# Patient Record
Sex: FEMALE | Race: WHITE | NOT HISPANIC OR LATINO | Employment: OTHER | URBAN - METROPOLITAN AREA
[De-identification: names, ages, dates, MRNs, and addresses within clinical notes are randomized per-mention and may not be internally consistent; named-entity substitution may affect disease eponyms.]

---

## 2017-05-09 ENCOUNTER — TELEPHONE (OUTPATIENT)
Dept: INTERNAL MEDICINE | Facility: CLINIC | Age: 72
End: 2017-05-09

## 2017-05-09 DIAGNOSIS — I10 HYPERTENSION GOAL BP (BLOOD PRESSURE) < 140/90: Primary | ICD-10-CM

## 2017-05-09 DIAGNOSIS — E78.5 HYPERLIPIDEMIA LDL GOAL <130: ICD-10-CM

## 2017-05-09 DIAGNOSIS — Z87.39 HISTORY OF GOUT: ICD-10-CM

## 2017-05-09 NOTE — TELEPHONE ENCOUNTER
Reason for Call: Request for an order or referral:    Order or referral being requested:  Lab orders    Date needed: at your convenience    Has the patient been seen by the PCP for this problem? YES    Additional comments:  Patient has lab appointment 5-31-17    Phone number Patient can be reached at:  Home number on file [phone] (home)    Best Time:  Anytime    Can we leave a detailed message on this number?  YES    Call taken on 5/9/2017 at 3:05 PM by Marian Aguirre

## 2017-05-31 NOTE — PROGRESS NOTES
SUBJECTIVE:                                                            Alfreda Baxter is a 72 year old female who presents for Preventive Visit.      Are you in the first 12 months of your Medicare Part B coverage?  No    Healthy Habits:    Do you get at least three servings of calcium containing foods daily (dairy, green leafy vegetables, etc.)? yes    Amount of exercise or daily activities, outside of work: 2 day(s) per week    Problems taking medications regularly No    Medication side effects: No    Have you had an eye exam in the past two years? yes    Do you see a dentist twice per year? yes    Do you have sleep apnea, excessive snoring or daytime drowsiness?yes-daytime drowsiness    COGNITIVE SCREEN  1) Repeat 3 items (Banana, Sunrise, Chair)    2) Clock draw: NORMAL  3) 3 item recall: Recalls 3 objects  Results: 3 items recalled: COGNITIVE IMPAIRMENT LESS LIKELY    Mini-CogTM Copyright S Thanh. Licensed by the author for use in [facility]; reprinted with permission (willis@[facility]). All rights reserved.            Body aches - from low back to low legs. , Left foot pain, Back MRI - has been getting lumbar MRI and epidurals via [facility].    Unable to tolerate oral NSAIDs due to GERD.  Using tylenol and topical NSAID.      Patient involved in daily yoga and range of motion exercises for maintaining mobility (for bilateral knee dejenerative joint arthritis)    Reviewed and updated as needed this visit by clinical staff         Reviewed and updated as needed this visit by Provider        Social History   Substance Use Topics     Smoking status: Former Smoker     Packs/day: 0.10     Years: 1.00     Types: Cigarettes     Start date: 1/1/1963     Quit date: 1/1/1965     Smokeless tobacco: Never Used     Alcohol use No      Comment: rare       The patient does not drink >3 drinks per day nor >7 drinks per week.    Today's PHQ-2 Score:   PHQ-2 ( 1999 Pfizer) 12/5/2016 1/19/2016   Q1: Little  interest or pleasure in doing things 0 0   Q2: Feeling down, depressed or hopeless 0 0   PHQ-2 Score 0 0   Q1: Little interest or pleasure in doing things - -   Q2: Feeling down, depressed or hopeless - -   PHQ-2 Score - -     0  Do you feel safe in your environment - Yes    Do you have a Health Care Directive?: Yes: Advance Directive has been received and scanned.    Current providers sharing in care for this patient include:   Patient Care Team:  Brandi Sims MD as PCP - General      Hearing impairment: Yes, -has hearing aids     Ability to successfully perform activities of daily living: Yes, no assistance needed     Fall risk:       Home safety:  none identified      The following health maintenance items are reviewed in Epic and correct as of today:  Health Maintenance   Topic Date Due     PNEUMOCOCCAL (1 of 2 - PCV13) 03/17/2009     FALL RISK ASSESSMENT  01/19/2017     ASTHMA CONTROL TEST Q6 MOS  06/05/2017     EYE EXAM Q1 YEAR  07/26/2017     INFLUENZA VACCINE (SYSTEM ASSIGNED)  09/01/2017     MAMMO SCREEN Q2 YR (SYSTEM ASSIGNED)  09/24/2017     TETANUS IMMUNIZATION (SYSTEM ASSIGNED)  11/19/2017     ASTHMA ACTION PLAN Q1 YR  12/05/2017     DEXA Q3 YR  04/22/2018     COLONOSCOPY Q3 YR  11/21/2019     ADVANCE DIRECTIVE PLANNING Q5 YRS  10/12/2020     LIPID SCREEN Q5 YR FEMALE (SYSTEM ASSIGNED)  03/15/2021         Pneumonia Vaccine:Adults age 65+ who received Pneumovax (PPSV23) at 65 years or older: Should be given PCV13 > 1 year after their most recent PPSV23     ROS:  Constitutional, HEENT, cardiovascular, pulmonary, GI (zantac controls GERD), , musculoskeletal diffuse dejenerative joint arthritis), neuro, skin, endocrine and psych systems are negative, except as otherwise noted.    Problem list, Medication list, Allergies, and Medical/Social/Surgical histories reviewed in EPIC and updated as appropriate.  Labs reviewed in EPIC  BP Readings from Last 3 Encounters:   06/05/17 114/69   12/05/16 123/76    11/21/16 104/67    Wt Readings from Last 3 Encounters:   06/05/17 194 lb (88 kg)   12/05/16 191 lb (86.6 kg)   11/15/16 190 lb (86.2 kg)                  Patient Active Problem List   Diagnosis     DJD (degenerative joint disease)     Hypertension goal BP (blood pressure) < 140/90     Hyperlipidemia LDL goal <130     Cataract, mild, ou     Advanced directives, counseling/discussion     Lumbar spinal stenosis     Toe pain     Adenomatous colon polyp     Mild persistent asthma     Primary osteoarthritis of both knees     Acute idiopathic gout of left foot     Status post total left knee replacement     Blepharitis, both eyes     Past Surgical History:   Procedure Laterality Date     ABDOMEN SURGERY  1976, 1978, 1995    2 C-sections,  total hysterectomy     APPENDECTOMY      age 7 yrs.     BREAST BIOPSY, RT/LT  02/17/04    left benign     BREAST SURGERY      see above     C TOTAL KNEE ARTHROPLASTY Left 11/2015    at [facility]     COLONOSCOPY  2013    needed q 3 yrs.     COLONOSCOPY WITH CO2 INSUFFLATION N/A 11/21/2016    Procedure: COLONOSCOPY WITH CO2 INSUFFLATION;  Surgeon: Brian Cleaning MD;  Location: [facility] OR     GENITOURINARY SURGERY      see above     HYSTERECTOMY, PAP NO LONGER INDICATED       HYSTERECTOMY, POORNIMA  1997    bleeding fibroids     ORTHOPEDIC SURGERY  2009    meniscus repair       Social History   Substance Use Topics     Smoking status: Former Smoker     Packs/day: 0.10     Years: 1.00     Types: Cigarettes     Start date: 1/1/1963     Quit date: 1/1/1965     Smokeless tobacco: Never Used     Alcohol use No      Comment: rare     Family History   Problem Relation Age of Onset     Respiratory Father      Glaucoma Father      Asthma Father      Allergies Sister      Eye Disorder Sister      Lipids Sister      Neurologic Disorder Sister      OSTEOPOROSIS Sister      Glaucoma Sister      Hypertension Sister      Arthritis Mother      CANCER Mother      Hypertension Mother      Other Cancer  Mother      Thyroid Disease Mother      GASTROINTESTINAL DISEASE Son      Macular Degeneration Sister      Hyperlipidemia Sister      Hypertension Sister      OSTEOPOROSIS Sister          Current Outpatient Prescriptions   Medication Sig Dispense Refill     zolpidem (AMBIEN) 5 MG tablet Take 1 tablet (5 mg) by mouth nightly as needed for sleep 30 tablet 3     diclofenac (VOLTAREN) 1 % GEL topical gel Apply 4 grams to knees or 2 grams to hands four times daily using enclosed dosing card. 100 g 3     RaNITidine HCl (ZANTAC 75 PO)        atorvastatin (LIPITOR) 40 MG tablet Take 1 tablet (40 mg) by mouth daily 90 tablet 3     lisinopril-hydrochlorothiazide (PRINZIDE/ZESTORETIC) 20-25 MG per tablet Take 1 tablet by mouth daily 90 tablet 3     S-Adenosylmethionine (GUILLERMO-E) 400 MG TABS        acetaminophen (TYLENOL) 500 MG tablet Take 500-1,000 mg by mouth every 6 hours as needed.       ASPIRIN 81 MG OR CPEP 1 CAPSULE DAILY       CALCIUM 500 +D OR one daily       B-COMPLEX OR Take  by mouth daily.       Multiple Vitamins-Minerals (WOMENS MULTIVITAMIN PLUS PO) Take  by mouth daily.       budesonide-formoterol (SYMBICORT) 80-4.5 MCG/ACT inhaler Inhale 2 puffs into the lungs 2 times daily (Patient not taking: Reported on 6/5/2017) 3 Inhaler 3     albuterol (PROAIR HFA, PROVENTIL HFA, VENTOLIN HFA) 108 (90 BASE) MCG/ACT inhaler Inhale 2 puffs into the lungs every 6 hours as needed for shortness of breath / dyspnea (Patient not taking: Reported on 6/5/2017) 1 Inhaler 12     Allergies   Allergen Reactions     Niacin      Nsaids      Oxycodone Hives     ... With facial swelling     Trazodone      nausea     Recent Labs   Lab Test  06/01/17   0808  12/05/16   1041  03/15/16   1027   09/10/15   1353  12/11/14   0840   12/05/13   0902  12/05/12   0912  12/01/11   0952   A1C   --    --    --    --    --    --    --   5.4  5.8  5.7   LDL  66   --   68   --    --   72   --   100  104  110   HDL  62   --   55   --    --   55   --   43*   "47*  44*   TRIG  188*   --   222*   --    --   138   --   237*  182*  204*   ALT   --    --   23   --    --   27   --    --   37  30   CR  0.77  0.80   --    < >  0.87  0.74   < >  0.74  0.79  0.84   GFRESTIMATED  73  70   --    < >  64  78   < >  78  72  68   GFRESTBLACK  89  85   --    < >  77  >90   GFR Calc     < >  >90  88  82   POTASSIUM  4.2  4.0   --    < >  3.9  4.6   < >  4.2  4.2  4.5   TSH   --    --    --    --   0.53   --    --   1.15  0.81  0.72    < > = values in this interval not displayed.      OBJECTIVE:                                                            /69 (BP Location: Right arm, Patient Position: Chair, Cuff Size: Adult Regular)  Pulse 74  Temp 97.3  F (36.3  C) (Oral)  Ht 5' 2\" (1.575 m)  Wt 194 lb (88 kg)  SpO2 95%  BMI 35.48 kg/m2 Estimated body mass index is 34.93 kg/(m^2) as calculated from the following:    Height as of 11/15/16: 5' 2\" (1.575 m).    Weight as of 12/5/16: 191 lb (86.6 kg).  EXAM:   GENERAL APPEARANCE: healthy, alert and no distress  EYES: Eyes grossly normal to inspection, PERRL and conjunctivae and sclerae normal  HENT: ear canals and TM's normal, nose and mouth without ulcers or lesions, oropharynx clear and oral mucous membranes moist  NECK: no adenopathy, no asymmetry, masses, or scars and thyroid normal to palpation  RESP: lungs clear to auscultation - no rales, rhonchi or wheezes  BREAST: normal without masses, tenderness or nipple discharge and no palpable axillary masses or adenopathy  CV: regular rate and rhythm, normal S1 S2, no S3 or S4, no murmur, click or rub, no peripheral edema and peripheral pulses strong  ABDOMEN: soft, nontender, no hepatosplenomegaly, no masses and bowel sounds normal   (female): normal female external genitalia, normal urethral meatus, vaginal mucosal atrophy noted and normal post-hysterectomy exam without masses.   MS: no musculoskeletal defects are noted and gait is age appropriate without " "ataxia  SKIN: no suspicious lesions or rashes.  sk's   NEURO: Normal strength and tone, sensory exam grossly normal, mentation intact and speech normal  PSYCH: mentation appears normal and affect normal/bright    ASSESSMENT / PLAN:                                                                ICD-10-CM    1. Routine general medical examination at a health care facility Z00.00    2. Need for prophylactic vaccination against Streptococcus pneumoniae (pneumococcus) Z23    3. Primary insomnia F51.01 zolpidem (AMBIEN) 5 MG tablet   4. Primary osteoarthritis of both knees M17.0 diclofenac (VOLTAREN) 1 % GEL topical gel   5. Status post total left knee replacement Z96.652 diclofenac (VOLTAREN) 1 % GEL topical gel   6. Osteoarthritis, unspecified osteoarthritis type, unspecified site M19.90    7. Encounter for screening mammogram for breast cancer Z12.31 *MA Screening Digital Bilateral       Unable to tolerate oral NSAIDs due to GERD.  Using tylenol and topical NSAID.   Uses ambien in order to fall asleep with her aches which would otherwise keep her up.    Mammogram due in Fall.  On q3year colonoscopy plan.      Patient pain likely due to diffuse dejenerative joint arthritis and not gout.      End of Life Planning:  Patient currently has an advanced directive: Yes.  Practitioner is supportive of decision.    COUNSELING:  Reviewed preventive health counseling, as reflected in patient instructions       due for prevnar 13, pt wants to check on coverage.         Estimated body mass index is 34.93 kg/(m^2) as calculated from the following:    Height as of 11/15/16: 5' 2\" (1.575 m).    Weight as of 12/5/16: 191 lb (86.6 kg).  Weight management plan: pt is exercising.    reports that she quit smoking about 52 years ago. Her smoking use included Cigarettes. She started smoking about 54 years ago. She has a 0.10 pack-year smoking history. She has never used smokeless tobacco.      Appropriate preventive services were discussed " with this patient, including applicable screening as appropriate for cardiovascular disease, diabetes, osteopenia/osteoporosis, and glaucoma.  As appropriate for age/gender, discussed screening for colorectal cancer, prostate cancer, breast cancer, and cervical cancer. Checklist reviewing preventive services available has been given to the patient.    Reviewed patients plan of care and provided an AVS. The Basic Care Plan (routine screening as documented in Health Maintenance) for Alfreda meets the Care Plan requirement. This Care Plan has been established and reviewed with the Patient.    Counseling Resources:  ATP IV Guidelines  Pooled Cohorts Equation Calculator  Breast Cancer Risk Calculator  FRAX Risk Assessment  ICSI Preventive Guidelines  Dietary Guidelines for Americans, 2010  USDA's MyPlate  ASA Prophylaxis  Lung CA Screening    Brandi Sims MD  [facility]

## 2017-05-31 NOTE — PATIENT INSTRUCTIONS

## 2017-06-01 DIAGNOSIS — E78.5 HYPERLIPIDEMIA LDL GOAL <130: ICD-10-CM

## 2017-06-01 DIAGNOSIS — Z87.39 HISTORY OF GOUT: ICD-10-CM

## 2017-06-01 DIAGNOSIS — I10 HYPERTENSION GOAL BP (BLOOD PRESSURE) < 140/90: ICD-10-CM

## 2017-06-01 LAB
ANION GAP SERPL CALCULATED.3IONS-SCNC: 5 MMOL/L (ref 3–14)
BUN SERPL-MCNC: 24 MG/DL (ref 7–30)
CALCIUM SERPL-MCNC: 9.2 MG/DL (ref 8.5–10.1)
CHLORIDE SERPL-SCNC: 105 MMOL/L (ref 94–109)
CHOLEST SERPL-MCNC: 166 MG/DL
CO2 SERPL-SCNC: 30 MMOL/L (ref 20–32)
CREAT SERPL-MCNC: 0.77 MG/DL (ref 0.52–1.04)
GFR SERPL CREATININE-BSD FRML MDRD: 73 ML/MIN/1.7M2
GLUCOSE SERPL-MCNC: 108 MG/DL (ref 70–99)
HDLC SERPL-MCNC: 62 MG/DL
LDLC SERPL CALC-MCNC: 66 MG/DL
NONHDLC SERPL-MCNC: 104 MG/DL
POTASSIUM SERPL-SCNC: 4.2 MMOL/L (ref 3.4–5.3)
SODIUM SERPL-SCNC: 140 MMOL/L (ref 133–144)
TRIGL SERPL-MCNC: 188 MG/DL
URATE SERPL-MCNC: 8.3 MG/DL (ref 2.6–6)

## 2017-06-01 PROCEDURE — 84550 ASSAY OF BLOOD/URIC ACID: CPT | Performed by: INTERNAL MEDICINE

## 2017-06-01 PROCEDURE — 80048 BASIC METABOLIC PNL TOTAL CA: CPT | Performed by: INTERNAL MEDICINE

## 2017-06-01 PROCEDURE — 80061 LIPID PANEL: CPT | Performed by: INTERNAL MEDICINE

## 2017-06-01 PROCEDURE — 36415 COLL VENOUS BLD VENIPUNCTURE: CPT | Performed by: INTERNAL MEDICINE

## 2017-06-05 ENCOUNTER — OFFICE VISIT (OUTPATIENT)
Dept: FAMILY MEDICINE | Facility: CLINIC | Age: 72
End: 2017-06-05
Payer: COMMERCIAL

## 2017-06-05 VITALS
HEART RATE: 74 BPM | TEMPERATURE: 97.3 F | BODY MASS INDEX: 35.7 KG/M2 | DIASTOLIC BLOOD PRESSURE: 69 MMHG | WEIGHT: 194 LBS | OXYGEN SATURATION: 95 % | SYSTOLIC BLOOD PRESSURE: 114 MMHG | HEIGHT: 62 IN

## 2017-06-05 DIAGNOSIS — Z00.00 ROUTINE GENERAL MEDICAL EXAMINATION AT A HEALTH CARE FACILITY: Primary | ICD-10-CM

## 2017-06-05 DIAGNOSIS — M19.90 OSTEOARTHRITIS, UNSPECIFIED OSTEOARTHRITIS TYPE, UNSPECIFIED SITE: ICD-10-CM

## 2017-06-05 DIAGNOSIS — M17.0 PRIMARY OSTEOARTHRITIS OF BOTH KNEES: ICD-10-CM

## 2017-06-05 DIAGNOSIS — F51.01 PRIMARY INSOMNIA: ICD-10-CM

## 2017-06-05 DIAGNOSIS — Z12.31 ENCOUNTER FOR SCREENING MAMMOGRAM FOR BREAST CANCER: ICD-10-CM

## 2017-06-05 DIAGNOSIS — Z96.652 STATUS POST TOTAL LEFT KNEE REPLACEMENT: ICD-10-CM

## 2017-06-05 DIAGNOSIS — Z23 NEED FOR PROPHYLACTIC VACCINATION AGAINST STREPTOCOCCUS PNEUMONIAE (PNEUMOCOCCUS): ICD-10-CM

## 2017-06-05 PROCEDURE — 99397 PER PM REEVAL EST PAT 65+ YR: CPT | Performed by: INTERNAL MEDICINE

## 2017-06-05 PROCEDURE — 99213 OFFICE O/P EST LOW 20 MIN: CPT | Mod: 25 | Performed by: INTERNAL MEDICINE

## 2017-06-05 RX ORDER — ZOLPIDEM TARTRATE 5 MG/1
5 TABLET ORAL
Qty: 30 TABLET | Refills: 3 | Status: SHIPPED | OUTPATIENT
Start: 2017-06-05 | End: 2018-02-12

## 2017-06-05 NOTE — MR AVS SNAPSHOT
After Visit Summary   6/5/2017    Alfreda Baxter    MRN: [id number]           Patient Information     Date Of Birth          1945        Visit Information        Provider Department      6/5/2017 8:20 AM Brandi Sims MD [facility]        Today's Diagnoses     Routine general medical examination at a health care facility    -  1    Need for prophylactic vaccination against Streptococcus pneumoniae (pneumococcus)        Primary insomnia        Primary osteoarthritis of both knees        Status post total left knee replacement        Osteoarthritis, unspecified osteoarthritis type, unspecified site        Encounter for screening mammogram for breast cancer          Care Instructions      Preventive Health Recommendations    Female Ages 65 +    Yearly exam:     See your health care provider every year in order to  o Review health changes.   o Discuss preventive care.    o Review your medicines if your doctor has prescribed any.      You no longer need a yearly Pap test unless you've had an abnormal Pap test in the past 10 years. If you have vaginal symptoms, such as bleeding or discharge, be sure to talk with your provider about a Pap test.      Every 1 to 2 years, have a mammogram.  If you are over 69, talk with your health care provider about whether or not you want to continue having screening mammograms.      Every 10 years, have a colonoscopy. Or, have a yearly FIT test (stool test). These exams will check for colon cancer.       Have a cholesterol test every 5 years, or more often if your doctor advises it.       Have a diabetes test (fasting glucose) every three years. If you are at risk for diabetes, you should have this test more often.       At age 65, have a bone density scan (DEXA) to check for osteoporosis (brittle bone disease).    Shots:    Get a flu shot each year.    Get a tetanus shot every 10 years.    Talk to your doctor about your pneumonia vaccines.  There are now two you should receive - Pneumovax (PPSV 23) and Prevnar (PCV 13).    Talk to your doctor about the shingles vaccine.    Talk to your doctor about the hepatitis B vaccine.    Nutrition:     Eat at least 5 servings of fruits and vegetables each day.      Eat whole-grain bread, whole-wheat pasta and brown rice instead of white grains and rice.      Talk to your provider about Calcium and Vitamin D.     Lifestyle    Exercise at least 150 minutes a week (30 minutes a day, 5 days a week). This will help you control your weight and prevent disease.      Limit alcohol to one drink per day.      No smoking.       Wear sunscreen to prevent skin cancer.       See your dentist twice a year for an exam and cleaning.      See your eye doctor every 1 to 2 years to screen for conditions such as glaucoma, macular degeneration and cataracts.    Try the pool with your friend!!    Schedule Prevnar 13 with ancillary in future              Follow-ups after your visit        Follow-up notes from your care team     Return in about 1 year (around 6/5/2018), or if symptoms worsen or fail to improve, for Physical Exam.      Your next 10 appointments already scheduled     Jul 27, 2017  9:30 AM CDT   New Visit with Bryan Morrison MD   [facility]    [address] [place] [address] [id number] [phone]              Future tests that were ordered for you today     Open Future Orders        Priority Expected Expires Ordered    *MA Screening Digital Bilateral Routine  6/5/2018 6/5/2017            Who to contact     If you have questions or need follow up information about today's clinic visit or your schedule please contact [facility] directly at [phone].  Normal or non-critical lab and imaging results will be communicated to you by MyChart, letter or phone within 4 business days after the clinic has received the results. If you do not hear from us within 7  "days, please contact the clinic through [employer] or phone. If you have a critical or abnormal lab result, we will notify you by phone as soon as possible.  Submit refill requests through [employer] or call your pharmacy and they will forward the refill request to us. Please allow 3 business days for your refill to be completed.          Additional Information About Your Visit        [employer] Information     [employer] gives you secure access to your electronic health record. If you see a primary care provider, you can also send messages to your care team and make appointments. If you have questions, please call your primary care clinic.  If you do not have a primary care provider, please call [phone] and they will assist you.        Care EveryWhere ID     This is your Care EveryWhere ID. This could be used by other organizations to access your [facility] medical records  [id number]        Your Vitals Were     Pulse Temperature Height Pulse Oximetry BMI (Body Mass Index)       74 97.3  F (36.3  C) (Oral) 5' 2\" (1.575 m) 95% 35.48 kg/m2        Blood Pressure from Last 3 Encounters:   06/05/17 114/69   12/05/16 123/76   11/21/16 104/67    Weight from Last 3 Encounters:   06/05/17 194 lb (88 kg)   12/05/16 191 lb (86.6 kg)   11/15/16 190 lb (86.2 kg)                 Today's Medication Changes          These changes are accurate as of: 6/5/17  9:37 AM.  If you have any questions, ask your nurse or doctor.               Stop taking these medicines if you haven't already. Please contact your care team if you have questions.     budesonide-formoterol 80-4.5 MCG/ACT Inhaler   Commonly known as:  SYMBICORT   Stopped by:  Brandi Sims MD                Where to get your medicines      These medications were sent to [id number] - [place] [address]  [address], [place] [address]     Phone:  [phone]     diclofenac 1 % Gel topical gel         Some of these will need a " paper prescription and others can be bought over the counter.  Ask your nurse if you have questions.     Bring a paper prescription for each of these medications     zolpidem 5 MG tablet                Primary Care Provider Office Phone # Fax #    Brandi Sims [phone]       [facility] [address]  [facility] [address]        Thank you!     Thank you for choosing [facility]  for your care. Our goal is always to provide you with excellent care. Hearing back from our patients is one way we can continue to improve our services. Please take a few minutes to complete the written survey that you may receive in the mail after your visit with us. Thank you!             Your Updated Medication List - Protect others around you: Learn how to safely use, store and throw away your medicines at www.disposemymeds.org.          This list is accurate as of: 6/5/17  9:37 AM.  Always use your most recent med list.                   Brand Name Dispense Instructions for use    acetaminophen 500 MG tablet    TYLENOL     Take 500-1,000 mg by mouth every 6 hours as needed.       albuterol 108 (90 BASE) MCG/ACT Inhaler    PROAIR HFA/PROVENTIL HFA/VENTOLIN HFA    1 Inhaler    Inhale 2 puffs into the lungs every 6 hours as needed for shortness of breath / dyspnea       ASPIRIN 81 MG Cpep      1 CAPSULE DAILY       atorvastatin 40 MG tablet    LIPITOR    90 tablet    Take 1 tablet (40 mg) by mouth daily       B-COMPLEX PO      Take  by mouth daily.       CALCIUM 500 +D PO      one daily       diclofenac 1 % Gel topical gel    VOLTAREN    100 g    Apply 4 grams to knees or 2 grams to hands four times daily using enclosed dosing card.       lisinopril-hydrochlorothiazide 20-25 MG per tablet    PRINZIDE/ZESTORETIC    90 tablet    Take 1 tablet by mouth daily       GUILLERMO-e 400 MG Tabs          WOMENS MULTIVITAMIN PLUS PO      Take  by mouth daily.       ZANTAC 75 PO           zolpidem 5 MG tablet    AMBIEN    30 tablet    Take 1 tablet (5 mg) by mouth nightly as needed for sleep

## 2017-06-05 NOTE — NURSING NOTE
"Chief Complaint   Patient presents with     Physical     Health Maintenance     ACT,fall risk        Initial /69 (BP Location: Right arm, Patient Position: Chair, Cuff Size: Adult Regular)  Pulse 74  Temp 97.3  F (36.3  C) (Oral)  Ht 5' 2\" (1.575 m)  Wt 194 lb (88 kg)  SpO2 95%  BMI 35.48 kg/m2 Estimated body mass index is 35.48 kg/(m^2) as calculated from the following:    Height as of this encounter: 5' 2\" (1.575 m).    Weight as of this encounter: 194 lb (88 kg).  Medication Reconciliation: complete   Salome Grijalva ma      "

## 2017-06-06 ASSESSMENT — ASTHMA QUESTIONNAIRES: ACT_TOTALSCORE: 25

## 2017-06-07 ENCOUNTER — TRANSFERRED RECORDS (OUTPATIENT)
Dept: HEALTH INFORMATION MANAGEMENT | Facility: CLINIC | Age: 72
End: 2017-06-07

## 2017-06-07 ENCOUNTER — TELEPHONE (OUTPATIENT)
Dept: FAMILY MEDICINE | Facility: CLINIC | Age: 72
End: 2017-06-07

## 2017-06-07 NOTE — TELEPHONE ENCOUNTER
Forms received from: [facility]   Phone number listed: [phone]   Fax listed: [phone]  Date received: 06/07/2017  Form description: Drug Therapy Recommendation  Once forms are completed, please return to [facility] via fax.  Is patient requesting to be contacted when forms are completed: NA    Form placed: In provider's basket  Waleska Roberts

## 2017-06-15 ENCOUNTER — TRANSFERRED RECORDS (OUTPATIENT)
Dept: HEALTH INFORMATION MANAGEMENT | Facility: CLINIC | Age: 72
End: 2017-06-15

## 2017-07-18 ENCOUNTER — TRANSFERRED RECORDS (OUTPATIENT)
Dept: HEALTH INFORMATION MANAGEMENT | Facility: CLINIC | Age: 72
End: 2017-07-18

## 2017-07-27 ENCOUNTER — OFFICE VISIT (OUTPATIENT)
Dept: OPHTHALMOLOGY | Facility: CLINIC | Age: 72
End: 2017-07-27
Payer: COMMERCIAL

## 2017-07-27 DIAGNOSIS — H01.023 SQUAMOUS BLEPHARITIS OF BOTH EYES, UNSPECIFIED EYELID: ICD-10-CM

## 2017-07-27 DIAGNOSIS — H01.026 SQUAMOUS BLEPHARITIS OF BOTH EYES, UNSPECIFIED EYELID: ICD-10-CM

## 2017-07-27 DIAGNOSIS — H52.4 PRESBYOPIA: ICD-10-CM

## 2017-07-27 DIAGNOSIS — H25.813 COMBINED FORMS OF AGE-RELATED CATARACT OF BOTH EYES: Primary | ICD-10-CM

## 2017-07-27 PROCEDURE — 92014 COMPRE OPH EXAM EST PT 1/>: CPT | Performed by: OPHTHALMOLOGY

## 2017-07-27 PROCEDURE — 92015 DETERMINE REFRACTIVE STATE: CPT | Performed by: OPHTHALMOLOGY

## 2017-07-27 ASSESSMENT — VISUAL ACUITY
OS_CC: 20/20
OD_CC: 20/25
CORRECTION_TYPE: GLASSES
OS_CC: J7
OD_CC: J2
OD_CC+: -2
METHOD: SNELLEN - LINEAR

## 2017-07-27 ASSESSMENT — REFRACTION_WEARINGRX
OD_SPHERE: -1.00
OD_CYLINDER: +0.50
SPECS_TYPE: PAL
OS_SPHERE: +0.25
OS_CYLINDER: +0.75
OD_AXIS: 116
OS_AXIS: 045
OS_ADD: +2.75
OD_ADD: +2.75

## 2017-07-27 ASSESSMENT — SLIT LAMP EXAM - LIDS
COMMENTS: 2+ DERMATOCHALASIS - UPPER LID, 2+ SCLERAL SHOW, 2+ MEIBOMIAN GLAND DYSFUNCTION
COMMENTS: 2+ DERMATOCHALASIS - UPPER LID, 2+ SCLERAL SHOW, 2+ MEIBOMIAN GLAND DYSFUNCTION

## 2017-07-27 ASSESSMENT — CONF VISUAL FIELD
OD_NORMAL: 1
OS_NORMAL: 1

## 2017-07-27 ASSESSMENT — EXTERNAL EXAM - LEFT EYE: OS_EXAM: 2+ BROW PTOSIS

## 2017-07-27 ASSESSMENT — TONOMETRY
OD_IOP_MMHG: 18
IOP_METHOD: APPLANATION
OS_IOP_MMHG: 17

## 2017-07-27 ASSESSMENT — REFRACTION_MANIFEST
OD_SPHERE: -1.50
OD_ADD: +2.75
OS_CYLINDER: +0.75
OD_AXIS: 140
OD_CYLINDER: +0.50
OS_ADD: +2.75
OS_AXIS: 050
OS_SPHERE: +0.25

## 2017-07-27 ASSESSMENT — CUP TO DISC RATIO
OD_RATIO: 0.5
OS_RATIO: 0.3

## 2017-07-27 ASSESSMENT — EXTERNAL EXAM - RIGHT EYE: OD_EXAM: 2+ BROW PTOSIS

## 2017-07-27 NOTE — MR AVS SNAPSHOT
After Visit Summary   7/27/2017    Alfreda Baxter    MRN: [id number]           Patient Information     Date Of Birth          1945        Visit Information        Provider Department      7/27/2017 9:30 AM Bryan Morrison MD [facility]        Today's Diagnoses     Combined forms of age-related cataract, mild both eyes        Squamous blepharitis of both eyes, unspecified eyelid        Myopia, right        Hypermetropia, left        Irregular astigmatism of both eyes        Presbyopia          Care Instructions    Glasses Rx given - optional   Use artificial tears up to 4 times daily both eyes. (Refresh Tears or Systane Ultra/Balance)   Call in March 2018 for an appointment in July 2018 for Complete Exam    Dr. Morrison [phone]          Follow-ups after your visit        Who to contact     If you have questions or need follow up information about today's clinic visit or your schedule please contact [facility] directly at [phone].  Normal or non-critical lab and imaging results will be communicated to you by Crazy eCommercehart, letter or phone within 4 business days after the clinic has received the results. If you do not hear from us within 7 days, please contact the clinic through GetAFivet or phone. If you have a critical or abnormal lab result, we will notify you by phone as soon as possible.  Submit refill requests through Huoli or call your pharmacy and they will forward the refill request to us. Please allow 3 business days for your refill to be completed.          Additional Information About Your Visit        MyChart Information     Huoli gives you secure access to your electronic health record. If you see a primary care provider, you can also send messages to your care team and make appointments. If you have questions, please call your primary care clinic.  If you do not have a primary care provider, please call [phone] and they will assist you.         Care EveryWhere ID     This is your Care EveryWhere ID. This could be used by other organizations to access your [place] medical records  [id number]         Blood Pressure from Last 3 Encounters:   06/05/17 114/69   12/05/16 123/76   11/21/16 104/67    Weight from Last 3 Encounters:   06/05/17 88 kg (194 lb)   12/05/16 86.6 kg (191 lb)   11/15/16 86.2 kg (190 lb)              We Performed the Following     EYE EXAM (SIMPLE-NONBILLABLE)     REFRACTION        Primary Care Provider Office Phone # Fax #    Brandi Sims [phone]       [facility] [address]  [facility] [address]        Equal Access to Services     ANGELICA PEREYRA : Hadii aad ku hadasho Sochun, waaxda luqadaha, qaybta kaalmada adeegyada, waxay kieranin melinda ibrahim. So [facility] [phone].    ATENCIÓN: Si habla español, tiene a che disposición servicios gratuitos de asistencia lingüística. [facility] [phone].    We comply with applicable federal civil rights laws and [place] laws. We do not discriminate on the basis of race, color, national origin, age, disability sex, sexual orientation or gender identity.            Thank you!     Thank you for choosing [facility]  for your care. Our goal is always to provide you with excellent care. Hearing back from our patients is one way we can continue to improve our services. Please take a few minutes to complete the written survey that you may receive in the mail after your visit with us. Thank you!             Your Updated Medication List - Protect others around you: Learn how to safely use, store and throw away your medicines at www.disposemymeds.org.          This list is accurate as of: 7/27/17 10:30 AM.  Always use your most recent med list.                   Brand Name Dispense Instructions for use Diagnosis    acetaminophen 500 MG tablet    TYLENOL     Take 500-1,000 mg by mouth every 6 hours as needed.         albuterol 108 (90 BASE) MCG/ACT Inhaler    PROAIR HFA/PROVENTIL HFA/VENTOLIN HFA    1 Inhaler    Inhale 2 puffs into the lungs every 6 hours as needed for shortness of breath / dyspnea    Mild persistent asthma       ASPIRIN 81 MG Cpep      1 CAPSULE DAILY        atorvastatin 40 MG tablet    LIPITOR    90 tablet    Take 1 tablet (40 mg) by mouth daily    Hyperlipidemia LDL goal <130       B-COMPLEX PO      Take  by mouth daily.        CALCIUM 500 +D PO      one daily        diclofenac 1 % Gel topical gel    VOLTAREN    100 g    Apply 4 grams to knees or 2 grams to hands four times daily using enclosed dosing card.    Primary osteoarthritis of both knees, Status post total left knee replacement       lisinopril-hydrochlorothiazide 20-25 MG per tablet    PRINZIDE/ZESTORETIC    90 tablet    Take 1 tablet by mouth daily    Hypertension goal BP (blood pressure) < 140/90       GUILLERMO-e 400 MG Tabs           WOMENS MULTIVITAMIN PLUS PO      Take  by mouth daily.        ZANTAC 75 PO           zolpidem 5 MG tablet    AMBIEN    30 tablet    Take 1 tablet (5 mg) by mouth nightly as needed for sleep    Primary insomnia

## 2017-07-27 NOTE — PROGRESS NOTES
Current Eye Medications:  Systane PRN OU     Subjective:  Complete Exam - Vision seems stable, no concerns about eyes this year.  Hasn't had to use sulfa/pred drop for the past year.     Objective:  See Ophthalmology Exam.       Assessment:  Stable eye exam.      ICD-10-CM    1. Combined forms of age-related cataract, mild, both eyes H25.813 EYE EXAM (SIMPLE-NONBILLABLE)   2. Squamous blepharitis of both eyes, unspecified eyelid H01.023     H01.026    3. Presbyopia H52.4 REFRACTION        Plan:  Glasses Rx given - optional   Use artificial tears up to 4 times daily both eyes. (Refresh Tears or Systane Ultra/Balance)   Call in March 2018 for an appointment in July 2018 for Complete Exam    Dr. Morrison [phone]

## 2017-07-27 NOTE — PATIENT INSTRUCTIONS
Glasses Rx given - optional   Use artificial tears up to 4 times daily both eyes. (Refresh Tears or Systane Ultra/Balance)   Call in March 2018 for an appointment in July 2018 for Complete Exam    Dr. Morrison [phone]

## 2017-09-14 ENCOUNTER — TRANSFERRED RECORDS (OUTPATIENT)
Dept: HEALTH INFORMATION MANAGEMENT | Facility: CLINIC | Age: 72
End: 2017-09-14

## 2017-12-11 DIAGNOSIS — I10 HYPERTENSION GOAL BP (BLOOD PRESSURE) < 140/90: ICD-10-CM

## 2017-12-12 RX ORDER — LISINOPRIL AND HYDROCHLOROTHIAZIDE 20; 25 MG/1; MG/1
TABLET ORAL
Qty: 90 TABLET | Refills: 1 | Status: SHIPPED | OUTPATIENT
Start: 2017-12-12 | End: 2018-06-12

## 2018-01-04 ENCOUNTER — TRANSFERRED RECORDS (OUTPATIENT)
Dept: HEALTH INFORMATION MANAGEMENT | Facility: CLINIC | Age: 73
End: 2018-01-04

## 2018-02-12 ENCOUNTER — MYC MEDICAL ADVICE (OUTPATIENT)
Dept: FAMILY MEDICINE | Facility: CLINIC | Age: 73
End: 2018-02-12

## 2018-02-12 DIAGNOSIS — E78.5 HYPERLIPIDEMIA LDL GOAL <130: Primary | ICD-10-CM

## 2018-02-12 DIAGNOSIS — F51.01 PRIMARY INSOMNIA: ICD-10-CM

## 2018-02-12 RX ORDER — ZOLPIDEM TARTRATE 5 MG/1
TABLET ORAL
Qty: 30 TABLET | Refills: 3 | Status: SHIPPED | OUTPATIENT
Start: 2018-02-12 | End: 2018-06-28

## 2018-02-12 NOTE — TELEPHONE ENCOUNTER
Requested Prescriptions   Pending Prescriptions Disp Refills     zolpidem (AMBIEN) 5 MG tablet [Pharmacy Med Name: ZOLPIDEM 5MG        TAB] 30 tablet 3     Sig: TAKE ONE TABLET BY MOUTH IN THE EVENING AS NEEDED FOR SLEEP    There is no refill protocol information for this order         Last Written Prescription Date:  6-5-17  Last Fill Quantity: 30,   # refills: 3  Last Office Visit: 6-5-17  Future Office visit:       Routing refill request to provider for review/approval because:  Drug not on the [facility], [facility] or [facility] refill protocol or controlled substance

## 2018-02-13 RX ORDER — ATORVASTATIN CALCIUM 20 MG/1
20 TABLET, FILM COATED ORAL DAILY
Qty: 90 TABLET | Refills: 3 | Status: SHIPPED | OUTPATIENT
Start: 2018-02-13 | End: 2018-09-06 | Stop reason: DRUGHIGH

## 2018-04-12 ENCOUNTER — TRANSFERRED RECORDS (OUTPATIENT)
Dept: HEALTH INFORMATION MANAGEMENT | Facility: CLINIC | Age: 73
End: 2018-04-12

## 2018-06-12 ENCOUNTER — TELEPHONE (OUTPATIENT)
Dept: FAMILY MEDICINE | Facility: CLINIC | Age: 73
End: 2018-06-12

## 2018-06-12 DIAGNOSIS — R79.89 LOW VITAMIN D LEVEL: ICD-10-CM

## 2018-06-12 DIAGNOSIS — E78.5 HYPERLIPIDEMIA LDL GOAL <130: ICD-10-CM

## 2018-06-12 DIAGNOSIS — I10 HYPERTENSION GOAL BP (BLOOD PRESSURE) < 140/90: ICD-10-CM

## 2018-06-12 DIAGNOSIS — R73.09 ELEVATED GLUCOSE: ICD-10-CM

## 2018-06-12 DIAGNOSIS — I10 HYPERTENSION GOAL BP (BLOOD PRESSURE) < 140/90: Primary | ICD-10-CM

## 2018-06-12 RX ORDER — LISINOPRIL AND HYDROCHLOROTHIAZIDE 20; 25 MG/1; MG/1
TABLET ORAL
Qty: 30 TABLET | Refills: 0 | Status: SHIPPED | OUTPATIENT
Start: 2018-06-12 | End: 2018-06-28

## 2018-06-12 NOTE — TELEPHONE ENCOUNTER
"Requested Prescriptions   Pending Prescriptions Disp Refills     lisinopril-hydrochlorothiazide (PRINZIDE/ZESTORETIC) 20-25 MG per tablet [Pharmacy Med Name: LISINOPRIL/HCTZ 20-25MG TAB] 90 tablet 1    Last Written Prescription Date:  12/12/17  Last Fill Quantity: 90,  # refills: 1   Last office visit: 6/5/2017 with prescribing provider:     Future Office Visit:     Sig: TAKE ONE TABLET BY MOUTH ONCE DAILY    Diuretics (Including Combos) Protocol Failed    6/12/2018  9:20 AM       Failed - Blood pressure under 140/90 in past 12 months    BP Readings from Last 3 Encounters:   06/05/17 114/69   12/05/16 123/76   11/21/16 104/67                Failed - Recent (12 mo) or future (30 days) visit within the authorizing provider's specialty    Patient had office visit in the last 12 months or has a visit in the next 30 days with authorizing provider or within the authorizing provider's specialty.  See \"Patient Info\" tab in inbasket, or \"Choose Columns\" in Meds & Orders section of the refill encounter.           Failed - Normal serum creatinine on file in past 12 months    Recent Labs   Lab Test  06/01/17   0808   CR  0.77             Failed - Normal serum potassium on file in past 12 months    Recent Labs   Lab Test  06/01/17   0808   POTASSIUM  4.2                   Failed - Normal serum sodium on file in past 12 months    Recent Labs   Lab Test  06/01/17   0808   NA  140             Passed - Patient is age 18 or older       Passed - No active pregancy on record       Passed - No positive pregnancy test in past 12 months          "

## 2018-06-12 NOTE — TELEPHONE ENCOUNTER
Reason for Call:  Other Lab Orders    Detailed comments:  Patient is scheduled for a Wellness physical on 06/28/18 and patient is requesting to do her pre visit labs on 6/25/18.  Please add lab orders to her chart    Phone Number Patient can be reached at: Home number on file [phone] (home)    Best Time: Any     Can we leave a detailed message on this number? YES    Call taken on 6/12/2018 at 10:54 AM by Cheryl Griffith

## 2018-06-25 DIAGNOSIS — R79.89 LOW VITAMIN D LEVEL: ICD-10-CM

## 2018-06-25 DIAGNOSIS — I10 HYPERTENSION GOAL BP (BLOOD PRESSURE) < 140/90: ICD-10-CM

## 2018-06-25 DIAGNOSIS — E78.5 HYPERLIPIDEMIA LDL GOAL <130: ICD-10-CM

## 2018-06-25 DIAGNOSIS — R73.09 ELEVATED GLUCOSE: ICD-10-CM

## 2018-06-25 LAB
ANION GAP SERPL CALCULATED.3IONS-SCNC: 11 MMOL/L (ref 3–14)
BUN SERPL-MCNC: 22 MG/DL (ref 7–30)
CALCIUM SERPL-MCNC: 9.4 MG/DL (ref 8.5–10.1)
CHLORIDE SERPL-SCNC: 104 MMOL/L (ref 94–109)
CHOLEST SERPL-MCNC: 163 MG/DL
CO2 SERPL-SCNC: 27 MMOL/L (ref 20–32)
CREAT SERPL-MCNC: 0.83 MG/DL (ref 0.52–1.04)
DEPRECATED CALCIDIOL+CALCIFEROL SERPL-MC: 22 UG/L (ref 20–75)
GFR SERPL CREATININE-BSD FRML MDRD: 67 ML/MIN/1.7M2
GLUCOSE SERPL-MCNC: 100 MG/DL (ref 70–99)
HBA1C MFR BLD: 5.4 % (ref 0–5.6)
HDLC SERPL-MCNC: 51 MG/DL
LDLC SERPL CALC-MCNC: 68 MG/DL
NONHDLC SERPL-MCNC: 112 MG/DL
POTASSIUM SERPL-SCNC: 4.3 MMOL/L (ref 3.4–5.3)
SODIUM SERPL-SCNC: 142 MMOL/L (ref 133–144)
TRIGL SERPL-MCNC: 218 MG/DL

## 2018-06-25 PROCEDURE — 80061 LIPID PANEL: CPT | Performed by: INTERNAL MEDICINE

## 2018-06-25 PROCEDURE — 83036 HEMOGLOBIN GLYCOSYLATED A1C: CPT | Performed by: INTERNAL MEDICINE

## 2018-06-25 PROCEDURE — 36415 COLL VENOUS BLD VENIPUNCTURE: CPT | Performed by: INTERNAL MEDICINE

## 2018-06-25 PROCEDURE — 82306 VITAMIN D 25 HYDROXY: CPT | Performed by: INTERNAL MEDICINE

## 2018-06-25 PROCEDURE — 80048 BASIC METABOLIC PNL TOTAL CA: CPT | Performed by: INTERNAL MEDICINE

## 2018-06-26 ENCOUNTER — TELEPHONE (OUTPATIENT)
Dept: FAMILY MEDICINE | Facility: CLINIC | Age: 73
End: 2018-06-26

## 2018-06-26 NOTE — TELEPHONE ENCOUNTER
Forms received from: [facility]   Phone number listed: [phone]   Fax listed: [phone]  Date received: 06/26/2018  Form description: Possible Consideration of Therapy Change  Once forms are completed, please return to [facility] via fax.  Is patient requesting to be contacted when forms are completed: NA    Form placed: In provider's basket  Waleska Roberts

## 2018-06-27 PROBLEM — J45.20 MILD INTERMITTENT ASTHMA WITHOUT COMPLICATION: Status: ACTIVE | Noted: 2018-06-27

## 2018-06-28 ENCOUNTER — OFFICE VISIT (OUTPATIENT)
Dept: FAMILY MEDICINE | Facility: CLINIC | Age: 73
End: 2018-06-28
Payer: COMMERCIAL

## 2018-06-28 VITALS
WEIGHT: 197 LBS | DIASTOLIC BLOOD PRESSURE: 68 MMHG | HEIGHT: 62 IN | SYSTOLIC BLOOD PRESSURE: 115 MMHG | BODY MASS INDEX: 36.25 KG/M2 | OXYGEN SATURATION: 97 % | HEART RATE: 72 BPM | TEMPERATURE: 97.5 F

## 2018-06-28 DIAGNOSIS — E78.5 HYPERLIPIDEMIA LDL GOAL <130: ICD-10-CM

## 2018-06-28 DIAGNOSIS — M79.2 NEUROPATHIC PAIN OF FOOT, LEFT: ICD-10-CM

## 2018-06-28 DIAGNOSIS — M48.061 SPINAL STENOSIS OF LUMBAR REGION WITHOUT NEUROGENIC CLAUDICATION: ICD-10-CM

## 2018-06-28 DIAGNOSIS — Z00.00 ROUTINE GENERAL MEDICAL EXAMINATION AT A HEALTH CARE FACILITY: Primary | ICD-10-CM

## 2018-06-28 DIAGNOSIS — Z23 NEED FOR TETANUS BOOSTER: ICD-10-CM

## 2018-06-28 DIAGNOSIS — J45.20 MILD INTERMITTENT ASTHMA WITHOUT COMPLICATION: ICD-10-CM

## 2018-06-28 DIAGNOSIS — Z23 NEED FOR PNEUMOCOCCAL VACCINATION: ICD-10-CM

## 2018-06-28 DIAGNOSIS — E66.9 OBESITY (BMI 35.0-39.9 WITHOUT COMORBIDITY): ICD-10-CM

## 2018-06-28 DIAGNOSIS — F51.01 PRIMARY INSOMNIA: ICD-10-CM

## 2018-06-28 DIAGNOSIS — Z96.652 STATUS POST TOTAL LEFT KNEE REPLACEMENT: ICD-10-CM

## 2018-06-28 DIAGNOSIS — I10 HYPERTENSION GOAL BP (BLOOD PRESSURE) < 140/90: ICD-10-CM

## 2018-06-28 DIAGNOSIS — M17.0 PRIMARY OSTEOARTHRITIS OF BOTH KNEES: ICD-10-CM

## 2018-06-28 DIAGNOSIS — M15.0 PRIMARY OSTEOARTHRITIS INVOLVING MULTIPLE JOINTS: ICD-10-CM

## 2018-06-28 DIAGNOSIS — R79.89 LOW VITAMIN D LEVEL: ICD-10-CM

## 2018-06-28 DIAGNOSIS — M10.072 ACUTE IDIOPATHIC GOUT INVOLVING TOE OF LEFT FOOT: ICD-10-CM

## 2018-06-28 PROCEDURE — 90472 IMMUNIZATION ADMIN EACH ADD: CPT | Performed by: INTERNAL MEDICINE

## 2018-06-28 PROCEDURE — 90714 TD VACC NO PRESV 7 YRS+ IM: CPT | Performed by: INTERNAL MEDICINE

## 2018-06-28 PROCEDURE — 90670 PCV13 VACCINE IM: CPT | Performed by: INTERNAL MEDICINE

## 2018-06-28 PROCEDURE — G0439 PPPS, SUBSEQ VISIT: HCPCS | Performed by: INTERNAL MEDICINE

## 2018-06-28 PROCEDURE — 99213 OFFICE O/P EST LOW 20 MIN: CPT | Mod: 25 | Performed by: INTERNAL MEDICINE

## 2018-06-28 PROCEDURE — G0009 ADMIN PNEUMOCOCCAL VACCINE: HCPCS | Performed by: INTERNAL MEDICINE

## 2018-06-28 RX ORDER — LISINOPRIL AND HYDROCHLOROTHIAZIDE 20; 25 MG/1; MG/1
1 TABLET ORAL DAILY
Qty: 90 TABLET | Refills: 3 | Status: ON HOLD | OUTPATIENT
Start: 2018-06-28 | End: 2018-12-31

## 2018-06-28 RX ORDER — ZOLPIDEM TARTRATE 5 MG/1
TABLET ORAL
Qty: 30 TABLET | Refills: 3 | Status: SHIPPED | OUTPATIENT
Start: 2018-06-28 | End: 2019-07-11

## 2018-06-28 RX ORDER — GABAPENTIN 300 MG/1
300 CAPSULE ORAL
Qty: 90 CAPSULE | Refills: 0 | Status: SHIPPED | OUTPATIENT
Start: 2018-06-28 | End: 2018-07-31

## 2018-06-28 RX ORDER — CHOLECALCIFEROL (VITAMIN D3) 50 MCG
2000 TABLET ORAL DAILY
Qty: 100 TABLET | Refills: 3 | Status: CANCELLED | OUTPATIENT
Start: 2018-06-28

## 2018-06-28 ASSESSMENT — ACTIVITIES OF DAILY LIVING (ADL)
CURRENT_FUNCTION: NO ASSISTANCE NEEDED
I_NEED_ASSISTANCE_FOR_THE_FOLLOWING_DAILY_ACTIVITIES:: NO ASSISTANCE IS NEEDED

## 2018-06-28 NOTE — NURSING NOTE
Screening Questionnaire for Adult Immunization    Are you sick today?   No   Do you have allergies to medications, food, a vaccine component or latex?   No   Have you ever had a serious reaction after receiving a vaccination?   No   Do you have a long-term health problem with heart disease, lung disease, asthma, kidney disease, metabolic disease (e.g. diabetes), anemia, or other blood disorder?   YES   Do you have cancer, leukemia, HIV/AIDS, or any other immune system problem?   No   In the past 3 months, have you taken medications that affect  your immune system, such as prednisone, other steroids, or anticancer drugs; drugs for the treatment of rheumatoid arthritis, Crohn s disease, or psoriasis; or have you had radiation treatments?   No   Have you had a seizure, or a brain or other nervous system problem?   No   During the past year, have you received a transfusion of blood or blood     products, or been given immune (gamma) globulin or antiviral drug?   No   For women: Are you pregnant or is there a chance you could become        pregnant during the next month?   No   Have you received any vaccinations in the past 4 weeks?   No     Immunization questionnaire was positive for at least one answer.  Notified Dr. Sims.     Per orders of Dr. Sims, injection of TD and Pneumococcal 13 given by Effie Cee. Patient instructed to remain in clinic for 15 minutes afterwards, and to report any adverse reaction to me immediately.  Prior to injection verified patient identity using patient's name and date of birth.  Vaccine information supplied.     Screening performed by Effie Cee on 6/28/2018 at 10:50 AM.

## 2018-06-28 NOTE — MR AVS SNAPSHOT
After Visit Summary   6/28/2018    Alfreda Baxter    MRN: [id number]           Patient Information     Date Of Birth          1945        Visit Information        Provider Department      6/28/2018 9:40 AM Brandi Sims MD [facility]        Today's Diagnoses     Routine general medical examination at a health care facility    -  1    Low vitamin D level        Hyperlipidemia LDL goal <130        overweight HCC        Hypertension goal BP (blood pressure) < 140/90        Primary osteoarthritis involving multiple joints        Spinal stenosis of lumbar region without neurogenic claudication        Mild intermittent asthma without complication        Need for pneumococcal vaccination        Need for tetanus booster        Primary insomnia        Primary osteoarthritis of both knees        Status post total left knee replacement        Acute idiopathic gout involving toe of left foot        Neuropathic pain of foot, left          Care Instructions      Preventive Health Recommendations    Female Ages 65 +    Yearly exam:     See your health care provider every year in order to  o Review health changes.   o Discuss preventive care.    o Review your medicines if your doctor has prescribed any.      You no longer need a yearly Pap test unless you've had an abnormal Pap test in the past 10 years. If you have vaginal symptoms, such as bleeding or discharge, be sure to talk with your provider about a Pap test.      Every 1 to 2 years, have a mammogram.  If you are over 69, talk with your health care provider about whether or not you want to continue having screening mammograms.      Every 10 years, have a colonoscopy. Or, have a yearly FIT test (stool test). These exams will check for colon cancer.       Have a cholesterol test every 5 years, or more often if your doctor advises it.       Have a diabetes test (fasting glucose) every three years. If you are at risk for diabetes,  "you should have this test more often.       At age 65, have a bone density scan (DEXA) to check for osteoporosis (brittle bone disease).    Shots:    Get a flu shot each year.    Get a tetanus shot every 10 years.    Talk to your doctor about your pneumonia vaccines. There are now two you should receive - Pneumovax (PPSV 23) and Prevnar (PCV 13).    Talk to your pharmacist about the shingles vaccine.    Talk to your doctor about the hepatitis B vaccine.    Nutrition:     Eat at least 5 servings of fruits and vegetables each day.      Eat whole-grain bread, whole-wheat pasta and brown rice instead of white grains and rice.      Get adequate Calcium and Vitamin D.     Lifestyle    Exercise at least 150 minutes a week (30 minutes a day, 5 days a week). This will help you control your weight and prevent disease.      Limit alcohol to one drink per day.      No smoking.       Wear sunscreen to prevent skin cancer.       See your dentist twice a year for an exam and cleaning.      See your eye doctor every 1 to 2 years to screen for conditions such as glaucoma, macular degeneration and cataracts.      Get shingles vaccine (Shingrix) via pharmacy in near future.     Vit D 98985 IU weekly (\"boost up the levels\")       Trial gabapentin 300 mg (or 600 mg ) at bedtime.                Follow-ups after your visit        Your next 10 appointments already scheduled     Aug 01, 2018  9:00 AM CDT   New Visit with Bryan Morrison MD   [facility] ([facility])    [address] [place] [id number] [phone]              Who to contact     If you have questions or need follow up information about today's clinic visit or your schedule please contact [facility] directly at [phone].  Normal or non-critical lab and imaging results will be communicated to you by MyChart, letter or phone within 4 business days after the clinic has received the results. If you do not hear " "from us within 7 days, please contact the clinic through Espressi or phone. If you have a critical or abnormal lab result, we will notify you by phone as soon as possible.  Submit refill requests through Espressi or call your pharmacy and they will forward the refill request to us. Please allow 3 business days for your refill to be completed.          Additional Information About Your Visit        The [employer] Information     Espressi gives you secure access to your electronic health record. If you see a primary care provider, you can also send messages to your care team and make appointments. If you have questions, please call your primary care clinic.  If you do not have a primary care provider, please call [phone] and they will assist you.        Care EveryWhere ID     This is your Care EveryWhere ID. This could be used by other organizations to access your [place] medical records  [id number]        Your Vitals Were     Pulse Temperature Height Pulse Oximetry BMI (Body Mass Index)       72 97.5  F (36.4  C) (Oral) 5' 2.01\" (1.575 m) 97% 36.02 kg/m2        Blood Pressure from Last 3 Encounters:   06/28/18 115/68   06/05/17 114/69   12/05/16 123/76    Weight from Last 3 Encounters:   06/28/18 197 lb (89.4 kg)   06/05/17 194 lb (88 kg)   12/05/16 191 lb (86.6 kg)              We Performed the Following     Asthma Action Plan (AAP)     EA ADD'L VACCINE     PNEUMOCOCCAL CONJ VACCINE 13 VALENT IM     TD (ADULT, 7+) PRESERVE FREE     VACCINE ADMINISTRATION, INITIAL          Today's Medication Changes          These changes are accurate as of 6/28/18 10:42 AM.  If you have any questions, ask your nurse or doctor.               Start taking these medicines.        Dose/Directions    cholecalciferol 14033 units capsule   Commonly known as:  VITAMIN D3   Used for:  Low vitamin D level   Started by:  Brandi Sims MD        Dose:  1 capsule   Take 1 capsule (50,000 Units) by mouth once a week   Quantity:  12 capsule "   Refills:  0       gabapentin 300 MG capsule   Commonly known as:  NEURONTIN   Used for:  Neuropathic pain of foot, left   Started by:  Brandi Sims MD        Dose:  300 mg   Take 1 capsule (300 mg) by mouth nightly as needed May take an extra tab at bedtime if needed.   Quantity:  90 capsule   Refills:  0         These medicines have changed or have updated prescriptions.        Dose/Directions    diclofenac 1 % Gel topical gel   Commonly known as:  VOLTAREN   This may have changed:  additional instructions   Used for:  Primary osteoarthritis of both knees, Status post total left knee replacement   Changed by:  Brandi Sims MD        Apply 4 grams to knees or 2 grams to hands four times daily using enclosed dosing card. Generic okay   Quantity:  100 g   Refills:  11       lisinopril-hydrochlorothiazide 20-25 MG per tablet   Commonly known as:  PRINZIDE/ZESTORETIC   This may have changed:  See the new instructions.   Used for:  Hypertension goal BP (blood pressure) < 140/90   Changed by:  Brandi Sims MD        Dose:  1 tablet   Take 1 tablet by mouth daily   Quantity:  90 tablet   Refills:  3       zolpidem 5 MG tablet   Commonly known as:  AMBIEN   This may have changed:  See the new instructions.   Used for:  Primary insomnia        TAKE ONE TABLET BY MOUTH IN THE EVENING AS NEEDED FOR SLEEP   Quantity:  30 tablet   Refills:  3            Where to get your medicines      These medications were sent to [facility] [place], [place] - [address]  [address] [place], [place] [address]     Phone:  [phone]     cholecalciferol 85121 units capsule    gabapentin 300 MG capsule    lisinopril-hydrochlorothiazide 20-25 MG per tablet         Some of these will need a paper prescription and others can be bought over the counter.  Ask your nurse if you have questions.     Bring a paper prescription for each of these medications     diclofenac 1 % Gel topical gel     zolpidem 5 MG tablet                Primary Care Provider Office Phone # Fax #    Brandi Sims [phone]       [address] [facility] [address]        Equal Access to Services     ANGELICA PEREYRA : Phillip evon mcduffie juano Sochun, waaxda luqadaha, qaybta kaalmada adeegyada, kostas hernadez laNinijoellen ibrahim. So [facility] [phone].    ATENCIÓN: Si habla español, tiene a che disposición servicios gratuitos de asistencia lingüística. Llame al [phone].    We comply with applicable federal civil rights laws and [place] laws. We do not discriminate on the basis of race, color, national origin, age, disability, sex, sexual orientation, or gender identity.            Thank you!     Thank you for choosing [facility]  for your care. Our goal is always to provide you with excellent care. Hearing back from our patients is one way we can continue to improve our services. Please take a few minutes to complete the written survey that you may receive in the mail after your visit with us. Thank you!             Your Updated Medication List - Protect others around you: Learn how to safely use, store and throw away your medicines at www.disposemymeds.org.          This list is accurate as of 6/28/18 10:42 AM.  Always use your most recent med list.                   Brand Name Dispense Instructions for use Diagnosis    acetaminophen 500 MG tablet    TYLENOL     Take 500-1,000 mg by mouth every 6 hours as needed.        albuterol 108 (90 Base) MCG/ACT Inhaler    PROAIR HFA/PROVENTIL HFA/VENTOLIN HFA    1 Inhaler    Inhale 2 puffs into the lungs every 6 hours as needed for shortness of breath / dyspnea    Mild persistent asthma       ASPIRIN 81 MG Cpep      1 CAPSULE DAILY        atorvastatin 20 MG tablet    LIPITOR    90 tablet    Take 1 tablet (20 mg) by mouth daily    Hyperlipidemia LDL goal <130       B-COMPLEX PO      Take  by mouth daily.        CALCIUM 500 +D  PO      one daily        cholecalciferol 30217 units capsule    VITAMIN D3    12 capsule    Take 1 capsule (50,000 Units) by mouth once a week    Low vitamin D level       diclofenac 1 % Gel topical gel    VOLTAREN    100 g    Apply 4 grams to knees or 2 grams to hands four times daily using enclosed dosing card. Generic okay    Primary osteoarthritis of both knees, Status post total left knee replacement       gabapentin 300 MG capsule    NEURONTIN    90 capsule    Take 1 capsule (300 mg) by mouth nightly as needed May take an extra tab at bedtime if needed.    Neuropathic pain of foot, left       lisinopril-hydrochlorothiazide 20-25 MG per tablet    PRINZIDE/ZESTORETIC    90 tablet    Take 1 tablet by mouth daily    Hypertension goal BP (blood pressure) < 140/90       GUILLERMO-e 400 MG Tabs           WOMENS MULTIVITAMIN PLUS PO      Take  by mouth daily.        ZANTAC 75 PO           zolpidem 5 MG tablet    AMBIEN    30 tablet    TAKE ONE TABLET BY MOUTH IN THE EVENING AS NEEDED FOR SLEEP    Primary insomnia

## 2018-06-28 NOTE — PATIENT INSTRUCTIONS
Preventive Health Recommendations    Female Ages 65 +    Yearly exam:     See your health care provider every year in order to  o Review health changes.   o Discuss preventive care.    o Review your medicines if your doctor has prescribed any.      You no longer need a yearly Pap test unless you've had an abnormal Pap test in the past 10 years. If you have vaginal symptoms, such as bleeding or discharge, be sure to talk with your provider about a Pap test.      Every 1 to 2 years, have a mammogram.  If you are over 69, talk with your health care provider about whether or not you want to continue having screening mammograms.      Every 10 years, have a colonoscopy. Or, have a yearly FIT test (stool test). These exams will check for colon cancer.       Have a cholesterol test every 5 years, or more often if your doctor advises it.       Have a diabetes test (fasting glucose) every three years. If you are at risk for diabetes, you should have this test more often.       At age 65, have a bone density scan (DEXA) to check for osteoporosis (brittle bone disease).    Shots:    Get a flu shot each year.    Get a tetanus shot every 10 years.    Talk to your doctor about your pneumonia vaccines. There are now two you should receive - Pneumovax (PPSV 23) and Prevnar (PCV 13).    Talk to your pharmacist about the shingles vaccine.    Talk to your doctor about the hepatitis B vaccine.    Nutrition:     Eat at least 5 servings of fruits and vegetables each day.      Eat whole-grain bread, whole-wheat pasta and brown rice instead of white grains and rice.      Get adequate Calcium and Vitamin D.     Lifestyle    Exercise at least 150 minutes a week (30 minutes a day, 5 days a week). This will help you control your weight and prevent disease.      Limit alcohol to one drink per day.      No smoking.       Wear sunscreen to prevent skin cancer.       See your dentist twice a year for an exam and cleaning.      See your eye doctor  "every 1 to 2 years to screen for conditions such as glaucoma, macular degeneration and cataracts.      Get shingles vaccine (Shingrix) via pharmacy in near future.     Vit D 64557 IU weekly (\"boost up the levels\")       Trial gabapentin 300 mg (or 600 mg ) at bedtime.        "

## 2018-06-28 NOTE — NURSING NOTE
[facility]

## 2018-06-28 NOTE — PROGRESS NOTES
SUBJECTIVE:   Alfreda Baxter is a 73 year old female who presents for Preventive Visit.    74 y/o F here for AFE.  H/o intermittent asthma,  Hysterecomy with BSO (fibroids),  HTN (lisn/hct), colon polyp, hyperlipidemia,  DJD (lumbar epidurals, s/p B TKA), NSAID intolerance (GERD). discuss Vit D at next visit.       Her pharmacy wants me to consider changing from ambien to gabapentin for sleep.  Patient has neuropathic pain at left foot, especially at night.        Are you in the first 12 months of your Medicare coverage?  No    Physical   Annual:     Getting at least 3 servings of Calcium per day:  Yes    Bi-annual eye exam:  Yes    Dental care twice a year:  Yes    Sleep apnea or symptoms of sleep apnea:  None    Diet:  Regular (no restrictions)    Frequency of exercise:  2-3 days/week    Duration of exercise:  30-45 minutes    Taking medications regularly:  Yes    Medication side effects:  None    Additional concerns today:  YES    Ability to successfully perform activities of daily living: no assistance needed    Home Safety:  No safety concerns identified    Hearing Impairment: difficulty following a conversation in a noisy restaurant or crowded room and need to ask people to speak up or repeat themselves        Fall risk:  Fallen 2 or more times in the past year?: No  Any fall with injury in the past year?: No    COGNITIVE SCREEN  1) Repeat 3 items (Leader, Season, Table)    2) Clock draw: NORMAL  3) 3 item recall: Recalls 3 objects  Results: 3 items recalled: COGNITIVE IMPAIRMENT LESS LIKELY    Mini-CogTM Copyright S Thanh. Licensed by the author for use in [facility]; reprinted with permission (willis@.[email or web address]). All rights reserved.        Reviewed and updated as needed this visit by clinical staff  Tobacco  Allergies  Meds  Med Hx  Surg Hx  Fam Hx  Soc Hx        Reviewed and updated as needed this visit by Provider        Social History   Substance Use Topics     Smoking status: Former Smoker      Packs/day: 0.10     Years: 1.00     Types: Cigarettes     Start date: 1/1/1963     Quit date: 1/1/1965     Smokeless tobacco: Never Used     Alcohol use Yes      Comment: one a month       Alcohol Use 6/28/2018   If you drink alcohol do you typically have greater than 3 drinks per day OR greater than 7 drinks per week? No                Today's PHQ-2 Score:   PHQ-2 ( 1999 Pfizer) 6/28/2018   Q1: Little interest or pleasure in doing things 0   Q2: Feeling down, depressed or hopeless 0   PHQ-2 Score 0   Q1: Little interest or pleasure in doing things Not at all   Q2: Feeling down, depressed or hopeless Not at all   PHQ-2 Score 0       Do you feel safe in your environment - Yes    Do you have a Health Care Directive?: Yes: Advance Directive has been received and scanned.    Current providers sharing in care for this patient include:   Patient Care Team:  Brandi Smis MD as PCP - General    The following health maintenance items are reviewed in Epic and correct as of today:  Health Maintenance   Topic Date Due     PNEUMOCOCCAL (1 of 2 - PCV13) 03/17/2009     TETANUS IMMUNIZATION (SYSTEM ASSIGNED)  11/19/2017     ASTHMA ACTION PLAN Q1 YR  12/05/2017     ASTHMA CONTROL TEST Q6 MOS  12/05/2017     DEXA Q3 YR  04/22/2018     PHQ-2 Q1 YR  06/05/2018     FALL RISK ASSESSMENT  06/05/2018     EYE EXAM Q1 YEAR  07/27/2018     INFLUENZA VACCINE (Season Ended) 09/01/2018     MAMMO SCREEN Q2 YR (SYSTEM ASSIGNED)  10/11/2019     COLONOSCOPY Q3 YR  11/21/2019     ADVANCE DIRECTIVE PLANNING Q5 YRS  10/12/2020     LIPID SCREEN Q5 YR FEMALE (SYSTEM ASSIGNED)  06/25/2023     Labs reviewed in EPIC  BP Readings from Last 3 Encounters:   06/28/18 115/68   06/05/17 114/69   12/05/16 123/76    Wt Readings from Last 3 Encounters:   06/28/18 197 lb (89.4 kg)   06/05/17 194 lb (88 kg)   12/05/16 191 lb (86.6 kg)                  Patient Active Problem List   Diagnosis     DJD (degenerative joint disease)     Hypertension goal BP  (blood pressure) < 140/90     Hyperlipidemia LDL goal <130     Advanced directives, counseling/discussion     Lumbar spinal stenosis     Toe pain     Adenomatous colon polyp     Mild persistent asthma     Primary osteoarthritis of both knees     Acute idiopathic gout of left foot     Status post total left knee replacement     Blepharitis, both eyes     Combined forms of age-related cataract, mild, both eyes     Mild intermittent asthma without complication     overweight HCC     Primary insomnia     Acute idiopathic gout involving toe of left foot     Past Surgical History:   Procedure Laterality Date     ABDOMEN SURGERY  1976, 1978, 1995    2 C-sections,  total hysterectomy     APPENDECTOMY      age 7 yrs.     BREAST BIOPSY, RT/LT  02/17/04    left benign     BREAST SURGERY      see above     C TOTAL KNEE ARTHROPLASTY Left 11/2015    [facility]     COLONOSCOPY  2013    needed q 3 yrs.     COLONOSCOPY WITH CO2 INSUFFLATION N/A 11/21/2016    Procedure: COLONOSCOPY WITH CO2 INSUFFLATION;  Surgeon: Brian Cleaning MD;  Location: [facility] OR     GENITOURINARY SURGERY      see above     HYSTERECTOMY, PAP NO LONGER INDICATED       HYSTERECTOMY, POORNIMA  1997    bleeding fibroids     ORTHOPEDIC SURGERY  2009    meniscus repair       Social History   Substance Use Topics     Smoking status: Former Smoker     Packs/day: 0.10     Years: 1.00     Types: Cigarettes     Start date: 1/1/1963     Quit date: 1/1/1965     Smokeless tobacco: Never Used     Alcohol use Yes      Comment: one a month     Family History   Problem Relation Age of Onset     Respiratory Father      Glaucoma Father      Asthma Father      Allergies Sister      Eye Disorder Sister      Lipids Sister      Neurologic Disorder Sister      Osteoperosis Sister      Glaucoma Sister      Hypertension Sister      Arthritis Mother      Cancer Mother      Hypertension Mother      Other Cancer Mother      Thyroid Disease Mother      GASTROINTESTINAL DISEASE Son       Macular Degeneration Sister      Hyperlipidemia Sister      Hypertension Sister      Osteoperosis Sister          Current Outpatient Prescriptions   Medication Sig Dispense Refill     acetaminophen (TYLENOL) 500 MG tablet Take 500-1,000 mg by mouth every 6 hours as needed.       albuterol (PROAIR HFA, PROVENTIL HFA, VENTOLIN HFA) 108 (90 BASE) MCG/ACT inhaler Inhale 2 puffs into the lungs every 6 hours as needed for shortness of breath / dyspnea 1 Inhaler 12     ASPIRIN 81 MG OR CPEP 1 CAPSULE DAILY       atorvastatin (LIPITOR) 20 MG tablet Take 1 tablet (20 mg) by mouth daily 90 tablet 3     B-COMPLEX OR Take  by mouth daily.       CALCIUM 500 +D OR one daily       cholecalciferol (VITAMIN D3) 42627 units capsule Take 1 capsule (50,000 Units) by mouth once a week 12 capsule 0     diclofenac (VOLTAREN) 1 % GEL topical gel Apply 4 grams to knees or 2 grams to hands four times daily using enclosed dosing card.  Generic okay 100 g 11     gabapentin (NEURONTIN) 300 MG capsule Take 1 capsule (300 mg) by mouth nightly as needed May take an extra tab at bedtime if needed. 90 capsule 0     lisinopril-hydrochlorothiazide (PRINZIDE/ZESTORETIC) 20-25 MG per tablet Take 1 tablet by mouth daily 90 tablet 3     Multiple Vitamins-Minerals (WOMENS MULTIVITAMIN PLUS PO) Take  by mouth daily.       RaNITidine HCl (ZANTAC 75 PO)        S-Adenosylmethionine (GUILLERMO-E) 400 MG TABS        zolpidem (AMBIEN) 5 MG tablet TAKE ONE TABLET BY MOUTH IN THE EVENING AS NEEDED FOR SLEEP 30 tablet 3     [DISCONTINUED] lisinopril-hydrochlorothiazide (PRINZIDE/ZESTORETIC) 20-25 MG per tablet TAKE ONE TABLET BY MOUTH ONCE DAILY 30 tablet 0     Allergies   Allergen Reactions     Niacin      Nsaids      Oxycodone Hives     ... With facial swelling     Trazodone      nausea     Recent Labs   Lab Test  06/25/18   0834  06/01/17   0808   03/15/16   1027   09/10/15   1353  12/11/14   0840   12/05/13   0902  12/05/12   0912   A1C  5.4   --    --    --    --  "   --    --    --   5.4  5.8   LDL  68  66   --   68   --    --   72   --   100  104   HDL  51  62   --   55   --    --   55   --   43*  47*   TRIG  218*  188*   --   222*   --    --   138   --   237*  182*   ALT   --    --    --   23   --    --   27   --    --   37   CR  0.83  0.77   < >   --    < >  0.87  0.74   < >  0.74  0.79   GFRESTIMATED  67  73   < >   --    < >  64  78   < >  78  72   GFRESTBLACK  81  89   < >   --    < >  77  >90   GFR Calc     < >  >90  88   POTASSIUM  4.3  4.2   < >   --    < >  3.9  4.6   < >  4.2  4.2   TSH   --    --    --    --    --   0.53   --    --   1.15  0.81    < > = values in this interval not displayed.             Review of Systems  Constitutional, HEENT, cardiovascular, pulmonary, GI, , musculoskeletal, neuro, skin, endocrine and psych systems are negative, except as otherwise noted.  Dejenerative joint arthritis is a big issue for her.  Has to keep moblity in check with 2 times weekly yoga and chair exercises.     OBJECTIVE:   /68 (BP Location: Right arm, Patient Position: Chair, Cuff Size: Adult Regular)  Pulse 72  Temp 97.5  F (36.4  C) (Oral)  Ht 5' 2.01\" (1.575 m)  Wt 197 lb (89.4 kg)  SpO2 97%  BMI 36.02 kg/m2 Estimated body mass index is 36.02 kg/(m^2) as calculated from the following:    Height as of this encounter: 5' 2.01\" (1.575 m).    Weight as of this encounter: 197 lb (89.4 kg).  Physical Exam  GENERAL APPEARANCE: healthy, alert and no distress  EYES: Eyes grossly normal to inspection, PERRL and conjunctivae and sclerae normal  HENT: ear canals and TM's normal, nose and mouth without ulcers or lesions, oropharynx clear and oral mucous membranes moist  NECK: no adenopathy, no asymmetry, masses, or scars and thyroid normal to palpation  RESP: lungs clear to auscultation - no rales, rhonchi or wheezes  BREAST: normal without masses, tenderness or nipple discharge and no palpable axillary masses or adenopathy  CV: regular rate and " rhythm, normal S1 S2, no S3 or S4, no murmur, click or rub, no peripheral edema and peripheral pulses strong  ABDOMEN: soft, nontender, no hepatosplenomegaly, no masses and bowel sounds normal   (female): normal post-hysterectomy exam without masses.   MS: no musculoskeletal defects are noted and gait is age appropriate without ataxia  SKIN: no suspicious lesions or rashes  SKIN: no suspicious lesions or rashes and scattered sks.   NEURO: Normal strength and tone, sensory exam grossly normal, mentation intact and speech normal  PSYCH: mentation appears normal and affect normal/bright    Diagnostic Test Results:  No results found for this or any previous visit (from the past 24 hour(s)).    ASSESSMENT / PLAN:       ICD-10-CM    1. Routine general medical examination at a health care facility Z00.00    2. Low vitamin D level E55.9 cholecalciferol (VITAMIN D3) 54367 units capsule   3. Hyperlipidemia LDL goal <130 E78.5    4. overweight HCC E66.9    5. Hypertension goal BP (blood pressure) < 140/90 I10 lisinopril-hydrochlorothiazide (PRINZIDE/ZESTORETIC) 20-25 MG per tablet     OFFICE/OUTPT VISIT,EST,LEVL III   6. Primary osteoarthritis involving multiple joints M15.0    7. Spinal stenosis of lumbar region without neurogenic claudication M48.061    8. Mild intermittent asthma without complication J45.20    9. Need for pneumococcal vaccination Z23 PNEUMOCOCCAL CONJ VACCINE 13 VALENT IM     EA ADD'L VACCINE   10. Need for tetanus booster Z23 VACCINE ADMINISTRATION, INITIAL     TD (ADULT, 7+) PRESERVE FREE   11. Primary insomnia F51.01 zolpidem (AMBIEN) 5 MG tablet     OFFICE/OUTPT VISIT,EST,LEVL III   12. Primary osteoarthritis of both knees M17.0 diclofenac (VOLTAREN) 1 % GEL topical gel   13. Status post total left knee replacement Z96.652 diclofenac (VOLTAREN) 1 % GEL topical gel   14. Acute idiopathic gout involving toe of left foot M10.072 OFFICE/OUTPT VISIT,EST,LEVL III   15. Neuropathic pain of foot, left G57.92  "gabapentin (NEURONTIN) 300 MG capsule     OFFICE/OUTPT VISIT,EST,LEVL III        She is agreeable to trial gabapentin to help her foot pain and help sleep... In lieu of the ambien.     Will refill ambien in case this does not work.   For gout, she prefers prn prednisone and no daily med.  Doses this once yearly for symptoms 40 mg daily X 4.  Is okay with this regimen.     Patient Instructions   Get shingles vaccine (Shingrix) via pharmacy in near future.     Vit D 83324 IU weekly (\"boost up the levels\")       Trial gabapentin 300 mg (or 600 mg ) at bedtime.         End of Life Planning:  Patient currently has an advanced directive: Yes.  Practitioner is supportive of decision.    COUNSELING:  Reviewed preventive health counseling, as reflected in patient instructions       Immunizations    Vaccinated for: Pneumococcal and Td          BP Readings from Last 1 Encounters:   06/28/18 115/68     Estimated body mass index is 36.02 kg/(m^2) as calculated from the following:    Height as of this encounter: 5' 2.01\" (1.575 m).    Weight as of this encounter: 197 lb (89.4 kg).      Weight management plan: Discussed healthy diet and exercise guidelines and patient will follow up in 12 months in clinic to re-evaluate.     reports that she quit smoking about 53 years ago. Her smoking use included Cigarettes. She started smoking about 55 years ago. She has a 0.10 pack-year smoking history. She has never used smokeless tobacco.      Appropriate preventive services were discussed with this patient, including applicable screening as appropriate for cardiovascular disease, diabetes, osteopenia/osteoporosis, and glaucoma.  As appropriate for age/gender, discussed screening for colorectal cancer, prostate cancer, breast cancer, and cervical cancer. Checklist reviewing preventive services available has been given to the patient.    Reviewed patients plan of care and provided an AVS. The Basic Care Plan (routine screening as documented " in Health Maintenance) for Alfreda meets the Care Plan requirement. This Care Plan has been established and reviewed with the Patient.    Counseling Resources:  ATP IV Guidelines  Pooled Cohorts Equation Calculator  Breast Cancer Risk Calculator  FRAX Risk Assessment  ICSI Preventive Guidelines  Dietary Guidelines for Americans, 2010  USDA's MyPlate  ASA Prophylaxis  Lung CA Screening    Brandi Sims MD  [facility]  Answers for HPI/ROS submitted by the patient on 6/28/2018   PHQ-2 Score: 0

## 2018-06-29 ASSESSMENT — ASTHMA QUESTIONNAIRES: ACT_TOTALSCORE: 25

## 2018-07-25 ENCOUNTER — TRANSFERRED RECORDS (OUTPATIENT)
Dept: HEALTH INFORMATION MANAGEMENT | Facility: CLINIC | Age: 73
End: 2018-07-25

## 2018-07-31 ENCOUNTER — MYC MEDICAL ADVICE (OUTPATIENT)
Dept: FAMILY MEDICINE | Facility: CLINIC | Age: 73
End: 2018-07-31

## 2018-07-31 NOTE — TELEPHONE ENCOUNTER
See Supportiehart message. Patient experienced dizziness when taking the Gabapentin. Will no longer be taking it. She will be going to see a foot and ankle specialist to get a second opinion.    Routed to provider as HEATHER Young RN  [facility]

## 2018-08-01 ENCOUNTER — TRANSFERRED RECORDS (OUTPATIENT)
Dept: HEALTH INFORMATION MANAGEMENT | Facility: CLINIC | Age: 73
End: 2018-08-01

## 2018-08-01 ENCOUNTER — OFFICE VISIT (OUTPATIENT)
Dept: OPHTHALMOLOGY | Facility: CLINIC | Age: 73
End: 2018-08-01
Payer: COMMERCIAL

## 2018-08-01 DIAGNOSIS — H52.4 PRESBYOPIA: ICD-10-CM

## 2018-08-01 DIAGNOSIS — H25.813 COMBINED FORMS OF AGE-RELATED CATARACT OF BOTH EYES: Primary | ICD-10-CM

## 2018-08-01 PROCEDURE — 92015 DETERMINE REFRACTIVE STATE: CPT | Performed by: OPHTHALMOLOGY

## 2018-08-01 PROCEDURE — 92014 COMPRE OPH EXAM EST PT 1/>: CPT | Performed by: OPHTHALMOLOGY

## 2018-08-01 ASSESSMENT — REFRACTION_MANIFEST
OS_SPHERE: +0.25
OS_CYLINDER: +0.75
OD_AXIS: 145
OD_ADD: +2.75
OD_CYLINDER: +0.50
OS_AXIS: 050
OD_SPHERE: -1.50
OS_ADD: +2.75

## 2018-08-01 ASSESSMENT — TONOMETRY
IOP_METHOD: APPLANATION
OD_IOP_MMHG: 17
OS_IOP_MMHG: 17

## 2018-08-01 ASSESSMENT — VISUAL ACUITY
OD_CC: 20/40-2
CORRECTION_TYPE: GLASSES
OS_CC: 20/20
OS_CC: J1
METHOD: SNELLEN - LINEAR
OD_CC: J2

## 2018-08-01 ASSESSMENT — EXTERNAL EXAM - LEFT EYE: OS_EXAM: 2+ BROW PTOSIS, MILD TO MOD BROW

## 2018-08-01 ASSESSMENT — REFRACTION_WEARINGRX
OS_SPHERE: PLANO
OD_AXIS: 116
SPECS_TYPE: PAL
OD_SPHERE: -1.00
OS_CYLINDER: +1.25
OD_ADD: +2.75
OS_AXIS: 047
OS_ADD: +2.75
OD_CYLINDER: +0.50

## 2018-08-01 ASSESSMENT — CUP TO DISC RATIO
OD_RATIO: 0.5
OS_RATIO: 0.3

## 2018-08-01 ASSESSMENT — CONF VISUAL FIELD
OD_NORMAL: 1
OS_NORMAL: 1

## 2018-08-01 ASSESSMENT — EXTERNAL EXAM - RIGHT EYE: OD_EXAM: 2+ BROW PTOSIS, MILD TO MOD BROW

## 2018-08-01 NOTE — LETTER
8/1/2018         RE: Alfreda Baxter  [address] [address]        Dear Colleague,    Thank you for referring your patient, Alfreda Baxter, to the [facility]. Please see a copy of my visit note below.     Current Eye Medications:  Tears prn     Subjective:  Comprehensive eye exam.   Pt reports is seeing pretty good, vision does not seem a s good in her right eye. Pt states her biggest problem her glasses are fitting poorly on her face now.     Objective:  See Ophthalmology Exam.       Assessment:  Vision right eye likely down from subtle worsening of cataract.  Glaucoma OCT and retinal OCT within normal limits.      ICD-10-CM    1. Combined forms of age-related cataract, mild, both eyes H25.813 EYE EXAM (SIMPLE-NONBILLABLE)   2. Presbyopia H52.4 REFRACTIVE STATUS        Plan:  Glasses Rx given - optional.  Continue using artificial tears up to 4 times daily both eyes as needed.  (Refresh Tears, Systane Ultra/Balance, or Theratears)  Call in April 2019 for an appointment in August 2019 for Complete Exam.    Dr. Morrison [phone]           Again, thank you for allowing me to participate in the care of your patient.        Sincerely,        Bryan Morrison MD

## 2018-08-01 NOTE — PATIENT INSTRUCTIONS
Glasses Rx given - optional.  Continue using artificial tears up to 4 times daily both eyes as needed.  (Refresh Tears, Systane Ultra/Balance, or Theratears)  Call in April 2019 for an appointment in August 2019 for Complete Exam.    Dr. Morrison [phone]

## 2018-08-01 NOTE — PROGRESS NOTES
Current Eye Medications:  Tears prn     Subjective:  Comprehensive eye exam.   Pt reports is seeing pretty good, vision does not seem a s good in her right eye. Pt states her biggest problem her glasses are fitting poorly on her face now.     Objective:  See Ophthalmology Exam.       Assessment:  Vision right eye likely down from subtle worsening of cataract.  Glaucoma OCT and retinal OCT within normal limits.      ICD-10-CM    1. Combined forms of age-related cataract, mild, both eyes H25.813 EYE EXAM (SIMPLE-NONBILLABLE)   2. Presbyopia H52.4 REFRACTIVE STATUS        Plan:  Glasses Rx given - optional.  Continue using artificial tears up to 4 times daily both eyes as needed.  (Refresh Tears, Systane Ultra/Balance, or Theratears)  Call in April 2019 for an appointment in August 2019 for Complete Exam.    Dr. Morrison [phone]

## 2018-08-01 NOTE — MR AVS SNAPSHOT
After Visit Summary   8/1/2018    Alfreda Baxter    MRN: [id number]           Patient Information     Date Of Birth          1945        Visit Information        Provider Department      8/1/2018 9:00 AM Bryan Morrison MD [facility]        Today's Diagnoses     Presbyopia    -  1    Combined forms of age-related cataract, mild, both eyes          Care Instructions    Glasses Rx given - optional.  Continue using artificial tears up to 4 times daily both eyes as needed.  (Refresh Tears, Systane Ultra/Balance, or Theratears)  Call in April 2019 for an appointment in August 2019 for Complete Exam.    Dr. Morrison [phone]              Follow-ups after your visit        Who to contact     If you have questions or need follow up information about today's clinic visit or your schedule please contact [facility] directly at [phone].  Normal or non-critical lab and imaging results will be communicated to you by XunLighthart, letter or phone within 4 business days after the clinic has received the results. If you do not hear from us within 7 days, please contact the clinic through XunLighthart or phone. If you have a critical or abnormal lab result, we will notify you by phone as soon as possible.  Submit refill requests through [employer] or call your pharmacy and they will forward the refill request to us. Please allow 3 business days for your refill to be completed.          Additional Information About Your Visit        MyChart Information     [employer] gives you secure access to your electronic health record. If you see a primary care provider, you can also send messages to your care team and make appointments. If you have questions, please call your primary care clinic.  If you do not have a primary care provider, please call [phone] and they will assist you.        Care EveryWhere ID     This is your Care EveryWhere ID. This could be used by other organizations to access  your [place] medical records  [id number]         Blood Pressure from Last 3 Encounters:   06/28/18 115/68   06/05/17 114/69   12/05/16 123/76    Weight from Last 3 Encounters:   06/28/18 89.4 kg (197 lb)   06/05/17 88 kg (194 lb)   12/05/16 86.6 kg (191 lb)              Today, you had the following     No orders found for display       Primary Care Provider Office Phone # Fax #    Brandi Sims [phone]       [address] [facility] [address]        Equal Access to Services     [facility]: Hadii aad ku hadasho Soomaali, waaxda luqadaha, qaybta kaalmada ademariannyada, kostas fry . So [facility] [phone].    ATENCIÓN: Si habla español, tiene a che disposición servicios gratuitos de asistencia lingüística. Llame al [phone].    We comply with applicable federal civil rights laws and [place] laws. We do not discriminate on the basis of race, color, national origin, age, disability, sex, sexual orientation, or gender identity.            Thank you!     Thank you for choosing [facility] [place]  for your care. Our goal is always to provide you with excellent care. Hearing back from our patients is one way we can continue to improve our services. Please take a few minutes to complete the written survey that you may receive in the mail after your visit with us. Thank you!             Your Updated Medication List - Protect others around you: Learn how to safely use, store and throw away your medicines at www.disposemymeds.org.          This list is accurate as of 8/1/18 10:01 AM.  Always use your most recent med list.                   Brand Name Dispense Instructions for use Diagnosis    acetaminophen 500 MG tablet    TYLENOL     Take 500-1,000 mg by mouth every 6 hours as needed.        albuterol 108 (90 Base) MCG/ACT Inhaler    PROAIR HFA/PROVENTIL HFA/VENTOLIN HFA    1 Inhaler    Inhale 2 puffs into the lungs every 6 hours as needed for  shortness of breath / dyspnea    Mild persistent asthma       ASPIRIN 81 MG Cpep      1 CAPSULE DAILY        atorvastatin 20 MG tablet    LIPITOR    90 tablet    Take 1 tablet (20 mg) by mouth daily    Hyperlipidemia LDL goal <130       B-COMPLEX PO      Take  by mouth daily.        CALCIUM 500 +D PO      one daily        cholecalciferol 59899 units capsule    VITAMIN D3    12 capsule    Take 1 capsule (50,000 Units) by mouth once a week    Low vitamin D level       diclofenac 1 % Gel topical gel    VOLTAREN    100 g    Apply 4 grams to knees or 2 grams to hands four times daily using enclosed dosing card. Generic okay    Primary osteoarthritis of both knees, Status post total left knee replacement       lisinopril-hydrochlorothiazide 20-25 MG per tablet    PRINZIDE/ZESTORETIC    90 tablet    Take 1 tablet by mouth daily    Hypertension goal BP (blood pressure) < 140/90       GUILLERMO-e 400 MG Tabs           WOMENS MULTIVITAMIN PLUS PO      Take  by mouth daily.        ZANTAC 75 PO           zolpidem 5 MG tablet    AMBIEN    30 tablet    TAKE ONE TABLET BY MOUTH IN THE EVENING AS NEEDED FOR SLEEP    Primary insomnia

## 2018-08-02 ENCOUNTER — MYC MEDICAL ADVICE (OUTPATIENT)
Dept: OPHTHALMOLOGY | Facility: CLINIC | Age: 73
End: 2018-08-02

## 2018-08-11 ENCOUNTER — OFFICE VISIT (OUTPATIENT)
Dept: OPHTHALMOLOGY | Facility: CLINIC | Age: 73
End: 2018-08-11
Payer: COMMERCIAL

## 2018-08-11 ENCOUNTER — TRANSFERRED RECORDS (OUTPATIENT)
Dept: HEALTH INFORMATION MANAGEMENT | Facility: CLINIC | Age: 73
End: 2018-08-11

## 2018-08-11 DIAGNOSIS — H34.12 CENTRAL RETINAL ARTERY OCCLUSION OF LEFT EYE: Primary | ICD-10-CM

## 2018-08-11 PROCEDURE — 92012 INTRM OPH EXAM EST PATIENT: CPT | Performed by: OPHTHALMOLOGY

## 2018-08-11 NOTE — MR AVS SNAPSHOT
After Visit Summary   8/11/2018    Alfreda Baxter    MRN: [id number]           Patient Information     Date Of Birth          1945        Visit Information        Provider Department      8/11/2018 7:45 AM Bryan Morrison MD [facility]        Today's Diagnoses     Central retinal artery occlusion of left eye    -  1      Care Instructions    Discussed with Dr. Julien at [facility]; to be seen there for evaluation and possible bariatric therapy.  Bryan Morrison M.D.  [phone]            Follow-ups after your visit        Your next 10 appointments already scheduled     Nov 19, 2018 10:30 AM CST   New Visit with Buck Butterfield MD   [facility] ([facility])    [address] [address] [phone]              Who to contact     If you have questions or need follow up information about today's clinic visit or your schedule please contact [facility] directly at [phone].  Normal or non-critical lab and imaging results will be communicated to you by Hire Junglehart, letter or phone within 4 business days after the clinic has received the results. If you do not hear from us within 7 days, please contact the clinic through Hire Junglehart or phone. If you have a critical or abnormal lab result, we will notify you by phone as soon as possible.  Submit refill requests through Deep [employer]ines or call your pharmacy and they will forward the refill request to us. Please allow 3 business days for your refill to be completed.          Additional Information About Your Visit        Hire Junglehart Information     [employer] gives you secure access to your electronic health record. If you see a primary care provider, you can also send messages to your care team and make appointments. If you have questions, please call your primary care clinic.  If you do not have a primary care provider, please call [phone] and they will  assist you.        Care EveryWhere ID     This is your Care EveryWhere ID. This could be used by other organizations to access your [place] medical records  [id number]         Blood Pressure from Last 3 Encounters:   09/06/18 123/72   06/28/18 115/68   06/05/17 114/69    Weight from Last 3 Encounters:   09/06/18 88 kg (194 lb)   06/28/18 89.4 kg (197 lb)   06/05/17 88 kg (194 lb)              Today, you had the following     No orders found for display       Primary Care Provider Office Phone # Fax #    Brandi Sims [phone]       [address] [facility] [address]        Equal Access to Services     [facility] : Hadii evon [place], wajoeyda alfredo, qaybta kaalmada adeshaheed, kostas ibrahim. So [facility] [phone].    ATENCIÓN: Si habla español, tiene a che disposición servicios gratuitos de asistencia lingüística. Ll[facility] [phone].    We comply with applicable federal civil rights laws and [place] laws. We do not discriminate on the basis of race, color, national origin, age, disability, sex, sexual orientation, or gender identity.            Thank you!     Thank you for choosing [facility] [place]  for your care. Our goal is always to provide you with excellent care. Hearing back from our patients is one way we can continue to improve our services. Please take a few minutes to complete the written survey that you may receive in the mail after your visit with us. Thank you!             Your Updated Medication List - Protect others around you: Learn how to safely use, store and throw away your medicines at www.disposemymeds.org.          This list is accurate as of 8/11/18 11:59 PM.  Always use your most recent med list.                   Brand Name Dispense Instructions for use Diagnosis    acetaminophen 500 MG tablet    TYLENOL     Take 500-1,000 mg by mouth every 6 hours as needed.        albuterol 108 (90 Base) MCG/ACT  inhaler    PROAIR HFA/PROVENTIL HFA/VENTOLIN HFA    1 Inhaler    Inhale 2 puffs into the lungs every 6 hours as needed for shortness of breath / dyspnea    Mild persistent asthma       ASPIRIN 81 MG Cpep      1 CAPSULE DAILY        atorvastatin 20 MG tablet    LIPITOR    90 tablet    Take 1 tablet (20 mg) by mouth daily    Hyperlipidemia LDL goal <130       B-COMPLEX PO      Take  by mouth daily.        CALCIUM 500 +D PO      one daily        cholecalciferol 67233 units capsule    VITAMIN D3    12 capsule    Take 1 capsule (50,000 Units) by mouth once a week    Low vitamin D level       diclofenac 1 % Gel topical gel    VOLTAREN    100 g    Apply 4 grams to knees or 2 grams to hands four times daily using enclosed dosing card. Generic okay    Primary osteoarthritis of both knees, Status post total left knee replacement       lisinopril-hydrochlorothiazide 20-25 MG per tablet    PRINZIDE/ZESTORETIC    90 tablet    Take 1 tablet by mouth daily    Hypertension goal BP (blood pressure) < 140/90       GUILLERMO-e 400 MG Tabs           WOMENS MULTIVITAMIN PLUS PO      Take  by mouth daily.        ZANTAC 75 PO           zolpidem 5 MG tablet    AMBIEN    30 tablet    TAKE ONE TABLET BY MOUTH IN THE EVENING AS NEEDED FOR SLEEP    Primary insomnia

## 2018-08-11 NOTE — LETTER
"    8/11/2018         RE: Alfreda Baxter  [address] [facility] [address]        Dear Colleague,    Thank you for referring your patient, Alfreda Baxter, to the [facility]. Please see a copy of my visit note below.     Current Eye Medications:  Artificial tears as needed.     Subjective:  Sudden vision loss 7 AM today.  Painless.  No symptoms right eye.  Mild headache, no other symptoms of GCA.  History of visual auras without headache.  Told in past \"hole in heart.\"  On 81 mg  ASA daily.  CT scan of head at [facility] in [place] this morning unremarkable.    Hx of NIDDM, HTN, DJD, hyperlidpidemia, gout.  Recent exam 8/1/2018.     Objective:  See Ophthalmology Exam.       Assessment:  Acute central retinal artery occlusion left eye.      Plan:  Discussed with [facility] Julien at [facility]; to be seen there for evaluation and possible bariatric therapy.  Bryan Morrison M.D.  [phone]             Again, thank you for allowing me to participate in the care of your patient.        Sincerely,        Bryan Morrison MD    "

## 2018-08-27 ENCOUNTER — MYC MEDICAL ADVICE (OUTPATIENT)
Dept: FAMILY MEDICINE | Facility: CLINIC | Age: 73
End: 2018-08-27

## 2018-08-27 ENCOUNTER — MYC MEDICAL ADVICE (OUTPATIENT)
Dept: OPHTHALMOLOGY | Facility: CLINIC | Age: 73
End: 2018-08-27

## 2018-08-27 DIAGNOSIS — G45.3 AMAUROSIS FUGAX: Primary | ICD-10-CM

## 2018-08-27 NOTE — TELEPHONE ENCOUNTER
Routing to PCP to review and advise.  Please see My Chart message.      Lyndsay Carpenter RN  [facility]

## 2018-08-29 ENCOUNTER — ALLIED HEALTH/NURSE VISIT (OUTPATIENT)
Dept: NURSING | Facility: CLINIC | Age: 73
End: 2018-08-29
Payer: COMMERCIAL

## 2018-08-29 DIAGNOSIS — G45.3 AMAUROSIS FUGAX: ICD-10-CM

## 2018-08-29 PROCEDURE — 99207 ZZC NO CHARGE NURSE ONLY: CPT

## 2018-08-29 PROCEDURE — 0296T ZIO PATCH HOLTER: CPT

## 2018-08-29 NOTE — MR AVS SNAPSHOT
After Visit Summary   8/29/2018    Alfreda Baxter    MRN: [id number]           Patient Information     Date Of Birth          1945        Visit Information        Provider Department      8/29/2018 1:30 PM CP ANCILLARY [facility]        Today's Diagnoses     Amaurosis fugax           Follow-ups after your visit        Your next 10 appointments already scheduled     Sep 06, 2018  7:20 AM CDT   SHORT with Brandi Sims MD   [facility] ([facility]    [address] [place] [address] [phone]              Who to contact     If you have questions or need follow up information about today's clinic visit or your schedule please contact [facility] directly at [phone].  Normal or non-critical lab and imaging results will be communicated to you by MyChart, letter or phone within 4 business days after the clinic has received the results. If you do not hear from us within 7 days, please contact the clinic through MyChart or phone. If you have a critical or abnormal lab result, we will notify you by phone as soon as possible.  Submit refill requests through What's in My Handbag or call your pharmacy and they will forward the refill request to us. Please allow 3 business days for your refill to be completed.          Additional Information About Your Visit        MyChart Information     What's in My Handbag gives you secure access to your electronic health record. If you see a primary care provider, you can also send messages to your care team and make appointments. If you have questions, please call your primary care clinic.  If you do not have a primary care provider, please call [phone] and they will assist you.        Care EveryWhere ID     This is your Care EveryWhere ID. This could be used by other organizations to access your [place] medical records  [id number]         Blood Pressure  from Last 3 Encounters:   06/28/18 115/68   06/05/17 114/69   12/05/16 123/76    Weight from Last 3 Encounters:   06/28/18 197 lb (89.4 kg)   06/05/17 194 lb (88 kg)   12/05/16 191 lb (86.6 kg)              We Performed the Following     Zio Patch Holter        Primary Care Provider Office Phone # Fax #    Brandi Sims [phone]       [address] [facility] [address]        Equal Access to Services     [facility]: Hadii aad ku hadasho Soomaali, waaxda luqadaha, qaybta kaalmada adeegyada, waxay violeta hayjoellen fry . So [facility] [phone].    ATENCIÓN: Si habla español, tiene a che disposición servicios gratuitos de asistencia lingüística. LlProtestant [facility] [phone].    We comply with applicable federal civil rights laws and [place] laws. We do not discriminate on the basis of race, color, national origin, age, disability, sex, sexual orientation, or gender identity.            Thank you!     Thank you for choosing [facility]  for your care. Our goal is always to provide you with excellent care. Hearing back from our patients is one way we can continue to improve our services. Please take a few minutes to complete the written survey that you may receive in the mail after your visit with us. Thank you!             Your Updated Medication List - Protect others around you: Learn how to safely use, store and throw away your medicines at www.disposemymeds.org.          This list is accurate as of 8/29/18  2:02 PM.  Always use your most recent med list.                   Brand Name Dispense Instructions for use Diagnosis    acetaminophen 500 MG tablet    TYLENOL     Take 500-1,000 mg by mouth every 6 hours as needed.        albuterol 108 (90 Base) MCG/ACT inhaler    PROAIR HFA/PROVENTIL HFA/VENTOLIN HFA    1 Inhaler    Inhale 2 puffs into the lungs every 6 hours as needed for shortness of breath / dyspnea    Mild persistent asthma       ASPIRIN 81  MG Cpep      1 CAPSULE DAILY        atorvastatin 20 MG tablet    LIPITOR    90 tablet    Take 1 tablet (20 mg) by mouth daily    Hyperlipidemia LDL goal <130       B-COMPLEX PO      Take  by mouth daily.        CALCIUM 500 +D PO      one daily        cholecalciferol 40752 units capsule    VITAMIN D3    12 capsule    Take 1 capsule (50,000 Units) by mouth once a week    Low vitamin D level       diclofenac 1 % Gel topical gel    VOLTAREN    100 g    Apply 4 grams to knees or 2 grams to hands four times daily using enclosed dosing card. Generic okay    Primary osteoarthritis of both knees, Status post total left knee replacement       lisinopril-hydrochlorothiazide 20-25 MG per tablet    PRINZIDE/ZESTORETIC    90 tablet    Take 1 tablet by mouth daily    Hypertension goal BP (blood pressure) < 140/90       GUILLERMO-e 400 MG Tabs           WOMENS MULTIVITAMIN PLUS PO      Take  by mouth daily.        ZANTAC 75 PO           zolpidem 5 MG tablet    AMBIEN    30 tablet    TAKE ONE TABLET BY MOUTH IN THE EVENING AS NEEDED FOR SLEEP    Primary insomnia

## 2018-08-30 NOTE — TELEPHONE ENCOUNTER
Discussed with patient.  Had 10 dives in hyperbaric oxygen chamber at [facility] for central retinal artery occlusion of the left eye.  Apparently feels the vision did not improve.  Had full workup and currently on 14 day Holter monitor.  She does not know of any definitive etiology for event.  Is on Plavix now.  Will see her in the next couple weeks for [place] [facility], glaucoma OCT, retinal OCT and complete exam.  Bryan Morrison M.D.

## 2018-09-06 ENCOUNTER — RADIANT APPOINTMENT (OUTPATIENT)
Dept: GENERAL RADIOLOGY | Facility: CLINIC | Age: 73
End: 2018-09-06
Attending: INTERNAL MEDICINE
Payer: COMMERCIAL

## 2018-09-06 ENCOUNTER — OFFICE VISIT (OUTPATIENT)
Dept: FAMILY MEDICINE | Facility: CLINIC | Age: 73
End: 2018-09-06
Payer: COMMERCIAL

## 2018-09-06 VITALS
SYSTOLIC BLOOD PRESSURE: 123 MMHG | WEIGHT: 194 LBS | DIASTOLIC BLOOD PRESSURE: 72 MMHG | OXYGEN SATURATION: 96 % | HEART RATE: 71 BPM | BODY MASS INDEX: 35.47 KG/M2 | TEMPERATURE: 97.8 F

## 2018-09-06 DIAGNOSIS — Q21.12 PATENT FORAMEN OVALE WITH RIGHT TO LEFT SHUNT: ICD-10-CM

## 2018-09-06 DIAGNOSIS — I10 HYPERTENSION GOAL BP (BLOOD PRESSURE) < 140/90: ICD-10-CM

## 2018-09-06 DIAGNOSIS — J06.9 UPPER RESPIRATORY TRACT INFECTION, UNSPECIFIED TYPE: ICD-10-CM

## 2018-09-06 DIAGNOSIS — E78.5 HYPERLIPIDEMIA LDL GOAL <130: ICD-10-CM

## 2018-09-06 DIAGNOSIS — H34.12 RETINAL ARTERY OCCLUSION, CENTRAL, LEFT: Primary | ICD-10-CM

## 2018-09-06 PROCEDURE — 71046 X-RAY EXAM CHEST 2 VIEWS: CPT | Mod: FY

## 2018-09-06 PROCEDURE — 99215 OFFICE O/P EST HI 40 MIN: CPT | Performed by: INTERNAL MEDICINE

## 2018-09-06 RX ORDER — CEFDINIR 300 MG/1
300 CAPSULE ORAL 2 TIMES DAILY
Qty: 20 CAPSULE | Refills: 1 | Status: SHIPPED | OUTPATIENT
Start: 2018-09-06 | End: 2018-09-16

## 2018-09-06 RX ORDER — CLOPIDOGREL BISULFATE 75 MG/1
75 TABLET ORAL DAILY
Qty: 90 TABLET | Refills: 3 | Status: SHIPPED | OUTPATIENT
Start: 2018-09-06 | End: 2019-02-12

## 2018-09-06 RX ORDER — ATORVASTATIN CALCIUM 40 MG/1
40 TABLET, FILM COATED ORAL DAILY
Qty: 90 TABLET | Refills: 3 | Status: SHIPPED | OUTPATIENT
Start: 2018-09-06 | End: 2019-08-13

## 2018-09-06 RX ORDER — LISINOPRIL 10 MG/1
10 TABLET ORAL
COMMUNITY
Start: 2018-08-16 | End: 2018-09-06

## 2018-09-06 RX ORDER — CLOPIDOGREL BISULFATE 75 MG/1
75 TABLET ORAL DAILY
COMMUNITY
Start: 2018-08-16 | End: 2018-09-06

## 2018-09-06 RX ORDER — PREDNISONE 20 MG/1
40 TABLET ORAL DAILY
Qty: 10 TABLET | Refills: 0 | Status: SHIPPED | OUTPATIENT
Start: 2018-09-06 | End: 2020-12-08

## 2018-09-06 RX ORDER — LISINOPRIL 10 MG/1
10 TABLET ORAL DAILY
Qty: 90 TABLET | Refills: 3 | Status: SHIPPED | OUTPATIENT
Start: 2018-09-06 | End: 2019-07-11

## 2018-09-06 ASSESSMENT — PAIN SCALES - GENERAL: PAINLEVEL: NO PAIN (0)

## 2018-09-06 NOTE — PROGRESS NOTES
SUBJECTIVE:   Alfreda Baxter is a 73 year old female who presents to clinic today for the following health issues:      72 y/o F here for hospital follow up.  Presented 8/11/18 with Ameurosis Fugax and has sustained some loss of vision from left eye.  Can see shadows peripherally only..  Ongoing outpatient work up inclues Ziopatch , still wearing it.. ....  [place] [facility] [place] Plavix now.   Lipitor dose doubled.   Hydrochlorothiazide stopped...   Echo did find/confirm PFO.     Denies bruising/bleeding.   BP has been okay.          Hospital Follow-up Visit:    [place]ospital[place]Nursing [place] [facility] [place]  Date of Admission: 8/11/18  Date of Discharge: 8/16/18  Reason(s) for Admission: Stroke            Problems taking medications regularly:  None       Medication changes since discharge: yes see med. List        Problems adhering to non-medication therapy:  None     Summary of hospitalization:  See outside records, reviewed and scanned    See HPI for summary  Diagnostic Tests/Treatments reviewed.  Follow up needed: see below.   Other Healthcare Providers Involved in Patient s Care:   [place] [facility] [place]  [place] [facility] [place] Kent. Opthalmology follow up, Tamiko.      She has Ziopatch on now, and will be complete shortly            Update since discharge: stable.      Post Discharge Medication Reconciliation: discharge medications reconciled and changed, per note/orders (see AVS).  Plan of care communicated with patient     Coding guidelines for this visit:  Type of Medical   Decision Making Face-to-Face Visit       within 7 Days of discharge Face-to-Face Visit        within 14 days of discharge   Moderate Complexity [id number]   High Complexity [id number]            Cough and left ear plugged.           Problem list and histories reviewed & adjusted, as indicated.     Patient Active Problem List   Diagnosis     DJD (degenerative joint disease)      Hypertension goal BP (blood pressure) < 140/90     Hyperlipidemia LDL goal <130     Advanced directives, counseling/discussion     Lumbar spinal stenosis     Toe pain     Adenomatous colon polyp     Mild persistent asthma     Primary osteoarthritis of both knees     Acute idiopathic gout of left foot     Status post total left knee replacement     Blepharitis, both eyes     Combined forms of age-related cataract, mild, both eyes     Mild intermittent asthma without complication     overweight HCC     Primary insomnia     Acute idiopathic gout involving toe of left foot     Past Surgical History:   Procedure Laterality Date     ABDOMEN SURGERY  1976, 1978, 1995    2 C-sections,  total hysterectomy     APPENDECTOMY      age 7 yrs.     BREAST BIOPSY, RT/LT  02/17/04    left benign     BREAST SURGERY      see above     C TOTAL KNEE ARTHROPLASTY Left 11/2015    at [facility]     COLONOSCOPY  2013    needed q 3 yrs.     COLONOSCOPY WITH CO2 INSUFFLATION N/A 11/21/2016    Procedure: COLONOSCOPY WITH CO2 INSUFFLATION;  Surgeon: Brian Cleaning MD;  Location: [facility] OR     GENITOURINARY SURGERY      see above     HYSTERECTOMY, PAP NO LONGER INDICATED       HYSTERECTOMY, POORNIMA  1997    bleeding fibroids     ORTHOPEDIC SURGERY  2009    meniscus repair       Social History   Substance Use Topics     Smoking status: Former Smoker     Packs/day: 0.10     Years: 1.00     Types: Cigarettes     Start date: 1/1/1963     Quit date: 1/1/1965     Smokeless tobacco: Never Used     Alcohol use Yes      Comment: one a month     Family History   Problem Relation Age of Onset     Respiratory Father      Glaucoma Father      Asthma Father      Allergies Sister      Eye Disorder Sister      Lipids Sister      Neurologic Disorder Sister      Osteoporosis Sister      Glaucoma Sister      Hypertension Sister      Arthritis Mother      Cancer Mother      Hypertension Mother      Other Cancer Mother      Thyroid Disease Mother       GASTROINTESTINAL DISEASE Son      Macular Degeneration Sister      Hyperlipidemia Sister      Hypertension Sister      Osteoporosis Sister          Current Outpatient Prescriptions   Medication Sig Dispense Refill     acetaminophen (TYLENOL) 500 MG tablet Take 500-1,000 mg by mouth every 6 hours as needed.       albuterol (PROAIR HFA, PROVENTIL HFA, VENTOLIN HFA) 108 (90 BASE) MCG/ACT inhaler Inhale 2 puffs into the lungs every 6 hours as needed for shortness of breath / dyspnea 1 Inhaler 12     ASPIRIN 81 MG OR CPEP 1 CAPSULE DAILY       atorvastatin (LIPITOR) 40 MG tablet Take 1 tablet (40 mg) by mouth daily 90 tablet 3     B-COMPLEX OR Take  by mouth daily.       CALCIUM 500 +D OR one daily       cefdinir (OMNICEF) 300 MG capsule Take 1 capsule (300 mg) by mouth 2 times daily for 10 days 20 capsule 1     clopidogrel (PLAVIX) 75 MG tablet Take 1 tablet (75 mg) by mouth daily 90 tablet 3     lisinopril (PRINIVIL/ZESTRIL) 10 MG tablet Take 1 tablet (10 mg) by mouth daily 90 tablet 3     Multiple Vitamins-Minerals (WOMENS MULTIVITAMIN PLUS PO) Take  by mouth daily.       predniSONE (DELTASONE) 20 MG tablet Take 2 tablets (40 mg) by mouth daily for 5 days 10 tablet 0     RaNITidine HCl (ZANTAC 75 PO)        S-Adenosylmethionine (GUILLERMO-E) 400 MG TABS        zolpidem (AMBIEN) 5 MG tablet TAKE ONE TABLET BY MOUTH IN THE EVENING AS NEEDED FOR SLEEP 30 tablet 3     cholecalciferol (VITAMIN D3) 68779 units capsule Take 1 capsule (50,000 Units) by mouth once a week (Patient not taking: Reported on 9/6/2018) 12 capsule 0     diclofenac (VOLTAREN) 1 % GEL topical gel Apply 4 grams to knees or 2 grams to hands four times daily using enclosed dosing card.  Generic okay (Patient not taking: Reported on 9/6/2018) 100 g 11     lisinopril-hydrochlorothiazide (PRINZIDE/ZESTORETIC) 20-25 MG per tablet Take 1 tablet by mouth daily (Patient not taking: Reported on 9/6/2018) 90 tablet 3     [DISCONTINUED] atorvastatin (LIPITOR) 20 MG  tablet Take 1 tablet (20 mg) by mouth daily (Patient taking differently: Take 40 mg by mouth daily ) 90 tablet 3     [DISCONTINUED] clopidogrel (PLAVIX) 75 MG tablet Take 75 mg by mouth daily       [DISCONTINUED] lisinopril (PRINIVIL/ZESTRIL) 10 MG tablet Take 10 mg by mouth       Allergies   Allergen Reactions     Niacin      Nsaids      Oxycodone Hives     ... With facial swelling     Trazodone      nausea     Recent Labs   Lab Test  06/25/18   0834  06/01/17   0808   03/15/16   1027   09/10/15   1353  12/11/14   0840   12/05/13   0902  12/05/12   0912   A1C  5.4   --    --    --    --    --    --    --   5.4  5.8   LDL  68  66   --   68   --    --   72   --   100  104   HDL  51  62   --   55   --    --   55   --   43*  47*   TRIG  218*  188*   --   222*   --    --   138   --   237*  182*   ALT   --    --    --   23   --    --   27   --    --   37   CR  0.83  0.77   < >   --    < >  0.87  0.74   < >  0.74  0.79   GFRESTIMATED  67  73   < >   --    < >  64  78   < >  78  72   GFRESTBLACK  81  89   < >   --    < >  77  >90   GFR Calc     < >  >90  88   POTASSIUM  4.3  4.2   < >   --    < >  3.9  4.6   < >  4.2  4.2   TSH   --    --    --    --    --   0.53   --    --   1.15  0.81    < > = values in this interval not displayed.      BP Readings from Last 3 Encounters:   09/06/18 123/72   06/28/18 115/68   06/05/17 114/69    Wt Readings from Last 3 Encounters:   09/06/18 194 lb (88 kg)   06/28/18 197 lb (89.4 kg)   06/05/17 194 lb (88 kg)                  Labs reviewed in EPIC    Reviewed and updated as needed this visit by clinical staff  Tobacco  Allergies  Meds  Med Hx  Surg Hx  Fam Hx  Soc Hx      Reviewed and updated as needed this visit by Provider         ROS:  Constitutional, HEENT - Left ear feels plugged, cardiovascular, GI, , musculoskeletal, neuro, skin, endocrine and psych systems are negative, except as otherwise noted.  Cough/bronchitis.     OBJECTIVE:     /72 (BP Location:  Right arm, Patient Position: Chair, Cuff Size: Adult Regular)  Pulse 71  Temp 97.8  F (36.6  C) (Oral)  Wt 194 lb (88 kg)  SpO2 96%  Breastfeeding? No  BMI 35.47 kg/m2  Body mass index is 35.47 kg/(m^2).  GENERAL: healthy, alert and no distress  EYES: Eyes grossly normal to inspection, PERRL and conjunctivae and sclerae normal  EYES:  Left pupil response is sluggish to light .  Cataracts.    NECK: no adenopathy, no asymmetry, masses, or scars and thyroid normal to palpation  RESP:  Scattered wheezing and rhonchi mostly at RML field.   CV: regular rate and rhythm, normal S1 S2, no S3 or S4, no murmur, click or rub, no peripheral edema and peripheral pulses strong  CV: ziopatch is on   ABDOMEN: soft, nontender, no hepatosplenomegaly, no masses and bowel sounds normal  MS: no gross musculoskeletal defects noted, no edema  NEURO: Normal strength and tone, mentation intact and speech normal  PSYCH: mentation appears normal, affect normal/bright  LYMPH: no cervical, supraclavicular, axillary, or inguinal adenopathy    Diagnostic Test Results:  Results for orders placed or performed in visit on 06/25/18   Hemoglobin A1c   Result Value Ref Range    Hemoglobin A1C 5.4 0 - 5.6 %   Basic metabolic panel   Result Value Ref Range    Sodium 142 133 - 144 mmol/L    Potassium 4.3 3.4 - 5.3 mmol/L    Chloride 104 94 - 109 mmol/L    Carbon Dioxide 27 20 - 32 mmol/L    Anion Gap 11 3 - 14 mmol/L    Glucose 100 (H) 70 - 99 mg/dL    Urea Nitrogen 22 7 - 30 mg/dL    Creatinine 0.83 0.52 - 1.04 mg/dL    GFR Estimate 67 >60 mL/min/1.7m2    GFR Estimate If Black 81 >60 mL/min/1.7m2    Calcium 9.4 8.5 - 10.1 mg/dL   Lipid panel reflex to direct LDL Fasting   Result Value Ref Range    Cholesterol 163 <200 mg/dL    Triglycerides 218 (H) <150 mg/dL    HDL Cholesterol 51 >49 mg/dL    LDL Cholesterol Calculated 68 <100 mg/dL    Non HDL Cholesterol 112 <130 mg/dL   Vitamin D Deficiency   Result Value Ref Range    Vitamin D Deficiency  screening 22 20 - 75 ug/L       ASSESSMENT/PLAN:             ICD-10-CM    1. Retinal artery occlusion, central, left H34.12 clopidogrel (PLAVIX) 75 MG tablet   2. Hypertension goal BP (blood pressure) < 140/90 I10 lisinopril (PRINIVIL/ZESTRIL) 10 MG tablet   3. Upper respiratory tract infection, unspecified type J06.9 XR Chest 2 Views     cefdinir (OMNICEF) 300 MG capsule     predniSONE (DELTASONE) 20 MG tablet   4. Patent foramen ovale with right to left shunt Q21.1 clopidogrel (PLAVIX) 75 MG tablet   5. Hyperlipidemia LDL goal <130 E78.5 atorvastatin (LIPITOR) 40 MG tablet           I will anticipate Ziopatch and give her results.   For now, continue current management     Treat asthmatic bronchitis and possible OM with Omnicef and prednisone Rx.        Complex care and follow up    Brandi Sims MD  [facility]

## 2018-09-06 NOTE — MR AVS SNAPSHOT
After Visit Summary   9/6/2018    Alfreda Baxter    MRN: [id number]           Patient Information     Date Of Birth          1945        Visit Information        Provider Department      9/6/2018 7:20 AM Brandi Sims MD [facility]        Today's Diagnoses     Retinal artery occlusion, central, left    -  1    Hypertension goal BP (blood pressure) < 140/90        Upper respiratory tract infection, unspecified type        Patent foramen ovale with right to left shunt        Hyperlipidemia LDL goal <130          Care Instructions    Omnicef (cefdinir) as directed for 10 days    Prednisone 40 mg daily for 3 - 5 days.    Return to clinic late October.                 Follow-ups after your visit        Follow-up notes from your care team     Return in about 7 weeks (around 10/25/2018) for BP Recheck, Routine Visit.      Your next 10 appointments already scheduled     Sep 14, 2018  9:00 AM CDT   New Visit with Bryan Morrison MD   [facility]    [address] [place] [address] [phone]              Who to contact     If you have questions or need follow up information about today's clinic visit or your schedule please contact [facility] directly at [phone].  Normal or non-critical lab and imaging results will be communicated to you by MyChart, letter or phone within 4 business days after the clinic has received the results. If you do not hear from us within 7 days, please contact the clinic through MyChart or phone. If you have a critical or abnormal lab result, we will notify you by phone as soon as possible.  Submit refill requests through [employer] or call your pharmacy and they will forward the refill request to us. Please allow 3 business days for your refill to be completed.          Additional Information About Your Visit        [employer] Information     [employer] gives you secure access  to your electronic health record. If you see a primary care provider, you can also send messages to your care team and make appointments. If you have questions, please call your primary care clinic.  If you do not have a primary care provider, please call [phone] and they will assist you.        Care EveryWhere ID     This is your Care EveryWhere ID. This could be used by other organizations to access your [facility] medical records  [id number]        Your Vitals Were     Pulse Temperature Pulse Oximetry Breastfeeding? BMI (Body Mass Index)       71 97.8  F (36.6  C) (Oral) 96% No 35.47 kg/m2        Blood Pressure from Last 3 Encounters:   09/06/18 123/72   06/28/18 115/68   06/05/17 114/69    Weight from Last 3 Encounters:   09/06/18 194 lb (88 kg)   06/28/18 197 lb (89.4 kg)   06/05/17 194 lb (88 kg)                 Today's Medication Changes          These changes are accurate as of 9/6/18  8:31 AM.  If you have any questions, ask your nurse or doctor.               Start taking these medicines.        Dose/Directions    cefdinir 300 MG capsule   Commonly known as:  OMNICEF   Used for:  Upper respiratory tract infection, unspecified type   Started by:  Brandi Sims MD        Dose:  300 mg   Take 1 capsule (300 mg) by mouth 2 times daily for 10 days   Quantity:  20 capsule   Refills:  1       predniSONE 20 MG tablet   Commonly known as:  DELTASONE   Used for:  Upper respiratory tract infection, unspecified type   Started by:  Brandi Sims MD        Dose:  40 mg   Take 2 tablets (40 mg) by mouth daily for 5 days   Quantity:  10 tablet   Refills:  0         These medicines have changed or have updated prescriptions.        Dose/Directions    atorvastatin 40 MG tablet   Commonly known as:  LIPITOR   This may have changed:    - medication strength  - how much to take   Used for:  Hyperlipidemia LDL goal <130   Changed by:  Brandi Sims MD        Dose:  40 mg   Take 1 tablet (40 mg) by  mouth daily   Quantity:  90 tablet   Refills:  3       lisinopril 10 MG tablet   Commonly known as:  PRINIVIL/ZESTRIL   This may have changed:  when to take this   Used for:  Hypertension goal BP (blood pressure) < 140/90   Changed by:  Brandi Sims MD        Dose:  10 mg   Take 1 tablet (10 mg) by mouth daily   Quantity:  90 tablet   Refills:  3            Where to get your medicines      These medications were sent to [facility] [address] [place] [address] - [address] [address]     Phone:  [phone]     atorvastatin 40 MG tablet    cefdinir 300 MG capsule    clopidogrel 75 MG tablet    lisinopril 10 MG tablet    predniSONE 20 MG tablet                Primary Care Provider Office Phone # Fax #    Brandi Sims [phone] [address] [facility] [address]        Equal Access to Services     [facility] AH: Hadii evon ku hadasho Soomaali, waaxda luqadaha, qaybta kaalmada adeegyada, waxay idiin haysmoothn kristal fry . So [facility] [phone].    ATENCIÓN: Si habla español, tiene a che disposición servicios gratuitos de asistencia lingüística. Llame al [phone].    We comply with applicable federal civil rights laws and [place] laws. We do not discriminate on the basis of race, color, national origin, age, disability, sex, sexual orientation, or gender identity.            Thank you!     Thank you for choosing [facility]  for your care. Our goal is always to provide you with excellent care. Hearing back from our patients is one way we can continue to improve our services. Please take a few minutes to complete the written survey that you may receive in the mail after your visit with us. Thank you!             Your Updated Medication List - Protect others around you: Learn how to safely use, store and throw away your medicines at www.disposemymeds.org.          This list is accurate as of  9/6/18  8:31 AM.  Always use your most recent med list.                   Brand Name Dispense Instructions for use Diagnosis    acetaminophen 500 MG tablet    TYLENOL     Take 500-1,000 mg by mouth every 6 hours as needed.        albuterol 108 (90 Base) MCG/ACT inhaler    PROAIR HFA/PROVENTIL HFA/VENTOLIN HFA    1 Inhaler    Inhale 2 puffs into the lungs every 6 hours as needed for shortness of breath / dyspnea    Mild persistent asthma       ASPIRIN 81 MG Cpep      1 CAPSULE DAILY        atorvastatin 40 MG tablet    LIPITOR    90 tablet    Take 1 tablet (40 mg) by mouth daily    Hyperlipidemia LDL goal <130       B-COMPLEX PO      Take  by mouth daily.        CALCIUM 500 +D PO      one daily        cefdinir 300 MG capsule    OMNICEF    20 capsule    Take 1 capsule (300 mg) by mouth 2 times daily for 10 days    Upper respiratory tract infection, unspecified type       cholecalciferol 89782 units capsule    VITAMIN D3    12 capsule    Take 1 capsule (50,000 Units) by mouth once a week    Low vitamin D level       clopidogrel 75 MG tablet    PLAVIX    90 tablet    Take 1 tablet (75 mg) by mouth daily    Retinal artery occlusion, central, left, Patent foramen ovale with right to left shunt       diclofenac 1 % Gel topical gel    VOLTAREN    100 g    Apply 4 grams to knees or 2 grams to hands four times daily using enclosed dosing card. Generic okay    Primary osteoarthritis of both knees, Status post total left knee replacement       lisinopril 10 MG tablet    PRINIVIL/ZESTRIL    90 tablet    Take 1 tablet (10 mg) by mouth daily    Hypertension goal BP (blood pressure) < 140/90       lisinopril-hydrochlorothiazide 20-25 MG per tablet    PRINZIDE/ZESTORETIC    90 tablet    Take 1 tablet by mouth daily    Hypertension goal BP (blood pressure) < 140/90       predniSONE 20 MG tablet    DELTASONE    10 tablet    Take 2 tablets (40 mg) by mouth daily for 5 days    Upper respiratory tract infection, unspecified type        GUILLERMO-e 400 MG Tabs           WOMENS MULTIVITAMIN PLUS PO      Take  by mouth daily.        ZANTAC 75 PO           zolpidem 5 MG tablet    AMBIEN    30 tablet    TAKE ONE TABLET BY MOUTH IN THE EVENING AS NEEDED FOR SLEEP    Primary insomnia

## 2018-09-06 NOTE — PATIENT INSTRUCTIONS
Omnicef (cefdinir) as directed for 10 days    Prednisone 40 mg daily for 3 - 5 days.    Return to clinic late October.

## 2018-09-14 ENCOUNTER — OFFICE VISIT (OUTPATIENT)
Dept: OPHTHALMOLOGY | Facility: CLINIC | Age: 73
End: 2018-09-14
Payer: COMMERCIAL

## 2018-09-14 DIAGNOSIS — H34.12 CENTRAL RETINAL ARTERY OCCLUSION OF LEFT EYE: Primary | ICD-10-CM

## 2018-09-14 DIAGNOSIS — H52.4 PRESBYOPIA: ICD-10-CM

## 2018-09-14 DIAGNOSIS — H43.813 POSTERIOR VITREOUS DETACHMENT OF BOTH EYES: ICD-10-CM

## 2018-09-14 DIAGNOSIS — H25.813 COMBINED FORMS OF AGE-RELATED CATARACT OF BOTH EYES: ICD-10-CM

## 2018-09-14 PROCEDURE — 92134 CPTRZ OPH DX IMG PST SGM RTA: CPT | Performed by: OPHTHALMOLOGY

## 2018-09-14 PROCEDURE — 92015 DETERMINE REFRACTIVE STATE: CPT | Performed by: OPHTHALMOLOGY

## 2018-09-14 PROCEDURE — 92083 EXTENDED VISUAL FIELD XM: CPT | Performed by: OPHTHALMOLOGY

## 2018-09-14 PROCEDURE — 92014 COMPRE OPH EXAM EST PT 1/>: CPT | Performed by: OPHTHALMOLOGY

## 2018-09-14 ASSESSMENT — REFRACTION_MANIFEST
OD_SPHERE: -2.25
OD_ADD: +3.50
OS_CYLINDER: SPHERE
OS_SPHERE: PLANO
OD_CYLINDER: SPHERE

## 2018-09-14 ASSESSMENT — EXTERNAL EXAM - RIGHT EYE: OD_EXAM: 2+ BROW PTOSIS, MILD TO MOD BROW

## 2018-09-14 ASSESSMENT — VISUAL ACUITY
OD_CC: 70-1
METHOD: SNELLEN - LINEAR
OS_CC: LP

## 2018-09-14 ASSESSMENT — REFRACTION_WEARINGRX
OD_AXIS: 133
OS_AXIS: 053
OS_CYLINDER: +0.75
OD_CYLINDER: +1.25
SPECS_TYPE: PAL
OS_ADD: +2.75
OS_SPHERE: +0.50
OD_ADD: +2.75
OD_SPHERE: -1.25

## 2018-09-14 ASSESSMENT — CONF VISUAL FIELD
OS_SUPERIOR_NASAL_RESTRICTION: 3
OS_INFERIOR_NASAL_RESTRICTION: 3
OS_INFERIOR_TEMPORAL_RESTRICTION: 3

## 2018-09-14 ASSESSMENT — CUP TO DISC RATIO
OS_RATIO: 0.5
OD_RATIO: 0.5

## 2018-09-14 ASSESSMENT — TONOMETRY
OD_IOP_MMHG: 20
OS_IOP_MMHG: 18
IOP_METHOD: APPLANATION

## 2018-09-14 ASSESSMENT — EXTERNAL EXAM - LEFT EYE: OS_EXAM: 2+ BROW PTOSIS, MILD TO MOD BROW

## 2018-09-14 NOTE — LETTER
"    9/14/2018         RE: Alfreda Baxter  [address] [facility] [address]        Dear Colleague,    Thank you for referring your patient, Alfreda Baxter, to the [facility]. Please see a copy of my visit note below.     Current Eye Medications:  no     Subjective:  Comprehensive eye exam plus mac and glaucoma oct and 24-2  Pt reports the  HBOs she had had not improved her vision left eye at all, pt also reports that vision in her right is blurrier.    No result yet on recent 14 day Holter.  Was on 81 mg aspirin prior to event and continues; also now Plavix 75 mg daily.  Lipitor was increased.  BP meds adjusted down.  \"Hole in heart\" but no treatment recommended.     Objective:  See Ophthalmology Exam.       Assessment:  Recent central retinal artery occlusion left eye; no obvious benefit from hyperbaric oxygen treatments.  Myopic shift right eye from cataract.      ICD-10-CM    1. Central retinal artery occlusion of left eye H34.12 EYE EXAM (SIMPLE-NONBILLABLE)     VISUAL FIELDS EXAM (EXTENDED)      COMPUTERIZED OPHTHALMIC IMAGING OPTIC NERVE     HC COMPUTERIZED OPHTHALMIC IMAGING RETINA   2. Combined forms of age-related cataract, mild, both eyes H25.813    3. Posterior vitreous detachment of both eyes H43.813    4. Presbyopia H52.4 REFRACTIVE STATUS        Plan:  Glasses Rx given.  Use artificial tears up to 4 times daily both eyes.  (Refresh Tears, Systane Ultra/Balance, or Theratears)  Recommend gradual increase of independent driving without restrictions.   Return visit in 1 month for intraocular pressure check.  Discussed need to watch for anterior segment neovascularization.    Bryan Morrison M.D.  [phone]      Again, thank you for allowing me to participate in the care of your patient.        Sincerely,        Bryan Morrison MD    "

## 2018-09-14 NOTE — PATIENT INSTRUCTIONS
Glasses Rx given.  Use artificial tears up to 4 times daily both eyes.  (Refresh Tears, Systane Ultra/Balance, or Theratears)  Recommend gradual increase of independent driving without restrictions.   Return visit in 1 month for intraocular pressure check.    Bryan Morrison M.D.  [phone]

## 2018-09-14 NOTE — MR AVS SNAPSHOT
After Visit Summary   9/14/2018    Alfreda Baxter    MRN: [id number]           Patient Information     Date Of Birth          1945        Visit Information        Provider Department      9/14/2018 9:00 AM rByan Morrison MD [facility]        Today's Diagnoses     Presbyopia    -  1    Combined forms of age-related cataract, mild, both eyes        Squamous blepharitis of both eyes, unspecified eyelid          Care Instructions    Glasses Rx given.  Use artificial tears up to 4 times daily both eyes.  (Refresh Tears, Systane Ultra/Balance, or Theratears)  Recommend gradual increase of independent driving without restrictions.   Return visit in 1 month for intraocular pressure check.    Bryan Morrison M.D.  [phone]              Follow-ups after your visit        Who to contact     If you have questions or need follow up information about today's clinic visit or your schedule please contact [facility] directly at [phone].  Normal or non-critical lab and imaging results will be communicated to you by [employer], letter or phone within 4 business days after the clinic has received the results. If you do not hear from us within 7 days, please contact the clinic through YUPIQt or phone. If you have a critical or abnormal lab result, we will notify you by phone as soon as possible.  Submit refill requests through [employer] or call your pharmacy and they will forward the refill request to us. Please allow 3 business days for your refill to be completed.          Additional Information About Your Visit        MyChart Information     [employer] gives you secure access to your electronic health record. If you see a primary care provider, you can also send messages to your care team and make appointments. If you have questions, please call your primary care clinic.  If you do not have a primary care provider, please call [phone] and they will assist you.        Care  EveryWhere ID     This is your Care EveryWhere ID. This could be used by other organizations to access your [place] medical records  [id number]         Blood Pressure from Last 3 Encounters:   09/06/18 123/72   06/28/18 115/68   06/05/17 114/69    Weight from Last 3 Encounters:   09/06/18 88 kg (194 lb)   06/28/18 89.4 kg (197 lb)   06/05/17 88 kg (194 lb)              We Performed the Following     EYE EXAM (SIMPLE-NONBILLABLE)     HC COMPUTERIZED OPHTHALMIC IMAGING OPTIC NERVE     HC COMPUTERIZED OPHTHALMIC IMAGING RETINA     REFRACTIVE STATUS     VISUAL FIELDS EXAM (EXTENDED)        Primary Care Provider Office Phone # Fax #    Brandi Sims [phone]       [address] [facility] [address]        Equal Access to Services     ANGELICA PEREYRA : Hadii aad ku hadasho Soomaali, waaxda luqadaha, qaybta kaalmada adeegyada, kostas ibrahim. So [facility] [phone].    ATENCIÓN: Si habla español, tiene a che disposición servicios gratuitos de asistencia lingüística. Llame al [phone].    We comply with applicable federal civil rights laws and [place] laws. We do not discriminate on the basis of race, color, national origin, age, disability, sex, sexual orientation, or gender identity.            Thank you!     Thank you for choosing [facility]  for your care. Our goal is always to provide you with excellent care. Hearing back from our patients is one way we can continue to improve our services. Please take a few minutes to complete the written survey that you may receive in the mail after your visit with us. Thank you!             Your Updated Medication List - Protect others around you: Learn how to safely use, store and throw away your medicines at www.disposemymeds.org.          This list is accurate as of 9/14/18 10:38 AM.  Always use your most recent med list.                   Brand Name Dispense Instructions for use Diagnosis     acetaminophen 500 MG tablet    TYLENOL     Take 500-1,000 mg by mouth every 6 hours as needed.        albuterol 108 (90 Base) MCG/ACT inhaler    PROAIR HFA/PROVENTIL HFA/VENTOLIN HFA    1 Inhaler    Inhale 2 puffs into the lungs every 6 hours as needed for shortness of breath / dyspnea    Mild persistent asthma       ASPIRIN 81 MG Cpep      1 CAPSULE DAILY        atorvastatin 40 MG tablet    LIPITOR    90 tablet    Take 1 tablet (40 mg) by mouth daily    Hyperlipidemia LDL goal <130       B-COMPLEX PO      Take  by mouth daily.        CALCIUM 500 +D PO      one daily        cefdinir 300 MG capsule    OMNICEF    20 capsule    Take 1 capsule (300 mg) by mouth 2 times daily for 10 days    Upper respiratory tract infection, unspecified type       cholecalciferol 72947 units capsule    VITAMIN D3    12 capsule    Take 1 capsule (50,000 Units) by mouth once a week    Low vitamin D level       clopidogrel 75 MG tablet    PLAVIX    90 tablet    Take 1 tablet (75 mg) by mouth daily    Retinal artery occlusion, central, left, Patent foramen ovale with right to left shunt       diclofenac 1 % Gel topical gel    VOLTAREN    100 g    Apply 4 grams to knees or 2 grams to hands four times daily using enclosed dosing card. Generic okay    Primary osteoarthritis of both knees, Status post total left knee replacement       lisinopril 10 MG tablet    PRINIVIL/ZESTRIL    90 tablet    Take 1 tablet (10 mg) by mouth daily    Hypertension goal BP (blood pressure) < 140/90       lisinopril-hydrochlorothiazide 20-25 MG per tablet    PRINZIDE/ZESTORETIC    90 tablet    Take 1 tablet by mouth daily    Hypertension goal BP (blood pressure) < 140/90       GUILLERMO-e 400 MG Tabs           WOMENS MULTIVITAMIN PLUS PO      Take  by mouth daily.        ZANTAC 75 PO           zolpidem 5 MG tablet    AMBIEN    30 tablet    TAKE ONE TABLET BY MOUTH IN THE EVENING AS NEEDED FOR SLEEP    Primary insomnia

## 2018-09-14 NOTE — PROGRESS NOTES
" Current Eye Medications:  no     Subjective:  Comprehensive eye exam plus mac and glaucoma oct and 24-2  Pt reports the  HBOs she had had not improved her vision left eye at all, pt also reports that vision in her right is blurrier.    No result yet on recent 14 day Holter.  Was on 81 mg aspirin prior to event and continues; also now Plavix 75 mg daily.  Lipitor was increased.  BP meds adjusted down.  \"Hole in heart\" but no treatment recommended.     Objective:  See Ophthalmology Exam.       Assessment:  Recent central retinal artery occlusion left eye; no obvious benefit from hyperbaric oxygen treatments.  Myopic shift right eye from cataract.      ICD-10-CM    1. Central retinal artery occlusion of left eye H34.12 EYE EXAM (SIMPLE-NONBILLABLE)     VISUAL FIELDS EXAM (EXTENDED)      COMPUTERIZED OPHTHALMIC IMAGING OPTIC NERVE      COMPUTERIZED OPHTHALMIC IMAGING RETINA   2. Combined forms of age-related cataract, mild, both eyes H25.813    3. Posterior vitreous detachment of both eyes H43.813    4. Presbyopia H52.4 REFRACTIVE STATUS        Plan:  Glasses Rx given.  Use artificial tears up to 4 times daily both eyes.  (Refresh Tears, Systane Ultra/Balance, or Theratears)  Recommend gradual increase of independent driving without restrictions.   Return visit in 1 month for intraocular pressure check.  Discussed need to watch for anterior segment neovascularization.    Bryan Morrison M.D.  [phone]    "

## 2018-09-15 PROBLEM — H43.813 POSTERIOR VITREOUS DETACHMENT OF BOTH EYES: Status: ACTIVE | Noted: 2018-09-15

## 2018-09-15 PROBLEM — H34.12 CENTRAL RETINAL ARTERY OCCLUSION OF LEFT EYE: Status: ACTIVE | Noted: 2018-09-15

## 2018-09-21 DIAGNOSIS — I47.20 V-TACH (H): Primary | ICD-10-CM

## 2018-09-21 NOTE — PROGRESS NOTES
Alfreda Baxter    Here are the results we discussed.   I would like you to consult with one of our [facility]ologists about the findings in the context of the recent stroke.     [facility] [phone] -    Sincerely,     EMILEE MARTÍNEZ M.D.

## 2018-09-28 ENCOUNTER — MYC MEDICAL ADVICE (OUTPATIENT)
Dept: FAMILY MEDICINE | Facility: CLINIC | Age: 73
End: 2018-09-28

## 2018-09-28 NOTE — TELEPHONE ENCOUNTER
See Mychart message below from patient regarding a full report of her heart monitor.    Routed to provider to address.  Trinh Young RN  [facility]

## 2018-09-28 NOTE — LETTER
Dear Alfreda,         Your Care Team has attempted to reach you multiple times to assist you with scheduling your cardiology appointment. We had previously mailed you out your Ziopatch along with the scheduling information for the cardiologist. We do not see that you have scheduled that appointment yet. We are including this information again with this letter. You can contact the [facility] to schedule an appointment with the cardiologist by calling [phone]. Please let us know if you have any questions at [phone].      Sincerely,    Brandi MARCUS

## 2018-10-01 NOTE — TELEPHONE ENCOUNTER
Referral entered on 9/21 (see order).  Please offer to help schedule appt with cardiology or give her number to call for scheduling    Print ziopatch report and send to her (if not already done)     Thanks.

## 2018-10-04 NOTE — TELEPHONE ENCOUNTER
Message left for patient to return call regarding message below. Letter printed and mailed along with Ziopatch summary.

## 2018-10-17 ENCOUNTER — OFFICE VISIT (OUTPATIENT)
Dept: OPHTHALMOLOGY | Facility: CLINIC | Age: 73
End: 2018-10-17
Payer: COMMERCIAL

## 2018-10-17 DIAGNOSIS — H40.52X2 NEOVASCULAR GLAUCOMA OF LEFT EYE, MODERATE STAGE: ICD-10-CM

## 2018-10-17 DIAGNOSIS — H34.12 CENTRAL RETINAL ARTERY OCCLUSION OF LEFT EYE: Primary | ICD-10-CM

## 2018-10-17 PROCEDURE — 92012 INTRM OPH EXAM EST PATIENT: CPT | Performed by: OPHTHALMOLOGY

## 2018-10-17 PROCEDURE — 92020 GONIOSCOPY: CPT | Performed by: OPHTHALMOLOGY

## 2018-10-17 RX ORDER — LATANOPROST 50 UG/ML
1 SOLUTION/ DROPS OPHTHALMIC AT BEDTIME
Qty: 1 BOTTLE | Refills: 11 | Status: SHIPPED | OUTPATIENT
Start: 2018-10-17 | End: 2019-11-27

## 2018-10-17 RX ORDER — BRIMONIDINE TARTRATE AND TIMOLOL MALEATE 2; 5 MG/ML; MG/ML
1 SOLUTION OPHTHALMIC 2 TIMES DAILY
Qty: 1 BOTTLE | Refills: 11 | Status: ON HOLD | OUTPATIENT
Start: 2018-10-17 | End: 2018-12-31

## 2018-10-17 RX ORDER — VITS A,C,E/LUTEIN/MINERALS 300MCG-200
1 TABLET ORAL DAILY
COMMUNITY

## 2018-10-17 ASSESSMENT — VISUAL ACUITY
METHOD: SNELLEN - LINEAR
CORRECTION_TYPE: GLASSES
OD_PH_CC: 20/25+2
OD_CC: 20/30
OD_CC+: -1

## 2018-10-17 ASSESSMENT — TONOMETRY
IOP_METHOD: APPLANATION
OD_IOP_MMHG: 20
IOP_METHOD: APPLANATION
OS_IOP_MMHG: 55
OS_IOP_MMHG: 56
OD_IOP_MMHG: 20

## 2018-10-17 ASSESSMENT — GONIOSCOPY: OS_TEMPORAL: GRADE 3

## 2018-10-17 ASSESSMENT — EXTERNAL EXAM - RIGHT EYE: OD_EXAM: 2+ BROW PTOSIS, MILD TO MOD BROW

## 2018-10-17 ASSESSMENT — EXTERNAL EXAM - LEFT EYE: OS_EXAM: 2+ BROW PTOSIS, MILD TO MOD BROW

## 2018-10-17 NOTE — PROGRESS NOTES
Current Eye Medications:  Artificial tears both eyes twice a day (Soothe or Systane) Ocuvite daily      Subjective:  Here for IOP check today.  Left eye vision is gone, FB pain in the left eye x 10 days.  Feels like something in the left eye. Red and gritty left eye.  No drainage. Sees a bit of a shadow in the left eye with a little window of vision and when she catches the light it hurts. Father and sister had glaucoma.  Another sister with AMD.      Objective:  See Ophthalmology Exam.       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  Angle currently about 50% open.      Plan:  Start Combigan in left eye twice daily,  And Latanoprost left eye daily at bedtime.   [facility] +/- [facility].  Return visit 3 weeks for intraocular pressure check.     Bryan Morrison M.D.  [phone]

## 2018-10-17 NOTE — MR AVS SNAPSHOT
After Visit Summary   10/17/2018    Alfreda Baxter    MRN: [id number]           Patient Information     Date Of Birth          1945        Visit Information        Provider Department      10/17/2018 9:15 AM Bryan Morrison MD [facility]        Today's Diagnoses     Central retinal artery occlusion of left eye    -  1    Neovascular glaucoma of left eye, moderate stage          Care Instructions    Start Combigan in left eye twice daily,  And Latanoprost left eye daily at bedtime.   [facility].  Return visit 3 weeks for intraocular pressure check.     Bryan Morrison M.D.  [phone]            Follow-ups after your visit        Additional Services     OPHTHALMOLOGY ADULT REFERRAL       Your provider has referred you to:  [facility] [place]      Please be aware that coverage of these services is subject to the terms and limitations of your health insurance plan.  Call member services at your health plan with any benefit or coverage questions.      Please bring the following to your appointment:  >>   Any x-rays, CTs or MRIs which have been performed.  Contact the facility where they were done to arrange for  prior to your scheduled appointment.  Any new CT, MRI or other procedures ordered by your specialist must be performed at a [facility] facility or coordinated by your clinic's referral office.    >>   List of current medications   >>   This referral request   >>   Any documents/labs given to you for this referral                  Your next 10 appointments already scheduled     Nov 19, 2018 10:30 AM CST   New Visit with Buck Butterfield MD   [facility] ([facility])    [address] [place] [address] 2nd Floor  [facility] [id number] [phone]              Who to contact     If you have questions or need follow up information about today's clinic visit or your schedule please contact  [facility] directly at [phone].  Normal or non-critical lab and imaging results will be communicated to you by MyChart, letter or phone within 4 business days after the clinic has received the results. If you do not hear from us within 7 days, please contact the clinic through [employer] or phone. If you have a critical or abnormal lab result, we will notify you by phone as soon as possible.  Submit refill requests through [employer] or call your pharmacy and they will forward the refill request to us. Please allow 3 business days for your refill to be completed.          Additional Information About Your Visit        [employer] Information     [employer] gives you secure access to your electronic health record. If you see a primary care provider, you can also send messages to your care team and make appointments. If you have questions, please call your primary care clinic.  If you do not have a primary care provider, please call [phone] and they will assist you.        Care EveryWhere ID     This is your Care EveryWhere ID. This could be used by other organizations to access your [place] medical records  [id number]         Blood Pressure from Last 3 Encounters:   09/06/18 123/72   06/28/18 115/68   06/05/17 114/69    Weight from Last 3 Encounters:   09/06/18 88 kg (194 lb)   06/28/18 89.4 kg (197 lb)   06/05/17 88 kg (194 lb)              We Performed the Following     OPHTHALMOLOGY ADULT REFERRAL          Today's Medication Changes          These changes are accurate as of 10/17/18 10:10 AM.  If you have any questions, ask your nurse or doctor.               Start taking these medicines.        Dose/Directions    brimonidine-timolol 0.2-0.5 % ophthalmic solution   Commonly known as:  COMBIGAN   Used for:  Neovascular glaucoma of left eye, moderate stage   Started by:  Bryan Morrison MD        Dose:  1 drop   Place 1 drop Into the left eye 2 times daily   Quantity:  1 Bottle   Refills:  11        latanoprost 0.005 % ophthalmic solution   Commonly known as:  XALATAN   Used for:  Neovascular glaucoma of left eye, moderate stage   Started by:  Bryan Morrison MD        Dose:  1 drop   Place 1 drop Into the left eye At Bedtime   Quantity:  1 Bottle   Refills:  11            Where to get your medicines      These medications were sent to [facility] - [place], [place] - [address]  [address], [place] [address]     Phone:  [phone]     brimonidine-timolol 0.2-0.5 % ophthalmic solution    latanoprost 0.005 % ophthalmic solution                Primary Care Provider Office Phone # Fax #    Brandi Sims [phone] [address] [facility] [address]        Equal Access to Services     [facility]: Hadii evon mcduffie hadasho Soomaali, waaxda luqadaha, qaybta kaalmada adeegyada, kostas rfy . So [facility] [phone].    ATENCIÓN: Si habla español, tiene a che disposición servicios gratuitos de asistencia lingüística. Ll[facility] [phone].    We comply with applicable federal civil rights laws and [place] laws. We do not discriminate on the basis of race, color, national origin, age, disability, sex, sexual orientation, or gender identity.            Thank you!     Thank you for choosing [facility]  for your care. Our goal is always to provide you with excellent care. Hearing back from our patients is one way we can continue to improve our services. Please take a few minutes to complete the written survey that you may receive in the mail after your visit with us. Thank you!             Your Updated Medication List - Protect others around you: Learn how to safely use, store and throw away your medicines at www.disposemymeds.org.          This list is accurate as of 10/17/18 10:10 AM.  Always use your most recent med list.                   Brand Name Dispense Instructions for use Diagnosis     acetaminophen 500 MG tablet    TYLENOL     Take 500-1,000 mg by mouth every 6 hours as needed.        albuterol 108 (90 Base) MCG/ACT inhaler    PROAIR HFA/PROVENTIL HFA/VENTOLIN HFA    1 Inhaler    Inhale 2 puffs into the lungs every 6 hours as needed for shortness of breath / dyspnea    Mild persistent asthma       ASPIRIN 81 MG Cpep      1 CAPSULE DAILY        atorvastatin 40 MG tablet    LIPITOR    90 tablet    Take 1 tablet (40 mg) by mouth daily    Hyperlipidemia LDL goal <130       B-COMPLEX PO      Take  by mouth daily.        brimonidine-timolol 0.2-0.5 % ophthalmic solution    COMBIGAN    1 Bottle    Place 1 drop Into the left eye 2 times daily    Neovascular glaucoma of left eye, moderate stage       CALCIUM 500 +D PO      one daily        cholecalciferol 22982 units capsule    VITAMIN D3    12 capsule    Take 1 capsule (50,000 Units) by mouth once a week    Low vitamin D level       clopidogrel 75 MG tablet    PLAVIX    90 tablet    Take 1 tablet (75 mg) by mouth daily    Retinal artery occlusion, central, left, Patent foramen ovale with right to left shunt       diclofenac 1 % Gel topical gel    VOLTAREN    100 g    Apply 4 grams to knees or 2 grams to hands four times daily using enclosed dosing card. Generic okay    Primary osteoarthritis of both knees, Status post total left knee replacement       latanoprost 0.005 % ophthalmic solution    XALATAN    1 Bottle    Place 1 drop Into the left eye At Bedtime    Neovascular glaucoma of left eye, moderate stage       lisinopril 10 MG tablet    PRINIVIL/ZESTRIL    90 tablet    Take 1 tablet (10 mg) by mouth daily    Hypertension goal BP (blood pressure) < 140/90       lisinopril-hydrochlorothiazide 20-25 MG per tablet    PRINZIDE/ZESTORETIC    90 tablet    Take 1 tablet by mouth daily    Hypertension goal BP (blood pressure) < 140/90       GUILLERMO-e 400 MG Tabs           * WOMENS MULTIVITAMIN PLUS PO      Take  by mouth daily.        * multivitamin Tabs tablet       Take 1 tablet by mouth daily        ZANTAC 75 PO           zolpidem 5 MG tablet    AMBIEN    30 tablet    TAKE ONE TABLET BY MOUTH IN THE EVENING AS NEEDED FOR SLEEP    Primary insomnia       * Notice:  This list has 2 medication(s) that are the same as other medications prescribed for you. Read the directions carefully, and ask your doctor or other care provider to review them with you.

## 2018-10-17 NOTE — PATIENT INSTRUCTIONS
Start Combigan in left eye twice daily,  And Latanoprost left eye daily at bedtime.   [facility] for probable PRP +/- antiVEGF.  Return visit 3 weeks for intraocular pressure check.     Bryan Morrison M.D.  [phone]

## 2018-10-17 NOTE — LETTER
10/17/2018         RE: Alfreda Baxter  [address] [facility] [address]        Dear Colleague,    Thank you for referring your patient, Alfreda Baxter, to the [facility]. Please see a copy of my visit note below.     Current Eye Medications:  Artificial tears both eyes twice a day (Soothe or Systane) Ocuvite daily      Subjective:  Here for IOP check today.  Left eye vision is gone, FB pain in the left eye x 10 days.  Feels like something in the left eye. Red and gritty left eye.  No drainage. Sees a bit of a shadow in the left eye with a little window of vision and when she catches the light it hurts. Father and sister had glaucoma.  Another sister with AMD.      Objective:  See Ophthalmology Exam.       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  Angle currently about 50% open.      Plan:  Start Combigan in left eye twice daily,  And Latanoprost left eye daily at bedtime.   [facility] for probable PRP +/- antiVEGF.  Return visit 3 weeks for intraocular pressure check.     Bryan Morrison M.D.  [phone]           Again, thank you for allowing me to participate in the care of your patient.        Sincerely,        Bryan Morrison MD

## 2018-10-18 ASSESSMENT — CUP TO DISC RATIO
OS_RATIO: 0.3
OD_RATIO: 0.5

## 2018-10-18 ASSESSMENT — VISUAL ACUITY
METHOD: SNELLEN - LINEAR
OD_CC: 20/30

## 2018-10-18 ASSESSMENT — EXTERNAL EXAM - RIGHT EYE: OD_EXAM: 2+ BROW PTOSIS, MILD TO MOD BROW

## 2018-10-18 ASSESSMENT — TONOMETRY
OS_IOP_MMHG: 20
OD_IOP_MMHG: 20
IOP_METHOD: APPLANATION

## 2018-10-18 ASSESSMENT — EXTERNAL EXAM - LEFT EYE: OS_EXAM: 2+ BROW PTOSIS, MILD TO MOD BROW

## 2018-10-19 ENCOUNTER — TRANSFERRED RECORDS (OUTPATIENT)
Dept: HEALTH INFORMATION MANAGEMENT | Facility: CLINIC | Age: 73
End: 2018-10-19

## 2018-10-19 NOTE — PATIENT INSTRUCTIONS
Discussed with Dr. Julien at [facility]; to be seen there for evaluation and possible bariatric therapy.  Bryan Morrison M.D.  [phone]

## 2018-10-22 ENCOUNTER — TRANSFERRED RECORDS (OUTPATIENT)
Dept: HEALTH INFORMATION MANAGEMENT | Facility: CLINIC | Age: 73
End: 2018-10-22

## 2018-11-19 ENCOUNTER — OFFICE VISIT (OUTPATIENT)
Dept: CARDIOLOGY | Facility: CLINIC | Age: 73
End: 2018-11-19
Payer: COMMERCIAL

## 2018-11-19 VITALS
WEIGHT: 191 LBS | DIASTOLIC BLOOD PRESSURE: 84 MMHG | BODY MASS INDEX: 34.93 KG/M2 | HEART RATE: 80 BPM | SYSTOLIC BLOOD PRESSURE: 144 MMHG | OXYGEN SATURATION: 98 %

## 2018-11-19 DIAGNOSIS — I47.29 VENTRICULAR TACHYCARDIA (PAROXYSMAL) (H): ICD-10-CM

## 2018-11-19 DIAGNOSIS — Q21.12 PFO (PATENT FORAMEN OVALE): Primary | ICD-10-CM

## 2018-11-19 DIAGNOSIS — H34.12 CENTRAL RETINAL ARTERY OCCLUSION OF LEFT EYE: ICD-10-CM

## 2018-11-19 PROCEDURE — 99204 OFFICE O/P NEW MOD 45 MIN: CPT | Performed by: INTERNAL MEDICINE

## 2018-11-19 RX ORDER — LIDOCAINE 40 MG/G
CREAM TOPICAL
Status: CANCELLED | OUTPATIENT
Start: 2018-11-19

## 2018-11-19 NOTE — NURSING NOTE
"Chief Complaint   Patient presents with     New Patient     NEW Dr. Butterfield hx of PFO recent CVA on Plavix.        Initial /85 (BP Location: Right arm, Patient Position: Sitting, Cuff Size: Adult Large)  Pulse 80  Wt 86.6 kg (191 lb)  SpO2 98%  BMI 34.93 kg/m2 Estimated body mass index is 34.93 kg/(m^2) as calculated from the following:    Height as of 6/28/18: 1.575 m (5' 2.01\").    Weight as of this encounter: 86.6 kg (191 lb)..  BP completed using cuff size: large    Malcolm Malik L.P.N.    "

## 2018-11-19 NOTE — MR AVS SNAPSHOT
After Visit Summary   11/19/2018    Alfreda Baxter    MRN: [id number]           Patient Information     Date Of Birth          1945        Visit Information        Provider Department      11/19/2018 10:30 AM Buck Butterfield MD [facility] PHYSICIANS HEART AT [facility]        Today's Diagnoses     PFO (patent foramen ovale)    -  1    SVT (supraventricular tachycardia) (H)        Central retinal artery occlusion of left eye          Care Instructions    Thank you for coming to the [facility] @ [facility]; please note the following instructions:    1.   Dr. Butterfield has ordered for you to have a cardiac MRI and a loop recorder.      2.  Sherry Dunlap, the [facility] EP  will call you to schedule these tests.   You may also call her directly at [phone].                If you have any questions regarding your visit please contact your care team:     Cardiology  Telephone Number   Sendy LACEY, KEELEY DAVID, RN   Rosalee LÓPEZ, RMA  Mihaela JEAN, MA  Malcolm REYES, N   [phone]    *After hours: [phone]   For scheduling appts:     [phone] or    [phone] (select option 1)    *After hours: [phone]     For the Device Clinic (Pacemakers and ICD's)  RN's :  Jaz Kemp   During business hours: [phone]    *After business hours:  [phone] (select option 4)      Normal test result notifications will be released via [employer] or mailed within 7 business days.  All other test results, will be communicated via telephone once reviewed by your cardiologist.    If you need a medication refill please contact your pharmacy.  Please allow 3 business days for your refill to be completed.    As always, thank you for trusting us with your health care needs!        Understanding Loop Recorder Implantation  An implantable loop recorder (ILR) is a device that records information about how your heart is working. A loop recorder may be  implanted if you have problems such as:    Fainting, dizziness, or lightheadedness    Heart palpitations    Very fast or slow heartbeats    Unexplained falls    Possible hidden heart rhythm problems that can cause strokes    Heart attack in the past    Certain gene disorders  During implantation, a small device is placed under the skin on your chest, a few inches below your collarbone. The device works as an electrocardiogram (ECG). It constantly picks up electrical signals from your heart. An ILR records for up to 3 years.  How a loop recorder helps  You may need an ILR if other tests haven t found the cause of your symptoms. An ILR constantly records your heart s electrical activity. For example, if you faint because of an arrhythmia, the device records your heart s activity before, during, and after you faint. Then your health care provider can see how your heart was acting.  Or you may need to trigger your ILR with an activator. This is a small, handheld device. You press a button on the activator when you are feeling symptoms. The IRL then records your heart s activity. This device is very useful when you don't have symptoms often or if your provider needs to look at long-term information about your heart.  Once the cause of your symptoms is found, you can be treated. You may need another small device to help control your heart rhythm. This may be a pacemaker or an implantable cardioverter-defibrillator (ICD).  How loop recorder implantation is done  The doctor will make a small cut (incision) in your skin. This is usually done in the left upper chest, a few inches below your collarbone. He or she will make a small pocket under your skin. The doctor will place the loop recorder in this pocket. The machine is about the size of a flat AA battery.  Your provider can remove the device in a similar way once enough information has been recorded.  Risks of loop recorder implantation  All procedures have some risks.  The risks of this procedure include:    Bleeding or bruising    Infection that may require that the ILR be removed    Damage to your heart or blood vessels    Mild pain at your implantation site  Your own risks will depend on your age, your other overall health, and other factors. Ask your healthcare provider about which risks most apply to you.  Date Last Reviewed: 3/1/2017    1753-9975 The [employer]. [address] [place] [address]. All rights reserved. This information is not intended as a substitute for professional medical care. Always follow your healthcare professional's instructions.                Follow-ups after your visit        Future tests that were ordered for you today     Open Future Orders        Priority Expected Expires Ordered    MRI Cardiac w/contrast and flow Routine 11/20/2018 11/19/2019 11/19/2018            Who to contact     If you have questions or need follow up information about today's clinic visit or your schedule please contact [facility] PHYSICIANS HEART AT [facility] directly at [phone].  Normal or non-critical lab and imaging results will be communicated to you by Kirondohart, letter or phone within 4 business days after the clinic has received the results. If you do not hear from us within 7 days, please contact the clinic through [employer]It[employer] or phone. If you have a critical or abnormal lab result, we will notify you by phone as soon as possible.  Submit refill requests through [employer] or call your pharmacy and they will forward the refill request to us. Please allow 3 business days for your refill to be completed.          Additional Information About Your Visit        Kirondohart Information     [employer] gives you secure access to your electronic health record. If you see a primary care provider, you can also send messages to your care team and make appointments. If you have questions, please call your primary care clinic.  If you do not have a  primary care provider, please call [phone] and they will assist you.        Care EveryWhere ID     This is your Care EveryWhere ID. This could be used by other organizations to access your [facility] medical records  [id number]        Your Vitals Were     Pulse Pulse Oximetry BMI (Body Mass Index)             80 98% 34.93 kg/m2          Blood Pressure from Last 3 Encounters:   11/19/18 143/85   09/06/18 123/72   06/28/18 115/68    Weight from Last 3 Encounters:   11/19/18 86.6 kg (191 lb)   09/06/18 88 kg (194 lb)   06/28/18 89.4 kg (197 lb)               Primary Care Provider Office Phone # Fax #    Brandi Sims [phone]       [address] [facility] [address]        Equal Access to Services     ANGELICA PEREYRA : Hadii aad froy hadasho Soomaali, waaxda luqadaha, qaybta kaalmada adeegyada, kostas fry . [facility] [phone].    ATENCIÓN: Si habla español, tiene a che disposición servicios gratuitos de asistencia lingüística. Leia al [phone].    We comply with applicable federal civil rights laws and [place] laws. We do not discriminate on the basis of race, color, national origin, age, disability, sex, sexual orientation, or gender identity.            Thank you!     Thank you for choosing [facility]YSIC[facility] HE[facility] AT [facility]  for your care. Our goal is always to provide you with excellent care. Hearing back from our patients is one way we can continue to improve our services. Please take a few minutes to complete the written survey that you may receive in the mail after your visit with us. Thank you!             Your Updated Medication List - Protect others around you: Learn how to safely use, store and throw away your medicines at www.disposemymeds.org.          This list is accurate as of 11/19/18 11:46 AM.  Always use your most recent med list.                   Brand Name Dispense Instructions for use Diagnosis     acetaminophen 500 MG tablet    TYLENOL     Take 500-1,000 mg by mouth every 6 hours as needed.        albuterol 108 (90 Base) MCG/ACT inhaler    PROAIR HFA/PROVENTIL HFA/VENTOLIN HFA    1 Inhaler    Inhale 2 puffs into the lungs every 6 hours as needed for shortness of breath / dyspnea    Mild persistent asthma       ASPIRIN 81 MG Cpep      1 CAPSULE DAILY        atorvastatin 40 MG tablet    LIPITOR    90 tablet    Take 1 tablet (40 mg) by mouth daily    Hyperlipidemia LDL goal <130       B-COMPLEX PO      Take  by mouth daily.        brimonidine-timolol 0.2-0.5 % ophthalmic solution    COMBIGAN    1 Bottle    Place 1 drop Into the left eye 2 times daily    Neovascular glaucoma of left eye, moderate stage       CALCIUM 500 +D PO      one daily        clopidogrel 75 MG tablet    PLAVIX    90 tablet    Take 1 tablet (75 mg) by mouth daily    Retinal artery occlusion, central, left, Patent foramen ovale with right to left shunt       diclofenac 1 % Gel topical gel    VOLTAREN    100 g    Apply 4 grams to knees or 2 grams to hands four times daily using enclosed dosing card. Generic okay    Primary osteoarthritis of both knees, Status post total left knee replacement       latanoprost 0.005 % ophthalmic solution    XALATAN    1 Bottle    Place 1 drop Into the left eye At Bedtime    Neovascular glaucoma of left eye, moderate stage       lisinopril 10 MG tablet    PRINIVIL/ZESTRIL    90 tablet    Take 1 tablet (10 mg) by mouth daily    Hypertension goal BP (blood pressure) < 140/90       lisinopril-hydrochlorothiazide 20-25 MG per tablet    PRINZIDE/ZESTORETIC    90 tablet    Take 1 tablet by mouth daily    Hypertension goal BP (blood pressure) < 140/90       GUILLERMO-e 400 MG Tabs           vitamin D3 93278 units capsule    CHOLECALCIFEROL    12 capsule    Take 1 capsule (50,000 Units) by mouth once a week    Low vitamin D level       * WOMENS MULTIVITAMIN PLUS PO      Take  by mouth daily.        * multivitamin Tabs tablet       Take 1 tablet by mouth daily        ZANTAC 75 PO           zolpidem 5 MG tablet    AMBIEN    30 tablet    TAKE ONE TABLET BY MOUTH IN THE EVENING AS NEEDED FOR SLEEP    Primary insomnia       * Notice:  This list has 2 medication(s) that are the same as other medications prescribed for you. Read the directions carefully, and ask your doctor or other care provider to review them with you.

## 2018-11-19 NOTE — PATIENT INSTRUCTIONS
Thank you for coming to the [facility] @ [place]; please note the following instructions:    1.   Dr. Butterfield has ordered for you to have a cardiac MRI and a loop recorder.      2.  Sherry Dunlap, the [facility] EP  will call you to schedule these tests.   You may also call her directly at [phone].                If you have any questions regarding your visit please contact your care team:     Cardiology  Telephone Number   Sendy WADEBautista, RN  Tommy DAVID, RN   Rosalee LÓPEZ, ERIC JEAN, MA  Malcolm REYES, LPN   [phone]    *After hours: [phone]   For scheduling appts:     [phone] or    [phone] (select option 1)    *After hours: [phone]     For the Device Clinic (Pacemakers and ICD's)  RN's :  Jaz Kemp   During business hours: [phone]    *After business hours:  [phone] (select option 4)      Normal test result notifications will be released via [employer] or mailed within 7 business days.  All other test results, will be communicated via telephone once reviewed by your cardiologist.    If you need a medication refill please contact your pharmacy.  Please allow 3 business days for your refill to be completed.    As always, thank you for trusting us with your health care needs!        Understanding Loop Recorder Implantation  An implantable loop recorder (ILR) is a device that records information about how your heart is working. A loop recorder may be implanted if you have problems such as:    Fainting, dizziness, or lightheadedness    Heart palpitations    Very fast or slow heartbeats    Unexplained falls    Possible hidden heart rhythm problems that can cause strokes    Heart attack in the past    Certain gene disorders  During implantation, a small device is placed under the skin on your chest, a few inches below your collarbone. The device works as an electrocardiogram (ECG). It constantly picks up electrical signals from your  heart. An ILR records for up to 3 years.  How a loop recorder helps  You may need an ILR if other tests haven t found the cause of your symptoms. An ILR constantly records your heart s electrical activity. For example, if you faint because of an arrhythmia, the device records your heart s activity before, during, and after you faint. Then your health care provider can see how your heart was acting.  Or you may need to trigger your ILR with an activator. This is a small, handheld device. You press a button on the activator when you are feeling symptoms. The IRL then records your heart s activity. This device is very useful when you don't have symptoms often or if your provider needs to look at long-term information about your heart.  Once the cause of your symptoms is found, you can be treated. You may need another small device to help control your heart rhythm. This may be a pacemaker or an implantable cardioverter-defibrillator (ICD).  How loop recorder implantation is done  The doctor will make a small cut (incision) in your skin. This is usually done in the left upper chest, a few inches below your collarbone. He or she will make a small pocket under your skin. The doctor will place the loop recorder in this pocket. The machine is about the size of a flat AA battery.  Your provider can remove the device in a similar way once enough information has been recorded.  Risks of loop recorder implantation  All procedures have some risks. The risks of this procedure include:    Bleeding or bruising    Infection that may require that the ILR be removed    Damage to your heart or blood vessels    Mild pain at your implantation site  Your own risks will depend on your age, your other overall health, and other factors. Ask your healthcare provider about which risks most apply to you.  Date Last Reviewed: 3/1/2017    8152-6012 [employer]. [address] [place] [address], [place] [address]. All rights reserved. This  information is not intended as a substitute for professional medical care. Always follow your healthcare professional's instructions.

## 2018-11-19 NOTE — NURSING NOTE
Cardiac Testing: Patient given instructions regarding  Cardiac MRI . Discussed purpose, preparation, procedure and when to expect results reported back to the patient. Patient demonstrated understanding of this information and agreed to call with further questions or concerns.  Med Reconcile: Reviewed and verified all current medications with the patient. The updated medication list was printed and given to the patient.  Return Appointment: Patient given instructions regarding scheduling next clinic visit. Patient demonstrated understanding of this information and agreed to call with further questions or concerns.  EP Procedure: Patient given instructions regarding  Loop recorder-medtronicDiscussed purpose, preparation, and procedure with the patient. Patient demonstrated understanding of this information and agreed to call with further questions or concerns.  Patient stated she understood all health information given and agreed to call with further questions or concerns.  Sendy Laurent RN

## 2018-11-19 NOTE — PROGRESS NOTES
HPI:  We have been asked to see Alfreda Baxter for an abnormal Zio monitor (showing NSVT) and central retinal artery occlusion of unknown etiolgy.  She is a 73 year old woman with hypertension a prior history of hyperlipidemia. In August she was doing some chores around the house when she noted the loss of her vision in in her left eye over a couple of minutes. She presented to [facility] 8/11-16/2018 for acute onset of painless left vision loss and was found to have a left central retinal artery occlusion. She wore a 14 day monitor (and a second monitor) that did not show AF. She has been treated by Ophthlamology at [facility] with ASA and Plavix with a plan to change to Plavix alone indefinitely starting this month  unless cardiology feels otherwise    She wore a Zio monitor through the [employer] system 8/29--/12/2018. This showed sinus rhythm with occasional PACs, rare PVCs, up to 13 seconds SVT and 3 episodes NSVT up to 12 seconds. These were asymptomatic.   She had a TTE at [facility] 8/15/2018 that showed normal LV function, EF EF 69% without RWMA. No intracardiac shunt was identified with a contrast injection. No cardiac source of embolus could be identified (but the study also states  contrast echocardiograpahy was performed with the injection of agitated D50, with evidence for intracardiac right to left shunting of 50 micro-bubbles under normal respiration and none following release of Valsalva s maneuver within 3 cardiac cycles ). There was borderline LAE.   She had an echo at [facility] in 2005 that showed a PFO.  Her medications list lisinopril 10 mg daily, AND lisinopril/HCTZ 10/25 mg daily, Plavix 75 mg daily.  When she was hospitalized her lisinopril/HCTZ combination was diccontinued (she was hypotensive at the time). She is not on a beta-blocker.   She had a negative adenosine nuclear scan in 2013       PAST MEDICAL HISTORY:  Past Medical History:   Diagnosis Date     Asthma     controlled with inhaler     Cataract       CRAO (central retinal artery occlusion), left      DJD (degenerative joint disease)     knees     GERD (gastroesophageal reflux disease)      High cholesterol      HTN (hypertension)      Need for prophylactic hormone replacement therapy (postmenopausal)     off 11/04 after benign breast bx     PFO (patent foramen ovale)        CURRENT MEDICATIONS:  Current Outpatient Prescriptions   Medication Sig Dispense Refill     acetaminophen (TYLENOL) 500 MG tablet Take 500-1,000 mg by mouth every 6 hours as needed.       albuterol (PROAIR HFA, PROVENTIL HFA, VENTOLIN HFA) 108 (90 BASE) MCG/ACT inhaler Inhale 2 puffs into the lungs every 6 hours as needed for shortness of breath / dyspnea 1 Inhaler 12     ASPIRIN 81 MG OR CPEP 1 CAPSULE DAILY       atorvastatin (LIPITOR) 40 MG tablet Take 1 tablet (40 mg) by mouth daily 90 tablet 3     B-COMPLEX OR Take  by mouth daily.       brimonidine-timolol (COMBIGAN) 0.2-0.5 % ophthalmic solution Place 1 drop Into the left eye 2 times daily 1 Bottle 11     CALCIUM 500 +D OR one daily       clopidogrel (PLAVIX) 75 MG tablet Take 1 tablet (75 mg) by mouth daily 90 tablet 3     latanoprost (XALATAN) 0.005 % ophthalmic solution Place 1 drop Into the left eye At Bedtime 1 Bottle 11     lisinopril (PRINIVIL/ZESTRIL) 10 MG tablet Take 1 tablet (10 mg) by mouth daily 90 tablet 3     Multiple Vitamins-Minerals (WOMENS MULTIVITAMIN PLUS PO) Take  by mouth daily.       multivitamin (OCUVITE) TABS tablet Take 1 tablet by mouth daily       RaNITidine HCl (ZANTAC 75 PO)        S-Adenosylmethionine (GUILLERMO-E) 400 MG TABS        zolpidem (AMBIEN) 5 MG tablet TAKE ONE TABLET BY MOUTH IN THE EVENING AS NEEDED FOR SLEEP 30 tablet 3     cholecalciferol (VITAMIN D3) 31405 units capsule Take 1 capsule (50,000 Units) by mouth once a week (Patient not taking: Reported on 9/6/2018) 12 capsule 0     diclofenac (VOLTAREN) 1 % GEL topical gel Apply 4 grams to knees or 2 grams to hands four times daily using enclosed  dosing card.  Generic okay (Patient not taking: Reported on 9/6/2018) 100 g 11     lisinopril-hydrochlorothiazide (PRINZIDE/ZESTORETIC) 20-25 MG per tablet Take 1 tablet by mouth daily (Patient not taking: Reported on 9/6/2018) 90 tablet 3       PAST SURGICAL HISTORY:  Past Surgical History:   Procedure Laterality Date     ABDOMEN SURGERY  1976, 1978, 1995    2 C-sections,  total hysterectomy     APPENDECTOMY      age 7 yrs.     BREAST BIOPSY, RT/LT  02/17/04    left benign     BREAST SURGERY      see above     C TOTAL KNEE ARTHROPLASTY Left 11/2015    at [facility]     COLONOSCOPY  2013    needed q 3 yrs.     COLONOSCOPY WITH CO2 INSUFFLATION N/A 11/21/2016    Procedure: COLONOSCOPY WITH CO2 INSUFFLATION;  Surgeon: Brian Cleaning MD;  Location: [facility] OR     GENITOURINARY SURGERY      see above     HYSTERECTOMY, PAP NO LONGER INDICATED       HYSTERECTOMY, POORNIMA  1997    bleeding fibroids     ORTHOPEDIC SURGERY  2009    meniscus repair       ALLERGIES:     Allergies   Allergen Reactions     Codeine Anaphylaxis     Niacin      No Clinical Screening - See Comments Other (See Comments)     senisitive to non steroidals  Aleve, ibuprofen celebrex cause bad stomach pains     Oxycodone Hives     Other reaction(s): Unknown  ... With facial swelling     Trazodone      nausea     Nsaids Nausea and Vomiting     Other reaction(s): Abdominal Pain       FAMILY HISTORY:  Family History   Problem Relation Age of Onset     Respiratory Father      Glaucoma Father      Asthma Father      Allergies Sister      Eye Disorder Sister      Lipids Sister      Neurologic Disorder Sister      Osteoporosis Sister      Glaucoma Sister      Hypertension Sister      Arthritis Mother      Cancer Mother      Hypertension Mother      Other Cancer Mother      Thyroid Disease Mother      GASTROINTESTINAL DISEASE Son      Macular Degeneration Sister      Hyperlipidemia Sister      Hypertension Sister      Osteoporosis Sister        SOCIAL  HISTORY:  Social History   Substance Use Topics     Smoking status: Former Smoker     Packs/day: 0.10     Years: 1.00     Types: Cigarettes     Start date: 1/1/1963     Quit date: 1/1/1965     Smokeless tobacco: Never Used     Alcohol use Yes      Comment: one a month       ROS:   Constitutional: No fever, chills, or sweats. Weight stable.   ENT: No visual disturbance, ear ache, epistaxis, sore throat.   Cardiovascular: As per HPI.   Respiratory: No cough, hemoptysis.    GI: No nausea, vomiting, hematemesis, melena, or hematochezia.   : No hematuria.   Integument: Negative.   Psychiatric: Negative.   Hematologic:  Easy bruising, no easy bleeding.  Neuro: Negative.   Endocrinology: No significant heat or cold intolerance   Musculoskeletal: No myalgia.    Exam:  /84  Pulse 80  Wt 86.6 kg (191 lb)  SpO2 98%  BMI 34.93 kg/m2  No acute distress.  Alert and oriented.  HEENT: EOMI, PERRL, oral mucosa moist, palate elevates symmetrically  Neck: No carotid bruits.  No lymphadenopathy.  JVP 6 cm without HJR.  Cor: RRR. Normal S1 and S2.  No murmur, rub, or gallop.  Lungs:  Clear  Abd: Soft, nontender, bowel sounds present.  No hepatosplenomegaly..  Extremities: No C/C/E.  Pulses (R/L, 4=normal):  Carotids 4/4, Radials 4/4, DP 3/3, TP 1/1  Neuro: Grossly intact.    Labs:  CBC RESULTS:   Lab Results   Component Value Date    WBC 7.3 12/03/2015    RBC 4.20 12/03/2015    HGB 12.1 12/03/2015    HCT 37.7 12/03/2015    MCV 90 12/03/2015    MCH 28.8 12/03/2015    MCHC 32.1 12/03/2015    RDW 12.4 12/03/2015     (H) 12/03/2015       BMP RESULTS:  Lab Results   Component Value Date     06/25/2018    POTASSIUM 4.3 06/25/2018    CHLORIDE 104 06/25/2018    CO2 27 06/25/2018    ANIONGAP 11 06/25/2018     (H) 06/25/2018    BUN 22 06/25/2018    CR 0.83 06/25/2018    GFRESTIMATED 67 06/25/2018    GFRESTBLACK 81 06/25/2018    RUDOLPH 9.4 06/25/2018        INR RESULTS:  No results found for:  INR    Procedures:  Echocardiogram: No results found for this or any previous visit (from the past 8760 hour(s)).    Assessment and Plan:  Alfreda Baxter is a 73 year old woman with a history of hypertension who had a central retinal artery occlusion in August.  Two monitors have not shown AF, but they showed episodes of SVT and NSVT.  I reviewed the SVT tracings, they appeared to be atrial tachycardias. They could predispose to PAF but no AF was seen.  The significance of the non-sustained ventricular tachycardia is unclear.  The echo is reassuring, but I would like to obtain a cardia MRI to examine for myocardial scarring or inflammation that would raise the significance of NSVT.  The etiology of her central retinal artery occlusion is unknown.  PAF is a possibility and demonstration of significant AF would change the treatment of her event from anti-platelet medication to warfarin or NOAC.  Her [id number] score appears to be 3 (age>65, hypertension, female).  I discussed the implantable loop recorder with her and recommended implant.  The PFO could be the cause of an embolic event.  Apparently this was worked up in 2005 and the PFO was not felt to be significant.  The MRI may shed additional light on the size of her PFO.  For now she is not inclined to pursue PFO closure.  EMILEE Quach

## 2018-11-19 NOTE — LETTER
11/19/2018      RE: Alfreda Baxter  [address] [address]       Dear Colleague,    Thank you for the opportunity to participate in the care of your patient, Alfreda Baxter, at the [facility] HEART AT [facility]. Please see a copy of my visit note below.    HPI:  We have been asked to see Alfreda Baxter for an abnormal Zio monitor (showing NSVT) and central retinal artery occlusion of unknown etiolgy.  She is a 73 year old woman with hypertension a prior history of hyperlipidemia. In August she was doing some chores around the house when she noted the loss of her vision in in her left eye over a couple of minutes. She presented to [facility] 8/11-16/2018 for acute onset of painless left vision loss and was found to have a left central retinal artery occlusion. She wore a 14 day monitor (and a second monitor) that did not show AF. She has been treated by Ophthlamology at [facility] with ASA and Plavix with a plan to change to Plavix alone indefinitely starting this month  unless cardiology feels otherwise    She wore a Zio monitor through the [employer] system 8/29--/12/2018. This showed sinus rhythm with occasional PACs, rare PVCs, up to 13 seconds SVT and 3 episodes NSVT up to 12 seconds. These were asymptomatic.   She had a TTE at [facility] 8/15/2018 that showed normal LV function, EF EF 69% without RWMA. No intracardiac shunt was identified with a contrast injection. No cardiac source of embolus could be identified (but the study also states  contrast echocardiograpahy was performed with the injection of agitated D50, with evidence for intracardiac right to left shunting of 50 micro-bubbles under normal respiration and none following release of Valsalva s maneuver within 3 cardiac cycles ). There was borderline LAE.   She had an echo at [facility] in 2005 that showed a PFO.  Her medications list lisinopril 10 mg daily, AND lisinopril/HCTZ  10/25 mg daily, Plavix 75 mg daily.  When she was hospitalized her lisinopril/HCTZ combination was diccontinued (she was hypotensive at the time). She is not on a beta-blocker.   She had a negative adenosine nuclear scan in 2013       PAST MEDICAL HISTORY:  Past Medical History:   Diagnosis Date     Asthma     controlled with inhaler     Cataract      CRAO (central retinal artery occlusion), left      DJD (degenerative joint disease)     knees     GERD (gastroesophageal reflux disease)      High cholesterol      HTN (hypertension)      Need for prophylactic hormone replacement therapy (postmenopausal)     off 11/04 after benign breast bx     PFO (patent foramen ovale)        CURRENT MEDICATIONS:  Current Outpatient Prescriptions   Medication Sig Dispense Refill     acetaminophen (TYLENOL) 500 MG tablet Take 500-1,000 mg by mouth every 6 hours as needed.       albuterol (PROAIR HFA, PROVENTIL HFA, VENTOLIN HFA) 108 (90 BASE) MCG/ACT inhaler Inhale 2 puffs into the lungs every 6 hours as needed for shortness of breath / dyspnea 1 Inhaler 12     ASPIRIN 81 MG OR CPEP 1 CAPSULE DAILY       atorvastatin (LIPITOR) 40 MG tablet Take 1 tablet (40 mg) by mouth daily 90 tablet 3     B-COMPLEX OR Take  by mouth daily.       brimonidine-timolol (COMBIGAN) 0.2-0.5 % ophthalmic solution Place 1 drop Into the left eye 2 times daily 1 Bottle 11     CALCIUM 500 +D OR one daily       clopidogrel (PLAVIX) 75 MG tablet Take 1 tablet (75 mg) by mouth daily 90 tablet 3     latanoprost (XALATAN) 0.005 % ophthalmic solution Place 1 drop Into the left eye At Bedtime 1 Bottle 11     lisinopril (PRINIVIL/ZESTRIL) 10 MG tablet Take 1 tablet (10 mg) by mouth daily 90 tablet 3     Multiple Vitamins-Minerals (WOMENS MULTIVITAMIN PLUS PO) Take  by mouth daily.       multivitamin (OCUVITE) TABS tablet Take 1 tablet by mouth daily       RaNITidine HCl (ZANTAC 75 PO)        S-Adenosylmethionine (GUILLERMO-E) 400 MG TABS        zolpidem (AMBIEN) 5 MG tablet  TAKE ONE TABLET BY MOUTH IN THE EVENING AS NEEDED FOR SLEEP 30 tablet 3     cholecalciferol (VITAMIN D3) 63655 units capsule Take 1 capsule (50,000 Units) by mouth once a week (Patient not taking: Reported on 9/6/2018) 12 capsule 0     diclofenac (VOLTAREN) 1 % GEL topical gel Apply 4 grams to knees or 2 grams to hands four times daily using enclosed dosing card.  Generic okay (Patient not taking: Reported on 9/6/2018) 100 g 11     lisinopril-hydrochlorothiazide (PRINZIDE/ZESTORETIC) 20-25 MG per tablet Take 1 tablet by mouth daily (Patient not taking: Reported on 9/6/2018) 90 tablet 3       PAST SURGICAL HISTORY:  Past Surgical History:   Procedure Laterality Date     ABDOMEN SURGERY  1976, 1978, 1995    2 C-sections,  total hysterectomy     APPENDECTOMY      age 7 yrs.     BREAST BIOPSY, RT/LT  02/17/04    left benign     BREAST SURGERY      see above     C TOTAL KNEE ARTHROPLASTY Left 11/2015    at [facility]     COLONOSCOPY  2013    needed q 3 yrs.     COLONOSCOPY WITH CO2 INSUFFLATION N/A 11/21/2016    Procedure: COLONOSCOPY WITH CO2 INSUFFLATION;  Surgeon: Brian Cleaning MD;  Location: [facility] OR     GENITOURINARY SURGERY      see above     HYSTERECTOMY, PAP NO LONGER INDICATED       HYSTERECTOMY, POORNIMA  1997    bleeding fibroids     ORTHOPEDIC SURGERY  2009    meniscus repair       ALLERGIES:     Allergies   Allergen Reactions     Codeine Anaphylaxis     Niacin      No Clinical Screening - See Comments Other (See Comments)     senisitive to non steroidals  Aleve, ibuprofen celebrex cause bad stomach pains     Oxycodone Hives     Other reaction(s): Unknown  ... With facial swelling     Trazodone      nausea     Nsaids Nausea and Vomiting     Other reaction(s): Abdominal Pain       FAMILY HISTORY:  Family History   Problem Relation Age of Onset     Respiratory Father      Glaucoma Father      Asthma Father      Allergies Sister      Eye Disorder Sister      Lipids Sister      Neurologic Disorder Sister       Osteoporosis Sister      Glaucoma Sister      Hypertension Sister      Arthritis Mother      Cancer Mother      Hypertension Mother      Other Cancer Mother      Thyroid Disease Mother      GASTROINTESTINAL DISEASE Son      Macular Degeneration Sister      Hyperlipidemia Sister      Hypertension Sister      Osteoporosis Sister        SOCIAL HISTORY:  Social History   Substance Use Topics     Smoking status: Former Smoker     Packs/day: 0.10     Years: 1.00     Types: Cigarettes     Start date: 1/1/1963     Quit date: 1/1/1965     Smokeless tobacco: Never Used     Alcohol use Yes      Comment: one a month       ROS:   Constitutional: No fever, chills, or sweats. Weight stable.   ENT: No visual disturbance, ear ache, epistaxis, sore throat.   Cardiovascular: As per HPI.   Respiratory: No cough, hemoptysis.    GI: No nausea, vomiting, hematemesis, melena, or hematochezia.   : No hematuria.   Integument: Negative.   Psychiatric: Negative.   Hematologic:  Easy bruising, no easy bleeding.  Neuro: Negative.   Endocrinology: No significant heat or cold intolerance   Musculoskeletal: No myalgia.    Exam:  /84  Pulse 80  Wt 86.6 kg (191 lb)  SpO2 98%  BMI 34.93 kg/m2  No acute distress.  Alert and oriented.  HEENT: EOMI, PERRL, oral mucosa moist, palate elevates symmetrically  Neck: No carotid bruits.  No lymphadenopathy.  JVP 6 cm without HJR.  Cor: RRR. Normal S1 and S2.  No murmur, rub, or gallop.  Lungs:  Clear  Abd: Soft, nontender, bowel sounds present.  No hepatosplenomegaly..  Extremities: No C/C/E.  Pulses (R/L, 4=normal):  Carotids 4/4, Radials 4/4, DP 3/3, TP 1/1  Neuro: Grossly intact.    Labs:  CBC RESULTS:   Lab Results   Component Value Date    WBC 7.3 12/03/2015    RBC 4.20 12/03/2015    HGB 12.1 12/03/2015    HCT 37.7 12/03/2015    MCV 90 12/03/2015    MCH 28.8 12/03/2015    MCHC 32.1 12/03/2015    RDW 12.4 12/03/2015     (H) 12/03/2015       BMP RESULTS:  Lab Results   Component Value  Date     06/25/2018    POTASSIUM 4.3 06/25/2018    CHLORIDE 104 06/25/2018    CO2 27 06/25/2018    ANIONGAP 11 06/25/2018     (H) 06/25/2018    BUN 22 06/25/2018    CR 0.83 06/25/2018    GFRESTIMATED 67 06/25/2018    GFRESTBLACK 81 06/25/2018    RUDOLPH 9.4 06/25/2018        INR RESULTS:  No results found for: INR    Procedures:  Echocardiogram: No results found for this or any previous visit (from the past 8760 hour(s)).    Assessment and Plan:  Alfreda Baxter is a 73 year old woman with a history of hypertension who had a central retinal artery occlusion in August.  Two monitors have not shown AF, but they showed episodes of SVT and NSVT.  I reviewed the SVT tracings, they appeared to be atrial tachycardias. They could predispose to PAF but no AF was seen.  The significance of the non-sustained ventricular tachycardia is unclear.  The echo is reassuring, but I would like to obtain a cardia MRI to examine for myocardial scarring or inflammation that would raise the significance of NSVT.  The etiology of her central retinal artery occlusion is unknown.  PAF is a possibility and demonstration of significant AF would change the treatment of her event from anti-platelet medication to warfarin or NOAC.  Her [id number] score appears to be 3 (age>65, hypertension, female).  I discussed the implantable loop recorder with her and recommended implant.  The PFO could be the cause of an embolic event.  Apparently this was worked up in 2005 and the PFO was not felt to be significant.  The MRI may shed additional light on the size of her PFO.  For now she is not inclined to pursue PFO closure.  EMILEE Quach

## 2018-11-30 ENCOUNTER — TRANSFERRED RECORDS (OUTPATIENT)
Dept: HEALTH INFORMATION MANAGEMENT | Facility: CLINIC | Age: 73
End: 2018-11-30

## 2018-12-02 ENCOUNTER — TRANSFERRED RECORDS (OUTPATIENT)
Dept: HEALTH INFORMATION MANAGEMENT | Facility: CLINIC | Age: 73
End: 2018-12-02

## 2018-12-02 DIAGNOSIS — H34.12 CENTRAL RETINAL ARTERY OCCLUSION OF LEFT EYE: ICD-10-CM

## 2018-12-02 DIAGNOSIS — Q21.12 PFO (PATENT FORAMEN OVALE): Primary | ICD-10-CM

## 2018-12-02 DIAGNOSIS — I47.29 PAROXYSMAL VENTRICULAR TACHYCARDIA (H): ICD-10-CM

## 2018-12-02 PROBLEM — I47.20 PAROXYSMAL VENTRICULAR TACHYCARDIA (H): Status: ACTIVE | Noted: 2018-11-20

## 2018-12-28 ENCOUNTER — TRANSFERRED RECORDS (OUTPATIENT)
Dept: HEALTH INFORMATION MANAGEMENT | Facility: CLINIC | Age: 73
End: 2018-12-28

## 2018-12-31 ENCOUNTER — APPOINTMENT (OUTPATIENT)
Dept: LAB | Facility: CLINIC | Age: 73
End: 2018-12-31
Attending: INTERNAL MEDICINE
Payer: MEDICARE

## 2018-12-31 ENCOUNTER — HOSPITAL ENCOUNTER (OUTPATIENT)
Facility: CLINIC | Age: 73
Discharge: HOME | End: 2018-12-31
Attending: INTERNAL MEDICINE | Admitting: INTERNAL MEDICINE
Payer: MEDICARE

## 2018-12-31 ENCOUNTER — APPOINTMENT (OUTPATIENT)
Dept: MEDSURG UNIT | Facility: CLINIC | Age: 73
End: 2018-12-31
Attending: INTERNAL MEDICINE
Payer: MEDICARE

## 2018-12-31 ENCOUNTER — HOSPITAL ENCOUNTER (OUTPATIENT)
Dept: MRI IMAGING | Facility: CLINIC | Age: 73
Discharge: HOME | End: 2018-12-31
Attending: INTERNAL MEDICINE | Admitting: INTERNAL MEDICINE
Payer: MEDICARE

## 2018-12-31 VITALS
DIASTOLIC BLOOD PRESSURE: 63 MMHG | OXYGEN SATURATION: 95 % | RESPIRATION RATE: 16 BRPM | HEART RATE: 76 BPM | SYSTOLIC BLOOD PRESSURE: 122 MMHG | TEMPERATURE: 97.7 F

## 2018-12-31 DIAGNOSIS — H34.12 CENTRAL RETINAL ARTERY OCCLUSION OF LEFT EYE: ICD-10-CM

## 2018-12-31 DIAGNOSIS — I47.29 PAROXYSMAL VENTRICULAR TACHYCARDIA (H): ICD-10-CM

## 2018-12-31 DIAGNOSIS — Q21.12 PFO (PATENT FORAMEN OVALE): ICD-10-CM

## 2018-12-31 DIAGNOSIS — I47.29 VENTRICULAR TACHYCARDIA (PAROXYSMAL) (H): ICD-10-CM

## 2018-12-31 LAB
ANION GAP SERPL CALCULATED.3IONS-SCNC: 7 MMOL/L (ref 3–14)
BUN SERPL-MCNC: 16 MG/DL (ref 7–30)
CALCIUM SERPL-MCNC: 9.5 MG/DL (ref 8.5–10.1)
CHLORIDE SERPL-SCNC: 107 MMOL/L (ref 94–109)
CO2 SERPL-SCNC: 27 MMOL/L (ref 20–32)
CREAT BLD-MCNC: 0.7 MG/DL (ref 0.52–1.04)
CREAT SERPL-MCNC: 0.62 MG/DL (ref 0.52–1.04)
ERYTHROCYTE [DISTWIDTH] IN BLOOD BY AUTOMATED COUNT: 13.4 % (ref 10–15)
GFR SERPL CREATININE-BSD FRML MDRD: 82 ML/MIN/{1.73_M2}
GFR SERPL CREATININE-BSD FRML MDRD: 88 ML/MIN/{1.73_M2}
GLUCOSE SERPL-MCNC: 99 MG/DL (ref 70–99)
HCT VFR BLD AUTO: 45.8 % (ref 35–47)
HGB BLD-MCNC: 14.5 G/DL (ref 11.7–15.7)
INR PPP: 0.94 (ref 0.86–1.14)
MCH RBC QN AUTO: 29.3 PG (ref 26.5–33)
MCHC RBC AUTO-ENTMCNC: 31.7 G/DL (ref 31.5–36.5)
MCV RBC AUTO: 93 FL (ref 78–100)
PLATELET # BLD AUTO: 231 10E9/L (ref 150–450)
POTASSIUM SERPL-SCNC: 3.9 MMOL/L (ref 3.4–5.3)
RBC # BLD AUTO: 4.95 10E12/L (ref 3.8–5.2)
SODIUM SERPL-SCNC: 142 MMOL/L (ref 133–144)
WBC # BLD AUTO: 6 10E9/L (ref 4–11)

## 2018-12-31 PROCEDURE — 27210794 ZZH OR GENERAL SUPPLY STERILE: Performed by: INTERNAL MEDICINE

## 2018-12-31 PROCEDURE — 36415 COLL VENOUS BLD VENIPUNCTURE: CPT | Performed by: INTERNAL MEDICINE

## 2018-12-31 PROCEDURE — 80048 BASIC METABOLIC PNL TOTAL CA: CPT | Performed by: INTERNAL MEDICINE

## 2018-12-31 PROCEDURE — 33282 ZZHC LOOP RECORDER IMPLANT: CPT | Performed by: INTERNAL MEDICINE

## 2018-12-31 PROCEDURE — 25000128 H RX IP 250 OP 636: Performed by: INTERNAL MEDICINE

## 2018-12-31 PROCEDURE — 75565 CARD MRI VELOC FLOW MAPPING: CPT

## 2018-12-31 PROCEDURE — 40000166 ZZH STATISTIC PP CARE STAGE 1

## 2018-12-31 PROCEDURE — 85610 PROTHROMBIN TIME: CPT | Performed by: INTERNAL MEDICINE

## 2018-12-31 PROCEDURE — 25500064 ZZH RX 255 OP 636: Performed by: INTERNAL MEDICINE

## 2018-12-31 PROCEDURE — 25000125 ZZHC RX 250: Performed by: INTERNAL MEDICINE

## 2018-12-31 PROCEDURE — C1764 EVENT RECORDER, CARDIAC: HCPCS | Performed by: INTERNAL MEDICINE

## 2018-12-31 PROCEDURE — A9585 GADOBUTROL INJECTION: HCPCS | Performed by: INTERNAL MEDICINE

## 2018-12-31 PROCEDURE — 82565 ASSAY OF CREATININE: CPT

## 2018-12-31 PROCEDURE — 85027 COMPLETE CBC AUTOMATED: CPT | Performed by: INTERNAL MEDICINE

## 2018-12-31 DEVICE — SYS INS CARD MNTR REVEAL LINQ: Type: IMPLANTABLE DEVICE | Status: FUNCTIONAL

## 2018-12-31 RX ORDER — CEFAZOLIN SODIUM 2 G/100ML
2 INJECTION, SOLUTION INTRAVENOUS
Status: COMPLETED | OUTPATIENT
Start: 2018-12-31 | End: 2018-12-31

## 2018-12-31 RX ORDER — LIDOCAINE 40 MG/G
CREAM TOPICAL
Status: DISCONTINUED | OUTPATIENT
Start: 2018-12-31 | End: 2018-12-31 | Stop reason: HOSPADM

## 2018-12-31 RX ORDER — LIDOCAINE HYDROCHLORIDE AND EPINEPHRINE 10; 10 MG/ML; UG/ML
INJECTION, SOLUTION INFILTRATION; PERINEURAL
Status: DISCONTINUED | OUTPATIENT
Start: 2018-12-31 | End: 2018-12-31 | Stop reason: HOSPADM

## 2018-12-31 RX ORDER — GADOBUTROL 604.72 MG/ML
10 INJECTION INTRAVENOUS ONCE
Status: COMPLETED | OUTPATIENT
Start: 2018-12-31 | End: 2018-12-31

## 2018-12-31 RX ADMIN — GADOBUTROL 10 ML: 604.72 INJECTION INTRAVENOUS at 08:56

## 2018-12-31 NOTE — IP AVS SNAPSHOT
[facility], [facility],  Heart Cath Lab  [address] [address]  Phone:  [phone]                                    After Visit Summary   12/31/2018    Alfreda Baxter    MRN: [id number]           After Visit Summary Signature Page    I have received my discharge instructions, and my questions have been answered. I have discussed any challenges I see with this plan with the nurse or doctor.    ..........................................................................................................................................  Patient/Patient Representative Signature      ..........................................................................................................................................  Patient Representative Print Name and Relationship to Patient    ..................................................               ................................................  Date                                   Time    ..........................................................................................................................................  Reviewed by Signature/Title    ...................................................              ..............................................  Date                                               Time          22EPIC Rev 08/18

## 2018-12-31 NOTE — DISCHARGE INSTRUCTIONS
Home Care after a Loop Recorder Implant  Wound care:  Check for signs of infection each day.  These include increased redness, swelling, drainage or a fever over 101 F (38.3 C).  Call us immediately if you see any of these signs.  You may shower 24 hours after the procedure.  Do not submerge the incision (in a bath tub, hot tub, or swimming pool) until fully healed.  Do not apply powder or lotion to the incision for 14 days.     Pain:   You may have mild pain for 3 to 5 days.  Take acetaminophen (Tylenol) or ibuprofen (Advil) for the pain.  Call us if the pain is severe or lasts more than 5 days.    Activity:  After 24 hours, slowly return to your normal activities.      Avoid anything that may cause rough contact or a hard hit to your chest.  This includes football, hockey, and other contact sports.    Follow Up Visits:  Return to the clinic in 7 to 10 days to have your loop recorder and wound checked.      Telling others about your device:  Before you have any medical tests or treatments, tell the doctors, dentists, and other care providers about your device.  There are a few tests and treatments that may interfere with your device.  Your care team may need to take special steps to keep you safe.  Before you leave the hospital, you will receive a temporary ID card.  A permanent card will be mailed to you about 6 to 8 weeks later.  Always carry the ID card with you.  It has important details about your device.  Safety near electrical equipment:  All of these are safe to use when in good repair -    Microwaves    Radios    Cordless phone    Remote controls    Small electrical tools  Security martinez: It is okay to walk through security martinez at the airports and department stores. Tell airport security that you have an implanted loop recorder.  Full-body scanners are safe.  Activator: Black and Gray (pictured below). Carry this with you. Press the button if you have symptoms.            Home Monitor: White and Blue  (pictured below). Plug in at home. Use under device clinic direction.    Questions?  Please call [facility].    General inquiry: [phone]    Device Nurse:          Business Hours:  [phone]                         After Hours:  [phone]   Choose option 4, then ask for the                             on-call device nurse at job code [id number].    Your next device clinic appointment is scheduled on:     ___________________________ at _____________.          [facility]  [facility] - Clinic [address]  [address]

## 2018-12-31 NOTE — IP AVS SNAPSHOT
MRN:[id number]                      After Visit Summary   12/31/2018    Alfreda Baxter    MRN: [id number]           Visit Information        Department      12/31/2018 10:08 AM [facility], [place],  Heart [facility]          Review of your medicines      UNREVIEWED medicines. Ask your doctor about these medicines       Dose / Directions   acetaminophen 500 MG tablet  Commonly known as:  TYLENOL      Dose:  500-1000 mg  Take 500-1,000 mg by mouth every 6 hours as needed.  Refills:  0     albuterol 108 (90 Base) MCG/ACT inhaler  Commonly known as:  PROAIR HFA/PROVENTIL HFA/VENTOLIN HFA  Used for:  Mild persistent asthma      Dose:  2 puff  Inhale 2 puffs into the lungs every 6 hours as needed for shortness of breath / dyspnea  Quantity:  1 Inhaler  Refills:  12     ASPIRIN 81 MG Cpep      1 CAPSULE DAILY  Refills:  0     atorvastatin 40 MG tablet  Commonly known as:  LIPITOR  Used for:  Hyperlipidemia LDL goal <130      Dose:  40 mg  Take 1 tablet (40 mg) by mouth daily  Quantity:  90 tablet  Refills:  3     B-COMPLEX PO      Take  by mouth daily.  Refills:  0     CALCIUM 500 +D PO      one daily  Refills:  0     clopidogrel 75 MG tablet  Commonly known as:  PLAVIX  Used for:  Retinal artery occlusion, central, left, Patent foramen ovale with right to left shunt      Dose:  75 mg  Take 1 tablet (75 mg) by mouth daily  Quantity:  90 tablet  Refills:  3     diclofenac 1 % topical gel  Commonly known as:  VOLTAREN  Used for:  Primary osteoarthritis of both knees, Status post total left knee replacement      Apply 4 grams to knees or 2 grams to hands four times daily using enclosed dosing card.  Generic okay  Quantity:  100 g  Refills:  11     latanoprost 0.005 % ophthalmic solution  Commonly known as:  XALATAN  Used for:  Neovascular glaucoma of left eye, moderate stage      Dose:  1 drop  Place 1 drop Into the left eye At Bedtime  Quantity:  1 Bottle  Refills:  11     lisinopril 10 MG tablet  Commonly known  as:  PRINIVIL/ZESTRIL  Used for:  Hypertension goal BP (blood pressure) < 140/90      Dose:  10 mg  Take 1 tablet (10 mg) by mouth daily  Quantity:  90 tablet  Refills:  3     multivitamin Tabs tablet      Dose:  1 tablet  Take 1 tablet by mouth daily  Refills:  0     GUILLERMO-e 400 MG Tabs      Dose:  1 capsule  Take 1 capsule by mouth daily  Refills:  0     ZANTAC 75 PO      Dose:  75 mg  Take 75 mg by mouth as needed  Refills:  0     zolpidem 5 MG tablet  Commonly known as:  AMBIEN  Used for:  Primary insomnia      TAKE ONE TABLET BY MOUTH IN THE EVENING AS NEEDED FOR SLEEP  Quantity:  30 tablet  Refills:  3              Protect others around you: Learn how to safely use, store and throw away your medicines at www.disposemymeds.org.       Follow-ups after your visit       Your next 10 appointments already scheduled    Apr 01, 2019 10:00 AM CDT  Return Visit with DEVICE CHECK RN CARD  [facility] [place] ([facility]) [address] [place] [id number]  [phone]   Apr 01, 2019 10:30 AM CDT  Return Visit with Buck Butterfield MD  [facility] ([facility]) [address] [place] [phone]  [phone]      Care Instructions       Further instructions from your care team        Loop Recorder Implant    Physician: Dr. Butterfield      AFTER YOU GO HOME:   DO    Relax and take it easy for 24 hours.    Drink plenty of fluids, unless otherwise instructed by the physician.    Resume your regular diet, unless otherwise instructed by the physician.    Continue you medications, unless otherwise instructed by the physician.    Take dressing off the following morning. Cover your incision lightly with guaze to protect from clothing.    Keep your incision dry for 3 days.  After 3 days, you may shower if the incision is not draining.  Use antibacterial soap and water to cleanse the incision site  every 24 hours.     DON'T    NO strenuous exercise of lifting above the shoulder level for the next 14 days.    NO swimming until incision is completely healed (approximately 3 weeks).    NO lotion or powder to the incision site until completely healed.    CALL YOUR PRIMARY PHYSICIAN, OR THE CARDIOLOGY FELLOW IF:    YOU develop a fever or any redness, swelling, or drainage from your incision    Telephone: [phone]  Ask for the Cardiology Fellow on-call.  Someone is on-call 24hrs/day.                          FOLLOW-UP APPOINTMENTS:  Please make an appointment to have your incision checked 7-10 days postop either with the [facility] or with your primary care physician.             Additional Information About Your Visit       MyChart Information    [employer] gives you secure access to your electronic health record. If you see a primary care provider, you can also send messages to your care team and make appointments. If you have questions, please call your primary care clinic.  If you do not have a primary care provider, please call [phone] and they will assist you.       Care EveryWhere ID    This is your Care EveryWhere ID. This could be used by other organizations to access your [facility] medical records  [id number]       Your Vitals Were     Blood Pressure   122/63 (BP Location: Right arm)    Pulse   76    Temperature   97.7  F (36.5  C) (Oral)    Respirations   16    Pulse Oximetry   95%          Primary Care Provider Office Phone # Fax #    Brandi Sims [phone]      Equal Access to Services    [facility] Center[facility]I : Hadii evon martinezo Sochun, waaxda luqadaha, qaybta kaalmada dariana, kostas fry . So [facility] [phone].    ATENCIÓN: Si habla español, tiene a che disposición servicios gratuitos de asistencia lingüística. Llame al [phone].    We comply with applicable federal civil rights laws and [place] laws. We do not discriminate on the  basis of race, color, national origin, age, disability, sex, sexual orientation, or gender identity.           Thank you!    Thank you for choosing [facility] for your care. Our goal is always to provide you with excellent care. Hearing back from our patients is one way we can continue to improve our services. Please take a few minutes to complete the written survey that you may receive in the mail after you visit with us. Thank you!            Medication List      ASK your doctor about these medications          Morning Afternoon Evening Bedtime As Needed    acetaminophen 500 MG tablet  Also known as:  TYLENOL  INSTRUCTIONS:  Take 500-1,000 mg by mouth every 6 hours as needed.                     albuterol 108 (90 Base) MCG/ACT inhaler  Also known as:  PROAIR HFA/PROVENTIL HFA/VENTOLIN HFA  INSTRUCTIONS:  Inhale 2 puffs into the lungs every 6 hours as needed for shortness of breath / dyspnea                     ASPIRIN 81 MG Cpep  INSTRUCTIONS:  1 CAPSULE DAILY                     atorvastatin 40 MG tablet  Also known as:  LIPITOR  INSTRUCTIONS:  Take 1 tablet (40 mg) by mouth daily                     B-COMPLEX PO  INSTRUCTIONS:  Take  by mouth daily.                     CALCIUM 500 +D PO  INSTRUCTIONS:  one daily                     clopidogrel 75 MG tablet  Also known as:  PLAVIX  INSTRUCTIONS:  Take 1 tablet (75 mg) by mouth daily                     diclofenac 1 % topical gel  Also known as:  VOLTAREN  INSTRUCTIONS:  Apply 4 grams to knees or 2 grams to hands four times daily using enclosed dosing card.  Generic okay                     latanoprost 0.005 % ophthalmic solution  Also known as:  XALATAN  INSTRUCTIONS:  Place 1 drop Into the left eye At Bedtime                     lisinopril 10 MG tablet  Also known as:  PRINIVIL/ZESTRIL  INSTRUCTIONS:  Take 1 tablet (10 mg) by mouth daily                     multivitamin Tabs tablet  INSTRUCTIONS:  Take 1 tablet by mouth daily                     GUILLERMO-e 400 MG  Tabs  INSTRUCTIONS:  Take 1 capsule by mouth daily                     ZANTAC 75 PO  INSTRUCTIONS:  Take 75 mg by mouth as needed                     zolpidem 5 MG tablet  Also known as:  AMBIEN  INSTRUCTIONS:  TAKE ONE TABLET BY MOUTH IN THE EVENING AS NEEDED FOR SLEEP

## 2018-12-31 NOTE — H&P
Please see my Outpatient Consult note of 11/19/2018.   Alfreda Baxter is a 73 year old woman with hypertension and a prior history of hyperlipidemia. She had painless loss of her vision in in her left eye over a couple of minutes in August. [place] [facility] [place] and was found to have a left central retinal artery occlusion. She wore a 14 day monitor (and a second monitor) that did not show AF. She has been treated by [place]ology [place] [facility] with ASA and Plavix with a plan to change to Plavix alone indefinitely starting this month  unless cardiology feels otherwise      She wore a Zio monitor through the [employer] system 8/29--/12/2018. This showed sinus rhythm with occasional PACs, rare PVCs, up to 13 seconds SVT and 3 episodes NSVT up to 12 seconds. These were asymptomatic.   She had a [place] [facility] [place] that showed normal LV function, EF EF 69% without RWMA. No intracardiac shunt was identified with a contrast injection. No cardiac source of embolus could be identified (but the study also states  contrast echocardiograpahy was performed with the injection of agitated D50, with evidence for intracardiac right to left shunting of 50 micro-bubbles under normal respiration and none following release of Valsalva s maneuver within 3 cardiac cycles ). There was borderline LAE.     She had an echo at [facility] in 2005 that showed a PFO.    We arranged a cardiac MRI today to evaluate for LV scar and to evaluate for PFO.  Preliminary report on the MRI show no ventricular scar or PFO.  Presumably she has a small PFO such that closure may not be indicated.    We also arranged a loop recorder implant for more thorough screening for atrial fibrillation that would change her anticoagulation management.    She denies fevers, chills, chest pain, dyspnea.    On exam,  /77 (BP Location: Right arm)   Pulse 83   Temp 97.7  F (36.5  C) (Oral)   Resp 16   SpO2 96%   No acute distress.  Alert and oriented.  HEENT:   Normocephalic, atraumatic, sclera non-icteric, EOM intact, mucosa moist  Cor: RRR. Normal S1 and S2.  No murmur, rub, or gallop.  PMI in Lf 5th ICS.  Lungs:  Clear    I reviewed the indications, procedure, goals and risks for ILR implant.  She understands and wishes to proceed.    Buck Butterfield

## 2018-12-31 NOTE — PROGRESS NOTES
1330 Pt tolerating oral intake. Discharge instructions reviewed, copy given to pt. PIV dc'd by cath lab nurse. Device rep here to speak with pt. 1346 Pt dc'd home accompanied by .

## 2018-12-31 NOTE — PROGRESS NOTES
Pt back from CCL s/p LOOP recorder implant.  VSS.  Pt alert and oriented x4.  Pt denies any pain.  Lt chest site F/D/I.  Pt's family is not at the bedside.

## 2018-12-31 NOTE — PROGRESS NOTES
1130 Pt on 2a prepped and ready for loop recorder implant. PIV placed prior to 2a arrival. Pt ready for consent.

## 2019-01-10 ENCOUNTER — OFFICE VISIT (OUTPATIENT)
Dept: FAMILY MEDICINE | Facility: CLINIC | Age: 74
End: 2019-01-10
Payer: MEDICARE

## 2019-01-10 VITALS
HEIGHT: 62 IN | OXYGEN SATURATION: 96 % | SYSTOLIC BLOOD PRESSURE: 112 MMHG | TEMPERATURE: 98.5 F | BODY MASS INDEX: 35.15 KG/M2 | HEART RATE: 77 BPM | DIASTOLIC BLOOD PRESSURE: 70 MMHG | WEIGHT: 191 LBS

## 2019-01-10 DIAGNOSIS — I47.29 PAROXYSMAL VENTRICULAR TACHYCARDIA (H): ICD-10-CM

## 2019-01-10 DIAGNOSIS — E78.5 HYPERLIPIDEMIA LDL GOAL <130: Primary | ICD-10-CM

## 2019-01-10 DIAGNOSIS — H34.12 CENTRAL RETINAL ARTERY OCCLUSION OF LEFT EYE: ICD-10-CM

## 2019-01-10 PROCEDURE — 99213 OFFICE O/P EST LOW 20 MIN: CPT | Performed by: NURSE PRACTITIONER

## 2019-01-10 ASSESSMENT — MIFFLIN-ST. JEOR: SCORE: 1319.62

## 2019-01-10 ASSESSMENT — PAIN SCALES - GENERAL: PAINLEVEL: NO PAIN (0)

## 2019-01-10 NOTE — PROGRESS NOTES
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      She had central retinal artery occlusion August 2018, etiology unknown  Cardiac monitor did not reveal any AF  Loop recorder implanted 12/31/18   She is here for device check  Atorvastatin increased to 40 mg after stroke. Due for lipid recheck  Has f/u with cardiology schedule 4/1/19  She is on 81 asa and plavix. 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  Additional history: none    Patient Active Problem List   Diagnosis     DJD (degenerative joint disease)     Hypertension goal BP (blood pressure) < 140/90     Hyperlipidemia LDL goal <130     Advanced directives, counseling/discussion     Lumbar spinal stenosis     Toe pain     Adenomatous colon polyp     Mild persistent asthma     Primary osteoarthritis of both knees     Acute idiopathic gout of left foot     Status post total left knee replacement     Blepharitis, both eyes     Combined forms of age-related cataract, mild, both eyes     Mild intermittent asthma without complication     overweight HCC     Primary insomnia     Acute idiopathic gout involving toe of left foot     Central retinal artery occlusion of left eye     Posterior vitreous detachment of both eyes     Paroxysmal ventricular tachycardia (H)     PFO (patent foramen ovale)     Past Surgical History:   Procedure Laterality Date     ABDOMEN SURGERY  1976, 1978, 1995    2 C-sections,  total hysterectomy     APPENDECTOMY      age 7 yrs.     BREAST BIOPSY, RT/LT  02/17/04    left benign     BREAST SURGERY      see above     C TOTAL KNEE  ARTHROPLASTY Left 2015    at [facility]     COLONOSCOPY  2013    needed q 3 yrs.     COLONOSCOPY WITH CO2 INSUFFLATION N/A 2016    Procedure: COLONOSCOPY WITH CO2 INSUFFLATION;  Surgeon: Brian Cleaning MD;  Location:  OR     EP COMPREHENSIVE EP STUDY N/A 2018    Procedure: LOOP RECORDER;  Surgeon: Buck Butterfield MD;  Location:  HEART CARDIAC CATH LAB     GENITOURINARY SURGERY      see above     HYSTERECTOMY, PAP NO LONGER INDICATED       HYSTERECTOMY, POORNIMA      bleeding fibroids     ORTHOPEDIC SURGERY  2009    meniscus repair       Social History     Tobacco Use     Smoking status: Former Smoker     Packs/day: 0.10     Years: 1.00     Pack years: 0.10     Types: Cigarettes     Start date: 1963     Last attempt to quit: 1965     Years since quittin.0     Smokeless tobacco: Never Used   Substance Use Topics     Alcohol use: Yes     Comment: one a month     Family History   Problem Relation Age of Onset     Respiratory Father      Glaucoma Father      Asthma Father      Allergies Sister      Eye Disorder Sister      Lipids Sister      Neurologic Disorder Sister      Osteoporosis Sister      Glaucoma Sister      Hypertension Sister      Arthritis Mother      Cancer Mother      Hypertension Mother      Other Cancer Mother      Thyroid Disease Mother      Gastrointestinal Disease Son      Macular Degeneration Sister      Hyperlipidemia Sister      Hypertension Sister      Osteoporosis Sister          Current Outpatient Medications   Medication Sig Dispense Refill     acetaminophen (TYLENOL) 500 MG tablet Take 500-1,000 mg by mouth every 6 hours as needed.       albuterol (PROAIR HFA, PROVENTIL HFA, VENTOLIN HFA) 108 (90 BASE) MCG/ACT inhaler Inhale 2 puffs into the lungs every 6 hours as needed for shortness of breath / dyspnea 1 Inhaler 12     ASPIRIN 81 MG OR CPEP 1 CAPSULE DAILY       atorvastatin (LIPITOR) 40 MG tablet Take 1 tablet (40 mg) by mouth daily 90 tablet 3  "    B-COMPLEX OR Take  by mouth daily.       CALCIUM 500 +D OR one daily       clopidogrel (PLAVIX) 75 MG tablet Take 1 tablet (75 mg) by mouth daily 90 tablet 3     diclofenac (VOLTAREN) 1 % GEL topical gel Apply 4 grams to knees or 2 grams to hands four times daily using enclosed dosing card.  Generic okay 100 g 11     latanoprost (XALATAN) 0.005 % ophthalmic solution Place 1 drop Into the left eye At Bedtime 1 Bottle 11     lisinopril (PRINIVIL/ZESTRIL) 10 MG tablet Take 1 tablet (10 mg) by mouth daily 90 tablet 3     multivitamin (OCUVITE) TABS tablet Take 1 tablet by mouth daily       RaNITidine HCl (ZANTAC 75 PO) Take 75 mg by mouth as needed        S-Adenosylmethionine (GUILLERMO-E) 400 MG TABS Take 1 capsule by mouth daily        zolpidem (AMBIEN) 5 MG tablet TAKE ONE TABLET BY MOUTH IN THE EVENING AS NEEDED FOR SLEEP (Patient taking differently: Take 2.5 mg by mouth nightly as needed TAKE ONE TABLET BY MOUTH IN THE EVENING AS NEEDED FOR SLEEP) 30 tablet 3       Reviewed and updated as needed this visit by clinical staff  Tobacco  Allergies  Meds  Med Hx  Surg Hx  Fam Hx  Soc Hx      Reviewed and updated as needed this visit by Provider         ROS:  Constitutional, HEENT, cardiovascular, pulmonary, gi and gu systems are negative, except as otherwise noted.    OBJECTIVE:     /70 (BP Location: Left arm, Patient Position: Chair, Cuff Size: Adult Regular)   Pulse 77   Temp 98.5  F (36.9  C) (Oral)   Ht 1.575 m (5' 2\")   Wt 86.6 kg (191 lb)   SpO2 96%   Breastfeeding? No   BMI 34.93 kg/m    Body mass index is 34.93 kg/m .  GENERAL: healthy, alert and no distress  RESP: lungs clear to auscultation - no rales, rhonchi or wheezes  CV: regular rate and rhythm, normal S1 S2, no S3 or S4, no murmur, click or rub, no peripheral edema and peripheral pulses strong  SKIN: Small (approximately 2-3 mm incision) with edges well approximated, intact with glue, no drainage or surrounding erythema, warmth or " swelling    Diagnostic Test Results:  none     ASSESSMENT/PLAN:       ICD-10-CM    1. Hyperlipidemia LDL goal <130 E78.5 Lipid panel reflex to direct LDL Fasting   2. Paroxysmal ventricular tachycardia (H) I47.2    3. Central retinal artery occlusion of left eye H34.12        Incision healing. No signs of infection. Will continue with asa and plavix. F/U with cardiology as planned. Will schedule fasting lab only appointment to recheck lipids    SVEN Montgomery CNP  [facility]

## 2019-01-23 ENCOUNTER — OFFICE VISIT (OUTPATIENT)
Dept: OPHTHALMOLOGY | Facility: CLINIC | Age: 74
End: 2019-01-23
Payer: MEDICARE

## 2019-01-23 ENCOUNTER — DOCUMENTATION ONLY (OUTPATIENT)
Dept: OPHTHALMOLOGY | Facility: CLINIC | Age: 74
End: 2019-01-23

## 2019-01-23 DIAGNOSIS — H25.813 COMBINED FORMS OF AGE-RELATED CATARACT OF BOTH EYES: ICD-10-CM

## 2019-01-23 DIAGNOSIS — H52.4 PRESBYOPIA: Primary | ICD-10-CM

## 2019-01-23 DIAGNOSIS — H34.12 CENTRAL RETINAL ARTERY OCCLUSION OF LEFT EYE: ICD-10-CM

## 2019-01-23 DIAGNOSIS — H40.52X2 NEOVASCULAR GLAUCOMA OF LEFT EYE, MODERATE STAGE: ICD-10-CM

## 2019-01-23 PROCEDURE — 92012 INTRM OPH EXAM EST PATIENT: CPT | Performed by: OPHTHALMOLOGY

## 2019-01-23 ASSESSMENT — EXTERNAL EXAM - RIGHT EYE: OD_EXAM: 2+ BROW PTOSIS, MILD TO MOD BROW

## 2019-01-23 ASSESSMENT — REFRACTION_MANIFEST
OD_AXIS: 135
OD_ADD: +3.00
OD_SPHERE: -2.75
OD_CYLINDER: +0.75

## 2019-01-23 ASSESSMENT — VISUAL ACUITY
OD_CC: 2
OD_PH_CC: 20/40
OS_CC: HM PERIPH
CORRECTION_TYPE: GLASSES
OD_CC: 20/50
METHOD: SNELLEN - LINEAR
OD_PH_CC+: +1

## 2019-01-23 ASSESSMENT — REFRACTION_WEARINGRX
OD_ADD: +3.50
OD_CYLINDER: SPHERE
OS_CYLINDER: SPHERE
OS_SPHERE: PLANO
SPECS_TYPE: PAL
OD_SPHERE: -2.25
OS_ADD: +3.50

## 2019-01-23 ASSESSMENT — TONOMETRY
IOP_METHOD: APPLANATION
OS_IOP_MMHG: 25
OD_IOP_MMHG: 20

## 2019-01-23 ASSESSMENT — EXTERNAL EXAM - LEFT EYE: OS_EXAM: 2+ BROW PTOSIS, MILD TO MOD BROW

## 2019-01-23 NOTE — PATIENT INSTRUCTIONS
Continue same medication.  Continue care with retinal specialist.  Return visit 4 months for a complete exam.    Bryan Morrison M.D.  [phone]

## 2019-01-23 NOTE — PROGRESS NOTES
Current Eye Medications:  Latanoprost left eye every evening.       Subjective:  Patient is here because she feels her vision may be decreasing slightly, right eye, especially when looking in the distance.  Also, when she was in to see the Retina Doctor the end of December, there was an issue with the instrument used to check the pressure (initially the pressure in her right eye was 31, then it was rechecked to be 18).  Next appointment is 2-22-19.     Objective:  See Ophthalmology Exam.       Assessment:  Refractive shift right eye in patient with mild cataract right eye and hx of central retinal artery occlusion left eye.  Intraocular pressure remains controlled left eye after treatment for neovascularization.      Plan:  Continue same medication.  Continue care with retinal specialist.  Return visit 4 months for a complete exam.    Bryan Morrison M.D.  [phone]

## 2019-01-23 NOTE — LETTER
1/23/2019         RE: Alfreda Baxter  [address] [address]        Dear Colleague,    Thank you for referring your patient, Alfreda Baxter, to the [facility]. Please see a copy of my visit note below.     Current Eye Medications:  Latanoprost left eye every evening.       Subjective:  Patient is here because she feels her vision may be decreasing slightly, right eye, especially when looking in the distance.  Also, when she was in to see the Retina Doctor the end of December, there was an issue with the instrument used to check the pressure (initially the pressure in her right eye was 31, then it was rechecked to be 18).  Next appointment is 2-22-19.     Objective:  See Ophthalmology Exam.       Assessment:  Refractive shift right eye in patient with mild cataract right eye and hx of central retinal artery occlusion left eye.  Intraocular pressure remains controlled left eye after treatment for neovascularization.      Plan:  Continue same medication.  Continue care with retinal specialist.  Return visit 4 months for a complete exam.    Bryan Morrison M.D.  [phone]           Again, thank you for allowing me to participate in the care of your patient.        Sincerely,        Bryan Morrison MD

## 2019-01-25 ENCOUNTER — ANCILLARY PROCEDURE (OUTPATIENT)
Dept: CARDIOLOGY | Facility: CLINIC | Age: 74
End: 2019-01-25
Attending: INTERNAL MEDICINE
Payer: MEDICARE

## 2019-01-25 DIAGNOSIS — I47.29 PAROXYSMAL VENTRICULAR TACHYCARDIA (H): ICD-10-CM

## 2019-01-25 PROCEDURE — 93298 REM INTERROG DEV EVAL SCRMS: CPT | Performed by: INTERNAL MEDICINE

## 2019-01-25 PROCEDURE — 93299 CARDIAC DEVICE CHECK - REMOTE: CPT | Mod: ZF

## 2019-02-01 ENCOUNTER — ANCILLARY PROCEDURE (OUTPATIENT)
Dept: CARDIOLOGY | Facility: CLINIC | Age: 74
End: 2019-02-01
Attending: INTERNAL MEDICINE
Payer: MEDICARE

## 2019-02-01 DIAGNOSIS — I47.29 PAROXYSMAL VENTRICULAR TACHYCARDIA (H): ICD-10-CM

## 2019-02-01 PROCEDURE — 93298 REM INTERROG DEV EVAL SCRMS: CPT | Performed by: INTERNAL MEDICINE

## 2019-02-01 PROCEDURE — 93299 CARDIAC DEVICE CHECK - REMOTE: CPT | Mod: ZF

## 2019-02-04 ENCOUNTER — OFFICE VISIT (OUTPATIENT)
Dept: CARDIOLOGY | Facility: CLINIC | Age: 74
End: 2019-02-04
Payer: MEDICARE

## 2019-02-04 VITALS
SYSTOLIC BLOOD PRESSURE: 130 MMHG | DIASTOLIC BLOOD PRESSURE: 79 MMHG | BODY MASS INDEX: 35.67 KG/M2 | OXYGEN SATURATION: 96 % | HEART RATE: 81 BPM | WEIGHT: 195 LBS

## 2019-02-04 DIAGNOSIS — I48.0 PAROXYSMAL ATRIAL FIBRILLATION (H): Primary | ICD-10-CM

## 2019-02-04 DIAGNOSIS — H34.12 CENTRAL RETINAL ARTERY OCCLUSION OF LEFT EYE: ICD-10-CM

## 2019-02-04 PROCEDURE — 99214 OFFICE O/P EST MOD 30 MIN: CPT | Performed by: INTERNAL MEDICINE

## 2019-02-04 NOTE — LETTER
2/4/2019      RE: Alfreda Baxter  [address] [facility] [facility] [address]       Dear Colleague,    Thank you for the opportunity to participate in the care of your patient, Alfreda Baxter, at the [facility] AT [facility] at [facility]. Please see a copy of my visit note below.    HPI:  Alfreda Baxter returns for follow-up. I spent 25 minutes with the patient, entirely in counseling and coordination of care.  She is a 73 year old woman with hypertension and a prior history of hyperlipidemia. She had painless loss of her vision in in her left eye over a couple of minutes in August. She presented to [facility] [place]11[place]16/2018 and was found to have a left central retinal artery occlusion. She wore a 14 day monitor (and a second monitor) that did not show AF. She has been treated by Ophthlamology at [facility] with ASA and Plavix (with a plan to change to Plavix alone indefinitely later  unless cardiology feels otherwise ).  She is currently taking both and denies problems with them. I first met her in November 2018 and implanted a loop recording in December 2018. A remote transmission 2/1/2018 showed two episodes of PAF lasting 2 minutes each on 1/30 and 1/31/2019.  There was a 4-minute episode of 1/23/2019.  I reviewed the tracings and confirmed AF. These were asymptomatic.      PAST MEDICAL HISTORY:  Past Medical History:   Diagnosis Date     Asthma     controlled with inhaler     Cataract      CRAO (central retinal artery occlusion), left      DJD (degenerative joint disease)     knees     GERD (gastroesophageal reflux disease)      Glaucoma      High cholesterol      HTN (hypertension)      Need for prophylactic hormone replacement therapy (postmenopausal)     off 11/04 after benign breast bx     PFO (patent foramen ovale)        CURRENT MEDICATIONS:  Current Outpatient Medications   Medication Sig Dispense Refill     acetaminophen (TYLENOL) 500 MG tablet  Take 500-1,000 mg by mouth every 6 hours as needed.       albuterol (PROAIR HFA, PROVENTIL HFA, VENTOLIN HFA) 108 (90 BASE) MCG/ACT inhaler Inhale 2 puffs into the lungs every 6 hours as needed for shortness of breath / dyspnea 1 Inhaler 12     ASPIRIN 81 MG OR CPEP 1 CAPSULE DAILY       atorvastatin (LIPITOR) 40 MG tablet Take 1 tablet (40 mg) by mouth daily 90 tablet 3     B-COMPLEX OR Take  by mouth daily.       CALCIUM 500 +D OR one daily       clopidogrel (PLAVIX) 75 MG tablet Take 1 tablet (75 mg) by mouth daily 90 tablet 3     diclofenac (VOLTAREN) 1 % GEL topical gel Apply 4 grams to knees or 2 grams to hands four times daily using enclosed dosing card.  Generic okay 100 g 11     latanoprost (XALATAN) 0.005 % ophthalmic solution Place 1 drop Into the left eye At Bedtime 1 Bottle 11     lisinopril (PRINIVIL/ZESTRIL) 10 MG tablet Take 1 tablet (10 mg) by mouth daily 90 tablet 3     multivitamin (OCUVITE) TABS tablet Take 1 tablet by mouth daily       RaNITidine HCl (ZANTAC 75 PO) Take 75 mg by mouth as needed        S-Adenosylmethionine (GUILLERMO-E) 400 MG TABS Take 1 capsule by mouth daily        zolpidem (AMBIEN) 5 MG tablet TAKE ONE TABLET BY MOUTH IN THE EVENING AS NEEDED FOR SLEEP (Patient taking differently: Take 2.5 mg by mouth nightly as needed TAKE ONE TABLET BY MOUTH IN THE EVENING AS NEEDED FOR SLEEP) 30 tablet 3       PAST SURGICAL HISTORY:  Past Surgical History:   Procedure Laterality Date     ABDOMEN SURGERY  1976, 1978, 1995    2 C-sections,  total hysterectomy     APPENDECTOMY      age 7 yrs.     BREAST BIOPSY, RT/LT  02/17/04    left benign     BREAST SURGERY      see above     C TOTAL KNEE ARTHROPLASTY Left 11/2015    at [facility]     COLONOSCOPY  2013    needed q 3 yrs.     COLONOSCOPY WITH CO2 INSUFFLATION N/A 11/21/2016    Procedure: COLONOSCOPY WITH CO2 INSUFFLATION;  Surgeon: Brian Cleaning MD;  Location:  OR      COMPREHENSIVE EP STUDY N/A 12/31/2018    Procedure: LOOP  RECORDER;  Surgeon: Buck Butterfield MD;  Location:  HEART CARDIAC CATH LAB     GENITOURINARY SURGERY      see above     HYSTERECTOMY, PAP NO LONGER INDICATED       HYSTERECTOMY, POORNIMA      bleeding fibroids     ORTHOPEDIC SURGERY  2009    meniscus repair       ALLERGIES:     Allergies   Allergen Reactions     Codeine Anaphylaxis     Niacin      No Clinical Screening - See Comments Other (See Comments)     senisitive to non steroidals  Aleve, ibuprofen celebrex cause bad stomach pains     Oxycodone Hives     Other reaction(s): Unknown  ... With facial swelling     Trazodone      nausea     Nsaids Nausea and Vomiting     Other reaction(s): Abdominal Pain       FAMILY HISTORY:  Family History   Problem Relation Age of Onset     Respiratory Father      Glaucoma Father      Asthma Father      Allergies Sister      Eye Disorder Sister      Lipids Sister      Neurologic Disorder Sister      Osteoporosis Sister      Glaucoma Sister      Hypertension Sister      Arthritis Mother      Cancer Mother      Hypertension Mother      Other Cancer Mother      Thyroid Disease Mother      Gastrointestinal Disease Son      Macular Degeneration Sister      Hyperlipidemia Sister      Hypertension Sister      Osteoporosis Sister        SOCIAL HISTORY:  Social History     Tobacco Use     Smoking status: Former Smoker     Packs/day: 0.10     Years: 1.00     Pack years: 0.10     Types: Cigarettes     Start date: 1963     Last attempt to quit: 1965     Years since quittin.1     Smokeless tobacco: Never Used   Substance Use Topics     Alcohol use: Yes     Comment: one a month     Drug use: No       ROS:   Constitutional: No fever, chills, or sweats. Weight stable.   ENT: No visual disturbance, ear ache, epistaxis, sore throat.   Cardiovascular: As per HPI.   Respiratory: No cough, hemoptysis.    GI: No nausea, vomiting, hematemesis, melena, or hematochezia.   : No hematuria.   Integument: Negative.   Psychiatric: Negative.    Hematologic:  Easy bruising, no easy bleeding.  Neuro: Negative.   Endocrinology: No significant heat or cold intolerance   Musculoskeletal: No myalgia.    Exam:  /79 (BP Location: Right arm, Patient Position: Sitting, Cuff Size: Adult Large)   Pulse 81   Wt 88.5 kg (195 lb)   SpO2 96%   BMI 35.67 kg/m     Not performed    Labs:  CBC RESULTS:   Lab Results   Component Value Date    WBC 6.0 12/31/2018    RBC 4.95 12/31/2018    HGB 14.5 12/31/2018    HCT 45.8 12/31/2018    MCV 93 12/31/2018    MCH 29.3 12/31/2018    MCHC 31.7 12/31/2018    RDW 13.4 12/31/2018     12/31/2018       BMP RESULTS:  Lab Results   Component Value Date     12/31/2018    POTASSIUM 3.9 12/31/2018    CHLORIDE 107 12/31/2018    CO2 27 12/31/2018    ANIONGAP 7 12/31/2018    GLC 99 12/31/2018    BUN 16 12/31/2018    CR 0.62 12/31/2018    GFRESTIMATED 88 12/31/2018    GFRESTBLACK >90 12/31/2018    RUDOLPH 9.5 12/31/2018        INR RESULTS:  Lab Results   Component Value Date    INR 0.94 12/31/2018       Procedures:  Echocardiogram: No results found for this or any previous visit (from the past 8760 hour(s)).    Assessment and Plan:  Alfreda Baxter is a 73 year old woman with hypertension and a prior history of hyperlipidemia who had painless loss of her vision in in her left eye and was found to have a left central retinal artery occlusion. 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  Initial outpatient monitors did not show AF but an ILR has now shown 2-4 minute episodes of PAF. She is currently taking ASA and Plavix.  We discussed the problem of knowing what burden of AF should be treated with antithrombotic therapy.  However, she had a presumed embolic event of significant impact.  She is currently on ASA and Plavix.  I reviewed with her the ACTIVE-W trial that showed, compared to warfarin, ASA/Plavix was inferior in stroke prevention, was similar in major bleeds but had a higher incidence of total  bleeds.  I think she should be on warfarin or a NOAC.  She would like to see if she has good insurance coverage for a NOAC and will make a decision on anticoagulation when she has this information.  It was a pleasure seeing Alfreda Baxter today.    Buck Butterfield

## 2019-02-04 NOTE — PROGRESS NOTES
HPI:  Alfreda Baxter returns for follow-up. I spent 25 minutes with the patient, entirely in counseling and coordination of care.  She is a 73 year old woman with hypertension and a prior history of hyperlipidemia. She had painless loss of her vision in in her left eye over a couple of minutes in August. She presented to [facility] 8/11-16/2018 and was found to have a left central retinal artery occlusion. She wore a 14 day monitor (and a second monitor) that did not show AF. She has been treated by Ophthlamology at [facility] with ASA and Plavix (with a plan to change to Plavix alone indefinitely later  unless cardiology feels otherwise ).  She is currently taking both and denies problems with them. I first met her in November 2018 and implanted a loop recording in December 2018. A remote transmission 2/1/2018 showed two episodes of PAF lasting 2 minutes each on 1/30 and 1/31/2019.  There was a 4-minute episode of 1/23/2019.  I reviewed the tracings and confirmed AF. These were asymptomatic.      PAST MEDICAL HISTORY:  Past Medical History:   Diagnosis Date     Asthma     controlled with inhaler     Cataract      CRAO (central retinal artery occlusion), left      DJD (degenerative joint disease)     knees     GERD (gastroesophageal reflux disease)      Glaucoma      High cholesterol      HTN (hypertension)      Need for prophylactic hormone replacement therapy (postmenopausal)     off 11/04 after benign breast bx     PFO (patent foramen ovale)        CURRENT MEDICATIONS:  Current Outpatient Medications   Medication Sig Dispense Refill     acetaminophen (TYLENOL) 500 MG tablet Take 500-1,000 mg by mouth every 6 hours as needed.       albuterol (PROAIR HFA, PROVENTIL HFA, VENTOLIN HFA) 108 (90 BASE) MCG/ACT inhaler Inhale 2 puffs into the lungs every 6 hours as needed for shortness of breath / dyspnea 1 Inhaler 12     ASPIRIN 81 MG OR CPEP 1 CAPSULE DAILY       atorvastatin (LIPITOR) 40 MG tablet Take 1 tablet (40 mg) by mouth  daily 90 tablet 3     B-COMPLEX OR Take  by mouth daily.       CALCIUM 500 +D OR one daily       clopidogrel (PLAVIX) 75 MG tablet Take 1 tablet (75 mg) by mouth daily 90 tablet 3     diclofenac (VOLTAREN) 1 % GEL topical gel Apply 4 grams to knees or 2 grams to hands four times daily using enclosed dosing card.  Generic okay 100 g 11     latanoprost (XALATAN) 0.005 % ophthalmic solution Place 1 drop Into the left eye At Bedtime 1 Bottle 11     lisinopril (PRINIVIL/ZESTRIL) 10 MG tablet Take 1 tablet (10 mg) by mouth daily 90 tablet 3     multivitamin (OCUVITE) TABS tablet Take 1 tablet by mouth daily       RaNITidine HCl (ZANTAC 75 PO) Take 75 mg by mouth as needed        S-Adenosylmethionine (GUILLERMO-E) 400 MG TABS Take 1 capsule by mouth daily        zolpidem (AMBIEN) 5 MG tablet TAKE ONE TABLET BY MOUTH IN THE EVENING AS NEEDED FOR SLEEP (Patient taking differently: Take 2.5 mg by mouth nightly as needed TAKE ONE TABLET BY MOUTH IN THE EVENING AS NEEDED FOR SLEEP) 30 tablet 3       PAST SURGICAL HISTORY:  Past Surgical History:   Procedure Laterality Date     ABDOMEN SURGERY  1976, 1978, 1995    2 C-sections,  total hysterectomy     APPENDECTOMY      age 7 yrs.     BREAST BIOPSY, RT/LT  02/17/04    left benign     BREAST SURGERY      see above     C TOTAL KNEE ARTHROPLASTY Left 11/2015    at [facility]     COLONOSCOPY  2013    needed q 3 yrs.     COLONOSCOPY WITH CO2 INSUFFLATION N/A 11/21/2016    Procedure: COLONOSCOPY WITH CO2 INSUFFLATION;  Surgeon: Brian Cleaning MD;  Location:  OR      COMPREHENSIVE EP STUDY N/A 12/31/2018    Procedure: LOOP RECORDER;  Surgeon: Buck Butterfield MD;  Location:  HEART CARDIAC CATH LAB     GENITOURINARY SURGERY      see above     HYSTERECTOMY, PAP NO LONGER INDICATED       HYSTERECTOMY, POORNIMA  1997    bleeding fibroids     ORTHOPEDIC SURGERY  2009    meniscus repair       ALLERGIES:     Allergies   Allergen Reactions     Codeine Anaphylaxis     Niacin      No  Clinical Screening - See Comments Other (See Comments)     senisitive to non steroidals  Aleve, ibuprofen celebrex cause bad stomach pains     Oxycodone Hives     Other reaction(s): Unknown  ... With facial swelling     Trazodone      nausea     Nsaids Nausea and Vomiting     Other reaction(s): Abdominal Pain       FAMILY HISTORY:  Family History   Problem Relation Age of Onset     Respiratory Father      Glaucoma Father      Asthma Father      Allergies Sister      Eye Disorder Sister      Lipids Sister      Neurologic Disorder Sister      Osteoporosis Sister      Glaucoma Sister      Hypertension Sister      Arthritis Mother      Cancer Mother      Hypertension Mother      Other Cancer Mother      Thyroid Disease Mother      Gastrointestinal Disease Son      Macular Degeneration Sister      Hyperlipidemia Sister      Hypertension Sister      Osteoporosis Sister        SOCIAL HISTORY:  Social History     Tobacco Use     Smoking status: Former Smoker     Packs/day: 0.10     Years: 1.00     Pack years: 0.10     Types: Cigarettes     Start date: 1963     Last attempt to quit: 1965     Years since quittin.1     Smokeless tobacco: Never Used   Substance Use Topics     Alcohol use: Yes     Comment: one a month     Drug use: No       ROS:   Constitutional: No fever, chills, or sweats. Weight stable.   ENT: No visual disturbance, ear ache, epistaxis, sore throat.   Cardiovascular: As per HPI.   Respiratory: No cough, hemoptysis.    GI: No nausea, vomiting, hematemesis, melena, or hematochezia.   : No hematuria.   Integument: Negative.   Psychiatric: Negative.   Hematologic:  Easy bruising, no easy bleeding.  Neuro: Negative.   Endocrinology: No significant heat or cold intolerance   Musculoskeletal: No myalgia.    Exam:  /79 (BP Location: Right arm, Patient Position: Sitting, Cuff Size: Adult Large)   Pulse 81   Wt 88.5 kg (195 lb)   SpO2 96%   BMI 35.67 kg/m    Not performed    Labs:  CBC RESULTS:    Lab Results   Component Value Date    WBC 6.0 12/31/2018    RBC 4.95 12/31/2018    HGB 14.5 12/31/2018    HCT 45.8 12/31/2018    MCV 93 12/31/2018    MCH 29.3 12/31/2018    MCHC 31.7 12/31/2018    RDW 13.4 12/31/2018     12/31/2018       BMP RESULTS:  Lab Results   Component Value Date     12/31/2018    POTASSIUM 3.9 12/31/2018    CHLORIDE 107 12/31/2018    CO2 27 12/31/2018    ANIONGAP 7 12/31/2018    GLC 99 12/31/2018    BUN 16 12/31/2018    CR 0.62 12/31/2018    GFRESTIMATED 88 12/31/2018    GFRESTBLACK >90 12/31/2018    RUDOLPH 9.5 12/31/2018        INR RESULTS:  Lab Results   Component Value Date    INR 0.94 12/31/2018       Procedures:  Echocardiogram: No results found for this or any previous visit (from the past 8760 hour(s)).    Assessment and Plan:  Alfreda Baxter is a 73 year old woman with hypertension and a prior history of hyperlipidemia who had painless loss of her vision in in her left eye and was found to have a left central retinal artery occlusion. 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  Initial outpatient monitors did not show AF but an ILR has now shown 2-4 minute episodes of PAF. She is currently taking ASA and Plavix.  We discussed the problem of knowing what burden of AF should be treated with antithrombotic therapy.  However, she had a presumed embolic event of significant impact.  She is currently on ASA and Plavix.  I reviewed with her the ACTIVE-W trial that showed, compared to warfarin, ASA/Plavix was inferior in stroke prevention, was similar in major bleeds but had a higher incidence of total bleeds.  I think she should be on warfarin or a NOAC.  She would like to see if she has good insurance coverage for a NOAC and will make a decision on anticoagulation when she has this information.  It was a pleasure seeing Alfreda Baxter today.  Buck Butterfield      CC

## 2019-02-04 NOTE — PATIENT INSTRUCTIONS
Thank you for coming to the [facility] [place]; please note the following instructions:    1. Please check with your insurance to determine coverage for anticoagulation (NOAC's)    2.  Once you determine coverage and what you want to do please call the cardiology nurse at [phone]    3.  Follow up in April.        If you have any questions regarding your visit please contact your care team:     Cardiology  Telephone Number   Sendy LACEY, KEELEY JEAN,RN  Rosalee LÓPEZ, ERIC JEAN, CURTIS REYES, RITAN   [phone]    *After hours: [phone]   For scheduling appts:     [phone] or    [phone] (select option 1)    *After hours: [phone]     For the Device Clinic (Pacemakers and ICD's)  RN's :  Jaz Kemp   During business hours: [phone]    *After business hours:  [phone] (select option 4)      Normal test result notifications will be released via [employer] or mailed within 7 business days.  All other test results, will be communicated via telephone once reviewed by your cardiologist.    If you need a medication refill please contact your pharmacy.  Please allow 3 business days for your refill to be completed.    As always, thank you for trusting us with your health care needs!

## 2019-02-04 NOTE — NURSING NOTE
"Chief Complaint   Patient presents with     Palpitations     PFO (patent foramen ovale),RTC. After cardiac MRI, loop recorder ,AF.-Per patient no symptoms of concern at this time.       Initial /79 (BP Location: Right arm, Patient Position: Sitting, Cuff Size: Adult Large)   Pulse 81   Wt 88.5 kg (195 lb)   SpO2 96%   BMI 35.67 kg/m   Estimated body mass index is 35.67 kg/m  as calculated from the following:    Height as of 1/10/19: 1.575 m (5' 2\").    Weight as of this encounter: 88.5 kg (195 lb)..  BP completed using cuff size: large    Malcolm Malik L.P.N.    "

## 2019-02-06 ENCOUNTER — TELEPHONE (OUTPATIENT)
Dept: CARDIOLOGY | Facility: CLINIC | Age: 74
End: 2019-02-06

## 2019-02-06 DIAGNOSIS — I48.91 ATRIAL FIBRILLATION (H): Primary | ICD-10-CM

## 2019-02-06 LAB
MDC_IDC_MSMT_BATTERY_STATUS: NORMAL
MDC_IDC_PG_IMPLANT_DTM: NORMAL
MDC_IDC_PG_MFG: NORMAL
MDC_IDC_PG_MODEL: NORMAL
MDC_IDC_PG_SERIAL: NORMAL
MDC_IDC_PG_TYPE: NORMAL
MDC_IDC_SESS_CLINIC_NAME: NORMAL
MDC_IDC_SESS_DTM: NORMAL
MDC_IDC_SESS_TYPE: NORMAL
MDC_IDC_SET_ZONE_DETECTION_INTERVAL: 2000 MS
MDC_IDC_SET_ZONE_DETECTION_INTERVAL: 3000 MS
MDC_IDC_SET_ZONE_DETECTION_INTERVAL: 380 MS
MDC_IDC_SET_ZONE_TYPE: NORMAL
MDC_IDC_STAT_AT_BURDEN_PERCENT: 0.1 %
MDC_IDC_STAT_AT_DTM_END: NORMAL
MDC_IDC_STAT_AT_DTM_START: NORMAL
MDC_IDC_STAT_EPISODE_RECENT_COUNT: 0
MDC_IDC_STAT_EPISODE_RECENT_COUNT: 1
MDC_IDC_STAT_EPISODE_RECENT_COUNT: 2
MDC_IDC_STAT_EPISODE_RECENT_COUNT_DTM_END: NORMAL
MDC_IDC_STAT_EPISODE_RECENT_COUNT_DTM_START: NORMAL
MDC_IDC_STAT_EPISODE_TOTAL_COUNT: 0
MDC_IDC_STAT_EPISODE_TOTAL_COUNT: 1
MDC_IDC_STAT_EPISODE_TOTAL_COUNT: 2
MDC_IDC_STAT_EPISODE_TOTAL_COUNT: 3
MDC_IDC_STAT_EPISODE_TOTAL_COUNT_DTM_END: NORMAL
MDC_IDC_STAT_EPISODE_TOTAL_COUNT_DTM_START: NORMAL
MDC_IDC_STAT_EPISODE_TYPE: NORMAL

## 2019-02-07 NOTE — TELEPHONE ENCOUNTER
Patient called the cardiology RN line stating that she has questions regarding coumadin.    Left message for patient to call clinic.      Sendy Laurent RN

## 2019-02-08 DIAGNOSIS — E78.5 HYPERLIPIDEMIA LDL GOAL <130: ICD-10-CM

## 2019-02-08 LAB
CHOLEST SERPL-MCNC: 158 MG/DL
HDLC SERPL-MCNC: 58 MG/DL
LDLC SERPL CALC-MCNC: 62 MG/DL
NONHDLC SERPL-MCNC: 100 MG/DL
TRIGL SERPL-MCNC: 192 MG/DL

## 2019-02-08 PROCEDURE — 80061 LIPID PANEL: CPT | Performed by: NURSE PRACTITIONER

## 2019-02-08 PROCEDURE — 36415 COLL VENOUS BLD VENIPUNCTURE: CPT | Performed by: NURSE PRACTITIONER

## 2019-02-12 ENCOUNTER — TELEPHONE (OUTPATIENT)
Dept: CARDIOLOGY | Facility: CLINIC | Age: 74
End: 2019-02-12

## 2019-02-12 DIAGNOSIS — I48.0 PAF (PAROXYSMAL ATRIAL FIBRILLATION) (H): Primary | ICD-10-CM

## 2019-02-12 NOTE — TELEPHONE ENCOUNTER
"----- Message from Buck Butterfield MD sent at 2/12/2019  7:57 AM CST -----  Let's wait 3 days.  There's no logic possible.  They both block platelets irreversibly, but there are some patients on \"triple therapy\".  Buck    ----- Message -----  From: Sendy Laurent RN  Sent: 2/11/2019   3:39 PM  To: Buck Butterfield MD    Just want to make sure regarding Malinda.  Patient wanting to start Xarelto.  She is to stop asa and plavix and start Xarelto.  No lag time in between, correct?  ~Sendy    "

## 2019-02-12 NOTE — TELEPHONE ENCOUNTER
Dr. Butterfield's response reviewed with patient.  Rx sent to preferred pharmacy.  Patient is concerned that her herbal supplement S-Adenosylmethionine may interact with Xarelto.  Writer utilized Wingzedix and it shows no drug interaction.  I also advised for patient to discuss with her pharmacist.  Patient verbalized understanding.    Sendy Laurent RN

## 2019-02-13 ENCOUNTER — TRANSFERRED RECORDS (OUTPATIENT)
Dept: HEALTH INFORMATION MANAGEMENT | Facility: CLINIC | Age: 74
End: 2019-02-13

## 2019-02-22 ENCOUNTER — TELEPHONE (OUTPATIENT)
Dept: CARDIOLOGY | Facility: CLINIC | Age: 74
End: 2019-02-22

## 2019-02-22 NOTE — TELEPHONE ENCOUNTER
"Received phone message from [facility] nurse that patient had concerns over redness at the ILR site.  I spoke with Malinda and she states that about 3 days ago, she noted the site became inflamed and she described a \"jessica sized area of redness\".  She also notes a white dot at that site which she believes is a stitch. She states it is also more tender and sore to the touch. She denies any drainage at the site, any fevers or chills.  She states she has been hot packing the site.  She stated she had been on a 500 mile road trip last weekend and felt the seat belt was rubbing it quite a bit and wondered if this caused the recent irritation.  I offered patient a clinic appointment to assess the site.  She stated she is not able to come to the clinic today as she has an eye appointment this afternoon.  She would like to keep hot packing it over the weekend and let us know on Monday if it is not improved.  I reviewed the above with Jane Mccullough NP, who was in agreement with the above plan.    "

## 2019-02-22 NOTE — TELEPHONE ENCOUNTER
Spoke with patient via telephone call whom reported  that her loop recorder was implanted 2 months ago,and now the incision site is red, swollen and really sore to the touch.     Patient stated that she is feeling like the site is rejecting a stitch. Patient denied any fever and per patient no drainage noted.    Patient also reported the incision site was uncomfortable since the beginning however the last two days it really bothersome and the redness really scared her.    Patient is using hot pack at home to alleviate the symptoms.    Writer paged device nurse for advise.DEvice nurse will contact patient today to assess.Patient was informed and in agreement with plan.    Sofie Tariq RN

## 2019-04-01 ENCOUNTER — OFFICE VISIT (OUTPATIENT)
Dept: CARDIOLOGY | Facility: CLINIC | Age: 74
End: 2019-04-01
Payer: MEDICARE

## 2019-04-01 ENCOUNTER — ANCILLARY PROCEDURE (OUTPATIENT)
Dept: CARDIOLOGY | Facility: CLINIC | Age: 74
End: 2019-04-01
Payer: MEDICARE

## 2019-04-01 VITALS
HEART RATE: 80 BPM | WEIGHT: 195 LBS | DIASTOLIC BLOOD PRESSURE: 80 MMHG | BODY MASS INDEX: 35.67 KG/M2 | SYSTOLIC BLOOD PRESSURE: 123 MMHG | OXYGEN SATURATION: 96 %

## 2019-04-01 DIAGNOSIS — I47.29 PAROXYSMAL VENTRICULAR TACHYCARDIA (H): ICD-10-CM

## 2019-04-01 DIAGNOSIS — I48.0 PAROXYSMAL ATRIAL FIBRILLATION (H): Primary | ICD-10-CM

## 2019-04-01 PROCEDURE — 93291 INTERROG DEV EVAL SCRMS IP: CPT | Performed by: INTERNAL MEDICINE

## 2019-04-01 PROCEDURE — 99214 OFFICE O/P EST MOD 30 MIN: CPT | Performed by: INTERNAL MEDICINE

## 2019-04-01 NOTE — NURSING NOTE
"Chief Complaint   Patient presents with     Atrial Fib     April follow up for Paroxysmal atrial fibrillation . -Per patient no bothersome symptoms.       Initial /80 (BP Location: Left arm, Patient Position: Sitting, Cuff Size: Adult Large)   Pulse 80   Wt 88.5 kg (195 lb)   SpO2 96%   BMI 35.67 kg/m   Estimated body mass index is 35.67 kg/m  as calculated from the following:    Height as of 1/10/19: 1.575 m (5' 2\").    Weight as of this encounter: 88.5 kg (195 lb)..  BP completed using cuff size: large    Malcolm Malik L.P.N.    "

## 2019-04-01 NOTE — LETTER
4/1/2019      RE: Alfreda Baxter  [address] [facility] [facility] [address]       Dear Colleague,    Thank you for the opportunity to participate in the care of your patient, Alfreda Baxter, at the [facility] HEART AT [facility] at [facility]. Please see a copy of my visit note below.    HPI:  Alfreda Baxter returns for follow-up. I spent 15 minutes with her, entirely in counseling and coordination of care.  We last saw her February 2019. She is a 74 year old woman with hypertension and a prior history of hyperlipidemia who had painless loss of her vision in in her left eye and was found to have a left central retinal artery occlusion. 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 Initial outpatient monitors did not show AF but an ILR showed 2-4 minute episodes of PAF. We previously discussed the problem of knowing what burden of AF should be treated with antithrombotic therapy. However, she had a presumed embolic event of significant impact so I recommended NOAC or warfarin. Since we last saw her she has checked with her insurance company and is now taking rivaroxaban and has stopped ASA and Plavix.    At this time she continues to feel well.  She denies chest pain, SOB, palpitations or lightheadedness.  She denies any problems with rivaroxaban.  She is having fewer problems with bruising than with Plavix.  She takes her NOAC with food.      Her ILR is now showing 2-14 minutes of PAF on a regular basis.      PAST MEDICAL HISTORY:  Past Medical History:   Diagnosis Date     Asthma     controlled with inhaler     Cataract      CRAO (central retinal artery occlusion), left      DJD (degenerative joint disease)     knees     GERD (gastroesophageal reflux disease)      Glaucoma      High cholesterol      HTN (hypertension)      Need for prophylactic hormone replacement therapy (postmenopausal)     off 11/04 after benign breast  bx     PFO (patent foramen ovale)        CURRENT MEDICATIONS:  Current Outpatient Medications   Medication Sig Dispense Refill     acetaminophen (TYLENOL) 500 MG tablet Take 500-1,000 mg by mouth every 6 hours as needed.       albuterol (PROAIR HFA, PROVENTIL HFA, VENTOLIN HFA) 108 (90 BASE) MCG/ACT inhaler Inhale 2 puffs into the lungs every 6 hours as needed for shortness of breath / dyspnea 1 Inhaler 12     atorvastatin (LIPITOR) 40 MG tablet Take 1 tablet (40 mg) by mouth daily 90 tablet 3     B-COMPLEX OR Take  by mouth daily.       CALCIUM 500 +D OR one daily       diclofenac (VOLTAREN) 1 % GEL topical gel Apply 4 grams to knees or 2 grams to hands four times daily using enclosed dosing card.  Generic okay 100 g 11     latanoprost (XALATAN) 0.005 % ophthalmic solution Place 1 drop Into the left eye At Bedtime 1 Bottle 11     lisinopril (PRINIVIL/ZESTRIL) 10 MG tablet Take 1 tablet (10 mg) by mouth daily 90 tablet 3     multivitamin (OCUVITE) TABS tablet Take 1 tablet by mouth daily       RaNITidine HCl (ZANTAC 75 PO) Take 75 mg by mouth as needed        rivaroxaban ANTICOAGULANT (XARELTO) 20 MG TABS tablet Take 1 tablet (20 mg) by mouth daily (with dinner) 90 tablet 3     S-Adenosylmethionine (GUILLERMO-E) 400 MG TABS Take 1 capsule by mouth daily        zolpidem (AMBIEN) 5 MG tablet TAKE ONE TABLET BY MOUTH IN THE EVENING AS NEEDED FOR SLEEP (Patient taking differently: Take 2.5 mg by mouth nightly as needed TAKE ONE TABLET BY MOUTH IN THE EVENING AS NEEDED FOR SLEEP) 30 tablet 3       PAST SURGICAL HISTORY:  Past Surgical History:   Procedure Laterality Date     ABDOMEN SURGERY  1976, 1978, 1995    2 C-sections,  total hysterectomy     APPENDECTOMY      age 7 yrs.     BREAST BIOPSY, RT/LT  02/17/04    left benign     BREAST SURGERY      see above     C TOTAL KNEE ARTHROPLASTY Left 11/2015    at [place]     COLONOSCOPY  2013    needed q 3 yrs.     COLONOSCOPY WITH CO2 INSUFFLATION N/A 11/21/2016     Procedure: COLONOSCOPY WITH CO2 INSUFFLATION;  Surgeon: Brian Cleaning MD;  Location: [facility] OR     EP COMPREHENSIVE EP STUDY N/A 2018    Procedure: LOOP RECORDER;  Surgeon: Buck Butterfield MD;  Location:  HEART CARDIAC CATH LAB     GENITOURINARY SURGERY      see above     HYSTERECTOMY, PAP NO LONGER INDICATED       HYSTERECTOMY, POORNIMA      bleeding fibroids     ORTHOPEDIC SURGERY      meniscus repair       ALLERGIES:     Allergies   Allergen Reactions     Codeine Anaphylaxis     Niacin      No Clinical Screening - See Comments Other (See Comments)     senisitive to non steroidals  Aleve, ibuprofen celebrex cause bad stomach pains     Oxycodone Hives     Other reaction(s): Unknown  ... With facial swelling     Trazodone      nausea     Nsaids Nausea and Vomiting     Other reaction(s): Abdominal Pain       FAMILY HISTORY:  Family History   Problem Relation Age of Onset     Respiratory Father      Glaucoma Father      Asthma Father      Allergies Sister      Eye Disorder Sister      Lipids Sister      Neurologic Disorder Sister      Osteoporosis Sister      Glaucoma Sister      Hypertension Sister      Arthritis Mother      Cancer Mother      Hypertension Mother      Other Cancer Mother      Thyroid Disease Mother      Gastrointestinal Disease Son      Macular Degeneration Sister      Hyperlipidemia Sister      Hypertension Sister      Osteoporosis Sister        SOCIAL HISTORY:  Social History     Tobacco Use     Smoking status: Former Smoker     Packs/day: 0.10     Years: 1.00     Pack years: 0.10     Types: Cigarettes     Start date: 1963     Last attempt to quit: 1965     Years since quittin.2     Smokeless tobacco: Never Used   Substance Use Topics     Alcohol use: Yes     Comment: one a month     Drug use: No       ROS:   Constitutional: No fever, chills, or sweats. Weight stable.   ENT: No visual disturbance, ear ache, epistaxis, sore throat.   Cardiovascular: As per HPI.  "  Respiratory: No cough, hemoptysis.    GI: No nausea, vomiting, hematemesis, melena, or hematochezia.   : No hematuria.   Integument: Negative.   Psychiatric: Negative.   Hematologic:  Easy bruising, no easy bleeding.  Neuro: Negative.   Endocrinology: No significant heat or cold intolerance   Musculoskeletal: No myalgia.    Exam:  /80 (BP Location: Left arm, Patient Position: Sitting, Cuff Size: Adult Large)   Pulse 80   Wt 88.5 kg (195 lb)   SpO2 96%   BMI 35.67 kg/m     Not performed    Labs:  CBC RESULTS:   Lab Results   Component Value Date    WBC 6.0 12/31/2018    RBC 4.95 12/31/2018    HGB 14.5 12/31/2018    HCT 45.8 12/31/2018    MCV 93 12/31/2018    MCH 29.3 12/31/2018    MCHC 31.7 12/31/2018    RDW 13.4 12/31/2018     12/31/2018     BMP RESULTS:  Lab Results   Component Value Date     12/31/2018    POTASSIUM 3.9 12/31/2018    CHLORIDE 107 12/31/2018    CO2 27 12/31/2018    ANIONGAP 7 12/31/2018    GLC 99 12/31/2018    BUN 16 12/31/2018    CR 0.62 12/31/2018    GFRESTIMATED 88 12/31/2018    GFRESTBLACK >90 12/31/2018    RUDOLPH 9.5 12/31/2018      INR RESULTS:  Lab Results   Component Value Date    INR 0.94 12/31/2018     Procedures:  Echocardiogram: No results found for this or any previous visit (from the past 8760 hour(s)).    Assessment and Plan:  Alfreda Baxter is a 74 year old woman with hypertension and a prior history of hyperlipidemia who had painless loss of her vision in in her left eye and was found to have a left central retinal artery occlusion. 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 An ILR shows periods of PAF and she is taking rivaroxaban and is tolerating it well.  We will make no change in her medications.  We will turn of the AF \"alerts\" but continue to monitor in the device clinic for now.  She understands that I will be retiring in June.  We will arrange follow-up with Dr. Gold in one year.  It was a pleasure seeing Alfreda Baxter today " and it has been a privilege to participate in her medical care.    EMILEE Quach

## 2019-04-01 NOTE — PROGRESS NOTES
HPI:  Alfreda Baxter returns for follow-up. I spent 15 minutes with her, entirely in counseling and coordination of care.  We last saw her February 2019. She is a 74 year old woman with hypertension and a prior history of hyperlipidemia who had painless loss of her vision in in her left eye and was found to have a left central retinal artery occlusion. 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 Initial outpatient monitors did not show AF but an ILR showed 2-4 minute episodes of PAF. We previously discussed the problem of knowing what burden of AF should be treated with antithrombotic therapy. However, she had a presumed embolic event of significant impact so I recommended NOAC or warfarin. Since we last saw her she has checked with her insurance company and is now taking rivaroxaban and has stopped ASA and Plavix.    At this time she continues to feel well.  She denies chest pain, SOB, palpitations or lightheadedness.  She denies any problems with rivaroxaban.  She is having fewer problems with bruising than with Plavix.  She takes her NOAC with food.      Her ILR is now showing 2-14 minutes of PAF on a regular basis.      PAST MEDICAL HISTORY:  Past Medical History:   Diagnosis Date     Asthma     controlled with inhaler     Cataract      CRAO (central retinal artery occlusion), left      DJD (degenerative joint disease)     knees     GERD (gastroesophageal reflux disease)      Glaucoma      High cholesterol      HTN (hypertension)      Need for prophylactic hormone replacement therapy (postmenopausal)     off 11/04 after benign breast bx     PFO (patent foramen ovale)        CURRENT MEDICATIONS:  Current Outpatient Medications   Medication Sig Dispense Refill     acetaminophen (TYLENOL) 500 MG tablet Take 500-1,000 mg by mouth every 6 hours as needed.       albuterol (PROAIR HFA, PROVENTIL HFA, VENTOLIN HFA) 108 (90 BASE) MCG/ACT inhaler Inhale 2 puffs into the lungs every 6 hours as  needed for shortness of breath / dyspnea 1 Inhaler 12     atorvastatin (LIPITOR) 40 MG tablet Take 1 tablet (40 mg) by mouth daily 90 tablet 3     B-COMPLEX OR Take  by mouth daily.       CALCIUM 500 +D OR one daily       diclofenac (VOLTAREN) 1 % GEL topical gel Apply 4 grams to knees or 2 grams to hands four times daily using enclosed dosing card.  Generic okay 100 g 11     latanoprost (XALATAN) 0.005 % ophthalmic solution Place 1 drop Into the left eye At Bedtime 1 Bottle 11     lisinopril (PRINIVIL/ZESTRIL) 10 MG tablet Take 1 tablet (10 mg) by mouth daily 90 tablet 3     multivitamin (OCUVITE) TABS tablet Take 1 tablet by mouth daily       RaNITidine HCl (ZANTAC 75 PO) Take 75 mg by mouth as needed        rivaroxaban ANTICOAGULANT (XARELTO) 20 MG TABS tablet Take 1 tablet (20 mg) by mouth daily (with dinner) 90 tablet 3     S-Adenosylmethionine (GUILLERMO-E) 400 MG TABS Take 1 capsule by mouth daily        zolpidem (AMBIEN) 5 MG tablet TAKE ONE TABLET BY MOUTH IN THE EVENING AS NEEDED FOR SLEEP (Patient taking differently: Take 2.5 mg by mouth nightly as needed TAKE ONE TABLET BY MOUTH IN THE EVENING AS NEEDED FOR SLEEP) 30 tablet 3       PAST SURGICAL HISTORY:  Past Surgical History:   Procedure Laterality Date     ABDOMEN SURGERY  1976, 1978, 1995    2 C-sections,  total hysterectomy     APPENDECTOMY      age 7 yrs.     BREAST BIOPSY, RT/LT  02/17/04    left benign     BREAST SURGERY      see above     C TOTAL KNEE ARTHROPLASTY Left 11/2015    at [facility]     COLONOSCOPY  2013    needed q 3 yrs.     COLONOSCOPY WITH CO2 INSUFFLATION N/A 11/21/2016    Procedure: COLONOSCOPY WITH CO2 INSUFFLATION;  Surgeon: Brian Cleaning MD;  Location:  OR      COMPREHENSIVE EP STUDY N/A 12/31/2018    Procedure: LOOP RECORDER;  Surgeon: Buck Butterfield MD;  Location:  HEART CARDIAC CATH LAB     GENITOURINARY SURGERY      see above     HYSTERECTOMY, PAP NO LONGER INDICATED       HYSTERECTOMY, POORNIMA  1997     bleeding fibroids     ORTHOPEDIC SURGERY  2009    meniscus repair       ALLERGIES:     Allergies   Allergen Reactions     Codeine Anaphylaxis     Niacin      No Clinical Screening - See Comments Other (See Comments)     senisitive to non steroidals  Aleve, ibuprofen celebrex cause bad stomach pains     Oxycodone Hives     Other reaction(s): Unknown  ... With facial swelling     Trazodone      nausea     Nsaids Nausea and Vomiting     Other reaction(s): Abdominal Pain       FAMILY HISTORY:  Family History   Problem Relation Age of Onset     Respiratory Father      Glaucoma Father      Asthma Father      Allergies Sister      Eye Disorder Sister      Lipids Sister      Neurologic Disorder Sister      Osteoporosis Sister      Glaucoma Sister      Hypertension Sister      Arthritis Mother      Cancer Mother      Hypertension Mother      Other Cancer Mother      Thyroid Disease Mother      Gastrointestinal Disease Son      Macular Degeneration Sister      Hyperlipidemia Sister      Hypertension Sister      Osteoporosis Sister        SOCIAL HISTORY:  Social History     Tobacco Use     Smoking status: Former Smoker     Packs/day: 0.10     Years: 1.00     Pack years: 0.10     Types: Cigarettes     Start date: 1963     Last attempt to quit: 1965     Years since quittin.2     Smokeless tobacco: Never Used   Substance Use Topics     Alcohol use: Yes     Comment: one a month     Drug use: No       ROS:   Constitutional: No fever, chills, or sweats. Weight stable.   ENT: No visual disturbance, ear ache, epistaxis, sore throat.   Cardiovascular: As per HPI.   Respiratory: No cough, hemoptysis.    GI: No nausea, vomiting, hematemesis, melena, or hematochezia.   : No hematuria.   Integument: Negative.   Psychiatric: Negative.   Hematologic:  Easy bruising, no easy bleeding.  Neuro: Negative.   Endocrinology: No significant heat or cold intolerance   Musculoskeletal: No myalgia.    Exam:  /80 (BP Location: Left  "arm, Patient Position: Sitting, Cuff Size: Adult Large)   Pulse 80   Wt 88.5 kg (195 lb)   SpO2 96%   BMI 35.67 kg/m    Not performed    Labs:  CBC RESULTS:   Lab Results   Component Value Date    WBC 6.0 12/31/2018    RBC 4.95 12/31/2018    HGB 14.5 12/31/2018    HCT 45.8 12/31/2018    MCV 93 12/31/2018    MCH 29.3 12/31/2018    MCHC 31.7 12/31/2018    RDW 13.4 12/31/2018     12/31/2018       BMP RESULTS:  Lab Results   Component Value Date     12/31/2018    POTASSIUM 3.9 12/31/2018    CHLORIDE 107 12/31/2018    CO2 27 12/31/2018    ANIONGAP 7 12/31/2018    GLC 99 12/31/2018    BUN 16 12/31/2018    CR 0.62 12/31/2018    GFRESTIMATED 88 12/31/2018    GFRESTBLACK >90 12/31/2018    RUDOLPH 9.5 12/31/2018        INR RESULTS:  Lab Results   Component Value Date    INR 0.94 12/31/2018       Procedures:  Echocardiogram: No results found for this or any previous visit (from the past 8760 hour(s)).    Assessment and Plan:  Alfreda Baxter is a 74 year old woman with hypertension and a prior history of hyperlipidemia who had painless loss of her vision in in her left eye and was found to have a left central retinal artery occlusion. 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 An ILR shows periods of PAF and she is taking rivaroxaban and is tolerating it well.  We will make no change in her medications.  We will turn of the AF \"alerts\" but continue to monitor in the device clinic for now.  She understands that I will be retiring in June.  We will arrange follow-up with Dr. Gold in one year.  It was a pleasure seeing Alfreda Baxter today and it has been a privilege to participate in her medical care.  EMILEE Quach"

## 2019-04-01 NOTE — PATIENT INSTRUCTIONS
Thank you for coming to the [facility] @ [place]; please note the following instructions:    1.  Follow up with Dr. Parul Gold in 1 year.        If you have any questions regarding your visit please contact your care team:     Cardiology  Telephone Number   Sendy LACEY, RN  Sofie JEAN,RN  Rosalee LÓPEZ, ERIC JEAN, MA  Malcolm REYES, RITAN   [phone]    *After hours: [phone]   For scheduling appts:     [phone] or    [phone] (select option 1)    *After hours: [phone]     For the Device Clinic (Pacemakers and ICD's)  RN's :  Jaz Kemp   During business hours: [phone]    *After business hours:  [phone] (select option 4)      Normal test result notifications will be released via [employer] or mailed within 7 business days.  All other test results, will be communicated via telephone once reviewed by your cardiologist.    If you need a medication refill please contact your pharmacy.  Please allow 3 business days for your refill to be completed.    As always, thank you for trusting us with your health care needs!

## 2019-04-01 NOTE — PATIENT INSTRUCTIONS
It was a pleasure to see you in clinic today. Please do not hesitate to call with any questions or concerns. You are scheduled for a remote ILR transmission on 7/11/19. We will call in 1-2 business days to discuss the results with you. We will schedule the next transmission when we talk in July.     Jaz Romero, RN  Electrophysiology Nurse Clinician  [facility]  During business hours please call The Device Clinic:  [phone]  After business hours please call:  [phone]- select option #4 and ask for job code [phone]  Appointment lines  [facility]:  [phone]  [facility]:  [phone]

## 2019-04-02 ENCOUNTER — ANCILLARY ORDERS (OUTPATIENT)
Dept: CARDIOLOGY | Facility: CLINIC | Age: 74
End: 2019-04-02
Payer: MEDICARE

## 2019-04-02 DIAGNOSIS — I47.29 PAROXYSMAL VENTRICULAR TACHYCARDIA (H): ICD-10-CM

## 2019-04-02 LAB
MDC_IDC_MSMT_BATTERY_STATUS: NORMAL
MDC_IDC_PG_IMPLANT_DTM: NORMAL
MDC_IDC_PG_MFG: NORMAL
MDC_IDC_PG_MODEL: NORMAL
MDC_IDC_PG_SERIAL: NORMAL
MDC_IDC_PG_TYPE: NORMAL
MDC_IDC_SESS_CLINIC_NAME: NORMAL
MDC_IDC_SESS_DTM: NORMAL
MDC_IDC_SESS_TYPE: NORMAL
MDC_IDC_SET_ZONE_DETECTION_INTERVAL: 2000 MS
MDC_IDC_SET_ZONE_DETECTION_INTERVAL: 3000 MS
MDC_IDC_SET_ZONE_DETECTION_INTERVAL: 380 MS
MDC_IDC_SET_ZONE_TYPE: NORMAL
MDC_IDC_STAT_AT_BURDEN_PERCENT: 0.1 %
MDC_IDC_STAT_AT_DTM_END: NORMAL
MDC_IDC_STAT_AT_DTM_START: NORMAL
MDC_IDC_STAT_EPISODE_RECENT_COUNT: 0
MDC_IDC_STAT_EPISODE_RECENT_COUNT: 2
MDC_IDC_STAT_EPISODE_RECENT_COUNT: 2
MDC_IDC_STAT_EPISODE_RECENT_COUNT: 33
MDC_IDC_STAT_EPISODE_RECENT_COUNT_DTM_END: NORMAL
MDC_IDC_STAT_EPISODE_RECENT_COUNT_DTM_START: NORMAL
MDC_IDC_STAT_EPISODE_TOTAL_COUNT: 0
MDC_IDC_STAT_EPISODE_TOTAL_COUNT: 2
MDC_IDC_STAT_EPISODE_TOTAL_COUNT: 2
MDC_IDC_STAT_EPISODE_TOTAL_COUNT: 33
MDC_IDC_STAT_EPISODE_TOTAL_COUNT_DTM_END: NORMAL
MDC_IDC_STAT_EPISODE_TOTAL_COUNT_DTM_START: NORMAL
MDC_IDC_STAT_EPISODE_TYPE: NORMAL

## 2019-05-08 ENCOUNTER — DOCUMENTATION ONLY (OUTPATIENT)
Dept: LAB | Facility: CLINIC | Age: 74
End: 2019-05-08

## 2019-05-08 DIAGNOSIS — I10 HYPERTENSION GOAL BP (BLOOD PRESSURE) < 140/90: Primary | ICD-10-CM

## 2019-05-08 DIAGNOSIS — R73.09 ELEVATED GLUCOSE: ICD-10-CM

## 2019-05-08 DIAGNOSIS — Z79.01 ANTICOAGULATED: ICD-10-CM

## 2019-05-22 ENCOUNTER — OFFICE VISIT (OUTPATIENT)
Dept: OPHTHALMOLOGY | Facility: CLINIC | Age: 74
End: 2019-05-22
Payer: MEDICARE

## 2019-05-22 DIAGNOSIS — H52.4 PRESBYOPIA: ICD-10-CM

## 2019-05-22 DIAGNOSIS — H34.12 CENTRAL RETINAL ARTERY OCCLUSION OF LEFT EYE: Primary | ICD-10-CM

## 2019-05-22 DIAGNOSIS — H43.813 POSTERIOR VITREOUS DETACHMENT OF BOTH EYES: ICD-10-CM

## 2019-05-22 DIAGNOSIS — H25.813 COMBINED FORMS OF AGE-RELATED CATARACT OF BOTH EYES: ICD-10-CM

## 2019-05-22 DIAGNOSIS — H40.52X2 NEOVASCULAR GLAUCOMA OF LEFT EYE, MODERATE STAGE: ICD-10-CM

## 2019-05-22 PROCEDURE — 92015 DETERMINE REFRACTIVE STATE: CPT | Mod: GY | Performed by: OPHTHALMOLOGY

## 2019-05-22 PROCEDURE — 92014 COMPRE OPH EXAM EST PT 1/>: CPT | Performed by: OPHTHALMOLOGY

## 2019-05-22 ASSESSMENT — REFRACTION_MANIFEST
OD_CYLINDER: +0.75
OD_AXIS: 140
OD_SPHERE: -3.00
OS_AXIS: 127
OD_ADD: +3.00
OS_SPHERE: -2.00
OS_CYLINDER: +1.00

## 2019-05-22 ASSESSMENT — VISUAL ACUITY
OD_CC+: -1
OD_PH_CC+: -2
METHOD: SNELLEN - LINEAR
OS_CC: HM PERIPH
OD_PH_CC: 20/30
CORRECTION_TYPE: GLASSES
OD_CC: 20/40

## 2019-05-22 ASSESSMENT — TONOMETRY
OS_IOP_MMHG: 24
OD_IOP_MMHG: 20
IOP_METHOD: APPLANATION

## 2019-05-22 ASSESSMENT — REFRACTION_WEARINGRX
OD_CYLINDER: +0.75
SPECS_TYPE: PAL
OD_ADD: +3.50
OS_CYLINDER: SPHERE
OD_SPHERE: -2.75
OD_AXIS: 139
OS_SPHERE: PLANO

## 2019-05-22 ASSESSMENT — CUP TO DISC RATIO
OD_RATIO: 0.5
OS_RATIO: 0.5

## 2019-05-22 ASSESSMENT — EXTERNAL EXAM - RIGHT EYE: OD_EXAM: 2+ BROW PTOSIS, MILD TO MOD BROW

## 2019-05-22 ASSESSMENT — EXTERNAL EXAM - LEFT EYE: OS_EXAM: 2+ BROW PTOSIS, MILD TO MOD BROW

## 2019-05-22 NOTE — LETTER
5/22/2019         RE: Alfreda Baxter  [address] [address]        Dear Colleague,    Thank you for referring your patient, Alfreda Baxter, to the [facility]. Please see a copy of my visit note below.     Current Eye Medications:  Latanoprost left eye every evening, last drops:  10:30pm.   systane frequently.      Subjective:  Patient is here for follow up of central retinal artery occlusion left eye; refraction, pressure check, and dilation.  The new right lens has improved her vision.    Last Comprehensive Eye Exam was in September of 2018.       Objective:  See Ophthalmology Exam.       Assessment:  Stable intraocular pressure left eye and dilated exam both eyes in patient with hx of central retinal artery occlusion left eye.      ICD-10-CM    1. Central retinal artery occlusion of left eye H34.12    2. Combined forms of age-related cataract, mild, both eyes H25.813    3. 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 Posterior vitreous detachment of both eyes H43.813    5. Presbyopia H52.4 REFRACTIVE STATUS        Plan:  Continue same medication.  Continue same glasses.  Return visit 6 months for intraocular pressure check, glaucoma OCT, retinal OCT.  Bryan Morrison M.D.  [phone]             Again, thank you for allowing me to participate in the care of your patient.        Sincerely,        Bryan Morrison MD

## 2019-05-22 NOTE — PATIENT INSTRUCTIONS
Continue same medication.  Continue same glasses.  Return visit 6 months for intraocular pressure check, glaucoma OCT, retinal OCT.  Bryan Morrison M.D.  [phone]

## 2019-05-22 NOTE — PROGRESS NOTES
Current Eye Medications:  Latanoprost left eye every evening, last drops:  10:30pm.   systane frequently.      Subjective:  Patient is here for follow up of central retinal artery occlusion left eye; refraction, pressure check, and dilation.  The new right lens has improved her vision.    Last Comprehensive Eye Exam was in September of 2018.       Objective:  See Ophthalmology Exam.       Assessment:  Stable intraocular pressure left eye and dilated exam both eyes in patient with hx of central retinal artery occlusion left eye.      ICD-10-CM    1. Central retinal artery occlusion of left eye H34.12    2. Combined forms of age-related cataract, mild, both eyes H25.813    3. 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 Posterior vitreous detachment of both eyes H43.813    5. Presbyopia H52.4 REFRACTIVE STATUS        Plan:  Continue same medication.  Continue same glasses.  Return visit 6 months for intraocular pressure check, glaucoma OCT, retinal OCT.  Bryan Morrison M.D.  [phone]

## 2019-06-12 ENCOUNTER — TRANSFERRED RECORDS (OUTPATIENT)
Dept: HEALTH INFORMATION MANAGEMENT | Facility: CLINIC | Age: 74
End: 2019-06-12

## 2019-06-26 DIAGNOSIS — Z79.01 ANTICOAGULATED: ICD-10-CM

## 2019-06-26 DIAGNOSIS — I10 HYPERTENSION GOAL BP (BLOOD PRESSURE) < 140/90: ICD-10-CM

## 2019-06-26 DIAGNOSIS — R73.09 ELEVATED GLUCOSE: ICD-10-CM

## 2019-06-26 LAB
ANION GAP SERPL CALCULATED.3IONS-SCNC: 6 MMOL/L (ref 3–14)
BUN SERPL-MCNC: 21 MG/DL (ref 7–30)
CALCIUM SERPL-MCNC: 9.8 MG/DL (ref 8.5–10.1)
CHLORIDE SERPL-SCNC: 106 MMOL/L (ref 94–109)
CO2 SERPL-SCNC: 26 MMOL/L (ref 20–32)
CREAT SERPL-MCNC: 0.76 MG/DL (ref 0.52–1.04)
ERYTHROCYTE [DISTWIDTH] IN BLOOD BY AUTOMATED COUNT: 13.6 % (ref 10–15)
GFR SERPL CREATININE-BSD FRML MDRD: 76 ML/MIN/{1.73_M2}
GLUCOSE SERPL-MCNC: 109 MG/DL (ref 70–99)
HBA1C MFR BLD: 5.6 % (ref 0–5.6)
HCT VFR BLD AUTO: 43.9 % (ref 35–47)
HGB BLD-MCNC: 14.3 G/DL (ref 11.7–15.7)
MCH RBC QN AUTO: 29.3 PG (ref 26.5–33)
MCHC RBC AUTO-ENTMCNC: 32.6 G/DL (ref 31.5–36.5)
MCV RBC AUTO: 90 FL (ref 78–100)
PLATELET # BLD AUTO: 220 10E9/L (ref 150–450)
POTASSIUM SERPL-SCNC: 4.9 MMOL/L (ref 3.4–5.3)
RBC # BLD AUTO: 4.88 10E12/L (ref 3.8–5.2)
SODIUM SERPL-SCNC: 138 MMOL/L (ref 133–144)
WBC # BLD AUTO: 7.7 10E9/L (ref 4–11)

## 2019-06-26 PROCEDURE — 36415 COLL VENOUS BLD VENIPUNCTURE: CPT | Performed by: INTERNAL MEDICINE

## 2019-06-26 PROCEDURE — 85027 COMPLETE CBC AUTOMATED: CPT | Performed by: INTERNAL MEDICINE

## 2019-06-26 PROCEDURE — 80048 BASIC METABOLIC PNL TOTAL CA: CPT | Performed by: INTERNAL MEDICINE

## 2019-06-26 PROCEDURE — 83036 HEMOGLOBIN GLYCOSYLATED A1C: CPT | Performed by: INTERNAL MEDICINE

## 2019-06-27 NOTE — RESULT ENCOUNTER NOTE
Alferda Cruz are your results.  Your labs are normal/stable at this time.     Please call  with any questions.  We can also discuss any questions regarding these labs at your next scheduled visit.    Sincerely,    Brandi Sims M.D.

## 2019-07-08 PROBLEM — Z79.01 LONG TERM CURRENT USE OF ANTICOAGULANT THERAPY: Status: ACTIVE | Noted: 2019-07-08

## 2019-07-08 PROBLEM — I48.0 PAROXYSMAL A-FIB (H): Status: ACTIVE | Noted: 2019-07-08

## 2019-07-08 PROBLEM — H54.40 BLIND LEFT EYE: Status: ACTIVE | Noted: 2019-07-08

## 2019-07-10 ASSESSMENT — ENCOUNTER SYMPTOMS
DIZZINESS: 0
HEMATOCHEZIA: 0
MYALGIAS: 0
FEVER: 0
HEARTBURN: 1
FREQUENCY: 0
CHILLS: 0
ABDOMINAL PAIN: 0
DIARRHEA: 0
COUGH: 0
HEADACHES: 0
CONSTIPATION: 0
NAUSEA: 0
JOINT SWELLING: 0
BREAST MASS: 0
WEAKNESS: 0
SORE THROAT: 0
NERVOUS/ANXIOUS: 0
ARTHRALGIAS: 1
HEMATURIA: 0
PARESTHESIAS: 0
SHORTNESS OF BREATH: 0
DYSURIA: 0
EYE PAIN: 0
PALPITATIONS: 0

## 2019-07-10 ASSESSMENT — ACTIVITIES OF DAILY LIVING (ADL): CURRENT_FUNCTION: NO ASSISTANCE NEEDED

## 2019-07-11 ENCOUNTER — OFFICE VISIT (OUTPATIENT)
Dept: FAMILY MEDICINE | Facility: CLINIC | Age: 74
End: 2019-07-11
Payer: MEDICARE

## 2019-07-11 ENCOUNTER — ANCILLARY PROCEDURE (OUTPATIENT)
Dept: CARDIOLOGY | Facility: CLINIC | Age: 74
End: 2019-07-11
Attending: INTERNAL MEDICINE
Payer: MEDICARE

## 2019-07-11 VITALS
HEIGHT: 62 IN | DIASTOLIC BLOOD PRESSURE: 73 MMHG | HEART RATE: 79 BPM | SYSTOLIC BLOOD PRESSURE: 121 MMHG | WEIGHT: 195 LBS | OXYGEN SATURATION: 94 % | TEMPERATURE: 98.2 F | BODY MASS INDEX: 35.88 KG/M2

## 2019-07-11 DIAGNOSIS — Z79.01 LONG TERM CURRENT USE OF ANTICOAGULANT THERAPY: ICD-10-CM

## 2019-07-11 DIAGNOSIS — Z86.0100 HISTORY OF COLONIC POLYPS: ICD-10-CM

## 2019-07-11 DIAGNOSIS — M15.0 PRIMARY OSTEOARTHRITIS INVOLVING MULTIPLE JOINTS: ICD-10-CM

## 2019-07-11 DIAGNOSIS — I10 HYPERTENSION GOAL BP (BLOOD PRESSURE) < 140/90: ICD-10-CM

## 2019-07-11 DIAGNOSIS — F51.01 PRIMARY INSOMNIA: ICD-10-CM

## 2019-07-11 DIAGNOSIS — Z00.01 ENCOUNTER FOR GENERAL ADULT MEDICAL EXAMINATION WITH ABNORMAL FINDINGS: Primary | ICD-10-CM

## 2019-07-11 DIAGNOSIS — I48.0 PAROXYSMAL A-FIB (H): ICD-10-CM

## 2019-07-11 DIAGNOSIS — E78.5 HYPERLIPIDEMIA LDL GOAL <130: ICD-10-CM

## 2019-07-11 DIAGNOSIS — J45.20 MILD INTERMITTENT ASTHMA WITHOUT COMPLICATION: ICD-10-CM

## 2019-07-11 DIAGNOSIS — E66.01 MORBID OBESITY (H): ICD-10-CM

## 2019-07-11 DIAGNOSIS — I47.29 PAROXYSMAL VENTRICULAR TACHYCARDIA (H): ICD-10-CM

## 2019-07-11 DIAGNOSIS — Z86.69 HISTORY OF CENTRAL RETINAL ARTERY OCCLUSION: ICD-10-CM

## 2019-07-11 DIAGNOSIS — H54.40 BLIND LEFT EYE: ICD-10-CM

## 2019-07-11 DIAGNOSIS — Z12.11 SPECIAL SCREENING FOR MALIGNANT NEOPLASMS, COLON: ICD-10-CM

## 2019-07-11 LAB
CHOLEST SERPL-MCNC: 168 MG/DL
HDLC SERPL-MCNC: 61 MG/DL
LDLC SERPL CALC-MCNC: 61 MG/DL
NONHDLC SERPL-MCNC: 107 MG/DL
TRIGL SERPL-MCNC: 231 MG/DL

## 2019-07-11 PROCEDURE — 36415 COLL VENOUS BLD VENIPUNCTURE: CPT | Performed by: INTERNAL MEDICINE

## 2019-07-11 PROCEDURE — 93298 REM INTERROG DEV EVAL SCRMS: CPT | Performed by: INTERNAL MEDICINE

## 2019-07-11 PROCEDURE — G0438 PPPS, INITIAL VISIT: HCPCS | Performed by: INTERNAL MEDICINE

## 2019-07-11 PROCEDURE — 93299 CARDIAC DEVICE CHECK - REMOTE: CPT | Mod: ZF

## 2019-07-11 PROCEDURE — 99213 OFFICE O/P EST LOW 20 MIN: CPT | Mod: 25 | Performed by: INTERNAL MEDICINE

## 2019-07-11 PROCEDURE — 80061 LIPID PANEL: CPT | Performed by: INTERNAL MEDICINE

## 2019-07-11 RX ORDER — ZOLPIDEM TARTRATE 5 MG/1
TABLET ORAL
Qty: 30 TABLET | Refills: 3 | Status: SHIPPED | OUTPATIENT
Start: 2019-07-11 | End: 2020-07-27

## 2019-07-11 RX ORDER — LISINOPRIL 10 MG/1
10 TABLET ORAL DAILY
Qty: 90 TABLET | Refills: 3 | Status: SHIPPED | OUTPATIENT
Start: 2019-07-11 | End: 2020-07-21

## 2019-07-11 ASSESSMENT — ACTIVITIES OF DAILY LIVING (ADL): CURRENT_FUNCTION: NO ASSISTANCE NEEDED

## 2019-07-11 ASSESSMENT — ENCOUNTER SYMPTOMS
FEVER: 0
SHORTNESS OF BREATH: 0
HEMATURIA: 0
EYE PAIN: 0
DIZZINESS: 0
ABDOMINAL PAIN: 0
PARESTHESIAS: 0
FREQUENCY: 0
NAUSEA: 0
MYALGIAS: 0
JOINT SWELLING: 0
ARTHRALGIAS: 1
BREAST MASS: 0
PALPITATIONS: 0
CHILLS: 0
HEMATOCHEZIA: 0
HEADACHES: 0
WEAKNESS: 0
DYSURIA: 0
DIARRHEA: 0
COUGH: 0
NERVOUS/ANXIOUS: 0
SORE THROAT: 0
HEARTBURN: 1
CONSTIPATION: 0

## 2019-07-11 ASSESSMENT — ASTHMA QUESTIONNAIRES
QUESTION_5 LAST FOUR WEEKS HOW WOULD YOU RATE YOUR ASTHMA CONTROL: COMPLETELY CONTROLLED
ACUTE_EXACERBATION_TODAY: NO
QUESTION_2 LAST FOUR WEEKS HOW OFTEN HAVE YOU HAD SHORTNESS OF BREATH: NOT AT ALL
ACT_TOTALSCORE: 25
QUESTION_3 LAST FOUR WEEKS HOW OFTEN DID YOUR ASTHMA SYMPTOMS (WHEEZING, COUGHING, SHORTNESS OF BREATH, CHEST TIGHTNESS OR PAIN) WAKE YOU UP AT NIGHT OR EARLIER THAN USUAL IN THE MORNING: NOT AT ALL
QUESTION_4 LAST FOUR WEEKS HOW OFTEN HAVE YOU USED YOUR RESCUE INHALER OR NEBULIZER MEDICATION (SUCH AS ALBUTEROL): NOT AT ALL
QUESTION_1 LAST FOUR WEEKS HOW MUCH OF THE TIME DID YOUR ASTHMA KEEP YOU FROM GETTING AS MUCH DONE AT WORK, SCHOOL OR AT HOME: NONE OF THE TIME

## 2019-07-11 ASSESSMENT — MIFFLIN-ST. JEOR: SCORE: 1328.79

## 2019-07-11 NOTE — LETTER
My Asthma Action Plan  Name: Alfreda Baxter   YOB: 1945  Date: 7/11/2019   My doctor: Brandi Sims MD   My clinic: [facility]        My Control Medicine: None  My Rescue Medicine: Albuterol (Proair/Ventolin/Proventil) inhaler     My Asthma Severity: intermittent  Avoid your asthma triggers: upper respiratory infections and cold air               GREEN ZONE   Good Control    I feel good    No cough or wheeze    Can work, sleep and play without asthma symptoms       Take your asthma control medicine every day.     1. If exercise triggers your asthma, take your rescue medication    15 minutes before exercise or sports, and    During exercise if you have asthma symptoms  2. Spacer to use with inhaler: If you have a spacer, make sure to use it with your inhaler             YELLOW ZONE Getting Worse  I have ANY of these:    I do not feel good    Cough or wheeze    Chest feels tight    Wake up at night   1. Keep taking your Green Zone medications  2. Start taking your rescue medicine:    every 20 minutes for up to 1 hour. Then every 4 hours for 24-48 hours.  3. If you stay in the Yellow Zone for more than 12-24 hours, contact your doctor.  4. If you do not return to the Green Zone in 12-24 hours or you get worse, start taking your oral steroid medicine if prescribed by your provider.           RED ZONE Medical Alert - Get Help  I have ANY of these:    I feel awful    Medicine is not helping    Breathing getting harder    Trouble walking or talking    Nose opens wide to breathe       1. Take your rescue medicine NOW  2. If your provider has prescribed an oral steroid medicine, start taking it NOW  3. Call your doctor NOW  4. If you are still in the Red Zone after 20 minutes and you have not reached your doctor:    Take your rescue medicine again and    Call 911 or go to the emergency room right away    See your regular doctor within 2 weeks of an Emergency Room or Urgent Care  visit for follow-up treatment.          Annual Reminders:  Meet with Asthma Educator,  Flu Shot in the Fall, consider Pneumonia Vaccination for patients with asthma (aged 19 and older).    Pharmacy:    [facility] [address] [place], [place] [address]  WRITTEN PRESCRIPTION REQUESTED                      Asthma Triggers  How To Control Things That Make Your Asthma Worse    Triggers are things that make your asthma worse.  Look at the list below to help you find your triggers and what you can do about them.  You can help prevent asthma flare-ups by staying away from your triggers.      Trigger                                                          What you can do   Cigarette Smoke  Tobacco smoke can make asthma worse. Do not allow smoking in your home, car or around you.  Be sure no one smokes at a child s day care or school.  If you smoke, ask your health care provider for ways to help you quit.  Ask family members to quit too.  Ask your health care provider for a referral to Quit Plan to help you quit smoking, or call 6-806-727-PLAN.     Colds, Flu, Bronchitis  These are common triggers of asthma. Wash your hands often.  Don t touch your eyes, nose or mouth.  Get a flu shot every year.     Dust Mites  These are tiny bugs that live in cloth or carpet. They are too small to see. Wash sheets and blankets in hot water every week.   Encase pillows and mattress in dust mite proof covers.  Avoid having carpet if you can. If you have carpet, vacuum weekly.   Use a dust mask and HEPA vacuum.   Pollen and Outdoor Mold  Some people are allergic to trees, grass, or weed pollen, or molds. Try to keep your windows closed.  Limit time out doors when pollen count is high.   Ask you health care provider about taking medicine during allergy season.     Animal Dander  Some people are allergic to skin flakes, urine or saliva from pets with fur or feathers. Keep pets with fur or feathers out of your home.    If you  can t keep the pet outdoors, then keep the pet out of your bedroom.  Keep the bedroom door closed.  Keep pets off cloth furniture and away from stuffed toys.     Mice, Rats, and Cockroaches  Some people are allergic to the waste from these pests.   Cover food and garbage.  Clean up spills and food crumbs.  Store grease in the refrigerator.   Keep food out of the bedroom.   Indoor Mold  This can be a trigger if your home has high moisture. Fix leaking faucets, pipes, or other sources of water.   Clean moldy surfaces.  Dehumidify basement if it is damp and smelly.   Smoke, Strong Odors, and Sprays  These can reduce air quality. Stay away from strong odors and sprays, such as perfume, powder, hair spray, paints, smoke incense, paint, cleaning products, candles and new carpet.   Exercise or Sports  Some people with asthma have this trigger. Be active!  Ask your doctor about taking medicine before sports or exercise to prevent symptoms.    Warm up for 5-10 minutes before and after sports or exercise.     Other Triggers of Asthma  Cold air:  Cover your nose and mouth with a scarf.  Sometimes laughing or crying can be a trigger.  Some medicines and food can trigger asthma.

## 2019-07-11 NOTE — PATIENT INSTRUCTIONS
Colonoscopy will call you..    Consider increasing lipitor back to 40 mg if LDL more than 70    Try taking lipitor in the morning.     Consider Shingrix series (Shingles vaccine) .     Return to clinic 1 year for annual physical.

## 2019-07-11 NOTE — PROGRESS NOTES
"SUBJECTIVE:   Alfreda Baxter is a 74 year old female who presents for Preventive Visit.    73 y/o F here for AFE.  Had central retinal occlusion last year.  Per loop recorder, low burden of paroxysmal atrial fib found and she is now on Rivaroxaban (Xarelto) for secondary stroke prevention...... Other history includes HTN, hyperlipidemia, colon polyp, PFO, gout, DJD knees,  mild intermittent asthma.    Recent labs including BMP, FLP, A1C are normal.  She is concerned about xarelto after seeing some ads on TV.   Discussed w/her that her context involves CVA only, not CVA and h/o CAD/stent.  She understands.   Another concern, is the expense.  She may want to start warfarin for cost.     Right ear bothers her a little (can't equalized pressure).       Are you in the first 12 months of your Medicare coverage?  No    Healthy Habits:     In general, how would you rate your overall health?  Good    Frequency of exercise:  2-3 days/week    Duration of exercise:  30-45 minutes    Do you usually eat at least 4 servings of fruit and vegetables a day, include whole grains    & fiber and avoid regularly eating high fat or \"junk\" foods?  No    Taking medications regularly:  Yes    Medication side effects:  Other    Ability to successfully perform activities of daily living:  No assistance needed    Home Safety:  No safety concerns identified    Hearing Impairment:  Difficulty following a conversation in a noisy restaurant or crowded room, difficulty following dialogue in the theater, difficult to understand a speaker at a public meeting or [employer] service, need to ask people to speak up or repeat themselves and difficulty understanding soft or whispered speech    In the past 6 months, have you been bothered by leaking of urine? Yes    In general, how would you rate your overall mental or emotional health?  Good      PHQ-2 Total Score: 0    Additional concerns today:  Yes    Do you feel safe in your environment? YES    Do you " have a Health Care Directive? Yes: Advance Directive has been received and scanned.      Fall risk       Cognitive Screening   1) Repeat 3 items (Leader, Season, Table)    2) Clock draw: NORMAL  3) 3 item recall: Recalls 3 objects  Results: 3 items recalled: COGNITIVE IMPAIRMENT LESS LIKELY    Mini-CogTM Copyright [employer]. Licensed by the author for use in [facility]; reprinted with permission (willis@[employer]). All rights reserved.      Do you have sleep apnea, excessive snoring or daytime drowsiness?: yes-daytime drowsiness    Reviewed and updated as needed this visit by clinical staff         Reviewed and updated as needed this visit by Provider        Social History     Tobacco Use     Smoking status: Former Smoker     Packs/day: 0.10     Years: 1.00     Pack years: 0.10     Types: Cigarettes     Start date: 1963     Last attempt to quit: 1965     Years since quittin.5     Smokeless tobacco: Never Used   Substance Use Topics     Alcohol use: Yes     Comment: one a month     If you drink alcohol do you typically have >3 drinks per day or >7 drinks per week? No    Alcohol Use 7/10/2019   Prescreen: >3 drinks/day or >7 drinks/week? No   Prescreen: >3 drinks/day or >7 drinks/week? -           Discuss xarelto and lipitor  colonoscopy     Current providers sharing in care for this patient include:   Patient Care Team:  Brandi Sims MD as PCP - General  Brandi Sims MD as Assigned PCP    The following health maintenance items are reviewed in Epic and correct as of today:  Health Maintenance   Topic Date Due     ZOSTER IMMUNIZATION (2 of 3) 2009     ASTHMA CONTROL TEST  2018     FALL RISK ASSESSMENT  2019     MEDICARE ANNUAL WELLNESS VISIT  2019     ASTHMA ACTION PLAN  2019     INFLUENZA VACCINE (1) 2019     MAMMO SCREENING  10/11/2019     COLONOSCOPY  2019     EYE EXAM  2020     ADVANCE CARE PLANNING  10/12/2020     DEXA  2021      LIPID  02/08/2024     DTAP/TDAP/TD IMMUNIZATION (3 - Td) 06/28/2028     PHQ-2  Completed     IPV IMMUNIZATION  Aged Out     MENINGITIS IMMUNIZATION  Aged Out     Lab work is in process  Labs reviewed in EPIC  BP Readings from Last 3 Encounters:   07/11/19 121/73   04/01/19 123/80   02/04/19 130/79    Wt Readings from Last 3 Encounters:   07/11/19 88.5 kg (195 lb)   04/01/19 88.5 kg (195 lb)   02/04/19 88.5 kg (195 lb)                  Patient Active Problem List   Diagnosis     DJD (degenerative joint disease)     Hypertension goal BP (blood pressure) < 140/90     Hyperlipidemia LDL goal <130     Encounter for general adult medical examination with abnormal findings     Lumbar spinal stenosis     Toe pain     Adenomatous colon polyp     Mild persistent asthma     Primary osteoarthritis of both knees     Acute idiopathic gout of left foot     Status post total left knee replacement     Blepharitis, both eyes     Combined forms of age-related cataract, mild, both eyes     Mild intermittent asthma without complication     overweight HCC     Primary insomnia     Acute idiopathic gout involving toe of left foot     Central retinal artery occlusion of left eye     Posterior vitreous detachment of both eyes     Paroxysmal ventricular tachycardia (H)     PFO (patent foramen ovale)     Neovascular glaucoma of left eye, moderate stage     Blind left eye     Paroxysmal A-fib (H)     Long term current use of anticoagulant therapy     Obesity (BMI 35.0-39.9) with comorbidity (H)     Past Surgical History:   Procedure Laterality Date     ABDOMEN SURGERY  1976, 1978, 1995    2 C-sections,  total hysterectomy     APPENDECTOMY      age 7 yrs.     BREAST BIOPSY, RT/LT  02/17/04    left benign     BREAST SURGERY      see above     C TOTAL KNEE ARTHROPLASTY Left 11/2015    at [facility]     COLONOSCOPY  2013    needed q 3 yrs.     COLONOSCOPY WITH CO2 INSUFFLATION N/A 11/21/2016    Procedure: COLONOSCOPY WITH CO2 INSUFFLATION;   Surgeon: Brian Cleaning MD;  Location: [facility] OR     EP COMPREHENSIVE EP STUDY N/A 2018    Procedure: LOOP RECORDER;  Surgeon: Buck Butterfield MD;  Location:  [facility]     GENITOURINARY SURGERY      see above     HYSTERECTOMY, PAP NO LONGER INDICATED       HYSTERECTOMY, POORNIMA      bleeding fibroids     ORTHOPEDIC SURGERY      meniscus repair       Social History     Tobacco Use     Smoking status: Former Smoker     Packs/day: 0.10     Years: 1.00     Pack years: 0.10     Types: Cigarettes     Start date: 1963     Last attempt to quit: 1965     Years since quittin.5     Smokeless tobacco: Never Used   Substance Use Topics     Alcohol use: Not Currently     Comment: one a month     Family History   Problem Relation Age of Onset     Respiratory Father      Glaucoma Father      Asthma Father      Allergies Sister      Eye Disorder Sister      Lipids Sister      Neurologic Disorder Sister      Osteoporosis Sister      Glaucoma Sister      Hypertension Sister      Arthritis Mother      Cancer Mother      Hypertension Mother      Other Cancer Mother      Thyroid Disease Mother      Gastrointestinal Disease Son      Macular Degeneration Sister      Hyperlipidemia Sister      Hypertension Sister      Osteoporosis Sister          Current Outpatient Medications   Medication Sig Dispense Refill     acetaminophen (TYLENOL) 500 MG tablet Take 500-1,000 mg by mouth every 6 hours as needed.       albuterol (PROAIR HFA, PROVENTIL HFA, VENTOLIN HFA) 108 (90 BASE) MCG/ACT inhaler Inhale 2 puffs into the lungs every 6 hours as needed for shortness of breath / dyspnea 1 Inhaler 12     atorvastatin (LIPITOR) 40 MG tablet Take 1 tablet (40 mg) by mouth daily (Patient taking differently: Take 20 mg by mouth daily ) 90 tablet 3     B-COMPLEX OR Take  by mouth daily.       CALCIUM 500 +D OR one daily       latanoprost (XALATAN) 0.005 % ophthalmic solution Place 1 drop Into the left eye At  Bedtime 1 Bottle 11     lisinopril (PRINIVIL/ZESTRIL) 10 MG tablet Take 1 tablet (10 mg) by mouth daily 90 tablet 3     multivitamin (OCUVITE) TABS tablet Take 1 tablet by mouth daily       RaNITidine HCl (ZANTAC 75 PO) Take 75 mg by mouth as needed        rivaroxaban ANTICOAGULANT (XARELTO) 20 MG TABS tablet Take 1 tablet (20 mg) by mouth daily (with dinner) 90 tablet 3     S-Adenosylmethionine (GUILLERMO-E) 400 MG TABS Take 1 capsule by mouth daily        zolpidem (AMBIEN) 5 MG tablet TAKE ONE TABLET BY MOUTH IN THE EVENING AS NEEDED FOR SLEEP 30 tablet 3     diclofenac (VOLTAREN) 1 % GEL topical gel Apply 4 grams to knees or 2 grams to hands four times daily using enclosed dosing card.  Generic okay (Patient not taking: Reported on 7/11/2019) 100 g 11     Allergies   Allergen Reactions     Codeine Anaphylaxis     Niacin      No Clinical Screening - See Comments Other (See Comments)     senisitive to non steroidals  Aleve, ibuprofen celebrex cause bad stomach pains     Oxycodone Hives     Other reaction(s): Unknown  ... With facial swelling     Trazodone      nausea     Nsaids Nausea and Vomiting     Other reaction(s): Abdominal Pain     Recent Labs   Lab Test 06/26/19  0906 02/08/19  0956 12/31/18  1043  06/25/18  0834 06/01/17  0808  03/15/16  1027  09/10/15  1353 12/11/14  0840  12/05/13  0902 12/05/12  0912   A1C 5.6  --   --   --  5.4  --   --   --   --   --   --   --  5.4 5.8   LDL  --  62  --   --  68 66  --  68  --   --  72  --  100 104   HDL  --  58  --   --  51 62  --  55  --   --  55  --  43* 47*   TRIG  --  192*  --   --  218* 188*  --  222*  --   --  138  --  237* 182*   ALT  --   --   --   --   --   --   --  23  --   --  27  --   --  37   CR 0.76  --  0.62  --  0.83 0.77   < >  --    < > 0.87 0.74   < > 0.74 0.79   GFRESTIMATED 76  --  88   < > 67 73   < >  --    < > 64 78   < > 78 72   GFRESTBLACK 88  --  >90   < > 81 89   < >  --    < > 77 >90   GFR Calc     < > >90 88   POTASSIUM 4.9  --  " 3.9  --  4.3 4.2   < >  --    < > 3.9 4.6   < > 4.2 4.2   TSH  --   --   --   --   --   --   --   --   --  0.53  --   --  1.15 0.81    < > = values in this interval not displayed.           Review of Systems   Constitutional: Negative for chills and fever.   HENT: Positive for hearing loss. Negative for congestion, ear pain and sore throat.    Eyes: Negative for pain and visual disturbance.   Respiratory: Negative for cough and shortness of breath.    Cardiovascular: Negative for chest pain, palpitations and peripheral edema.   Gastrointestinal: Positive for heartburn. Negative for abdominal pain, constipation, diarrhea, hematochezia and nausea.   Breasts:  Negative for tenderness, breast mass and discharge.   Genitourinary: Negative for dysuria, frequency, genital sores, hematuria, pelvic pain, urgency, vaginal bleeding and vaginal discharge.   Musculoskeletal: Positive for arthralgias. Negative for joint swelling and myalgias.   Skin: Negative for rash.   Neurological: Negative for dizziness, weakness, headaches and paresthesias.   Psychiatric/Behavioral: Negative for mood changes. The patient is not nervous/anxious.           OBJECTIVE:   /73 (BP Location: Right arm, Patient Position: Sitting, Cuff Size: Adult Large)   Pulse 79   Temp 98.2  F (36.8  C) (Oral)   Ht 1.568 m (5' 1.75\")   Wt 88.5 kg (195 lb)   SpO2 94%   BMI 35.96 kg/m   Estimated body mass index is 35.67 kg/m  as calculated from the following:    Height as of 1/10/19: 1.575 m (5' 2\").    Weight as of 4/1/19: 88.5 kg (195 lb).     Physical Exam  GENERAL APPEARANCE: healthy, alert and no distress  EYES: Eyes grossly normal to inspection, PERRL and conjunctivae and sclerae normal  HENT: ear canals and TM's normal, nose and mouth without ulcers or lesions, oropharynx clear and oral mucous membranes moist  NECK: no adenopathy, no asymmetry, masses, or scars and thyroid normal to palpation  RESP: lungs clear to auscultation - no rales, " rhonchi or wheezes  BREAST: normal without masses, tenderness or nipple discharge and no palpable axillary masses or adenopathy  CV: regular rate and rhythm, normal S1 S2, no S3 or S4, no murmur, click or rub, no peripheral edema and peripheral pulses strong  ABDOMEN: soft, nontender, no hepatosplenomegaly, no masses and bowel sounds normal   (female): normal female external genitalia, normal urethral meatus, vaginal mucosal atrophy noted and normal post-hysterectomy exam without masses.   MS: no musculoskeletal defects are noted and gait is age appropriate without ataxia  MS: L TKA.   SKIN: no suspicious lesions or rashes  NEURO: Normal strength and tone, sensory exam grossly normal, mentation intact and speech normal  PSYCH: mentation appears normal and affect normal/bright    Diagnostic Test Results:  Labs reviewed in Epic  Results for orders placed or performed in visit on 06/26/19   Hemoglobin A1c   Result Value Ref Range    Hemoglobin A1C 5.6 0 - 5.6 %   CBC with platelets   Result Value Ref Range    WBC 7.7 4.0 - 11.0 10e9/L    RBC Count 4.88 3.8 - 5.2 10e12/L    Hemoglobin 14.3 11.7 - 15.7 g/dL    Hematocrit 43.9 35.0 - 47.0 %    MCV 90 78 - 100 fl    MCH 29.3 26.5 - 33.0 pg    MCHC 32.6 31.5 - 36.5 g/dL    RDW 13.6 10.0 - 15.0 %    Platelet Count 220 150 - 450 10e9/L   Basic metabolic panel   Result Value Ref Range    Sodium 138 133 - 144 mmol/L    Potassium 4.9 3.4 - 5.3 mmol/L    Chloride 106 94 - 109 mmol/L    Carbon Dioxide 26 20 - 32 mmol/L    Anion Gap 6 3 - 14 mmol/L    Glucose 109 (H) 70 - 99 mg/dL    Urea Nitrogen 21 7 - 30 mg/dL    Creatinine 0.76 0.52 - 1.04 mg/dL    GFR Estimate 76 >60 mL/min/[1.73_m2]    GFR Estimate If Black 88 >60 mL/min/[1.73_m2]    Calcium 9.8 8.5 - 10.1 mg/dL     FLP pending.     ASSESSMENT / PLAN:       ICD-10-CM    1. Encounter for general adult medical examination with abnormal findings Z00.01    2. Hypertension goal BP (blood pressure) < 140/90 I10 lisinopril  (PRINIVIL/ZESTRIL) 10 MG tablet     OFFICE/OUTPT VISIT,EST,LEVL III   3. Paroxysmal A-fib (H) I48.0    4. Mild intermittent asthma without complication J45.20 Asthma Action Plan (AAP)   5. Morbid obesity (H) E66.01    6. Blind left eye H54.40    7. Hyperlipidemia LDL goal <130 E78.5 Lipid panel reflex to direct LDL Fasting     OFFICE/OUTPT VISIT,EST,LEVL III   8. Primary osteoarthritis involving multiple joints M15.0    9. Long term current use of anticoagulant therapy Z79.01 OFFICE/OUTPT VISIT,EST,LEVL III   10. Primary insomnia F51.01 zolpidem (AMBIEN) 5 MG tablet   11. Special screening for malignant neoplasms, colon Z12.11 GASTROENTEROLOGY ADULT REF PROCEDURE ONLY [facility] [phone]; (Dr Bosch.  )   12. History of colonic polyps Z86.010 GASTROENTEROLOGY ADULT REF PROCEDURE ONLY [facility] [phone]; (Dr Bosch.  )   13. History of central retinal artery occlusion Z86.69 Lipid panel reflex to direct LDL Fasting         Discussion of Xarelto: She is concerned about xarelto after seeing some ads on TV.   Discussed w/her that her context involves CVA only, not CVA and h/o CAD/stent.  She understands.   Another concern, is the expense.  She may want to start warfarin for cost.   She will let me know a month prior.      Due for colonoscopy.      She wants to know if can lower lipitor.  In fact, she has lowered her dose to 1/2 daily (20 mg daily).  Will check FLP today    Discussed right ear.  My exam normal, can try otc fluticasone, allegra.  Avoid sudafed.  She might want to see Dr Lynn, ENT in future.  Watchful waiting for now.     Patient Instructions   Colonoscopy will call you..     Consider increasing lipitor back to 40 mg if LDL more than 70    Try taking lipitor in the morning.   GERD at HS may improve..     Consider Shingrix series (Shingles vaccine) .     Return to clinic 1 year for annual physical.        End of Life Planning:  Patient currently has an advanced directive: Yes.   "Practitioner is supportive of decision.    COUNSELING:  Reviewed preventive health counseling, as reflected in patient instructions       Immunizations    Discussed getting shingrix.         Estimated body mass index is 35.67 kg/m  as calculated from the following:    Height as of 1/10/19: 1.575 m (5' 2\").    Weight as of 4/1/19: 88.5 kg (195 lb).    Weight management plan: she is working on it, is stable and not gaining     reports that she quit smoking about 54 years ago. Her smoking use included cigarettes. She started smoking about 56 years ago. She has a 0.10 pack-year smoking history. She has never used smokeless tobacco.      Appropriate preventive services were discussed with this patient, including applicable screening as appropriate for cardiovascular disease, diabetes, osteopenia/osteoporosis, and glaucoma.  As appropriate for age/gender, discussed screening for colorectal cancer, prostate cancer, breast cancer, and cervical cancer. Checklist reviewing preventive services available has been given to the patient.    Reviewed patients plan of care and provided an AVS. The Basic Care Plan (routine screening as documented in Health Maintenance) for Alfreda meets the Care Plan requirement. This Care Plan has been established and reviewed with the Patient.    Counseling Resources:  ATP IV Guidelines  Pooled Cohorts Equation Calculator  Breast Cancer Risk Calculator  FRAX Risk Assessment  ICSI Preventive Guidelines  Dietary Guidelines for Americans, 2010  USDA's MyPlate  ASA Prophylaxis  Lung CA Screening    Brandi Sims MD  [facility]    Identified Health Risks:  "

## 2019-07-12 ASSESSMENT — ASTHMA QUESTIONNAIRES: ACT_TOTALSCORE: 25

## 2019-07-12 NOTE — RESULT ENCOUNTER NOTE
Alfreda Baxter    Cholesterol looks fine on your current dose of statin.  The elevated triglyceride is mild, and this time may reflect a non fasting state.     Sincerely,     EMILEE MARTÍNEZ M.D.

## 2019-07-26 LAB
MDC_IDC_MSMT_BATTERY_DTM: NORMAL
MDC_IDC_MSMT_BATTERY_STATUS: NORMAL
MDC_IDC_PG_IMPLANT_DTM: NORMAL
MDC_IDC_PG_MFG: NORMAL
MDC_IDC_PG_MODEL: NORMAL
MDC_IDC_PG_SERIAL: NORMAL
MDC_IDC_PG_TYPE: NORMAL
MDC_IDC_SESS_CLINIC_NAME: NORMAL
MDC_IDC_SESS_DTM: NORMAL
MDC_IDC_SESS_TYPE: NORMAL
MDC_IDC_SET_ZONE_DETECTION_INTERVAL: 2000 MS
MDC_IDC_SET_ZONE_DETECTION_INTERVAL: 3000 MS
MDC_IDC_SET_ZONE_DETECTION_INTERVAL: 380 MS
MDC_IDC_SET_ZONE_TYPE: NORMAL
MDC_IDC_STAT_AT_BURDEN_PERCENT: 0.1 %
MDC_IDC_STAT_AT_DTM_END: NORMAL
MDC_IDC_STAT_AT_DTM_START: NORMAL
MDC_IDC_STAT_EPISODE_RECENT_COUNT: 0
MDC_IDC_STAT_EPISODE_RECENT_COUNT: 4
MDC_IDC_STAT_EPISODE_RECENT_COUNT: 46
MDC_IDC_STAT_EPISODE_RECENT_COUNT_DTM_END: NORMAL
MDC_IDC_STAT_EPISODE_RECENT_COUNT_DTM_START: NORMAL
MDC_IDC_STAT_EPISODE_TOTAL_COUNT: 0
MDC_IDC_STAT_EPISODE_TOTAL_COUNT: 2
MDC_IDC_STAT_EPISODE_TOTAL_COUNT: 5
MDC_IDC_STAT_EPISODE_TOTAL_COUNT: 50
MDC_IDC_STAT_EPISODE_TOTAL_COUNT_DTM_END: NORMAL
MDC_IDC_STAT_EPISODE_TOTAL_COUNT_DTM_START: NORMAL
MDC_IDC_STAT_EPISODE_TYPE: NORMAL

## 2019-08-09 ENCOUNTER — TELEPHONE (OUTPATIENT)
Dept: FAMILY MEDICINE | Facility: CLINIC | Age: 74
End: 2019-08-09

## 2019-08-09 NOTE — TELEPHONE ENCOUNTER
I called patient and advised her of [facility]' note, call Monday if not improving.    Patient verbalized understanding of and agreement with plan.    Indigo Mccoy RN  [facility]

## 2019-08-09 NOTE — TELEPHONE ENCOUNTER
Reason for call:  Patient reporting a symptom    Symptom or request: Fever, congestion    Duration (how long have symptoms been present): Today    Have you been treated for this before? No    Additional comments: Patient called and stated that today she started running a fever and she is very congested. Patient is requesting to speak to a nurse to discuss.    Phone Number patient can be reached at:  Home number on file [phone] (home) or Work number on file:  none (work)    Best Time:  ASAP    Can we leave a detailed message on this number:  NO    Call taken on 8/9/2019 at 8:26 AM by Mica Mullins

## 2019-08-09 NOTE — TELEPHONE ENCOUNTER
"Patient was last seen 7/11/19 for routine visit with [facility].    I see she has history of asthma and heart issues (v tach and a-fib).    I called patient, she says she woke Tuesday AM (3 days ago) with tight chest and chest congestion.   She is running low grade temp (), coughing up clear sputum and is talking easily, denies wheezing, says she is using mucinex BID and albuterol for the cough but is worried that she continues to have tight chest and congestion with the low grade temp.       Denies chest pain, numbness, or tingling.   She thinks she might need to take her prednisone 40 mg for 5 days but wanted to check with [facility] first.      No openings in clinic and patient says she \"is not a big believer in Urgent Care\".   She does not necessarily think she needs an antibiotic yet but is worried that she is not feeling better yet as she can usually get over viral colds with inhaler and mucinex.    Routed to [facility] to advise.    Indigo Mccoy RN  [facility]          "

## 2019-08-10 ENCOUNTER — OFFICE VISIT (OUTPATIENT)
Dept: URGENT CARE | Facility: URGENT CARE | Age: 74
End: 2019-08-10
Payer: MEDICARE

## 2019-08-10 ENCOUNTER — ANCILLARY PROCEDURE (OUTPATIENT)
Dept: GENERAL RADIOLOGY | Facility: CLINIC | Age: 74
End: 2019-08-10
Attending: PHYSICIAN ASSISTANT
Payer: MEDICARE

## 2019-08-10 ENCOUNTER — HOSPITAL ENCOUNTER (EMERGENCY)
Facility: CLINIC | Age: 74
Discharge: HOME | End: 2019-08-10
Attending: EMERGENCY MEDICINE | Admitting: EMERGENCY MEDICINE
Payer: MEDICARE

## 2019-08-10 VITALS
SYSTOLIC BLOOD PRESSURE: 135 MMHG | RESPIRATION RATE: 12 BRPM | DIASTOLIC BLOOD PRESSURE: 77 MMHG | HEIGHT: 62 IN | OXYGEN SATURATION: 96 % | BODY MASS INDEX: 35.88 KG/M2 | TEMPERATURE: 97.7 F | WEIGHT: 195 LBS | HEART RATE: 92 BPM

## 2019-08-10 VITALS
SYSTOLIC BLOOD PRESSURE: 125 MMHG | HEART RATE: 100 BPM | OXYGEN SATURATION: 95 % | DIASTOLIC BLOOD PRESSURE: 78 MMHG | TEMPERATURE: 98 F | RESPIRATION RATE: 18 BRPM

## 2019-08-10 DIAGNOSIS — R06.02 SOB (SHORTNESS OF BREATH): ICD-10-CM

## 2019-08-10 DIAGNOSIS — R05.9 COUGH: ICD-10-CM

## 2019-08-10 DIAGNOSIS — R06.00 DYSPNEA, UNSPECIFIED TYPE: ICD-10-CM

## 2019-08-10 DIAGNOSIS — Z79.01 LONG TERM CURRENT USE OF ANTICOAGULANT THERAPY: ICD-10-CM

## 2019-08-10 DIAGNOSIS — J45.31 MILD PERSISTENT ASTHMA WITH ACUTE EXACERBATION: Primary | ICD-10-CM

## 2019-08-10 LAB
ANION GAP SERPL CALCULATED.3IONS-SCNC: 9 MMOL/L (ref 3–14)
BASOPHILS # BLD AUTO: 0 10E9/L (ref 0–0.2)
BASOPHILS NFR BLD AUTO: 0.1 %
BUN SERPL-MCNC: 18 MG/DL (ref 7–30)
CALCIUM SERPL-MCNC: 8.7 MG/DL (ref 8.5–10.1)
CHLORIDE SERPL-SCNC: 104 MMOL/L (ref 94–109)
CO2 SERPL-SCNC: 22 MMOL/L (ref 20–32)
CREAT SERPL-MCNC: 0.67 MG/DL (ref 0.52–1.04)
DIFFERENTIAL METHOD BLD: ABNORMAL
EOSINOPHIL # BLD AUTO: 0 10E9/L (ref 0–0.7)
EOSINOPHIL NFR BLD AUTO: 0 %
ERYTHROCYTE [DISTWIDTH] IN BLOOD BY AUTOMATED COUNT: 13.8 % (ref 10–15)
GFR SERPL CREATININE-BSD FRML MDRD: 86 ML/MIN/{1.73_M2}
GLUCOSE SERPL-MCNC: 189 MG/DL (ref 70–99)
HCT VFR BLD AUTO: 40 % (ref 35–47)
HGB BLD-MCNC: 12.9 G/DL (ref 11.7–15.7)
LYMPHOCYTES # BLD AUTO: 1.8 10E9/L (ref 0.8–5.3)
LYMPHOCYTES NFR BLD AUTO: 9.7 %
MCH RBC QN AUTO: 29.9 PG (ref 26.5–33)
MCHC RBC AUTO-ENTMCNC: 32.3 G/DL (ref 31.5–36.5)
MCV RBC AUTO: 93 FL (ref 78–100)
MONOCYTES # BLD AUTO: 0.9 10E9/L (ref 0–1.3)
MONOCYTES NFR BLD AUTO: 4.6 %
NEUTROPHILS # BLD AUTO: 16 10E9/L (ref 1.6–8.3)
NEUTROPHILS NFR BLD AUTO: 85.6 %
PLATELET # BLD AUTO: 252 10E9/L (ref 150–450)
POTASSIUM SERPL-SCNC: 3.8 MMOL/L (ref 3.4–5.3)
PROCALCITONIN SERPL-MCNC: 0.05 NG/ML
RBC # BLD AUTO: 4.31 10E12/L (ref 3.8–5.2)
SODIUM SERPL-SCNC: 135 MMOL/L (ref 133–144)
TROPONIN I SERPL-MCNC: <0.015 UG/L (ref 0–0.04)
WBC # BLD AUTO: 18.6 10E9/L (ref 4–11)

## 2019-08-10 PROCEDURE — 99214 OFFICE O/P EST MOD 30 MIN: CPT | Mod: 25 | Performed by: PHYSICIAN ASSISTANT

## 2019-08-10 PROCEDURE — 94640 AIRWAY INHALATION TREATMENT: CPT | Performed by: PHYSICIAN ASSISTANT

## 2019-08-10 PROCEDURE — 99284 EMERGENCY DEPT VISIT MOD MDM: CPT | Mod: 25 | Performed by: EMERGENCY MEDICINE

## 2019-08-10 PROCEDURE — 93005 ELECTROCARDIOGRAM TRACING: CPT

## 2019-08-10 PROCEDURE — 93010 ELECTROCARDIOGRAM REPORT: CPT | Mod: Z6 | Performed by: EMERGENCY MEDICINE

## 2019-08-10 PROCEDURE — 99284 EMERGENCY DEPT VISIT MOD MDM: CPT

## 2019-08-10 PROCEDURE — 84145 PROCALCITONIN (PCT): CPT | Performed by: EMERGENCY MEDICINE

## 2019-08-10 PROCEDURE — 80048 BASIC METABOLIC PNL TOTAL CA: CPT | Performed by: EMERGENCY MEDICINE

## 2019-08-10 PROCEDURE — 85025 COMPLETE CBC W/AUTO DIFF WBC: CPT | Performed by: PHYSICIAN ASSISTANT

## 2019-08-10 PROCEDURE — 84484 ASSAY OF TROPONIN QUANT: CPT | Performed by: EMERGENCY MEDICINE

## 2019-08-10 PROCEDURE — 71046 X-RAY EXAM CHEST 2 VIEWS: CPT

## 2019-08-10 PROCEDURE — 36415 COLL VENOUS BLD VENIPUNCTURE: CPT | Performed by: PHYSICIAN ASSISTANT

## 2019-08-10 RX ORDER — IPRATROPIUM BROMIDE AND ALBUTEROL SULFATE 2.5; .5 MG/3ML; MG/3ML
3 SOLUTION RESPIRATORY (INHALATION) ONCE
Status: COMPLETED | OUTPATIENT
Start: 2019-08-10 | End: 2019-08-10

## 2019-08-10 RX ORDER — DOXYCYCLINE 100 MG/1
100 CAPSULE ORAL 2 TIMES DAILY
Qty: 20 CAPSULE | Refills: 0 | Status: SHIPPED | OUTPATIENT
Start: 2019-08-10 | End: 2019-08-13

## 2019-08-10 RX ORDER — ALBUTEROL SULFATE 90 UG/1
2 AEROSOL, METERED RESPIRATORY (INHALATION) EVERY 6 HOURS PRN
Qty: 1 INHALER | Refills: 0 | Status: SHIPPED | OUTPATIENT
Start: 2019-08-10 | End: 2019-08-13

## 2019-08-10 RX ADMIN — IPRATROPIUM BROMIDE AND ALBUTEROL SULFATE 3 ML: 2.5; .5 SOLUTION RESPIRATORY (INHALATION) at 13:47

## 2019-08-10 ASSESSMENT — ENCOUNTER SYMPTOMS
EYES NEGATIVE: 1
ALLERGIC/IMMUNOLOGIC NEGATIVE: 1
CARDIOVASCULAR NEGATIVE: 1
LIGHT-HEADEDNESS: 0
VOMITING: 0
WEAKNESS: 0
FEVER: 0
DIZZINESS: 0
HEADACHES: 0
SHORTNESS OF BREATH: 1
MUSCULOSKELETAL NEGATIVE: 1
ARTHRALGIAS: 0
RHINORRHEA: 0
BRUISES/BLEEDS EASILY: 0
NECK PAIN: 0
NAUSEA: 0
ENDOCRINE NEGATIVE: 1
NAUSEA: 0
BACK PAIN: 0
SHORTNESS OF BREATH: 1
HEMATOLOGIC/LYMPHATIC NEGATIVE: 1
VOMITING: 0
FATIGUE: 1
COUGH: 1
CHILLS: 0
WOUND: 0
JOINT SWELLING: 0
SORE THROAT: 0
FEVER: 1
WHEEZING: 1
PALPITATIONS: 0
DIARRHEA: 0
MYALGIAS: 0
COUGH: 1
NECK STIFFNESS: 0

## 2019-08-10 ASSESSMENT — MIFFLIN-ST. JEOR: SCORE: 1328.79

## 2019-08-10 NOTE — PROGRESS NOTES
"Chief Complaint:     Chief Complaint   Patient presents with     Breathing Problem     plus pneumonia       HPI: Alfreda Baxter is an 74 year old female who presents with dry cough, nasal congestion, SOB and wheezing. It began  4 day(s) ago and has gradually worsening.  Cough is nonproductive, occasional There is no chest pain.  Patient did start taking prednisone yesterday ad this has helped.    Recent travel?  no.    Patient has a complicated medical Hx \"see problem list below.\"    ROS:     Review of Systems   Constitutional: Negative for chills and fever.   HENT: Positive for congestion. Negative for ear pain, rhinorrhea and sore throat.    Eyes: Negative.    Respiratory: Positive for cough, shortness of breath and wheezing.    Cardiovascular: Negative.  Negative for chest pain and palpitations.   Gastrointestinal: Negative for diarrhea, nausea and vomiting.   Endocrine: Negative.    Genitourinary: Negative.    Musculoskeletal: Negative.  Negative for arthralgias, back pain, joint swelling, myalgias, neck pain and neck stiffness.   Skin: Negative.  Negative for rash and wound.   Allergic/Immunologic: Negative.  Negative for immunocompromised state.   Neurological: Negative for dizziness, weakness, light-headedness and headaches.   Hematological: Negative.  Does not bruise/bleed easily.        Respiratory History  occasional episodes of bronchitis, pneumonia and asthma       Family History   Family History   Problem Relation Age of Onset     Respiratory Father      Glaucoma Father      Asthma Father      Allergies Sister      Eye Disorder Sister      Lipids Sister      Neurologic Disorder Sister      Osteoporosis Sister      Glaucoma Sister      Hypertension Sister      Arthritis Mother      Cancer Mother      Hypertension Mother      Other Cancer Mother      Thyroid Disease Mother      Gastrointestinal Disease Son      Macular Degeneration Sister      Hyperlipidemia Sister      Hypertension Sister      " Osteoporosis Sister         Problem history  Patient Active Problem List   Diagnosis     DJD (degenerative joint disease)     Hypertension goal BP (blood pressure) < 140/90     Hyperlipidemia LDL goal <130     Encounter for general adult medical examination with abnormal findings     Lumbar spinal stenosis     Toe pain     Adenomatous colon polyp     Mild persistent asthma     Primary osteoarthritis of both knees     Acute idiopathic gout of left foot     Status post total left knee replacement     Blepharitis, both eyes     Combined forms of age-related cataract, mild, both eyes     Mild intermittent asthma without complication     overweight HCC     Primary insomnia     Acute idiopathic gout involving toe of left foot     Central retinal artery occlusion of left eye     Posterior vitreous detachment of both eyes     Paroxysmal ventricular tachycardia (H)     PFO (patent foramen ovale)     Neovascular glaucoma of left eye, moderate stage     Blind left eye     Paroxysmal A-fib (H)     Long term current use of anticoagulant therapy     Obesity (BMI 35.0-39.9) with comorbidity (H)        Allergies  Allergies   Allergen Reactions     Codeine Anaphylaxis     Niacin      No Clinical Screening - See Comments Other (See Comments)     senisitive to non steroidals  Aleve, ibuprofen celebrex cause bad stomach pains     Oxycodone Hives     Other reaction(s): Unknown  ... With facial swelling     Trazodone      nausea     Nsaids Nausea and Vomiting     Other reaction(s): Abdominal Pain        Social History  Social History     Socioeconomic History     Marital status:      Spouse name: Not on file     Number of children: Not on file     Years of education: Not on file     Highest education level: Not on file   Occupational History     Not on file   Social Needs     Financial resource strain: Not on file     Food insecurity:     Worry: Not on file     Inability: Not on file     Transportation needs:     Medical: Not on  file     Non-medical: Not on file   Tobacco Use     Smoking status: Former Smoker     Packs/day: 0.10     Years: 1.00     Pack years: 0.10     Types: Cigarettes     Start date: 1963     Last attempt to quit: 1965     Years since quittin.6     Smokeless tobacco: Never Used   Substance and Sexual Activity     Alcohol use: Not Currently     Comment: one a month     Drug use: No     Sexual activity: Yes     Partners: Male     Birth control/protection: None   Lifestyle     Physical activity:     Days per week: Not on file     Minutes per session: Not on file     Stress: Not on file   Relationships     Social connections:     Talks on phone: Not on file     Gets together: Not on file     Attends Church service: Not on file     Active member of club or organization: Not on file     Attends meetings of clubs or organizations: Not on file     Relationship status: Not on file     Intimate partner violence:     Fear of current or ex partner: Not on file     Emotionally abused: Not on file     Physically abused: Not on file     Forced sexual activity: Not on file   Other Topics Concern     Parent/sibling w/ CABG, MI or angioplasty before 65F 55M? No   Social History Narrative    , lives with healthy .  2 grown children in area (one son lives with patient: he has Ulcerative Colitis and is on Remicade).          Lives in house.          Current Meds    Current Outpatient Medications:      acetaminophen (TYLENOL) 500 MG tablet, Take 500-1,000 mg by mouth every 6 hours as needed., Disp: , Rfl:      albuterol (PROAIR HFA, PROVENTIL HFA, VENTOLIN HFA) 108 (90 BASE) MCG/ACT inhaler, Inhale 2 puffs into the lungs every 6 hours as needed for shortness of breath / dyspnea, Disp: 1 Inhaler, Rfl: 12     atorvastatin (LIPITOR) 40 MG tablet, Take 1 tablet (40 mg) by mouth daily (Patient taking differently: Take 20 mg by mouth daily ), Disp: 90 tablet, Rfl: 3     B-COMPLEX OR, Take  by mouth daily., Disp: , Rfl:       CALCIUM 500 +D OR, one daily, Disp: , Rfl:      diclofenac (VOLTAREN) 1 % GEL topical gel, Apply 4 grams to knees or 2 grams to hands four times daily using enclosed dosing card. Generic okay, Disp: 100 g, Rfl: 11     latanoprost (XALATAN) 0.005 % ophthalmic solution, Place 1 drop Into the left eye At Bedtime, Disp: 1 Bottle, Rfl: 11     lisinopril (PRINIVIL/ZESTRIL) 10 MG tablet, Take 1 tablet (10 mg) by mouth daily, Disp: 90 tablet, Rfl: 3     multivitamin (OCUVITE) TABS tablet, Take 1 tablet by mouth daily, Disp: , Rfl:      PREDNISONE PO, Take 40 mg by mouth, Disp: , Rfl:      RaNITidine HCl (ZANTAC 75 PO), Take 75 mg by mouth as needed , Disp: , Rfl:      rivaroxaban ANTICOAGULANT (XARELTO) 20 MG TABS tablet, Take 1 tablet (20 mg) by mouth daily (with dinner), Disp: 90 tablet, Rfl: 3     S-Adenosylmethionine (GUILLERMO-E) 400 MG TABS, Take 1 capsule by mouth daily , Disp: , Rfl:      zolpidem (AMBIEN) 5 MG tablet, TAKE ONE TABLET BY MOUTH IN THE EVENING AS NEEDED FOR SLEEP, Disp: 30 tablet, Rfl: 3  No current facility-administered medications for this visit.         OBJECTIVE     Vital signs reviewed by Benjamin Juarez  /78 (BP Location: Left arm, Patient Position: Chair, Cuff Size: Adult Regular)   Pulse 100   Temp 98  F (36.7  C) (Oral)   Resp 18   SpO2 95%      Physical Exam   Constitutional: She is oriented to person, place, and time. She appears well-developed and well-nourished. She is cooperative.  Non-toxic appearance. She does not have a sickly appearance. She does not appear ill. No distress.   HENT:   Head: Normocephalic and atraumatic.   Right Ear: Hearing, tympanic membrane, external ear and ear canal normal. Tympanic membrane is not perforated, not erythematous, not retracted and not bulging.   Left Ear: Hearing, tympanic membrane, external ear and ear canal normal. Tympanic membrane is not perforated, not erythematous, not retracted and not bulging.   Nose: No mucosal edema or  rhinorrhea. Right sinus exhibits no maxillary sinus tenderness and no frontal sinus tenderness. Left sinus exhibits no maxillary sinus tenderness and no frontal sinus tenderness.   Mouth/Throat: Mucous membranes are normal. No oropharyngeal exudate, posterior oropharyngeal edema, posterior oropharyngeal erythema or tonsillar abscesses. Tonsils are 0 on the right. Tonsils are 0 on the left. No tonsillar exudate.   Eyes: Pupils are equal, round, and reactive to light. EOM are normal. Right eye exhibits no discharge. Left eye exhibits no discharge.   Neck: Normal range of motion. Neck supple.   Cardiovascular: Normal rate, regular rhythm, normal heart sounds and intact distal pulses. Exam reveals no gallop and no friction rub.   No murmur heard.  Pulmonary/Chest: Effort normal. No respiratory distress. She has no decreased breath sounds. She has wheezes. She has no rhonchi. She has no rales. She exhibits no tenderness.   Abdominal: Soft. Bowel sounds are normal. She exhibits no distension and no mass. There is no tenderness. There is no guarding.   Lymphadenopathy:     She has no cervical adenopathy.   Neurological: She is alert and oriented to person, place, and time. She has normal reflexes. No cranial nerve deficit.   Skin: Skin is warm and dry. She is not diaphoretic.   Psychiatric: She has a normal mood and affect. Her behavior is normal. Judgment and thought content normal.   Nursing note and vitals reviewed.        Labs:     Results for orders placed or performed in visit on 08/10/19   XR Chest 2 Views    Narrative    CHEST TWO VIEWS 8/10/2019 1:43 PM     HISTORY: Worsening shortness of breath and wheezing for 4 days.   History of asthma.  Rule out pneumonia. Mild persistent asthma with  acute exacerbation. SOB (shortness of breath).    COMPARISON: 9/6/2018    FINDINGS: Heart size and pulmonary vascularity are within normal  limits. The lungs are clear. No pneumothorax or pleural effusion.     Cardiac event  monitoring device noted.      Impression    IMPRESSION: No radiographic evidence of acute chest abnormality.     WOLFGANG DIA MD       Medical Decision Making:    Differential Diagnosis:  URI Adult/Peds:  Asthma exacerbation, Bronchitis-viral, Pneumonia and Viral upper respiratory illness        ASSESSMENT    1. Mild persistent asthma with acute exacerbation    2. SOB (shortness of breath)    3. Long term current use of anticoagulant therapy        PLAN    Patient presents with 4 day(s) dry cough, nasal congestion, SOB and wheezing.  Patient is in no acute distress.  Temp is 98 in clinic today.  Lung sounds have wheezing throughout and O2 sats at 95% on RA.  Imaging to rule out pneumonia ordered.  CXR was negative for any acute infiltrates or consolidations per my read.   Patient given duoneb neb treatment in clinic today.  Patient verbalized moderate relief of symptoms.  Lung sounds were clear post treatment.  CBC shows leukocytosis with WBC count of 18.6 and neutrophil count of 16  Patient has a complicated medical Hx, and continues to have SOB in clinic today.  Patient instructed to go to the ED now for further evaluation.  Patient declined EMS transport and will have her son take her now.  She will follow up with her PCP early next week for re-evaluation.  Patient verbalized understanding and agreed with this plan.  Patient discharged in stable condition.         Benjamin Juarez  8/10/2019, 1:25 PM

## 2019-08-10 NOTE — PROGRESS NOTES
The following nebulizer treatment was given:     MEDICATION: Duoneb  : [employer]  LOT #: [id number]  EXPIRATION DATE:  01/21  NDC # 9567-4472-99     Nebulizer Start Time:  0147  Nebulizer Stop Time:  0152  See Vital Signs Flowsheet      Clinic Administered Medication Documentation    Inhalable/Nebs Medication Documentation    Patient was given Ipratropium-Albuterol Neb. Prior to medication administration, verified patients identity using patient s name and date of birth. Please see MAR and medication order for additional information.     Adriana Lawson, CMA

## 2019-08-10 NOTE — ED AVS SNAPSHOT
[facility], [place], Emergency Department  [address] [address]  Phone:  [phone]                                    Alfreda Baxter   MRN: [id number]    Department:  [facility], Emergency Department   Date of Visit:  8/10/2019           After Visit Summary Signature Page    I have received my discharge instructions, and my questions have been answered. I have discussed any challenges I see with this plan with the nurse or doctor.    ..........................................................................................................................................  Patient/Patient Representative Signature      ..........................................................................................................................................  Patient Representative Print Name and Relationship to Patient    ..................................................               ................................................  Date                                   Time    ..........................................................................................................................................  Reviewed by Signature/Title    ...................................................              ..............................................  Date                                               Time          22EPIC Rev 08/18

## 2019-08-10 NOTE — ED TRIAGE NOTES
"Triage Assessment & Note:    /63   Pulse 85   Temp 97.7  F (36.5  C) (Oral)   Resp 12   Ht 1.568 m (5' 1.75\")   Wt 88.5 kg (195 lb)   SpO2 96%   BMI 35.96 kg/m        Patient presents with: Pt comes to triage from clinic with reports of SOB and elevated white count. Chest xray done at clinic. No reports of fever or CP.     Home Treatments/Remedies: Home medication    Febrile / Afebrile: afbrile    Duration of C/o: 3 days    Izzy Calderon RN  August 10, 2019        "

## 2019-08-10 NOTE — DISCHARGE INSTRUCTIONS
Please make an appointment to follow up with Your Primary Care Provider in 2-7 days.  Use albuterol inhaler as needed for shortness of breath.  Take doxycycline and stay out of the sun when taking this medication.  Return for worsening symptoms or other concerns.

## 2019-08-10 NOTE — ED PROVIDER NOTES
"  History     Chief Complaint   Patient presents with     Shortness of Breath     Abnormal Labs     elevated white     HPI  Alfreda Baxter is a 74 year old female with a history of asthma, HTN paroxysmal atrial fibrillation on Xarelto, with a history of central retinal occlusion who presents to the ED with complaint of shortness of breath and elevated white count to 18.6.  The patient presents from [place] urgent care due to her white count. She went to Urgent Care because of shortness of breath. She reports a cough, fatigue, and \"generally feeling awful\". She reports minimal fever of 99.8F-100.3F.Her symptoms began 4 days ago. She also had a chest xray which was unremarkable. She also did a duoneb in Urgent Care. The patient has been taking Tylenol today. The patient notes she recently started taking prednisone 40 mg the last 2 days. She notes she has no vision in her left eye due to a stroke a year ago. She reports some chest soreness from coughing. She denies nausea, vomiting, chest pain. She denies sick contacts. She is a nonsmoker.     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    I have reviewed the Medications, Allergies, Past Medical and Surgical History, and Social History in the [facility] system.  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     BREAST BIOPSY, RT/LT  02/17/04    left benign "     BREAST SURGERY      see above     C TOTAL KNEE ARTHROPLASTY Left 2015    at [facility]     COLONOSCOPY  2013    needed q 3 yrs.     COLONOSCOPY WITH CO2 INSUFFLATION N/A 2016    Procedure: COLONOSCOPY WITH CO2 INSUFFLATION;  Surgeon: Brian Cleaning MD;  Location:  OR     EP COMPREHENSIVE EP STUDY N/A 2018    Procedure: LOOP RECORDER;  Surgeon: Buck Butterfield MD;  [facility]     GENITOURINARY SURGERY      see above     HYSTERECTOMY, PAP NO LONGER INDICATED       HYSTERECTOMY, POORNIMA      bleeding fibroids     ORTHOPEDIC SURGERY  2009    meniscus repair       Family History   Problem Relation Age of Onset     Respiratory Father      Glaucoma Father      Asthma Father      Allergies Sister      Eye Disorder Sister      Lipids Sister      Neurologic Disorder Sister      Osteoporosis Sister      Glaucoma Sister      Hypertension Sister      Arthritis Mother      Cancer Mother      Hypertension Mother      Other Cancer Mother      Thyroid Disease Mother      Gastrointestinal Disease Son      Macular Degeneration Sister      Hyperlipidemia Sister      Hypertension Sister      Osteoporosis Sister        Social History     Tobacco Use     Smoking status: Former Smoker     Packs/day: 0.10     Years: 1.00     Pack years: 0.10     Types: Cigarettes     Start date: 1963     Last attempt to quit: 1965     Years since quittin.6     Smokeless tobacco: Never Used   Substance Use Topics     Alcohol use: Not Currently     Comment: one a month       No current facility-administered medications for this encounter.      Current Outpatient Medications   Medication     albuterol (PROAIR HFA/PROVENTIL HFA/VENTOLIN HFA) 108 (90 Base) MCG/ACT inhaler     doxycycline hyclate (VIBRAMYCIN) 100 MG capsule     acetaminophen (TYLENOL) 500 MG tablet     albuterol (PROAIR HFA, PROVENTIL HFA, VENTOLIN HFA) 108 (90 BASE) MCG/ACT inhaler     atorvastatin (LIPITOR) 40 MG tablet  "    B-COMPLEX OR     CALCIUM 500 +D OR     diclofenac (VOLTAREN) 1 % GEL topical gel     latanoprost (XALATAN) 0.005 % ophthalmic solution     lisinopril (PRINIVIL/ZESTRIL) 10 MG tablet     multivitamin (OCUVITE) TABS tablet     PREDNISONE PO     RaNITidine HCl (ZANTAC 75 PO)     rivaroxaban ANTICOAGULANT (XARELTO) 20 MG TABS tablet     S-Adenosylmethionine (GUILLERMO-E) 400 MG TABS     zolpidem (AMBIEN) 5 MG tablet        Allergies   Allergen Reactions     Codeine Anaphylaxis     Niacin      No Clinical Screening - See Comments Other (See Comments)     senisitive to non steroidals  Aleve, ibuprofen celebrex cause bad stomach pains     Oxycodone Hives     Other reaction(s): Unknown  ... With facial swelling     Trazodone      nausea     Nsaids Nausea and Vomiting     Other reaction(s): Abdominal Pain       Review of Systems   Constitutional: Positive for fatigue and fever.   Respiratory: Positive for cough and shortness of breath.    Cardiovascular: Negative for chest pain.   Gastrointestinal: Negative for nausea and vomiting.   All other systems reviewed and are negative.      Physical Exam   BP: 137/63  Pulse: 85  Temp: 97.7  F (36.5  C)  Resp: 12  Height: 156.8 cm (5' 1.75\")  Weight: 88.5 kg (195 lb)  SpO2: 96 %      Physical Exam  Physical Exam   Constitutional:   well nourished, well developed, resting comfortably   HENT:   Head: Normocephalic and atraumatic.   Eyes: Conjunctivae are normal. Pupils are equal, round, and reactive to light.    pharynx has no erythema or exudate, mucous membranes are moist  Neck:   no adenopathy, no bony tenderness  Cardiovascular: regular rate and rhythm without murmurs or gallops  Pulmonary/Chest: bibasilar crackles with diffuse wheezes, No respiratory distress.  GI: Soft with good bowel sounds.  Non-tender, non-distended, with no guarding, no rebound, no peritoneal signs.   Back:  No bony or CVA tenderness   Musculoskeletal:  no edema   Skin: Skin is warm and dry. No rash noted. "   Neurological: alert and oriented to person, place, and time. Nonfocal exam  Psychiatric:  normal mood and affect.    ED Course     ED Course as of Aug 10 1715   Sat Aug 10, 2019   1715 Procalcitonin     Procedures             EKG Interpretation:      Interpreted by Charlette Yost  Time reviewed: 1600 pm  Symptoms at time of EKG: see hpi   Rhythm: normal sinus   Rate: normal  Axis: normal  Ectopy: none  Conduction: normal  ST Segments/ T Waves: No ST-T wave changes  Q Waves: none  Comparison to prior: Unchanged from 11/4/2015: NSR rate of 72 bpm with no acute ischemic changes    Clinical Impression: Normal sinus rhythm, rate of 92 bpm with PACs          Critical Care time:  none          Results for KIMBERLY STARKEY (MRN [id number]) as of 8/10/2019 15:40   Ref. Range 8/10/2019 14:10   WBC Latest Ref Range: 4.0 - 11.0 10e9/L 18.6 (H)   Hemoglobin Latest Ref Range: 11.7 - 15.7 g/dL 12.9   Hematocrit Latest Ref Range: 35.0 - 47.0 % 40.0   Platelet Count Latest Ref Range: 150 - 450 10e9/L 252   RBC Count Latest Ref Range: 3.8 - 5.2 10e12/L 4.31   MCV Latest Ref Range: 78 - 100 fl 93   MCH Latest Ref Range: 26.5 - 33.0 pg 29.9   MCHC Latest Ref Range: 31.5 - 36.5 g/dL 32.3   RDW Latest Ref Range: 10.0 - 15.0 % 13.8   Diff Method Unknown Automated Method   % Neutrophils Latest Units: % 85.6   % Lymphocytes Latest Units: % 9.7   % Monocytes Latest Units: % 4.6   % Eosinophils Latest Units: % 0.0   % Basophils Latest Units: % 0.1   Absolute Neutrophil Latest Ref Range: 1.6 - 8.3 10e9/L 16.0 (H)   Absolute Lymphocytes Latest Ref Range: 0.8 - 5.3 10e9/L 1.8   Absolute Monocytes Latest Ref Range: 0.0 - 1.3 10e9/L 0.9   Absolute Eosinophils Latest Ref Range: 0.0 - 0.7 10e9/L 0.0   Absolute Basophils Latest Ref Range: 0.0 - 0.2 10e9/L 0.0       Labs Ordered and Resulted from Time of ED Arrival Up to the Time of Departure from the ED   BASIC METABOLIC PANEL - Abnormal; Notable for the following components:       Result Value     Glucose 189 (*)     All other components within normal limits   TROPONIN I   PROCALCITONIN     Results for orders placed or performed during the hospital encounter of 08/10/19   Troponin I   Result Value Ref Range    Troponin I ES <0.015 0.000 - 0.045 ug/L   Procalcitonin   Result Value Ref Range    Procalcitonin 0.05 ng/ml   Basic metabolic panel   Result Value Ref Range    Sodium 135 133 - 144 mmol/L    Potassium 3.8 3.4 - 5.3 mmol/L    Chloride 104 94 - 109 mmol/L    Carbon Dioxide 22 20 - 32 mmol/L    Anion Gap 9 3 - 14 mmol/L    Glucose 189 (H) 70 - 99 mg/dL    Urea Nitrogen 18 7 - 30 mg/dL    Creatinine 0.67 0.52 - 1.04 mg/dL    GFR Estimate 86 >60 mL/min/[1.73_m2]    GFR Estimate If Black >90 >60 mL/min/[1.73_m2]    Calcium 8.7 8.5 - 10.1 mg/dL   EKG 12-lead, tracing only   Result Value Ref Range    Interpretation ECG Click View Image link to view waveform and result            Assessments & Plan (with Medical Decision Making)       I have reviewed the nursing notes.  Emergency Department course:  The patient was seen and examined at 1540 pm.   EKG shows normal sinus rhythm, rate of 92 bpm with PACs and no acute ischemic changes.  Laboratory studies from urgent care are noted above and show an WBC of 18.6, hemoglobin of 12.9 and platelet count of 252.   Chest x-ray done today at the urgent care shows FINDINGS: Heart size and pulmonary vascularity are within normal  limits. The lungs are clear. No pneumothorax or pleural effusion.    Cardiac event monitoring device noted.  IMPRESSION: No radiographic evidence of acute chest abnormality.     Laboratory studies done in the ED today show hyperglycemia, with a glucose of 189.  Troponin I is less than 0.015.  Procalcitonin is 0.05.     The patient has leukocytosis and hyperglycemia.  This may be in part due to her taking steroids for the past couple of days.  I offered her an observation admission for nebulizer treatment and further evaluation.  She really does not  want to come into the hospital.    The patient is a 74-year-old female that presents with shortness of breath and cough of unclear etiology.  She does have a history of asthma and her lungs sound wheezy.  She does have an elevated WBC and glucose but this may be secondary to steroids.  Chest x-ray is unremarkable.  I believe she is safe to be discharged home with close outpatient follow-up.  I prescribed an albuterol inhaler (patient states hers is out of date) as well as doxycycline for 10 days. Patient was instructed to stay out of the sun when taking the antibiotic.  She should follow-up with her regular physician for recheck within 1 week.  I counseled the patient in my usual and standard fashion for dyspnea and reasons to return to the Emergency Department.      I have reviewed the findings, diagnosis, plan and need for follow up with the patient.    New Prescriptions    ALBUTEROL (PROAIR HFA/PROVENTIL HFA/VENTOLIN HFA) 108 (90 BASE) MCG/ACT INHALER    Inhale 2 puffs into the lungs every 6 hours as needed for shortness of breath / dyspnea or wheezing    DOXYCYCLINE HYCLATE (VIBRAMYCIN) 100 MG CAPSULE    Take 1 capsule (100 mg) by mouth 2 times daily for 10 days       Final diagnoses:   Dyspnea, unspecified type   Cough - rule out bronchitis   I, Eleni Duke, am serving as a trained medical scribe to document services personally performed by Charlette Yost MD, based on the provider's statements to me.   ICharlette MD, was physically present and have reviewed and verified the accuracy of this note documented by Eleni Duke.    This note was created in part by the use of Dragon voice recognition dictation system. Inadvertent grammatical errors and typographical errors may still exist.  Charlette Yost MD    8/10/2019   [facility], [place], EMERGENCY DEPARTMENT     Charlette Yost MD  08/10/19 [id number]

## 2019-08-12 LAB — INTERPRETATION ECG - MUSE: NORMAL

## 2019-08-13 ENCOUNTER — OFFICE VISIT (OUTPATIENT)
Dept: FAMILY MEDICINE | Facility: CLINIC | Age: 74
End: 2019-08-13
Payer: MEDICARE

## 2019-08-13 VITALS
BODY MASS INDEX: 36.51 KG/M2 | DIASTOLIC BLOOD PRESSURE: 72 MMHG | OXYGEN SATURATION: 94 % | HEART RATE: 78 BPM | SYSTOLIC BLOOD PRESSURE: 131 MMHG | WEIGHT: 198 LBS | TEMPERATURE: 98.3 F

## 2019-08-13 DIAGNOSIS — D72.829 LEUKOCYTOSIS, UNSPECIFIED TYPE: ICD-10-CM

## 2019-08-13 DIAGNOSIS — J45.31 MILD PERSISTENT ASTHMA WITH ACUTE EXACERBATION: ICD-10-CM

## 2019-08-13 DIAGNOSIS — E78.5 HYPERLIPIDEMIA LDL GOAL <130: ICD-10-CM

## 2019-08-13 DIAGNOSIS — Z79.52 ON PREDNISONE THERAPY: ICD-10-CM

## 2019-08-13 DIAGNOSIS — B34.9 VIRAL INFECTION: Primary | ICD-10-CM

## 2019-08-13 DIAGNOSIS — Z79.01 ANTICOAGULATED: ICD-10-CM

## 2019-08-13 PROCEDURE — 99214 OFFICE O/P EST MOD 30 MIN: CPT | Performed by: INTERNAL MEDICINE

## 2019-08-13 RX ORDER — ATORVASTATIN CALCIUM 40 MG/1
40 TABLET, FILM COATED ORAL DAILY
Qty: 90 TABLET | Refills: 3 | Status: SHIPPED | OUTPATIENT
Start: 2019-08-13 | End: 2020-01-21

## 2019-08-13 RX ORDER — ALBUTEROL SULFATE 90 UG/1
2 AEROSOL, METERED RESPIRATORY (INHALATION) EVERY 6 HOURS PRN
Qty: 1 INHALER | Refills: 3 | Status: SHIPPED | OUTPATIENT
Start: 2019-08-13 | End: 2022-08-29

## 2019-08-13 NOTE — PATIENT INSTRUCTIONS
Recheck CBC in future (off prednisone)      Use Symbicort for month of August/September      Let me know if symptoms don't improve.

## 2019-10-14 ENCOUNTER — ANCILLARY PROCEDURE (OUTPATIENT)
Dept: CARDIOLOGY | Facility: CLINIC | Age: 74
End: 2019-10-14
Attending: INTERNAL MEDICINE
Payer: MEDICARE

## 2019-10-14 DIAGNOSIS — I47.29 PAROXYSMAL VENTRICULAR TACHYCARDIA (H): ICD-10-CM

## 2019-10-14 PROCEDURE — 93298 REM INTERROG DEV EVAL SCRMS: CPT | Performed by: INTERNAL MEDICINE

## 2019-10-14 PROCEDURE — 93299 CARDIAC DEVICE CHECK - REMOTE: CPT | Mod: ZF

## 2019-10-23 ENCOUNTER — TRANSFERRED RECORDS (OUTPATIENT)
Dept: HEALTH INFORMATION MANAGEMENT | Facility: CLINIC | Age: 74
End: 2019-10-23

## 2019-10-23 LAB
MDC_IDC_EPISODE_DTM: NORMAL
MDC_IDC_EPISODE_DURATION: 120 S
MDC_IDC_EPISODE_DURATION: 360 S
MDC_IDC_EPISODE_DURATION: 360 S
MDC_IDC_EPISODE_ID: 59
MDC_IDC_EPISODE_ID: 60
MDC_IDC_EPISODE_ID: 61
MDC_IDC_EPISODE_ID: 62
MDC_IDC_EPISODE_ID: 63
MDC_IDC_EPISODE_ID: 64
MDC_IDC_EPISODE_TYPE: NORMAL
MDC_IDC_MSMT_BATTERY_STATUS: NORMAL
MDC_IDC_MSMT_BATTERY_STATUS: NORMAL
MDC_IDC_PG_IMPLANT_DTM: NORMAL
MDC_IDC_PG_IMPLANT_DTM: NORMAL
MDC_IDC_PG_MFG: NORMAL
MDC_IDC_PG_MFG: NORMAL
MDC_IDC_PG_MODEL: NORMAL
MDC_IDC_PG_MODEL: NORMAL
MDC_IDC_PG_SERIAL: NORMAL
MDC_IDC_PG_SERIAL: NORMAL
MDC_IDC_PG_TYPE: NORMAL
MDC_IDC_PG_TYPE: NORMAL
MDC_IDC_SESS_CLINIC_NAME: NORMAL
MDC_IDC_SESS_CLINIC_NAME: NORMAL
MDC_IDC_SESS_DTM: NORMAL
MDC_IDC_SESS_DTM: NORMAL
MDC_IDC_SESS_TYPE: NORMAL
MDC_IDC_SESS_TYPE: NORMAL
MDC_IDC_SET_ZONE_DETECTION_INTERVAL: 2000 MS
MDC_IDC_SET_ZONE_DETECTION_INTERVAL: 2000 MS
MDC_IDC_SET_ZONE_DETECTION_INTERVAL: 3000 MS
MDC_IDC_SET_ZONE_DETECTION_INTERVAL: 3000 MS
MDC_IDC_SET_ZONE_DETECTION_INTERVAL: 380 MS
MDC_IDC_SET_ZONE_DETECTION_INTERVAL: 380 MS
MDC_IDC_SET_ZONE_TYPE: NORMAL
MDC_IDC_STAT_AT_BURDEN_PERCENT: 0 %
MDC_IDC_STAT_AT_BURDEN_PERCENT: 0 %
MDC_IDC_STAT_AT_DTM_END: NORMAL
MDC_IDC_STAT_AT_DTM_END: NORMAL
MDC_IDC_STAT_AT_DTM_START: NORMAL
MDC_IDC_STAT_AT_DTM_START: NORMAL
MDC_IDC_STAT_EPISODE_RECENT_COUNT: 0
MDC_IDC_STAT_EPISODE_RECENT_COUNT: 1
MDC_IDC_STAT_EPISODE_RECENT_COUNT: 10
MDC_IDC_STAT_EPISODE_RECENT_COUNT: 2
MDC_IDC_STAT_EPISODE_RECENT_COUNT_DTM_END: NORMAL
MDC_IDC_STAT_EPISODE_RECENT_COUNT_DTM_START: NORMAL
MDC_IDC_STAT_EPISODE_TOTAL_COUNT: 0
MDC_IDC_STAT_EPISODE_TOTAL_COUNT: 1
MDC_IDC_STAT_EPISODE_TOTAL_COUNT: 2
MDC_IDC_STAT_EPISODE_TOTAL_COUNT: 3
MDC_IDC_STAT_EPISODE_TOTAL_COUNT: 6
MDC_IDC_STAT_EPISODE_TOTAL_COUNT: 60
MDC_IDC_STAT_EPISODE_TOTAL_COUNT_DTM_END: NORMAL
MDC_IDC_STAT_EPISODE_TOTAL_COUNT_DTM_START: NORMAL
MDC_IDC_STAT_EPISODE_TYPE: NORMAL

## 2019-10-30 ENCOUNTER — ANCILLARY PROCEDURE (OUTPATIENT)
Dept: MAMMOGRAPHY | Facility: CLINIC | Age: 74
End: 2019-10-30
Attending: INTERNAL MEDICINE
Payer: MEDICARE

## 2019-10-30 DIAGNOSIS — Z12.31 VISIT FOR SCREENING MAMMOGRAM: ICD-10-CM

## 2019-11-14 ENCOUNTER — HOSPITAL ENCOUNTER (OUTPATIENT)
Facility: AMBULATORY SURGERY CENTER | Age: 74
End: 2019-11-14
Attending: SURGERY | Admitting: SURGERY
Payer: MEDICARE

## 2019-11-27 ENCOUNTER — OFFICE VISIT (OUTPATIENT)
Dept: OPHTHALMOLOGY | Facility: CLINIC | Age: 74
End: 2019-11-27
Payer: MEDICARE

## 2019-11-27 DIAGNOSIS — H40.051 OCULAR HYPERTENSION OF RIGHT EYE: ICD-10-CM

## 2019-11-27 DIAGNOSIS — H34.12 CENTRAL RETINAL ARTERY OCCLUSION OF LEFT EYE: ICD-10-CM

## 2019-11-27 DIAGNOSIS — H40.52X2 NEOVASCULAR GLAUCOMA OF LEFT EYE, MODERATE STAGE: Primary | ICD-10-CM

## 2019-11-27 PROCEDURE — 92133 CPTRZD OPH DX IMG PST SGM ON: CPT | Performed by: OPHTHALMOLOGY

## 2019-11-27 PROCEDURE — 92012 INTRM OPH EXAM EST PATIENT: CPT | Performed by: OPHTHALMOLOGY

## 2019-11-27 RX ORDER — TIMOLOL MALEATE 5 MG/ML
1 SOLUTION/ DROPS OPHTHALMIC DAILY
Qty: 1 BOTTLE | Refills: 11 | Status: SHIPPED | OUTPATIENT
Start: 2019-11-27 | End: 2020-06-10 | Stop reason: ALTCHOICE

## 2019-11-27 RX ORDER — LATANOPROST 50 UG/ML
1 SOLUTION/ DROPS OPHTHALMIC AT BEDTIME
Qty: 1 BOTTLE | Refills: 11 | Status: SHIPPED | OUTPATIENT
Start: 2019-11-27 | End: 2020-07-27

## 2019-11-27 ASSESSMENT — EXTERNAL EXAM - LEFT EYE: OS_EXAM: 2+ BROW PTOSIS, MILD TO MOD BROW

## 2019-11-27 ASSESSMENT — REFRACTION_WEARINGRX
SPECS_TYPE: PAL
OD_SPHERE: -2.75
OD_ADD: +3.50
OD_CYLINDER: +0.75
OS_SPHERE: PLANO
OS_CYLINDER: SPHERE
OD_AXIS: 139

## 2019-11-27 ASSESSMENT — VISUAL ACUITY
CORRECTION_TYPE: GLASSES
OD_CC: 20/40
OD_PH_CC+: -2
OD_CC+: -1
METHOD: SNELLEN - LINEAR
OD_PH_CC: 20/30

## 2019-11-27 ASSESSMENT — TONOMETRY
OS_IOP_MMHG: 27
IOP_METHOD: APPLANATION
OD_IOP_MMHG: 24

## 2019-11-27 ASSESSMENT — EXTERNAL EXAM - RIGHT EYE: OD_EXAM: 2+ BROW PTOSIS, MILD TO MOD BROW

## 2019-11-27 NOTE — PROGRESS NOTES
Current Eye Medications:    Latanoprost left eye every evening. Ocuvite daily. Artificial tears TID both eyes.     Subjective: here for OCT glauc and Retinal.  Having more headaches and blurred vision in the right eye recently. Sister, nieces and father with glaucoma       Objective:  See Ophthalmology Exam.       Assessment:  Intraocular pressure up both eyes.  Stable glaucoma OCT and Hill Visual Field both eyes in patient with hx of central retinal artery occlusion left eye.      Plan:  Start Timolol (yellow top) left eye once daily in the morning.   Continue using Latanoprost (green top) but use in both eyes at bedtime,   And AREDS2 vitamin daily.  Return visit in 6 months for complete exam.     Bryan Morrison M.D.  [phone]

## 2019-11-27 NOTE — PATIENT INSTRUCTIONS
Start Timolol (yellow top) left eye once daily in the morning.   Continue using Latanoprost (green top) but use in both eyes at bedtime,   And AREDS2 vitamin daily.  Return visit in 6 months for complete exam.     Bryan Morrison M.D.  [phone]

## 2019-11-27 NOTE — LETTER
11/27/2019         RE: Alfreda Baxter  [address] [id number]        Dear Colleague,    Thank you for referring your patient, Alfreda Baxter, to the [facility]. Please see a copy of my visit note below.     Current Eye Medications:    Latanoprost left eye every evening. Ocuvite daily. Artificial tears TID both eyes.     Subjective: here for OCT glauc and Retinal.  Having more headaches and blurred vision in the right eye recently. Sister, nieces and father with glaucoma       Objective:  See Ophthalmology Exam.       Assessment:  Intraocular pressure up both eyes.  Stable glaucoma OCT and Hill Visual Field both eyes in patient with hx of central retinal artery occlusion left eye.      Plan:  Start Timolol (yellow top) left eye once daily in the morning.   Continue using Latanoprost (green top) but use in both eyes at bedtime,   And AREDS2 vitamin daily.  Return visit in 6 months for complete exam.     Bryan Morrison M.D.  [phone]           Again, thank you for allowing me to participate in the care of your patient.        Sincerely,        Bryan Morrison MD

## 2019-12-02 ENCOUNTER — MYC MEDICAL ADVICE (OUTPATIENT)
Dept: CARDIOLOGY | Facility: CLINIC | Age: 74
End: 2019-12-02

## 2020-01-06 ENCOUNTER — TELEPHONE (OUTPATIENT)
Dept: CARDIOLOGY | Facility: CLINIC | Age: 75
End: 2020-01-06

## 2020-01-06 NOTE — TELEPHONE ENCOUNTER
[facility] Call Center    Phone Message    May a detailed message be left on voicemail: yes    Reason for Call: Medication Question or concern regarding medication   Prescription Clarification  Name of Medication: rivaroxaban ANTICOAGULANT (XARELTO) 20 MG TABS tablet  Prescribing Provider: Dr. Butterfield   What on the order needs clarification? Per pt, woke up with blood in her mouth, bleeding from gums. Has only happened once a few days ago. Believes it is a reaction to xarelto. Please call pt back to discuss, will not be available to take call until tomorrow (1/7) morning.           Action Taken: Message routed to:  Other: fk heart

## 2020-01-07 NOTE — TELEPHONE ENCOUNTER
Skyla Gold MD Heikkila, Kari S, RN   Caller: Unspecified (Yesterday,  9:24 AM)             CAN YOU CHECK IF SHE IS ELIGIBLE FOR ELIQUIS ?     IF SHE IS, I CAN SWITCH HER ?           Writer contacted patient and discussed Dr. Gold's recommendation.  Patient states that she does not want to switch at this point in time and will continue to take Xarelto and to monitor for bleeding symptoms.  Dr. Gold is aware of this.  Patient is scheduled to see Dr. Gold 1/21 and will discuss options at that time.    Sendy Laurent, KEELEY  Cardiology Care Coordinator  [facility]  [phone] option 1

## 2020-01-07 NOTE — TELEPHONE ENCOUNTER
"Spoke to patient who reports that she went to bed Thursday night and she woke up at 2 am (Friday 1/3) feeling a film in her mouth.  She then wiped her face with her hand and went to the bathroom.  When she was in the bathroom she looked at her hand and it was full of blood.  She discovered that her lower front gums were bleeding and called it a \"spontaneous phenominum\" it bleed for 4 hours.  She rinsed her mouth with cold water and applied pressure.  She finally fell asleep and the area was \"clotted\" when she woke up.  She then held the Xarelto for 2 nights and restarted the Xarelto Sunday night 1/5.  Reports she has not starting any new medications other than timolol eye drops in NOvember for glaucoma.    Patient is a previous Dr. Butterfield patient.  On xarelto for paroxsymal afib.      Denies any injury to mouth or gum.  States she flosses her teeth but not on that particular day.  Denies any symptoms of lightheadedness,dizziness, increased fatigue/exhausted, paleness.  Denies any hematuria or black or tarry stools.      Patient does admit to a history of mild nose bleeds while on Plavix in the past.    Advised patient to continue to take Xarelto and continue to monitor for signs and symptoms of bleeding.  Patient is scheduled to see Dr. Gold 1/21/20 to establish care and to discuss this episode.  She was previously a Dr. Butterfield patient.    Will route to Dr. Gold as an FYI and to advise if necessary.    Sendy Laurent RN  Cardiology Care Coordinator  [facility]  [phone] option 1      "

## 2020-01-16 ENCOUNTER — ANCILLARY PROCEDURE (OUTPATIENT)
Dept: CARDIOLOGY | Facility: CLINIC | Age: 75
End: 2020-01-16
Attending: INTERNAL MEDICINE
Payer: MEDICARE

## 2020-01-16 DIAGNOSIS — I47.29 PAROXYSMAL VENTRICULAR TACHYCARDIA (H): ICD-10-CM

## 2020-01-16 PROCEDURE — 93298 REM INTERROG DEV EVAL SCRMS: CPT | Performed by: INTERNAL MEDICINE

## 2020-01-16 PROCEDURE — G2066 INTER DEVC REMOTE 30D: HCPCS | Mod: ZF

## 2020-01-20 LAB
MDC_IDC_MSMT_BATTERY_STATUS: NORMAL
MDC_IDC_PG_IMPLANT_DTM: NORMAL
MDC_IDC_PG_MFG: NORMAL
MDC_IDC_PG_MODEL: NORMAL
MDC_IDC_PG_SERIAL: NORMAL
MDC_IDC_PG_TYPE: NORMAL
MDC_IDC_SESS_CLINIC_NAME: NORMAL
MDC_IDC_SESS_DTM: NORMAL
MDC_IDC_SESS_TYPE: NORMAL
MDC_IDC_SET_ZONE_DETECTION_INTERVAL: 2000 MS
MDC_IDC_SET_ZONE_DETECTION_INTERVAL: 3000 MS
MDC_IDC_SET_ZONE_DETECTION_INTERVAL: 380 MS
MDC_IDC_SET_ZONE_TYPE: NORMAL
MDC_IDC_STAT_AT_BURDEN_PERCENT: 0.2 %
MDC_IDC_STAT_AT_DTM_END: NORMAL
MDC_IDC_STAT_AT_DTM_START: NORMAL
MDC_IDC_STAT_EPISODE_RECENT_COUNT: 0
MDC_IDC_STAT_EPISODE_RECENT_COUNT: 2
MDC_IDC_STAT_EPISODE_RECENT_COUNT: 36
MDC_IDC_STAT_EPISODE_RECENT_COUNT_DTM_END: NORMAL
MDC_IDC_STAT_EPISODE_RECENT_COUNT_DTM_START: NORMAL
MDC_IDC_STAT_EPISODE_TOTAL_COUNT: 0
MDC_IDC_STAT_EPISODE_TOTAL_COUNT: 18
MDC_IDC_STAT_EPISODE_TOTAL_COUNT: 209
MDC_IDC_STAT_EPISODE_TOTAL_COUNT: 3
MDC_IDC_STAT_EPISODE_TOTAL_COUNT_DTM_END: NORMAL
MDC_IDC_STAT_EPISODE_TOTAL_COUNT_DTM_START: NORMAL
MDC_IDC_STAT_EPISODE_TYPE: NORMAL

## 2020-01-21 ENCOUNTER — OFFICE VISIT (OUTPATIENT)
Dept: CARDIOLOGY | Facility: CLINIC | Age: 75
End: 2020-01-21
Attending: INTERNAL MEDICINE
Payer: MEDICARE

## 2020-01-21 VITALS
WEIGHT: 194 LBS | SYSTOLIC BLOOD PRESSURE: 125 MMHG | HEART RATE: 84 BPM | OXYGEN SATURATION: 98 % | DIASTOLIC BLOOD PRESSURE: 81 MMHG | BODY MASS INDEX: 35.77 KG/M2

## 2020-01-21 DIAGNOSIS — E78.5 HYPERLIPIDEMIA LDL GOAL <130: ICD-10-CM

## 2020-01-21 DIAGNOSIS — I48.0 PAROXYSMAL ATRIAL FIBRILLATION (H): Primary | ICD-10-CM

## 2020-01-21 DIAGNOSIS — Z86.69 HISTORY OF CENTRAL RETINAL ARTERY OCCLUSION: ICD-10-CM

## 2020-01-21 DIAGNOSIS — I10 BENIGN ESSENTIAL HYPERTENSION: ICD-10-CM

## 2020-01-21 DIAGNOSIS — E78.5 HYPERLIPIDEMIA LDL GOAL <70: ICD-10-CM

## 2020-01-21 PROCEDURE — 99214 OFFICE O/P EST MOD 30 MIN: CPT | Mod: 25 | Performed by: INTERNAL MEDICINE

## 2020-01-21 RX ORDER — ATORVASTATIN CALCIUM 40 MG/1
20 TABLET, FILM COATED ORAL DAILY
Qty: 90 TABLET | Refills: 3 | COMMUNITY
Start: 2020-01-21 | End: 2021-07-29

## 2020-01-21 NOTE — PROGRESS NOTES
Referring provider: Buck Butterfield MD    HPI: Ms. Alfreda Baxter is a 74 year old  female with PMH significant for hypertension, hyperlipidemia and central retinal artery occlusion in August 2018.  The patient underwent ILR implantation in December 2018 which showed paroxysmal atrial fibrillation.  Patient is on rivaroxaban since then.    Patient is being seen today for follow-up.  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  Last ILR interrogation in January 16, 2020 showed 36 A. fib episodes lasting between 2 to 10 minutes.  Baseline rhythm is sinus.  Patient is asymptomatic from atrial fibrillation.  Denies palpitations, chest pain, dizziness, syncope or lower extremity edema.  She reports mild asthma and she is on inhalers as needed.  Reports dyspnea on exertion which is not new to her.  Few weeks ago patient had spontaneous gum bleeding that she noticed in the middle of the night.  She applied pressure on her gum and bleeding stopped.  No recurrent gum bleeding since then.  No other source of bleeding.      No significant history of smoking. Patient is currently on lisinopril 10 mg, rivaroxaban 20 mg, atorvastatin 40 mg.  Blood blood pressure and LDL well controlled.  Triglycerides mildly high.    Patient had cardiac MRI in 2018 which showed normal biventricular function with no intracardiac shunt.  Lexiscan stress test in 2013 showed normal myocardial perfusion.    Medications, personal, family, and social history reviewed with patient and revised.    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       albuterol (PROAIR HFA/PROVENTIL HFA/VENTOLIN HFA) 108 (90 Base) MCG/ACT inhaler Inhale 2 puffs into the lungs every 6 hours as needed for shortness of breath / dyspnea or wheezing 1 Inhaler 3     atorvastatin (LIPITOR) 40 MG tablet Take 1 tablet (40 mg) by mouth daily 90 tablet 3     B-COMPLEX OR Take  by mouth daily.       CALCIUM 500 +D OR one daily       hypromellose (ARTIFICIAL TEARS) 0.5 % SOLN ophthalmic solution Place 1 drop into both eyes 3 times daily       latanoprost (XALATAN) 0.005 % ophthalmic solution Place 1 drop into both eyes At Bedtime 1 Bottle 11     lisinopril (PRINIVIL/ZESTRIL) 10 MG tablet Take 1 tablet (10 mg) by mouth daily 90 tablet 3     multivitamin (OCUVITE) TABS tablet Take 1 tablet by mouth daily       PREDNISONE PO Take 40 mg by mouth       RaNITidine HCl (ZANTAC 75 PO) Take 75 mg by mouth as needed        rivaroxaban ANTICOAGULANT (XARELTO) 20 MG TABS tablet Take 1 tablet (20 mg) by mouth daily (with dinner) 90 tablet 3     S-Adenosylmethionine (GUILLERMO-E) 400 MG TABS Take 1 capsule by mouth daily        timolol maleate (TIMOPTIC) 0.5 % ophthalmic solution Place 1 drop Into the left eye daily 1 Bottle 11     zolpidem (AMBIEN) 5 MG tablet TAKE ONE TABLET BY MOUTH IN THE EVENING AS NEEDED FOR SLEEP 30 tablet 3       PAST SURGICAL HISTORY:  Past Surgical History:   Procedure Laterality Date     ABDOMEN SURGERY  1976, 1978, 1995    2 C-sections,  total hysterectomy     APPENDECTOMY      age 7 yrs.     BREAST BIOPSY, RT/LT  02/17/04    left benign     BREAST SURGERY      see above     C TOTAL KNEE ARTHROPLASTY Left 11/2015    at [facility]     COLONOSCOPY  2013    needed q 3 yrs.     COLONOSCOPY WITH CO2  INSUFFLATION N/A 2016    Procedure: COLONOSCOPY WITH CO2 INSUFFLATION;  Surgeon: Brian Cleaning MD;  Location: [facility] OR     EP COMPREHENSIVE EP STUDY N/A 2018    Procedure: LOOP RECORDER;  Surgeon: Buck Butterfield MD;  Location:  HEART CARDIAC CATH LAB     GENITOURINARY SURGERY      see above     HYSTERECTOMY, PAP NO LONGER INDICATED       HYSTERECTOMY, POORNIMA      bleeding fibroids     ORTHOPEDIC SURGERY      meniscus repair       ALLERGIES:     Allergies   Allergen Reactions     Codeine Anaphylaxis     Niacin      No Clinical Screening - See Comments Other (See Comments)     senisitive to non steroidals  Aleve, ibuprofen celebrex cause bad stomach pains  senisitive to non steroidals  Aleve, ibuprofen celebrex cause bad stomach pains     Oxycodone Hives     Other reaction(s): Unknown  ... With facial swelling     Trazodone      nausea     Nsaids Nausea and Vomiting     Other reaction(s): Abdominal Pain       FAMILY HISTORY:  Family History   Problem Relation Age of Onset     Respiratory Father      Glaucoma Father      Asthma Father      Allergies Sister      Eye Disorder Sister      Lipids Sister      Neurologic Disorder Sister      Osteoporosis Sister      Glaucoma Sister      Hypertension Sister      Arthritis Mother      Cancer Mother      Hypertension Mother      Other Cancer Mother      Thyroid Disease Mother      Gastrointestinal Disease Son      Macular Degeneration Sister      Hyperlipidemia Sister      Hypertension Sister      Osteoporosis Sister      Glaucoma Other          SOCIAL HISTORY:  Social History     Tobacco Use     Smoking status: Former Smoker     Packs/day: 0.10     Years: 1.00     Pack years: 0.10     Types: Cigarettes     Start date: 1963     Last attempt to quit: 1965     Years since quittin.0     Smokeless tobacco: Never Used   Substance Use Topics     Alcohol use: Not Currently     Comment: one a month     Drug use: No       ROS:   Constitutional:  No fever, chills, or sweats. Weight stable.   ENT: Blindness in the left eye, no ear ache, epistaxis, sore throat.   Cardiovascular: As per HPI.   Respiratory: No cough, hemoptysis.    GI: No nausea, vomiting, hematemesis, melena, or hematochezia.   : No hematuria.   Integument: Negative.   Psychiatric: Negative.   Hematologic:  No easy bruising, no easy bleeding.  Neuro: Negative.   Endocrinology: No significant heat or cold intolerance   Musculoskeletal: Knee pain+    Exam:  /81 (BP Location: Left arm, Patient Position: Sitting, Cuff Size: Adult Large)   Pulse 84   Wt 88 kg (194 lb)   SpO2 98%   BMI 35.77 kg/m    GENERAL APPEARANCE: alert and no distress  HEENT: no icterus, no central cyanosis  LYMPH/NECK: no adenopathy, no asymmetry, JVP not elevated, no carotid bruits.  RESPIRATORY: lungs clear to auscultation - no rales, rhonchi or wheezes, no use of accessory muscles, no retractions, respirations are unlabored, normal respiratory rate  CARDIOVASCULAR: regular rhythm, normal S1, S2, no S3 or S4 and no murmur, click or rub, precordium quiet with normal PMI.  GI: soft, non tender  EXTREMITIES: no edema  NEURO: alert, normal speech,and affect  VASC: Radial pulses are normal in volumes and symmetric bilaterally.   SKIN: no ecchymoses, no rashes     I have reviewed the labs and personally reviewed the imaging below and made my comment in the assessment and plan.    Labs:  CBC RESULTS:   Lab Results   Component Value Date    WBC 18.6 (H) 08/10/2019    RBC 4.31 08/10/2019    HGB 12.9 08/10/2019    HCT 40.0 08/10/2019    MCV 93 08/10/2019    MCH 29.9 08/10/2019    MCHC 32.3 08/10/2019    RDW 13.8 08/10/2019     08/10/2019       BMP RESULTS:  Lab Results   Component Value Date     08/10/2019    POTASSIUM 3.8 08/10/2019    CHLORIDE 104 08/10/2019    CO2 22 08/10/2019    ANIONGAP 9 08/10/2019     (H) 08/10/2019    BUN 18 08/10/2019    CR 0.67 08/10/2019    GFRESTIMATED 86 08/10/2019     GFRESTBLACK >90 08/10/2019    RUDOLPH 8.7 08/10/2019        INR RESULTS:  Lab Results   Component Value Date    INR 0.94 12/31/2018     ILR interrogation 1/16/2020:  Patient has a [employer] loop recorder. Normal loop recorder function. No patient activated episodes recorded. 36 AF episodes recorded - 2 - 10 min. AF burden = 0.2%. 2 tachy episodes recorded - 171-200 bpm, 7-10 sec. Patient is taking Xarelto. Presenting EGM = Regular VS @ 84 bpm. Loop recorder battery status = ok.    Cardiac MRI 2018  CONCLUSIONS: Normal cardiac function; LVEF 62%, RVEF 58%. The interatrial septum looks intact. The presence of normal chamber sizes and normal Qp/Qs ratio rule out any significant intracardiac shunting      EKG 8/10/2019 sinus rhythm with PACs        Assessment and Plan:   Ms. Alfreda Baxter is a 74 year old  female with PMH significant for hypertension, hyperlipidemia, central retinal artery occlusion in August 2018 complicated by left eye blindness and paroxysmal atrial fibrillation detected by ILR now on anticoagulation with rivaroxaban.      1.  Paroxysmal atrial fibrillation: This is detected through ILR.  Last interrogation in January 2020 showed episodes lasting 2 to 10 minutes. The patient is asymptomatic from atrial fibrillation. She is not on any rate control agent.  On rivaroxaban 20 mg daily.  Reports gum bleeding few weeks ago which was spontaneous and stopped with manual pressure.  No recurrent bleeding since then.  I recommended patient to let us know  if she has recurrent bleeding episodes.  Continue current treatment.     2.  Hypertension: Well-controlled with 10 mg lisinopril.    3.  Hyperlipidemia: Patient on atorvastatin 20 mg (takes half of 40 mg).  LDL well controlled.  No prior history of CAD.    No medication changes today.  Return to clinic in 1 year.    A total of 30 minutes spent face-toface with greater than 50% of the time spent in counseling and coordinating cares of the issues above.     Please  donot hesitate to contact me if you have any questions or concerns. Again, thank you for allowing me to participate in the care of your patient.    Skyla RADER MD  [facility] Division of Cardiology  Pager [phone]

## 2020-01-21 NOTE — NURSING NOTE
"Chief Complaint   Patient presents with     Atrial Fib     Paroxysmal atrial fibrillation , per patient no bothersome symptoms       Initial /81 (BP Location: Left arm, Patient Position: Sitting, Cuff Size: Adult Large)   Pulse 84   Wt 88 kg (194 lb)   SpO2 98%   BMI 35.77 kg/m   Estimated body mass index is 35.77 kg/m  as calculated from the following:    Height as of 8/10/19: 1.568 m (5' 1.75\").    Weight as of this encounter: 88 kg (194 lb)..  BP completed using cuff size: large    Malcolm Malik L.P.N.    "

## 2020-01-21 NOTE — LETTER
1/21/2020      RE: Alfreda Baxter  [address] [facility] [address]       Dear Colleague,    Thank you for the opportunity to participate in the care of your patient, Alfreda Baxter, at the [facility] HEART AT [facility] at [facility]. Please see a copy of my visit note below.    Referring provider: Buck Butterfield MD    HPI: Ms. Alfreda Baxter is a 74 year old  female with PMH significant for hypertension, hyperlipidemia and central retinal artery occlusion in August 2018.  The patient underwent ILR implantation in December 2018 which showed paroxysmal atrial fibrillation.  Patient is on rivaroxaban since then.    Patient is being seen today for follow-up.  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  Last ILR interrogation in January 16, 2020 showed 36 A. fib episodes lasting between 2 to 10 minutes.  Baseline rhythm is sinus.  Patient is asymptomatic from atrial fibrillation.  Denies palpitations, chest pain, dizziness, syncope or lower extremity edema.  She reports mild asthma and she is on inhalers as needed.  Reports dyspnea on exertion which is not new to her.  Few weeks ago patient had spontaneous gum bleeding that she noticed in the middle of the night.  She applied pressure on her gum and bleeding stopped.  No recurrent gum bleeding since then.  No other source of bleeding.      No significant history of smoking. Patient is currently on lisinopril 10 mg, rivaroxaban 20 mg, atorvastatin 40 mg.  Blood blood pressure and LDL well controlled.  Triglycerides mildly high.    Patient had cardiac MRI in 2018 which showed normal biventricular function with no intracardiac shunt.  Lexiscan stress test in 2013 showed normal myocardial perfusion.    Medications, personal, family, and social history reviewed with patient and revised.    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       albuterol (PROAIR HFA/PROVENTIL HFA/VENTOLIN HFA) 108 (90 Base) MCG/ACT inhaler Inhale 2 puffs into the lungs every 6 hours as needed for shortness of breath / dyspnea or wheezing 1 Inhaler 3     atorvastatin (LIPITOR) 40 MG tablet Take 1 tablet (40 mg) by mouth daily 90 tablet 3     B-COMPLEX OR Take  by mouth daily.       CALCIUM 500 +D OR one daily       hypromellose (ARTIFICIAL TEARS) 0.5 % SOLN ophthalmic solution Place 1 drop into both eyes 3 times daily       latanoprost (XALATAN) 0.005 % ophthalmic solution Place 1 drop into both eyes At Bedtime 1 Bottle 11     lisinopril (PRINIVIL/ZESTRIL) 10 MG tablet Take 1 tablet (10 mg) by mouth daily 90 tablet 3     multivitamin (OCUVITE) TABS tablet Take 1 tablet by mouth daily       PREDNISONE PO Take 40 mg by mouth       RaNITidine HCl (ZANTAC 75 PO) Take 75 mg by mouth as needed        rivaroxaban ANTICOAGULANT (XARELTO) 20 MG TABS tablet Take 1 tablet (20 mg) by mouth daily (with dinner) 90 tablet 3     S-Adenosylmethionine (GUILLERMO-E) 400 MG TABS Take 1 capsule by mouth daily        timolol maleate (TIMOPTIC) 0.5 % ophthalmic solution Place 1 drop Into the left eye daily 1 Bottle 11     zolpidem (AMBIEN) 5 MG tablet TAKE ONE TABLET BY MOUTH IN THE EVENING AS NEEDED FOR SLEEP 30 tablet 3     PAST SURGICAL HISTORY:  Past Surgical  History:   Procedure Laterality Date     ABDOMEN SURGERY  1976, 1978, 1995    2 C-sections,  total hysterectomy     APPENDECTOMY      age 7 yrs.     BREAST BIOPSY, RT/LT  02/17/04    left benign     BREAST SURGERY      see above     C TOTAL KNEE ARTHROPLASTY Left 11/2015    at [facility]     COLONOSCOPY  2013    needed q 3 yrs.     COLONOSCOPY WITH CO2 INSUFFLATION N/A 11/21/2016    Procedure: COLONOSCOPY WITH CO2 INSUFFLATION;  Surgeon: Brian Cleaning MD;  Location: [facility] OR     EP COMPREHENSIVE EP STUDY N/A 12/31/2018    Procedure: LOOP RECORDER;  Surgeon: Buck Butterfield MD;  Location:  HEART CARDIAC CATH LAB     GENITOURINARY SURGERY      see above     HYSTERECTOMY, PAP NO LONGER INDICATED       HYSTERECTOMY, POORNIMA  1997    bleeding fibroids     ORTHOPEDIC SURGERY  2009    meniscus repair     ALLERGIES:  Allergies   Allergen Reactions     Codeine Anaphylaxis     Niacin      No Clinical Screening - See Comments Other (See Comments)     senisitive to non steroidals  Aleve, ibuprofen celebrex cause bad stomach pains  senisitive to non steroidals  Aleve, ibuprofen celebrex cause bad stomach pains     Oxycodone Hives     Other reaction(s): Unknown  ... With facial swelling     Trazodone      nausea     Nsaids Nausea and Vomiting     Other reaction(s): Abdominal Pain     FAMILY HISTORY:  Family History   Problem Relation Age of Onset     Respiratory Father      Glaucoma Father      Asthma Father      Allergies Sister      Eye Disorder Sister      Lipids Sister      Neurologic Disorder Sister      Osteoporosis Sister      Glaucoma Sister      Hypertension Sister      Arthritis Mother      Cancer Mother      Hypertension Mother      Other Cancer Mother      Thyroid Disease Mother      Gastrointestinal Disease Son      Macular Degeneration Sister      Hyperlipidemia Sister      Hypertension Sister      Osteoporosis Sister      Glaucoma Other      SOCIAL HISTORY:  Social History     Tobacco Use     Smoking  status: Former Smoker     Packs/day: 0.10     Years: 1.00     Pack years: 0.10     Types: Cigarettes     Start date: 1963     Last attempt to quit: 1965     Years since quittin.0     Smokeless tobacco: Never Used   Substance Use Topics     Alcohol use: Not Currently     Comment: one a month     Drug use: No     ROS:   Constitutional: No fever, chills, or sweats. Weight stable.   ENT: Blindness in the left eye, no ear ache, epistaxis, sore throat.   Cardiovascular: As per HPI.   Respiratory: No cough, hemoptysis.    GI: No nausea, vomiting, hematemesis, melena, or hematochezia.   : No hematuria.   Integument: Negative.   Psychiatric: Negative.   Hematologic:  No easy bruising, no easy bleeding.  Neuro: Negative.   Endocrinology: No significant heat or cold intolerance   Musculoskeletal: Knee pain+    Exam:  /81 (BP Location: Left arm, Patient Position: Sitting, Cuff Size: Adult Large)   Pulse 84   Wt 88 kg (194 lb)   SpO2 98%   BMI 35.77 kg/m     GENERAL APPEARANCE: alert and no distress  HEENT: no icterus, no central cyanosis  LYMPH/NECK: no adenopathy, no asymmetry, JVP not elevated, no carotid bruits.  RESPIRATORY: lungs clear to auscultation - no rales, rhonchi or wheezes, no use of accessory muscles, no retractions, respirations are unlabored, normal respiratory rate  CARDIOVASCULAR: regular rhythm, normal S1, S2, no S3 or S4 and no murmur, click or rub, precordium quiet with normal PMI.  GI: soft, non tender  EXTREMITIES: no edema  NEURO: alert, normal speech,and affect  VASC: Radial pulses are normal in volumes and symmetric bilaterally.   SKIN: no ecchymoses, no rashes     I have reviewed the labs and personally reviewed the imaging below and made my comment in the assessment and plan.    Labs:  CBC RESULTS:   Lab Results   Component Value Date    WBC 18.6 (H) 08/10/2019    RBC 4.31 08/10/2019    HGB 12.9 08/10/2019    HCT 40.0 08/10/2019    MCV 93 08/10/2019    MCH 29.9 08/10/2019     MCHC 32.3 08/10/2019    RDW 13.8 08/10/2019     08/10/2019     BMP RESULTS:  Lab Results   Component Value Date     08/10/2019    POTASSIUM 3.8 08/10/2019    CHLORIDE 104 08/10/2019    CO2 22 08/10/2019    ANIONGAP 9 08/10/2019     (H) 08/10/2019    BUN 18 08/10/2019    CR 0.67 08/10/2019    GFRESTIMATED 86 08/10/2019    GFRESTBLACK >90 08/10/2019    RUDOLPH 8.7 08/10/2019     INR RESULTS:  Lab Results   Component Value Date    INR 0.94 12/31/2018     ILR interrogation 1/16/2020:  Patient has a [facility] loop recorder. Normal loop recorder function. No patient activated episodes recorded. 36 AF episodes recorded - 2 - 10 min. AF burden = 0.2%. 2 tachy episodes recorded - 171-200 bpm, 7-10 sec. Patient is taking Xarelto. Presenting EGM = Regular VS @ 84 bpm. Loop recorder battery status = ok.    Cardiac MRI 2018  CONCLUSIONS: Normal cardiac function; LVEF 62%, RVEF 58%. The interatrial septum looks intact. The presence of normal chamber sizes and normal Qp/Qs ratio rule out any significant intracardiac shunting    EKG 8/10/2019 sinus rhythm with PACs      Assessment and Plan:   Ms. Alfreda Baxter is a 74 year old  female with PMH significant for hypertension, hyperlipidemia, central retinal artery occlusion in August 2018 complicated by left eye blindness and paroxysmal atrial fibrillation detected by ILR now on anticoagulation with rivaroxaban.      1.  Paroxysmal atrial fibrillation: This is detected through ILR.  Last interrogation in January 2020 showed episodes lasting 2 to 10 minutes. The patient is asymptomatic from atrial fibrillation. She is not on any rate control agent.  On rivaroxaban 20 mg daily.  Reports gum bleeding few weeks ago which was spontaneous and stopped with manual pressure.  No recurrent bleeding since then.  I recommended patient to let us know  if she has recurrent bleeding episodes.  Continue current treatment.     2.  Hypertension: Well-controlled with 10 mg  lisinopril.    3.  Hyperlipidemia: Patient on atorvastatin 20 mg (takes half of 40 mg).  LDL well controlled.  No prior history of CAD.    No medication changes today.  Return to clinic in 1 year.    A total of 30 minutes spent face-toface with greater than 50% of the time spent in counseling and coordinating cares of the issues above.     Please donot hesitate to contact me if you have any questions or concerns. Again, thank you for allowing me to participate in the care of your patient.    Skyla RADER MD  [facility] Division of Cardiology  Pager [phone]

## 2020-01-21 NOTE — PATIENT INSTRUCTIONS
Thank you for coming to the [facility] Heart @ [facility]; please note the following instructions:    1. Dr. Crum Can would like you to return for a cardiac follow up in 1 year  (January).  We will contact you regarding your appointment when the time draws closer or you may call [phone] to arrange an appointment.  Mean while, if you should have any questions or concerns regarding your heart health, please contact us.  Thank you for choosing [facility] for your care.            If you have any questions regarding your visit please contact your care team:     Cardiology  Telephone Number   Sendy LACEY, RN  Mahi LEWIS,RN  Rosalee LÓPEZ, ERIC JEAN, MA  Malcolm REYES LPN   [phone]   (select option 1)    *After hours: [phone]     For scheduling appts:     [phone] or    [phone] (select option 1)    *After hours: [phone]     For the Device Clinic (Pacemakers and ICD's)  RN's :  Jaz Kemp   During business hours: [phone]    *After business hours:  [phone] (select option 4)      Normal test result notifications will be released via [employer] or mailed within 7 business days.  All other test results, will be communicated via telephone once reviewed by your cardiologist.    If you need a medication refill please contact your pharmacy.  Please allow 3 business days for your refill to be completed.    As always, thank you for trusting us with your health care needs!

## 2020-02-03 DIAGNOSIS — I48.0 PAF (PAROXYSMAL ATRIAL FIBRILLATION) (H): ICD-10-CM

## 2020-02-03 NOTE — TELEPHONE ENCOUNTER
Signed Prescriptions:                        Disp   Refills    rivaroxaban ANTICOAGULANT (XARELTO ANTICOA*90 tab*3        Sig: Take 1 tablet (20 mg) by mouth daily (with dinner)  Authorizing Provider: MAYKEL RADER  Ordering User: SENDY LAURENT Comprehensive Metabolic Panel:  Sodium   Date Value Ref Range Status   08/10/2019 135 133 - 144 mmol/L Final     Potassium   Date Value Ref Range Status   08/10/2019 3.8 3.4 - 5.3 mmol/L Final     Chloride   Date Value Ref Range Status   08/10/2019 104 94 - 109 mmol/L Final     Carbon Dioxide   Date Value Ref Range Status   08/10/2019 22 20 - 32 mmol/L Final     Anion Gap   Date Value Ref Range Status   08/10/2019 9 3 - 14 mmol/L Final     Glucose   Date Value Ref Range Status   08/10/2019 189 (H) 70 - 99 mg/dL Final     Urea Nitrogen   Date Value Ref Range Status   08/10/2019 18 7 - 30 mg/dL Final     Creatinine   Date Value Ref Range Status   08/10/2019 0.67 0.52 - 1.04 mg/dL Final     GFR Estimate   Date Value Ref Range Status   08/10/2019 86 >60 mL/min/[1.73_m2] Final     Comment:     Non  GFR Calc  Starting 12/18/2018, serum creatinine based estimated GFR (eGFR) will be   calculated using the Chronic Kidney Disease Epidemiology Collaboration   (CKD-EPI) equation.       Calcium   Date Value Ref Range Status   08/10/2019 8.7 8.5 - 10.1 mg/dL Final     ALT   Date Value Ref Range Status   03/15/2016 23 0 - 50 U/L Final     AST   Date Value Ref Range Status   03/15/2016 19 0 - 45 U/L Final             Sendy Laurent RN

## 2020-02-11 ENCOUNTER — TRANSFERRED RECORDS (OUTPATIENT)
Dept: HEALTH INFORMATION MANAGEMENT | Facility: CLINIC | Age: 75
End: 2020-02-11

## 2020-03-30 ENCOUNTER — MYC MEDICAL ADVICE (OUTPATIENT)
Dept: OPHTHALMOLOGY | Facility: CLINIC | Age: 75
End: 2020-03-30

## 2020-03-30 DIAGNOSIS — H40.52X2 NEOVASCULAR GLAUCOMA OF LEFT EYE, MODERATE STAGE: Primary | ICD-10-CM

## 2020-03-30 RX ORDER — BRIMONIDINE TARTRATE 1 MG/ML
1 SOLUTION/ DROPS OPHTHALMIC 2 TIMES DAILY
Qty: 1 BOTTLE | Refills: 11 | Status: SHIPPED | OUTPATIENT
Start: 2020-03-30 | End: 2020-07-27

## 2020-03-30 NOTE — TELEPHONE ENCOUNTER
Benja Petty,  You can stop the Timolol if you have a concern that it is affecting your breathing.  I'll prescribe a drop called Brimonidine for you to use if the left eye twice daily (about 12 hours apart), along with continuing the Latanoprost in both eyes at bedtime.  Always wait at least 5 minutes between drops.  Let us know if you have any problems with the new drop and we'll have you in for a complete exam when COVID situation allows.  Stay well!  Bryan Morrison M.D.  [phone]

## 2020-04-16 ENCOUNTER — ANCILLARY PROCEDURE (OUTPATIENT)
Dept: CARDIOLOGY | Facility: CLINIC | Age: 75
End: 2020-04-16
Attending: INTERNAL MEDICINE
Payer: MEDICARE

## 2020-04-16 DIAGNOSIS — I47.29 PAROXYSMAL VENTRICULAR TACHYCARDIA (H): ICD-10-CM

## 2020-04-16 PROCEDURE — 93298 REM INTERROG DEV EVAL SCRMS: CPT | Mod: ZP | Performed by: INTERNAL MEDICINE

## 2020-04-16 PROCEDURE — G2066 INTER DEVC REMOTE 30D: HCPCS | Mod: ZF

## 2020-04-17 LAB
MDC_IDC_MSMT_BATTERY_STATUS: NORMAL
MDC_IDC_PG_IMPLANT_DTM: NORMAL
MDC_IDC_PG_MFG: NORMAL
MDC_IDC_PG_MODEL: NORMAL
MDC_IDC_PG_SERIAL: NORMAL
MDC_IDC_PG_TYPE: NORMAL
MDC_IDC_SESS_CLINIC_NAME: NORMAL
MDC_IDC_SESS_DTM: NORMAL
MDC_IDC_SESS_TYPE: NORMAL
MDC_IDC_SET_ZONE_DETECTION_INTERVAL: 2000 MS
MDC_IDC_SET_ZONE_DETECTION_INTERVAL: 3000 MS
MDC_IDC_SET_ZONE_DETECTION_INTERVAL: 380 MS
MDC_IDC_SET_ZONE_TYPE: NORMAL
MDC_IDC_STAT_AT_BURDEN_PERCENT: 0 %
MDC_IDC_STAT_AT_DTM_END: NORMAL
MDC_IDC_STAT_AT_DTM_START: NORMAL
MDC_IDC_STAT_EPISODE_RECENT_COUNT: 0
MDC_IDC_STAT_EPISODE_RECENT_COUNT: 2
MDC_IDC_STAT_EPISODE_RECENT_COUNT: 3
MDC_IDC_STAT_EPISODE_RECENT_COUNT_DTM_END: NORMAL
MDC_IDC_STAT_EPISODE_RECENT_COUNT_DTM_START: NORMAL
MDC_IDC_STAT_EPISODE_TOTAL_COUNT: 0
MDC_IDC_STAT_EPISODE_TOTAL_COUNT: 20
MDC_IDC_STAT_EPISODE_TOTAL_COUNT: 212
MDC_IDC_STAT_EPISODE_TOTAL_COUNT: 3
MDC_IDC_STAT_EPISODE_TOTAL_COUNT_DTM_END: NORMAL
MDC_IDC_STAT_EPISODE_TOTAL_COUNT_DTM_START: NORMAL
MDC_IDC_STAT_EPISODE_TYPE: NORMAL

## 2020-05-14 ENCOUNTER — MYC MEDICAL ADVICE (OUTPATIENT)
Dept: OPHTHALMOLOGY | Facility: CLINIC | Age: 75
End: 2020-05-14

## 2020-06-08 ENCOUNTER — MYC MEDICAL ADVICE (OUTPATIENT)
Dept: FAMILY MEDICINE | Facility: CLINIC | Age: 75
End: 2020-06-08

## 2020-06-08 NOTE — TELEPHONE ENCOUNTER
Routed response to patient with phone number to call for colonoscopy and advising she call to schedule routine visit in July (last annual visit was 7/11/19).    Indigo Mccoy RN  [facility]

## 2020-06-10 ENCOUNTER — OFFICE VISIT (OUTPATIENT)
Dept: OPHTHALMOLOGY | Facility: CLINIC | Age: 75
End: 2020-06-10
Payer: MEDICARE

## 2020-06-10 DIAGNOSIS — H43.813 POSTERIOR VITREOUS DETACHMENT OF BOTH EYES: ICD-10-CM

## 2020-06-10 DIAGNOSIS — H40.051 OCULAR HYPERTENSION OF RIGHT EYE: ICD-10-CM

## 2020-06-10 DIAGNOSIS — H34.12 CENTRAL RETINAL ARTERY OCCLUSION OF LEFT EYE: ICD-10-CM

## 2020-06-10 DIAGNOSIS — H40.52X2 NEOVASCULAR GLAUCOMA OF LEFT EYE, MODERATE STAGE: ICD-10-CM

## 2020-06-10 DIAGNOSIS — H54.40 BLINDNESS OF LEFT EYE, UNSPECIFIED RIGHT EYE VISUAL IMPAIRMENT CATEGORY: ICD-10-CM

## 2020-06-10 DIAGNOSIS — H25.813 COMBINED FORMS OF AGE-RELATED CATARACT OF BOTH EYES: Primary | ICD-10-CM

## 2020-06-10 DIAGNOSIS — H52.4 PRESBYOPIA: ICD-10-CM

## 2020-06-10 PROCEDURE — 92014 COMPRE OPH EXAM EST PT 1/>: CPT | Performed by: OPHTHALMOLOGY

## 2020-06-10 PROCEDURE — 92015 DETERMINE REFRACTIVE STATE: CPT | Mod: GY | Performed by: OPHTHALMOLOGY

## 2020-06-10 RX ORDER — TIMOLOL MALEATE 5 MG/ML
1 SOLUTION/ DROPS OPHTHALMIC EVERY MORNING
COMMUNITY
End: 2020-12-30

## 2020-06-10 ASSESSMENT — REFRACTION_MANIFEST
OD_ADD: +3.00
OD_AXIS: 169
OS_AXIS: 035
OS_SPHERE: -0.75
OD_CYLINDER: +0.75
OD_SPHERE: -3.25
OS_CYLINDER: +0.50

## 2020-06-10 ASSESSMENT — CUP TO DISC RATIO
OD_RATIO: 0.5
OS_RATIO: 0.8

## 2020-06-10 ASSESSMENT — EXTERNAL EXAM - LEFT EYE: OS_EXAM: 2+ BROW PTOSIS, MILD TO MOD BROW

## 2020-06-10 ASSESSMENT — TONOMETRY
OD_IOP_MMHG: 15
OS_IOP_MMHG: 12
IOP_METHOD: APPLANATION

## 2020-06-10 ASSESSMENT — VISUAL ACUITY
OD_CC: 4
OD_CC: 20/50
METHOD: SNELLEN - LINEAR
OD_PH_CC: 20/40
OD_CC+: +2
OD_SC: 2
CORRECTION_TYPE: GLASSES

## 2020-06-10 ASSESSMENT — CONF VISUAL FIELD
OS_SUPERIOR_NASAL_RESTRICTION: 1
OS_INFERIOR_NASAL_RESTRICTION: 1
OS_SUPERIOR_TEMPORAL_RESTRICTION: 1
OD_NORMAL: 1
OS_INFERIOR_TEMPORAL_RESTRICTION: 1

## 2020-06-10 ASSESSMENT — REFRACTION_WEARINGRX
OD_ADD: +3.50
OS_CYLINDER: SPHERE
OD_SPHERE: -2.75
SPECS_TYPE: PAL
OD_AXIS: 136
OS_SPHERE: PLANO
OD_CYLINDER: +0.75

## 2020-06-10 ASSESSMENT — EXTERNAL EXAM - RIGHT EYE: OD_EXAM: 2+ BROW PTOSIS, MILD TO MOD BROW

## 2020-06-10 NOTE — LETTER
6/10/2020         RE: Alfreda Baxter  [address] [address]        Dear Colleague,    Thank you for referring your patient, Alfreda Baxter, to the [facility]. Please see a copy of my visit note below.     Current Eye Medications:  Timolol left eye each morning, Latanoprost both eyes every evening.  Last drops:  9am, 11pm.     Subjective:  Comprehensive Eye Exam.   Patient complains of very blurry vision, especially when looking in the distance.       Objective:  See Ophthalmology Exam.       Assessment:  Intraocular pressure improved both eyes with current drop regimen.  Mild worsening of nuclear sclerosis both eyes with myopic and astigmatic shift right eye.      ICD-10-CM    1. Combined forms of age-related cataract, mild-mod, both eyes  H25.813    2. Central retinal artery occlusion of left eye  H34.12    3. Neovascular glaucoma of left eye, moderate stage  H40.52X2    4. Blindness of left eye, unspecified right eye visual impairment category  H54.40    5. Ocular hypertension of right eye, treated  H40.051    6. Posterior vitreous detachment of both eyes  H43.813    7. Presbyopia  H52.4 REFRACTIVE STATUS        Plan:  Continue same medications.  Use artificial tears up to 4 times daily both eyes. (Refresh Tears, Systane Ultra/Balance, or Theratears)   Glasses Rx given - optional  Return visit 6 months for intraocular pressure check, glaucoma OCT, retinal OCT, [facility].  Bryan Morrison M.D.  [phone]         Again, thank you for allowing me to participate in the care of your patient.        Sincerely,        Bryan Morrison MD

## 2020-06-10 NOTE — PATIENT INSTRUCTIONS
Continue same medications.  Use artificial tears up to 4 times daily both eyes. (Refresh Tears, Systane Ultra/Balance, or Theratears)   Glasses Rx given - optional  Return visit 6 months for intraocular pressure check, glaucoma OCT, retinal OCT, [facility].  Bryan Morrison M.D.  [phone]

## 2020-06-10 NOTE — PROGRESS NOTES
Current Eye Medications:  Timolol left eye each morning, Latanoprost both eyes every evening.  Last drops:  9am, 11pm.     Subjective:  Comprehensive Eye Exam.   Patient complains of very blurry vision, especially when looking in the distance.       Objective:  See Ophthalmology Exam.       Assessment:  Intraocular pressure improved both eyes with current drop regimen.  Mild worsening of nuclear sclerosis both eyes with myopic and astigmatic shift right eye.      ICD-10-CM    1. Combined forms of age-related cataract, mild-mod, both eyes  H25.813    2. Central retinal artery occlusion of left eye  H34.12    3. Neovascular glaucoma of left eye, moderate stage  H40.52X2    4. Blindness of left eye, unspecified right eye visual impairment category  H54.40    5. Ocular hypertension of right eye, treated  H40.051    6. Posterior vitreous detachment of both eyes  H43.813    7. Presbyopia  H52.4 REFRACTIVE STATUS        Plan:  Continue same medications.  Use artificial tears up to 4 times daily both eyes. (Refresh Tears, Systane Ultra/Balance, or Theratears)   Glasses Rx given - optional  Return visit 6 months for intraocular pressure check, glaucoma OCT, retinal OCT, [facility].  Bryan Morrison M.D.  [phone]

## 2020-06-30 ENCOUNTER — HOSPITAL ENCOUNTER (OUTPATIENT)
Facility: AMBULATORY SURGERY CENTER | Age: 75
End: 2020-06-30
Attending: SURGERY | Admitting: SURGERY
Payer: MEDICARE

## 2020-06-30 DIAGNOSIS — Z11.59 ENCOUNTER FOR SCREENING FOR OTHER VIRAL DISEASES: Primary | ICD-10-CM

## 2020-06-30 DIAGNOSIS — Z12.11 COLON CANCER SCREENING: Primary | ICD-10-CM

## 2020-07-21 RX ORDER — BISACODYL 5 MG
5 TABLET, DELAYED RELEASE (ENTERIC COATED) ORAL SEE ADMIN INSTRUCTIONS
Qty: 1 TABLET | Refills: 0 | Status: SHIPPED | OUTPATIENT
Start: 2020-07-21 | End: 2020-07-27

## 2020-07-21 RX ORDER — SODIUM, POTASSIUM,MAG SULFATES 17.5-3.13G
1 SOLUTION, RECONSTITUTED, ORAL ORAL SEE ADMIN INSTRUCTIONS
Qty: 2 BOTTLE | Refills: 0 | Status: SHIPPED | OUTPATIENT
Start: 2020-07-21 | End: 2020-07-27

## 2020-07-21 RX ORDER — LIDOCAINE 40 MG/G
CREAM TOPICAL
Status: CANCELLED | OUTPATIENT
Start: 2020-07-21

## 2020-07-22 ENCOUNTER — ANCILLARY PROCEDURE (OUTPATIENT)
Dept: CARDIOLOGY | Facility: CLINIC | Age: 75
End: 2020-07-22
Attending: INTERNAL MEDICINE
Payer: MEDICARE

## 2020-07-22 DIAGNOSIS — I47.29 PAROXYSMAL VENTRICULAR TACHYCARDIA (H): ICD-10-CM

## 2020-07-22 PROCEDURE — G2066 INTER DEVC REMOTE 30D: HCPCS | Mod: ZF

## 2020-07-22 PROCEDURE — 93298 REM INTERROG DEV EVAL SCRMS: CPT | Mod: ZP | Performed by: INTERNAL MEDICINE

## 2020-07-24 ENCOUNTER — DOCUMENTATION ONLY (OUTPATIENT)
Dept: FAMILY MEDICINE | Facility: CLINIC | Age: 75
End: 2020-07-24
Payer: MEDICARE

## 2020-07-24 ENCOUNTER — DOCUMENTATION ONLY (OUTPATIENT)
Dept: FAMILY MEDICINE | Facility: CLINIC | Age: 75
End: 2020-07-24

## 2020-07-24 DIAGNOSIS — E78.5 HYPERLIPIDEMIA LDL GOAL <130: ICD-10-CM

## 2020-07-24 DIAGNOSIS — D72.829 LEUKOCYTOSIS, UNSPECIFIED TYPE: ICD-10-CM

## 2020-07-24 DIAGNOSIS — I10 HYPERTENSION GOAL BP (BLOOD PRESSURE) < 140/90: ICD-10-CM

## 2020-07-24 DIAGNOSIS — R73.09 ELEVATED GLUCOSE: Primary | ICD-10-CM

## 2020-07-24 LAB
ALT SERPL W P-5'-P-CCNC: 22 U/L (ref 0–50)
ANION GAP SERPL CALCULATED.3IONS-SCNC: 9 MMOL/L (ref 3–14)
BASOPHILS # BLD AUTO: 0 10E9/L (ref 0–0.2)
BASOPHILS NFR BLD AUTO: 0.2 %
BUN SERPL-MCNC: 19 MG/DL (ref 7–30)
CALCIUM SERPL-MCNC: 9.1 MG/DL (ref 8.5–10.1)
CHLORIDE SERPL-SCNC: 108 MMOL/L (ref 94–109)
CHOLEST SERPL-MCNC: 163 MG/DL
CO2 SERPL-SCNC: 26 MMOL/L (ref 20–32)
CREAT SERPL-MCNC: 0.85 MG/DL (ref 0.52–1.04)
DIFFERENTIAL METHOD BLD: NORMAL
EOSINOPHIL # BLD AUTO: 0.1 10E9/L (ref 0–0.7)
EOSINOPHIL NFR BLD AUTO: 2.3 %
ERYTHROCYTE [DISTWIDTH] IN BLOOD BY AUTOMATED COUNT: 13.5 % (ref 10–15)
GFR SERPL CREATININE-BSD FRML MDRD: 66 ML/MIN/{1.73_M2}
GLUCOSE SERPL-MCNC: 99 MG/DL (ref 70–99)
HBA1C MFR BLD: 5.9 % (ref 0–5.6)
HCT VFR BLD AUTO: 43.4 % (ref 35–47)
HDLC SERPL-MCNC: 54 MG/DL
HGB BLD-MCNC: 14.3 G/DL (ref 11.7–15.7)
LDLC SERPL CALC-MCNC: 73 MG/DL
LYMPHOCYTES # BLD AUTO: 2.3 10E9/L (ref 0.8–5.3)
LYMPHOCYTES NFR BLD AUTO: 41.3 %
MCH RBC QN AUTO: 29.5 PG (ref 26.5–33)
MCHC RBC AUTO-ENTMCNC: 32.9 G/DL (ref 31.5–36.5)
MCV RBC AUTO: 90 FL (ref 78–100)
MONOCYTES # BLD AUTO: 0.5 10E9/L (ref 0–1.3)
MONOCYTES NFR BLD AUTO: 7.9 %
NEUTROPHILS # BLD AUTO: 2.7 10E9/L (ref 1.6–8.3)
NEUTROPHILS NFR BLD AUTO: 48.3 %
NONHDLC SERPL-MCNC: 109 MG/DL
PLATELET # BLD AUTO: 246 10E9/L (ref 150–450)
POTASSIUM SERPL-SCNC: 4.1 MMOL/L (ref 3.4–5.3)
RBC # BLD AUTO: 4.84 10E12/L (ref 3.8–5.2)
SODIUM SERPL-SCNC: 143 MMOL/L (ref 133–144)
TRIGL SERPL-MCNC: 179 MG/DL
WBC # BLD AUTO: 5.7 10E9/L (ref 4–11)

## 2020-07-24 PROCEDURE — 80048 BASIC METABOLIC PNL TOTAL CA: CPT | Performed by: INTERNAL MEDICINE

## 2020-07-24 PROCEDURE — 36415 COLL VENOUS BLD VENIPUNCTURE: CPT | Performed by: INTERNAL MEDICINE

## 2020-07-24 PROCEDURE — 84460 ALANINE AMINO (ALT) (SGPT): CPT | Performed by: INTERNAL MEDICINE

## 2020-07-24 PROCEDURE — 85025 COMPLETE CBC W/AUTO DIFF WBC: CPT | Performed by: INTERNAL MEDICINE

## 2020-07-24 PROCEDURE — 80061 LIPID PANEL: CPT | Performed by: INTERNAL MEDICINE

## 2020-07-24 PROCEDURE — 83036 HEMOGLOBIN GLYCOSYLATED A1C: CPT | Performed by: INTERNAL MEDICINE

## 2020-07-26 ASSESSMENT — ENCOUNTER SYMPTOMS
MYALGIAS: 0
CHILLS: 0
CONSTIPATION: 0
HEMATURIA: 0
SORE THROAT: 0
JOINT SWELLING: 0
NERVOUS/ANXIOUS: 0
FEVER: 0
WEAKNESS: 0
EYE PAIN: 0
DYSURIA: 0
HEARTBURN: 0
ARTHRALGIAS: 1
PARESTHESIAS: 0
COUGH: 0
ABDOMINAL PAIN: 0
NAUSEA: 0
PALPITATIONS: 0
SHORTNESS OF BREATH: 0
HEADACHES: 0
HEMATOCHEZIA: 0
FREQUENCY: 0
DIARRHEA: 0
DIZZINESS: 0
BREAST MASS: 0

## 2020-07-26 ASSESSMENT — ACTIVITIES OF DAILY LIVING (ADL): CURRENT_FUNCTION: NO ASSISTANCE NEEDED

## 2020-07-26 NOTE — RESULT ENCOUNTER NOTE
Alfreda Cruz are your results.  Your labs are normal/stable at this time.     Please call  with any questions.  We can also discuss any questions regarding these labs at your next scheduled visit.    Sincerely,    Brandi Sims M.D.

## 2020-07-27 ENCOUNTER — OFFICE VISIT (OUTPATIENT)
Dept: FAMILY MEDICINE | Facility: CLINIC | Age: 75
End: 2020-07-27
Payer: MEDICARE

## 2020-07-27 VITALS
OXYGEN SATURATION: 97 % | HEART RATE: 73 BPM | TEMPERATURE: 98 F | BODY MASS INDEX: 35.51 KG/M2 | DIASTOLIC BLOOD PRESSURE: 79 MMHG | WEIGHT: 193 LBS | SYSTOLIC BLOOD PRESSURE: 137 MMHG | HEIGHT: 62 IN

## 2020-07-27 DIAGNOSIS — J45.30 MILD PERSISTENT ASTHMA WITHOUT COMPLICATION: ICD-10-CM

## 2020-07-27 DIAGNOSIS — Z00.00 ENCOUNTER FOR MEDICARE ANNUAL WELLNESS EXAM: ICD-10-CM

## 2020-07-27 DIAGNOSIS — E78.5 HYPERLIPIDEMIA LDL GOAL <130: ICD-10-CM

## 2020-07-27 DIAGNOSIS — H54.42A3 BLINDNESS LEFT EYE CATEGORY 3, NORMAL VISION RIGHT EYE: ICD-10-CM

## 2020-07-27 DIAGNOSIS — Z12.11 SCREEN FOR COLON CANCER: ICD-10-CM

## 2020-07-27 DIAGNOSIS — H61.21 IMPACTED CERUMEN OF RIGHT EAR: ICD-10-CM

## 2020-07-27 DIAGNOSIS — Z11.59 ENCOUNTER FOR SCREENING FOR OTHER VIRAL DISEASES: ICD-10-CM

## 2020-07-27 DIAGNOSIS — I10 HYPERTENSION GOAL BP (BLOOD PRESSURE) < 140/90: ICD-10-CM

## 2020-07-27 DIAGNOSIS — F51.01 PRIMARY INSOMNIA: ICD-10-CM

## 2020-07-27 DIAGNOSIS — I48.0 PAROXYSMAL A-FIB (H): ICD-10-CM

## 2020-07-27 DIAGNOSIS — Z23 NEED FOR PNEUMOCOCCAL VACCINATION: ICD-10-CM

## 2020-07-27 DIAGNOSIS — Z00.01 ENCOUNTER FOR GENERAL ADULT MEDICAL EXAMINATION WITH ABNORMAL FINDINGS: Primary | ICD-10-CM

## 2020-07-27 LAB
SARS-COV-2 RNA SPEC QL NAA+PROBE: ABNORMAL
SPECIMEN SOURCE: ABNORMAL

## 2020-07-27 PROCEDURE — U0003 INFECTIOUS AGENT DETECTION BY NUCLEIC ACID (DNA OR RNA); SEVERE ACUTE RESPIRATORY SYNDROME CORONAVIRUS 2 (SARS-COV-2) (CORONAVIRUS DISEASE [COVID-19]), AMPLIFIED PROBE TECHNIQUE, MAKING USE OF HIGH THROUGHPUT TECHNOLOGIES AS DESCRIBED BY CMS-2020-01-R: HCPCS | Performed by: SURGERY

## 2020-07-27 PROCEDURE — 90732 PPSV23 VACC 2 YRS+ SUBQ/IM: CPT | Performed by: INTERNAL MEDICINE

## 2020-07-27 PROCEDURE — G0439 PPPS, SUBSEQ VISIT: HCPCS | Performed by: INTERNAL MEDICINE

## 2020-07-27 PROCEDURE — G0009 ADMIN PNEUMOCOCCAL VACCINE: HCPCS | Performed by: INTERNAL MEDICINE

## 2020-07-27 RX ORDER — ZOLPIDEM TARTRATE 5 MG/1
TABLET ORAL
Qty: 30 TABLET | Refills: 3 | Status: SHIPPED | OUTPATIENT
Start: 2020-07-27 | End: 2021-07-29

## 2020-07-27 RX ORDER — LISINOPRIL 10 MG/1
10 TABLET ORAL DAILY
Qty: 90 TABLET | Refills: 3 | Status: SHIPPED | OUTPATIENT
Start: 2020-07-27 | End: 2021-07-29

## 2020-07-27 ASSESSMENT — ENCOUNTER SYMPTOMS
DIZZINESS: 0
DYSURIA: 0
CONSTIPATION: 0
NAUSEA: 0
COUGH: 0
CHILLS: 0
MYALGIAS: 0
WEAKNESS: 0
PALPITATIONS: 0
ARTHRALGIAS: 1
HEARTBURN: 0
HEADACHES: 0
ABDOMINAL PAIN: 0
DIARRHEA: 0
SORE THROAT: 0
JOINT SWELLING: 0
SHORTNESS OF BREATH: 0
NERVOUS/ANXIOUS: 0
HEMATOCHEZIA: 0
HEMATURIA: 0
EYE PAIN: 0
PARESTHESIAS: 0
FREQUENCY: 0
FEVER: 0
BREAST MASS: 0

## 2020-07-27 ASSESSMENT — MIFFLIN-ST. JEOR: SCORE: 1310.75

## 2020-07-27 ASSESSMENT — ACTIVITIES OF DAILY LIVING (ADL): CURRENT_FUNCTION: NO ASSISTANCE NEEDED

## 2020-07-27 NOTE — PROGRESS NOTES
"SUBJECTIVE:   Alfreda Baxter is a 75 year old female who presents for Preventive Visit.    76 y/o F here for AFE.  H/o  asthma, HTN, paroxysmal atrial fib on Xarelto, L eye blindness due to central retinal artery occlusion 2 Y ago.  Recent labs are normal, discussed with her.     Colonoscopy due, scheduled for this week, she will hold xarelto  Has had a couple episodes of gum bleeding, usually while in bed .   Not generalized but from left upper incisor and R lower incisor.    She discussed with cardiologist, considered changing to elliquis.    Are you in the first 12 months of your Medicare coverage?  No    Healthy Habits:     In general, how would you rate your overall health?  Good    Frequency of exercise:  2-3 days/week    Duration of exercise:  15-30 minutes    Do you usually eat at least 4 servings of fruit and vegetables a day, include whole grains    & fiber and avoid regularly eating high fat or \"junk\" foods?  No    Taking medications regularly:  Yes    Medication side effects:  Other    Ability to successfully perform activities of daily living:  No assistance needed    Home Safety:  No safety concerns identified    Hearing Impairment:  Difficulty following a conversation in a noisy restaurant or crowded room, difficulty following dialogue in the theater, need to ask people to speak up or repeat themselves and difficulty understanding soft or whispered speech    In the past 6 months, have you been bothered by leaking of urine? Yes    In general, how would you rate your overall mental or emotional health?  Good      PHQ-2 Total Score: 0    Additional concerns today:  No    Do you feel safe in your environment? YES    Have you ever done Advance Care Planning? (For example, a Health Directive, POLST, or a discussion with a medical provider or your loved ones about your wishes): Yes, advance care planning is on file.      Fall risk       Cognitive Screening   1) Repeat 3 items (Leader, Season, Table)    2) " Clock draw: NORMAL  3) 3 item recall: Recalls 3 objects  Results: 3 items recalled: COGNITIVE IMPAIRMENT LESS LIKELY    Mini-CogTM Copyright [employer]. Licensed by the author for use in [facility]; reprinted with permission ([email or web address]). All rights reserved.      Do you have sleep apnea, excessive snoring or daytime drowsiness?: yes-daytime drowsiness    Reviewed and updated as needed this visit by clinical staff         Reviewed and updated as needed this visit by Provider        Social History     Tobacco Use     Smoking status: Former Smoker     Packs/day: 0.10     Years: 1.00     Pack years: 0.10     Types: Cigarettes     Start date: 1963     Last attempt to quit: 1965     Years since quittin.6     Smokeless tobacco: Never Used   Substance Use Topics     Alcohol use: Not Currently     Comment: one a month     If you drink alcohol do you typically have >3 drinks per day or >7 drinks per week? No    Alcohol Use 2020   Prescreen: >3 drinks/day or >7 drinks/week? Not Applicable   Prescreen: >3 drinks/day or >7 drinks/week? -           Discuss xarelto  Side effects    Current providers sharing in care for this patient include:   Patient Care Team:  Brandi Sims MD as PCP - General  Brandi Sims MD as Assigned PCP    The following health maintenance items are reviewed in Epic and correct as of today:  Health Maintenance   Topic Date Due     PNEUMOCOCCAL IMMUNIZATION 65+ LOW/MEDIUM RISK (2 of 2 - PPSV23) 2019     COLORECTAL CANCER SCREENING  2019     ASTHMA CONTROL TEST  2020     FALL RISK ASSESSMENT  2020     MEDICARE ANNUAL WELLNESS VISIT  2020     ASTHMA ACTION PLAN  2020     INFLUENZA VACCINE (1) 2020     ADVANCE CARE PLANNING  10/12/2020     EYE EXAM  06/10/2021     DEXA  2021     LIPID  2025     DTAP/TDAP/TD IMMUNIZATION (3 - Td) 2028     PHQ-2  Completed     ZOSTER IMMUNIZATION  Completed     IPV  "IMMUNIZATION  Aged Out     MENINGITIS IMMUNIZATION  Aged Out     HEPATITIS B IMMUNIZATION  Aged Out       Review of Systems   Constitutional: Negative for chills and fever.   HENT: Positive for hearing loss. Negative for congestion, ear pain and sore throat.    Eyes: Positive for visual disturbance. Negative for pain.   Respiratory: Negative for cough and shortness of breath.    Cardiovascular: Negative for chest pain, palpitations and peripheral edema.   Gastrointestinal: Negative for abdominal pain, constipation, diarrhea, heartburn, hematochezia and nausea.   Breasts:  Negative for tenderness, breast mass and discharge.   Genitourinary: Negative for dysuria, frequency, genital sores, hematuria, pelvic pain, urgency, vaginal bleeding and vaginal discharge.   Musculoskeletal: Positive for arthralgias. Negative for joint swelling and myalgias.   Skin: Negative for rash.   Neurological: Negative for dizziness, weakness, headaches and paresthesias.   Psychiatric/Behavioral: Negative for mood changes. The patient is not nervous/anxious.           OBJECTIVE:   /79 (BP Location: Right arm, Patient Position: Sitting, Cuff Size: Adult Regular)   Pulse 73   Temp 98  F (36.7  C) (Oral)   Ht 1.562 m (5' 1.5\")   Wt 87.5 kg (193 lb)   SpO2 97%   BMI 35.88 kg/m   Estimated body mass index is 35.77 kg/m  as calculated from the following:    Height as of 8/10/19: 1.568 m (5' 1.75\").    Weight as of 1/21/20: 88 kg (194 lb).     Physical Exam     GENERAL APPEARANCE: healthy, alert and no distress  EYES: Eyes grossly normal to inspection, PERRL and conjunctivae and sclerae normal  HENT: ear canals and TM's normal, nose and mouth without ulcers or lesions, oropharynx clear and oral mucous membranes moist.   Some gum recession, no gum lesions.   HENT: right ear: occluded with wax  NECK: no adenopathy, no asymmetry, masses, or scars and thyroid normal to palpation  RESP: lungs clear to auscultation - no rales, rhonchi or " wheezes  BREAST: normal without masses, tenderness or nipple discharge and no palpable axillary masses or adenopathy  CV: regular rate and rhythm, normal S1 S2, no S3 or S4, no murmur, click or rub, no peripheral edema and peripheral pulses strong  ABDOMEN: soft, nontender, no hepatosplenomegaly, no masses and bowel sounds normal   (female): normal female external genitalia, normal urethral meatus, vaginal mucosal atrophy noted without masses or abnormal discharge   (female): inspection only  MS: no musculoskeletal defects are noted and gait is age appropriate without ataxia  MS: s/p L TKA  SKIN: no suspicious lesions or rashes  NEURO: Normal strength and tone, sensory exam grossly normal, mentation intact and speech normal  PSYCH: mentation appears normal and affect normal/bright    Diagnostic Test Results:  Labs reviewed in Epic - BMP CBC/plts, FLP all normal(LDL 73)    ASSESSMENT / PLAN:       ICD-10-CM    1. Encounter for general adult medical examination with abnormal findings  Z00.01    2. Paroxysmal A-fib (H)  I48.0    3. Mild persistent asthma without complication  J45.30    4. Hypertension goal BP (blood pressure) < 140/90  I10 lisinopril (ZESTRIL) 10 MG tablet   5. Hyperlipidemia LDL goal <130  E78.5    6. Blindness left eye category 3, normal vision right eye  H54.42A3    7. Screen for colon cancer  Z12.11    8. Encounter for Medicare annual wellness exam  Z00.00    9. Primary insomnia  F51.01 zolpidem (AMBIEN) 5 MG tablet   10. Need for pneumococcal vaccination  Z23 Pneumococcal vaccine 23 valent PPSV23  (Pneumovax) [57370]   11. Impacted cerumen of right ear  H61.21 HC REMOVAL IMPACTED CERUMEN IRRIGATION/LVG UNILAT       For now she will try changing mechanics of her oral hygeine.. may consider changing to elliquis in future (per cardiology)..   She is on xarelto due to intermittent afib and h/o central retinal occlusion.     COUNSELING:  Reviewed preventive health counseling, as reflected in  "patient instructions       Immunizations    Vaccinated for: Pneumococcal          Estimated body mass index is 35.77 kg/m  as calculated from the following:    Height as of 8/10/19: 1.568 m (5' 1.75\").    Weight as of 1/21/20: 88 kg (194 lb).         reports that she quit smoking about 55 years ago. Her smoking use included cigarettes. She started smoking about 57 years ago. She has a 0.10 pack-year smoking history. She has never used smokeless tobacco.      Appropriate preventive services were discussed with this patient, including applicable screening as appropriate for cardiovascular disease, diabetes, osteopenia/osteoporosis, and glaucoma.  As appropriate for age/gender, discussed screening for colorectal cancer, prostate cancer, breast cancer, and cervical cancer. Checklist reviewing preventive services available has been given to the patient.    Reviewed patients plan of care and provided an AVS. The Basic Care Plan (routine screening as documented in Health Maintenance) for Alfreda meets the Care Plan requirement. This Care Plan has been established and reviewed with the Patient.    Counseling Resources:  ATP IV Guidelines  Pooled Cohorts Equation Calculator  Breast Cancer Risk Calculator  FRAX Risk Assessment  ICSI Preventive Guidelines  Dietary Guidelines for Americans, 2010  [facility]'s MyPlate  ASA Prophylaxis  Lung CA Screening    Brandi Sims MD  [facility]    Identified Health Risks:  "

## 2020-07-27 NOTE — PATIENT INSTRUCTIONS
Patient Education   Personalized Prevention Plan  You are due for the preventive services outlined below.  Your care team is available to assist you in scheduling these services.  If you have already completed any of these items, please share that information with your care team to update in your medical record.  Health Maintenance Due   Topic Date Due     Pneumococcal Vaccine (2 of 2 - PPSV23) 06/28/2019     Colorectal Cancer Screening  11/21/2019     Asthma Control Test  01/11/2020     FALL RISK ASSESSMENT  07/11/2020     Annual Wellness Visit  07/11/2020     Asthma Action Plan - yearly  07/11/2020

## 2020-07-27 NOTE — NURSING NOTE
Prior to immunization administration, verified patients identity using patient s name and date of birth. Please see Immunization Activity for additional information.     Screening Questionnaire for Adult Immunization    Are you sick today?   No   Do you have allergies to medications, food, a vaccine component or latex?   No   Have you ever had a serious reaction after receiving a vaccination?   No   Do you have a long-term health problem with heart, lung, kidney, or metabolic disease (e.g., diabetes), asthma, a blood disorder, no spleen, complement component deficiency, a cochlear implant, or a spinal fluid leak?  Are you on long-term aspirin therapy?   No   Do you have cancer, leukemia, HIV/AIDS, or any other immune system problem?   No   Do you have a parent, brother, or sister with an immune system problem?   No   In the past 3 months, have you taken medications that affect  your immune system, such as prednisone, other steroids, or anticancer drugs; drugs for the treatment of rheumatoid arthritis, Crohn s disease, or psoriasis; or have you had radiation treatments?   No   Have you had a seizure, or a brain or other nervous system problem?   No   During the past year, have you received a transfusion of blood or blood    products, or been given immune (gamma) globulin or antiviral drug?   No   For women: Are you pregnant or is there a chance you could become       pregnant during the next month?   No   Have you received any vaccinations in the past 4 weeks?   No     Immunization questionnaire answers were all negative.        Per orders of Dr. Sims, injection of Pneumovax 23 given by Salome Grijalva CMA. Patient instructed to remain in clinic for 15 minutes afterwards, and to report any adverse reaction to me immediately.       Screening performed by Salome Grijalva CMA on 7/27/2020 at 1:59 PM.

## 2020-07-28 ENCOUNTER — MYC MEDICAL ADVICE (OUTPATIENT)
Dept: FAMILY MEDICINE | Facility: CLINIC | Age: 75
End: 2020-07-28

## 2020-07-28 LAB
MDC_IDC_EPISODE_DTM: NORMAL
MDC_IDC_EPISODE_DURATION: 120 S
MDC_IDC_EPISODE_ID: 231
MDC_IDC_EPISODE_TYPE: NORMAL
MDC_IDC_MSMT_BATTERY_STATUS: NORMAL
MDC_IDC_PG_IMPLANT_DTM: NORMAL
MDC_IDC_PG_MFG: NORMAL
MDC_IDC_PG_MODEL: NORMAL
MDC_IDC_PG_SERIAL: NORMAL
MDC_IDC_PG_TYPE: NORMAL
MDC_IDC_SESS_CLINIC_NAME: NORMAL
MDC_IDC_SESS_DTM: NORMAL
MDC_IDC_SESS_TYPE: NORMAL
MDC_IDC_SET_ZONE_DETECTION_INTERVAL: 2000 MS
MDC_IDC_SET_ZONE_DETECTION_INTERVAL: 3000 MS
MDC_IDC_SET_ZONE_DETECTION_INTERVAL: 380 MS
MDC_IDC_SET_ZONE_TYPE: NORMAL
MDC_IDC_STAT_AT_BURDEN_PERCENT: 0 %
MDC_IDC_STAT_AT_DTM_END: NORMAL
MDC_IDC_STAT_AT_DTM_START: NORMAL
MDC_IDC_STAT_EPISODE_RECENT_COUNT: 0
MDC_IDC_STAT_EPISODE_RECENT_COUNT: 1
MDC_IDC_STAT_EPISODE_RECENT_COUNT: 9
MDC_IDC_STAT_EPISODE_RECENT_COUNT_DTM_END: NORMAL
MDC_IDC_STAT_EPISODE_RECENT_COUNT_DTM_START: NORMAL
MDC_IDC_STAT_EPISODE_TOTAL_COUNT: 0
MDC_IDC_STAT_EPISODE_TOTAL_COUNT: 21
MDC_IDC_STAT_EPISODE_TOTAL_COUNT: 221
MDC_IDC_STAT_EPISODE_TOTAL_COUNT: 3
MDC_IDC_STAT_EPISODE_TOTAL_COUNT_DTM_END: NORMAL
MDC_IDC_STAT_EPISODE_TOTAL_COUNT_DTM_START: NORMAL
MDC_IDC_STAT_EPISODE_TYPE: NORMAL

## 2020-07-28 ASSESSMENT — ASTHMA QUESTIONNAIRES: ACT_TOTALSCORE: 25

## 2020-07-29 ENCOUNTER — MYC MEDICAL ADVICE (OUTPATIENT)
Dept: FAMILY MEDICINE | Facility: CLINIC | Age: 75
End: 2020-07-29

## 2020-07-29 ENCOUNTER — TELEPHONE (OUTPATIENT)
Dept: LAB | Facility: CLINIC | Age: 75
End: 2020-07-29

## 2020-07-29 NOTE — TELEPHONE ENCOUNTER
"Coronavirus (COVID-19) Notification    Caller Name (Patient, parent, daughter/son, grandparent, etc)  Malinda Baxter    Reason for call  Notify of Positive Coronavirus (COVID-19) lab results, assess symptoms,  review [facility] recommendations    Lab Result    Lab test:  2019-nCoV rRt-PCR or SARS-CoV-2 PCR    Oropharyngeal AND/OR nasopharyngeal swabs is POSITIVE for 2019-nCoV RNA/SARS-COV-2 PCR (COVID-19 virus)    RN Recommendations/Instructions per [facility] Coronavirus COVID-19 recommendations    Brief introduction script  Introduce self then review script:  \"I am calling on behalf of [employer].  We were notified that your Coronavirus test (COVID-19) for was POSITIVE for the virus.  I have some information to relay to you but first I wanted to mention that the [place] Dept of Health will be contacting you shortly [it's possible MD already called Patient] to talk to you more about how you are feeling and other people you have had contact with who might now also have the virus.  Also, [facility] is Partnering with the [facility] for Covid-19 research, you may be contacted directly by research staff.\"    Assessment (Inquire about Patient's current symptoms)   Assessment   Current Symptoms at time of phone call: (if no symptoms, document No symptoms] No symptoms   Symptoms onset (if applicable) n/a     If at time of call, Patients symptoms hare worsened, the Patient should contact 911 or have someone drive them to Emergency Dept promptly:      If Patient calling 911, inform 911 personal that you have tested positive for the Coronavirus (COVID-19).  Place mask on and await 911 to arrive.    If Emergency Dept, If possible, please have another adult drive you to the Emergency Dept but you need to wear mask when in contact with other people.      Review information with Patient    Your result was positive. This means you have COVID-19 (coronavirus).  We have sent you a letter that reviews the " information that I'll be reviewing with you now.    How can I protect others?    If you have symptoms: stay home and away from others (self-isolate) until:    You've had no fever--and no medicine that reduces fever--for 3 full days (72 hours). And      Your other symptoms have gotten better. For example, your cough or breathing has improved. And     At least 10 days have passed since your symptoms started.    If you don't have symptoms: Stay home and away from others (self-isolate) until at least 10 days have passed since your first positive COVID-19 test. (Date test collected)    During this time:    Stay in your own room, including for meals. Use your own bathroom if you can.    Stay away from others in your home. No hugging, kissing or shaking hands. No visitors.     Don't go to work, school or anywhere else.     Clean  high touch  surfaces often (doorknobs, counters, handles, etc.). Use a household cleaning spray or wipes. You'll find a full list on the EPA website at www.epa.gov/pesticide-registration/list-n-disinfectants-use-against-sars-cov-2.     Cover your mouth and nose with a mask, tissue or washcloth to avoid spreading germs.    Wash your hands and face often with soap and water.    Caregivers in these groups are at risk for severe illness due to COVID-19:  o People 65 years and older  o People who live in a nursing home or long-term care facility  o People with chronic disease (lung, heart, cancer, diabetes, kidney, liver, immunologic)  o People who have a weakened immune system, including those who:  - Are in cancer treatment  - Take medicine that weakens the immune system, such as corticosteroids  - Had a bone marrow or organ transplant  - Have an immune deficiency  - Have poorly controlled HIV or AIDS  - Are obese (body mass index of 40 or higher)  - Smoke regularly    Caregivers should wear gloves while washing dishes, handling laundry and cleaning bedrooms and bathrooms.    Wash and dry laundry  with special caution. Don't shake dirty laundry, and use the warmest water setting you can.    If you have a weakened immune system, ask your doctor about other actions you should take.    For more tips, go to www.cdc.gov/coronavirus/2019-ncov/downloads/10Things.pdf.    You should not go back to work until you meet the guidelines above for ending your home isolation. You should meet these along with any other guidelines that your employer has.    Employers: This document serves as formal notice of your employee's medical guidelines for going back to work. They must meet the above guidelines before going back to work in person.    How can I take care of myself?    1. Get lots of rest. Drink extra fluids (unless a doctor has told you not to).    2. Take Tylenol (acetaminophen) for fever or pain. If you have liver or kidney problems, ask your family doctor if it's okay to take Tylenol.     Take either:     650 mg (two 325 mg pills) every 4 to 6 hours, or     1,000 mg (two 500 mg pills) every 8 hours as needed.     Note: Don't take more than 3,000 mg in one day. Acetaminophen is found in many medicines (both prescribed and over-the-counter medicines). Read all labels to be sure you don't take too much.    For children, check the Tylenol bottle for the right dose (based on their age or weight).    3. If you have other health problems (like cancer, heart failure, an organ transplant or severe kidney disease): Call your specialty clinic if you don't feel better in the next 2 days.    4. Know when to call 911: Emergency warning signs include:    Trouble breathing or shortness of breath    Pain or pressure in the chest that doesn't go away    Feeling confused like you haven't felt before, or not being able to wake up    Bluish-colored lips or face    5. Sign up for [employer]. We know it's scary to hear that you have COVID-19. We want to track your symptoms to make sure you're okay over the next 2 weeks. Please look for an  email from [employer]--this is a free, online program that we'll use to keep in touch. To sign up, follow the link in the email. Learn more at [email or web address].    Where can I get more information?    [facility]: [email or web address]    Coronavirus Basics: www.health.[facility].[place]./diseases/coronavirus/basics.html    What to Do If You're Sick: www.cdc.gov/coronavirus/2019-ncov/about/steps-when-sick.html    Ending Home Isolation: www.cdc.gov/coronavirus/2019-ncov/hcp/disposition-in-home-patients.html     Caring for Someone with COVID-19: www.cdc.gov/coronavirus/2019-ncov/if-you-are-sick/care-for-someone.html     [facility] clinical trials (COVID-19 research studies): clinicalaffairs.[facility]-clinical-trials     A Positive COVID-19 letter will be sent via [employer] or the mail.    [Name]  Sarah Schlatter RN

## 2020-08-24 ENCOUNTER — MYC MEDICAL ADVICE (OUTPATIENT)
Dept: CARDIOLOGY | Facility: CLINIC | Age: 75
End: 2020-08-24

## 2020-08-24 ENCOUNTER — TELEPHONE (OUTPATIENT)
Dept: SURGERY | Facility: CLINIC | Age: 75
End: 2020-08-24

## 2020-08-24 DIAGNOSIS — I48.0 PAF (PAROXYSMAL ATRIAL FIBRILLATION) (H): Primary | ICD-10-CM

## 2020-08-24 NOTE — TELEPHONE ENCOUNTER
Patient left a message on the Surgery line.    She would like to schedule a colonoscopy for Sept. 3 with Dr. Cleaning.      Routing message to Surgery schedulers    Colleen Seaman MA

## 2020-08-25 DIAGNOSIS — Z11.59 ENCOUNTER FOR SCREENING FOR OTHER VIRAL DISEASES: Primary | ICD-10-CM

## 2020-09-08 DIAGNOSIS — I48.0 PAF (PAROXYSMAL ATRIAL FIBRILLATION) (H): ICD-10-CM

## 2020-09-08 DIAGNOSIS — Z11.59 ENCOUNTER FOR SCREENING FOR OTHER VIRAL DISEASES: ICD-10-CM

## 2020-09-08 LAB
SARS-COV-2 RNA SPEC QL NAA+PROBE: NOT DETECTED
SPECIMEN SOURCE: NORMAL

## 2020-09-08 PROCEDURE — U0003 INFECTIOUS AGENT DETECTION BY NUCLEIC ACID (DNA OR RNA); SEVERE ACUTE RESPIRATORY SYNDROME CORONAVIRUS 2 (SARS-COV-2) (CORONAVIRUS DISEASE [COVID-19]), AMPLIFIED PROBE TECHNIQUE, MAKING USE OF HIGH THROUGHPUT TECHNOLOGIES AS DESCRIBED BY CMS-2020-01-R: HCPCS | Performed by: SURGERY

## 2020-09-10 ENCOUNTER — HOSPITAL ENCOUNTER (OUTPATIENT)
Facility: AMBULATORY SURGERY CENTER | Age: 75
Discharge: HOME | End: 2020-09-10
Attending: SURGERY | Admitting: SURGERY
Payer: MEDICARE

## 2020-09-10 VITALS
OXYGEN SATURATION: 98 % | DIASTOLIC BLOOD PRESSURE: 58 MMHG | HEART RATE: 79 BPM | RESPIRATION RATE: 20 BRPM | SYSTOLIC BLOOD PRESSURE: 119 MMHG

## 2020-09-10 LAB — COLONOSCOPY: NORMAL

## 2020-09-10 PROCEDURE — 45385 COLONOSCOPY W/LESION REMOVAL: CPT | Mod: PT

## 2020-09-10 PROCEDURE — G8918 PT W/O PREOP ORDER IV AB PRO: HCPCS

## 2020-09-10 PROCEDURE — 45380 COLONOSCOPY AND BIOPSY: CPT | Mod: 59 | Performed by: SURGERY

## 2020-09-10 PROCEDURE — G0500 MOD SEDAT ENDO SERVICE >5YRS: HCPCS | Performed by: SURGERY

## 2020-09-10 PROCEDURE — G8907 PT DOC NO EVENTS ON DISCHARG: HCPCS

## 2020-09-10 PROCEDURE — 45385 COLONOSCOPY W/LESION REMOVAL: CPT | Mod: PT | Performed by: SURGERY

## 2020-09-10 PROCEDURE — 45380 COLONOSCOPY AND BIOPSY: CPT | Mod: XS,PT

## 2020-09-10 RX ORDER — FLUMAZENIL 0.1 MG/ML
0.2 INJECTION, SOLUTION INTRAVENOUS
Status: CANCELLED | OUTPATIENT
Start: 2020-09-10 | End: 2020-09-10

## 2020-09-10 RX ORDER — ONDANSETRON 2 MG/ML
4 INJECTION INTRAMUSCULAR; INTRAVENOUS EVERY 6 HOURS PRN
Status: CANCELLED | OUTPATIENT
Start: 2020-09-10

## 2020-09-10 RX ORDER — NALOXONE HYDROCHLORIDE 0.4 MG/ML
.1-.4 INJECTION, SOLUTION INTRAMUSCULAR; INTRAVENOUS; SUBCUTANEOUS
Status: CANCELLED | OUTPATIENT
Start: 2020-09-10 | End: 2020-09-11

## 2020-09-10 RX ORDER — ONDANSETRON 4 MG/1
4 TABLET, ORALLY DISINTEGRATING ORAL EVERY 6 HOURS PRN
Status: CANCELLED | OUTPATIENT
Start: 2020-09-10

## 2020-09-10 RX ORDER — FENTANYL CITRATE 50 UG/ML
INJECTION, SOLUTION INTRAMUSCULAR; INTRAVENOUS PRN
Status: DISCONTINUED | OUTPATIENT
Start: 2020-09-10 | End: 2020-09-10 | Stop reason: HOSPADM

## 2020-09-10 RX ORDER — LIDOCAINE 40 MG/G
CREAM TOPICAL
Status: DISCONTINUED | OUTPATIENT
Start: 2020-09-10 | End: 2020-09-11 | Stop reason: HOSPADM

## 2020-09-14 LAB — COPATH REPORT: NORMAL

## 2020-10-02 ENCOUNTER — TELEPHONE (OUTPATIENT)
Dept: CARDIOLOGY | Facility: CLINIC | Age: 75
End: 2020-10-02

## 2020-10-02 NOTE — TELEPHONE ENCOUNTER
Recommend to hold for 3 days prior to procedure and resume ASAP after surgery once she is OK from surgical standpoint.    DR.CAN

## 2020-10-02 NOTE — TELEPHONE ENCOUNTER
See my chart communication with pt.   Scheduled for total knee replacement on 10/28/20,   She was asking about number of days to hold eliquis prior to surgery.     Last cardiology appt 1/2020- offered and encouraged cardiology consult to clear also- schd with dr esposito on 10/6- pt declined appointment- stated was not necessary.    Will route to Dr Esposito to review number of days to hold Eliquis prior to total knee replacement.

## 2020-10-15 ENCOUNTER — OFFICE VISIT (OUTPATIENT)
Dept: FAMILY MEDICINE | Facility: CLINIC | Age: 75
End: 2020-10-15
Payer: MEDICARE

## 2020-10-15 VITALS
HEART RATE: 75 BPM | WEIGHT: 193 LBS | DIASTOLIC BLOOD PRESSURE: 79 MMHG | TEMPERATURE: 98.7 F | BODY MASS INDEX: 35.88 KG/M2 | OXYGEN SATURATION: 97 % | SYSTOLIC BLOOD PRESSURE: 130 MMHG

## 2020-10-15 DIAGNOSIS — I48.0 PAROXYSMAL A-FIB (H): ICD-10-CM

## 2020-10-15 DIAGNOSIS — Z96.652 STATUS POST TOTAL LEFT KNEE REPLACEMENT: ICD-10-CM

## 2020-10-15 DIAGNOSIS — H34.12 CENTRAL RETINAL ARTERY OCCLUSION OF LEFT EYE: ICD-10-CM

## 2020-10-15 DIAGNOSIS — Z01.818 PREOP GENERAL PHYSICAL EXAM: Primary | ICD-10-CM

## 2020-10-15 DIAGNOSIS — J45.20 MILD INTERMITTENT ASTHMA WITHOUT COMPLICATION: ICD-10-CM

## 2020-10-15 DIAGNOSIS — I10 HYPERTENSION GOAL BP (BLOOD PRESSURE) < 140/90: ICD-10-CM

## 2020-10-15 DIAGNOSIS — M17.11 PRIMARY OSTEOARTHRITIS OF RIGHT KNEE: ICD-10-CM

## 2020-10-15 DIAGNOSIS — R73.09 ELEVATED GLUCOSE: ICD-10-CM

## 2020-10-15 DIAGNOSIS — Z79.01 LONG TERM CURRENT USE OF ANTICOAGULANT THERAPY: ICD-10-CM

## 2020-10-15 LAB
ANION GAP SERPL CALCULATED.3IONS-SCNC: 2 MMOL/L (ref 3–14)
BUN SERPL-MCNC: 24 MG/DL (ref 7–30)
CALCIUM SERPL-MCNC: 9.6 MG/DL (ref 8.5–10.1)
CHLORIDE SERPL-SCNC: 106 MMOL/L (ref 94–109)
CO2 SERPL-SCNC: 33 MMOL/L (ref 20–32)
CREAT SERPL-MCNC: 0.7 MG/DL (ref 0.52–1.04)
ERYTHROCYTE [DISTWIDTH] IN BLOOD BY AUTOMATED COUNT: 13.2 % (ref 10–15)
GFR SERPL CREATININE-BSD FRML MDRD: 84 ML/MIN/{1.73_M2}
GLUCOSE SERPL-MCNC: 100 MG/DL (ref 70–99)
HBA1C MFR BLD: 5.3 % (ref 0–5.6)
HCT VFR BLD AUTO: 42.5 % (ref 35–47)
HGB BLD-MCNC: 14.1 G/DL (ref 11.7–15.7)
MCH RBC QN AUTO: 30 PG (ref 26.5–33)
MCHC RBC AUTO-ENTMCNC: 33.2 G/DL (ref 31.5–36.5)
MCV RBC AUTO: 90 FL (ref 78–100)
PLATELET # BLD AUTO: 252 10E9/L (ref 150–450)
POTASSIUM SERPL-SCNC: 4.6 MMOL/L (ref 3.4–5.3)
RBC # BLD AUTO: 4.7 10E12/L (ref 3.8–5.2)
SODIUM SERPL-SCNC: 141 MMOL/L (ref 133–144)
WBC # BLD AUTO: 5.9 10E9/L (ref 4–11)

## 2020-10-15 PROCEDURE — 83036 HEMOGLOBIN GLYCOSYLATED A1C: CPT | Performed by: INTERNAL MEDICINE

## 2020-10-15 PROCEDURE — 93000 ELECTROCARDIOGRAM COMPLETE: CPT | Performed by: INTERNAL MEDICINE

## 2020-10-15 PROCEDURE — 85027 COMPLETE CBC AUTOMATED: CPT | Performed by: INTERNAL MEDICINE

## 2020-10-15 PROCEDURE — 36415 COLL VENOUS BLD VENIPUNCTURE: CPT | Performed by: INTERNAL MEDICINE

## 2020-10-15 PROCEDURE — 80048 BASIC METABOLIC PNL TOTAL CA: CPT | Performed by: INTERNAL MEDICINE

## 2020-10-15 PROCEDURE — 99214 OFFICE O/P EST MOD 30 MIN: CPT | Performed by: INTERNAL MEDICINE

## 2020-10-15 NOTE — PROGRESS NOTES
PRE-OP EVALUATION:  Today's date: 10/15/2020    Alfreda Baxter (: 1945) presents for pre-operative evaluation assessment as requested by  ***.  She requires evaluation and anesthesia risk assessment prior to undergoing surgery/procedure for treatment of *** .    {PREOP QUESTIONNAIRE OPTIONS (by MA):740689}    HPI:     HPI related to upcoming procedure: ***      {Ch. Problems:038296}    MEDICAL HISTORY:     Patient Active Problem List    Diagnosis Date Noted     Ocular hypertension of right eye, treated 2019     Priority: Medium     Obesity (BMI 35.0-39.9) with comorbidity (H) 2019     Priority: Medium     Blind left eye 2019     Priority: Medium     Due to central retinal occlusion       Paroxysmal A-fib (H) 2019     Priority: Medium     Per loop recorder, 2018    On Rivaroxaban.   H/o R central retinal artery occlusion       Long term current use of anticoagulant therapy 2019     Priority: Medium     Neovascular glaucoma of left eye, moderate stage 2019     Priority: Medium     Paroxysmal ventricular tachycardia (H) 2018     Priority: Medium     Added automatically from request for surgery [id number]       PFO (patent foramen ovale) 2018     Priority: Medium     Added automatically from request for surgery [id number]       Central retinal artery occlusion of left eye 09/15/2018     Priority: Medium     Posterior vitreous detachment of both eyes 09/15/2018     Priority: Medium     overweight HCC 2018     Priority: Medium     Primary insomnia 2018     Priority: Medium     Ambien.....   Trazodone failed (morning drowsiness).  Benadryl failed        Acute idiopathic gout involving toe of left foot 2018     Priority: Medium     Mild intermittent asthma without complication 2018     Priority: Medium     Combined forms of age-related cataract, mild-mod, both eyes 2017     Priority: Medium     Blepharitis, both eyes 2016      Priority: Medium     Status post total left knee replacement 01/19/2016     Priority: Medium     11/2015       Acute idiopathic gout of left foot 11/04/2015     Priority: Medium     Primary osteoarthritis of both knees 09/10/2015     Priority: Medium     Mild persistent asthma 12/12/2013     Priority: Medium     Adenomatous colon polyp 06/05/2013     Priority: Medium      Colonoscopy due 2025       Toe pain 12/10/2012     Priority: Medium     Treats with prednisone (as if gout) about twice a year (12/10/12, [facility])       Encounter for general adult medical examination with abnormal findings 12/08/2011     Priority: Medium     Advance Care Planning 10/12/2015: Followup facilitation and documentation of choices:  Alfreda Baxter presented for follow-up session regarding ACP at the clinic.  She was accompanied by Vaughn, her , who is the health care agent.  Previous ACP discussions reviewed and questions answered. At this time she has completed a HCD reflecting current values, goals, and choices. Health Care Agent understands responsibility of role and choices.  Documents completed at this visit: HCD. Signed and notarized. Validation form completed and sent with copy of document to be scanned. Confirmed/documented designated decision maker(s). See permanent comments of demographics in clinical tab. Confirmed current code status reflects current choices. View document(s) and details by clicking on code status.  Added by Megha Allred on 10/12/2015  Advance Care Planning:   ACP Review and Resources Provided:  Reviewed chart for advance care plan.  Alfreda Baxter has no plan or code status on file. Discussed available resources and provided with information. Confirmed code status reflects current choices pending further ACP discussions.  Confirmed/documented designated decision maker(s). See permanent comments section of demographics in clinical tab. Added by Megha Allred on 12/16/2013  Discussed advance care  planning with patient; information given to patient to review. 12/8/2011        Lumbar spinal stenosis 12/08/2011     Priority: Medium     Sees Dr Sage       Hyperlipidemia LDL goal <130 12/31/2010     Priority: Medium     DJD (degenerative joint disease) 12/02/2010     Priority: Medium     Hypertension goal BP (blood pressure) < 140/90 12/02/2010     Priority: Medium      Past Medical History:   Diagnosis Date     Arrhythmia      Asthma     controlled with inhaler     Cataract      CRAO (central retinal artery occlusion), left      DJD (degenerative joint disease)     knees     GERD (gastroesophageal reflux disease)      Glaucoma      High cholesterol      HTN (hypertension)      Need for prophylactic hormone replacement therapy (postmenopausal)     off 11/04 after benign breast bx     Neovascular glaucoma of left eye, moderate stage 5/22/2019     PFO (patent foramen ovale)      Past Surgical History:   Procedure Laterality Date     ABDOMEN SURGERY  1976, 1978, 1995    2 C-sections,  total hysterectomy     APPENDECTOMY      age 7 yrs.     BREAST BIOPSY, RT/LT  02/17/04    left benign     BREAST SURGERY      see above     C TOTAL KNEE ARTHROPLASTY Left 11/2015    at [facility]     COLONOSCOPY  2013    needed q 3 yrs.     COLONOSCOPY N/A 9/10/2020    Procedure: Colonoscopy, With Polypectomy And Biopsy;  Surgeon: Brian Cleaning MD;  Location: [facility] OR     COLONOSCOPY WITH CO2 INSUFFLATION N/A 11/21/2016    Procedure: COLONOSCOPY WITH CO2 INSUFFLATION;  Surgeon: Brian Cleaning MD;  Location: [facility] OR     COLONOSCOPY WITH CO2 INSUFFLATION N/A 9/10/2020    Procedure: COLONOSCOPY, WITH CO2 INSUFFLATION;  Surgeon: Brian Cleaning MD;  Location: [facility] OR     EP COMPREHENSIVE EP STUDY N/A 12/31/2018    Procedure: LOOP RECORDER;  Surgeon: Buck Butterfield MD;  Location:  [facility]     GENITOURINARY SURGERY      see above     HYSTERECTOMY, PAP NO LONGER INDICATED       HYSTERECTOMY, POORNIMA   1997    bleeding fibroids     ORTHOPEDIC SURGERY  2009    meniscus repair     Current Outpatient Medications   Medication Sig Dispense Refill     acetaminophen (TYLENOL) 500 MG tablet Take 500-1,000 mg by mouth every 6 hours as needed.       albuterol (PROAIR HFA/PROVENTIL HFA/VENTOLIN HFA) 108 (90 Base) MCG/ACT inhaler Inhale 2 puffs into the lungs every 6 hours as needed for shortness of breath / dyspnea or wheezing 1 Inhaler 3     apixaban ANTICOAGULANT (ELIQUIS ANTICOAGULANT) 5 MG tablet Take 1 tablet (5 mg) by mouth 2 times daily 60 tablet 0     apixaban ANTICOAGULANT (ELIQUIS) 5 MG tablet Take 1 tablet (5 mg) by mouth 2 times daily 180 tablet 3     atorvastatin (LIPITOR) 40 MG tablet Take 0.5 tablets (20 mg) by mouth daily 90 tablet 3     B-COMPLEX OR Take  by mouth daily.       CALCIUM 500 +D OR one daily       hypromellose (ARTIFICIAL TEARS) 0.5 % SOLN ophthalmic solution Place 1 drop into both eyes 3 times daily       lisinopril (ZESTRIL) 10 MG tablet Take 1 tablet (10 mg) by mouth daily 90 tablet 3     multivitamin (OCUVITE) TABS tablet Take 1 tablet by mouth daily       rivaroxaban ANTICOAGULANT (XARELTO ANTICOAGULANT) 20 MG TABS tablet Take 1 tablet (20 mg) by mouth daily (with dinner) 90 tablet 3     S-Adenosylmethionine (GUILLERMO-E) 400 MG TABS Take 1 capsule by mouth daily        timolol maleate (TIMOPTIC) 0.5 % ophthalmic solution Place 1 drop Into the left eye every morning       zolpidem (AMBIEN) 5 MG tablet TAKE ONE TABLET BY MOUTH IN THE EVENING AS NEEDED FOR SLEEP 30 tablet 3     OTC products: {OTC ANALGESICS:178022}    Allergies   Allergen Reactions     Codeine Anaphylaxis     Niacin      No Clinical Screening - See Comments Other (See Comments)     senisitive to non steroidals  Aleve, ibuprofen celebrex cause bad stomach pains  senisitive to non steroidals  Aleve, ibuprofen celebrex cause bad stomach pains     Oxycodone Hives     Other reaction(s): Unknown  ... With facial swelling     Trazodone   "    nausea     Nsaids Nausea and Vomiting     Other reaction(s): Abdominal Pain      Latex Allergy: {YES/NO WITH DEFAULT:705670::\"NO\"}    Social History     Tobacco Use     Smoking status: Former Smoker     Packs/day: 0.10     Years: 1.00     Pack years: 0.10     Types: Cigarettes     Start date: 1963     Quit date: 1965     Years since quittin.8     Smokeless tobacco: Never Used   Substance Use Topics     Alcohol use: Not Currently     Comment: one a month     History   Drug Use No       REVIEW OF SYSTEMS:   {ROS Preop Choices:208843}    EXAM:   There were no vitals taken for this visit.  {EXAM Preop Choices:491818}    DIAGNOSTICS:   {DIAGNOSTIC FOR PREOP:421442}    Recent Labs   Lab Test 20  1013 20  0829 08/10/19  1618 08/10/19  1410 19  0906 18  1043   HGB  --  14.3  --  12.9 14.3 14.5   PLT  --  246  --  252 220 231   INR  --   --   --   --   --  0.94     --  135  --  138 142   POTASSIUM 4.1  --  3.8  --  4.9 3.9   CR 0.85  --  0.67  --  0.76 0.62   A1C 5.9*  --   --   --  5.6  --         IMPRESSION:   {PREOP REASONS:957346::\"Reason for surgery/procedure: ***\",\"Diagnosis/reason for consult: ***\"}    The proposed surgical procedure is considered {HIGH=major cardiovascular or procedures requiring prolonged anesthesia >4 hours or large fluid shifts;    INTERMEDIATE=abdominal, most orthopedic and intrathoracic surgery; LOW= endoscopy, cataract and breast surgery:422324} risk.    REVISED CARDIAC RISK INDEX  The patient has the following serious cardiovascular risks for perioperative complications such as (MI, PE, VFib and 3  AV Block):  {PREOP REVISED CARDIAC INDEX (RCI):561888:p:\"No serious cardiac risks\"}  INTERPRETATION: {REVISED CARDIAC RISK INTERPRETATION:480252}    The patient has the following additional risks for perioperative complications:  {Additional perioperative risks:281794:p:\"No identified additional risks\"}      ICD-10-CM    1. Preop general physical exam  " "Z01.818    2. Primary osteoarthritis of right knee  M17.11    3. Paroxysmal A-fib (H)  I48.0    4. Long term current use of anticoagulant therapy  Z79.01    5. Status post total left knee replacement  Z96.652    6. Mild intermittent asthma without complication  J45.20    7. Hypertension goal BP (blood pressure) < 140/90  I10    8. Central retinal artery occlusion of left eye  H34.12        RECOMMENDATIONS:     {IMPORTANT - Conditions - complete carefully!!:495122}    {IMPORTANT - Medications:635416::\"--Patient is to take all scheduled medications on the day of surgery EXCEPT for modifications listed below.\"}    {IMPORTANT - Approval:055613:p:\"APPROVAL GIVEN to proceed with proposed procedure, without further diagnostic evaluation\"}       Signed Electronically by: Brandi Sims MD    Copy of this evaluation report is provided to requesting physician.    [place] Preop Guidelines    Revised Cardiac Risk Index  "

## 2020-10-15 NOTE — PATIENT INSTRUCTIONS

## 2020-10-15 NOTE — PROGRESS NOTES
[address] [address]  Phone: [phone]  Fax: [phone]  Primary Provider: Brandi Sims  Pre-op Performing Provider: BRANDI SIMS    PREOPERATIVE EVALUATION:  Today's date: 10/15/2020    Alfreda Baxter is a 75 year old female who presents for a preoperative evaluation.    Surgical Information:  Surgery/Procedure: knee replacement, R  Surgery Location: [facility]  Surgeon: Dr. Sage  Surgery Date: 10/28/2020  Time of Surgery: 9:00  Where patient plans to recover: At home with family  Fax number for surgical facility:[phone]    Type of Anesthesia Anticipated: to be determined    Subjective     HPI related to upcoming procedure:  76 y/o F here for preop, R TKA.  H/o asthma, HTN, paroxysmal atrial fib on  Elliquis (Still has Loop Recorder in, investigation ongoing), L eye blindness due to central retinal artery occlusion 2 1/2 Y ago.    Preop Questions 10/15/2020   1. Have you ever had a heart attack or stroke? YES - h/p L central artery occlusion 2018   2. Have you ever had surgery on your heart or blood vessels, such as a stent placement, a coronary artery bypass, or surgery on an artery in your head, neck, heart, or legs? No   3. Do you have chest pain with activity? No   4. Do you have a history of  heart failure? No   5. Do you currently have a cold, bronchitis or symptoms of other infection? No   6. Do you have a cough, shortness of breath, or wheezing? No   7. Do you or anyone in your family have previous history of blood clots? No   8. Do you or does anyone in your family have a serious bleeding problem such as prolonged bleeding following surgeries or cuts? No   9. Have you ever had problems with anemia or been told to take iron pills? No   10. Have you had any abnormal blood loss such as black, tarry or bloody stools, or abnormal vaginal bleeding? No   11. Have you ever had a blood transfusion? No   12. Are  you willing to have a blood transfusion if it is medically needed before, during, or after your surgery? Yes   13. Have you or any of your relatives ever had problems with anesthesia? No   14. Do you have sleep apnea, excessive snoring or daytime drowsiness? No   15. Do you have any artifical heart valves or other implanted medical devices like a pacemaker, defibrillator, or continuous glucose monitor? YES - Loop Recorder   15a. What type of device do you have? loop recorder   15b. Name of the clinic that manages your device:  [facility]   16. Do you have artificial joints? YES - L TKA 2016   17. Are you allergic to latex? No   18. Is there any chance that you may be pregnant? -       Health Care Directive:  Patient has a Health Care Directive on file      Preoperative Review of :   reviewed - no concerns       Status of Chronic Conditions:  A-FIB - Patient has a longstanding history of chronic A-fib currently on rate control. Current treatment regimen includes Apixaban for stroke prevention and denies significant symptoms of lightheadedness, palpitations or dyspnea.   Has LOOP recorder for evaluation    ASTHMA - Patient has a longstanding history of mild Asthma . Patient has been doing well overall noting NO SYMPTOMS and continues on medication regimen consisting of prn albuterol.  without adverse reactions or side effects.     HYPERTENSION - Patient has longstanding history of HTN , currently denies any symptoms referable to elevated blood pressure. Specifically denies chest pain, palpitations, dyspnea, orthopnea, PND or peripheral edema. Blood pressure readings have been in normal range. Current medication regimen is as listed below. Patient denies any side effects of medication.       Review of Systems  CONSTITUTIONAL: NEGATIVE for fever, chills, change in weight  ENT/MOUTH: NEGATIVE for ear, mouth and throat problems  RESP: NEGATIVE for significant cough or SOB  CV: NEGATIVE for chest pain,  palpitations or peripheral edema  GI: NEGATIVE for nausea, abdominal pain, heartburn, or change in bowel habits  PSYCHIATRIC: NEGATIVE for changes in mood or affect    Patient Active Problem List    Diagnosis Date Noted     Ocular hypertension of right eye, treated 11/27/2019     Priority: Medium     Obesity (BMI 35.0-39.9) with comorbidity (H) 07/11/2019     Priority: Medium     Blind left eye 07/08/2019     Priority: Medium     Due to central retinal occlusion       Paroxysmal A-fib (H) 07/08/2019     Priority: Medium     Per loop recorder, 2018    On Rivaroxaban.   H/o R central retinal artery occlusion       Long term current use of anticoagulant therapy 07/08/2019     Priority: Medium     Neovascular glaucoma of left eye, moderate stage 05/22/2019     Priority: Medium     Paroxysmal ventricular tachycardia (H) 11/20/2018     Priority: Medium     Added automatically from request for surgery [id number]       PFO (patent foramen ovale) 11/20/2018     Priority: Medium     Added automatically from request for surgery [id number]       Central retinal artery occlusion of left eye 09/15/2018     Priority: Medium     Posterior vitreous detachment of both eyes 09/15/2018     Priority: Medium     overweight HCC 06/28/2018     Priority: Medium     Primary insomnia 06/28/2018     Priority: Medium     Ambien.....   Trazodone failed (morning drowsiness).  Benadryl failed        Acute idiopathic gout involving toe of left foot 06/28/2018     Priority: Medium     Mild intermittent asthma without complication 06/27/2018     Priority: Medium     Combined forms of age-related cataract, mild-mod, both eyes 07/27/2017     Priority: Medium     Blepharitis, both eyes 07/26/2016     Priority: Medium     Status post total left knee replacement 01/19/2016     Priority: Medium     11/2015       Acute idiopathic gout of left foot 11/04/2015     Priority: Medium     Primary osteoarthritis of both knees 09/10/2015     Priority: Medium     Mild  persistent asthma 12/12/2013     Priority: Medium     Adenomatous colon polyp 06/05/2013     Priority: Medium      Colonoscopy due 2025       Toe pain 12/10/2012     Priority: Medium     Treats with prednisone (as if gout) about twice a year (12/10/12, [facility])       Encounter for general adult medical examination with abnormal findings 12/08/2011     Priority: Medium     Advance Care Planning 10/12/2015: Followup facilitation and documentation of choices:  Alfreda Baxter presented for follow-up session regarding ACP at the clinic.  She was accompanied by Vaughn, her , who is the health care agent.  Previous ACP discussions reviewed and questions answered. At this time she has completed a HCD reflecting current values, goals, and choices. Health Care Agent understands responsibility of role and choices.  Documents completed at this visit: HCD. Signed and notarized. Validation form completed and sent with copy of document to be scanned. Confirmed/documented designated decision maker(s). See permanent comments of demographics in clinical tab. Confirmed current code status reflects current choices. View document(s) and details by clicking on code status.  Added by Megha Allred on 10/12/2015  Advance Care Planning:   ACP Review and Resources Provided:  Reviewed chart for advance care plan.  Alfreda Baxter has no plan or code status on file. Discussed available resources and provided with information. Confirmed code status reflects current choices pending further ACP discussions.  Confirmed/documented designated decision maker(s). See permanent comments section of demographics in clinical tab. Added by Megha Allred on 12/16/2013  Discussed advance care planning with patient; information given to patient to review. 12/8/2011        Lumbar spinal stenosis 12/08/2011     Priority: Medium     Sees Dr Sage       Hyperlipidemia LDL goal <130 12/31/2010     Priority: Medium     DJD (degenerative joint disease)  12/02/2010     Priority: Medium     Hypertension goal BP (blood pressure) < 140/90 12/02/2010     Priority: Medium      Past Medical History:   Diagnosis Date     Arrhythmia      Asthma     controlled with inhaler     Cataract      CRAO (central retinal artery occlusion), left      DJD (degenerative joint disease)     knees     GERD (gastroesophageal reflux disease)      Glaucoma      High cholesterol      HTN (hypertension)      Need for prophylactic hormone replacement therapy (postmenopausal)     off 11/04 after benign breast bx     Neovascular glaucoma of left eye, moderate stage 5/22/2019     PFO (patent foramen ovale)      Past Surgical History:   Procedure Laterality Date     ABDOMEN SURGERY  1976, 1978, 1995    2 C-sections,  total hysterectomy     APPENDECTOMY      age 7 yrs.     BREAST BIOPSY, RT/LT  02/17/04    left benign     BREAST SURGERY      see above     C TOTAL KNEE ARTHROPLASTY Left 11/2015    at [facility]     COLONOSCOPY  2013    needed q 3 yrs.     COLONOSCOPY N/A 9/10/2020    Procedure: Colonoscopy, With Polypectomy And Biopsy;  Surgeon: Brian Cleaning MD;  Location: [facility] OR     COLONOSCOPY WITH CO2 INSUFFLATION N/A 11/21/2016    Procedure: COLONOSCOPY WITH CO2 INSUFFLATION;  Surgeon: Brian Cleaning MD;  Location: [facility] OR     COLONOSCOPY WITH CO2 INSUFFLATION N/A 9/10/2020    Procedure: COLONOSCOPY, WITH CO2 INSUFFLATION;  Surgeon: Brian Cleaning MD;  Location: [facility] OR     EP COMPREHENSIVE EP STUDY N/A 12/31/2018    Procedure: LOOP RECORDER;  Surgeon: Buck Butterfield MD;  Location:  [facility]     GENITOURINARY SURGERY      see above     HYSTERECTOMY, PAP NO LONGER INDICATED       HYSTERECTOMY, POORNIMA  1997    bleeding fibroids     ORTHOPEDIC SURGERY  2009    meniscus repair     Current Outpatient Medications   Medication Sig Dispense Refill     acetaminophen (TYLENOL) 500 MG tablet Take 500-1,000 mg by mouth every 6 hours as needed.       albuterol  (PROAIR HFA/PROVENTIL HFA/VENTOLIN HFA) 108 (90 Base) MCG/ACT inhaler Inhale 2 puffs into the lungs every 6 hours as needed for shortness of breath / dyspnea or wheezing 1 Inhaler 3     apixaban ANTICOAGULANT (ELIQUIS ANTICOAGULANT) 5 MG tablet Take 1 tablet (5 mg) by mouth 2 times daily 60 tablet 0     atorvastatin (LIPITOR) 40 MG tablet Take 0.5 tablets (20 mg) by mouth daily 90 tablet 3     B-COMPLEX OR Take  by mouth daily.       CALCIUM 500 +D OR one daily       hypromellose (ARTIFICIAL TEARS) 0.5 % SOLN ophthalmic solution Place 1 drop into both eyes 3 times daily       lisinopril (ZESTRIL) 10 MG tablet Take 1 tablet (10 mg) by mouth daily 90 tablet 3     multivitamin (OCUVITE) TABS tablet Take 1 tablet by mouth daily       NEW MED lasastropost eye drops       timolol maleate (TIMOPTIC) 0.5 % ophthalmic solution Place 1 drop Into the left eye every morning       zolpidem (AMBIEN) 5 MG tablet TAKE ONE TABLET BY MOUTH IN THE EVENING AS NEEDED FOR SLEEP 30 tablet 3     apixaban ANTICOAGULANT (ELIQUIS) 5 MG tablet Take 1 tablet (5 mg) by mouth 2 times daily 180 tablet 3     rivaroxaban ANTICOAGULANT (XARELTO ANTICOAGULANT) 20 MG TABS tablet Take 1 tablet (20 mg) by mouth daily (with dinner) 90 tablet 3     S-Adenosylmethionine (GUILLERMO-E) 400 MG TABS Take 1 capsule by mouth daily          Allergies   Allergen Reactions     Codeine Anaphylaxis     Niacin      No Clinical Screening - See Comments Other (See Comments)     senisitive to non steroidals  Aleve, ibuprofen celebrex cause bad stomach pains  senisitive to non steroidals  Aleve, ibuprofen celebrex cause bad stomach pains     Oxycodone Hives     Other reaction(s): Unknown  ... With facial swelling     Trazodone      nausea     Nsaids Nausea and Vomiting     Other reaction(s): Abdominal Pain        Social History     Tobacco Use     Smoking status: Former Smoker     Packs/day: 0.10     Years: 1.00     Pack years: 0.10     Types: Cigarettes     Start date: 1/1/1963      Quit date: 1965     Years since quittin.8     Smokeless tobacco: Never Used   Substance Use Topics     Alcohol use: Not Currently     Comment: one a month     Family History   Problem Relation Age of Onset     Respiratory Father      Glaucoma Father      Asthma Father      Allergies Sister      Eye Disorder Sister      Lipids Sister      Neurologic Disorder Sister      Osteoporosis Sister      Glaucoma Sister      Hypertension Sister      Arthritis Mother      Cancer Mother      Hypertension Mother      Other Cancer Mother      Thyroid Disease Mother      Gastrointestinal Disease Son      Macular Degeneration Sister      Hyperlipidemia Sister      Hypertension Sister      Osteoporosis Sister      Glaucoma Other      History   Drug Use No         Objective     /79 (BP Location: Right arm, Patient Position: Sitting, Cuff Size: Adult Large)   Pulse 75   Temp 98.7  F (37.1  C) (Oral)   Wt 87.5 kg (193 lb)   SpO2 97%   BMI 35.88 kg/m      Physical Exam  GENERAL APPEARANCE: healthy, alert and no distress  HENT: ear canals and TM's normal and nose and mouth without ulcers or lesions  RESP: lungs clear to auscultation - no rales, rhonchi or wheezes  CV: regular rate and rhythm, normal S1 S2, no S3 or S4 and no murmur, click or rub   ABDOMEN: soft, nontender, no HSM or masses and bowel sounds normal  NEURO: Normal strength and tone, sensory exam grossly normal, mentation intact and speech normal  MS: arthorscopy scars on R knee.  S/p L TKA.   PSYCH: mentation appears normal and affect normal/bright    Recent Labs   Lab Test 20  1013 20  0829 08/10/19  1618 08/10/19  1410 19  0906 18  1043   HGB  --  14.3  --  12.9 14.3 14.5   PLT  --  246  --  252 220 231   INR  --   --   --   --   --  0.94     --  135  --  138 142   POTASSIUM 4.1  --  3.8  --  4.9 3.9   CR 0.85  --  0.67  --  0.76 0.62   A1C 5.9*  --   --   --  5.6  --         Diagnostics:  Labs pending at this time.   CBC and A1C normal.  Other labs pending.   Results will be reviewed when available.   EKG: appears normal, NSR, low voltage anterior leads.  , normal axis, normal intervals, no acute ST/T changes c/w ischemia, no LVH by voltage criteria, unchanged from previous tracings    Revised Cardiac Risk Index (RCRI):  The patient has the following serious cardiovascular risks for perioperative complications:   - Cerebrovascular Disease (TIA or CVA) = 1 point     RCRI Interpretation: 1 point: Class II (low risk - 0.9% complication rate)            Assessment & Plan   The proposed surgical procedure is considered INTERMEDIATE risk.      ICD-10-CM    1. Preop general physical exam  Z01.818 EKG 12-lead complete w/read - Clinics     UA with Microscopic reflex to Culture     CANCELED: UA with Microscopic reflex to Culture   2. Primary osteoarthritis of right knee  M17.11    3. Paroxysmal A-fib (H)  I48.0 CBC with platelets   4. Long term current use of anticoagulant therapy  Z79.01 CBC with platelets   5. Status post total left knee replacement  Z96.652    6. Mild intermittent asthma without complication  J45.20    7. Hypertension goal BP (blood pressure) < 140/90  I10 Basic metabolic panel   8. Central retinal artery occlusion of left eye  H34.12    9. Elevated glucose  R73.09 Hemoglobin A1c      Implanted Device:   - Type of device: THIS IS A LOOP RECORDER, [employer] Patient advised to bring device information on day of surgery.      Risks and Recommendations:  The patient has the following additional risks and recommendations for perioperative complications:   - Morbid obesity (BMI >40)  - she feels she had a suboptimal outcome from her previous TKA    Medication Instructions:   - apixaban (Eliquis), edoxaban (Savaysa), rivaroxaban (Xarelto): Neuraxial or regional block anticipated AND CrCL (>=) 50mL/min. HOLD 3 days before surgery.   Her surgeon may want 7 day hold, this is okay if surgeon has this  protocol    RECOMMENDATION:  APPROVAL GIVEN to proceed with proposed procedure, without further diagnostic evaluation.    Signed Electronically by: Brandi Sims MD    Copy of this evaluation report is provided to requesting physician.    [facility] Guidelines    Revised Cardiac Risk Index

## 2020-10-16 ENCOUNTER — MYC MEDICAL ADVICE (OUTPATIENT)
Dept: FAMILY MEDICINE | Facility: CLINIC | Age: 75
End: 2020-10-16

## 2020-10-16 NOTE — RESULT ENCOUNTER NOTE
Alfreda Baxter    Screening for diabetes is normal/negative.  Blood count is perfect.  Your electrolytes / kidney function are normal/negative..  I think that slight elevation in Carbon Dioxide is due to mild dehydration/diuretic use (hydrochlorothiazide) and is not of any concern.       II wish you the best for your upcoming surgery!!    EMILEE MARTÍNEZ M.D.

## 2020-10-16 NOTE — TELEPHONE ENCOUNTER
See niecy question, patient looking for lab results.   Have not been resulted by provider yet, routed to [facility] to review and release results.  Indigo Mccoy RN  [facility]

## 2020-11-02 ENCOUNTER — MYC MEDICAL ADVICE (OUTPATIENT)
Dept: FAMILY MEDICINE | Facility: CLINIC | Age: 75
End: 2020-11-02

## 2020-11-02 NOTE — TELEPHONE ENCOUNTER
"Per patient herrerat note, needs follow up appt this week with [facility].    I see 10/28 admission for TKA right, 10/30 discharge.    [facility] has some openings this week.    Is she able to come in?   Does she feel she needs to be seen?    I called and spoke to patient.   She does not feel she needs to be seen.      Says says she is bruised up from being on eliquis, is back on it, says pain is under control, they did US to rule out DVT before she left.    She will see ortho in 2 weeks.   She thinks a visit with PCP would be a \"waste of time\" as she has been through same surgery before and she is currently doing well.    States she will call if she has any concerns.    Routed to [facility] as FYI, any response regarding need to be seen or not.    Indigo Mccoy RN  [facility]          "

## 2020-11-04 ENCOUNTER — MYC MEDICAL ADVICE (OUTPATIENT)
Dept: CARDIOLOGY | Facility: CLINIC | Age: 75
End: 2020-11-04

## 2020-11-04 NOTE — TELEPHONE ENCOUNTER
I called and talked to the patient.  Patient tells me that nosebleeding is not at the point that is out of control.  She has nose bleeding when she blows her nose in the morning.  She also sees blood when she wipes her nose.  She has been using moisturizers.  Patient tells me that she wanted to inform me about this and wants to know what would be the next step if bleeding gets worse.    I told patient that if we cannot control her nose bleeding then we might need to refer her for watchman procedure.  The patient will inform me if nose bleeding gets worse.  At this time we will continue the medication the same dose.  I have suggested ENT can look at her nose and see if there is anything needs to be done.  The patient declined and does not want to see ENT at this time.    Dr.CAN

## 2020-11-04 NOTE — TELEPHONE ENCOUNTER
Was on xarelto in the past but was experiencing bleeding gums and on 8/24/20 she was switched to Eliquis.  She is now having recurtent nose bleeds on Eliquis.

## 2020-11-06 ENCOUNTER — ANCILLARY PROCEDURE (OUTPATIENT)
Dept: CARDIOLOGY | Facility: CLINIC | Age: 75
End: 2020-11-06
Attending: INTERNAL MEDICINE
Payer: MEDICARE

## 2020-11-06 DIAGNOSIS — I47.29 PAROXYSMAL VENTRICULAR TACHYCARDIA (H): ICD-10-CM

## 2020-11-06 LAB
MDC_IDC_MSMT_BATTERY_STATUS: NORMAL
MDC_IDC_PG_IMPLANT_DTM: NORMAL
MDC_IDC_PG_MFG: NORMAL
MDC_IDC_PG_MODEL: NORMAL
MDC_IDC_PG_SERIAL: NORMAL
MDC_IDC_PG_TYPE: NORMAL
MDC_IDC_SESS_CLINIC_NAME: NORMAL
MDC_IDC_SESS_DTM: NORMAL
MDC_IDC_SESS_TYPE: NORMAL
MDC_IDC_SET_ZONE_DETECTION_INTERVAL: 2000 MS
MDC_IDC_SET_ZONE_DETECTION_INTERVAL: 3000 MS
MDC_IDC_SET_ZONE_DETECTION_INTERVAL: 380 MS
MDC_IDC_SET_ZONE_TYPE: NORMAL
MDC_IDC_STAT_AT_BURDEN_PERCENT: 0 %
MDC_IDC_STAT_AT_DTM_END: NORMAL
MDC_IDC_STAT_AT_DTM_START: NORMAL
MDC_IDC_STAT_EPISODE_RECENT_COUNT: 0
MDC_IDC_STAT_EPISODE_RECENT_COUNT: 2
MDC_IDC_STAT_EPISODE_RECENT_COUNT_DTM_END: NORMAL
MDC_IDC_STAT_EPISODE_RECENT_COUNT_DTM_START: NORMAL
MDC_IDC_STAT_EPISODE_TOTAL_COUNT: 0
MDC_IDC_STAT_EPISODE_TOTAL_COUNT: 223
MDC_IDC_STAT_EPISODE_TOTAL_COUNT: 25
MDC_IDC_STAT_EPISODE_TOTAL_COUNT: 3
MDC_IDC_STAT_EPISODE_TOTAL_COUNT_DTM_END: NORMAL
MDC_IDC_STAT_EPISODE_TOTAL_COUNT_DTM_START: NORMAL
MDC_IDC_STAT_EPISODE_TYPE: NORMAL

## 2020-11-06 PROCEDURE — G2066 INTER DEVC REMOTE 30D: HCPCS

## 2020-11-06 PROCEDURE — 93298 REM INTERROG DEV EVAL SCRMS: CPT | Performed by: INTERNAL MEDICINE

## 2020-11-11 ENCOUNTER — TRANSFERRED RECORDS (OUTPATIENT)
Dept: HEALTH INFORMATION MANAGEMENT | Facility: CLINIC | Age: 75
End: 2020-11-11

## 2020-11-26 ENCOUNTER — TRANSFERRED RECORDS (OUTPATIENT)
Dept: HEALTH INFORMATION MANAGEMENT | Facility: CLINIC | Age: 75
End: 2020-11-26

## 2020-11-26 ENCOUNTER — HOSPITAL ENCOUNTER (EMERGENCY)
Facility: CLINIC | Age: 75
Discharge: HOME | End: 2020-11-26
Attending: EMERGENCY MEDICINE | Admitting: EMERGENCY MEDICINE
Payer: MEDICARE

## 2020-11-26 ENCOUNTER — APPOINTMENT (OUTPATIENT)
Dept: CT IMAGING | Facility: CLINIC | Age: 75
End: 2020-11-26
Attending: EMERGENCY MEDICINE
Payer: MEDICARE

## 2020-11-26 ENCOUNTER — NURSE TRIAGE (OUTPATIENT)
Dept: NURSING | Facility: CLINIC | Age: 75
End: 2020-11-26

## 2020-11-26 ENCOUNTER — APPOINTMENT (OUTPATIENT)
Dept: MRI IMAGING | Facility: CLINIC | Age: 75
End: 2020-11-26
Attending: STUDENT IN AN ORGANIZED HEALTH CARE EDUCATION/TRAINING PROGRAM
Payer: MEDICARE

## 2020-11-26 ENCOUNTER — ANCILLARY PROCEDURE (OUTPATIENT)
Dept: CARDIOLOGY | Facility: CLINIC | Age: 75
End: 2020-11-26
Attending: INTERNAL MEDICINE
Payer: MEDICARE

## 2020-11-26 VITALS
SYSTOLIC BLOOD PRESSURE: 112 MMHG | HEART RATE: 90 BPM | HEIGHT: 61 IN | DIASTOLIC BLOOD PRESSURE: 67 MMHG | OXYGEN SATURATION: 98 % | TEMPERATURE: 98.1 F | BODY MASS INDEX: 36.82 KG/M2 | RESPIRATION RATE: 16 BRPM | WEIGHT: 195 LBS

## 2020-11-26 DIAGNOSIS — R42 VERTIGO: ICD-10-CM

## 2020-11-26 DIAGNOSIS — I48.0 PAROXYSMAL A-FIB (H): ICD-10-CM

## 2020-11-26 DIAGNOSIS — R42 DIZZINESS: ICD-10-CM

## 2020-11-26 DIAGNOSIS — I48.0 PAROXYSMAL A-FIB (H): Primary | ICD-10-CM

## 2020-11-26 DIAGNOSIS — R53.1 WEAKNESS: ICD-10-CM

## 2020-11-26 PROBLEM — M10.072 ACUTE IDIOPATHIC GOUT INVOLVING TOE OF LEFT FOOT: Status: RESOLVED | Noted: 2018-06-28 | Resolved: 2020-11-26

## 2020-11-26 PROBLEM — J45.20 MILD INTERMITTENT ASTHMA WITHOUT COMPLICATION: Status: RESOLVED | Noted: 2018-06-27 | Resolved: 2020-11-26

## 2020-11-26 PROBLEM — H34.12 CENTRAL RETINAL ARTERY OCCLUSION OF LEFT EYE: Status: RESOLVED | Noted: 2018-09-15 | Resolved: 2020-11-26

## 2020-11-26 LAB
ALBUMIN SERPL-MCNC: 3.6 G/DL (ref 3.4–5)
ALBUMIN UR-MCNC: NEGATIVE MG/DL
ALP SERPL-CCNC: 84 U/L (ref 40–150)
ALT SERPL W P-5'-P-CCNC: 16 U/L (ref 0–50)
ANION GAP SERPL CALCULATED.3IONS-SCNC: 6 MMOL/L (ref 3–14)
APPEARANCE UR: CLEAR
AST SERPL W P-5'-P-CCNC: 9 U/L (ref 0–45)
BACTERIA #/AREA URNS HPF: ABNORMAL /HPF
BASOPHILS # BLD AUTO: 0 10E9/L (ref 0–0.2)
BASOPHILS NFR BLD AUTO: 0.6 %
BILIRUB SERPL-MCNC: 0.5 MG/DL (ref 0.2–1.3)
BILIRUB UR QL STRIP: NEGATIVE
BUN SERPL-MCNC: 16 MG/DL (ref 7–30)
CALCIUM SERPL-MCNC: 10 MG/DL (ref 8.5–10.1)
CHLORIDE SERPL-SCNC: 104 MMOL/L (ref 94–109)
CO2 SERPL-SCNC: 29 MMOL/L (ref 20–32)
COLOR UR AUTO: ABNORMAL
CREAT SERPL-MCNC: 0.7 MG/DL (ref 0.52–1.04)
DIFFERENTIAL METHOD BLD: ABNORMAL
EOSINOPHIL # BLD AUTO: 0 10E9/L (ref 0–0.7)
EOSINOPHIL NFR BLD AUTO: 0.4 %
ERYTHROCYTE [DISTWIDTH] IN BLOOD BY AUTOMATED COUNT: 12.5 % (ref 10–15)
GFR SERPL CREATININE-BSD FRML MDRD: 84 ML/MIN/{1.73_M2}
GLUCOSE SERPL-MCNC: 114 MG/DL (ref 70–99)
GLUCOSE UR STRIP-MCNC: NEGATIVE MG/DL
HCT VFR BLD AUTO: 43.1 % (ref 35–47)
HGB BLD-MCNC: 13.4 G/DL (ref 11.7–15.7)
HGB UR QL STRIP: NEGATIVE
IMM GRANULOCYTES # BLD: 0 10E9/L (ref 0–0.4)
IMM GRANULOCYTES NFR BLD: 0.2 %
INR PPP: 1.24 (ref 0.86–1.14)
INTERPRETATION ECG - MUSE: NORMAL
INTERPRETATION ECG - MUSE: NORMAL
KETONES UR STRIP-MCNC: NEGATIVE MG/DL
LACTATE BLD-SCNC: 1 MMOL/L (ref 0.7–2)
LEUKOCYTE ESTERASE UR QL STRIP: ABNORMAL
LIPASE SERPL-CCNC: 130 U/L (ref 73–393)
LYMPHOCYTES # BLD AUTO: 1.3 10E9/L (ref 0.8–5.3)
LYMPHOCYTES NFR BLD AUTO: 25.2 %
MCH RBC QN AUTO: 28.3 PG (ref 26.5–33)
MCHC RBC AUTO-ENTMCNC: 31.1 G/DL (ref 31.5–36.5)
MCV RBC AUTO: 91 FL (ref 78–100)
MONOCYTES # BLD AUTO: 0.4 10E9/L (ref 0–1.3)
MONOCYTES NFR BLD AUTO: 7 %
NEUTROPHILS # BLD AUTO: 3.3 10E9/L (ref 1.6–8.3)
NEUTROPHILS NFR BLD AUTO: 66.6 %
NITRATE UR QL: NEGATIVE
NRBC # BLD AUTO: 0 10*3/UL
NRBC BLD AUTO-RTO: 0 /100
PH UR STRIP: 6.5 PH (ref 5–7)
PLATELET # BLD AUTO: 338 10E9/L (ref 150–450)
POTASSIUM SERPL-SCNC: 3.8 MMOL/L (ref 3.4–5.3)
PROT SERPL-MCNC: 8.6 G/DL (ref 6.8–8.8)
RBC # BLD AUTO: 4.74 10E12/L (ref 3.8–5.2)
RBC #/AREA URNS AUTO: 1 /HPF (ref 0–2)
SODIUM SERPL-SCNC: 138 MMOL/L (ref 133–144)
SOURCE: ABNORMAL
SP GR UR STRIP: 1 (ref 1–1.03)
SQUAMOUS #/AREA URNS AUTO: 1 /HPF (ref 0–1)
TRANS CELLS #/AREA URNS HPF: <1 /HPF (ref 0–1)
TROPONIN I SERPL-MCNC: <0.015 UG/L (ref 0–0.04)
TSH SERPL DL<=0.005 MIU/L-ACNC: 0.4 MU/L (ref 0.4–4)
UROBILINOGEN UR STRIP-MCNC: NORMAL MG/DL (ref 0–2)
WBC # BLD AUTO: 5 10E9/L (ref 4–11)
WBC #/AREA URNS AUTO: 4 /HPF (ref 0–5)

## 2020-11-26 PROCEDURE — 96361 HYDRATE IV INFUSION ADD-ON: CPT | Performed by: EMERGENCY MEDICINE

## 2020-11-26 PROCEDURE — 99285 EMERGENCY DEPT VISIT HI MDM: CPT | Mod: 25 | Performed by: EMERGENCY MEDICINE

## 2020-11-26 PROCEDURE — 96360 HYDRATION IV INFUSION INIT: CPT | Mod: 59 | Performed by: EMERGENCY MEDICINE

## 2020-11-26 PROCEDURE — 87086 URINE CULTURE/COLONY COUNT: CPT | Performed by: EMERGENCY MEDICINE

## 2020-11-26 PROCEDURE — 99207 CARDIAC DEVICE CHECK - REMOTE: CPT | Performed by: INTERNAL MEDICINE

## 2020-11-26 PROCEDURE — 80053 COMPREHEN METABOLIC PANEL: CPT | Performed by: EMERGENCY MEDICINE

## 2020-11-26 PROCEDURE — 70549 MR ANGIOGRAPH NECK W/O&W/DYE: CPT

## 2020-11-26 PROCEDURE — 99207 MR BRAIN FOR STROKE COMPLETE W/O & W CONTRAST: CPT | Mod: 26 | Performed by: STUDENT IN AN ORGANIZED HEALTH CARE EDUCATION/TRAINING PROGRAM

## 2020-11-26 PROCEDURE — 93005 ELECTROCARDIOGRAM TRACING: CPT | Performed by: EMERGENCY MEDICINE

## 2020-11-26 PROCEDURE — 258N000003 HC RX IP 258 OP 636: Performed by: EMERGENCY MEDICINE

## 2020-11-26 PROCEDURE — G2066 INTER DEVC REMOTE 30D: HCPCS

## 2020-11-26 PROCEDURE — 87088 URINE BACTERIA CULTURE: CPT | Performed by: EMERGENCY MEDICINE

## 2020-11-26 PROCEDURE — 81001 URINALYSIS AUTO W/SCOPE: CPT | Performed by: EMERGENCY MEDICINE

## 2020-11-26 PROCEDURE — 85025 COMPLETE CBC W/AUTO DIFF WBC: CPT | Performed by: EMERGENCY MEDICINE

## 2020-11-26 PROCEDURE — 70549 MR ANGIOGRAPH NECK W/O&W/DYE: CPT | Mod: 26 | Performed by: STUDENT IN AN ORGANIZED HEALTH CARE EDUCATION/TRAINING PROGRAM

## 2020-11-26 PROCEDURE — 83690 ASSAY OF LIPASE: CPT | Performed by: EMERGENCY MEDICINE

## 2020-11-26 PROCEDURE — 255N000002 HC RX 255 OP 636: Performed by: EMERGENCY MEDICINE

## 2020-11-26 PROCEDURE — 93010 ELECTROCARDIOGRAM REPORT: CPT | Performed by: EMERGENCY MEDICINE

## 2020-11-26 PROCEDURE — 70553 MRI BRAIN STEM W/O & W/DYE: CPT | Mod: 26 | Performed by: STUDENT IN AN ORGANIZED HEALTH CARE EDUCATION/TRAINING PROGRAM

## 2020-11-26 PROCEDURE — 85610 PROTHROMBIN TIME: CPT | Performed by: EMERGENCY MEDICINE

## 2020-11-26 PROCEDURE — 84484 ASSAY OF TROPONIN QUANT: CPT | Performed by: EMERGENCY MEDICINE

## 2020-11-26 PROCEDURE — 70553 MRI BRAIN STEM W/O & W/DYE: CPT

## 2020-11-26 PROCEDURE — 84443 ASSAY THYROID STIM HORMONE: CPT | Performed by: EMERGENCY MEDICINE

## 2020-11-26 PROCEDURE — 87186 SC STD MICRODIL/AGAR DIL: CPT | Performed by: EMERGENCY MEDICINE

## 2020-11-26 PROCEDURE — 70450 CT HEAD/BRAIN W/O DYE: CPT | Mod: 26 | Performed by: STUDENT IN AN ORGANIZED HEALTH CARE EDUCATION/TRAINING PROGRAM

## 2020-11-26 PROCEDURE — 70450 CT HEAD/BRAIN W/O DYE: CPT

## 2020-11-26 PROCEDURE — 83605 ASSAY OF LACTIC ACID: CPT | Performed by: EMERGENCY MEDICINE

## 2020-11-26 PROCEDURE — A9585 GADOBUTROL INJECTION: HCPCS | Performed by: EMERGENCY MEDICINE

## 2020-11-26 RX ORDER — GADOBUTROL 604.72 MG/ML
10 INJECTION INTRAVENOUS ONCE
Status: COMPLETED | OUTPATIENT
Start: 2020-11-26 | End: 2020-11-26

## 2020-11-26 RX ORDER — MECLIZINE HCL 12.5 MG 12.5 MG/1
12.5 TABLET ORAL 3 TIMES DAILY PRN
Qty: 20 TABLET | Refills: 0 | Status: SHIPPED | OUTPATIENT
Start: 2020-11-26 | End: 2021-08-19

## 2020-11-26 RX ADMIN — GADOBUTROL 10 ML: 604.72 INJECTION INTRAVENOUS at 17:54

## 2020-11-26 RX ADMIN — SODIUM CHLORIDE 100 ML: 9 INJECTION, SOLUTION INTRAVENOUS at 17:54

## 2020-11-26 RX ADMIN — SODIUM CHLORIDE 1000 ML: 9 INJECTION, SOLUTION INTRAVENOUS at 12:08

## 2020-11-26 ASSESSMENT — ENCOUNTER SYMPTOMS
CHILLS: 0
EYES NEGATIVE: 1
PSYCHIATRIC NEGATIVE: 1
HEADACHES: 0
NAUSEA: 0
PALPITATIONS: 0
MUSCULOSKELETAL NEGATIVE: 1
VOMITING: 0
SHORTNESS OF BREATH: 0
WEAKNESS: 1
FLANK PAIN: 0
COUGH: 0
LIGHT-HEADEDNESS: 1
FEVER: 0
ABDOMINAL PAIN: 0
DIZZINESS: 1

## 2020-11-26 ASSESSMENT — MIFFLIN-ST. JEOR: SCORE: 1311.89

## 2020-11-26 NOTE — CONSULTS
"[facility]     Stroke Consult Note    Chief Complaint  Episodic Dizziness     HPI  Ms. Baxter is a 75 year old woman with h/o HTN on lisinopril, HLD on atorvastatin, paroxysmal atrial fibrillation on apixiban, PFO, left CRAO in 2018, self-reported migraine history, and is POD15 s/p R TKA who presents with vertigo and imbalance. First symptom started acutely on 11/24 with room-spinning vertigo and involuntary leaning to the right. She had taken tramadol for post-operative pain several hours prior to the onset of dizziness and attributed it to the tramadol. Over the course of the day it resolved and yesterday she felt in her normal state of health, able to walk with no nausea or dizziness episodes, but just generalized fatigue and weakness. Yesterday most of the day she was fine and was able to do laundry and bake a pumpkin pie without issue. Evening of 11/25 her vertigo suddenly returned with nausea and inability to stand up straight or walk. She took her BP and found it was 200/98. She took a Tylenol and went to sleep, with symptoms still present when she awoke this morning. She tried to walk to the bathroom and had to hang on the walls because of the profound dizziness. She went back to bed and slept for a couple hours. She tried walking again and was unable to walk in a straight line. She subsequently decided to come to the hospital. In terms of triggers, turning her neck from side to side and going from a sitting to standing position often trigger the dizziness. She notes progressive hearing loss over years, L>R. Denies tinnitus, tingling, numbness, focal weakness.     10 years ago, she had dizziness episodes that were similar to this. They lasted seconds to minutes. She was told by a neurologist that they were \"migraine auras\" as they were always followed by migraine headache. Prior to menopause, she frequently had migraine headaches with visual aura. Since " menopauyse, she has not had any headaches with auras, but has had auras alone. Aura is twinkling lights that move from bottom to sides of visual field.     In terms of stroke hx, she had a left CRAO in 2018 that was not treated with tPA due to presentation outside the time window. Despite extensive workup, etiology was unclear. TTE did show PFO and borderline left atrial enlargement. She was sent home with ZioPatch then subsequently upgraded to loop recorder by cardiologist. She continues to have poor vision in her left eye. She takes Apixaban BID.       TPA Treatment   Not given due to stroke mimic: suspect peripheral vertigo, symptoms going on for 3 days .    Endovascular Treatment  low suspicion for LVO     Impression  1. Peripheral vertigo, high suspicion   2. Hx L CRAO     Recommendations  - MR brain w/ and w/out contrast (completed at time of note, shows no acute intracranial pathology)  - MRA Head and Neck (completed at time of note, shows no acute occlusion)   - Consider PT consult for vestibular and gait evaluation   - Outpatient neurology referral   - Outpatient PT referral       It seemed patient was very anxious to leave tonight and did not want to pay for observation admission, so ED discharged her home at her request. Patient discharged before our final recommendations were relayed to the ED team.      Staffed with Dr. Whitehead, fellow, and Dr. Ludwig is the attending physician.       Dallin Stark MD  Neurology Resident  Pager:  [phone]  ___________________________________________________    Nutrition: Regular Diet     Past Medical History   Past Medical History:   Diagnosis Date     Acute idiopathic gout of left foot 11/4/2015     Arrhythmia      Asthma     controlled with inhaler     Cataract      Central retinal artery occlusion of left eye 9/15/2018     CRAO (central retinal artery occlusion), left      DJD (degenerative joint disease)     knees     GERD (gastroesophageal reflux disease)       Glaucoma      High cholesterol      HTN (hypertension)      Need for prophylactic hormone replacement therapy (postmenopausal)     off 11/04 after benign breast bx     Neovascular glaucoma of left eye, 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     BREAST BIOPSY, RT/LT  02/17/04    left benign     BREAST SURGERY      see above     C TOTAL KNEE ARTHROPLASTY Left 11/2015    at [facility]     COLONOSCOPY  2013    needed q 3 yrs.     COLONOSCOPY N/A 9/10/2020    Procedure: Colonoscopy, With Polypectomy And Biopsy;  Surgeon: Brian Cleaning MD;  Location: [facility] OR     COLONOSCOPY WITH CO2 INSUFFLATION N/A 11/21/2016    Procedure: COLONOSCOPY WITH CO2 INSUFFLATION;  Surgeon: Brian Cleaning MD;  Location: [facility] OR     COLONOSCOPY WITH CO2 INSUFFLATION N/A 9/10/2020    Procedure: COLONOSCOPY, WITH CO2 INSUFFLATION;  Surgeon: Brian Cleaning MD;  Location: [facility] OR     EP COMPREHENSIVE EP STUDY N/A 12/31/2018    Procedure: LOOP RECORDER;  Surgeon: Buck Butterfield MD;  Location:  [facility]     GENITOURINARY SURGERY      see above     HYSTERECTOMY, PAP NO LONGER INDICATED       HYSTERECTOMY, POORNIMA  1997    bleeding fibroids     ORTHOPEDIC SURGERY  2009    meniscus repair     Medications   Home Meds  Prior to Admission medications    Medication Sig Start Date End Date Taking? Authorizing Provider   acetaminophen (TYLENOL) 500 MG tablet Take 500-1,000 mg by mouth every 6 hours as needed.    Reported, Patient   albuterol (PROAIR HFA/PROVENTIL HFA/VENTOLIN HFA) 108 (90 Base) MCG/ACT inhaler Inhale 2 puffs into the lungs every 6 hours as needed for shortness of breath / dyspnea or wheezing 8/13/19   Brandi Sims MD   apixaban ANTICOAGULANT (ELIQUIS ANTICOAGULANT) 5 MG tablet Take 1 tablet  (5 mg) by mouth 2 times daily 8/24/20   Skyla Gold MD   atorvastatin (LIPITOR) 40 MG tablet Take 0.5 tablets (20 mg) by mouth daily 1/21/20   Skyla Gold MD   B-COMPLEX OR Take  by mouth daily.    Reported, Patient   CALCIUM 500 +D OR one daily    Reported, Patient   hypromellose (ARTIFICIAL TEARS) 0.5 % SOLN ophthalmic solution Place 1 drop into both eyes 3 times daily    Reported, Patient   lisinopril (ZESTRIL) 10 MG tablet Take 1 tablet (10 mg) by mouth daily 7/27/20   Brandi Sims MD   multivitamin (OCUVITE) TABS tablet Take 1 tablet by mouth daily    Reported, Patient   NEW MED lasastropost eye drops    Reported, Patient   timolol maleate (TIMOPTIC) 0.5 % ophthalmic solution Place 1 drop Into the left eye every morning    Reported, Patient   zolpidem (AMBIEN) 5 MG tablet TAKE ONE TABLET BY MOUTH IN THE EVENING AS NEEDED FOR SLEEP 7/27/20   Brandi Sims MD       Allergies   Allergies   Allergen Reactions     Codeine Anaphylaxis     Niacin      No Clinical Screening - See Comments Other (See Comments)     senisitive to non steroidals  Aleve, ibuprofen celebrex cause bad stomach pains  senisitive to non steroidals  Aleve, ibuprofen celebrex cause bad stomach pains     Oxycodone Hives     Other reaction(s): Unknown  ... With facial swelling     Trazodone      nausea     Nsaids Nausea and Vomiting     Other reaction(s): Abdominal Pain     Family History   Family History   Problem Relation Age of Onset     Respiratory Father      Glaucoma Father      Asthma Father      Allergies Sister      Eye Disorder Sister      Lipids Sister      Neurologic Disorder Sister      Osteoporosis Sister      Glaucoma Sister      Hypertension Sister      Arthritis Mother      Cancer Mother      Hypertension Mother      Other Cancer Mother      Thyroid Disease Mother      Gastrointestinal Disease Son      Macular Degeneration Sister      Hyperlipidemia Sister      Hypertension Sister      Osteoporosis Sister       Glaucoma Other      Social History   Social History     Tobacco Use     Smoking status: Former Smoker     Packs/day: 0.10     Years: 1.00     Pack years: 0.10     Types: Cigarettes     Start date: 1963     Quit date: 1965     Years since quittin.9     Smokeless tobacco: Never Used   Substance Use Topics     Alcohol use: Not Currently     Comment: one a month     Drug use: No       Review of Systems   The 10 point Review of Systems was reviewed with patient and pertinent findings are included in the HPI.        PHYSICAL EXAMINATION  Temp:  [98.1  F (36.7  C)] 98.1  F (36.7  C)  Pulse:  [76-81] 76  Resp:  [16] 16  BP: (157-166)/(82-93) 166/82  SpO2:  [97 %] 97 %    General:  patient lying in bed without any acute distress    HEENT:  normocephalic/atraumatic  Cardio:  RRR  Pulmonary:  no respiratory distress  Abdomen:  soft, non-tender  Extremities:  no edema  Skin:  intact, warm/dry     Neurologic    HINTS:  Head Impulse - corrective saccade present  Nystagmus - absent   Test of Skew: Present left eye (CRAO eye)     Mental Status:  alert, oriented x 3, follows commands, speech clear and fluent, naming and repetition normal  Cranial Nerves:  Visual fields intact right eye. Vision in left eye limited - can count fingers up close but not far away. EOMI with normal smooth pursuit, facial sensation intact and symmetric, facial movements symmetric, decreased hearing to finger rubbing in left ear, palate elevation symmetric and uvula midline, no dysarthria, shoulder shrug strong bilaterally, tongue protrusion midline  Motor:  normal muscle tone and bulk, no abnormal movements, able to move all limbs spontaneously, strength 5/5 throughout upper and lower extremities (effort limited in RLE secondary to recent knee surgery), no pronator drift  Reflexes:   Trace Achilles reflexes bilaterally. Negative Hoffmans and Babinskis bilaterally. Brisk but not hyperreflexic in biceps, brachioradialis tendons bilaterally    Sensory:  light touch sensation intact and symmetric throughout upper and lower extremities, no extinction on double simultaneous stimulation   Coordination:  normal finger-to-nose without dysmetria bilaterally.   Station/Gait: Feels unsteady while walking    Dysphagia Screen  Per Nursing      Stroke Scales    NIHSS  Interval baseline (11/26/20 1706)   Interval Comments     1a. Level of Consciousness 0-->Alert, keenly responsive   1b. LOC Questions 0-->Answers both questions correctly   1c. LOC Commands 0-->Performs both tasks correctly   2.   Best Gaze 0-->Normal   3.   Visual 0-->No visual loss   4.   Facial Palsy 0-->Normal symmetrical movements   5a. Motor Arm, Left 0-->No drift, limb holds 90 (or 45) degrees for full 10 secs   5b. Motor Arm, Right 0-->No drift, limb holds 90 (or 45) degrees for full 10 secs   6a. Motor Leg, Left 0-->No drift, leg holds 30 degree position for full 5 secs   6b. Motor Leg, right 0-->No drift, leg holds 30 degree position for full 5 secs   7.   Limb Ataxia 0-->Absent   8.   Sensory 0-->Normal, no sensory loss   9.   Best Language 0-->No aphasia, normal   10. Dysarthria 0-->Normal   11. Extinction and Inattention  0-->No abnormality   Total 0 (11/26/20 1706)       Imaging  I personally reviewed all imaging; relevant findings per the HPI.    Lab Results Data   CBC  Recent Labs   Lab 11/26/20  1141   WBC 5.0   RBC 4.74   HGB 13.4   HCT 43.1        Basic Metabolic Panel   Recent Labs   Lab 11/26/20  1141      POTASSIUM 3.8   CHLORIDE 104   CO2 29   BUN 16   CR 0.70   *   RUDOLPH 10.0     Liver Panel  Recent Labs   Lab 11/26/20  1141   PROTTOTAL 8.6   ALBUMIN 3.6   BILITOTAL 0.5   ALKPHOS 84   AST 9   ALT 16     INR    Recent Labs   Lab Test 11/26/20  1141 12/31/18  1043   INR 1.24* 0.94      Lipid Profile    Recent Labs   Lab Test 07/24/20  1013 07/11/19  1127 02/08/19  0956 12/11/14  0840 12/11/14  0840 12/05/13  0902 12/05/12  0912   CHOL 163 168 158   < > 155 191  188   HDL 54 61 58   < > 55 43* 47*   LDL 73 61 62   < > 72 100 104   TRIG 179* 231* 192*   < > 138 237* 182*   CHOLHDLRATIO  --   --   --   --  2.8 4.4 4.0    < > = values in this interval not displayed.     A1C    Recent Labs   Lab Test 10/15/20  1108 07/24/20  1013 06/26/19  0906   A1C 5.3 5.9* 5.6     Troponin I    Recent Labs   Lab 11/26/20  1141   TROPI <0.015          Stroke Code / Stroke Consult Data Data    Not a stroke code

## 2020-11-26 NOTE — ED PROVIDER NOTES
[place] EMERGENCY DEPARTMENT ([facility])  11/26/20  ED 7    History     Chief Complaint   Patient presents with     Dizziness     The history is provided by the patient and medical records.     Alfreda Baxter is a 75 year old female with history of paroxysmal ventricular tachycardia who presents with vertiginous symptoms.  She had recent hospitalization for right total knee arthroplasty from 10/28-10/30/2020.  Patient states that she has been recovering from this well, has a walker but has been able to get around without it. Patient states symptoms started on 11/24/2020 with vertigo and listing to one side, had vomiting with this.  Eventually the symptom improved and yesterday she felt at her usual state of health, was able to walk all day without her walker, felt pretty good.  Last night her vertiginous symptoms recurred and she couldn't sleep well, took 1 Tylenol before going to bed. This morning she continued to feel vertigo, generalized weakness and inability to stand up straight.  Patient feels as if she is listing to 1 side.  Patient called into triage line reporting dizziness and generalized weakness and was advised to come to the ER for evaluation.  She continues to feel weak and with balance issues at this time. She states she just feels tired all over, has difficulty describing it.  She does not feel safe walking even with her walker for assistance.  No fever, chest pain or shortness of breath.  No cough or cold symptoms. No falls. Patient did have follow-up with [facility] 11/11/2020.  Postoperatively she was healing well, no signs of infection at this visit. Her right knee continues to be healing well.      Patient has a Medtronic remote loop recorder in place.  The [employer] care alert team did receive noticed that she had tacky episodes on 11/3/2020.  This showed fairly regular narrow complex tachycardias with rates of 182-188.  Patient is on Eliquis.    Social history:  patient lives with her . She feels very dry. Has been isolating    I have reviewed the Medications, Allergies, Past Medical and Surgical History, and Social History in the [facility] system.  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     BREAST BIOPSY, RT/LT  02/17/04    left benign     BREAST SURGERY      see above     C TOTAL KNEE ARTHROPLASTY Left 11/2015    at [facility]     COLONOSCOPY  2013    needed q 3 yrs.     COLONOSCOPY N/A 9/10/2020    Procedure: Colonoscopy, With Polypectomy And Biopsy;  Surgeon: Brian Cleaning MD;  Location: [facility] OR     COLONOSCOPY WITH CO2 INSUFFLATION N/A 11/21/2016    Procedure: COLONOSCOPY WITH CO2 INSUFFLATION;  Surgeon: Brian Cleaning MD;  Location: [facility] OR     COLONOSCOPY WITH CO2 INSUFFLATION N/A 9/10/2020    Procedure: COLONOSCOPY, WITH CO2 INSUFFLATION;  Surgeon: Brian Cleaning MD;  Location: [facility] OR     EP COMPREHENSIVE EP STUDY N/A 12/31/2018    Procedure: LOOP RECORDER;  Surgeon: Buck Butterfield MD;  Location:  [facility]     GENITOURINARY SURGERY      see above     HYSTERECTOMY, PAP NO LONGER INDICATED       HYSTERECTOMY, POORNIMA  1997    bleeding fibroids     ORTHOPEDIC SURGERY  2009    meniscus repair  "      Past medical history, past surgical history, medications, and allergies were reviewed with the patient. Additional pertinent items: None    Family History   Problem Relation Age of Onset     Respiratory Father      Glaucoma Father      Asthma Father      Allergies Sister      Eye Disorder Sister      Lipids Sister      Neurologic Disorder Sister      Osteoporosis Sister      Glaucoma Sister      Hypertension Sister      Arthritis Mother      Cancer Mother      Hypertension Mother      Other Cancer Mother      Thyroid Disease Mother      Gastrointestinal Disease Son      Macular Degeneration Sister      Hyperlipidemia Sister      Hypertension Sister      Osteoporosis Sister      Glaucoma Other        Social History     Tobacco Use     Smoking status: Former Smoker     Packs/day: 0.10     Years: 1.00     Pack years: 0.10     Types: Cigarettes     Start date: 1963     Quit date: 1965     Years since quittin.9     Smokeless tobacco: Never Used   Substance Use Topics     Alcohol use: Not Currently     Comment: one a month        Social history was reviewed with the patient. Additional pertinent items: None    Review of Systems   Constitutional: Negative for chills and fever.        Feels dehydrated   Eyes: Negative.    Respiratory: Negative for cough and shortness of breath.    Cardiovascular: Negative for chest pain and palpitations.   Gastrointestinal: Negative for abdominal pain, nausea and vomiting.   Genitourinary: Negative for flank pain.   Musculoskeletal: Negative.    Skin: Negative.  Negative for rash.   Neurological: Positive for dizziness, weakness (generalized) and light-headedness. Negative for headaches.   Psychiatric/Behavioral: Negative.    All other systems reviewed and are negative.          Physical Exam   BP: (!) 157/93  Pulse: 81  Temp: 98.1  F (36.7  C)  Resp: 16  Height: 154.9 cm (5' 1\")  Weight: 88.5 kg (195 lb)  SpO2: 97 %    Physical Exam    Physical Exam   Constitutional:   " well nourished, well developed, resting comfortably, appears tired   HENT:   Head: Normocephalic and atraumatic.   Eyes: Conjunctivae are normal. Right pupil;, round, and reactive to light.  Blind in left eye.   pharynx has no erythema or exudate, mucous membranes are moist.  EOMs are intact and right eye.  Neck:   no adenopathy, no bony tenderness  Cardiovascular: regular rate and rhythm without murmurs or gallops  Pulmonary/Chest: Clear to auscultation bilaterally, with no wheezes or retractions. No respiratory distress.  GI: Soft with good bowel sounds.  Non-tender, non-distended, with no guarding, no rebound, no peritoneal signs.   Back:  No bony or CVA tenderness   Musculoskeletal:  no edema or clubbing   Skin: Skin is warm and dry. No rash noted.   Neurological: alert and oriented to person, place, and time. Nonfocal exam  NEUROLOGIC: speech normal, mental status intact, muscle tone normal, muscle strength normal, CN II-XII intact: face symmetric, facial sensation to light touch equal, symmetric cheek puff, eyebrow raise, palate elevation. PERRL, no nystagmus. EOMI. Tongue midline. Shoulder shrug strong and symmetric.  Motor: normal bulk/tone, no muscle wasting or fasciculations  No pronator drift, strong  strength   Psychiatric: flta mood and affect. Appears fatigued    ED Course        Procedures             EKG Interpretation:      Interpreted by Charlette Yost MD  Time reviewed: 1155 am  Symptoms at time of EKG: see hpi   Rhythm: normal sinus   Rate: Normal  Axis: Left Axis Deviation  Ectopy: none  Conduction: normal  ST Segments/ T Waves: Poor R wave progression  Q Waves: v1, v2 and v3  Comparison to prior:  8/10/2019: Normal sinus rhythm with a rate of 92 bpm with PACs, no acute ischemic changes    Clinical Impression: Normal sinus rhythm, rate of 77 bpm, with a left axis deviation and poor R wave progression.                              Results for orders placed or performed during the hospital  encounter of 11/26/20 (from the past 24 hour(s))   CBC with platelets differential   Result Value Ref Range    WBC 5.0 4.0 - 11.0 10e9/L    RBC Count 4.74 3.8 - 5.2 10e12/L    Hemoglobin 13.4 11.7 - 15.7 g/dL    Hematocrit 43.1 35.0 - 47.0 %    MCV 91 78 - 100 fl    MCH 28.3 26.5 - 33.0 pg    MCHC 31.1 (L) 31.5 - 36.5 g/dL    RDW 12.5 10.0 - 15.0 %    Platelet Count 338 150 - 450 10e9/L    Diff Method Automated Method     % Neutrophils 66.6 %    % Lymphocytes 25.2 %    % Monocytes 7.0 %    % Eosinophils 0.4 %    % Basophils 0.6 %    % Immature Granulocytes 0.2 %    Nucleated RBCs 0 0 /100    Absolute Neutrophil 3.3 1.6 - 8.3 10e9/L    Absolute Lymphocytes 1.3 0.8 - 5.3 10e9/L    Absolute Monocytes 0.4 0.0 - 1.3 10e9/L    Absolute Eosinophils 0.0 0.0 - 0.7 10e9/L    Absolute Basophils 0.0 0.0 - 0.2 10e9/L    Abs Immature Granulocytes 0.0 0 - 0.4 10e9/L    Absolute Nucleated RBC 0.0    Comprehensive metabolic panel   Result Value Ref Range    Sodium 138 133 - 144 mmol/L    Potassium 3.8 3.4 - 5.3 mmol/L    Chloride 104 94 - 109 mmol/L    Carbon Dioxide 29 20 - 32 mmol/L    Anion Gap 6 3 - 14 mmol/L    Glucose 114 (H) 70 - 99 mg/dL    Urea Nitrogen 16 7 - 30 mg/dL    Creatinine 0.70 0.52 - 1.04 mg/dL    GFR Estimate 84 >60 mL/min/[1.73_m2]    GFR Estimate If Black >90 >60 mL/min/[1.73_m2]    Calcium 10.0 8.5 - 10.1 mg/dL    Bilirubin Total 0.5 0.2 - 1.3 mg/dL    Albumin 3.6 3.4 - 5.0 g/dL    Protein Total 8.6 6.8 - 8.8 g/dL    Alkaline Phosphatase 84 40 - 150 U/L    ALT 16 0 - 50 U/L    AST 9 0 - 45 U/L   Lipase   Result Value Ref Range    Lipase 130 73 - 393 U/L   Lactic acid whole blood   Result Value Ref Range    Lactic Acid 1.0 0.7 - 2.0 mmol/L   Troponin I   Result Value Ref Range    Troponin I ES <0.015 0.000 - 0.045 ug/L   INR   Result Value Ref Range    INR 1.24 (H) 0.86 - 1.14   TSH with free T4 reflex   Result Value Ref Range    TSH 0.40 0.40 - 4.00 mU/L   EKG 12-lead, tracing only   Result Value Ref Range     Interpretation ECG Click View Image link to view waveform and result    Head CT w/o contrast    Narrative    CT HEAD W/O CONTRAST 11/26/2020 12:53 PM    History: dizziness weakness,     Comparison: None     Technique: Using multidetector thin collimation helical acquisition  technique, axial, coronal and sagittal CT images from the skull base  to the vertex were obtained without intravenous contrast.    Findings: There is no intracranial hemorrhage, mass effect, or midline  shift. No evidence of gray-white differentiation loss.  Ventricles are  proportionate to the cerebral sulci. The basal cisterns are clear.  Diffuse scattered periventricular and subcortical white matter  hypodensities. Left vertebral artery vessel calcifications. Possible  chronic-appearing lacunar infarct in the left basal ganglia.    The bony calvaria and the bones of the skull base are normal. The  visualized portions of the paranasal sinuses and mastoid air cells are  clear.       Impression    Impression:  1. No acute intracranial pathology.  2. Multiple scattered periventricular and subcortical white matter  hypodensities, likely related to chronic small vessel disease.    Imaging findings discussed with Dr. Yost by Dr. Lucho Ace at  1:45PM and at 2:46 PM on 11/26/2020.    I have personally reviewed the examination and initial interpretation  and I agree with the findings.    PRASANTH REBOLLEDO MD   UA reflex to Microscopic and Culture    Specimen: Urine Midstream; Midstream Urine   Result Value Ref Range    Color Urine Light Yellow     Appearance Urine Clear     Glucose Urine Negative NEG^Negative mg/dL    Bilirubin Urine Negative NEG^Negative    Ketones Urine Negative NEG^Negative mg/dL    Specific Gravity Urine 1.004 1.003 - 1.035    Blood Urine Negative NEG^Negative    pH Urine 6.5 5.0 - 7.0 pH    Protein Albumin Urine Negative NEG^Negative mg/dL    Urobilinogen mg/dL Normal 0.0 - 2.0 mg/dL    Nitrite Urine Negative NEG^Negative     Leukocyte Esterase Urine Moderate (A) NEG^Negative    Source Midstream Urine     RBC Urine 1 0 - 2 /HPF    WBC Urine 4 0 - 5 /HPF    Bacteria Urine Few (A) NEG^Negative /HPF    Squamous Epithelial /HPF Urine 1 0 - 1 /HPF    Transitional Epi <1 0 - 1 /HPF   Urine Culture Aerobic Bacterial    Specimen: Midstream Urine   Result Value Ref Range    Specimen Description Midstream Urine     Special Requests Specimen received in preservative     Culture Micro PENDING    EKG 12-lead, tracing only   Result Value Ref Range    Interpretation ECG Click View Image link to view waveform and result    MR Brain for Stroke Cmpl w/o & w Contras    Impression    Impression:  1. Increased areas of restricted diffusion in along the left putamen  and thalamus which may represent punctate infarcts.  2. Multiple large areas of T2 FLAIR hyperintense signal along the  subcortical and periventricular white matter, likely sequela of  chronic small vessel ischemic disease.  2. No abnormal enhancing lesions intracranially.  3. Head MRA demonstrates no definite aneurysm or stenosis of the major  intracranial arteries.  4. Neck MRA demonstrates patent major cervical arteries.    Findings discussed with Dr. Yost by Dr. Lucho Ace at 7:04PM on  11/26/2020.             Medications   0.9% sodium chloride BOLUS (0 mLs Intravenous Stopped 11/26/20 1443)   gadobutrol (GADAVIST) injection 10 mL (10 mLs Intravenous Given 11/26/20 1754)   0.9% sodium chloride BOLUS (100 mLs Intravenous New Bag 11/26/20 1754)              Assessments & Plan (with Medical Decision Making)       I have reviewed the nursing notes.  EMERGENCY DEPARTMENT COURSE: Patient was seen and examined at 1137 am.     EKG shows Normal sinus rhythm, rate of 77 bpm, with a left axis deviation and poor R wave progression.  The patient is quite dry.  I treated her with normal saline bolus IV.    Laboratory studies, including a CBC, comprehensive metabolic panel, and lipase, are all  unremarkable.  TSH is normal at 0.40.  Troponin I is less than 0.015.  INR is 1.24.  The patient is on Eliquis.  UA shows moderate LCE with 4 WBCs.    The patient is wearing a loop recorder.  I did speak with MRI about the possibility of obtaining an MRI of her brain.  MRI states that this would erase all the findings of the loop recorder.  I ordered a noncontrasted head CT, which shows: Impression:  1. Multifocal areas of gray-white differentiation loss in the left  parietotemporal and right frontotemporal areas, likely representing  age indeterminate infarcts, however appear subacute.  2. Multiple scattered periventricular and subcortical white matter  hypodensities, likely related chronic small associated disease.     I consulted Stroke Neurology, who recommended MRI of the brain stroke protocol with and without contrast and MRA of the head and neck.    Her Loop recorder was interrogated in the ED to preserve the information already recorded..  Apparently, having the patient undergo an MRI study will erase all information from the loop recorder.    The patient was seen by Dr. Stark of stroke neurology.  Please see her documentation for details. She recommended obtaining an MRI of the brain with and without contrast as well as MRI and MRA of the head and neck.   Preliminary read on MRI and MRA shows:  Impression:  1. Increased areas of restricted diffusion in along the left putamen  and thalamus which may represent punctate infarcts.  2. Multiple large areas of T2 FLAIR hyperintense signal along the  subcortical and periventricular white matter, likely sequela of  chronic small vessel ischemic disease.  2. No abnormal enhancing lesions intracranially.  3. Head MRA demonstrates no definite aneurysm or stenosis of the major  intracranial arteries.  4. Neck MRA demonstrates patent major cervical arteries.    The patient is a 75-year-old female who presents with episodes of dizziness and vertigo.  Pending Neuro  Stroke decision, I had intended to admit the patient to the ED obs unit for vertigo--for PT/OT consult and vestibular rehabilitation. The patient did not want an observation admission because she stated that insurance would not cover it. She would prefer to be discharged home rather than go into OBs.  Risks of leaving were discussed with the patient but she was adamant about discharge if the neurology team felt she did not need to be admitted as an inpatient.  I wrote a prescription for meclizine that she can use for symptomatic relief. I discussed vertigo and dizziness with the patient and reasons to return. I would like her to see her PMD in follow up and recheck within a week.     The patient was signed out to Dr. Moe at about 1915 pm pending Neuro stroke recommendations.     I have reviewed the findings, diagnosis, plan and need for follow up with the patient.    New Prescriptions    No medications on file       Final diagnoses:   Dizziness   Weakness   Vertigo       11/26/2020   [facility] EMERGENCY DEPARTMENT  I, Andreina Luque, am serving as a trained medical scribe to document services personally performed by Charlette Yost MD based on the provider's statements to me on November 26, 2020.  This document has been checked and approved by the attending provider.    I, Charlette Yost MD, was physically present and have reviewed and verified the accuracy of this note documented by Andreina Luque, medical scribe.     This note was created in part by the use of Dragon voice recognition dictation system. Inadvertent grammatical errors and typographical errors may still exist.  MD Priyank White Alda L, MD  11/26/20 2026

## 2020-11-26 NOTE — TELEPHONE ENCOUNTER
Pt calls in with concern of dizziness  Started Tuesday night > was very dizzy - shaky   This resolved on own   Last night - same thing - felt very dizzy - could barley stand     Pt says she is staying hydrated - is urinating ok     NO other symptoms   No fever   No chest pain   No SOB    Pt does have a cardiac hx - does have a Loop recorder in     Per protocol = pt to be seen within 4 hours     May go to the ED at the [facility] )    Will discuss with  first    Protocol and care advice reviewed  Caller states understanding of the recommended disposition  Advised to call back if further questions or concerns    Francois Ospina , RN / [facility]        Additional Information    Negative: Severe difficulty breathing (e.g., struggling for each breath, speaks in single words)    Negative: [1] Difficulty breathing or swallowing AND [2] started suddenly after medicine, an allergic food or bee sting    Negative: Shock suspected (e.g., cold/pale/clammy skin, too weak to stand, low BP, rapid pulse)    Negative: Difficult to awaken or acting confused (e.g., disoriented, slurred speech)    Negative: [1] Weakness (i.e., paralysis, loss of muscle strength) of the face, arm or leg on one side of the body AND [2] sudden onset AND [3] present now    Negative: [1] Numbness (i.e., loss of sensation) of the face, arm or leg on one side of the body AND [2] sudden onset AND [3] present now    Negative: [1] Loss of speech or garbled speech AND [2] sudden onset AND [3] present now    Negative: Overdose (accidental or intentional) of medications    Negative: [1] Fainted > 15 minutes ago AND [2] still feels too weak or dizzy to stand    Negative: Heart beating < 50 beats per minute OR > 140 beats per minute    Negative: Sounds like a life-threatening emergency to the triager    Negative: Difficulty breathing    Negative: SEVERE dizziness (e.g., unable to stand, requires support to walk, feels like passing out now)     "Negative: Extra heart beats OR irregular heart beating  (i.e., \"palpitations\")    Negative: [1] Drinking very little AND [2] dehydration suspected (e.g., no urine > 12 hours, very dry mouth, very lightheaded)    Negative: Patient sounds very sick or weak to the triager    [1] Dizziness caused by heat exposure, sudden standing, or poor fluid intake AND [2] no improvement after 2 hours of rest and fluids    Protocols used: DIZZINESS - [id number]      "

## 2020-11-26 NOTE — PROGRESS NOTES
[employer] Care[employer] Express remote loop recorder transmission received from the ER and reviewed. Device transmission sent per MD orders. Patient has a [employer]onic loop recorder. Normal loop recorder function. No patient activated episodes recorded. 2 AF episodes recorded 2 lasting minutes in duration with max ventricular rates of  bpm. 3 tachy episodes recorded - 194-214 bpm, 4-12 seconds. Stored tachy episodes reveal an irregular and narrow complex tachycardia. Patient is taking Eliquis. Presenting EGM = NSR with PAC's @ 75 bpm. Loop recorder battery status = ok. ER RN notified of interrogation results. DEANDRA Jones RN    Remote loop recorder transmission

## 2020-11-26 NOTE — ED AVS SNAPSHOT
[facility] Emergency Department  [address] [address]  Phone: [phone]                                    Alfreda Baxter   MRN: [id number]    Department: [facility] Emergency Department   Date of Visit: 11/26/2020           After Visit Summary Signature Page    I have received my discharge instructions, and my questions have been answered. I have discussed any challenges I see with this plan with the nurse or doctor.    ..........................................................................................................................................  Patient/Patient Representative Signature      ..........................................................................................................................................  Patient Representative Print Name and Relationship to Patient    ..................................................               ................................................  Date                                   Time    ..........................................................................................................................................  Reviewed by Signature/Title    ...................................................              ..............................................  Date                                               Time          22EPIC Rev 08/18

## 2020-11-26 NOTE — ED TRIAGE NOTES
Patient has been experiencing dizziness intermittently since TKR about 4 weeks ago. Patient states it has gotten intolerable beginning Tuesday of this week. Denies injury or dark stools. Has history A-fib on Eliquis. Pt has N/V/D.

## 2020-11-26 NOTE — ED NOTES
"Assessment: Patient has \"room spinning dizziness\" affecting her ability to ambulate and move around. Patient feels somewhat dizzy at rest but it is made worse with movement. This began 11/24/20. Patient had episode of nausea and vomiting with her dizziness.   "

## 2020-11-27 ENCOUNTER — TELEPHONE (OUTPATIENT)
Dept: EMERGENCY MEDICINE | Facility: CLINIC | Age: 75
End: 2020-11-27

## 2020-11-27 DIAGNOSIS — N39.0 URINARY TRACT INFECTION: ICD-10-CM

## 2020-11-27 NOTE — TELEPHONE ENCOUNTER
"[employer] [facility] Emergency Department Lab result notification [Adult-Female]    [facility] ED lab result protocol used  Urine culture    Reason for call  Notify of lab results, assess symptoms,  review ED providers recommendations/discharge instructions (if necessary) and advise per ED lab result f/u protocol    Lab Result (including Rx patient on, if applicable)  Preliminary urine culture report on 11/27/20 shows the presence of bacteria(s):  50,000 to 100,000 colonies/mL Streptococcus agalactiae B  Emergency Dept/Urgent Care discharge antibiotic: None  Recommendations per [facility] ED Lab result protocol - Urine culture protocol.    Information table from ED Provider visit on 11/26/20  Symptoms reported at ED visit (Chief complaint, HPI) Chief Complaint   Patient presents with     Dizziness      The history is provided by the patient and medical records.      Alfreda Baxter is a 75 year old female with history of paroxysmal ventricular tachycardia who presents with vertiginous symptoms.  She had recent hospitalization for right total knee arthroplasty from 10/28-10/30/2020.  Patient states that she has been recovering from this well, has a walker but has been able to get around without it. Patient states symptoms started on 11/24/2020 with vertigo and listing to one side, had vomiting with this.  Eventually the symptom improved and yesterday she felt at her usual state of health, was able to walk all day without her walker, felt pretty good.  Last night her vertiginous symptoms recurred and she couldn't sleep well, took 1 Tylenol before going to bed. This morning she continued to feel vertigo, generalized weakness and inability to stand up straight.  Patient feels as if she is listing to 1 side.  Patient called into triage line reporting dizziness and generalized weakness and was advised to come to the ER for evaluation.  She continues to feel weak and with balance issues at this time. She states she just feels  tired all over, has difficulty describing it.  She does not feel safe walking even with her walker for assistance.  No fever, chest pain or shortness of breath.  No cough or cold symptoms. No falls. Patient did have follow-up with [facility] 11/11/2020.  Postoperatively she was healing well, no signs of infection at this visit. Her right knee continues to be healing well.      Patient has a [employer] remote loop recorder in place.  The [employer] care alert team did receive noticed that she had tacky episodes on 11/3/2020.  This showed fairly regular narrow complex tachycardias with rates of 182-188.  Patient is on Eliquis.     Social history: patient lives with her . She feels very dry. Has been isolating     I have reviewed the Medications, Allergies, Past Medical and Surgical History, and Social History in the Epic system.     Significant Medical hx, if applicable (i.e. CKD, diabetes) Reviewed   Allergies Allergies   Allergen Reactions     Codeine Anaphylaxis     Niacin      No Clinical Screening - See Comments Other (See Comments)     senisitive to non steroidals  Aleve, ibuprofen celebrex cause bad stomach pains  senisitive to non steroidals  Aleve, ibuprofen celebrex cause bad stomach pains     Oxycodone Hives     Other reaction(s): Unknown  ... With facial swelling     Trazodone      nausea     Nsaids Nausea and Vomiting     Other reaction(s): Abdominal Pain      Weight, if applicable Wt Readings from Last 2 Encounters:   11/26/20 88.5 kg (195 lb)   10/15/20 87.5 kg (193 lb)      Coumadin/Warfarin [Yes /No] ?   Creatinine Level (mg/dl) Creatinine   Date Value Ref Range Status   11/26/2020 0.70 0.52 - 1.04 mg/dL Final      Creatinine clearance (ml/min), if applicable Serum creatinine: 0.7 mg/dL 11/26/20 1141  Estimated creatinine clearance: 69.2 mL/min   Pregnant (Yes/No/NA) NA   Breastfeeding (Yes/No/NA) NA   ED providers Impression and Plan (applicable information) I have reviewed the  nursing notes.  EMERGENCY DEPARTMENT COURSE: Patient was seen and examined at 1137 am.     EKG shows Normal sinus rhythm, rate of 77 bpm, with a left axis deviation and poor R wave progression.  The patient is quite dry.  I treated her with normal saline bolus IV.     Laboratory studies, including a CBC, comprehensive metabolic panel, and lipase, are all unremarkable.  TSH is normal at 0.40.  Troponin I is less than 0.015.  INR is 1.24.  The patient is on Eliquis.  UA shows moderate LCE with 4 WBCs.     The patient is wearing a loop recorder.  I did speak with MRI about the possibility of obtaining an MRI of her brain.  MRI states that this would erase all the findings of the loop recorder.  I ordered a noncontrasted head CT, which shows: Impression:  1. Multifocal areas of gray-white differentiation loss in the left  parietotemporal and right frontotemporal areas, likely representing  age indeterminate infarcts, however appear subacute.  2. Multiple scattered periventricular and subcortical white matter  hypodensities, likely related chronic small associated disease.     I consulted Stroke Neurology, who recommended MRI of the brain stroke protocol with and without contrast and MRA of the head and neck.     Her Loop recorder was interrogated in the ED to preserve the information already recorded..  Apparently, having the patient undergo an MRI study will erase all information from the loop recorder.     The patient was seen by Dr. Stark of stroke neurology.  Please see her documentation for details. She recommended obtaining an MRI of the brain with and without contrast as well as MRI and MRA of the head and neck.   Preliminary read on MRI and MRA shows:  Impression:  1. Increased areas of restricted diffusion in along the left putamen  and thalamus which may represent punctate infarcts.  2. Multiple large areas of T2 FLAIR hyperintense signal along the  subcortical and periventricular white matter, likely  sequela of  chronic small vessel ischemic disease.  2. No abnormal enhancing lesions intracranially.  3. Head MRA demonstrates no definite aneurysm or stenosis of the major  intracranial arteries.  4. Neck MRA demonstrates patent major cervical arteries.   The patient is a 75-year-old female who presents with episodes of dizziness and vertigo.  Pending Neuro Stroke decision, I had intended to admit the patient to the ED obs unit for vertigo--for PT/OT consult and vestibular rehabilitation. The patient did not want an observation admission because she stated that insurance would not cover it. She would prefer to be discharged home rather than go into OBs.  Risks of leaving were discussed with the patient but she was adamant about discharge if the neurology team felt she did not need to be admitted as an inpatient.  I wrote a prescription for meclizine that she can use for symptomatic relief. I discussed vertigo and dizziness with the patient and reasons to return. I would like her to see her PMD in follow up and recheck within a week.      The patient was signed out to Dr. Moe at about 1915 pm pending Neuro stroke recommendations.      I have reviewed the findings, diagnosis, plan and need for follow up with the patient.      ED diagnosis Dizziness   Weakness   Vertigo        ED provider Charlette Yost MD      RN Assessment (Patient s current Symptoms), include time called.  [Insert Left message here if message left]  4:11 feels some frequency but this has been better in the past few days. No burning. Not any more frequent than usual, does wake up more during the night. No pain.  Patient states she is feeling well.    RN Recommendations/Instructions per [facility] ED lab result protocol  Patient notified of lab result and treatment recommendations.   Patient is asymptomatic and will wait final culture.       Please Contact your PCP clinic or return to the Emergency department if your:    Symptoms  return.    Symptoms worsen or other concerning symptom's.    PCP follow-up Questions asked: NO    [RN Name]  Dixie Kaba  [employer] [facility]  Emergency Dept Lab Result RN  # [phone]      Copy of Lab result   Urine Culture Aerobic Bacterial  Order: [id number]  Status:  Preliminary result   Visible to patient:  No (not released)   Dx:  Dizziness  Specimen Information: Midstream Urine        Component 1d ago   Specimen Description Midstream Urine    Special Requests Specimen received in preservative P    Culture Micro Abnormal   50,000 to 100,000 colonies/mL   Streptococcus agalactiae sero group B   Susceptibility testing not routinely done   This organism is susceptible to ampicillin, penicillin, vancomycin and the cephalosporins.    If treatment is required AND your patient is allergic to penicillin, contact the   Microbiology Lab within 5 days to request susceptibility testing.   P      Culture Micro Culture in progress P    Resulting Agency [facility]         Specimen Collected: 11/26/20  1:18 PM   Last Resulted: 11/27/20  3:15 PM

## 2020-11-27 NOTE — DISCHARGE INSTRUCTIONS
You were offered admission and did not want it.  Please return at any times for concerns.  Take Antivert to see if it helps with your symptoms.  This may make you tired.  Please follow up with your regular doctor next week for reevaluation.

## 2020-11-28 LAB
BACTERIA SPEC CULT: ABNORMAL
BACTERIA SPEC CULT: ABNORMAL
Lab: ABNORMAL
SPECIMEN SOURCE: ABNORMAL

## 2020-11-29 NOTE — TELEPHONE ENCOUNTER
[facility]    [facility] ED lab result protocol used  Urine culture    Reason for call  Notify of lab results, assess symptoms,  review ED providers recommendations/discharge instructions (if necessary) and advise per ED lab result f/u protocol    Lab Result (including Rx patient on, if applicable)  Final urine culture on 11/28/2020 shows the presence of bacteria(s): 50,000 to 100,000 colonies/mL Streptococcus agalactiae sero group B AND 10,000 to 50,000 colonies/mL Enterococcus faecalis   [facility] Emergency Dept/Urgent Care discharge antibiotic: None  As per  ED lab result protocol, treat per Urine culture protocol.  Information table from ED Provider visit on 11/26/2020  Symptoms reported at ED visit (Chief complaint, HPI) See below   Significant Medical hx, if applicable (i.e. CKD, diabetes) Reviewed   Allergies Allergies   Allergen Reactions     Codeine Anaphylaxis     Niacin      No Clinical Screening - See Comments Other (See Comments)     senisitive to non steroidals  Aleve, ibuprofen celebrex cause bad stomach pains  senisitive to non steroidals  Aleve, ibuprofen celebrex cause bad stomach pains     Oxycodone Hives     Other reaction(s): Unknown  ... With facial swelling     Trazodone      nausea     Nsaids Nausea and Vomiting     Other reaction(s): Abdominal Pain      Weight, if applicable Wt Readings from Last 2 Encounters:   11/26/20 88.5 kg (195 lb)   10/15/20 87.5 kg (193 lb)      Coumadin/Warfarin [Yes /No] No   Creatinine Level (mg/dl) Creatinine   Date Value Ref Range Status   11/26/2020 0.70 0.52 - 1.04 mg/dL Final      Creatinine clearance (ml/min), if applicable Serum creatinine: 0.7 mg/dL 11/26/20 1141  Estimated creatinine clearance: 69.2 mL/min   ED providers Impression and Plan (applicable information) See below   ED diagnosis Dizziness   Weakness   Vertigo      ED provider Charlette Yost MD      RN Assessment (Patient s  "current Symptoms), include time called.  [Insert Left message here if message left]  10:31AM: Spoke with patient. She states she is having occasional dizziness. Does not feel worse than when she was in the ED. \"I feel like I've had a urinary tract infection brewing for a few weeks.\" C/O \"slight\" burning with urination. Denies any fever, blood in the urine, severe back or abdominal pain, nausea or vomiting.     RN Recommendations/Instructions per [facility] ED lab result protocol  Patient notified of lab result and treatment recommendations.  Rx for Amoxicillin-Clavulanate (Augmentin) 875-125 mg PO tablet, 1 tablet by mouth 2 times daily for 3 days sent to [Pharmacy - [facility] in [place]].  RN reviewed information about UTI's. Advised to increase fluids and wipe front to back when going to the bathroom.   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   Advised to follow up with her PCP as directed by the ED provider.  The patient is comfortable with the information given and has no further questions.      Please Contact your PCP clinic or return to the Emergency department if your:    Symptoms worsen or other concerning symptom's.    PCP follow-up Questions asked: YES       [RN Name]  Celina Santos RN  [facility] RN  Lung Nodule and ED Lab Result RN  Epic pool (ED late result f/u RN): [id number]  [facility] INCIDENTAL RADIOLOGY F/U NURSES: P [id number]  # [phone]    Copy of Lab result   Urine Culture Aerobic Bacterial  Order: [id number]  Status:  Final result   Visible to patient:  Yes (MyChart) Dx:  Dizziness  Specimen Information: Midstream Urine        Component 3d ago   Specimen Description Midstream Urine    Special Requests Specimen received in preservative    Culture Micro Abnormal   50,000 to 100,000 colonies/mL   Streptococcus agalactiae sero group B   This organism is susceptible to ampicillin, penicillin, vancomycin and the cephalosporins.    If treatment is required AND your " patient is allergic to penicillin, contact the   Microbiology Lab within 5 days to request susceptibility testing.   Testing unavailable due to 's backorder.     Culture Micro Abnormal   10,000 to 50,000 colonies/mL   Enterococcus faecalis     Resulting Agency [facility]   Susceptibility     Enterococcus faecalis     MEHUL     AMPICILLIN <=2 ug/mL Sensitive     NITROFURANTOIN <=16 ug/mL Sensitive     PENICILLIN 2 ug/mL Sensitive     VANCOMYCIN 1 ug/mL Sensitive                 Specimen Collected: 11/26/20  1:18 PM Last Resulted: 11/28/20  9:40 PM

## 2020-12-02 ENCOUNTER — TRANSFERRED RECORDS (OUTPATIENT)
Dept: HEALTH INFORMATION MANAGEMENT | Facility: CLINIC | Age: 75
End: 2020-12-02

## 2020-12-02 ENCOUNTER — MYC MEDICAL ADVICE (OUTPATIENT)
Dept: FAMILY MEDICINE | Facility: CLINIC | Age: 75
End: 2020-12-02

## 2020-12-02 DIAGNOSIS — H81.10 BENIGN PAROXYSMAL POSITIONAL VERTIGO, UNSPECIFIED LATERALITY: Primary | ICD-10-CM

## 2020-12-02 NOTE — TELEPHONE ENCOUNTER
Routing to PCP to review and advise.    Lyndsay Carpenter RN  [facility]

## 2020-12-04 LAB
MDC_IDC_EPISODE_DTM: NORMAL
MDC_IDC_EPISODE_DURATION: 120 S
MDC_IDC_EPISODE_ID: 242
MDC_IDC_EPISODE_TYPE: NORMAL
MDC_IDC_MSMT_BATTERY_STATUS: NORMAL
MDC_IDC_PG_IMPLANT_DTM: NORMAL
MDC_IDC_PG_MFG: NORMAL
MDC_IDC_PG_MODEL: NORMAL
MDC_IDC_PG_SERIAL: NORMAL
MDC_IDC_PG_TYPE: NORMAL
MDC_IDC_SESS_CLINIC_NAME: NORMAL
MDC_IDC_SESS_DTM: NORMAL
MDC_IDC_SESS_TYPE: NORMAL
MDC_IDC_SET_ZONE_DETECTION_INTERVAL: 2000 MS
MDC_IDC_SET_ZONE_DETECTION_INTERVAL: 3000 MS
MDC_IDC_SET_ZONE_DETECTION_INTERVAL: 380 MS
MDC_IDC_SET_ZONE_TYPE: NORMAL
MDC_IDC_STAT_AT_BURDEN_PERCENT: 0.1 %
MDC_IDC_STAT_AT_DTM_END: NORMAL
MDC_IDC_STAT_AT_DTM_START: NORMAL
MDC_IDC_STAT_EPISODE_RECENT_COUNT: 0
MDC_IDC_STAT_EPISODE_RECENT_COUNT: 2
MDC_IDC_STAT_EPISODE_RECENT_COUNT: 3
MDC_IDC_STAT_EPISODE_RECENT_COUNT_DTM_END: NORMAL
MDC_IDC_STAT_EPISODE_RECENT_COUNT_DTM_START: NORMAL
MDC_IDC_STAT_EPISODE_TOTAL_COUNT: 0
MDC_IDC_STAT_EPISODE_TOTAL_COUNT: 225
MDC_IDC_STAT_EPISODE_TOTAL_COUNT: 28
MDC_IDC_STAT_EPISODE_TOTAL_COUNT: 3
MDC_IDC_STAT_EPISODE_TOTAL_COUNT_DTM_END: NORMAL
MDC_IDC_STAT_EPISODE_TOTAL_COUNT_DTM_START: NORMAL
MDC_IDC_STAT_EPISODE_TYPE: NORMAL

## 2020-12-08 ENCOUNTER — NURSE TRIAGE (OUTPATIENT)
Dept: FAMILY MEDICINE | Facility: CLINIC | Age: 75
End: 2020-12-08

## 2020-12-08 ENCOUNTER — MYC MEDICAL ADVICE (OUTPATIENT)
Dept: FAMILY MEDICINE | Facility: CLINIC | Age: 75
End: 2020-12-08

## 2020-12-08 DIAGNOSIS — J06.9 UPPER RESPIRATORY TRACT INFECTION, UNSPECIFIED TYPE: ICD-10-CM

## 2020-12-08 RX ORDER — PREDNISONE 20 MG/1
40 TABLET ORAL DAILY
Qty: 10 TABLET | Refills: 1 | Status: SHIPPED | OUTPATIENT
Start: 2020-12-08 | End: 2021-07-29

## 2020-12-08 NOTE — TELEPHONE ENCOUNTER
Reason for call:  Patient reporting a symptom    Symptom or request: Gout     Duration (how long have symptoms been present): couple days    Have you been treated for this before? Yes    Additional comments: Patient called stating she has a major gout flare up on her ankle and that Dr. Sims has helped her with it in the past.    Phone Number patient can be reached at:  Home number on file [phone] (home)    Best Time:  anytime    Can we leave a detailed message on this number:  YES    Call taken on 12/8/2020 at 7:56 AM by Roxanne Carreon.

## 2020-12-08 NOTE — TELEPHONE ENCOUNTER
Called patient. She states that her left ankle is hot, red, swollen, sore. States that she has has this before with toe on the foot and it was gout. Pt states that she is 6 weeks out from total knee replacement on right side. She declines any fever. Declines redness, warmth, swelling to calf on left side. Recommended appt with provider today for assessment. No appt's open in clinic today recommended urgent care. Pt states that she sent a message to her provide via [facility] and will just wait for her provider's reply.       Additional Information    Redness of the skin and no fever    Negative: Followed an injury    Negative: Thigh or calf pain is the main symptom    Negative: Thigh or calf swelling is the main symptom    Negative: Foot pain is the main symptom    Negative: Entire foot is cool or blue in comparison to other side    Negative: Fever and red area (or area very tender to touch)    Negative: Fever and swollen joint    Negative: Thigh or calf pain and only 1 side and present > 1 hour    Negative: Thigh or calf swelling and only 1 side    Negative: Patient sounds very sick or weak to the triager    Negative: SEVERE pain (e.g., excruciating, unable to walk) and not improved after 2 hours of pain medicine    Negative: Looks infected (spreading redness, pus) and large red area (> 2 in. or 5 cm)    Protocols used: ANKLE PAIN-A-OH    Reema Masters RN

## 2020-12-10 ENCOUNTER — OFFICE VISIT (OUTPATIENT)
Dept: OPHTHALMOLOGY | Facility: CLINIC | Age: 75
End: 2020-12-10
Payer: MEDICARE

## 2020-12-10 DIAGNOSIS — H43.813 POSTERIOR VITREOUS DETACHMENT OF BOTH EYES: ICD-10-CM

## 2020-12-10 DIAGNOSIS — Z86.69 HISTORY OF CENTRAL RETINAL ARTERY OCCLUSION: ICD-10-CM

## 2020-12-10 DIAGNOSIS — H40.52X2 NEOVASCULAR GLAUCOMA OF LEFT EYE, MODERATE STAGE: ICD-10-CM

## 2020-12-10 DIAGNOSIS — H40.051 OCULAR HYPERTENSION OF RIGHT EYE: Primary | ICD-10-CM

## 2020-12-10 PROCEDURE — 92012 INTRM OPH EXAM EST PATIENT: CPT | Performed by: OPHTHALMOLOGY

## 2020-12-10 PROCEDURE — 92083 EXTENDED VISUAL FIELD XM: CPT | Performed by: OPHTHALMOLOGY

## 2020-12-10 PROCEDURE — 92133 CPTRZD OPH DX IMG PST SGM ON: CPT | Performed by: OPHTHALMOLOGY

## 2020-12-10 RX ORDER — LATANOPROST 50 UG/ML
1 SOLUTION/ DROPS OPHTHALMIC AT BEDTIME
COMMUNITY
Start: 2020-11-17 | End: 2020-12-10

## 2020-12-10 ASSESSMENT — TONOMETRY
IOP_METHOD: APPLANATION
OS_IOP_MMHG: 12
OD_IOP_MMHG: 13

## 2020-12-10 ASSESSMENT — VISUAL ACUITY
METHOD: SNELLEN - LINEAR
OD_CC+: -3
OD_CC: 20/25 SLOW
OS_CC: LP WITH PROJECTION
CORRECTION_TYPE: GLASSES

## 2020-12-10 NOTE — PROGRESS NOTES
Current Eye Medications:  Artificial tears both eyes, Timolol left eye each morning, Latanoprost both eyes every evening.      Subjective:  Here for HVF and OCT today for glaucoma. Father and sister with glaucoma     Objective:  See Ophthalmology Exam.       Assessment:  Stable intraocular pressure, glaucoma OCT, retinal OCT, and Hill Visual Field both eyes in patient who is a treated glaucoma suspect.      Plan:  Continue same medications.  Use artificial tears up to 4 times daily both eyes as needed.  (Refresh Tears, Systane Ultra/Balance, or Theratears)  Return visit 6 months for a complete exam.  Bryan Morrison M.D.  [phone]

## 2020-12-10 NOTE — LETTER
12/10/2020         RE: Alfreda Baxter  [address] [address]        Dear Colleague,    Thank you for referring your patient, Alfreda Baxter, to the [facility]. Please see a copy of my visit note below.     Current Eye Medications:  Artificial tears both eyes, Timolol left eye each morning, Latanoprost both eyes every evening.      Subjective:  Here for HVF and OCT today for glaucoma. Father and sister with glaucoma     Objective:  See Ophthalmology Exam.       Assessment:  Stable intraocular pressure, glaucoma OCT, retinal OCT, and Hill Visual Field both eyes in patient who is a treated glaucoma suspect.      Plan:  Continue same medications.  Use artificial tears up to 4 times daily both eyes as needed.  (Refresh Tears, Systane Ultra/Balance, or Theratears)  Return visit 6 months for a complete exam.  Bryan Morrison M.D.  [phone]             Again, thank you for allowing me to participate in the care of your patient.        Sincerely,        Bryan Morrison MD

## 2020-12-10 NOTE — PATIENT INSTRUCTIONS
Continue same medications.  Use artificial tears up to 4 times daily both eyes as needed.  (Refresh Tears, Systane Ultra/Balance, or Theratears)  Return visit 6 months for a complete exam.  Bryan Morrison M.D.  [phone]

## 2020-12-11 PROBLEM — Z86.69 HISTORY OF CENTRAL RETINAL ARTERY OCCLUSION: Status: ACTIVE | Noted: 2020-12-11

## 2020-12-11 RX ORDER — LATANOPROST 50 UG/ML
1 SOLUTION/ DROPS OPHTHALMIC AT BEDTIME
Qty: 1 BOTTLE | Refills: 11 | Status: SHIPPED | OUTPATIENT
Start: 2020-12-11 | End: 2021-12-16

## 2020-12-11 ASSESSMENT — EXTERNAL EXAM - RIGHT EYE: OD_EXAM: 2+ BROW PTOSIS, MILD TO MOD BROW

## 2020-12-11 ASSESSMENT — EXTERNAL EXAM - LEFT EYE: OS_EXAM: 2+ BROW PTOSIS, MILD TO MOD BROW

## 2021-01-05 ENCOUNTER — OFFICE VISIT (OUTPATIENT)
Dept: CARDIOLOGY | Facility: CLINIC | Age: 76
End: 2021-01-05
Payer: MEDICARE

## 2021-01-05 VITALS
WEIGHT: 185 LBS | OXYGEN SATURATION: 96 % | DIASTOLIC BLOOD PRESSURE: 85 MMHG | HEART RATE: 77 BPM | SYSTOLIC BLOOD PRESSURE: 134 MMHG | BODY MASS INDEX: 34.96 KG/M2

## 2021-01-05 DIAGNOSIS — E78.5 HYPERLIPIDEMIA LDL GOAL <130: ICD-10-CM

## 2021-01-05 DIAGNOSIS — I10 BENIGN ESSENTIAL HYPERTENSION: ICD-10-CM

## 2021-01-05 DIAGNOSIS — I48.0 PAROXYSMAL ATRIAL FIBRILLATION (H): Primary | ICD-10-CM

## 2021-01-05 DIAGNOSIS — R42 VERTIGO: ICD-10-CM

## 2021-01-05 DIAGNOSIS — E78.5 HYPERLIPIDEMIA LDL GOAL <70: ICD-10-CM

## 2021-01-05 DIAGNOSIS — Z86.69 HISTORY OF CENTRAL RETINAL ARTERY OCCLUSION: ICD-10-CM

## 2021-01-05 PROCEDURE — 99214 OFFICE O/P EST MOD 30 MIN: CPT | Performed by: INTERNAL MEDICINE

## 2021-01-05 NOTE — PROGRESS NOTES
Referring provider: Buck Butterfield MD    HPI: Ms. Alfreda Baxter is a 74 year old  female with PMH significant for hypertension, hyperlipidemia and central retinal artery occlusion in August 2018.  The patient underwent ILR implantation in December 2018 which showed paroxysmal atrial fibrillation.  Patient is on rivaroxaban since then.    Patient is being seen today for follow-up.  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  Last ILR interrogation in January 16, 2020 showed 36 A. fib episodes lasting between 2 to 10 minutes.  Baseline rhythm is sinus.  Patient is asymptomatic from atrial fibrillation.  Denies palpitations, chest pain, dizziness, syncope or lower extremity edema.  She reports mild asthma and she is on inhalers as needed.  Reports dyspnea on exertion which is not new to her.  Few weeks ago patient had spontaneous gum bleeding that she noticed in the middle of the night.  She applied pressure on her gum and bleeding stopped.  No recurrent gum bleeding since then.  No other source of bleeding.      No significant history of smoking. Patient is currently on lisinopril 10 mg, rivaroxaban 20 mg, atorvastatin 40 mg.  Blood blood pressure and LDL well controlled.  Triglycerides mildly high.    Patient had cardiac MRI in 2018 which showed normal biventricular function with no intracardiac shunt.  Lexiscan stress test in 2013 showed normal myocardial perfusion.    Medications, personal, family, and social history reviewed with patient and revised.    Interval history 1/5/2021:  Patient is being seen today for 1 year follow-up.  In the interim she had several nosebleeding episodes with rivaroxaban.  She tells me that she was taking a supplement containing serotonin called GUILLERMO/E and when she stopped taking that she was still having nosebleeds however it was less.  Patient was suspecting if there was interaction with the supplement in the medication.  But eventually she  switched to Eliquis 2 months ago after her hip arthroplasty in October of this year. She is off of that supplement now. She has not had any bleeding side effects from Eliquis.  She is overall feeling well from cardiac standpoint.  Denies chest pain, shortness of breath, palpitations or syncope. She has some lower extremity edema on the right side (she recently had knee replacement on that side).  Patient was also seen in the ER on 11/26/2020 for severe vertigo.  She tells me that she has vertigo for several years however on 11/26 vertigo was very severe which prompted her to go to ER.  All the investigation including CT head and MR brain were unremarkable.     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       albuterol (PROAIR HFA/PROVENTIL HFA/VENTOLIN HFA) 108 (90 Base) MCG/ACT inhaler Inhale 2 puffs into the lungs every 6 hours as needed for shortness of breath / dyspnea or wheezing 1 Inhaler 3     apixaban ANTICOAGULANT (ELIQUIS ANTICOAGULANT) 5 MG tablet Take 1 tablet (5 mg) by mouth 2 times daily 60 tablet 0     atorvastatin (LIPITOR) 40 MG tablet Take 0.5 tablets (20 mg) by mouth daily 90 tablet 3     B-COMPLEX OR Take  by mouth daily.        CALCIUM 500 +D OR one daily       hypromellose (ARTIFICIAL TEARS) 0.5 % SOLN ophthalmic solution Place 1 drop into both eyes 3 times daily       latanoprost (XALATAN) 0.005 % ophthalmic solution Place 1 drop into both eyes At Bedtime 1 Bottle 11     lisinopril (ZESTRIL) 10 MG tablet Take 1 tablet (10 mg) by mouth daily 90 tablet 3     meclizine (ANTIVERT) 12.5 MG tablet Take 1 tablet (12.5 mg) by mouth 3 times daily as needed for dizziness 20 tablet 0     multivitamin (OCUVITE) TABS tablet Take 1 tablet by mouth daily       NEW MED lasastropost eye drops       predniSONE (DELTASONE) 20 MG tablet Take 2 tablets (40 mg) by mouth daily 10 tablet 1     timolol maleate (TIMOPTIC) 0.5 % ophthalmic solution Place 1 drop Into the left eye every morning 5 mL 11     zolpidem (AMBIEN) 5 MG tablet TAKE ONE TABLET BY MOUTH IN THE EVENING AS NEEDED FOR SLEEP 30 tablet 3       PAST SURGICAL HISTORY:  Past Surgical History:   Procedure Laterality Date     ABDOMEN SURGERY  1976, 1978, 1995    2 C-sections,  total hysterectomy     APPENDECTOMY      age 7 yrs.     BREAST BIOPSY, RT/LT  02/17/04    left benign     BREAST SURGERY      see above     C TOTAL KNEE ARTHROPLASTY Left 11/2015    at [facility]     COLONOSCOPY  2013    needed q 3 yrs.     COLONOSCOPY N/A 9/10/2020    Procedure: Colonoscopy, With Polypectomy And Biopsy;  Surgeon: Brian Cleaning MD;  Location: [facility]     COLONOSCOPY WITH CO2 INSUFFLATION N/A 11/21/2016    Procedure: COLONOSCOPY WITH CO2 INSUFFLATION;  Surgeon: Brian Cleaning MD;  Location: [facility]     COLONOSCOPY WITH CO2 INSUFFLATION N/A 9/10/2020    Procedure: COLONOSCOPY, WITH CO2 INSUFFLATION;  Surgeon: Brian Cleaning MD;  Location: [facility]     EP COMPREHENSIVE EP STUDY N/A 12/31/2018    Procedure: LOOP RECORDER;  Surgeon: Buck Butterfield MD;  Location:  [facility] CATH [facility]     GENITOURINARY SURGERY      see above     HYSTERECTOMY, PAP NO LONGER INDICATED        HYSTERECTOMY, POORNIMA      bleeding fibroids     ORTHOPEDIC SURGERY  2009    meniscus repair       ALLERGIES:     Allergies   Allergen Reactions     Codeine Anaphylaxis     Niacin      No Clinical Screening - See Comments Other (See Comments)     senisitive to non steroidals  Aleve, ibuprofen celebrex cause bad stomach pains  senisitive to non steroidals  Aleve, ibuprofen celebrex cause bad stomach pains     Oxycodone Hives     Other reaction(s): Unknown  ... With facial swelling     Trazodone      nausea     Nsaids Nausea and Vomiting     Other reaction(s): Abdominal Pain       FAMILY HISTORY:  Family History   Problem Relation Age of Onset     Respiratory Father      Glaucoma Father      Asthma Father      Allergies Sister      Eye Disorder Sister      Lipids Sister      Neurologic Disorder Sister      Osteoporosis Sister      Glaucoma Sister      Hypertension Sister      Arthritis Mother      Cancer Mother      Hypertension Mother      Other Cancer Mother      Thyroid Disease Mother      Gastrointestinal Disease Son      Macular Degeneration Sister      Hyperlipidemia Sister      Hypertension Sister      Osteoporosis Sister      Glaucoma Other          SOCIAL HISTORY:  Social History     Tobacco Use     Smoking status: Former Smoker     Packs/day: 0.10     Years: 1.00     Pack years: 0.10     Types: Cigarettes     Start date: 1963     Quit date: 1965     Years since quittin.0     Smokeless tobacco: Never Used   Substance Use Topics     Alcohol use: Not Currently     Comment: one a month     Drug use: No       ROS:   Constitutional: No fever, chills, or sweats. Weight stable.   ENT: No epistaxis  Cardiovascular: As per HPI.   Respiratory: No cough, hemoptysis.    : No hematuria.   Integument: Negative.   Hematologic:  No easy bruising, no easy bleeding.  Neuro: Vertigo+    Exam:  /85 (BP Location: Left arm, Patient Position: Sitting, Cuff Size: Adult Large)   Pulse 77   Wt 83.9 kg (185 lb)    SpO2 96%   BMI 34.96 kg/m    GENERAL APPEARANCE: alert and no distress  HEENT: no icterus, no central cyanosis  RESPIRATORY: lungs clear to auscultation - no rales, rhonchi or wheezes, no use of accessory muscles, no retractions, respirations are unlabored, normal respiratory rate  CARDIOVASCULAR: regular rhythm, normal S1, S2, no S3 or S4 and no murmur, click or rub, precordium quiet with normal PMI.  Preserved  EXTREMITIES: Trace edema on the right side  NEURO: alert, normal speech,and affect  SKIN: no ecchymoses, no rashes     I have reviewed the labs and personally reviewed the imaging below and made my comment in the assessment and plan.    Labs:  CBC RESULTS:   Lab Results   Component Value Date    WBC 5.0 11/26/2020    RBC 4.74 11/26/2020    HGB 13.4 11/26/2020    HCT 43.1 11/26/2020    MCV 91 11/26/2020    MCH 28.3 11/26/2020    MCHC 31.1 (L) 11/26/2020    RDW 12.5 11/26/2020     11/26/2020       BMP RESULTS:  Lab Results   Component Value Date     11/26/2020    POTASSIUM 3.8 11/26/2020    CHLORIDE 104 11/26/2020    CO2 29 11/26/2020    ANIONGAP 6 11/26/2020     (H) 11/26/2020    BUN 16 11/26/2020    CR 0.70 11/26/2020    GFRESTIMATED 84 11/26/2020    GFRESTBLACK >90 11/26/2020    RUDOLPH 10.0 11/26/2020        INR RESULTS:  Lab Results   Component Value Date    INR 1.24 (H) 11/26/2020    INR 0.94 12/31/2018     ILR interrogation 1/16/2020:  Patient has a [employer] loop recorder. Normal loop recorder function. No patient activated episodes recorded. 36 AF episodes recorded - 2 - 10 min. AF burden = 0.2%. 2 tachy episodes recorded - 171-200 bpm, 7-10 sec. Patient is taking Xarelto. Presenting EGM = Regular VS @ 84 bpm. Loop recorder battery status = ok.    Cardiac MRI 2018  CONCLUSIONS: Normal cardiac function; LVEF 62%, RVEF 58%. The interatrial septum looks intact. The presence of normal chamber sizes and normal Qp/Qs ratio rule out any significant intracardiac shunting      EKG 8/10/2019  sinus rhythm with PACs        Assessment and Plan:   Ms. Alfreda Baxter is a 74 year old  female with PMH significant for hypertension, chronic vertigo, hyperlipidemia, central retinal artery occlusion in August 2018 complicated by left eye blindness and paroxysmal atrial fibrillation detected by ILR  on anticoagulation with Eliquis.    1.  Paroxysmal atrial fibrillation: Patient is asymptomatic from atrial fibrillation.  A. fib burden is not high as recorded from her ILR.  Patient was on Xarelto until 2 months ago however she had to switch to Eliquis due to frequent nosebleeding.  No bleeding side effects from Eliquis so far.  She is asymptomatic from cardiac standpoint.    2.  Hypertension: Well-controlled with 10 mg lisinopril.    3.  Hyperlipidemia: Patient on atorvastatin 20 mg (takes half of 40 mg).  LDL well controlled.  No prior history of CAD.    No medication changes today.  Return to clinic in 2 year.    A total of 20 minutes spent face-toface with greater than 50% of the time spent in counseling and coordinating cares of the issues above.     Please donot hesitate to contact me if you have any questions or concerns. Again, thank you for allowing me to participate in the care of your patient.    Skyla RADER MD  [facility] Division of Cardiology  Pager [phone]

## 2021-01-05 NOTE — NURSING NOTE
"Chief Complaint   Patient presents with     Atrial Fib     Paroxysmal atrial fibrillation   , annual visit and post ER. Nov. dizziness per patient        Initial /85 (BP Location: Left arm, Patient Position: Sitting, Cuff Size: Adult Large)   Pulse 77   Wt 83.9 kg (185 lb)   SpO2 96%   BMI 34.96 kg/m   Estimated body mass index is 34.96 kg/m  as calculated from the following:    Height as of 11/26/20: 1.549 m (5' 1\").    Weight as of this encounter: 83.9 kg (185 lb)..  BP completed using cuff size: large    Malcolm Malik L.P.N.    "

## 2021-01-05 NOTE — LETTER
1/5/2021      RE: Alfreda Baxter  [address] [facility] [address]       Dear Colleague,    Thank you for the opportunity to participate in the care of your patient, Alfreda Baxter, at the [facility] HEART [facility]. Please see a copy of my visit note below.    Referring provider: Buck Butterfield MD    HPI: Ms. Alfreda Baxter is a 74 year old  female with PMH significant for hypertension, hyperlipidemia and central retinal artery occlusion in August 2018.  The patient underwent ILR implantation in December 2018 which showed paroxysmal atrial fibrillation.  Patient is on rivaroxaban since then.    Patient is being seen today for follow-up.  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  Last ILR interrogation in January 16, 2020 showed 36 A. fib episodes lasting between 2 to 10 minutes.  Baseline rhythm is sinus.  Patient is asymptomatic from atrial fibrillation.  Denies palpitations, chest pain, dizziness, syncope or lower extremity edema.  She reports mild asthma and she is on inhalers as needed.  Reports dyspnea on exertion which is not new to her.  Few weeks ago patient had spontaneous gum bleeding that she noticed in the middle of the night.  She applied pressure on her gum and bleeding stopped.  No recurrent gum bleeding since then.  No other source of bleeding.      No significant history of smoking. Patient is currently on lisinopril 10 mg, rivaroxaban 20 mg, atorvastatin 40 mg.  Blood blood pressure and LDL well controlled.  Triglycerides mildly high.    Patient had cardiac MRI in 2018 which showed normal biventricular function with no intracardiac shunt.  Lexiscan stress test in 2013 showed normal myocardial perfusion.    Medications, personal, family, and social history reviewed with patient and revised.    Interval history 1/5/2021:  Patient is being seen today for 1 year follow-up.  In the  interim she had several nosebleeding episodes with rivaroxaban.  She tells me that she was taking a supplement containing serotonin called GUILLERMO/E and when she stopped taking that she was still having nosebleeds however it was less.  Patient was suspecting if there was interaction with the supplement in the medication.  But eventually she switched to Eliquis 2 months ago after her hip arthroplasty in October of this year. She is off of that supplement now. She has not had any bleeding side effects from Eliquis.  She is overall feeling well from cardiac standpoint.  Denies chest pain, shortness of breath, palpitations or syncope. She has some lower extremity edema on the right side (she recently had knee replacement on that side).  Patient was also seen in the ER on 11/26/2020 for severe vertigo.  She tells me that she has vertigo for several years however on 11/26 vertigo was very severe which prompted her to go to ER.  All the investigation including CT head and MR brain were unremarkable.     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       albuterol (PROAIR HFA/PROVENTIL HFA/VENTOLIN HFA) 108 (90 Base) MCG/ACT inhaler Inhale 2 puffs into the  lungs every 6 hours as needed for shortness of breath / dyspnea or wheezing 1 Inhaler 3     apixaban ANTICOAGULANT (ELIQUIS ANTICOAGULANT) 5 MG tablet Take 1 tablet (5 mg) by mouth 2 times daily 60 tablet 0     atorvastatin (LIPITOR) 40 MG tablet Take 0.5 tablets (20 mg) by mouth daily 90 tablet 3     B-COMPLEX OR Take  by mouth daily.       CALCIUM 500 +D OR one daily       hypromellose (ARTIFICIAL TEARS) 0.5 % SOLN ophthalmic solution Place 1 drop into both eyes 3 times daily       latanoprost (XALATAN) 0.005 % ophthalmic solution Place 1 drop into both eyes At Bedtime 1 Bottle 11     lisinopril (ZESTRIL) 10 MG tablet Take 1 tablet (10 mg) by mouth daily 90 tablet 3     meclizine (ANTIVERT) 12.5 MG tablet Take 1 tablet (12.5 mg) by mouth 3 times daily as needed for dizziness 20 tablet 0     multivitamin (OCUVITE) TABS tablet Take 1 tablet by mouth daily       NEW MED lasastropost eye drops       predniSONE (DELTASONE) 20 MG tablet Take 2 tablets (40 mg) by mouth daily 10 tablet 1     timolol maleate (TIMOPTIC) 0.5 % ophthalmic solution Place 1 drop Into the left eye every morning 5 mL 11     zolpidem (AMBIEN) 5 MG tablet TAKE ONE TABLET BY MOUTH IN THE EVENING AS NEEDED FOR SLEEP 30 tablet 3       PAST SURGICAL HISTORY:  Past Surgical History:   Procedure Laterality Date     ABDOMEN SURGERY  1976, 1978, 1995    2 C-sections,  total hysterectomy     APPENDECTOMY      age 7 yrs.     BREAST BIOPSY, RT/LT  02/17/04    left benign     BREAST SURGERY      see above     C TOTAL KNEE ARTHROPLASTY Left 11/2015    at [facility]     COLONOSCOPY  2013    needed q 3 yrs.     COLONOSCOPY N/A 9/10/2020    Procedure: Colonoscopy, With Polypectomy And Biopsy;  Surgeon: Brian Cleaning MD;  Location: [facility]     COLONOSCOPY WITH CO2 INSUFFLATION N/A 11/21/2016    Procedure: COLONOSCOPY WITH CO2 INSUFFLATION;  Surgeon: Brian Cleaning MD;  Location: [facility]     COLONOSCOPY WITH CO2 INSUFFLATION N/A 9/10/2020     Procedure: COLONOSCOPY, WITH CO2 INSUFFLATION;  Surgeon: Brian Cleaning MD;  Location: [facility]     EP COMPREHENSIVE EP STUDY N/A 2018    Procedure: LOOP RECORDER;  Surgeon: Buck Butterfield MD;  Location:  [facility] CARDIAC CATH LAB     GENITOURINARY SURGERY      see above     HYSTERECTOMY, PAP NO LONGER INDICATED       HYSTERECTOMY, POORNIMA      bleeding fibroids     ORTHOPEDIC SURGERY      meniscus repair       ALLERGIES:     Allergies   Allergen Reactions     Codeine Anaphylaxis     Niacin      No Clinical Screening - See Comments Other (See Comments)     senisitive to non steroidals  Aleve, ibuprofen celebrex cause bad stomach pains  senisitive to non steroidals  Aleve, ibuprofen celebrex cause bad stomach pains     Oxycodone Hives     Other reaction(s): Unknown  ... With facial swelling     Trazodone      nausea     Nsaids Nausea and Vomiting     Other reaction(s): Abdominal Pain       FAMILY HISTORY:  Family History   Problem Relation Age of Onset     Respiratory Father      Glaucoma Father      Asthma Father      Allergies Sister      Eye Disorder Sister      Lipids Sister      Neurologic Disorder Sister      Osteoporosis Sister      Glaucoma Sister      Hypertension Sister      Arthritis Mother      Cancer Mother      Hypertension Mother      Other Cancer Mother      Thyroid Disease Mother      Gastrointestinal Disease Son      Macular Degeneration Sister      Hyperlipidemia Sister      Hypertension Sister      Osteoporosis Sister      Glaucoma Other          SOCIAL HISTORY:  Social History     Tobacco Use     Smoking status: Former Smoker     Packs/day: 0.10     Years: 1.00     Pack years: 0.10     Types: Cigarettes     Start date: 1963     Quit date: 1965     Years since quittin.0     Smokeless tobacco: Never Used   Substance Use Topics     Alcohol use: Not Currently     Comment: one a month     Drug use: No       ROS:   Constitutional: No fever, chills, or sweats. Weight  stable.   ENT: No epistaxis  Cardiovascular: As per HPI.   Respiratory: No cough, hemoptysis.    : No hematuria.   Integument: Negative.   Hematologic:  No easy bruising, no easy bleeding.  Neuro: Vertigo+    Exam:  /85 (BP Location: Left arm, Patient Position: Sitting, Cuff Size: Adult Large)   Pulse 77   Wt 83.9 kg (185 lb)   SpO2 96%   BMI 34.96 kg/m    GENERAL APPEARANCE: alert and no distress  HEENT: no icterus, no central cyanosis  RESPIRATORY: lungs clear to auscultation - no rales, rhonchi or wheezes, no use of accessory muscles, no retractions, respirations are unlabored, normal respiratory rate  CARDIOVASCULAR: regular rhythm, normal S1, S2, no S3 or S4 and no murmur, click or rub, precordium quiet with normal PMI.  Preserved  EXTREMITIES: Trace edema on the right side  NEURO: alert, normal speech,and affect  SKIN: no ecchymoses, no rashes     I have reviewed the labs and personally reviewed the imaging below and made my comment in the assessment and plan.    Labs:  CBC RESULTS:   Lab Results   Component Value Date    WBC 5.0 11/26/2020    RBC 4.74 11/26/2020    HGB 13.4 11/26/2020    HCT 43.1 11/26/2020    MCV 91 11/26/2020    MCH 28.3 11/26/2020    MCHC 31.1 (L) 11/26/2020    RDW 12.5 11/26/2020     11/26/2020       BMP RESULTS:  Lab Results   Component Value Date     11/26/2020    POTASSIUM 3.8 11/26/2020    CHLORIDE 104 11/26/2020    CO2 29 11/26/2020    ANIONGAP 6 11/26/2020     (H) 11/26/2020    BUN 16 11/26/2020    CR 0.70 11/26/2020    GFRESTIMATED 84 11/26/2020    GFRESTBLACK >90 11/26/2020    RUDOLPH 10.0 11/26/2020        INR RESULTS:  Lab Results   Component Value Date    INR 1.24 (H) 11/26/2020    INR 0.94 12/31/2018     ILR interrogation 1/16/2020:  Patient has a [employer] loop recorder. Normal loop recorder function. No patient activated episodes recorded. 36 AF episodes recorded - 2 - 10 min. AF burden = 0.2%. 2 tachy episodes recorded - 171-200 bpm, 7-10 sec.  Patient is taking Xarelto. Presenting EGM = Regular VS @ 84 bpm. Loop recorder battery status = ok.    Cardiac MRI 2018  CONCLUSIONS: Normal cardiac function; LVEF 62%, RVEF 58%. The interatrial septum looks intact. The presence of normal chamber sizes and normal Qp/Qs ratio rule out any significant intracardiac shunting      EKG 8/10/2019 sinus rhythm with PACs        Assessment and Plan:   Ms. Alfreda Baxter is a 74 year old  female with PMH significant for hypertension, chronic vertigo, hyperlipidemia, central retinal artery occlusion in August 2018 complicated by left eye blindness and paroxysmal atrial fibrillation detected by ILR  on anticoagulation with Eliquis.    1.  Paroxysmal atrial fibrillation: Patient is asymptomatic from atrial fibrillation.  A. fib burden is not high as recorded from her ILR.  Patient was on Xarelto until 2 months ago however she had to switch to Eliquis due to frequent nosebleeding.  No bleeding side effects from Eliquis so far.  She is asymptomatic from cardiac standpoint.    2.  Hypertension: Well-controlled with 10 mg lisinopril.    3.  Hyperlipidemia: Patient on atorvastatin 20 mg (takes half of 40 mg).  LDL well controlled.  No prior history of CAD.    No medication changes today.  Return to clinic in 2 year.    A total of 20 minutes spent face-toface with greater than 50% of the time spent in counseling and coordinating cares of the issues above.     Please donot hesitate to contact me if you have any questions or concerns. Again, thank you for allowing me to participate in the care of your patient.    Skyla RADER MD  [facility] Division of Cardiology  Pager [phone]

## 2021-01-05 NOTE — PATIENT INSTRUCTIONS
Thank you for coming to the [facility] @ [facility]; please note the following instructions:    1.  Follow up in 2 years with Dr. Gold.  The cardiology team will contact you when the time gets cloer.    2.  Eliquis has been filled.        If you have any questions regarding your visit please contact your care team:     Cardiology  Telephone Number   Sendy LACEY, RN  Mahi LEWIS, RN   Rosalee LÓPEZ, RMA  Mihaela JEAN, RMA  Malcolm REYES, LPN   [phone] (option 1)   For scheduling appts:     [phone] (select option 1)       For the Device Clinic (Pacemakers and ICD's)  RN's :  Jaz Kemp   During business hours: [phone]    *After business hours:  [phone] (select option 4)      Normal test result notifications will be released via [employer] or mailed within 7 business days.  All other test results, will be communicated via telephone once reviewed by your cardiologist.    If you need a medication refill please contact your pharmacy.  Please allow 3 business days for your refill to be completed.    As always, thank you for trusting us with your health care needs!

## 2021-01-20 ENCOUNTER — TRANSFERRED RECORDS (OUTPATIENT)
Dept: HEALTH INFORMATION MANAGEMENT | Facility: CLINIC | Age: 76
End: 2021-01-20

## 2021-03-02 ENCOUNTER — ANCILLARY PROCEDURE (OUTPATIENT)
Dept: CARDIOLOGY | Facility: CLINIC | Age: 76
End: 2021-03-02
Attending: INTERNAL MEDICINE
Payer: MEDICARE

## 2021-03-02 ENCOUNTER — MYC MEDICAL ADVICE (OUTPATIENT)
Dept: INTERNAL MEDICINE | Facility: CLINIC | Age: 76
End: 2021-03-02

## 2021-03-02 DIAGNOSIS — I47.29 PAROXYSMAL VENTRICULAR TACHYCARDIA (H): ICD-10-CM

## 2021-03-02 PROCEDURE — 93298 REM INTERROG DEV EVAL SCRMS: CPT | Performed by: INTERNAL MEDICINE

## 2021-03-02 PROCEDURE — G2066 INTER DEVC REMOTE 30D: HCPCS

## 2021-03-02 NOTE — TELEPHONE ENCOUNTER
niecy sent to patient.    Ghada BSN-RN  Triage Nurse  [facility]: [facility]

## 2021-03-10 ENCOUNTER — IMMUNIZATION (OUTPATIENT)
Dept: NURSING | Facility: CLINIC | Age: 76
End: 2021-03-10
Payer: MEDICARE

## 2021-03-10 PROCEDURE — 91300 PR COVID VAC PFIZER DIL RECON 30 MCG/0.3 ML IM: CPT

## 2021-03-10 PROCEDURE — 0001A PR COVID VAC PFIZER DIL RECON 30 MCG/0.3 ML IM: CPT

## 2021-03-23 LAB
MDC_IDC_EPISODE_DTM: NORMAL
MDC_IDC_EPISODE_DURATION: 120 S
MDC_IDC_EPISODE_ID: 255
MDC_IDC_EPISODE_ID: 256
MDC_IDC_EPISODE_ID: 257
MDC_IDC_EPISODE_TYPE: NORMAL
MDC_IDC_MSMT_BATTERY_STATUS: NORMAL
MDC_IDC_PG_IMPLANT_DTM: NORMAL
MDC_IDC_PG_MFG: NORMAL
MDC_IDC_PG_MODEL: NORMAL
MDC_IDC_PG_SERIAL: NORMAL
MDC_IDC_PG_TYPE: NORMAL
MDC_IDC_SESS_CLINIC_NAME: NORMAL
MDC_IDC_SESS_DTM: NORMAL
MDC_IDC_SESS_TYPE: NORMAL
MDC_IDC_SET_ZONE_DETECTION_INTERVAL: 2000 MS
MDC_IDC_SET_ZONE_DETECTION_INTERVAL: 3000 MS
MDC_IDC_SET_ZONE_DETECTION_INTERVAL: 380 MS
MDC_IDC_SET_ZONE_TYPE: NORMAL
MDC_IDC_STAT_AT_BURDEN_PERCENT: 0 %
MDC_IDC_STAT_AT_DTM_END: NORMAL
MDC_IDC_STAT_AT_DTM_START: NORMAL
MDC_IDC_STAT_EPISODE_RECENT_COUNT: 0
MDC_IDC_STAT_EPISODE_RECENT_COUNT: 3
MDC_IDC_STAT_EPISODE_RECENT_COUNT_DTM_END: NORMAL
MDC_IDC_STAT_EPISODE_RECENT_COUNT_DTM_START: NORMAL
MDC_IDC_STAT_EPISODE_TOTAL_COUNT: 0
MDC_IDC_STAT_EPISODE_TOTAL_COUNT: 239
MDC_IDC_STAT_EPISODE_TOTAL_COUNT: 29
MDC_IDC_STAT_EPISODE_TOTAL_COUNT: 3
MDC_IDC_STAT_EPISODE_TOTAL_COUNT_DTM_END: NORMAL
MDC_IDC_STAT_EPISODE_TOTAL_COUNT_DTM_START: NORMAL
MDC_IDC_STAT_EPISODE_TYPE: NORMAL

## 2021-03-31 ENCOUNTER — IMMUNIZATION (OUTPATIENT)
Dept: NURSING | Facility: CLINIC | Age: 76
End: 2021-03-31
Attending: FAMILY MEDICINE
Payer: MEDICARE

## 2021-03-31 PROCEDURE — 91300 PR COVID VAC PFIZER DIL RECON 30 MCG/0.3 ML IM: CPT

## 2021-03-31 PROCEDURE — 0002A PR COVID VAC PFIZER DIL RECON 30 MCG/0.3 ML IM: CPT

## 2021-04-19 ENCOUNTER — MYC MEDICAL ADVICE (OUTPATIENT)
Dept: INTERNAL MEDICINE | Facility: CLINIC | Age: 76
End: 2021-04-19

## 2021-06-07 ENCOUNTER — ANCILLARY PROCEDURE (OUTPATIENT)
Dept: CARDIOLOGY | Facility: CLINIC | Age: 76
End: 2021-06-07
Attending: INTERNAL MEDICINE
Payer: MEDICARE

## 2021-06-07 DIAGNOSIS — I47.29 PAROXYSMAL VENTRICULAR TACHYCARDIA (H): ICD-10-CM

## 2021-06-07 DIAGNOSIS — I63.9 CRYPTOGENIC STROKE (H): ICD-10-CM

## 2021-06-07 PROCEDURE — 93298 REM INTERROG DEV EVAL SCRMS: CPT | Performed by: INTERNAL MEDICINE

## 2021-06-07 PROCEDURE — G2066 INTER DEVC REMOTE 30D: HCPCS

## 2021-06-09 ENCOUNTER — OFFICE VISIT (OUTPATIENT)
Dept: OPHTHALMOLOGY | Facility: CLINIC | Age: 76
End: 2021-06-09
Payer: MEDICARE

## 2021-06-09 DIAGNOSIS — H43.813 POSTERIOR VITREOUS DETACHMENT OF BOTH EYES: ICD-10-CM

## 2021-06-09 DIAGNOSIS — Z01.01 ENCOUNTER FOR EXAMINATION OF EYES AND VISION WITH ABNORMAL FINDINGS: ICD-10-CM

## 2021-06-09 DIAGNOSIS — H40.52X2 NEOVASCULAR GLAUCOMA OF LEFT EYE, MODERATE STAGE: ICD-10-CM

## 2021-06-09 DIAGNOSIS — Z86.69 HISTORY OF CENTRAL RETINAL ARTERY OCCLUSION: ICD-10-CM

## 2021-06-09 DIAGNOSIS — H25.813 COMBINED FORMS OF AGE-RELATED CATARACT OF BOTH EYES: Primary | ICD-10-CM

## 2021-06-09 DIAGNOSIS — H52.4 PRESBYOPIA: ICD-10-CM

## 2021-06-09 DIAGNOSIS — H54.3 BLINDNESS OF LEFT EYE WITH CATEGORY 3 BLINDNESS OF RIGHT EYE: ICD-10-CM

## 2021-06-09 DIAGNOSIS — H40.051 OCULAR HYPERTENSION OF RIGHT EYE: ICD-10-CM

## 2021-06-09 PROCEDURE — 92015 DETERMINE REFRACTIVE STATE: CPT | Mod: GY | Performed by: OPHTHALMOLOGY

## 2021-06-09 PROCEDURE — 92014 COMPRE OPH EXAM EST PT 1/>: CPT | Performed by: OPHTHALMOLOGY

## 2021-06-09 ASSESSMENT — CUP TO DISC RATIO
OD_RATIO: 0.5
OS_RATIO: 0.8

## 2021-06-09 ASSESSMENT — REFRACTION_MANIFEST
OD_SPHERE: -3.75
OD_AXIS: 165
OD_CYLINDER: +0.75
OS_SPHERE: BALANCE
OD_ADD: +4.00

## 2021-06-09 ASSESSMENT — EXTERNAL EXAM - RIGHT EYE: OD_EXAM: 2+ BROW PTOSIS, MILD TO MOD BROW

## 2021-06-09 ASSESSMENT — CONF VISUAL FIELD
OS_SUPERIOR_TEMPORAL_RESTRICTION: 1
OD_NORMAL: 1
OS_SUPERIOR_NASAL_RESTRICTION: 1
OS_INFERIOR_TEMPORAL_RESTRICTION: 1
OS_INFERIOR_NASAL_RESTRICTION: 1

## 2021-06-09 ASSESSMENT — TONOMETRY
IOP_METHOD: APPLANATION
OD_IOP_MMHG: 17
OS_IOP_MMHG: 17

## 2021-06-09 ASSESSMENT — REFRACTION_WEARINGRX
OS_CYLINDER: SPHERE
OS_SPHERE: PLANO
OD_ADD: +3.50
SPECS_TYPE: PAL
OD_AXIS: 018
OD_CYLINDER: +0.75
OD_SPHERE: -3.25

## 2021-06-09 ASSESSMENT — VISUAL ACUITY
OD_SC: J1
METHOD: SNELLEN - LINEAR
CORRECTION_TYPE: GLASSES
OD_CC: 20.40

## 2021-06-09 ASSESSMENT — EXTERNAL EXAM - LEFT EYE: OS_EXAM: 2+ BROW PTOSIS, MILD TO MOD BROW

## 2021-06-09 NOTE — LETTER
6/9/2021         RE: Alfreda Baxter  [address] [address]        Dear Colleague,    Thank you for referring your patient, Alfreda Baxter, to the [facility]. Please see a copy of my visit note below.     Current Eye Medications:  Latanoprost nightly and timolol left eye every morning , last drops at      Subjective:  Comprehensive eye exam.   Pt reports that her distance vision is blurrier.     Objective:  See Ophthalmology Exam.       Assessment: Mild worsening of cataract right eye in patient who is functionally monocular.  Stable intraocular pressure both eyes.      ICD-10-CM    1. Combined forms of age-related cataract, mild-mod, both eyes  H25.813    2. History of central retinal artery occlusion, os  Z86.69    3. Ocular hypertension of right eye, treated  H40.051    4. Neovascular glaucoma of left eye, moderate stage  H40.52X2    5. Blindness of left eye with category 3 blindness of right eye  H54.3    6. Posterior vitreous detachment of both eyes  H43.813    7. Encounter for examination of eyes and vision with abnormal findings  Z01.01    8. Presbyopia  H52.4            Plan:  Continue same medications.  Glasses Rx given - optional.  Use artificial tears up to 4 times daily both eyes. (Refresh Tears, Systane Ultra/Balance, or Theratears).  Return visit in 6 months for intraocular pressure, glaucoma OCT, retinal OCT and Hill Visual Field  Bryan Morrison M.D.  [phone]             Again, thank you for allowing me to participate in the care of your patient.        Sincerely,        Bryan Morrison MD

## 2021-06-09 NOTE — PROGRESS NOTES
Current Eye Medications:  Latanoprost nightly and timolol left eye every morning , last drops at      Subjective:  Comprehensive eye exam.   Pt reports that her distance vision is blurrier.     Objective:  See Ophthalmology Exam.       Assessment: Mild worsening of cataract right eye in patient who is functionally monocular.  Stable intraocular pressure both eyes.      ICD-10-CM    1. Combined forms of age-related cataract, mild-mod, both eyes  H25.813    2. History of central retinal artery occlusion, os  Z86.69    3. Ocular hypertension of right eye, treated  H40.051    4. Neovascular glaucoma of left eye, moderate stage  H40.52X2    5. Blindness of left eye with category 3 blindness of right eye  H54.3    6. Posterior vitreous detachment of both eyes  H43.813    7. Encounter for examination of eyes and vision with abnormal findings  Z01.01    8. Presbyopia  H52.4            Plan:  Continue same medications.  Glasses Rx given - optional.  Use artificial tears up to 4 times daily both eyes. (Refresh Tears, Systane Ultra/Balance, or Theratears).  Return visit in 6 months for intraocular pressure, glaucoma OCT, retinal OCT and Hill Visual Field  Bryan Morrison M.D.  [phone]

## 2021-06-09 NOTE — PATIENT INSTRUCTIONS
Continue same medications.  Glasses Rx given - optional.  Use artificial tears up to 4 times daily both eyes. (Refresh Tears, Systane Ultra/Balance, or Theratears).  Return visit in 6 months for intraocular pressure, glaucoma OCT, retinal OCT and Hill Visual Field  Bryan Morrison M.D.  [phone]

## 2021-06-24 LAB
MDC_IDC_EPISODE_DTM: NORMAL
MDC_IDC_EPISODE_DURATION: 120 S
MDC_IDC_EPISODE_DURATION: 240 S
MDC_IDC_EPISODE_DURATION: 480 S
MDC_IDC_EPISODE_DURATION: 840 S
MDC_IDC_EPISODE_ID: 647
MDC_IDC_EPISODE_ID: 672
MDC_IDC_EPISODE_ID: 673
MDC_IDC_EPISODE_ID: 674
MDC_IDC_EPISODE_ID: 675
MDC_IDC_EPISODE_ID: 676
MDC_IDC_EPISODE_ID: 677
MDC_IDC_EPISODE_ID: 678
MDC_IDC_EPISODE_ID: 679
MDC_IDC_EPISODE_ID: 680
MDC_IDC_EPISODE_ID: 681
MDC_IDC_EPISODE_ID: 682
MDC_IDC_EPISODE_ID: 683
MDC_IDC_EPISODE_ID: 684
MDC_IDC_EPISODE_ID: 685
MDC_IDC_EPISODE_ID: 686
MDC_IDC_EPISODE_ID: 687
MDC_IDC_EPISODE_ID: 688
MDC_IDC_EPISODE_ID: 689
MDC_IDC_EPISODE_ID: 690
MDC_IDC_EPISODE_ID: 691
MDC_IDC_EPISODE_ID: 692
MDC_IDC_EPISODE_ID: 693
MDC_IDC_EPISODE_ID: 694
MDC_IDC_EPISODE_ID: 695
MDC_IDC_EPISODE_ID: 696
MDC_IDC_EPISODE_ID: 697
MDC_IDC_EPISODE_ID: 698
MDC_IDC_EPISODE_ID: 699
MDC_IDC_EPISODE_ID: 700
MDC_IDC_EPISODE_ID: 701
MDC_IDC_EPISODE_TYPE: NORMAL
MDC_IDC_MSMT_BATTERY_STATUS: NORMAL
MDC_IDC_PG_IMPLANT_DTM: NORMAL
MDC_IDC_PG_MFG: NORMAL
MDC_IDC_PG_MODEL: NORMAL
MDC_IDC_PG_SERIAL: NORMAL
MDC_IDC_PG_TYPE: NORMAL
MDC_IDC_SESS_CLINIC_NAME: NORMAL
MDC_IDC_SESS_DTM: NORMAL
MDC_IDC_SESS_TYPE: NORMAL
MDC_IDC_SET_ZONE_DETECTION_INTERVAL: 2000 MS
MDC_IDC_SET_ZONE_DETECTION_INTERVAL: 3000 MS
MDC_IDC_SET_ZONE_DETECTION_INTERVAL: 380 MS
MDC_IDC_SET_ZONE_TYPE: NORMAL
MDC_IDC_STAT_AT_BURDEN_PERCENT: 1.3 %
MDC_IDC_STAT_AT_DTM_END: NORMAL
MDC_IDC_STAT_AT_DTM_START: NORMAL
MDC_IDC_STAT_EPISODE_RECENT_COUNT: 0
MDC_IDC_STAT_EPISODE_RECENT_COUNT: 2
MDC_IDC_STAT_EPISODE_RECENT_COUNT: 212
MDC_IDC_STAT_EPISODE_RECENT_COUNT_DTM_END: NORMAL
MDC_IDC_STAT_EPISODE_RECENT_COUNT_DTM_START: NORMAL
MDC_IDC_STAT_EPISODE_TOTAL_COUNT: 0
MDC_IDC_STAT_EPISODE_TOTAL_COUNT: 3
MDC_IDC_STAT_EPISODE_TOTAL_COUNT: 48
MDC_IDC_STAT_EPISODE_TOTAL_COUNT: 685
MDC_IDC_STAT_EPISODE_TOTAL_COUNT_DTM_END: NORMAL
MDC_IDC_STAT_EPISODE_TOTAL_COUNT_DTM_START: NORMAL
MDC_IDC_STAT_EPISODE_TYPE: NORMAL

## 2021-06-28 ENCOUNTER — MYC MEDICAL ADVICE (OUTPATIENT)
Dept: CARDIOLOGY | Facility: CLINIC | Age: 76
End: 2021-06-28

## 2021-07-19 ENCOUNTER — MYC MEDICAL ADVICE (OUTPATIENT)
Dept: INTERNAL MEDICINE | Facility: CLINIC | Age: 76
End: 2021-07-19

## 2021-07-20 ENCOUNTER — OFFICE VISIT (OUTPATIENT)
Dept: FAMILY MEDICINE | Facility: CLINIC | Age: 76
End: 2021-07-20
Payer: MEDICARE

## 2021-07-20 VITALS
OXYGEN SATURATION: 98 % | RESPIRATION RATE: 18 BRPM | BODY MASS INDEX: 36.63 KG/M2 | HEIGHT: 61 IN | DIASTOLIC BLOOD PRESSURE: 81 MMHG | WEIGHT: 194 LBS | SYSTOLIC BLOOD PRESSURE: 159 MMHG | TEMPERATURE: 98 F | HEART RATE: 77 BPM

## 2021-07-20 DIAGNOSIS — E66.01 MORBID OBESITY (H): ICD-10-CM

## 2021-07-20 DIAGNOSIS — M10.9 ACUTE GOUT INVOLVING TOE OF LEFT FOOT, UNSPECIFIED CAUSE: Primary | ICD-10-CM

## 2021-07-20 DIAGNOSIS — T14.8XXA OPEN WOUND: ICD-10-CM

## 2021-07-20 LAB
BASOPHILS # BLD AUTO: 0 10E3/UL (ref 0–0.2)
BASOPHILS NFR BLD AUTO: 0 %
EOSINOPHIL # BLD AUTO: 0.1 10E3/UL (ref 0–0.7)
EOSINOPHIL NFR BLD AUTO: 1 %
ERYTHROCYTE [DISTWIDTH] IN BLOOD BY AUTOMATED COUNT: 13.5 % (ref 10–15)
HCT VFR BLD AUTO: 42.9 % (ref 35–47)
HGB BLD-MCNC: 13.9 G/DL (ref 11.7–15.7)
LYMPHOCYTES # BLD AUTO: 3 10E3/UL (ref 0.8–5.3)
LYMPHOCYTES NFR BLD AUTO: 24 %
MCH RBC QN AUTO: 29.4 PG (ref 26.5–33)
MCHC RBC AUTO-ENTMCNC: 32.4 G/DL (ref 31.5–36.5)
MCV RBC AUTO: 91 FL (ref 78–100)
MONOCYTES # BLD AUTO: 0.8 10E3/UL (ref 0–1.3)
MONOCYTES NFR BLD AUTO: 7 %
NEUTROPHILS # BLD AUTO: 8.4 10E3/UL (ref 1.6–8.3)
NEUTROPHILS NFR BLD AUTO: 69 %
PLATELET # BLD AUTO: 258 10E3/UL (ref 150–450)
RBC # BLD AUTO: 4.73 10E6/UL (ref 3.8–5.2)
URATE SERPL-MCNC: 5.9 MG/DL (ref 2.6–6)
WBC # BLD AUTO: 12.3 10E3/UL (ref 4–11)

## 2021-07-20 PROCEDURE — 87070 CULTURE OTHR SPECIMN AEROBIC: CPT | Performed by: NURSE PRACTITIONER

## 2021-07-20 PROCEDURE — 84550 ASSAY OF BLOOD/URIC ACID: CPT | Performed by: NURSE PRACTITIONER

## 2021-07-20 PROCEDURE — 99204 OFFICE O/P NEW MOD 45 MIN: CPT | Performed by: NURSE PRACTITIONER

## 2021-07-20 PROCEDURE — 36415 COLL VENOUS BLD VENIPUNCTURE: CPT | Performed by: NURSE PRACTITIONER

## 2021-07-20 PROCEDURE — 85025 COMPLETE CBC W/AUTO DIFF WBC: CPT | Performed by: NURSE PRACTITIONER

## 2021-07-20 PROCEDURE — 87205 SMEAR GRAM STAIN: CPT | Performed by: NURSE PRACTITIONER

## 2021-07-20 RX ORDER — PREDNISONE 20 MG/1
20 TABLET ORAL DAILY
Qty: 7 TABLET | Refills: 0 | Status: CANCELLED | OUTPATIENT
Start: 2021-07-20

## 2021-07-20 RX ORDER — CEPHALEXIN 500 MG/1
500 CAPSULE ORAL 4 TIMES DAILY
Qty: 28 CAPSULE | Refills: 0 | Status: SHIPPED | OUTPATIENT
Start: 2021-07-20 | End: 2021-07-29

## 2021-07-20 RX ORDER — COLCHICINE 0.6 MG/1
0.6 TABLET ORAL DAILY
Qty: 30 TABLET | Refills: 0 | Status: CANCELLED | OUTPATIENT
Start: 2021-07-20 | End: 2021-08-19

## 2021-07-20 RX ORDER — PREDNISONE 10 MG/1
TABLET ORAL
Qty: 30 TABLET | Refills: 0 | Status: SHIPPED | OUTPATIENT
Start: 2021-07-20 | End: 2021-08-19

## 2021-07-20 ASSESSMENT — PAIN SCALES - GENERAL: PAINLEVEL: SEVERE PAIN (7)

## 2021-07-20 ASSESSMENT — MIFFLIN-ST. JEOR: SCORE: 1302.36

## 2021-07-20 NOTE — PATIENT INSTRUCTIONS
Patient Education     Treating Gout Attacks     Raising the joint above the level of your heart can help reduce gout symptoms.     Gout is a disease that affects the joints. It is caused by excess uric acid in your blood that may lead to crystals forming in your joints. Left untreated, it can lead to painful foot and joint deformities and even kidney problems. But, by treating gout early, you can relieve pain and help prevent future problems. Gout can usually be treated with medicine and proper diet. In severe cases, surgery may be needed.  Gout attacks are painful and often happen more than once. Taking medicines may reduce pain and prevent attacks in the future. There are also some things you can do at home to relieve symptoms.  Medicines for gout  Your healthcare provider may prescribe a daily medicine to reduce levels of uric acid. Reducing your uric acid levels may help prevent gout attacks. Allopurinol is one commonly used medicine taken daily to reduce uric acid levels. Other daily medicines used to reduce uric acid levels include febuxostat, lesinurad, and probencid. Other medicines can help relieve pain and swelling during an acute attack. Medicines such as NSAIDs (nonsteroidal anti-inflammatory medicines), steroids, and colchicine may be prescribed for intermittent use to relieve an acute gout attack. Be sure to take your medicine as directed.  What you can do  Below are some things you can do at home to relieve gout symptoms. Your healthcare provider may have other tips.    Rest the painful joint as much as you can.    Raise the painful joint so it is at a level higher than your heart.    Use ice for 10 minutes every 1 to 2 hours as possible.  How can I prevent gout?  With a little effort, you may be able to prevent gout attacks in the future. Here are some things you can do:    Don't eat foods high in purines  ? Certain meats (red meat, processed meat, turkey)  ? Organ meats (kidney, liver,  sweetbread)  ? Shellfish (lobster, crab, shrimp, scallop, mussel)  ? Certain fish (anchovy, sardine, herring, mackerel)    Take any medicines prescribed by your healthcare provider.    Lose weight if you need to.    Reduce high fructose corn syrup in meals and drinks.    Reduce or cut out alcohol, particularly beer, but also red wine and spirits.    Control blood pressure, diabetes, and cholesterol.    Drink plenty of water to help flush uric acid from your body.  [employer] last reviewed this educational content on 4/1/2018 2000-2021 [employer]. All rights reserved. This information is not intended as a substitute for professional medical care. Always follow your healthcare professional's instructions.           Patient Education     Gout Diet  Gout is a painful condition caused by an excess of uric acid, a waste product made by the body. Uric acid forms crystals that collect in the joints. The immune response to these crystals brings on symptoms of joint pain and swelling. This is called a gout attack. Often, medications and diet changes are combined to manage gout. Below are some guidelines for changing your diet to help you manage gout and prevent attacks. Your healthcare provider will help you determine the best eating plan for you.  Eating to manage gout  Weight loss for those who are overweight may help reduce gout attacks.  Eat less of these foods  Eating too many foods containing purines may raise the levels of uric acid in your body. This raises your risk for a gout attack. Try to limit these foods and drinks:    Alcohol, such as beer and red wine. You may be told to avoid alcohol completely.    Soft drinks that contain sugar or high fructose corn syrup    Certain fish, including anchovies, sardines, fish eggs, and herring    Shellfish    Certain meats, such as red meat, hot dogs, luncheon meats, and turkey    Organ meats, such as liver, kidneys, and sweetbreads    Legumes, such as dried  beans and peas    Other high fat foods such as gravy, whole milk, and high fat cheeses    Vegetables such as asparagus, cauliflower, spinach, and mushrooms used to be thought to contribute to an increased risk for a gout attack, but recent studies show that high purine vegetables don't increase the risk for a gout attack.  Eat more of these foods  Other foods may be helpful for people with gout. Add some of these foods to your diet:    Cherries contain chemicals that may lower uric acid.    Omega fatty acids. These are found in some fatty fish such as salmon, certain oils (flax, olive, or nut), and nuts themselves. Omega fatty acids may help prevent inflammation due to gout.    Dairy products that are low-fat or fat-free, such as cheese and yogurt    Complex carbohydrate foods, including whole grains, brown rice, oats, and beans    Coffee, in moderation    Water, approximately 64 ounces per day  Follow-up care  Follow up with your healthcare provider as advised.  When to seek medical advice  Call your healthcare provider right away if any of these occur:    Return of gout symptoms, usually at night:    Severe pain, swelling, and heat in a joint, especially the base of the big toe    Affected joint is hard to move    Skin of the affected joint is purple or red    Fever of 100.4 F (38 C) or higher    Pain that doesn't get better even with prescribed medicine   [employer] last reviewed this educational content on 6/1/2018 2000-2021 [employer]. All rights reserved. This information is not intended as a substitute for professional medical care. Always follow your healthcare professional's instructions.

## 2021-07-20 NOTE — PROGRESS NOTES
"    Assessment & Plan     (M10.9) Acute gout involving toe of left foot, unspecified cause  (primary encounter diagnosis)  Comment: redness and swelling in left second toe, with a localized white draining wound on the superior aspect of the toe proximal to the cuticle. Pt reports 4 days of 40 mg prednisone use with minimal relief. Rx prednisone taper to reduce gout exacerbation and keflex 500 mg QID for tx of possible infection. Culture, gram stain, and CBC ordered to determine cause of infection. Uric acid ordered to determine level of uric acid with gouty flare. Discussed possible allopurinol for prevention of gout flares and she declines at this time.   Plan: Uric acid, CBC with platelets and differential,        predniSONE (DELTASONE) 10 MG tablet            (T14.8XXA) Open wound  Comment: Localized redness, warmth, inflammation and drainage to left second toe surrounding nail bed. Culture and gram stain obtained for further evaluation of wound.   Plan: CBC with platelets and differential, Wound         Aerobic Bacterial Culture Routine with Gram         Stain, Gram stain, cephALEXin (KEFLEX) 500 MG         capsule      (E66.01) Morbid obesity (H)  Comment: 36.7 BMI. Appropriate gout diet discussed. Pt plans to keep a food diary to determine causative factors for exacerbations.       Ordering of each unique test  Prescription drug management       BMI:   Estimated body mass index is 36.66 kg/m  as calculated from the following:    Height as of this encounter: 1.549 m (5' 1\").    Weight as of this encounter: 88 kg (194 lb).   Weight management plan: Discussed healthy diet and exercise guidelines    See Patient Instructions    Return for Follow up next week with Dr Sims for annual physical.    Jeanette Oakes, KAREEN-S ([facility])    I very much appreciated the opportunity to see and assess this patient in the clinic with NP student.  I agree with the above note which summarizes my findings and " "current recommendations. I have reviewed all diagnostics noted and performed physical exam. Changes were made in the body of the note to achieve one comprehensive document.    SVEN Yepez CNP  [facility] MAGY Petty is a 76 year old who presents for the following health issues     HPI     Reports gouty flare x 2 weeks in left 2nd toe. Severe redness and inflammation. Left second toe has been draining white exudate. Has been soaking 3-4x/day. 4 days of 40 mg prednisone with minimal relief.   Gout/ Single Inflamed Joint  Onset/Duration: 2 weeks ago flare up   Description:   Location: Second toe on left foot   Joint Swelling: YES  Redness: YES  Pain: YES and also draining   Intensity: severe  Progression of Symptoms: worsening  Accompanying Signs & Symptoms:  Fevers: no  History:   Trauma to the area: no  Previous history of gout: YES  Recent illness: no  Alcohol use: no  Diuretic use: no  Precipitating or alleviating factors: None  Therapies tried and outcome: Prednisone has helped in the past, but not so much effective this time.     Review of Systems   Constitutional, HEENT, cardiovascular, pulmonary, gi and gu systems are negative, except as otherwise noted.      Objective    BP (!) 159/81 (BP Location: Right arm, Patient Position: Sitting, Cuff Size: Adult Large)   Pulse 77   Temp 98  F (36.7  C) (Oral)   Resp 18   Ht 1.549 m (5' 1\")   Wt 88 kg (194 lb)   SpO2 98%   BMI 36.66 kg/m    Body mass index is 36.66 kg/m .     Physical Exam   GENERAL: healthy, alert and no distress  MS: no gross musculoskeletal defects noted, no edema  SKIN: redness and inflammation in left second toe involving DIP, PIP, and MTP joints with a localized white draining wound on the superior aspect of the toe proximal to the cuticle. no other suspicious lesions or rashes  NEURO: Normal strength and tone, mentation intact and speech normal  PSYCH: mentation appears normal, affect " normal/bright    No results found for this or any previous visit (from the past 24 hour(s)).

## 2021-07-21 ENCOUNTER — MYC MEDICAL ADVICE (OUTPATIENT)
Dept: INTERNAL MEDICINE | Facility: CLINIC | Age: 76
End: 2021-07-21

## 2021-07-21 ENCOUNTER — MYC MEDICAL ADVICE (OUTPATIENT)
Dept: FAMILY MEDICINE | Facility: CLINIC | Age: 76
End: 2021-07-21

## 2021-07-21 DIAGNOSIS — L08.9 TOE INFECTION: Primary | ICD-10-CM

## 2021-07-21 ASSESSMENT — ASTHMA QUESTIONNAIRES: ACT_TOTALSCORE: 25

## 2021-07-22 LAB
BACTERIA WND CULT: ABNORMAL
GRAM STAIN RESULT: ABNORMAL
GRAM STAIN RESULT: ABNORMAL

## 2021-07-22 NOTE — RESULT ENCOUNTER NOTE
Malinda,    Your wound culture could not prove infection. Follow-up with your provider as planned.     Your uric acid level is normal.     Mercedes Urena MD

## 2021-07-25 ENCOUNTER — MYC MEDICAL ADVICE (OUTPATIENT)
Dept: INTERNAL MEDICINE | Facility: CLINIC | Age: 76
End: 2021-07-25

## 2021-07-25 DIAGNOSIS — M10.079 ACUTE IDIOPATHIC GOUT INVOLVING TOE, UNSPECIFIED LATERALITY: Primary | ICD-10-CM

## 2021-07-27 ENCOUNTER — LAB (OUTPATIENT)
Dept: LAB | Facility: CLINIC | Age: 76
End: 2021-07-27
Payer: MEDICARE

## 2021-07-27 DIAGNOSIS — I10 HYPERTENSION GOAL BP (BLOOD PRESSURE) < 140/90: ICD-10-CM

## 2021-07-27 DIAGNOSIS — E78.5 HYPERLIPIDEMIA LDL GOAL <130: ICD-10-CM

## 2021-07-27 DIAGNOSIS — L08.9 TOE INFECTION: ICD-10-CM

## 2021-07-27 DIAGNOSIS — M79.675 PAIN OF TOE OF LEFT FOOT: ICD-10-CM

## 2021-07-27 DIAGNOSIS — I48.0 PAROXYSMAL A-FIB (H): ICD-10-CM

## 2021-07-27 DIAGNOSIS — R73.09 ELEVATED GLUCOSE: ICD-10-CM

## 2021-07-27 DIAGNOSIS — Z79.01 LONG TERM CURRENT USE OF ANTICOAGULANT THERAPY: ICD-10-CM

## 2021-07-27 LAB
ANION GAP SERPL CALCULATED.3IONS-SCNC: 3 MMOL/L (ref 3–14)
BUN SERPL-MCNC: 22 MG/DL (ref 7–30)
CALCIUM SERPL-MCNC: 9.4 MG/DL (ref 8.5–10.1)
CHLORIDE BLD-SCNC: 104 MMOL/L (ref 94–109)
CHOLEST SERPL-MCNC: 179 MG/DL
CO2 SERPL-SCNC: 31 MMOL/L (ref 20–32)
CREAT SERPL-MCNC: 0.92 MG/DL (ref 0.52–1.04)
CRP SERPL-MCNC: <2.9 MG/L (ref 0–8)
ERYTHROCYTE [DISTWIDTH] IN BLOOD BY AUTOMATED COUNT: 13.9 % (ref 10–15)
ERYTHROCYTE [SEDIMENTATION RATE] IN BLOOD BY WESTERGREN METHOD: 9 MM/HR (ref 0–30)
FASTING STATUS PATIENT QL REPORTED: YES
GFR SERPL CREATININE-BSD FRML MDRD: 60 ML/MIN/1.73M2
GLUCOSE BLD-MCNC: 94 MG/DL (ref 70–99)
HBA1C MFR BLD: 5.7 % (ref 0–5.6)
HCT VFR BLD AUTO: 46.1 % (ref 35–47)
HDLC SERPL-MCNC: 73 MG/DL
HGB BLD-MCNC: 14.8 G/DL (ref 11.7–15.7)
LDLC SERPL CALC-MCNC: 67 MG/DL
MCH RBC QN AUTO: 29.3 PG (ref 26.5–33)
MCHC RBC AUTO-ENTMCNC: 32.1 G/DL (ref 31.5–36.5)
MCV RBC AUTO: 91 FL (ref 78–100)
NONHDLC SERPL-MCNC: 106 MG/DL
PLATELET # BLD AUTO: 299 10E3/UL (ref 150–450)
POTASSIUM BLD-SCNC: 4.5 MMOL/L (ref 3.4–5.3)
RBC # BLD AUTO: 5.05 10E6/UL (ref 3.8–5.2)
SODIUM SERPL-SCNC: 138 MMOL/L (ref 133–144)
TRIGL SERPL-MCNC: 195 MG/DL
URATE SERPL-MCNC: 6 MG/DL (ref 2.6–6)
WBC # BLD AUTO: 11.2 10E3/UL (ref 4–11)

## 2021-07-27 PROCEDURE — 36415 COLL VENOUS BLD VENIPUNCTURE: CPT

## 2021-07-27 PROCEDURE — 85652 RBC SED RATE AUTOMATED: CPT

## 2021-07-27 PROCEDURE — 85027 COMPLETE CBC AUTOMATED: CPT

## 2021-07-27 PROCEDURE — 83036 HEMOGLOBIN GLYCOSYLATED A1C: CPT

## 2021-07-27 PROCEDURE — 80061 LIPID PANEL: CPT

## 2021-07-27 PROCEDURE — 86140 C-REACTIVE PROTEIN: CPT

## 2021-07-27 PROCEDURE — 84550 ASSAY OF BLOOD/URIC ACID: CPT

## 2021-07-27 PROCEDURE — 80048 BASIC METABOLIC PNL TOTAL CA: CPT

## 2021-07-27 NOTE — TELEPHONE ENCOUNTER
Lisinopril-hctz 20-25      Last Written Prescription Date: 12/5/16  Last Fill Quantity: 90,  # refills: 3   Last Office Visit with [facility], [facility] or [facility] prescribing provider: 6/5/17, see plan:    Instructions        Return in about 1 year (around 6/5/2018), or if symptoms worsen or fail to improve, for Physical Exam.     Requested Prescriptions   Pending Prescriptions Disp Refills     lisinopril-hydrochlorothiazide (PRINZIDE/ZESTORETIC) 20-25 MG per tablet [Pharmacy Med Name: LISINOPRIL/HCTZ 20-25MG TAB] 90 tablet 3     Sig: TAKE ONE TABLET BY MOUTH ONCE DAILY    Diuretics (Including Combos) Protocol Passed    12/11/2017 11:05 AM       Passed - Blood pressure under 140/90    BP Readings from Last 3 Encounters:   06/05/17 114/69   12/05/16 123/76   11/21/16 104/67                Passed - Recent or future visit with authorizing provider's specialty    Patient had office visit in the last year or has a visit in the next 30 days with authorizing provider.  See chart review.              Passed - Patient is age 18 or older       Passed - No active pregancy on record       Passed - Normal serum creatinine on file in past 12 months    Recent Labs   Lab Test  06/01/17   0808   CR  0.77             Passed - Normal serum potassium on file in past 12 months    Recent Labs   Lab Test  06/01/17   0808   POTASSIUM  4.2                   Passed - Normal serum sodium on file in past 12 months    Recent Labs   Lab Test  06/01/17   0808   NA  140             Passed - No positive pregnancy test in past 12 months                                                   Prescription approved per [facility] Refill Protocol.  Indigo Mccoy RN  [facility]        
daily use

## 2021-07-28 NOTE — RESULT ENCOUNTER NOTE
Alfreda Baxter    Labs look great.  Uric acid (gout) once again is normal.. the inflammatory labs are also normal     I will see you tomorrow and look forward to it!    Sincerely,       EMILEE MARTÍNEZ M.D.

## 2021-07-29 ENCOUNTER — OFFICE VISIT (OUTPATIENT)
Dept: INTERNAL MEDICINE | Facility: CLINIC | Age: 76
End: 2021-07-29
Payer: MEDICARE

## 2021-07-29 VITALS
DIASTOLIC BLOOD PRESSURE: 82 MMHG | SYSTOLIC BLOOD PRESSURE: 131 MMHG | HEIGHT: 61 IN | WEIGHT: 192 LBS | BODY MASS INDEX: 36.25 KG/M2 | HEART RATE: 73 BPM | OXYGEN SATURATION: 96 % | TEMPERATURE: 97.8 F

## 2021-07-29 DIAGNOSIS — E66.01 MORBID OBESITY (H): ICD-10-CM

## 2021-07-29 DIAGNOSIS — I48.0 PAROXYSMAL A-FIB (H): ICD-10-CM

## 2021-07-29 DIAGNOSIS — Z96.653 STATUS POST TOTAL BILATERAL KNEE REPLACEMENT: ICD-10-CM

## 2021-07-29 DIAGNOSIS — H54.3 BLINDNESS OF LEFT EYE WITH CATEGORY 3 BLINDNESS OF RIGHT EYE: ICD-10-CM

## 2021-07-29 DIAGNOSIS — Z11.59 NEED FOR HEPATITIS C SCREENING TEST: ICD-10-CM

## 2021-07-29 DIAGNOSIS — M48.061 SPINAL STENOSIS OF LUMBAR REGION WITHOUT NEUROGENIC CLAUDICATION: ICD-10-CM

## 2021-07-29 DIAGNOSIS — E78.5 HYPERLIPIDEMIA LDL GOAL <130: ICD-10-CM

## 2021-07-29 DIAGNOSIS — Z79.01 LONG TERM CURRENT USE OF ANTICOAGULANT THERAPY: ICD-10-CM

## 2021-07-29 DIAGNOSIS — Z00.00 ENCOUNTER FOR MEDICARE ANNUAL WELLNESS EXAM: ICD-10-CM

## 2021-07-29 DIAGNOSIS — J45.30 MILD PERSISTENT ASTHMA WITHOUT COMPLICATION: ICD-10-CM

## 2021-07-29 DIAGNOSIS — Z78.0 ASYMPTOMATIC POSTMENOPAUSAL STATUS: ICD-10-CM

## 2021-07-29 DIAGNOSIS — Z00.00 ROUTINE GENERAL MEDICAL EXAMINATION AT A HEALTH CARE FACILITY: Primary | ICD-10-CM

## 2021-07-29 DIAGNOSIS — F51.01 PRIMARY INSOMNIA: ICD-10-CM

## 2021-07-29 DIAGNOSIS — I10 HYPERTENSION GOAL BP (BLOOD PRESSURE) < 140/90: ICD-10-CM

## 2021-07-29 DIAGNOSIS — M10.079 ACUTE IDIOPATHIC GOUT INVOLVING TOE, UNSPECIFIED LATERALITY: ICD-10-CM

## 2021-07-29 PROCEDURE — 99213 OFFICE O/P EST LOW 20 MIN: CPT | Mod: 25 | Performed by: INTERNAL MEDICINE

## 2021-07-29 PROCEDURE — G0439 PPPS, SUBSEQ VISIT: HCPCS | Performed by: INTERNAL MEDICINE

## 2021-07-29 RX ORDER — LISINOPRIL 10 MG/1
10 TABLET ORAL DAILY
Qty: 90 TABLET | Refills: 3 | Status: SHIPPED | OUTPATIENT
Start: 2021-07-29 | End: 2022-08-29

## 2021-07-29 RX ORDER — ZOLPIDEM TARTRATE 5 MG/1
TABLET ORAL
Qty: 30 TABLET | Refills: 3 | Status: SHIPPED | OUTPATIENT
Start: 2021-07-29 | End: 2022-08-29

## 2021-07-29 RX ORDER — ATORVASTATIN CALCIUM 20 MG/1
20 TABLET, FILM COATED ORAL DAILY
Qty: 90 TABLET | Refills: 3 | Status: SHIPPED | OUTPATIENT
Start: 2021-07-29 | End: 2022-08-29

## 2021-07-29 RX ORDER — COLCHICINE 0.6 MG/1
0.6 TABLET ORAL DAILY PRN
Qty: 20 TABLET | Refills: 1 | Status: SHIPPED | OUTPATIENT
Start: 2021-07-29 | End: 2021-08-19

## 2021-07-29 ASSESSMENT — ACTIVITIES OF DAILY LIVING (ADL): CURRENT_FUNCTION: NO ASSISTANCE NEEDED

## 2021-07-29 ASSESSMENT — MIFFLIN-ST. JEOR: SCORE: 1293.29

## 2021-07-29 NOTE — PATIENT INSTRUCTIONS
Patient Education   Personalized Prevention Plan  You are due for the preventive services outlined below.  Your care team is available to assist you in scheduling these services.  If you have already completed any of these items, please share that information with your care team to update in your medical record.  Health Maintenance Due   Topic Date Due     ANNUAL REVIEW OF HM ORDERS  Never done     Hepatitis C Screening  Never done     Asthma Action Plan - yearly  07/11/2020     Osteoporosis Screening  06/28/2021     Annual Wellness Visit  07/27/2021           Call to schedule imaging   Mammogram and Dexa scan    Trial Colchicine for gout: watch for GI side effects.   Consider start Allopurinol, with colchicine for first month or so.   You can hold the lipitor when using colchicine...

## 2021-07-29 NOTE — PROGRESS NOTES
"SUBJECTIVE:   Alfreda Baxter is a 76 year old female who presents for Preventive Visit.    75 y/o F here for AFE. H/o asthma, HTN, paroxysmal atrial fib on  Elliquis, L eye blindness due to central retinal artery occlusion 3+ Y ago.      She recently is contending with a gouty toe - it is better now.   Prednisone helps but it affects her vision, and she has monovision 2/2 Retinal aa occlusion a couple of years ago.... she would like more tools to deal with acute gout for future, is not sure if she will accept daily Allopurinol or not...       . Recent labs look good reviewed with patient... UAcid not elevated, but does not mean she is not having gout episode.   Inflammatory labs are negative, making infection less likely cluprit here.      *  would not want to tx osteoporosis, but interested in DEXA screening.       Patient has been advised of split billing requirements and indicates understanding: Yes   Are you in the first 12 months of your Medicare coverage?  No    Healthy Habits:    In general, how would you rate your overall health?  Very good    Frequency of exercise:  1 day/week    Duration of exercise:  15-30 minutes    Do you usually eat at least 4 servings of fruit and vegetables a day, include whole grains    & fiber and avoid regularly eating high fat or \"junk\" foods?  Yes    Taking medications regularly:  Yes    Barriers to taking medications:  None    Medication side effects:  None    Ability to successfully perform activities of daily living:  No assistance needed    Home Safety:  No safety concerns identified    Hearing Impairment:  Difficulty following a conversation in a noisy restaurant or crowded room    In the past 6 months, have you been bothered by leaking of urine? Yes    In general, how would you rate your overall mental or emotional health?  Very good      PHQ-2 Total Score:    Additional concerns today:  No    Do you feel safe in your environment? Yes    Have you ever done Advance Care " Planning? (For example, a Health Directive, POLST, or a discussion with a medical provider or your loved ones about your wishes): Yes, advance care planning is on file.       Fall risk       Cognitive Screening   1) Repeat 3 items (Leader, Season, Table)  c  2) Clock draw: NORMAL  3) 3 item recall: Recalls 3 objects  Results: 3 items recalled: COGNITIVE IMPAIRMENT LESS LIKELY    Mini-CogTM Copyright [employer]. Licensed by the author for use in [facility]; reprinted with permission (willis@[facility]). All rights reserved.      Do you have sleep apnea, excessive snoring or daytime drowsiness?: no    Reviewed and updated as needed this visit by clinical staff                 Reviewed and updated as needed this visit by Provider                Social History     Tobacco Use     Smoking status: Former Smoker     Packs/day: 0.10     Years: 1.00     Pack years: 0.10     Types: Cigarettes     Start date: 1963     Quit date: 1965     Years since quittin.6     Smokeless tobacco: Never Used   Substance Use Topics     Alcohol use: Not Currently     Comment: one a month     If you drink alcohol do you typically have >3 drinks per day or >7 drinks per week? No    Alcohol Use 2020   Prescreen: >3 drinks/day or >7 drinks/week? -   Prescreen: >3 drinks/day or >7 drinks/week? No       Current providers sharing in care for this patient include:   Patient Care Team:  Brandi Sims MD as PCP - General  Brandi Sims MD as Assigned PCP  Skyla Gold MD as Assigned Heart and Vascular Provider  Bryan Morrison MD as Assigned Surgical Provider    The following health maintenance items are reviewed in Epic and correct as of today:  Health Maintenance Due   Topic Date Due     ANNUAL REVIEW OF HM ORDERS  Never done     HEPATITIS C SCREENING  Never done     ASTHMA ACTION PLAN  2020     DEXA  2021     MEDICARE ANNUAL WELLNESS VISIT  2021     Labs reviewed in EPIC  BP Readings from Last  3 Encounters:   07/29/21 131/82   07/20/21 (!) 159/81   01/05/21 134/85    Wt Readings from Last 3 Encounters:   07/29/21 87.1 kg (192 lb)   07/20/21 88 kg (194 lb)   01/05/21 83.9 kg (185 lb)                  Patient Active Problem List   Diagnosis     DJD (degenerative joint disease)     Hypertension goal BP (blood pressure) < 140/90     Hyperlipidemia LDL goal <130     Encounter for general adult medical examination with abnormal findings     Lumbar spinal stenosis     Toe pain     Adenomatous colon polyp     Mild persistent asthma     Status post total bilateral knee replacement     Blepharitis, both eyes     Combined forms of age-related cataract, mild-mod, both eyes     overweight HCC     Primary insomnia     Posterior vitreous detachment of both eyes     Paroxysmal ventricular tachycardia (H)     PFO (patent foramen ovale)     Neovascular glaucoma of left eye, moderate stage     Blind left eye     Paroxysmal A-fib (H)     Long term current use of anticoagulant therapy     Obesity (BMI 35.0-39.9) with comorbidity (H)     Ocular hypertension of right eye, treated     History of central retinal artery occlusion, os     Past Surgical History:   Procedure Laterality Date     ABDOMEN SURGERY  1976, 1978, 1995    2 C-sections,  total hysterectomy     APPENDECTOMY      age 7 yrs.     BREAST BIOPSY, RT/LT  02/17/04    left benign     BREAST SURGERY      see above     C TOTAL KNEE ARTHROPLASTY Left 11/2015    at [facility]     COLONOSCOPY  2013    needed q 3 yrs.     COLONOSCOPY N/A 9/10/2020    Procedure: Colonoscopy, With Polypectomy And Biopsy;  Surgeon: Brian Cleaning MD;  Location: [facility] OR     COLONOSCOPY WITH CO2 INSUFFLATION N/A 11/21/2016    Procedure: COLONOSCOPY WITH CO2 INSUFFLATION;  Surgeon: Brian Cleaning MD;  Location: [facility] OR     COLONOSCOPY WITH CO2 INSUFFLATION N/A 9/10/2020    Procedure: COLONOSCOPY, WITH CO2 INSUFFLATION;  Surgeon: Brian Cleaning MD;  Location: [facility] OR      EP COMPREHENSIVE EP STUDY N/A 2018    Procedure: LOOP RECORDER;  Surgeon: Buck Butterfield MD;  Location:  [facility]     GENITOURINARY SURGERY      see above     HYSTERECTOMY, PAP NO LONGER INDICATED       HYSTERECTOMY, POORNIMA      bleeding fibroids     ORTHOPEDIC SURGERY  2009    meniscus repair       Social History     Tobacco Use     Smoking status: Former Smoker     Packs/day: 0.10     Years: 1.00     Pack years: 0.10     Types: Cigarettes     Start date: 1963     Quit date: 1965     Years since quittin.6     Smokeless tobacco: Never Used   Substance Use Topics     Alcohol use: Not Currently     Comment: one a month     Family History   Problem Relation Age of Onset     Respiratory Father      Glaucoma Father      Asthma Father      Allergies Sister      Eye Disorder Sister      Lipids Sister      Neurologic Disorder Sister      Osteoporosis Sister      Glaucoma Sister      Hypertension Sister      Arthritis Mother      Cancer Mother      Hypertension Mother      Other Cancer Mother      Thyroid Disease Mother      Gastrointestinal Disease Son      Macular Degeneration Sister      Hyperlipidemia Sister      Hypertension Sister      Osteoporosis Sister      Glaucoma Other          Current Outpatient Medications   Medication Sig Dispense Refill     acetaminophen (TYLENOL) 500 MG tablet Take 500-1,000 mg by mouth every 6 hours as needed.       albuterol (PROAIR HFA/PROVENTIL HFA/VENTOLIN HFA) 108 (90 Base) MCG/ACT inhaler Inhale 2 puffs into the lungs every 6 hours as needed for shortness of breath / dyspnea or wheezing 1 Inhaler 3     apixaban ANTICOAGULANT (ELIQUIS ANTICOAGULANT) 5 MG tablet Take 1 tablet (5 mg) by mouth 2 times daily 180 tablet 3     atorvastatin (LIPITOR) 40 MG tablet Take 0.5 tablets (20 mg) by mouth daily 90 tablet 3     B-COMPLEX OR Take  by mouth daily.       CALCIUM 500 +D OR one daily       colchicine (COLCYRS) 0.6 MG tablet Take 1 tablet (0.6 mg) by  mouth daily as needed (gout) [okay to hold lipitor when taking colchine] 20 tablet 1     hypromellose (ARTIFICIAL TEARS) 0.5 % SOLN ophthalmic solution Place 1 drop into both eyes 3 times daily       latanoprost (XALATAN) 0.005 % ophthalmic solution Place 1 drop into both eyes At Bedtime 1 Bottle 11     lisinopril (ZESTRIL) 10 MG tablet Take 1 tablet (10 mg) by mouth daily 90 tablet 3     meclizine (ANTIVERT) 12.5 MG tablet Take 1 tablet (12.5 mg) by mouth 3 times daily as needed for dizziness 20 tablet 0     multivitamin (OCUVITE) TABS tablet Take 1 tablet by mouth daily       NEW MED lasastropost eye drops       predniSONE (DELTASONE) 10 MG tablet Take 4 tablets by mouth daily for 3 days, then 3 tablets by mouth daily for 3 days, then 2 tablets by mouth daily for 3 days, then 1 tablet by mouth daily for 3 days. 30 tablet 0     timolol maleate (TIMOPTIC) 0.5 % ophthalmic solution Place 1 drop Into the left eye every morning 5 mL 11     zolpidem (AMBIEN) 5 MG tablet TAKE ONE TABLET BY MOUTH IN THE EVENING AS NEEDED FOR SLEEP 30 tablet 3     Allergies   Allergen Reactions     Codeine Anaphylaxis     Niacin      No Clinical Screening - See Comments Other (See Comments)     senisitive to non steroidals  Aleve, ibuprofen celebrex cause bad stomach pains  senisitive to non steroidals  Aleve, ibuprofen celebrex cause bad stomach pains     Oxycodone Hives     Other reaction(s): Unknown  ... With facial swelling     Trazodone      nausea     Nsaids Nausea and Vomiting     Other reaction(s): Abdominal Pain     Recent Labs   Lab Test 07/27/21  1004 11/26/20  1141 10/15/20  1108 07/24/20  1013 07/11/19  1127 12/05/16  1041 03/15/16  1027 09/10/15  1353   A1C 5.7*  --  5.3 5.9*  --    < >  --   --    LDL 67  --   --  73 61   < > 68  --    HDL 73  --   --  54 61   < > 55  --    TRIG 195*  --   --  179* 231*   < > 222*  --    ALT  --  16  --  22  --   --  23  --    CR 0.92 0.70 0.70 0.85  --   --   --  0.87   GFRESTIMATED 60* 84  "84 66  --   --   --  64   GFRESTBLACK  --  >90 >90 77  --   --   --  77   POTASSIUM 4.5 3.8 4.6 4.1  --   --   --  3.9   TSH  --  0.40  --   --   --   --   --  0.53    < > = values in this interval not displayed.           Mammogram Screening - Patient over age 75, has elected to continue with screening.  Pertinent mammograms are reviewed under the imaging tab.    Review of Systems  Constitutional, HEENT, cardiovascular, pulmonary, GI, , musculoskeletal, neuro, skin, endocrine and psych systems are negative, except as otherwise noted.  No gum bleeding with Charlene   Doing well on this   Gout flare recently: See HPI ...      BPPV is better.       OBJECTIVE:   /82 (BP Location: Right arm, Patient Position: Sitting, Cuff Size: Adult Large)   Pulse 73   Temp 97.8  F (36.6  C) (Oral)   Ht 1.549 m (5' 1\")   Wt 87.1 kg (192 lb)   SpO2 96%   BMI 36.28 kg/m   Estimated body mass index is 36.66 kg/m  as calculated from the following:    Height as of 7/20/21: 1.549 m (5' 1\").    Weight as of 7/20/21: 88 kg (194 lb).     Physical Exam  GENERAL APPEARANCE: healthy, alert and no distress  EYES: Eyes grossly normal to inspection, PERRL and conjunctivae and sclerae normal  HENT: ear canals and TM's normal, nose and mouth without ulcers or lesions, oropharynx clear and oral mucous membranes moist  NECK: no adenopathy, no asymmetry, masses, or scars and thyroid normal to palpation  RESP: lungs clear to auscultation - no rales, rhonchi or wheezes  BREAST: normal without masses, tenderness or nipple discharge and no palpable axillary masses or adenopathy  CV: regular rate and rhythm, normal S1 S2, no S3 or S4, no murmur, click or rub, no peripheral edema and peripheral pulses strong  ABDOMEN: soft, nontender, no hepatosplenomegaly, no masses and bowel sounds normal   (female): normal female external genitalia, normal urethral meatus, vaginal mucosal atrophy noted, normal cervix, adnexae, and uterus without masses or " abnormal discharge   (female): bimanual only  MS: no musculoskeletal defects are noted and gait is age appropriate without ataxia  MS: R second toe is red and swollen, some pain.  No current discharge.   SKIN: no suspicious lesions or rashes  NEURO: Normal strength and tone, sensory exam grossly normal, mentation intact and speech normal  PSYCH: mentation appears normal and affect normal/bright    Diagnostic Test Results:  Labs reviewed in Epic  See labs.     ASSESSMENT / PLAN:   1. Routine general medical examination at a health care facility       2. Hypertension goal BP (blood pressure) < 140/90  Well controlled.   Labs surveillance.   - lisinopril (ZESTRIL) 10 MG tablet; Take 1 tablet (10 mg) by mouth daily  Dispense: 90 tablet; Refill: 3  - OFFICE/OUTPT VISIT,EST,LEVL III    3. Acute idiopathic gout involving toe, unspecified laterality   trial prn colchicine   Side effects discussed and questions answered.   Consider starting allopurinol to prevent these attacks.. she will think about it.     - colchicine (COLCYRS) 0.6 MG tablet; Take 1 tablet (0.6 mg) by mouth daily as needed (gout) [okay to hold lipitor when taking colchine]  Dispense: 20 tablet; Refill: 1  - OFFICE/OUTPT VISIT,EST,LEVL III    4. 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 Paroxysmal A-fib (H)   elliquis well tolerated.     6. Long term current use of anticoagulant therapy   Elliquis well tolerated.     7. Mild persistent asthma without complication  Prn albu, is on daily antihistamine, non sedating.     8. Spinal stenosis of lumbar region without neurogenic claudication   h/o     9. Status post total bilateral knee replacement       10. Obesity (BMI 35.0-39.9) with comorbidity (H)       11. Need for hepatitis C screening test   agreeable     12. Encounter for Medicare annual wellness exam       13. Primary insomnia     - zolpidem (AMBIEN) 5 MG tablet; TAKE ONE TABLET BY MOUTH IN THE EVENING AS NEEDED FOR SLEEP   "Dispense: 30 tablet; Refill: 3  - OFFICE/OUTPT VISIT,EST,LEVL III    14. Asymptomatic postmenopausal status     - DEXA HIP/PELVIS/SPINE - Future; Future  -- interested in results, not interested in treating if osteoporosis present.     15. Hyperlipidemia LDL goal <130     - atorvastatin (LIPITOR) 20 MG tablet; Take 1 tablet (20 mg) by mouth daily  Dispense: 90 tablet; Refill: 3  - OFFICE/OUTPT VISIT,EST,LEVL III    Patient has been advised of split billing requirements and indicates understanding: Yes  COUNSELING:  Reviewed preventive health counseling, as reflected in patient instructions       Osteoporosis prevention/bone health    Estimated body mass index is 36.66 kg/m  as calculated from the following:    Height as of 7/20/21: 1.549 m (5' 1\").    Weight as of 7/20/21: 88 kg (194 lb).    Weight management plan: portion     She reports that she quit smoking about 56 years ago. Her smoking use included cigarettes. She started smoking about 58 years ago. She has a 0.10 pack-year smoking history. She has never used smokeless tobacco.      Appropriate preventive services were discussed with this patient, including applicable screening as appropriate for cardiovascular disease, diabetes, osteopenia/osteoporosis, and glaucoma.  As appropriate for age/gender, discussed screening for colorectal cancer, prostate cancer, breast cancer, and cervical cancer. Checklist reviewing preventive services available has been given to the patient.    Reviewed patients plan of care and provided an AVS. The Basic Care Plan (routine screening as documented in Health Maintenance) for Alfreda meets the Care Plan requirement. This Care Plan has been established and reviewed with the Patient.    Counseling Resources:  ATP IV Guidelines  Pooled Cohorts Equation Calculator  Breast Cancer Risk Calculator  Breast Cancer: Medication to Reduce Risk  FRAX Risk Assessment  ICSI Preventive Guidelines  Dietary Guidelines for Americans, 2010  USDA's " MyPlate  ASA Prophylaxis  Lung CA Screening    Brandi Sims MD  [facility]    Identified Health Risks:

## 2021-07-29 NOTE — LETTER
My Asthma Action Plan    Name: Alfreda Baxter   YOB: 1945  Date: 7/29/2021   My doctor: Brandi Sims MD   My clinic: [facility]        My Rescue Medicine:   Albuterol inhaler (Proair/Ventolin/Proventil HFA)  2-4 puffs EVERY 4 HOURS as needed. Use a spacer if recommended by your provider.   My Asthma Severity:   Intermittent / Exercise Induced  Know your asthma triggers: upper respiratory infections and pollens             GREEN ZONE   Good Control    I feel good    No cough or wheeze    Can work, sleep and play without asthma symptoms       Take your asthma control medicine every day.     1. If exercise triggers your asthma, take your rescue medication    15 minutes before exercise or sports, and    During exercise if you have asthma symptoms  2. Spacer to use with inhaler: If you have a spacer, make sure to use it with your inhaler             YELLOW ZONE Getting Worse  I have ANY of these:    I do not feel good    Cough or wheeze    Chest feels tight    Wake up at night   1. Keep taking your Green Zone medications  2. Start taking your rescue medicine:    every 20 minutes for up to 1 hour. Then every 4 hours for 24-48 hours.  3. If you stay in the Yellow Zone for more than 12-24 hours, contact your doctor.  4. If you do not return to the Green Zone in 12-24 hours or you get worse, start taking your oral steroid medicine if prescribed by your provider.           RED ZONE Medical Alert - Get Help  I have ANY of these:    I feel awful    Medicine is not helping    Breathing getting harder    Trouble walking or talking    Nose opens wide to breathe       1. Take your rescue medicine NOW  2. If your provider has prescribed an oral steroid medicine, start taking it NOW  3. Call your doctor NOW  4. If you are still in the Red Zone after 20 minutes and you have not reached your doctor:    Take your rescue medicine again and    Call 911 or go to the emergency room right  away    See your regular doctor within 2 weeks of an Emergency Room or Urgent Care visit for follow-up treatment.          Annual Reminders:  Meet with Asthma Educator,  Flu Shot in the Fall, consider Pneumonia Vaccination for patients with asthma (aged 19 and older).    Pharmacy:    [facility] [address] [place], [place] [address] [address]  WRITTEN PRESCRIPTION REQUESTED    Electronically signed by Brandi Sims MD   Date: 07/29/21                    Asthma Triggers  How To Control Things That Make Your Asthma Worse    Triggers are things that make your asthma worse.  Look at the list below to help you find your triggers and   what you can do about them. You can help prevent asthma flare-ups by staying away from your triggers.      Trigger                                                          What you can do   Cigarette Smoke  Tobacco smoke can make asthma worse. Do not allow smoking in your home, car or around you.  Be sure no one smokes at a child s day care or school.  If you smoke, ask your health care provider for ways to help you quit.  Ask family members to quit too.  Ask your health care provider for a referral to Quit Plan to help you quit smoking, or call 3-871-162-PLAN.     Colds, Flu, Bronchitis  These are common triggers of asthma. Wash your hands often.  Don t touch your eyes, nose or mouth.  Get a flu shot every year.     Dust Mites  These are tiny bugs that live in cloth or carpet. They are too small to see. Wash sheets and blankets in hot water every week.   Encase pillows and mattress in dust mite proof covers.  Avoid having carpet if you can. If you have carpet, vacuum weekly.   Use a dust mask and HEPA vacuum.   Pollen and Outdoor Mold  Some people are allergic to trees, grass, or weed pollen, or molds. Try to keep your windows closed.  Limit time out doors when pollen count is high.   Ask you health care provider about taking medicine during allergy season.     Animal  Dander  Some people are allergic to skin flakes, urine or saliva from pets with fur or feathers. Keep pets with fur or feathers out of your home.    If you can t keep the pet outdoors, then keep the pet out of your bedroom.  Keep the bedroom door closed.  Keep pets off cloth furniture and away from stuffed toys.     Mice, Rats, and Cockroaches  Some people are allergic to the waste from these pests.   Cover food and garbage.  Clean up spills and food crumbs.  Store grease in the refrigerator.   Keep food out of the bedroom.   Indoor Mold  This can be a trigger if your home has high moisture. Fix leaking faucets, pipes, or other sources of water.   Clean moldy surfaces.  Dehumidify basement if it is damp and smelly.   Smoke, Strong Odors, and Sprays  These can reduce air quality. Stay away from strong odors and sprays, such as perfume, powder, hair spray, paints, smoke incense, paint, cleaning products, candles and new carpet.   Exercise or Sports  Some people with asthma have this trigger. Be active!  Ask your doctor about taking medicine before sports or exercise to prevent symptoms.    Warm up for 5-10 minutes before and after sports or exercise.     Other Triggers of Asthma  Cold air:  Cover your nose and mouth with a scarf.  Sometimes laughing or crying can be a trigger.  Some medicines and food can trigger asthma.

## 2021-07-30 ENCOUNTER — OFFICE VISIT (OUTPATIENT)
Dept: PODIATRY | Facility: CLINIC | Age: 76
End: 2021-07-30
Payer: MEDICARE

## 2021-07-30 VITALS
HEART RATE: 73 BPM | BODY MASS INDEX: 36.25 KG/M2 | SYSTOLIC BLOOD PRESSURE: 131 MMHG | DIASTOLIC BLOOD PRESSURE: 82 MMHG | WEIGHT: 192 LBS | HEIGHT: 61 IN

## 2021-07-30 DIAGNOSIS — M10.079 ACUTE IDIOPATHIC GOUT INVOLVING TOE, UNSPECIFIED LATERALITY: Primary | ICD-10-CM

## 2021-07-30 PROCEDURE — 99203 OFFICE O/P NEW LOW 30 MIN: CPT | Performed by: PODIATRIST

## 2021-07-30 ASSESSMENT — MIFFLIN-ST. JEOR: SCORE: 1293.29

## 2021-07-30 NOTE — LETTER
7/30/2021         RE: Alfreda Baxter  [address] [facility] [id number]        Dear Colleague,    Thank you for referring your patient, Alfreda Baxter, to the [facility]. Please see a copy of my visit note below.     Subjective:    Pt is seen today in consult from Dr. Sims with the c/c of painful left second toe.  Patient has longstanding history of gout.  Has had redness swelling and drainage on her left second toe.  Has recently been placed on prednisone.  She has 1 pill left.  She states is no longer draining.  Feeling somewhat better but still some discomfort.  She has had problems with this toe before.  She denies any fever chills or shortness of breath.  Has recently been started on Keflex.  Denies lymphangitis or lymphadenopathy.  Patient is anticoagulated.    ROS:  A 10-point review of systems was performed and is positive for that noted in the HPI and as seen below.  All other areas are negative.        Allergies   Allergen Reactions     Codeine Anaphylaxis     Niacin      No Clinical Screening - See Comments Other (See Comments)     senisitive to non steroidals  Aleve, ibuprofen celebrex cause bad stomach pains  senisitive to non steroidals  Aleve, ibuprofen celebrex cause bad stomach pains     Oxycodone Hives     Other reaction(s): Unknown  ... With facial swelling     Trazodone      nausea     Nsaids Nausea and Vomiting     Other reaction(s): Abdominal Pain       Current Outpatient Medications   Medication Sig Dispense Refill     acetaminophen (TYLENOL) 500 MG tablet Take 500-1,000 mg by mouth every 6 hours as needed.       albuterol (PROAIR HFA/PROVENTIL HFA/VENTOLIN HFA) 108 (90 Base) MCG/ACT inhaler Inhale 2 puffs into the lungs every 6 hours as needed for shortness of breath / dyspnea or wheezing 1 Inhaler 3     apixaban ANTICOAGULANT (ELIQUIS ANTICOAGULANT) 5 MG tablet Take 1 tablet (5 mg) by mouth 2 times daily 180 tablet 3     atorvastatin (LIPITOR) 20  MG tablet Take 1 tablet (20 mg) by mouth daily 90 tablet 3     B-COMPLEX OR Take  by mouth daily.       CALCIUM 500 +D OR one daily       colchicine (COLCYRS) 0.6 MG tablet Take 1 tablet (0.6 mg) by mouth daily as needed (gout) [okay to hold lipitor when taking colchine] 20 tablet 1     hypromellose (ARTIFICIAL TEARS) 0.5 % SOLN ophthalmic solution Place 1 drop into both eyes 3 times daily       latanoprost (XALATAN) 0.005 % ophthalmic solution Place 1 drop into both eyes At Bedtime 1 Bottle 11     lisinopril (ZESTRIL) 10 MG tablet Take 1 tablet (10 mg) by mouth daily 90 tablet 3     meclizine (ANTIVERT) 12.5 MG tablet Take 1 tablet (12.5 mg) by mouth 3 times daily as needed for dizziness 20 tablet 0     multivitamin (OCUVITE) TABS tablet Take 1 tablet by mouth daily       NEW MED lasastropost eye drops       predniSONE (DELTASONE) 10 MG tablet Take 4 tablets by mouth daily for 3 days, then 3 tablets by mouth daily for 3 days, then 2 tablets by mouth daily for 3 days, then 1 tablet by mouth daily for 3 days. 30 tablet 0     timolol maleate (TIMOPTIC) 0.5 % ophthalmic solution Place 1 drop Into the left eye every morning 5 mL 11     zolpidem (AMBIEN) 5 MG tablet TAKE ONE TABLET BY MOUTH IN THE EVENING AS NEEDED FOR SLEEP 30 tablet 3       Patient Active Problem List   Diagnosis     DJD (degenerative joint disease)     Hypertension goal BP (blood pressure) < 140/90     Hyperlipidemia LDL goal <130     Encounter for general adult medical examination with abnormal findings     Lumbar spinal stenosis     Toe pain     Adenomatous colon polyp     Mild persistent asthma     Status post total bilateral knee replacement     Blepharitis, both eyes     Combined forms of age-related cataract, mild-mod, both eyes     overweight HCC     Primary insomnia     Posterior vitreous detachment of both eyes     Paroxysmal ventricular tachycardia (H)     PFO (patent foramen ovale)     Neovascular glaucoma of left eye, moderate stage      Blind left eye     Paroxysmal A-fib (H)     Long term current use of anticoagulant therapy     Obesity (BMI 35.0-39.9) with comorbidity (H)     Ocular hypertension of right eye, treated     History of central retinal artery occlusion, os       Past Medical History:   Diagnosis Date     Acute idiopathic gout of left foot 11/4/2015     Arrhythmia      Asthma     controlled with inhaler     Cataract      Central retinal artery occlusion of left eye 9/15/2018     CRAO (central retinal artery occlusion), left      DJD (degenerative joint disease)     knees     GERD (gastroesophageal reflux disease)      Glaucoma      High cholesterol      HTN (hypertension)      Need for prophylactic hormone replacement therapy (postmenopausal)     off 11/04 after benign breast bx     Neovascular glaucoma of left eye, moderate stage 5/22/2019     PFO (patent foramen ovale)        Past Surgical History:   Procedure Laterality Date     ABDOMEN SURGERY  1976, 1978, 1995    2 C-sections,  total hysterectomy     APPENDECTOMY      age 7 yrs.     BREAST BIOPSY, RT/LT  02/17/04    left benign     BREAST SURGERY      see above     C TOTAL KNEE ARTHROPLASTY Left 11/2015    at [facility]     COLONOSCOPY  2013    needed q 3 yrs.     COLONOSCOPY N/A 9/10/2020    Procedure: Colonoscopy, With Polypectomy And Biopsy;  Surgeon: Brian Cleaning MD;  Location: [facility]     COLONOSCOPY WITH CO2 INSUFFLATION N/A 11/21/2016    Procedure: COLONOSCOPY WITH CO2 INSUFFLATION;  Surgeon: Brian Cleaning MD;  Location: [facility]     COLONOSCOPY WITH CO2 INSUFFLATION N/A 9/10/2020    Procedure: COLONOSCOPY, WITH CO2 INSUFFLATION;  Surgeon: Brian Cleaning MD;  Location: [facility]     EP COMPREHENSIVE EP STUDY N/A 12/31/2018    Procedure: LOOP RECORDER;  Surgeon: Buck Butterfield MD;  Location:  [facility]     GENITOURINARY SURGERY      see above     HYSTERECTOMY, PAP NO LONGER INDICATED       HYSTERECTOMY, POORNIMA  1997    bleeding  "fibroids     ORTHOPEDIC SURGERY  2009    meniscus repair       Family History   Problem Relation Age of Onset     Respiratory Father      Glaucoma Father      Asthma Father      Allergies Sister      Eye Disorder Sister      Lipids Sister      Neurologic Disorder Sister      Osteoporosis Sister      Glaucoma Sister      Hypertension Sister      Arthritis Mother      Cancer Mother      Hypertension Mother      Other Cancer Mother      Thyroid Disease Mother      Gastrointestinal Disease Son      Macular Degeneration Sister      Hyperlipidemia Sister      Hypertension Sister      Osteoporosis Sister      Glaucoma Other        Social History     Tobacco Use     Smoking status: Former Smoker     Packs/day: 0.10     Years: 1.00     Pack years: 0.10     Types: Cigarettes     Start date: 1963     Quit date: 1965     Years since quittin.6     Smokeless tobacco: Never Used   Substance Use Topics     Alcohol use: Not Currently     Comment: one a month       Objective:    /82   Pulse 73   Ht 1.549 m (5' 1\")   Wt 87.1 kg (192 lb)   BMI 36.28 kg/m  .      Constitutional/ general:  Pt is in no apparent distress, appears well-nourished.  Cooperative with history and physical exam.     Psych:  The patient answered questions appropriately.  Normal affect.  Seems to have reasonable expectations, in terms of treatment.     Eyes:  Visual scanning/ tracking without deficit.     Ears:  Response to auditory stimuli is normal.  No  hearing aid devices.  Auricles in proper alignment.     Lymphatic:  Popliteal lymph nodes not enlarged.     Lungs:  Non labored breathing, non labored speech. No cough.  No audible wheezing. Even, quiet breathing.       Vascular:  Pedal pulses are palpable bilaterally for both the DP and PT arteries.  CFT < 3 sec. bilateral ankle edema and varicosities.  Pedal hair growth noted.     Neuro:  Alert and oriented x 3. Coordinated gait.  Light touch sensation is intact to the L4, L5, S1 " distributions. No obvious deficits.  No evidence of neurological-based weakness, spasticity, or contracture in the lower extremities.     Derm: Normal texture and turgor.  No erythema, ecchymosis, or cyanosis.  No open lesions.     Musculoskeletal:    Patient is ambulatory without an assistive device or brace.  No gross deformities.  Normal arch with weightbearing.  No forefoot or rear foot deformities noted.  MS 5/5 all compartments.    No equinus.   Normal ROM all forefoot and rearfoot joints.  Left second toe has erythematous discoloration and is edematous.  Dry eschar noted on the dorsum of the DIPJ with no drainage.  Can see white discoloration on her skin consistent with tophi.  No crepitus with palpation.  Slight pain with palpation.          Ref Range & Units 3 d ago 10 d ago     Uric Acid 2.6 - 6.0 mg/dL 6.0  5.9        Ref Range & Units 3 d ago 10 d ago    WBC Count 4.0 - 11.0 10e3/uL 11.2High   12.3High       Recent sed rate and CRP normal     recent culture results noted        Assessment:    Gout  Left second toe tophi    Plan:  Reviewed recent labs.  Discussed left second toe findings consistent with gout.  Doubt infected at this time.  Explained to patient large amount of tophi in this toe.  Discussed if toe still painful after finishing prednisone agree with Dr. Sims that colchicine may be helpful.  Discussed on how to take this.  Discussed if pain resolves going on allopurinol may help slowly resolve this over time.  Discussed only other way to resolve tophi would be surgically.  She will try conservative treatments first.  RTC as needed.  Thank you for allowing me participate in the care of this patient.        Luis Enrique Penn DPM, FACFAS           Again, thank you for allowing me to participate in the care of your patient.        Sincerely,        Luis Enrique Penn DPM

## 2021-07-30 NOTE — PROGRESS NOTES
Subjective:    Pt is seen today in consult from Dr. Sims with the c/c of painful left second toe.  Patient has longstanding history of gout.  Has had redness swelling and drainage on her left second toe.  Has recently been placed on prednisone.  She has 1 pill left.  She states is no longer draining.  Feeling somewhat better but still some discomfort.  She has had problems with this toe before.  She denies any fever chills or shortness of breath.  Has recently been started on Keflex.  Denies lymphangitis or lymphadenopathy.  Patient is anticoagulated.    ROS:  A 10-point review of systems was performed and is positive for that noted in the HPI and as seen below.  All other areas are negative.        Allergies   Allergen Reactions     Codeine Anaphylaxis     Niacin      No Clinical Screening - See Comments Other (See Comments)     senisitive to non steroidals  Aleve, ibuprofen celebrex cause bad stomach pains  senisitive to non steroidals  Aleve, ibuprofen celebrex cause bad stomach pains     Oxycodone Hives     Other reaction(s): Unknown  ... With facial swelling     Trazodone      nausea     Nsaids Nausea and Vomiting     Other reaction(s): Abdominal Pain       Current Outpatient Medications   Medication Sig Dispense Refill     acetaminophen (TYLENOL) 500 MG tablet Take 500-1,000 mg by mouth every 6 hours as needed.       albuterol (PROAIR HFA/PROVENTIL HFA/VENTOLIN HFA) 108 (90 Base) MCG/ACT inhaler Inhale 2 puffs into the lungs every 6 hours as needed for shortness of breath / dyspnea or wheezing 1 Inhaler 3     apixaban ANTICOAGULANT (ELIQUIS ANTICOAGULANT) 5 MG tablet Take 1 tablet (5 mg) by mouth 2 times daily 180 tablet 3     atorvastatin (LIPITOR) 20 MG tablet Take 1 tablet (20 mg) by mouth daily 90 tablet 3     B-COMPLEX OR Take  by mouth daily.       CALCIUM 500 +D OR one daily       colchicine (COLCYRS) 0.6 MG tablet Take 1 tablet (0.6 mg) by mouth daily as needed (gout) [okay to hold lipitor when  taking colchine] 20 tablet 1     hypromellose (ARTIFICIAL TEARS) 0.5 % SOLN ophthalmic solution Place 1 drop into both eyes 3 times daily       latanoprost (XALATAN) 0.005 % ophthalmic solution Place 1 drop into both eyes At Bedtime 1 Bottle 11     lisinopril (ZESTRIL) 10 MG tablet Take 1 tablet (10 mg) by mouth daily 90 tablet 3     meclizine (ANTIVERT) 12.5 MG tablet Take 1 tablet (12.5 mg) by mouth 3 times daily as needed for dizziness 20 tablet 0     multivitamin (OCUVITE) TABS tablet Take 1 tablet by mouth daily       NEW MED lasastropost eye drops       predniSONE (DELTASONE) 10 MG tablet Take 4 tablets by mouth daily for 3 days, then 3 tablets by mouth daily for 3 days, then 2 tablets by mouth daily for 3 days, then 1 tablet by mouth daily for 3 days. 30 tablet 0     timolol maleate (TIMOPTIC) 0.5 % ophthalmic solution Place 1 drop Into the left eye every morning 5 mL 11     zolpidem (AMBIEN) 5 MG tablet TAKE ONE TABLET BY MOUTH IN THE EVENING AS NEEDED FOR SLEEP 30 tablet 3       Patient Active Problem List   Diagnosis     DJD (degenerative joint disease)     Hypertension goal BP (blood pressure) < 140/90     Hyperlipidemia LDL goal <130     Encounter for general adult medical examination with abnormal findings     Lumbar spinal stenosis     Toe pain     Adenomatous colon polyp     Mild persistent asthma     Status post total bilateral knee replacement     Blepharitis, both eyes     Combined forms of age-related cataract, mild-mod, both eyes     overweight HCC     Primary insomnia     Posterior vitreous detachment of both eyes     Paroxysmal ventricular tachycardia (H)     PFO (patent foramen ovale)     Neovascular glaucoma of left eye, moderate stage     Blind left eye     Paroxysmal A-fib (H)     Long term current use of anticoagulant therapy     Obesity (BMI 35.0-39.9) with comorbidity (H)     Ocular hypertension of right eye, treated     History of central retinal artery occlusion, os       Past Medical  History:   Diagnosis Date     Acute idiopathic gout of left foot 11/4/2015     Arrhythmia      Asthma     controlled with inhaler     Cataract      Central retinal artery occlusion of left eye 9/15/2018     CRAO (central retinal artery occlusion), left      DJD (degenerative joint disease)     knees     GERD (gastroesophageal reflux disease)      Glaucoma      High cholesterol      HTN (hypertension)      Need for prophylactic hormone replacement therapy (postmenopausal)     off 11/04 after benign breast bx     Neovascular glaucoma of left eye, moderate stage 5/22/2019     PFO (patent foramen ovale)        Past Surgical History:   Procedure Laterality Date     ABDOMEN SURGERY  1976, 1978, 1995    2 C-sections,  total hysterectomy     APPENDECTOMY      age 7 yrs.     BREAST BIOPSY, RT/LT  02/17/04    left benign     BREAST SURGERY      see above     C TOTAL KNEE ARTHROPLASTY Left 11/2015    at [facility]     COLONOSCOPY  2013    needed q 3 yrs.     COLONOSCOPY N/A 9/10/2020    Procedure: Colonoscopy, With Polypectomy And Biopsy;  Surgeon: Brian Cleaning MD;  Location: [facility]     COLONOSCOPY WITH CO2 INSUFFLATION N/A 11/21/2016    Procedure: COLONOSCOPY WITH CO2 INSUFFLATION;  Surgeon: Brian Cleaning MD;  Location: [facility]     COLONOSCOPY WITH CO2 INSUFFLATION N/A 9/10/2020    Procedure: COLONOSCOPY, WITH CO2 INSUFFLATION;  Surgeon: Brian Cleaning MD;  Location: [facility]     EP COMPREHENSIVE EP STUDY N/A 12/31/2018    Procedure: LOOP RECORDER;  Surgeon: Buck Butterfield MD;  Location:  [facility]     GENITOURINARY SURGERY      see above     HYSTERECTOMY, PAP NO LONGER INDICATED       HYSTERECTOMY, POORNIMA  1997    bleeding fibroids     ORTHOPEDIC SURGERY  2009    meniscus repair       Family History   Problem Relation Age of Onset     Respiratory Father      Glaucoma Father      Asthma Father      Allergies Sister      Eye Disorder Sister      Lipids Sister      Neurologic Disorder  "Sister      Osteoporosis Sister      Glaucoma Sister      Hypertension Sister      Arthritis Mother      Cancer Mother      Hypertension Mother      Other Cancer Mother      Thyroid Disease Mother      Gastrointestinal Disease Son      Macular Degeneration Sister      Hyperlipidemia Sister      Hypertension Sister      Osteoporosis Sister      Glaucoma Other        Social History     Tobacco Use     Smoking status: Former Smoker     Packs/day: 0.10     Years: 1.00     Pack years: 0.10     Types: Cigarettes     Start date: 1963     Quit date: 1965     Years since quittin.6     Smokeless tobacco: Never Used   Substance Use Topics     Alcohol use: Not Currently     Comment: one a month       Objective:    /82   Pulse 73   Ht 1.549 m (5' 1\")   Wt 87.1 kg (192 lb)   BMI 36.28 kg/m  .      Constitutional/ general:  Pt is in no apparent distress, appears well-nourished.  Cooperative with history and physical exam.     Psych:  The patient answered questions appropriately.  Normal affect.  Seems to have reasonable expectations, in terms of treatment.     Eyes:  Visual scanning/ tracking without deficit.     Ears:  Response to auditory stimuli is normal.  No  hearing aid devices.  Auricles in proper alignment.     Lymphatic:  Popliteal lymph nodes not enlarged.     Lungs:  Non labored breathing, non labored speech. No cough.  No audible wheezing. Even, quiet breathing.       Vascular:  Pedal pulses are palpable bilaterally for both the DP and PT arteries.  CFT < 3 sec. bilateral ankle edema and varicosities.  Pedal hair growth noted.     Neuro:  Alert and oriented x 3. Coordinated gait.  Light touch sensation is intact to the L4, L5, S1 distributions. No obvious deficits.  No evidence of neurological-based weakness, spasticity, or contracture in the lower extremities.     Derm: Normal texture and turgor.  No erythema, ecchymosis, or cyanosis.  No open lesions.     Musculoskeletal:    Patient is " ambulatory without an assistive device or brace.  No gross deformities.  Normal arch with weightbearing.  No forefoot or rear foot deformities noted.  MS 5/5 all compartments.    No equinus.   Normal ROM all forefoot and rearfoot joints.  Left second toe has erythematous discoloration and is edematous.  Dry eschar noted on the dorsum of the DIPJ with no drainage.  Can see white discoloration on her skin consistent with tophi.  No crepitus with palpation.  Slight pain with palpation.          Ref Range & Units 3 d ago 10 d ago     Uric Acid 2.6 - 6.0 mg/dL 6.0  5.9        Ref Range & Units 3 d ago 10 d ago    WBC Count 4.0 - 11.0 10e3/uL 11.2High   12.3High       Recent sed rate and CRP normal     recent culture results noted        Assessment:    Gout  Left second toe tophi    Plan:  Reviewed recent labs.  Discussed left second toe findings consistent with gout.  Doubt infected at this time.  Explained to patient large amount of tophi in this toe.  Discussed if toe still painful after finishing prednisone agree with Dr. Sims that colchicine may be helpful.  Discussed on how to take this.  Discussed if pain resolves going on allopurinol may help slowly resolve this over time.  Discussed only other way to resolve tophi would be surgically.  She will try conservative treatments first.  RTC as needed.  Thank you for allowing me participate in the care of this patient.        Luis Enrique Penn, AGUILAR, FACFAS

## 2021-08-19 ENCOUNTER — VIRTUAL VISIT (OUTPATIENT)
Dept: INTERNAL MEDICINE | Facility: CLINIC | Age: 76
End: 2021-08-19
Payer: MEDICARE

## 2021-08-19 ENCOUNTER — MYC MEDICAL ADVICE (OUTPATIENT)
Dept: INTERNAL MEDICINE | Facility: CLINIC | Age: 76
End: 2021-08-19

## 2021-08-19 DIAGNOSIS — M1A.00X1 GOUTY ARTHROPATHY, CHRONIC, WITH TOPHI: ICD-10-CM

## 2021-08-19 DIAGNOSIS — M10.9 ACUTE GOUT INVOLVING TOE OF LEFT FOOT, UNSPECIFIED CAUSE: ICD-10-CM

## 2021-08-19 PROCEDURE — 99442 PR PHYSICIAN TELEPHONE EVALUATION 11-20 MIN: CPT | Performed by: INTERNAL MEDICINE

## 2021-08-19 RX ORDER — COLCHICINE 0.6 MG/1
0.6 TABLET ORAL DAILY PRN
Qty: 30 TABLET | Refills: 1 | Status: SHIPPED | OUTPATIENT
Start: 2021-08-19 | End: 2022-08-29

## 2021-08-19 RX ORDER — ALLOPURINOL 300 MG/1
300 TABLET ORAL DAILY
Qty: 90 TABLET | Refills: 3 | Status: SHIPPED | OUTPATIENT
Start: 2021-08-19 | End: 2022-06-15

## 2021-08-19 RX ORDER — PREDNISONE 10 MG/1
TABLET ORAL
Qty: 30 TABLET | Refills: 0 | Status: SHIPPED | OUTPATIENT
Start: 2021-08-19 | End: 2021-12-03

## 2021-08-19 NOTE — PATIENT INSTRUCTIONS
For chronic tophaceous gout:   1) allopurinol 300 mg each day  2) during first 6 week:   -- daily or every other day colchcicine  -- you can take a few day's break from colchicine and take 20 mg prednisone for those days if you need to (Rx sent)    If not working, I would send you to rheumatologist.

## 2021-08-19 NOTE — TELEPHONE ENCOUNTER
Could we schedule her for tel visit at the end of my day?  Could be overbook, ada does not need to be here when I am doing the visit.

## 2021-08-19 NOTE — PROGRESS NOTES
Malinda is a 76 year old who is being evaluated via a billable telephone visit.      What phone number would you like to be contacted at? [phone]  How would you like to obtain your AVS? MyChart    Assessment & Plan     Gouty arthropathy, chronic, with tophi  Start allopurinol    Side effects discussed and questions answered.   Concurrent with cochicine for first 6 weeks.  If not tolerated then every other day or use prednisone.   NSAIDs c/i because of anticoagulation therapy    - allopurinol (ZYLOPRIM) 300 MG tablet; Take 1 tablet (300 mg) by mouth daily  - colchicine (COLCYRS) 0.6 MG tablet; Take 1 tablet (0.6 mg) by mouth daily as needed (gout) [okay to hold lipitor when taking colchine]    Acute gout involving toe of left foot, unspecified cause     - predniSONE (DELTASONE) 10 MG tablet; Take 4 tablets by mouth daily for 3 days, then 3 tablets by mouth daily for 3 days, then 2 tablets by mouth daily for 3 days, then 1 tablet by mouth daily for 3 days.      I spent a total of 20 minutes on the day of the visit.   Time spent doing chart review, history and exam, documentation and further activities per the note       n    Return in about 1 year (around 8/19/2022) for Physical Exam.    Brandi Sims MD  [facility]    Ian Petty is a 76 year old who presents for the following health issues     HPI     Discuss on-going gout and how to prevent it.    At recent visit: Prednisone helps but it affects her vision.... she would like more tools to deal with acute gout for future, is not sure if she will accept daily Allopurinol or not...     Uric acid 8.4  2015    8.3  2017    5.9  7/2019(having acute symptoms)      Review of Systems   Constitutional, HEENT, cardiovascular, pulmonary, gi and gu systems are negative, except as otherwise noted.      Objective           Vitals:  No vitals were obtained today due to virtual visit.    Physical Exam   healthy, alert and no distress  PSYCH:  Alert and oriented times 3; coherent speech, normal   rate and volume, able to articulate logical thoughts, able   to abstract reason, no tangential thoughts, no hallucinations   or delusions  Her affect is normal and pleasant  RESP: No cough, no audible wheezing, able to talk in full sentences  Remainder of exam unable to be completed due to telephone visits    Lab on 07/27/2021   Component Date Value Ref Range Status     Cholesterol 07/27/2021 179  <200 mg/dL Final    Age 0-19 years  Desirable: <170 mg/dL  Borderline high:  170-199 mg/dl  High:            >199 mg/dl    Age 20 years and older  Desirable: <200 mg/dL     Triglycerides 07/27/2021 195* <150 mg/dL Final    0-9 years:  Normal:    Less than 75 mg/dL  Borderline high:  75-99 mg/dL  High:             Greater than or equal to 100 mg/dL    0-19 years:  Normal:    Less than 90 mg/dL  Borderline high:   mg/dL  High:             Greater than or equal to 130 mg/dL    20 years and older:  Normal:    Less than 150 mg/dL  Borderline high:  150-199 mg/dL  High:             200-499 mg/dL  Very high:   Greater than or equal to 500 mg/dL     Direct Measure HDL 07/27/2021 73  >=50 mg/dL Final    0-19 years:       Greater than or equal to 45 mg/dL   Low: Less than 40 mg/dL   Borderline low: 40-44 mg/dL     20 years and older:   Female: Greater than or equal to 50 mg/dL   Male:   Greater than or equal to 40 mg/dL          LDL Cholesterol Calculated 07/27/2021 67  <=100 mg/dL Final    Age 0-19 years:  Desirable: 0-110 mg/dL   Borderline high: 110-129 mg/dL   High: >= 130 mg/dL    Age 20 years and older:  Desirable: <100mg/dL  Above desirable: 100-129 mg/dL   Borderline high: 130-159 mg/dL   High: 160-189 mg/dL   Very high: >= 190 mg/dL     Non HDL Cholesterol 07/27/2021 106  <130 mg/dL Final    0-19 years:  Desirable:          Less than 120 mg/dL  Borderline high:   120-144 mg/dL  High:                   Greater than or equal to 145 mg/dL    20 years and  older:  Desirable:          130 mg/dL  Above Desirable: 130-159 mg/dL  Borderline high:   160-189 mg/dL  High:               190-219 mg/dL  Very high:     Greater than or equal to 220 mg/dL     Patient Fasting > 8hrs? 07/27/2021 Yes   Final     Sodium 07/27/2021 138  133 - 144 mmol/L Final     Potassium 07/27/2021 4.5  3.4 - 5.3 mmol/L Final     Chloride 07/27/2021 104  94 - 109 mmol/L Final     Carbon Dioxide (CO2) 07/27/2021 31  20 - 32 mmol/L Final     Anion Gap 07/27/2021 3  3 - 14 mmol/L Final     Urea Nitrogen 07/27/2021 22  7 - 30 mg/dL Final     Creatinine 07/27/2021 0.92  0.52 - 1.04 mg/dL Final     Calcium 07/27/2021 9.4  8.5 - 10.1 mg/dL Final     Glucose 07/27/2021 94  70 - 99 mg/dL Final     GFR Estimate 07/27/2021 60* >60 mL/min/1.73m2 Final    As of July 11, 2021, eGFR is calculated by the CKD-EPI creatinine equation, without race adjustment. eGFR can be influenced by muscle mass, exercise, and diet. The reported eGFR is an estimation only and is only applicable if the renal function is stable.     WBC Count 07/27/2021 11.2* 4.0 - 11.0 10e3/uL Final     RBC Count 07/27/2021 5.05  3.80 - 5.20 10e6/uL Final     Hemoglobin 07/27/2021 14.8  11.7 - 15.7 g/dL Final     Hematocrit 07/27/2021 46.1  35.0 - 47.0 % Final     MCV 07/27/2021 91  78 - 100 fL Final     MCH 07/27/2021 29.3  26.5 - 33.0 pg Final     MCHC 07/27/2021 32.1  31.5 - 36.5 g/dL Final     RDW 07/27/2021 13.9  10.0 - 15.0 % Final     Platelet Count 07/27/2021 299  150 - 450 10e3/uL Final     Hemoglobin A1C 07/27/2021 5.7* 0.0 - 5.6 % Final    Normal <5.7%   Prediabetes 5.7-6.4%    Diabetes 6.5% or higher     Note: Adopted from ADA consensus guidelines.     Uric Acid 07/27/2021 6.0  2.6 - 6.0 mg/dL Final     Erythrocyte Sedimentation Rate 07/27/2021 9  0 - 30 mm/hr Final     CRP Inflammation 07/27/2021 <2.9  0.0 - 8.0 mg/L Final               Phone call duration: 20 minutes

## 2021-09-19 ENCOUNTER — HEALTH MAINTENANCE LETTER (OUTPATIENT)
Age: 76
End: 2021-09-19

## 2021-09-23 ENCOUNTER — ANCILLARY PROCEDURE (OUTPATIENT)
Dept: CARDIOLOGY | Facility: CLINIC | Age: 76
End: 2021-09-23
Attending: INTERNAL MEDICINE
Payer: MEDICARE

## 2021-09-23 DIAGNOSIS — I47.29 PAROXYSMAL VENTRICULAR TACHYCARDIA (H): ICD-10-CM

## 2021-09-23 DIAGNOSIS — I63.9 CRYPTOGENIC STROKE (H): ICD-10-CM

## 2021-09-23 PROCEDURE — 93297 REM INTERROG DEV EVAL ICPMS: CPT | Performed by: INTERNAL MEDICINE

## 2021-09-23 PROCEDURE — G2066 INTER DEVC REMOTE 30D: HCPCS

## 2021-10-08 LAB
MDC_IDC_EPISODE_DTM: NORMAL
MDC_IDC_EPISODE_DURATION: 120 S
MDC_IDC_EPISODE_DURATION: 2160 S
MDC_IDC_EPISODE_DURATION: 240 S
MDC_IDC_EPISODE_DURATION: 360 S
MDC_IDC_EPISODE_DURATION: 600 S
MDC_IDC_EPISODE_DURATION: 720 S
MDC_IDC_EPISODE_DURATION: 840 S
MDC_IDC_EPISODE_DURATION: 960 S
MDC_IDC_EPISODE_ID: 751
MDC_IDC_EPISODE_ID: 752
MDC_IDC_EPISODE_ID: 753
MDC_IDC_EPISODE_ID: 754
MDC_IDC_EPISODE_ID: 755
MDC_IDC_EPISODE_ID: 756
MDC_IDC_EPISODE_ID: 757
MDC_IDC_EPISODE_ID: 758
MDC_IDC_EPISODE_ID: 759
MDC_IDC_EPISODE_ID: 760
MDC_IDC_EPISODE_ID: 761
MDC_IDC_EPISODE_ID: 762
MDC_IDC_EPISODE_ID: 763
MDC_IDC_EPISODE_ID: 764
MDC_IDC_EPISODE_ID: 765
MDC_IDC_EPISODE_ID: 766
MDC_IDC_EPISODE_ID: 767
MDC_IDC_EPISODE_ID: 768
MDC_IDC_EPISODE_ID: 769
MDC_IDC_EPISODE_ID: 770
MDC_IDC_EPISODE_ID: 771
MDC_IDC_EPISODE_ID: 772
MDC_IDC_EPISODE_ID: 773
MDC_IDC_EPISODE_ID: 774
MDC_IDC_EPISODE_ID: 775
MDC_IDC_EPISODE_ID: 776
MDC_IDC_EPISODE_ID: 777
MDC_IDC_EPISODE_ID: 778
MDC_IDC_EPISODE_ID: 779
MDC_IDC_EPISODE_ID: 780
MDC_IDC_EPISODE_TYPE: NORMAL
MDC_IDC_MSMT_BATTERY_STATUS: NORMAL
MDC_IDC_PG_IMPLANT_DTM: NORMAL
MDC_IDC_PG_MFG: NORMAL
MDC_IDC_PG_MODEL: NORMAL
MDC_IDC_PG_SERIAL: NORMAL
MDC_IDC_PG_TYPE: NORMAL
MDC_IDC_SESS_CLINIC_NAME: NORMAL
MDC_IDC_SESS_DTM: NORMAL
MDC_IDC_SESS_TYPE: NORMAL
MDC_IDC_SET_ZONE_DETECTION_INTERVAL: 2000 MS
MDC_IDC_SET_ZONE_DETECTION_INTERVAL: 3000 MS
MDC_IDC_SET_ZONE_DETECTION_INTERVAL: 380 MS
MDC_IDC_SET_ZONE_TYPE: NORMAL
MDC_IDC_STAT_AT_BURDEN_PERCENT: 0.2 %
MDC_IDC_STAT_AT_DTM_END: NORMAL
MDC_IDC_STAT_AT_DTM_START: NORMAL
MDC_IDC_STAT_EPISODE_RECENT_COUNT: 0
MDC_IDC_STAT_EPISODE_RECENT_COUNT: 5
MDC_IDC_STAT_EPISODE_RECENT_COUNT: 77
MDC_IDC_STAT_EPISODE_RECENT_COUNT_DTM_END: NORMAL
MDC_IDC_STAT_EPISODE_RECENT_COUNT_DTM_START: NORMAL
MDC_IDC_STAT_EPISODE_TOTAL_COUNT: 0
MDC_IDC_STAT_EPISODE_TOTAL_COUNT: 3
MDC_IDC_STAT_EPISODE_TOTAL_COUNT: 53
MDC_IDC_STAT_EPISODE_TOTAL_COUNT: 762
MDC_IDC_STAT_EPISODE_TOTAL_COUNT_DTM_END: NORMAL
MDC_IDC_STAT_EPISODE_TOTAL_COUNT_DTM_START: NORMAL
MDC_IDC_STAT_EPISODE_TYPE: NORMAL

## 2021-10-26 ENCOUNTER — ANCILLARY PROCEDURE (OUTPATIENT)
Dept: MAMMOGRAPHY | Facility: CLINIC | Age: 76
End: 2021-10-26
Attending: INTERNAL MEDICINE
Payer: MEDICARE

## 2021-10-26 DIAGNOSIS — Z12.31 VISIT FOR SCREENING MAMMOGRAM: ICD-10-CM

## 2021-10-26 PROCEDURE — 77067 SCR MAMMO BI INCL CAD: CPT | Mod: TC | Performed by: RADIOLOGY

## 2021-12-03 ENCOUNTER — OFFICE VISIT (OUTPATIENT)
Dept: INTERNAL MEDICINE | Facility: CLINIC | Age: 76
End: 2021-12-03
Payer: MEDICARE

## 2021-12-03 VITALS
WEIGHT: 194 LBS | TEMPERATURE: 97.5 F | OXYGEN SATURATION: 97 % | DIASTOLIC BLOOD PRESSURE: 84 MMHG | HEART RATE: 79 BPM | BODY MASS INDEX: 36.66 KG/M2 | SYSTOLIC BLOOD PRESSURE: 144 MMHG

## 2021-12-03 DIAGNOSIS — I48.0 PAROXYSMAL A-FIB (H): ICD-10-CM

## 2021-12-03 DIAGNOSIS — Z11.59 ENCOUNTER FOR HCV SCREENING TEST FOR LOW RISK PATIENT: ICD-10-CM

## 2021-12-03 DIAGNOSIS — Z79.01 LONG TERM CURRENT USE OF ANTICOAGULANT THERAPY: ICD-10-CM

## 2021-12-03 DIAGNOSIS — M48.061 SPINAL STENOSIS OF LUMBAR REGION WITHOUT NEUROGENIC CLAUDICATION: ICD-10-CM

## 2021-12-03 DIAGNOSIS — M10.9 ACUTE GOUT INVOLVING TOE OF LEFT FOOT, UNSPECIFIED CAUSE: ICD-10-CM

## 2021-12-03 DIAGNOSIS — M1A.00X1 GOUTY ARTHROPATHY, CHRONIC, WITH TOPHI: Primary | ICD-10-CM

## 2021-12-03 LAB
ANION GAP SERPL CALCULATED.3IONS-SCNC: 6 MMOL/L (ref 3–14)
BUN SERPL-MCNC: 18 MG/DL (ref 7–30)
CALCIUM SERPL-MCNC: 9.2 MG/DL (ref 8.5–10.1)
CHLORIDE BLD-SCNC: 110 MMOL/L (ref 94–109)
CO2 SERPL-SCNC: 28 MMOL/L (ref 20–32)
CREAT SERPL-MCNC: 0.94 MG/DL (ref 0.52–1.04)
GFR SERPL CREATININE-BSD FRML MDRD: 59 ML/MIN/1.73M2
GLUCOSE BLD-MCNC: 109 MG/DL (ref 70–99)
POTASSIUM BLD-SCNC: 4 MMOL/L (ref 3.4–5.3)
SODIUM SERPL-SCNC: 144 MMOL/L (ref 133–144)
URATE SERPL-MCNC: 4 MG/DL (ref 2.6–6)

## 2021-12-03 PROCEDURE — 84550 ASSAY OF BLOOD/URIC ACID: CPT | Performed by: INTERNAL MEDICINE

## 2021-12-03 PROCEDURE — 36415 COLL VENOUS BLD VENIPUNCTURE: CPT | Performed by: INTERNAL MEDICINE

## 2021-12-03 PROCEDURE — 80048 BASIC METABOLIC PNL TOTAL CA: CPT | Performed by: INTERNAL MEDICINE

## 2021-12-03 PROCEDURE — 99213 OFFICE O/P EST LOW 20 MIN: CPT | Performed by: INTERNAL MEDICINE

## 2021-12-03 PROCEDURE — 86803 HEPATITIS C AB TEST: CPT | Performed by: INTERNAL MEDICINE

## 2021-12-03 RX ORDER — PREDNISONE 10 MG/1
TABLET ORAL
Qty: 30 TABLET | Refills: 3 | Status: SHIPPED | OUTPATIENT
Start: 2021-12-03 | End: 2022-06-15

## 2021-12-03 NOTE — PROGRESS NOTES
Assessment & Plan     Gouty arthropathy, chronic, with tophi  Now on daily allopurinol, no more recurrences.  Doing well    Wants to check renal function on allopurinol.   - Uric acid; Future  - Basic metabolic panel; Future  - Uric acid  - Basic metabolic panel    Acute gout involving toe of left foot, unspecified cause   no acute symptoms    Prednisone burst prn recurrence.  Refilled today  - predniSONE (DELTASONE) 10 MG tablet; Take 4 tablets by mouth daily for 3 days, then 3 tablets by mouth daily for 3 days, then 2 tablets by mouth daily for 3 days, then 1 tablet by mouth daily for 3 days.    Spinal stenosis of lumbar region without neurogenic claudication  Working with [facility]  She can use prn prednisone burst if the pain becomes intolerable.  Using ice rest and heat right now.     Encounter for HCV screening test for low risk patient  Agreeable   - Hepatitis C Screen Reflex to HCV RNA Quant and Genotype; Future  - Hepatitis C Screen Reflex to HCV RNA Quant and Genotype    Paroxysmal A-fib (H)  Long term current use of anticoagulant therapy  No issues currently.  Stable.         I spent a total of 20 minutes on the day of the visit.   Time spent doing chart review, history and exam, documentation and further activities per the note              Return in about 8 months (around 8/3/2022) for Physical Exam.    Brandi Sims MD  [facility]    ============================================================  ============================================================      Subjective   Malinda is a 76 year old who presents for the following health issues     75 y/o F h/o asthma, HTN, paroxysmal atrial fib on  Elliquis, L eye blindness due to central retinal artery occlusion 3+ Y ago.     She is here to f/u gout.  We stared allopurinol 8/19/21. Uric acid now at 6.0.  Her sx are way better (long story short).  Wants to recheck renal function.     MRI 2017 showed some L5/S1 changes.   Has MRI  scheduled next week, via [facility]/Dr Sage.  Has has LESI with [facility] in past.      HPI     Discuss ongoing gout  Right buttock and leg pain .. see     Review of Systems   Constitutional, HEENT, cardiovascular, pulmonary, gi and gu systems are negative, except as otherwise noted.      Objective    BP (!) 144/84 (BP Location: Right arm, Patient Position: Sitting, Cuff Size: Adult Regular)   Pulse 79   Temp 97.5  F (36.4  C) (Oral)   Wt 88 kg (194 lb)   SpO2 97%   BMI 36.66 kg/m    There is no height or weight on file to calculate BMI.     Physical Exam     GENERAL: healthy, alert and no distress  EYES: Eyes grossly normal to inspection, PERRL and conjunctivae and sclerae normal  MS: no gross musculoskeletal defects noted, no edema   chronic gouty L 4th toe   antalgic gait (sciatica, R)   SKIN: no suspicious lesions or rashes  NEURO: Normal strength and tone, mentation intact and speech normal    BMP and UAcid are pending.

## 2021-12-04 LAB — HCV AB SERPL QL IA: NONREACTIVE

## 2021-12-09 ENCOUNTER — OFFICE VISIT (OUTPATIENT)
Dept: OPHTHALMOLOGY | Facility: CLINIC | Age: 76
End: 2021-12-09
Payer: MEDICARE

## 2021-12-09 DIAGNOSIS — H40.051 OCULAR HYPERTENSION OF RIGHT EYE: Primary | ICD-10-CM

## 2021-12-09 DIAGNOSIS — H40.52X2 NEOVASCULAR GLAUCOMA OF LEFT EYE, MODERATE STAGE: ICD-10-CM

## 2021-12-09 PROCEDURE — 92083 EXTENDED VISUAL FIELD XM: CPT | Performed by: OPHTHALMOLOGY

## 2021-12-09 PROCEDURE — 92012 INTRM OPH EXAM EST PATIENT: CPT | Performed by: OPHTHALMOLOGY

## 2021-12-09 PROCEDURE — 92133 CPTRZD OPH DX IMG PST SGM ON: CPT | Performed by: OPHTHALMOLOGY

## 2021-12-09 ASSESSMENT — VISUAL ACUITY
METHOD: SNELLEN - LINEAR
OD_CC+: +2
OS_CC: LP
CORRECTION_TYPE: GLASSES
OD_CC: 20/40

## 2021-12-09 ASSESSMENT — EXTERNAL EXAM - RIGHT EYE: OD_EXAM: 2+ BROW PTOSIS, MILD TO MOD BROW

## 2021-12-09 ASSESSMENT — TONOMETRY
IOP_METHOD: APPLANATION
OS_IOP_MMHG: 16
OD_IOP_MMHG: 16

## 2021-12-09 ASSESSMENT — EXTERNAL EXAM - LEFT EYE: OS_EXAM: 2+ BROW PTOSIS, MILD TO MOD BROW

## 2021-12-09 NOTE — PROGRESS NOTES
Current Eye Medications:  Timolol left eye each morning, Latanoprost both eyes every evening. Last drops: right eye:  10:30pm, left eye:  8am.     Subjective:  Follow up Ocular Hypertension.  Patient is here for a pressure check, OCT, and visual field.  Distance vision continues to be a struggle, but is slightly better with her new glasses.  She periodically takes Prednisone for recurring gout and sciatica.       Objective:  See Ophthalmology Exam.       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     Plan:  Continue Timolol left eye each morning.   Latanoprost both eyes every evening.  Return visit 6 months for a complete exam.    Bryan Morrison M.D.  [phone]

## 2021-12-09 NOTE — PATIENT INSTRUCTIONS
Continue Timolol left eye each morning.   Latanoprost both eyes every evening.  Return visit 6 months for a complete exam.    Bryan Morrison M.D.  [phone]

## 2021-12-09 NOTE — LETTER
12/9/2021         RE: Alfreda Baxter  [address] [facility] [address]        Dear Colleague,    Thank you for referring your patient, Alfreda Baxter, to the [facility]. Please see a copy of my visit note below.     Current Eye Medications:  Timolol left eye each morning, Latanoprost both eyes every evening. Last drops: right eye:  10:30pm, left eye:  8am.     Subjective:  Follow up Ocular Hypertension.  Patient is here for a pressure check, OCT, and visual field.  Distance vision continues to be a struggle, but is slightly better with her new glasses.  She periodically takes Prednisone for recurring gout and sciatica.       Objective:  See Ophthalmology Exam.       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     Plan:  Continue Timolol left eye each morning.   Latanoprost both eyes every evening.  Return visit 6 months for a complete exam.    Bryan Morrison M.D.  [phone]           Again, thank you for allowing me to participate in the care of your patient.        Sincerely,        Bryan Morrison MD

## 2021-12-16 DIAGNOSIS — H40.051 OCULAR HYPERTENSION OF RIGHT EYE: ICD-10-CM

## 2021-12-16 DIAGNOSIS — H40.52X2 NEOVASCULAR GLAUCOMA OF LEFT EYE, MODERATE STAGE: ICD-10-CM

## 2021-12-16 RX ORDER — LATANOPROST 50 UG/ML
1 SOLUTION/ DROPS OPHTHALMIC AT BEDTIME
Qty: 7.5 ML | Refills: 3 | Status: SHIPPED | OUTPATIENT
Start: 2021-12-16 | End: 2023-05-10

## 2022-02-12 ENCOUNTER — ANCILLARY PROCEDURE (OUTPATIENT)
Dept: CARDIOLOGY | Facility: CLINIC | Age: 77
End: 2022-02-12
Attending: INTERNAL MEDICINE
Payer: MEDICARE

## 2022-02-12 DIAGNOSIS — I63.9 CRYPTOGENIC STROKE (H): ICD-10-CM

## 2022-02-12 DIAGNOSIS — I47.29 PAROXYSMAL VENTRICULAR TACHYCARDIA (H): ICD-10-CM

## 2022-02-12 PROCEDURE — 93298 REM INTERROG DEV EVAL SCRMS: CPT | Performed by: INTERNAL MEDICINE

## 2022-02-12 PROCEDURE — G2066 INTER DEVC REMOTE 30D: HCPCS

## 2022-02-16 LAB
MDC_IDC_EPISODE_DTM: NORMAL
MDC_IDC_EPISODE_DURATION: 4 S
MDC_IDC_EPISODE_ID: 886
MDC_IDC_EPISODE_TYPE: NORMAL
MDC_IDC_MSMT_BATTERY_STATUS: NORMAL
MDC_IDC_PG_IMPLANT_DTM: NORMAL
MDC_IDC_PG_MFG: NORMAL
MDC_IDC_PG_MODEL: NORMAL
MDC_IDC_PG_SERIAL: NORMAL
MDC_IDC_PG_TYPE: NORMAL
MDC_IDC_SESS_CLINIC_NAME: NORMAL
MDC_IDC_SESS_DTM: NORMAL
MDC_IDC_SESS_TYPE: NORMAL
MDC_IDC_SET_ZONE_TYPE: NORMAL
MDC_IDC_STAT_AT_BURDEN_PERCENT: 0.07
MDC_IDC_STAT_AT_DTM_END: NORMAL
MDC_IDC_STAT_AT_DTM_START: NORMAL
MDC_IDC_STAT_EPISODE_RECENT_COUNT: 0
MDC_IDC_STAT_EPISODE_RECENT_COUNT: 1
MDC_IDC_STAT_EPISODE_RECENT_COUNT: 12
MDC_IDC_STAT_EPISODE_RECENT_COUNT_DTM_END: NORMAL
MDC_IDC_STAT_EPISODE_RECENT_COUNT_DTM_START: NORMAL
MDC_IDC_STAT_EPISODE_TOTAL_COUNT: 0
MDC_IDC_STAT_EPISODE_TOTAL_COUNT: 3
MDC_IDC_STAT_EPISODE_TOTAL_COUNT: 55
MDC_IDC_STAT_EPISODE_TOTAL_COUNT: 876
MDC_IDC_STAT_EPISODE_TOTAL_COUNT_DTM_END: NORMAL
MDC_IDC_STAT_EPISODE_TOTAL_COUNT_DTM_START: NORMAL
MDC_IDC_STAT_EPISODE_TYPE: NORMAL

## 2022-03-03 DIAGNOSIS — H40.52X2 NEOVASCULAR GLAUCOMA OF LEFT EYE, MODERATE STAGE: ICD-10-CM

## 2022-03-03 RX ORDER — TIMOLOL MALEATE 5 MG/ML
1 SOLUTION/ DROPS OPHTHALMIC EVERY MORNING
Qty: 5 ML | Refills: 11 | Status: SHIPPED | OUTPATIENT
Start: 2022-03-03 | End: 2022-09-12

## 2022-03-03 NOTE — TELEPHONE ENCOUNTER
Recommend refilling drops per last visit note on 12/09/21: Continue Timolol left eye each morning. Patient has follow up scheduled 06/15/22.

## 2022-04-04 DIAGNOSIS — I48.0 PAROXYSMAL ATRIAL FIBRILLATION (H): Primary | ICD-10-CM

## 2022-04-04 RX ORDER — APIXABAN 5 MG/1
TABLET, FILM COATED ORAL
Qty: 180 TABLET | Refills: 3 | Status: SHIPPED | OUTPATIENT
Start: 2022-04-04 | End: 2022-08-29 | Stop reason: DRUGHIGH

## 2022-04-05 NOTE — CONFIDENTIAL NOTE
Signed Prescriptions:                        Disp   Refills    ELIQUIS ANTICOAGULANT 5 MG tablet          180 ta*3        Sig: Take 1 tablet by mouth twice daily  Authorizing Provider: MAYKEL RADER  Ordering User: SENDY LAURENT    Rx filled per refill protocol.    Sendy Laurent, RN  Cardiology Care Coordinator  [facility]  [phone] option 1

## 2022-04-13 ENCOUNTER — ANCILLARY PROCEDURE (OUTPATIENT)
Dept: CARDIOLOGY | Facility: CLINIC | Age: 77
End: 2022-04-13
Attending: INTERNAL MEDICINE
Payer: MEDICARE

## 2022-04-13 DIAGNOSIS — I47.29 PAROXYSMAL VENTRICULAR TACHYCARDIA (H): ICD-10-CM

## 2022-04-13 DIAGNOSIS — I63.9 CRYPTOGENIC STROKE (H): ICD-10-CM

## 2022-04-13 PROCEDURE — G2066 INTER DEVC REMOTE 30D: HCPCS

## 2022-04-13 PROCEDURE — 93297 REM INTERROG DEV EVAL ICPMS: CPT | Performed by: INTERNAL MEDICINE

## 2022-05-31 LAB
MDC_IDC_MSMT_BATTERY_STATUS: NORMAL
MDC_IDC_PG_IMPLANT_DTM: NORMAL
MDC_IDC_PG_MFG: NORMAL
MDC_IDC_PG_MODEL: NORMAL
MDC_IDC_PG_SERIAL: NORMAL
MDC_IDC_PG_TYPE: NORMAL
MDC_IDC_SESS_CLINIC_NAME: NORMAL
MDC_IDC_SESS_DTM: NORMAL
MDC_IDC_SESS_TYPE: NORMAL
MDC_IDC_SET_ZONE_DETECTION_INTERVAL: 2000 MS
MDC_IDC_SET_ZONE_DETECTION_INTERVAL: 3000 MS
MDC_IDC_SET_ZONE_DETECTION_INTERVAL: 380 MS
MDC_IDC_SET_ZONE_TYPE: NORMAL
MDC_IDC_STAT_AT_BURDEN_PERCENT: 0 %
MDC_IDC_STAT_AT_DTM_END: NORMAL
MDC_IDC_STAT_AT_DTM_START: NORMAL
MDC_IDC_STAT_EPISODE_RECENT_COUNT: 0
MDC_IDC_STAT_EPISODE_RECENT_COUNT_DTM_END: NORMAL
MDC_IDC_STAT_EPISODE_RECENT_COUNT_DTM_START: NORMAL
MDC_IDC_STAT_EPISODE_TOTAL_COUNT: 0
MDC_IDC_STAT_EPISODE_TOTAL_COUNT: 3
MDC_IDC_STAT_EPISODE_TOTAL_COUNT: 79
MDC_IDC_STAT_EPISODE_TOTAL_COUNT: 876
MDC_IDC_STAT_EPISODE_TOTAL_COUNT_DTM_END: NORMAL
MDC_IDC_STAT_EPISODE_TOTAL_COUNT_DTM_START: NORMAL
MDC_IDC_STAT_EPISODE_TYPE: NORMAL

## 2022-06-07 ENCOUNTER — ANCILLARY PROCEDURE (OUTPATIENT)
Dept: CARDIOLOGY | Facility: CLINIC | Age: 77
End: 2022-06-07
Attending: INTERNAL MEDICINE
Payer: MEDICARE

## 2022-06-07 DIAGNOSIS — I47.29 PAROXYSMAL VENTRICULAR TACHYCARDIA (H): ICD-10-CM

## 2022-06-07 DIAGNOSIS — I63.9 CRYPTOGENIC STROKE (H): ICD-10-CM

## 2022-06-07 PROCEDURE — G2066 INTER DEVC REMOTE 30D: HCPCS

## 2022-06-07 PROCEDURE — 99207 CARDIAC DEVICE CHECK - REMOTE: CPT | Performed by: INTERNAL MEDICINE

## 2022-06-12 LAB
MDC_IDC_EPISODE_DTM: NORMAL
MDC_IDC_EPISODE_DURATION: 120 S
MDC_IDC_EPISODE_ID: 925
MDC_IDC_EPISODE_TYPE: NORMAL
MDC_IDC_MSMT_BATTERY_DTM: NORMAL
MDC_IDC_MSMT_BATTERY_STATUS: NORMAL
MDC_IDC_PG_IMPLANT_DTM: NORMAL
MDC_IDC_PG_MFG: NORMAL
MDC_IDC_PG_MODEL: NORMAL
MDC_IDC_PG_SERIAL: NORMAL
MDC_IDC_PG_TYPE: NORMAL
MDC_IDC_SESS_CLINIC_NAME: NORMAL
MDC_IDC_SESS_DTM: NORMAL
MDC_IDC_SESS_TYPE: NORMAL
MDC_IDC_SET_ZONE_TYPE: NORMAL
MDC_IDC_STAT_AT_BURDEN_PERCENT: 0.04
MDC_IDC_STAT_AT_DTM_END: NORMAL
MDC_IDC_STAT_AT_DTM_START: NORMAL
MDC_IDC_STAT_EPISODE_RECENT_COUNT: 0
MDC_IDC_STAT_EPISODE_RECENT_COUNT: 10
MDC_IDC_STAT_EPISODE_RECENT_COUNT_DTM_END: NORMAL
MDC_IDC_STAT_EPISODE_RECENT_COUNT_DTM_START: NORMAL
MDC_IDC_STAT_EPISODE_TOTAL_COUNT: 0
MDC_IDC_STAT_EPISODE_TOTAL_COUNT: 3
MDC_IDC_STAT_EPISODE_TOTAL_COUNT: 80
MDC_IDC_STAT_EPISODE_TOTAL_COUNT: 890
MDC_IDC_STAT_EPISODE_TOTAL_COUNT_DTM_END: NORMAL
MDC_IDC_STAT_EPISODE_TOTAL_COUNT_DTM_START: NORMAL
MDC_IDC_STAT_EPISODE_TYPE: NORMAL

## 2022-06-15 ENCOUNTER — OFFICE VISIT (OUTPATIENT)
Dept: OPHTHALMOLOGY | Facility: CLINIC | Age: 77
End: 2022-06-15
Payer: MEDICARE

## 2022-06-15 DIAGNOSIS — H25.813 COMBINED FORMS OF AGE-RELATED CATARACT OF BOTH EYES: Primary | ICD-10-CM

## 2022-06-15 DIAGNOSIS — H40.52X2 NEOVASCULAR GLAUCOMA OF LEFT EYE, MODERATE STAGE: ICD-10-CM

## 2022-06-15 DIAGNOSIS — H40.051 OCULAR HYPERTENSION OF RIGHT EYE: ICD-10-CM

## 2022-06-15 DIAGNOSIS — Z01.01 ENCOUNTER FOR EXAMINATION OF EYES AND VISION WITH ABNORMAL FINDINGS: ICD-10-CM

## 2022-06-15 DIAGNOSIS — H52.4 PRESBYOPIA: ICD-10-CM

## 2022-06-15 DIAGNOSIS — H43.813 POSTERIOR VITREOUS DETACHMENT OF BOTH EYES: ICD-10-CM

## 2022-06-15 DIAGNOSIS — Z86.69 HISTORY OF CENTRAL RETINAL ARTERY OCCLUSION: ICD-10-CM

## 2022-06-15 PROCEDURE — 92014 COMPRE OPH EXAM EST PT 1/>: CPT | Performed by: OPHTHALMOLOGY

## 2022-06-15 PROCEDURE — 92015 DETERMINE REFRACTIVE STATE: CPT | Mod: GY | Performed by: OPHTHALMOLOGY

## 2022-06-15 ASSESSMENT — EXTERNAL EXAM - RIGHT EYE: OD_EXAM: 2+ BROW PTOSIS, MILD TO MOD BROW

## 2022-06-15 ASSESSMENT — EXTERNAL EXAM - LEFT EYE: OS_EXAM: 2+ BROW PTOSIS, MILD TO MOD BROW

## 2022-06-15 ASSESSMENT — REFRACTION_WEARINGRX
OD_ADD: +3.50
SPECS_TYPE: PAL
OS_CYLINDER: SPHERE
OD_AXIS: 018
OD_CYLINDER: +0.75
OS_SPHERE: PLANO
OD_SPHERE: -3.25

## 2022-06-15 ASSESSMENT — VISUAL ACUITY
OS_CC: NLP
OD_CC: 20/30
METHOD: SNELLEN - LINEAR
CORRECTION_TYPE: GLASSES

## 2022-06-15 ASSESSMENT — TONOMETRY
IOP_METHOD: APPLANATION
OD_IOP_MMHG: 12
OS_IOP_MMHG: 13

## 2022-06-15 ASSESSMENT — REFRACTION_MANIFEST
OD_SPHERE: -4.00
OD_ADD: +3.00
OS_SPHERE: BALANCE
OD_CYLINDER: +1.25
OD_AXIS: 162

## 2022-06-15 ASSESSMENT — CONF VISUAL FIELD
OS_INFERIOR_TEMPORAL_RESTRICTION: 1
OD_NORMAL: 1
OS_INFERIOR_NASAL_RESTRICTION: 1
OS_SUPERIOR_TEMPORAL_RESTRICTION: 1
OS_SUPERIOR_NASAL_RESTRICTION: 1

## 2022-06-15 ASSESSMENT — CUP TO DISC RATIO
OS_RATIO: 0.8
OD_RATIO: 0.5

## 2022-06-15 NOTE — PATIENT INSTRUCTIONS
"Continue same medications.  Glasses prescription given - optional  Use artificial tears up to four times a day (like Refresh Optive, Systane Balance, TheraTears, or generic artificial tears are ok. Avoid \"get the red out\" drops).  Referral to Dr. Marco Florence at the [place] for cataract surgery.  Return visit 6 months for an intraocular pressure check, glaucoma OCT, retinal OCT, and Hill Visual Field.   Bryan Morrison M.D.  [phone]     "

## 2022-06-15 NOTE — PROGRESS NOTES
"Current Eye Medications:  Timolol left eye each morning, Latanoprost both eyes every evening. Last drops: right eye:  10:30pm, left eye:  8am.  Systane 2-3x a day in both eyes.     Subjective:  She is here for annual eye exam.  She notes her vision on the right eye is not great. Glasses are about 1 year old.   She denies pain/discomfort in either eye.     ANDREAS Bishop  10:03 AM 06/15/2022    Objective:  See Ophthalmology Exam.      Assessment:  Stable intraocular pressure and discs in patient who is a treated glaucoma suspect right eye and has regressed neovascular glaucoma after central retinal artery occlusion left eye.  Cataract right eye now visually significant.      ICD-10-CM    1. Combined forms of age-related cataract, mod, both eyes  H25.813 Adult Eye Referral     EYE EXAM (SIMPLE-NONBILLABLE)   2. Ocular hypertension of right eye, treated  H40.051    3. History of central retinal artery occlusion, os  Z86.69    4. Posterior vitreous detachment of both eyes  H43.813    5. Neovascular glaucoma of left eye, moderate stage  H40.52X2    6. Encounter for examination of eyes and vision with abnormal findings  Z01.01 REFRACTIVE STATUS     EYE EXAM (SIMPLE-NONBILLABLE)   7. Presbyopia  H52.4 REFRACTIVE STATUS       Plan:  Continue same medications.  Glasses prescription given - optional  Use artificial tears up to four times a day (like Refresh Optive, Systane Balance, TheraTears, or generic artificial tears are ok. Avoid \"get the red out\" drops).  Referral to Dr. Marco Florence at the [facility] for cataract surgery.  Return visit 6 months for an intraocular pressure check, glaucoma OCT, retinal OCT, and Hill Visual Field.   Bryan Morrison M.D.  [phone]     " DISPLAY PLAN FREE TEXT

## 2022-06-15 NOTE — LETTER
"    6/15/2022         RE: Alfreda Baxter  [address] [facility] [address] [facility] [address]        Dear Colleague,    Thank you for referring your patient, Alfreda Baxter, to the [facility]. Please see a copy of my visit note below.    Current Eye Medications:  Timolol left eye each morning, Latanoprost both eyes every evening. Last drops: right eye:  10:30pm, left eye:  8am.  Systane 2-3x a day in both eyes.     Subjective:  She is here for annual eye exam.  She notes her vision on the right eye is not great. Glasses are about 1 year old.   She denies pain/discomfort in either eye.     ANDREAS Bishop  10:03 AM 06/15/2022    Objective:  See Ophthalmology Exam.      Assessment:  Stable intraocular pressure and discs in patient who is a treated glaucoma suspect right eye and has regressed neovascular glaucoma after central retinal artery occlusion left eye.  Cataract right eye now visually significant.      ICD-10-CM    1. Combined forms of age-related cataract, mod, both eyes  H25.813 Adult Eye Referral     EYE EXAM (SIMPLE-NONBILLABLE)   2. Ocular hypertension of right eye, treated  H40.051    3. History of central retinal artery occlusion, os  Z86.69    4. Posterior vitreous detachment of both eyes  H43.813    5. Neovascular glaucoma of left eye, moderate stage  H40.52X2    6. Encounter for examination of eyes and vision with abnormal findings  Z01.01 REFRACTIVE STATUS     EYE EXAM (SIMPLE-NONBILLABLE)   7. Presbyopia  H52.4 REFRACTIVE STATUS       Plan:  Continue same medications.  Glasses prescription given - optional  Use artificial tears up to four times a day (like Refresh Optive, Systane Balance, TheraTears, or generic artificial tears are ok. Avoid \"get the red out\" drops).  Referral to Dr. Marco Florence at the [place] for cataract surgery.  Return visit 6 months for an intraocular pressure check, glaucoma OCT, retinal OCT, and Hill Visual Field.   Bryan Morrison, " M.D.  [phone]         Again, thank you for allowing me to participate in the care of your patient.        Sincerely,        Bryan Morrison MD

## 2022-08-09 ENCOUNTER — DOCUMENTATION ONLY (OUTPATIENT)
Dept: LAB | Facility: CLINIC | Age: 77
End: 2022-08-09

## 2022-08-09 DIAGNOSIS — I10 HYPERTENSION GOAL BP (BLOOD PRESSURE) < 140/90: ICD-10-CM

## 2022-08-09 DIAGNOSIS — R73.9 BLOOD GLUCOSE ELEVATED: ICD-10-CM

## 2022-08-09 DIAGNOSIS — Z79.01 LONG TERM CURRENT USE OF ANTICOAGULANT THERAPY: ICD-10-CM

## 2022-08-09 DIAGNOSIS — I48.0 PAROXYSMAL A-FIB (H): ICD-10-CM

## 2022-08-09 DIAGNOSIS — E78.5 HYPERLIPIDEMIA LDL GOAL <130: Primary | ICD-10-CM

## 2022-08-11 ENCOUNTER — OFFICE VISIT (OUTPATIENT)
Dept: OPHTHALMOLOGY | Facility: CLINIC | Age: 77
End: 2022-08-11
Attending: OPHTHALMOLOGY
Payer: MEDICARE

## 2022-08-11 DIAGNOSIS — H40.051 OCULAR HYPERTENSION OF RIGHT EYE: ICD-10-CM

## 2022-08-11 DIAGNOSIS — H25.813 COMBINED FORMS OF AGE-RELATED CATARACT OF BOTH EYES: Primary | ICD-10-CM

## 2022-08-11 DIAGNOSIS — H40.52X2 NEOVASCULAR GLAUCOMA OF LEFT EYE, MODERATE STAGE: ICD-10-CM

## 2022-08-11 DIAGNOSIS — Z86.69 HISTORY OF CENTRAL RETINAL ARTERY OCCLUSION: ICD-10-CM

## 2022-08-11 PROCEDURE — 76519 ECHO EXAM OF EYE: CPT | Performed by: OPHTHALMOLOGY

## 2022-08-11 PROCEDURE — G0463 HOSPITAL OUTPT CLINIC VISIT: HCPCS | Mod: 25

## 2022-08-11 PROCEDURE — 99214 OFFICE O/P EST MOD 30 MIN: CPT | Mod: GC | Performed by: OPHTHALMOLOGY

## 2022-08-11 ASSESSMENT — TONOMETRY
OS_IOP_MMHG: 10
IOP_METHOD: TONOPEN
OD_IOP_MMHG: 14

## 2022-08-11 ASSESSMENT — EXTERNAL EXAM - RIGHT EYE: OD_EXAM: 2+ BROW PTOSIS, MILD TO MOD BROW

## 2022-08-11 ASSESSMENT — REFRACTION_WEARINGRX
OD_CYLINDER: +0.75
OS_SPHERE: PLANO
OD_ADD: +3.50
OD_AXIS: 018
OS_CYLINDER: SPHERE
SPECS_TYPE: PAL
OD_SPHERE: -3.25

## 2022-08-11 ASSESSMENT — VISUAL ACUITY
OD_CC: 20/30
METHOD: SNELLEN - LINEAR
OS_CC: NLP
OD_CC+: +2
CORRECTION_TYPE: GLASSES

## 2022-08-11 ASSESSMENT — SLIT LAMP EXAM - LIDS
COMMENTS: DERMATOCHALASIS
COMMENTS: DERMATOCHALASIS

## 2022-08-11 ASSESSMENT — CUP TO DISC RATIO
OD_RATIO: 0.5
OS_RATIO: 0.8

## 2022-08-11 ASSESSMENT — REFRACTION_MANIFEST
OS_SPHERE: BALANCE
OD_CYLINDER: +1.00
OD_SPHERE: -4.25
OD_ADD: +3.00
OD_AXIS: 160

## 2022-08-11 ASSESSMENT — CONF VISUAL FIELD
METHOD: COUNTING FINGERS
OS_SUPERIOR_NASAL_RESTRICTION: 1
OS_INFERIOR_NASAL_RESTRICTION: 1
OS_INFERIOR_TEMPORAL_RESTRICTION: 1
OS_SUPERIOR_TEMPORAL_RESTRICTION: 1
OD_NORMAL: 1

## 2022-08-11 ASSESSMENT — EXTERNAL EXAM - LEFT EYE: OS_EXAM: 2+ BROW PTOSIS, MILD TO MOD BROW

## 2022-08-11 NOTE — NURSING NOTE
Chief Complaints and History of Present Illnesses   Patient presents with     Cataract Evaluation     Referred from Dr. Morrison     Chief Complaint(s) and History of Present Illness(es)     Cataract Evaluation     Laterality: right eye    Severity: mild    Pain scale: 0/10    Comments: Referred from Dr. Morrison              Comments     New pt here today for cataract evaluation on referral from Dr. Morrison.  Pt had CE 6/15/2022 at [facility] - records in [facility].  Having difficulties with [facility] - pt LE is NLP.  No pain or discomfort in eyes.    Kendall JOHNS, EDISON 9:02 AM 08/11/2022

## 2022-08-11 NOTE — PROGRESS NOTES
Chief Complaint(s) and History of Present Illness(es)     Cataract Evaluation     Laterality: right eye    Severity: mild    Pain scale: 0/10    Comments: Referred from Dr. Morrison           Comments     New pt here today for cataract evaluation on referral from Dr. Morrison.  Pt had CE 6/15/2022 at [facility] - records in [facility].  Having difficulties with [facility] - pt LE is NLP.  No pain or discomfort in eyes.    Kendall JOHNS, EDISON 9:02 AM 08/11/2022      Patient is a 76 y/o F who presents with cataract evaluation. She reports that she has had poor vision in the left eye in the setting of a CRAO / stroke which occurred 4 years ago ([place] [facility] [place]) and subsequent neovascular glaucoma in the left eye (Was treated with PRP for IOP of 54 mmHg - was painful / worse than childbirth) at the time. Was told that the vessels are behaving and there is a possibility of them coming back in the future - No surgery was performed for that eye/no injections either to the eye.      Uses Latanoprost each eye at bedtime, and Timolol qAM left eye.     Follows Dr. Sanchez for elevated IOP in both eyes and checks OCT RNFL / VF. Also has a FHx of macular degeneration.     Patient reports that her vision in the right eye has been diminishing in nature - she reports that she is feeling unsafe because of her distance vision. The refraction is not addressing this. Wanted cataract evaluation. Doesn't drive at night due to being bothered by headlights. She reports that she reads without the glasses on, and she reads OK without glasses, although sometimes they get tired.     Patient reports that she also has a history of migrainous headaches, and once menopause occurred, auras occurs irregularly - has typical scintillating lights occasionally.     History of A.Fib (On Eliquis) - Follows with a cardiologist and has an implanted recorder in her chest inserted.      Review of systems for the eyes was negative other  than the pertinent positives/negatives listed in the HPI.      Assessment & Plan      Alfreda Baxter is a 77 year old female with the following diagnoses:   1. Combined forms of age-related cataract of both eyes    2. History of central retinal artery occlusion, os    3. Neovascular glaucoma of left eye, moderate stage    4. Ocular hypertension of right eye, treated       Dilates to: > 8 mm  Alpha blockers/Flomax: None  Trauma/Pseudoxfoliation: None  Fuchs dystrophy/guttae: Yes    Diabetes: No  Anticoagulation: On Eliquis    Cyl: right eye: 0.49 D @ 130 ; left eye: 1.38 D @ 156     We discussed the risks and benefits of cataract surgery in the OD eye, and informed consent was obtained.  Proceed with CE/IOL OD.  Aim for near target -3.00    Special equipment/needs:    Anesthesia:Topical  Dilation:Good  Iris expansion:Not needed  Pseudoexfoliation: No pseudoexfoliation  Trypan Blue: No   Dex       # Ocular Hypertension, right eye   - Continues on Latanoprost at bedtime each eye     # History of CRAO, left eye   # History of Neovascular glaucoma, left eye   # NLP right eye   # Monocular status   - Follows with Dr. Sanchez - trace NVD present today on examination no NVD  - IOP wnl each eye   - Continue with Latanoprost at bedtime each eye   - Continue with timolol qAM left eye   - Recommend full time glass wear for ocular protection     Salo Wang MD  Department of Ophthalmology  Pager: [phone]    Attending Physician Attestation:  Complete documentation of historical and exam elements from today's encounter can be found in the full encounter summary report (not reduplicated in this progress note).  I personally obtained the chief complaint(s) and history of present illness.  I confirmed and edited as necessary the review of systems, past medical/surgical history, family history, social history, and examination findings as documented by others; and I examined the patient myself.  I personally reviewed the  relevant tests, images, and reports as documented above.  I formulated and edited as necessary the assessment and plan and discussed the findings and management plan with the patient and family. Attending Physician Image/Tesing Attestation: I personally reviewed the ophthalmic test(s) associated with this encounter, agree with the interpretation(s) as documented by the resident/fellow, and have edited the corresponding report(s) as necessary.  Today with Alfreda Baxter , I reviewed the indications, risks, benefits, and alternatives of the proposed surgical procedure including, but not limited to, failure obtain the desired result  and need for additional surgery, bleeding, infection, loss of vision, loss of the eye, and the remote possibility of permanent damage to any organ system or death with the use of anesthesia.  I provided multiple opportunities for the questions, answered all questions to the best of my ability, and confirmed that my answers and my discussion were understood.      . - Ezequiel Florence MD

## 2022-08-24 ENCOUNTER — TELEPHONE (OUTPATIENT)
Dept: OPHTHALMOLOGY | Facility: CLINIC | Age: 77
End: 2022-08-24

## 2022-08-24 ENCOUNTER — LAB (OUTPATIENT)
Dept: LAB | Facility: CLINIC | Age: 77
End: 2022-08-24
Payer: MEDICARE

## 2022-08-24 DIAGNOSIS — I48.0 PAROXYSMAL A-FIB (H): ICD-10-CM

## 2022-08-24 DIAGNOSIS — E78.5 HYPERLIPIDEMIA LDL GOAL <130: ICD-10-CM

## 2022-08-24 DIAGNOSIS — R73.9 BLOOD GLUCOSE ELEVATED: ICD-10-CM

## 2022-08-24 DIAGNOSIS — Z79.01 LONG TERM CURRENT USE OF ANTICOAGULANT THERAPY: ICD-10-CM

## 2022-08-24 DIAGNOSIS — I10 HYPERTENSION GOAL BP (BLOOD PRESSURE) < 140/90: ICD-10-CM

## 2022-08-24 LAB
ANION GAP SERPL CALCULATED.3IONS-SCNC: 2 MMOL/L (ref 3–14)
BUN SERPL-MCNC: 19 MG/DL (ref 7–30)
CALCIUM SERPL-MCNC: 9.7 MG/DL (ref 8.5–10.1)
CHLORIDE BLD-SCNC: 106 MMOL/L (ref 94–109)
CHOLEST SERPL-MCNC: 157 MG/DL
CO2 SERPL-SCNC: 31 MMOL/L (ref 20–32)
CREAT SERPL-MCNC: 0.78 MG/DL (ref 0.52–1.04)
ERYTHROCYTE [DISTWIDTH] IN BLOOD BY AUTOMATED COUNT: 13.2 % (ref 10–15)
FASTING STATUS PATIENT QL REPORTED: YES
GFR SERPL CREATININE-BSD FRML MDRD: 77 ML/MIN/1.73M2
GLUCOSE BLD-MCNC: 110 MG/DL (ref 70–99)
HBA1C MFR BLD: 5.6 % (ref 0–5.6)
HCT VFR BLD AUTO: 44.3 % (ref 35–47)
HDLC SERPL-MCNC: 55 MG/DL
HGB BLD-MCNC: 14.5 G/DL (ref 11.7–15.7)
LDLC SERPL CALC-MCNC: 68 MG/DL
MCH RBC QN AUTO: 29.7 PG (ref 26.5–33)
MCHC RBC AUTO-ENTMCNC: 32.7 G/DL (ref 31.5–36.5)
MCV RBC AUTO: 91 FL (ref 78–100)
NONHDLC SERPL-MCNC: 102 MG/DL
PLATELET # BLD AUTO: 242 10E3/UL (ref 150–450)
POTASSIUM BLD-SCNC: 5 MMOL/L (ref 3.4–5.3)
RBC # BLD AUTO: 4.89 10E6/UL (ref 3.8–5.2)
SODIUM SERPL-SCNC: 139 MMOL/L (ref 133–144)
TRIGL SERPL-MCNC: 168 MG/DL
WBC # BLD AUTO: 6.4 10E3/UL (ref 4–11)

## 2022-08-24 PROCEDURE — 83036 HEMOGLOBIN GLYCOSYLATED A1C: CPT

## 2022-08-24 PROCEDURE — 85027 COMPLETE CBC AUTOMATED: CPT

## 2022-08-24 PROCEDURE — 80061 LIPID PANEL: CPT

## 2022-08-24 PROCEDURE — 36415 COLL VENOUS BLD VENIPUNCTURE: CPT

## 2022-08-24 PROCEDURE — 80048 BASIC METABOLIC PNL TOTAL CA: CPT

## 2022-08-24 NOTE — TELEPHONE ENCOUNTER
Called patient to schedule surgery with Dr. Florence    Date of Surgery: 10/24    Location of surgery: [facility]    Pre-Op H&P: PCP    Imaging Scheduled:  No    Discussed COVID-19 Testing: Yes    Post-Op Appt Date: 11/16    Surgery Packet Mailed: yes      Additional comments: Spoke with patient they are aware of above dates and will review packet/call with any questions          Anna C. Schoenecker on 8/24/2022 at 10:28 AM

## 2022-08-29 ENCOUNTER — OFFICE VISIT (OUTPATIENT)
Dept: INTERNAL MEDICINE | Facility: CLINIC | Age: 77
End: 2022-08-29
Payer: MEDICARE

## 2022-08-29 VITALS
BODY MASS INDEX: 36.66 KG/M2 | WEIGHT: 194 LBS | SYSTOLIC BLOOD PRESSURE: 131 MMHG | OXYGEN SATURATION: 94 % | DIASTOLIC BLOOD PRESSURE: 78 MMHG | HEART RATE: 84 BPM | TEMPERATURE: 98.1 F

## 2022-08-29 DIAGNOSIS — M1A.00X1 GOUTY ARTHROPATHY, CHRONIC, WITH TOPHI: ICD-10-CM

## 2022-08-29 DIAGNOSIS — F51.01 PRIMARY INSOMNIA: ICD-10-CM

## 2022-08-29 DIAGNOSIS — E66.01 OBESITY, CLASS III, BMI 40-49.9 (MORBID OBESITY) (H): ICD-10-CM

## 2022-08-29 DIAGNOSIS — J45.30 MILD PERSISTENT ASTHMA WITHOUT COMPLICATION: ICD-10-CM

## 2022-08-29 DIAGNOSIS — Z78.0 ASYMPTOMATIC POSTMENOPAUSAL STATUS: ICD-10-CM

## 2022-08-29 DIAGNOSIS — H54.40 BLINDNESS OF LEFT EYE WITH NORMAL VISION IN CONTRALATERAL EYE: ICD-10-CM

## 2022-08-29 DIAGNOSIS — Z79.01 LONG TERM CURRENT USE OF ANTICOAGULANT THERAPY: ICD-10-CM

## 2022-08-29 DIAGNOSIS — I48.0 PAROXYSMAL ATRIAL FIBRILLATION (H): ICD-10-CM

## 2022-08-29 DIAGNOSIS — Z86.69 HISTORY OF CENTRAL RETINAL ARTERY OCCLUSION: ICD-10-CM

## 2022-08-29 DIAGNOSIS — J45.20 MILD INTERMITTENT ASTHMA WITHOUT COMPLICATION: ICD-10-CM

## 2022-08-29 DIAGNOSIS — I10 HYPERTENSION GOAL BP (BLOOD PRESSURE) < 140/90: ICD-10-CM

## 2022-08-29 DIAGNOSIS — Z00.01 ENCOUNTER FOR GENERAL ADULT MEDICAL EXAMINATION WITH ABNORMAL FINDINGS: Primary | ICD-10-CM

## 2022-08-29 DIAGNOSIS — E78.5 HYPERLIPIDEMIA LDL GOAL <130: ICD-10-CM

## 2022-08-29 DIAGNOSIS — M48.061 SPINAL STENOSIS OF LUMBAR REGION WITHOUT NEUROGENIC CLAUDICATION: ICD-10-CM

## 2022-08-29 PROCEDURE — 99213 OFFICE O/P EST LOW 20 MIN: CPT | Mod: 25 | Performed by: INTERNAL MEDICINE

## 2022-08-29 PROCEDURE — G0439 PPPS, SUBSEQ VISIT: HCPCS | Performed by: INTERNAL MEDICINE

## 2022-08-29 RX ORDER — ATORVASTATIN CALCIUM 20 MG/1
20 TABLET, FILM COATED ORAL DAILY
Qty: 90 TABLET | Refills: 3 | Status: SHIPPED | OUTPATIENT
Start: 2022-08-29 | End: 2022-11-15

## 2022-08-29 RX ORDER — ZOLPIDEM TARTRATE 5 MG/1
TABLET ORAL
Qty: 30 TABLET | Refills: 3 | Status: SHIPPED | OUTPATIENT
Start: 2022-08-29 | End: 2023-08-30

## 2022-08-29 RX ORDER — LISINOPRIL 10 MG/1
10 TABLET ORAL DAILY
Qty: 90 TABLET | Refills: 3 | Status: SHIPPED | OUTPATIENT
Start: 2022-08-29 | End: 2023-08-30

## 2022-08-29 RX ORDER — ALBUTEROL SULFATE 90 UG/1
2 AEROSOL, METERED RESPIRATORY (INHALATION) EVERY 6 HOURS PRN
Qty: 18 G | Refills: 3 | Status: SHIPPED | OUTPATIENT
Start: 2022-08-29 | End: 2023-08-30

## 2022-08-29 ASSESSMENT — ENCOUNTER SYMPTOMS
EYE PAIN: 0
DIZZINESS: 0
NERVOUS/ANXIOUS: 0
CHILLS: 0
DYSURIA: 0
WEAKNESS: 0
HEADACHES: 0
FEVER: 0
ARTHRALGIAS: 0
BREAST MASS: 0
SORE THROAT: 0
FREQUENCY: 0
COUGH: 0
JOINT SWELLING: 0
HEMATURIA: 0
PALPITATIONS: 0
HEMATOCHEZIA: 0
DIARRHEA: 0
MYALGIAS: 0
CONSTIPATION: 0
HEARTBURN: 0
ABDOMINAL PAIN: 0
PARESTHESIAS: 0
NAUSEA: 0
SHORTNESS OF BREATH: 0

## 2022-08-29 ASSESSMENT — ASTHMA QUESTIONNAIRES
ACT_TOTALSCORE: 25
QUESTION_5 LAST FOUR WEEKS HOW WOULD YOU RATE YOUR ASTHMA CONTROL: COMPLETELY CONTROLLED
QUESTION_1 LAST FOUR WEEKS HOW MUCH OF THE TIME DID YOUR ASTHMA KEEP YOU FROM GETTING AS MUCH DONE AT WORK, SCHOOL OR AT HOME: NONE OF THE TIME
QUESTION_3 LAST FOUR WEEKS HOW OFTEN DID YOUR ASTHMA SYMPTOMS (WHEEZING, COUGHING, SHORTNESS OF BREATH, CHEST TIGHTNESS OR PAIN) WAKE YOU UP AT NIGHT OR EARLIER THAN USUAL IN THE MORNING: NOT AT ALL
ACT_TOTALSCORE: 25
QUESTION_2 LAST FOUR WEEKS HOW OFTEN HAVE YOU HAD SHORTNESS OF BREATH: NOT AT ALL
QUESTION_4 LAST FOUR WEEKS HOW OFTEN HAVE YOU USED YOUR RESCUE INHALER OR NEBULIZER MEDICATION (SUCH AS ALBUTEROL): NOT AT ALL

## 2022-08-29 ASSESSMENT — ACTIVITIES OF DAILY LIVING (ADL): CURRENT_FUNCTION: NO ASSISTANCE NEEDED

## 2022-08-29 NOTE — PATIENT INSTRUCTIONS
Change ELiquis to 2.5 mg twice daily    Call to schedule imaging  OR 1 [phone]: Bone Density.       We will call to schedule preop

## 2022-08-29 NOTE — LETTER
My Asthma Action Plan    Name: Alfreda Baxter   YOB: 1945  Date: 8/29/2022   My doctor: Brandi Sims MD   My clinic: [facility]        My Rescue Medicine:   Albuterol inhaler (Proair/Ventolin/Proventil HFA)  2-4 puffs EVERY 4 HOURS as needed. Use a spacer if recommended by your provider.   My Asthma Severity:   Intermittent / Exercise Induced  Know your asthma triggers: upper respiratory infections             GREEN ZONE   Good Control    I feel good    No cough or wheeze    Can work, sleep and play without asthma symptoms       Take your asthma control medicine every day.     1. If exercise triggers your asthma, take your rescue medication    15 minutes before exercise or sports, and    During exercise if you have asthma symptoms  2. Spacer to use with inhaler: If you have a spacer, make sure to use it with your inhaler             YELLOW ZONE Getting Worse  I have ANY of these:    I do not feel good    Cough or wheeze    Chest feels tight    Wake up at night   1. Keep taking your Green Zone medications  2. Start taking your rescue medicine:    every 20 minutes for up to 1 hour. Then every 4 hours for 24-48 hours.  3. If you stay in the Yellow Zone for more than 12-24 hours, contact your doctor.  4. If you do not return to the Green Zone in 12-24 hours or you get worse, start taking your oral steroid medicine if prescribed by your provider.           RED ZONE Medical Alert - Get Help  I have ANY of these:    I feel awful    Medicine is not helping    Breathing getting harder    Trouble walking or talking    Nose opens wide to breathe       1. Take your rescue medicine NOW  2. If your provider has prescribed an oral steroid medicine, start taking it NOW  3. Call your doctor NOW  4. If you are still in the Red Zone after 20 minutes and you have not reached your doctor:    Take your rescue medicine again and    Call 911 or go to the emergency room right away    See your  regular doctor within 2 weeks of an Emergency Room or Urgent Care visit for follow-up treatment.          Annual Reminders:  Meet with Asthma Educator,  Flu Shot in the Fall, consider Pneumonia Vaccination for patients with asthma (aged 19 and older).    Pharmacy:    [facility] [address] [place], [place] [id number], [place] - [address]  WRITTEN PRESCRIPTION REQUESTED    Electronically signed by Brandi Sims MD   Date: 08/29/22                    Asthma Triggers  How To Control Things That Make Your Asthma Worse    Triggers are things that make your asthma worse.  Look at the list below to help you find your triggers and   what you can do about them. You can help prevent asthma flare-ups by staying away from your triggers.      Trigger                                                          What you can do   Cigarette Smoke  Tobacco smoke can make asthma worse. Do not allow smoking in your home, car or around you.  Be sure no one smokes at a child s day care or school.  If you smoke, ask your health care provider for ways to help you quit.  Ask family members to quit too.  Ask your health care provider for a referral to Quit Plan to help you quit smoking, or call 6-945-061-PLAN.     Colds, Flu, Bronchitis  These are common triggers of asthma. Wash your hands often.  Don t touch your eyes, nose or mouth.  Get a flu shot every year.     Dust Mites  These are tiny bugs that live in cloth or carpet. They are too small to see. Wash sheets and blankets in hot water every week.   Encase pillows and mattress in dust mite proof covers.  Avoid having carpet if you can. If you have carpet, vacuum weekly.   Use a dust mask and HEPA vacuum.   Pollen and Outdoor Mold  Some people are allergic to trees, grass, or weed pollen, or molds. Try to keep your windows closed.  Limit time out doors when pollen count is high.   Ask you health care provider about taking medicine during allergy season.     Animal Dander  Some people are  allergic to skin flakes, urine or saliva from pets with fur or feathers. Keep pets with fur or feathers out of your home.    If you can t keep the pet outdoors, then keep the pet out of your bedroom.  Keep the bedroom door closed.  Keep pets off cloth furniture and away from stuffed toys.     Mice, Rats, and Cockroaches  Some people are allergic to the waste from these pests.   Cover food and garbage.  Clean up spills and food crumbs.  Store grease in the refrigerator.   Keep food out of the bedroom.   Indoor Mold  This can be a trigger if your home has high moisture. Fix leaking faucets, pipes, or other sources of water.   Clean moldy surfaces.  Dehumidify basement if it is damp and smelly.   Smoke, Strong Odors, and Sprays  These can reduce air quality. Stay away from strong odors and sprays, such as perfume, powder, hair spray, paints, smoke incense, paint, cleaning products, candles and new carpet.   Exercise or Sports  Some people with asthma have this trigger. Be active!  Ask your doctor about taking medicine before sports or exercise to prevent symptoms.    Warm up for 5-10 minutes before and after sports or exercise.     Other Triggers of Asthma  Cold air:  Cover your nose and mouth with a scarf.  Sometimes laughing or crying can be a trigger.  Some medicines and food can trigger asthma.

## 2022-08-29 NOTE — PROGRESS NOTES
"SUBJECTIVE:   Alfreda Baxter is a 77 year old female who presents for Preventive Visit.    76 y/o F here for AFE.       h/o asthma, HTN, paroxysmal atrial fib on  Elliquis, L eye blindness due to central retinal artery occlusion 4+ Y ago, Spinal Stenosis (working with [facility], Dr Sage),  Gout (allopurinol).     She will have R cataract surgery 10/24/22     She is having gum bleeding with the Eliquis now (like she had with Xarelto).  She is now taking 5 mg rather than bid.     The gum bleeding is resolved.      She thinks she had COVID in Spring time.   Did not test though         Patient has been advised of split billing requirements and indicates understanding: Yes  Are you in the first 12 months of your Medicare coverage?  No    Healthy Habits:     In general, how would you rate your overall health?  Excellent    Frequency of exercise:  4-5 days/week    Duration of exercise:  15-30 minutes    Do you usually eat at least 4 servings of fruit and vegetables a day, include whole grains    & fiber and avoid regularly eating high fat or \"junk\" foods?  Yes    Taking medications regularly:  Yes    Medication side effects:  None    Ability to successfully perform activities of daily living:  No assistance needed    Home Safety:  No safety concerns identified    Hearing Impairment:  Difficulty following dialogue in the theater    In the past 6 months, have you been bothered by leaking of urine? Yes    In general, how would you rate your overall mental or emotional health?  Excellent      PHQ-2 Total Score: 0    Additional concerns today:  No    Do you feel safe in your environment? Yes    Have you ever done Advance Care Planning? (For example, a Health Directive, POLST, or a discussion with a medical provider or your loved ones about your wishes): Yes, advance care planning is on file.       Fall risk  Fallen 2 or more times in the past year?: No  Any fall with injury in the past year?: No    Cognitive Screening   1) Repeat 3 " items (Leader, Season, Table)    2) Clock draw: NORMAL  3) 3 item recall: Recalls 3 objects  Results: 3 items recalled: COGNITIVE IMPAIRMENT LESS LIKELY    Mini-CogTM Copyright S Thanh. Licensed by the author for use in [facility]; reprinted with permission (willis@[facility]). All rights reserved.      Do you have sleep apnea, excessive snoring or daytime drowsiness?: no    Reviewed and updated as needed this visit by clinical staff                    Reviewed and updated as needed this visit by Provider                   Social History     Tobacco Use     Smoking status: Former Smoker     Packs/day: 0.10     Years: 1.00     Pack years: 0.10     Types: Cigarettes     Start date: 1963     Quit date: 1965     Years since quittin.6     Smokeless tobacco: Never Used   Substance Use Topics     Alcohol use: Not Currently     Comment: one a month     If you drink alcohol do you typically have >3 drinks per day or >7 drinks per week? No    Alcohol Use 2022   Prescreen: >3 drinks/day or >7 drinks/week? Not Applicable   Prescreen: >3 drinks/day or >7 drinks/week? -           No Concerns     Current providers sharing in care for this patient include:   Patient Care Team:  Brandi Sims MD as PCP - General  Bryan Morrison MD as Assigned Surgical Provider  Brandi Sims MD as Assigned PCP  Luis Enrique Penn DPM as Assigned Musculoskeletal Provider  Ezequiel Florence MD as MD (Ophthalmology)    The following health maintenance items are reviewed in Epic and correct as of today:  Health Maintenance Due   Topic Date Due     DEXA  2021     COVID-19 Vaccine (4 - Booster for Pfizer series) 2022     MEDICARE ANNUAL WELLNESS VISIT  2022     ANNUAL REVIEW OF HM ORDERS  2022     ASTHMA ACTION PLAN  2022     INFLUENZA VACCINE (1) 2022     Labs reviewed in EPIC  BP Readings from Last 3 Encounters:   22 131/78   21 (!) 144/84   21 131/82     Wt Readings from Last 3 Encounters:   08/29/22 88 kg (194 lb)   12/03/21 88 kg (194 lb)   07/30/21 87.1 kg (192 lb)                  Patient Active Problem List   Diagnosis     DJD (degenerative joint disease)     Hypertension goal BP (blood pressure) < 140/90     Hyperlipidemia LDL goal <130     Encounter for general adult medical examination with abnormal findings     Lumbar spinal stenosis     Toe pain     Adenomatous colon polyp     Mild persistent asthma     Status post total bilateral knee replacement     Blepharitis, both eyes     Combined forms of age-related cataract, mild-mod, both eyes     overweight HCC     Primary insomnia     Posterior vitreous detachment of both eyes     Paroxysmal ventricular tachycardia (H)     PFO (patent foramen ovale)     Neovascular glaucoma of left eye, moderate stage     Blind left eye     Paroxysmal A-fib (H)     Long term current use of anticoagulant therapy     Obesity (BMI 35.0-39.9) with comorbidity (H)     Ocular hypertension of right eye, treated     History of central retinal artery occlusion, os     Past Surgical History:   Procedure Laterality Date     ABDOMEN SURGERY  1976, 1978, 1995    2 C-sections,  total hysterectomy     APPENDECTOMY      age 7 yrs.     BREAST BIOPSY, RT/LT  02/17/04    left benign     BREAST SURGERY      see above     COLONOSCOPY  2013    needed q 3 yrs.     COLONOSCOPY N/A 9/10/2020    Procedure: Colonoscopy, With Polypectomy And Biopsy;  Surgeon: Brian Cleaning MD;  Location: [facility] OR     COLONOSCOPY WITH CO2 INSUFFLATION N/A 11/21/2016    Procedure: COLONOSCOPY WITH CO2 INSUFFLATION;  Surgeon: Brian Cleaning MD;  Location: [facility] OR     COLONOSCOPY WITH CO2 INSUFFLATION N/A 9/10/2020    Procedure: COLONOSCOPY, WITH CO2 INSUFFLATION;  Surgeon: Brian Cleaning MD;  Location: [facility] OR     EP COMPREHENSIVE EP STUDY N/A 12/31/2018    Procedure: LOOP RECORDER;  Surgeon: Buck Butterfield MD;  Location:  [facility]      GENITOURINARY SURGERY      see above     HYSTERECTOMY, PAP NO LONGER INDICATED       HYSTERECTOMY, POORNIMA      bleeding fibroids     ORTHOPEDIC SURGERY  2009    meniscus repair     ZZC TOTAL KNEE ARTHROPLASTY Left 2015    at [facility]       Social History     Tobacco Use     Smoking status: Former Smoker     Packs/day: 0.10     Years: 1.00     Pack years: 0.10     Types: Cigarettes     Start date: 1963     Quit date: 1965     Years since quittin.6     Smokeless tobacco: Never Used   Substance Use Topics     Alcohol use: Not Currently     Comment: one a month     Family History   Problem Relation Age of Onset     Respiratory Father      Glaucoma Father      Asthma Father      Allergies Sister      Eye Disorder Sister      Lipids Sister      Neurologic Disorder Sister      Osteoporosis Sister      Glaucoma Sister      Hypertension Sister      Arthritis Mother      Cancer Mother      Hypertension Mother      Other Cancer Mother      Thyroid Disease Mother      Gastrointestinal Disease Son      Macular Degeneration Sister      Hyperlipidemia Sister      Hypertension Sister      Osteoporosis Sister      Glaucoma Other          Current Outpatient Medications   Medication Sig Dispense Refill     acetaminophen (TYLENOL) 500 MG tablet Take 500-1,000 mg by mouth every 6 hours as needed.       albuterol (PROAIR HFA/PROVENTIL HFA/VENTOLIN HFA) 108 (90 Base) MCG/ACT inhaler Inhale 2 puffs into the lungs every 6 hours as needed for shortness of breath / dyspnea or wheezing 18 g 3     apixaban ANTICOAGULANT (ELIQUIS ANTICOAGULANT) 2.5 MG tablet Take 1 tablet (2.5 mg) by mouth 2 times daily 180 tablet 3     atorvastatin (LIPITOR) 20 MG tablet Take 1 tablet (20 mg) by mouth daily 90 tablet 3     B-COMPLEX OR Take  by mouth daily.       CALCIUM 500 +D OR one daily       hypromellose (ARTIFICIAL TEARS) 0.5 % SOLN ophthalmic solution Place 1 drop into both eyes 3 times daily       latanoprost (XALATAN) 0.005 %  ophthalmic solution Place 1 drop into both eyes At Bedtime 7.5 mL 3     lisinopril (ZESTRIL) 10 MG tablet Take 1 tablet (10 mg) by mouth daily 90 tablet 3     multivitamin (OCUVITE) TABS tablet Take 1 tablet by mouth daily       NEW MED Latanoprost eye drops        timolol maleate (TIMOPTIC) 0.5 % ophthalmic solution Place 1 drop Into the left eye every morning 5 mL 11     zolpidem (AMBIEN) 5 MG tablet TAKE ONE TABLET BY MOUTH IN THE EVENING AS NEEDED FOR SLEEP 30 tablet 3     Allergies   Allergen Reactions     Codeine Anaphylaxis     Niacin      No Clinical Screening - See Comments Other (See Comments)     senisitive to non steroidals  Aleve, ibuprofen celebrex cause bad stomach pains  senisitive to non steroidals  Aleve, ibuprofen celebrex cause bad stomach pains     Oxycodone Hives     Other reaction(s): Unknown  ... With facial swelling     Trazodone      nausea     Nsaids Nausea and Vomiting     Other reaction(s): Abdominal Pain     Recent Labs   Lab Test 08/24/22  1057 12/03/21  1538 07/27/21  1004 07/27/21  1004 11/26/20  1141 10/15/20  1108 07/24/20  1013 12/05/16  1041 03/15/16  1027 11/04/15  1135 09/10/15  1353   A1C 5.6  --   --  5.7*  --  5.3 5.9*   < >  --   --   --    LDL 68  --   --  67  --   --  73   < > 68  --   --    HDL 55  --   --  73  --   --  54   < > 55  --   --    TRIG 168*  --   --  195*  --   --  179*   < > 222*  --   --    ALT  --   --   --   --  16  --  22  --  23  --   --    CR 0.78 0.94   < > 0.92 0.70 0.70 0.85   < >  --    < > 0.87   GFRESTIMATED 77 59*   < > 60* 84 84 66   < >  --    < > 64   GFRESTBLACK  --   --   --   --  >90 >90 77   < >  --    < > 77   POTASSIUM 5.0 4.0   < > 4.5 3.8 4.6 4.1   < >  --    < > 3.9   TSH  --   --   --   --  0.40  --   --   --   --   --  0.53    < > = values in this interval not displayed.              Pertinent mammograms are reviewed under the imaging tab.    Review of Systems   Constitutional: Negative for chills and fever.   HENT: Negative for  "congestion, ear pain, hearing loss and sore throat.    Eyes: Negative for pain and visual disturbance.   Respiratory: Negative for cough and shortness of breath.    Cardiovascular: Negative for chest pain, palpitations and peripheral edema.   Gastrointestinal: Negative for abdominal pain, constipation, diarrhea, heartburn, hematochezia and nausea.   Breasts:  Negative for tenderness, breast mass and discharge.   Genitourinary: Negative for dysuria, frequency, genital sores, hematuria, pelvic pain, urgency, vaginal bleeding and vaginal discharge.   Musculoskeletal: Negative for arthralgias, joint swelling and myalgias.   Skin: Negative for rash.   Neurological: Negative for dizziness, weakness, headaches and paresthesias.   Psychiatric/Behavioral: Negative for mood changes. The patient is not nervous/anxious.      Constitutional, HEENT, cardiovascular, pulmonary, gi and gu systems are negative, except as otherwise noted.    OBJECTIVE:   /78 (BP Location: Right arm, Patient Position: Sitting, Cuff Size: Adult Large)   Pulse 84   Temp 98.1  F (36.7  C) (Oral)   Wt 88 kg (194 lb)   SpO2 94%   BMI 36.66 kg/m   Estimated body mass index is 36.66 kg/m  as calculated from the following:    Height as of 7/30/21: 1.549 m (5' 1\").    Weight as of 12/3/21: 88 kg (194 lb).  Physical Exam  GENERAL: healthy, alert and no distress  EYES: Eyes grossly normal to inspection, PERRL and conjunctivae and sclerae normal  EYES: cataract evident right eye   HENT: ear canals and TM's normal, nose and mouth without ulcers or lesions  NECK: no adenopathy, no asymmetry, masses, or scars and thyroid normal to palpation  RESP: lungs clear to auscultation - no rales, rhonchi or wheezes  BREAST: normal without masses, tenderness or nipple discharge and no palpable axillary masses or adenopathy  CV: regular rate and rhythm, normal S1 S2, no S3 or S4, no murmur, click or rub, no peripheral edema and peripheral pulses strong  ABDOMEN: soft, " nontender, no hepatosplenomegaly, no masses and bowel sounds normal  MS: no gross musculoskeletal defects noted, no edema  MS:  Dejenerative joint arthritis changes, antalgic gait.   SKIN: no suspicious lesions or rashes  NEURO: Normal strength and tone, mentation intact and speech normal  PSYCH: mentation appears normal, affect normal/bright    Diagnostic Test Results:  Labs reviewed in Epic    ASSESSMENT / PLAN:   (Z00.01) Encounter for general adult medical examination with abnormal findings  (primary encounter diagnosis)  Comment: doing well..  Will see her for cataract surgery preop in October.   Plan:      (I10) Hypertension goal BP (blood pressure) < 140/90  Comment:  Continue current management   Plan: lisinopril (ZESTRIL) 10 MG tablet             (I48.0) Paroxysmal A-fib (H)  Comment:  The Normaiquis is causing bleeding gums just like the xarelto did. She is taking only one 5 mg daily and this has helped   Plan: will reduce to 2.5 mg tabs, take twice daily   (Z79.01) Long term current use of anticoagulant therapy  Comment: see above  Plan: see above    (Z86.69) History of central retinal artery occlusion, os  Comment: cardopembolic:   Linq monitor found PAF.      Plan:  Ellquis (she can tolerate lower dose)     (H54.40) Blindness of left eye with normal vision in contralateral eye  Comment: has cataract in R eye:  Surgery in a couple months.   Plan:      (J45.30) Mild persistent asthma without complication  Comment:  Continue current management   Plan:  Prn albu    (E66.01) Obesity (BMI 35.0-39.9) with comorbidity (H)  Comment:    Plan: stable.  Continue current management      (M1A.00X1) Gouty arthropathy, chronic, with tophi  Comment:  None since maintained on allopurinol  Plan:      (M48.061) Spinal stenosis of lumbar region without neurogenic claudication  Comment:   Plan:  Continue HEP    (J45.31) Mild  Intermittent asthma without complication.   Comment:    Plan: albuterol (PROAIR HFA/PROVENTIL  "HFA/VENTOLIN         HFA) 108 (90 Base) MCG/ACT inhaler             (E78.5) Hyperlipidemia LDL goal <130  Comment: well controlled   Plan: atorvastatin (LIPITOR) 20 MG tablet             (F51.01) Primary insomnia  Comment:    Plan: zolpidem (AMBIEN) 5 MG tablet         Continue current management     (I48.0) Paroxysmal atrial fibrillation (H)  Comment:  See above comments.    Plan: apixaban ANTICOAGULANT (ELIQUIS ANTICOAGULANT)         2.5 MG tablet             (Z78.0) Asymptomatic postmenopausal status  Comment:  She Does not want to treat Osteoporosis  Plan: DEXA HIP/PELVIS/SPINE - Future           COUNSELING:  Reviewed preventive health counseling, as reflected in patient instructions       Osteoporosis prevention/bone health    Estimated body mass index is 36.66 kg/m  as calculated from the following:    Height as of 7/30/21: 1.549 m (5' 1\").    Weight as of 12/3/21: 88 kg (194 lb).    Weight management plan: diet    She reports that she quit smoking about 57 years ago. Her smoking use included cigarettes. She started smoking about 59 years ago. She has a 0.10 pack-year smoking history. She has never used smokeless tobacco.      Appropriate preventive services were discussed with this patient, including applicable screening as appropriate for cardiovascular disease, diabetes, osteopenia/osteoporosis, and glaucoma.  As appropriate for age/gender, discussed screening for colorectal cancer, prostate cancer, breast cancer, and cervical cancer. Checklist reviewing preventive services available has been given to the patient.    Reviewed patients plan of care and provided an AVS. The Basic Care Plan (routine screening as documented in Health Maintenance) for Alfreda meets the Care Plan requirement. This Care Plan has been established and reviewed with the Patient.    Counseling Resources:  ATP IV Guidelines  Pooled Cohorts Equation Calculator  Breast Cancer Risk Calculator  Breast Cancer: Medication to Reduce Risk  FRAX " Risk Assessment  ICSI Preventive Guidelines  Dietary Guidelines for Americans, 2010  USDA's MyPlate  ASA Prophylaxis  Lung CA Screening    Brandi Sims MD  [facility]    Identified Health Risks:

## 2022-09-12 DIAGNOSIS — H40.52X2 NEOVASCULAR GLAUCOMA OF LEFT EYE, MODERATE STAGE: ICD-10-CM

## 2022-09-12 RX ORDER — TIMOLOL MALEATE 5 MG/ML
1 SOLUTION/ DROPS OPHTHALMIC EVERY MORNING
Qty: 10 ML | Refills: 3 | Status: SHIPPED | OUTPATIENT
Start: 2022-09-12 | End: 2023-08-14

## 2022-09-12 NOTE — TELEPHONE ENCOUNTER
Refill request for Timolol for 10 mL as 5 mL is not available. Will send to Dr. Morrison to sign and send. She had surgery with Dr. Cedillo recently for cataracts as well.

## 2022-10-21 ENCOUNTER — OFFICE VISIT (OUTPATIENT)
Dept: INTERNAL MEDICINE | Facility: CLINIC | Age: 77
End: 2022-10-21
Payer: MEDICARE

## 2022-10-21 ENCOUNTER — ANESTHESIA EVENT (OUTPATIENT)
Dept: SURGERY | Facility: AMBULATORY SURGERY CENTER | Age: 77
End: 2022-10-21
Payer: MEDICARE

## 2022-10-21 VITALS
BODY MASS INDEX: 37.22 KG/M2 | WEIGHT: 197 LBS | TEMPERATURE: 98.3 F | HEART RATE: 80 BPM | DIASTOLIC BLOOD PRESSURE: 79 MMHG | SYSTOLIC BLOOD PRESSURE: 129 MMHG | OXYGEN SATURATION: 96 %

## 2022-10-21 DIAGNOSIS — I48.0 PAROXYSMAL ATRIAL FIBRILLATION (H): ICD-10-CM

## 2022-10-21 DIAGNOSIS — J45.20 MILD INTERMITTENT ASTHMA WITHOUT COMPLICATION: ICD-10-CM

## 2022-10-21 DIAGNOSIS — Z79.01 LONG TERM CURRENT USE OF ANTICOAGULANT THERAPY: ICD-10-CM

## 2022-10-21 DIAGNOSIS — Z01.818 PREOP GENERAL PHYSICAL EXAM: Primary | ICD-10-CM

## 2022-10-21 DIAGNOSIS — H54.40 BLINDNESS OF LEFT EYE WITH NORMAL VISION IN CONTRALATERAL EYE: ICD-10-CM

## 2022-10-21 DIAGNOSIS — I10 HYPERTENSION GOAL BP (BLOOD PRESSURE) < 140/90: ICD-10-CM

## 2022-10-21 DIAGNOSIS — Z86.69 HISTORY OF CENTRAL RETINAL ARTERY OCCLUSION: ICD-10-CM

## 2022-10-21 PROCEDURE — 99213 OFFICE O/P EST LOW 20 MIN: CPT | Performed by: INTERNAL MEDICINE

## 2022-10-21 NOTE — PROGRESS NOTES
[facility]  [address]  [place] [address]  Phone: [phone]  Primary Provider: Brandi Sims  Pre-op Performing Provider: BRANDI SIMS       PREOPERATIVE EVALUATION:  Today's date: 10/21/2022    Alfreda Baxter is a 77 year old female who presents for a preoperative evaluation.    Surgical Information:  Surgery/Procedure: Right eye cataract  Surgery Location: [facility]   Surgeon: Dr. Florence  Surgery Date: 10/24/22  Time of Surgery:   Where patient plans to recover: At home with family  Fax number for surgical facility: Note does not need to be faxed, will be available electronically in Epic.    Type of Anesthesia Anticipated: to be determined    Assessment & Plan     The proposed surgical procedure is considered LOW risk.    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      History of central retinal artery occlusion, os       Long term current use of anticoagulant therapy   Eliquis, reduced dose.       Paroxysmal atrial fibrillation (H)   rate controlled     Hypertension goal BP (blood pressure) < 140/90   at goal   Continue current management     Mild intermittent asthma without complication   no current issues.          Implanted Device:   - : [employer], Model: LINQ.      this is an implantable heart monitor.  it is now inactive.       Risks and Recommendations:  The patient has the following additional risks and recommendations for perioperative complications:   - No identified additional risk factors other than previously addressed    Medication Instructions:  Patient is to take all scheduled medications on the day of surgery   - apixaban (Eliquis), edoxaban (Savaysa), rivaroxaban (Xarelto): CrCl <50 mL/min. Does not need to hold for cataract surgery.        RECOMMENDATION:  APPROVAL GIVEN to proceed with proposed procedure, without further diagnostic  evaluation.    I spent a total of 20 minutes on the day of the visit.   Time spent doing chart review, history and exam, documentation and further activities per the note         Subjective     HPI related to upcoming procedure: *    78 y/o F here for preop.  She will have cataract surgey next week.   H/o asthma, HTN, paroxysmal atrial fib on  Elliquis, L eye blindness due to central retinal artery occlusion 4+ Y ago, Spinal Stenosis (working with [facility], Dr Sage),  Gout (allopurinol).       Preop Questions 10/21/2022   1. Have you ever had a heart attack or stroke? YES -  Ophtlamic.    Left eye blindness due to central retinal occlusion   2. Have you ever had surgery on your heart or blood vessels, such as a stent placement, a coronary artery bypass, or surgery on an artery in your head, neck, heart, or legs? No   3. Do you have chest pain with activity? No   4. Do you have a history of  heart failure? No   5. Do you currently have a cold, bronchitis or symptoms of other infection? No   6. Do you have a cough, shortness of breath, or wheezing? No   7. Do you or anyone in your family have previous history of blood clots? YES -    8. Do you or does anyone in your family have a serious bleeding problem such as prolonged bleeding following surgeries or cuts? No   9. Have you ever had problems with anemia or been told to take iron pills? No   10. Have you had any abnormal blood loss such as black, tarry or bloody stools, or abnormal vaginal bleeding? No   11. Have you ever had a blood transfusion? No   12. Are you willing to have a blood transfusion if it is medically needed before, during, or after your surgery? Yes   13. Have you or any of your relatives ever had problems with anesthesia? No   14. Do you have sleep apnea, excessive snoring or daytime drowsiness? No   15. Do you have any artifical heart valves or other implanted medical devices like a pacemaker, defibrillator, or continuous glucose monitor? YES -  [employer] Linq    No longer functioning.    15a. What type of device do you have? heart rate  moniter implanted non functioning [employer] LINQ   15b. Name of the clinic that manages your device:  [facility]   16. Do you have artificial joints? YES - both knees   17. Are you allergic to latex? No   18. Is there any chance that you may be pregnant? -       Health Care Directive:  Patient has a Health Care Directive on file      Preoperative Review of :   reviewed - controlled substances reflected in medication list.       Status of Chronic Conditions:  A-FIB - Patient has a longstanding history of chronic A-fib currently on rate control. Current treatment regimen includes Apixaban for stroke prevention and denies significant symptoms of lightheadedness, palpitations or dyspnea.     HYPERTENSION - Patient has longstanding history of HTN , currently denies any symptoms referable to elevated blood pressure. Specifically denies chest pain, palpitations, dyspnea, orthopnea, PND or peripheral edema. Blood pressure readings have been in normal range. Current medication regimen is as listed below. Patient denies any side effects of medication.       Review of Systems  CONSTITUTIONAL: NEGATIVE for fever, chills, change in weight  ENT/MOUTH: NEGATIVE for ear, mouth and throat problems  RESP: NEGATIVE for significant cough or SOB  CV: NEGATIVE for chest pain, palpitations or peripheral edema    Patient Active Problem List    Diagnosis Date Noted     History of central retinal artery occlusion, os 12/11/2020     Priority: Medium     Ocular hypertension of right eye, treated 11/27/2019     Priority: Medium     Obesity (BMI 35.0-39.9) with comorbidity (H) 07/11/2019     Priority: Medium     Blind left eye 07/08/2019     Priority: Medium     Due to central retinal occlusion       Paroxysmal A-fib (H) 07/08/2019     Priority: Medium     Per loop recorder, 2018    On Rivaroxaban.   H/o R central retinal artery  occlusion       Long term current use of anticoagulant therapy 07/08/2019     Priority: Medium     Neovascular glaucoma of left eye, moderate stage 05/22/2019     Priority: Medium     Paroxysmal ventricular tachycardia 11/20/2018     Priority: Medium     Added automatically from request for surgery [id number]       PFO (patent foramen ovale) 11/20/2018     Priority: Medium     Added automatically from request for surgery [id number]       Posterior vitreous detachment of both eyes 09/15/2018     Priority: Medium     overweight HCC 06/28/2018     Priority: Medium     Primary insomnia 06/28/2018     Priority: Medium     Ambien.....   Trazodone failed (morning drowsiness).  Benadryl failed        Combined forms of age-related cataract, mild-mod, both eyes 07/27/2017     Priority: Medium     Blepharitis, both eyes 07/26/2016     Priority: Medium     Status post total bilateral knee replacement 01/19/2016     Priority: Medium     Left 11/2015  Right 10/2020       Mild persistent asthma 12/12/2013     Priority: Medium     Adenomatous colon polyp 06/05/2013     Priority: Medium      Colonoscopy due 2025       Toe pain 12/10/2012     Priority: Medium     Treats with prednisone (as if gout) about twice a year (12/10/12, [facility])       Encounter for general adult medical examination with abnormal findings 12/08/2011     Priority: Medium     Advance Care Planning 10/12/2015: Followup facilitation and documentation of choices:  Alfreda Baxter presented for follow-up session regarding ACP at the clinic.  She was accompanied by Vaughn, her , who is the health care agent.  Previous ACP discussions reviewed and questions answered. At this time she has completed a HCD reflecting current values, goals, and choices. Health Care Agent understands responsibility of role and choices.  Documents completed at this visit: HCD. Signed and notarized. Validation form completed and sent with copy of document to be scanned. Confirmed/documented  designated decision maker(s). See permanent comments of demographics in clinical tab. Confirmed current code status reflects current choices. View document(s) and details by clicking on code status.  Added by Megha Allred on 10/12/2015  Advance Care Planning:   ACP Review and Resources Provided:  Reviewed chart for advance care plan.  Alfreda Baxter has no plan or code status on file. Discussed available resources and provided with information. Confirmed code status reflects current choices pending further ACP discussions.  Confirmed/documented designated decision maker(s). See permanent comments section of demographics in clinical tab. Added by Megha Allred on 12/16/2013  Discussed advance care planning with patient; information given to patient to review. 12/8/2011        Lumbar spinal stenosis 12/08/2011     Priority: Medium     Sees Dr Sage       Hyperlipidemia LDL goal <130 12/31/2010     Priority: Medium     DJD (degenerative joint disease) 12/02/2010     Priority: Medium     Hypertension goal BP (blood pressure) < 140/90 12/02/2010     Priority: Medium      Past Medical History:   Diagnosis Date     Acute idiopathic gout of left foot 11/4/2015     Arrhythmia      Asthma     controlled with inhaler     Cataract      Central retinal artery occlusion of left eye 9/15/2018     CRAO (central retinal artery occlusion), left      DJD (degenerative joint disease)     knees     GERD (gastroesophageal reflux disease)      Glaucoma      High cholesterol      HTN (hypertension)      Need for prophylactic hormone replacement therapy (postmenopausal)     off 11/04 after benign breast bx     Neovascular glaucoma of left eye, moderate stage 5/22/2019     PFO (patent foramen ovale)      Past Surgical History:   Procedure Laterality Date     ABDOMEN SURGERY  1976, 1978, 1995    2 C-sections,  total hysterectomy     APPENDECTOMY      age 7 yrs.     BREAST BIOPSY, RT/LT  02/17/04    left benign     BREAST SURGERY       see above     COLONOSCOPY  2013    needed q 3 yrs.     COLONOSCOPY N/A 9/10/2020    Procedure: Colonoscopy, With Polypectomy And Biopsy;  Surgeon: Brian Cleaning MD;  Location: [facility]     COLONOSCOPY WITH CO2 INSUFFLATION N/A 11/21/2016    Procedure: COLONOSCOPY WITH CO2 INSUFFLATION;  Surgeon: Brian Cleaning MD;  Location: [facility]     COLONOSCOPY WITH CO2 INSUFFLATION N/A 9/10/2020    Procedure: COLONOSCOPY, WITH CO2 INSUFFLATION;  Surgeon: Brian Cleaning MD;  Location: [facility]     EP COMPREHENSIVE EP STUDY N/A 12/31/2018    Procedure: LOOP RECORDER;  Surgeon: Buck Butterfield MD;  Location:  [facility]ARDI[facility] CATH LAB     GENITOURINARY SURGERY      see above     HYSTERECTOMY, PAP NO LONGER INDICATED       HYSTERECTOMY, POORNIMA  1997    bleeding fibroids     ORTHOPEDIC SURGERY  2009    meniscus repair     ZZC TOTAL KNEE ARTHROPLASTY Left 11/2015    at [facility]     Current Outpatient Medications   Medication Sig Dispense Refill     acetaminophen (TYLENOL) 500 MG tablet Take 500-1,000 mg by mouth every 6 hours as needed.       albuterol (PROAIR HFA/PROVENTIL HFA/VENTOLIN HFA) 108 (90 Base) MCG/ACT inhaler Inhale 2 puffs into the lungs every 6 hours as needed for shortness of breath / dyspnea or wheezing 18 g 3     apixaban ANTICOAGULANT (ELIQUIS ANTICOAGULANT) 2.5 MG tablet Take 1 tablet (2.5 mg) by mouth 2 times daily 180 tablet 3     atorvastatin (LIPITOR) 20 MG tablet Take 1 tablet (20 mg) by mouth daily 90 tablet 3     B-COMPLEX OR Take  by mouth daily.       CALCIUM 500 +D OR one daily       hypromellose (ARTIFICIAL TEARS) 0.5 % SOLN ophthalmic solution Place 1 drop into both eyes 3 times daily       latanoprost (XALATAN) 0.005 % ophthalmic solution Place 1 drop into both eyes At Bedtime 7.5 mL 3     lisinopril (ZESTRIL) 10 MG tablet Take 1 tablet (10 mg) by mouth daily 90 tablet 3     multivitamin (OCUVITE) TABS tablet Take 1 tablet by mouth daily       NEW MED Latanoprost eye drops         timolol maleate (TIMOPTIC) 0.5 % ophthalmic solution Place 1 drop Into the left eye every morning 10 mL 3     zolpidem (AMBIEN) 5 MG tablet TAKE ONE TABLET BY MOUTH IN THE EVENING AS NEEDED FOR SLEEP 30 tablet 3       Allergies   Allergen Reactions     Codeine Anaphylaxis     Niacin      No Clinical Screening - See Comments Other (See Comments)     senisitive to non steroidals  Aleve, ibuprofen celebrex cause bad stomach pains  senisitive to non steroidals  Aleve, ibuprofen celebrex cause bad stomach pains     Oxycodone Hives     Other reaction(s): Unknown  ... With facial swelling     Trazodone      nausea     Nsaids Nausea and Vomiting     Other reaction(s): Abdominal Pain        Social History     Tobacco Use     Smoking status: Former     Packs/day: 0.10     Years: 1.00     Pack years: 0.10     Types: Cigarettes     Start date: 1963     Quit date: 1965     Years since quittin.8     Smokeless tobacco: Never   Substance Use Topics     Alcohol use: Not Currently     Comment: one a month     Family History   Problem Relation Age of Onset     Respiratory Father      Glaucoma Father      Asthma Father      Allergies Sister      Eye Disorder Sister      Lipids Sister      Neurologic Disorder Sister      Osteoporosis Sister      Glaucoma Sister      Hypertension Sister      Arthritis Mother      Cancer Mother      Hypertension Mother      Other Cancer Mother      Thyroid Disease Mother      Gastrointestinal Disease Son      Macular Degeneration Sister      Hyperlipidemia Sister      Hypertension Sister      Osteoporosis Sister      Glaucoma Other      History   Drug Use No         Objective     /79 (BP Location: Right arm, Patient Position: Sitting, Cuff Size: Adult Large)   Pulse 80   Temp 98.3  F (36.8  C) (Oral)   Wt 89.4 kg (197 lb)   SpO2 96%   BMI 37.22 kg/m      Physical Exam  GENERAL APPEARANCE: healthy, alert and no distress  HENT: ear canals and TM's normal and nose and mouth  without ulcers or lesions  RESP: lungs clear to auscultation - no rales, rhonchi or wheezes  CV: regular rate and rhythm, normal S1 S2, no S3 or S4 and 2/6 systolic murmur, no click or rub   ABDOMEN: soft, nontender, no HSM or masses and bowel sounds normal  MS:  kaylen tka  NEURO: Normal strength and tone, sensory exam grossly normal, mentation intact and speech normal    Recent Labs   Lab Test 08/24/22  1057 12/03/21  1538 07/27/21  1004 07/20/21  1248 11/26/20  1141   HGB 14.5  --  14.8   < > 13.4     --  299   < > 338   INR  --   --   --   --  1.24*    144 138   < > 138   POTASSIUM 5.0 4.0 4.5   < > 3.8   CR 0.78 0.94 0.92   < > 0.70   A1C 5.6  --  5.7*  --   --     < > = values in this interval not displayed.        Diagnostics:  {LABS:  Normal/stable 8/24/22   No EKG required for low risk surgery (cataract, skin procedure, breast biopsy, etc).    Revised Cardiac Risk Index (RCRI):  The patient has the following serious cardiovascular risks for perioperative complications:   - No serious cardiac risks = 0 points     RCRI Interpretation: 0 points: Class I (very low risk - 0.4% complication rate)           Signed Electronically by: Brandi Sims MD  Copy of this evaluation report is provided to requesting physician.

## 2022-10-24 ENCOUNTER — ANESTHESIA (OUTPATIENT)
Dept: SURGERY | Facility: AMBULATORY SURGERY CENTER | Age: 77
End: 2022-10-24
Payer: MEDICARE

## 2022-10-24 ENCOUNTER — HOSPITAL ENCOUNTER (OUTPATIENT)
Facility: AMBULATORY SURGERY CENTER | Age: 77
Discharge: HOME | End: 2022-10-24
Attending: OPHTHALMOLOGY
Payer: MEDICARE

## 2022-10-24 ENCOUNTER — OFFICE VISIT (OUTPATIENT)
Dept: OPHTHALMOLOGY | Facility: CLINIC | Age: 77
End: 2022-10-24

## 2022-10-24 VITALS
OXYGEN SATURATION: 96 % | DIASTOLIC BLOOD PRESSURE: 68 MMHG | RESPIRATION RATE: 16 BRPM | HEIGHT: 62 IN | WEIGHT: 197 LBS | TEMPERATURE: 97.5 F | SYSTOLIC BLOOD PRESSURE: 124 MMHG | BODY MASS INDEX: 36.25 KG/M2 | HEART RATE: 73 BPM

## 2022-10-24 DIAGNOSIS — Z98.890 POSTOPERATIVE EYE STATE: Primary | ICD-10-CM

## 2022-10-24 DIAGNOSIS — H25.813 COMBINED FORMS OF AGE-RELATED CATARACT OF BOTH EYES: Primary | ICD-10-CM

## 2022-10-24 PROCEDURE — 66982 XCAPSL CTRC RMVL CPLX WO ECP: CPT | Mod: RT | Performed by: OPHTHALMOLOGY

## 2022-10-24 PROCEDURE — 66982 XCAPSL CTRC RMVL CPLX WO ECP: CPT | Mod: RT

## 2022-10-24 PROCEDURE — 99024 POSTOP FOLLOW-UP VISIT: CPT | Mod: GC | Performed by: OPHTHALMOLOGY

## 2022-10-24 DEVICE — IMPLANTABLE DEVICE: Type: IMPLANTABLE DEVICE | Site: EYE | Status: FUNCTIONAL

## 2022-10-24 RX ORDER — TETRACAINE HYDROCHLORIDE 5 MG/ML
SOLUTION OPHTHALMIC PRN
Status: DISCONTINUED | OUTPATIENT
Start: 2022-10-24 | End: 2022-10-24 | Stop reason: HOSPADM

## 2022-10-24 RX ORDER — LIDOCAINE 40 MG/G
CREAM TOPICAL
Status: DISCONTINUED | OUTPATIENT
Start: 2022-10-24 | End: 2022-10-24 | Stop reason: HOSPADM

## 2022-10-24 RX ORDER — PROPARACAINE HYDROCHLORIDE 5 MG/ML
1 SOLUTION/ DROPS OPHTHALMIC ONCE
Status: COMPLETED | OUTPATIENT
Start: 2022-10-24 | End: 2022-10-24

## 2022-10-24 RX ORDER — DEXAMETHASONE SODIUM PHOSPHATE 4 MG/ML
INJECTION, SOLUTION INTRA-ARTICULAR; INTRALESIONAL; INTRAMUSCULAR; INTRAVENOUS; SOFT TISSUE PRN
Status: DISCONTINUED | OUTPATIENT
Start: 2022-10-24 | End: 2022-10-24 | Stop reason: HOSPADM

## 2022-10-24 RX ORDER — MEPERIDINE HYDROCHLORIDE 25 MG/ML
12.5 INJECTION INTRAMUSCULAR; INTRAVENOUS; SUBCUTANEOUS
Status: DISCONTINUED | OUTPATIENT
Start: 2022-10-24 | End: 2022-10-25 | Stop reason: HOSPADM

## 2022-10-24 RX ORDER — PREDNISOLONE ACETATE 10 MG/ML
1 SUSPENSION/ DROPS OPHTHALMIC 4 TIMES DAILY
Qty: 10 ML | Refills: 1 | Status: SHIPPED | OUTPATIENT
Start: 2022-10-24 | End: 2022-12-06

## 2022-10-24 RX ORDER — CYCLOPENTOLAT/TROPIC/PHENYLEPH 1%-1%-2.5%
1 DROPS (EA) OPHTHALMIC (EYE)
Status: COMPLETED | OUTPATIENT
Start: 2022-10-24 | End: 2022-10-24

## 2022-10-24 RX ORDER — ONDANSETRON 4 MG/1
4 TABLET, ORALLY DISINTEGRATING ORAL EVERY 30 MIN PRN
Status: DISCONTINUED | OUTPATIENT
Start: 2022-10-24 | End: 2022-10-25 | Stop reason: HOSPADM

## 2022-10-24 RX ORDER — TIMOLOL MALEATE 5 MG/ML
SOLUTION/ DROPS OPHTHALMIC PRN
Status: DISCONTINUED | OUTPATIENT
Start: 2022-10-24 | End: 2022-10-24 | Stop reason: HOSPADM

## 2022-10-24 RX ORDER — MOXIFLOXACIN IN NACL,ISO-OS/PF 0.3MG/0.3
SYRINGE (ML) INTRAOCULAR PRN
Status: DISCONTINUED | OUTPATIENT
Start: 2022-10-24 | End: 2022-10-24 | Stop reason: HOSPADM

## 2022-10-24 RX ORDER — SODIUM CHLORIDE, SODIUM LACTATE, POTASSIUM CHLORIDE, CALCIUM CHLORIDE 600; 310; 30; 20 MG/100ML; MG/100ML; MG/100ML; MG/100ML
INJECTION, SOLUTION INTRAVENOUS CONTINUOUS
Status: DISCONTINUED | OUTPATIENT
Start: 2022-10-24 | End: 2022-10-25 | Stop reason: HOSPADM

## 2022-10-24 RX ORDER — BALANCED SALT SOLUTION 6.4; .75; .48; .3; 3.9; 1.7 MG/ML; MG/ML; MG/ML; MG/ML; MG/ML; MG/ML
SOLUTION OPHTHALMIC PRN
Status: DISCONTINUED | OUTPATIENT
Start: 2022-10-24 | End: 2022-10-24 | Stop reason: HOSPADM

## 2022-10-24 RX ORDER — LIDOCAINE HYDROCHLORIDE 10 MG/ML
INJECTION, SOLUTION EPIDURAL; INFILTRATION; INTRACAUDAL; PERINEURAL PRN
Status: DISCONTINUED | OUTPATIENT
Start: 2022-10-24 | End: 2022-10-24 | Stop reason: HOSPADM

## 2022-10-24 RX ORDER — SODIUM CHLORIDE, SODIUM LACTATE, POTASSIUM CHLORIDE, CALCIUM CHLORIDE 600; 310; 30; 20 MG/100ML; MG/100ML; MG/100ML; MG/100ML
INJECTION, SOLUTION INTRAVENOUS CONTINUOUS
Status: DISCONTINUED | OUTPATIENT
Start: 2022-10-24 | End: 2022-10-24 | Stop reason: HOSPADM

## 2022-10-24 RX ORDER — ONDANSETRON 2 MG/ML
4 INJECTION INTRAMUSCULAR; INTRAVENOUS EVERY 30 MIN PRN
Status: DISCONTINUED | OUTPATIENT
Start: 2022-10-24 | End: 2022-10-25 | Stop reason: HOSPADM

## 2022-10-24 RX ORDER — ACETAMINOPHEN 325 MG/1
975 TABLET ORAL ONCE
Status: COMPLETED | OUTPATIENT
Start: 2022-10-24 | End: 2022-10-24

## 2022-10-24 RX ORDER — FENTANYL CITRATE 50 UG/ML
25 INJECTION, SOLUTION INTRAMUSCULAR; INTRAVENOUS EVERY 5 MIN PRN
Status: DISCONTINUED | OUTPATIENT
Start: 2022-10-24 | End: 2022-10-24 | Stop reason: HOSPADM

## 2022-10-24 RX ORDER — MOXIFLOXACIN 5 MG/ML
1 SOLUTION/ DROPS OPHTHALMIC 3 TIMES DAILY
Qty: 3 ML | Refills: 1 | Status: SHIPPED | OUTPATIENT
Start: 2022-10-24 | End: 2022-12-06

## 2022-10-24 RX ADMIN — Medication 1 DROP: at 06:29

## 2022-10-24 RX ADMIN — SODIUM CHLORIDE, SODIUM LACTATE, POTASSIUM CHLORIDE, CALCIUM CHLORIDE: 600; 310; 30; 20 INJECTION, SOLUTION INTRAVENOUS at 07:14

## 2022-10-24 RX ADMIN — Medication 1 DROP: at 06:35

## 2022-10-24 RX ADMIN — SODIUM CHLORIDE, SODIUM LACTATE, POTASSIUM CHLORIDE, CALCIUM CHLORIDE: 600; 310; 30; 20 INJECTION, SOLUTION INTRAVENOUS at 06:38

## 2022-10-24 RX ADMIN — ACETAMINOPHEN 975 MG: 325 TABLET ORAL at 06:30

## 2022-10-24 RX ADMIN — Medication 1 DROP: at 06:39

## 2022-10-24 RX ADMIN — PROPARACAINE HYDROCHLORIDE 1 DROP: 5 SOLUTION/ DROPS OPHTHALMIC at 06:29

## 2022-10-24 ASSESSMENT — TONOMETRY
OD_IOP_MMHG: 17
IOP_METHOD: TONOPEN

## 2022-10-24 ASSESSMENT — VISUAL ACUITY: METHOD: SNELLEN - LINEAR

## 2022-10-24 ASSESSMENT — EXTERNAL EXAM - RIGHT EYE: OD_EXAM: NORMAL

## 2022-10-24 ASSESSMENT — SLIT LAMP EXAM - LIDS: COMMENTS: PROTECTIVE PTOSIS

## 2022-10-24 NOTE — PROGRESS NOTES
POD#0, status post cataract surgery, RIGHT eye     Impression/Plan:  Pseudophakia, RIGHT: POD0, good post-operative appearance. IOP reasonable.     Eye protection at all times and eye shield at night for 1 week.     Limited activities with no exercise or heavy lifting for 1 week.     Instructed patient to contact us for decreasing vision, eye pain, new floaters or flashes of light or other concerning symptoms.     Written instructions given    Drops:  Ofloxacin QID for 7 days in operative eye  Prednisolone 4/3/2/1 taper every 7 days in operative eye  Continue latanoprost at bedtime ou and timolol QAM left eye as prior to surgery    Follow up scheduled on 11/16/22    All questions were answered    Danyelle Chavez MD  Ophthalmology Resident, PGY-4  [facility]     Attending Physician Attestation:  Complete documentation of historical and exam elements from today's encounter can be found in the full encounter summary report (not reduplicated in this progress note).  I personally obtained the chief complaint(s) and history of present illness.  I confirmed and edited as necessary the review of systems, past medical/surgical history, family history, social history, and examination findings as documented by others; and I examined the patient myself.  I personally reviewed the relevant tests, images, and reports as documented above.  I formulated and edited as necessary the assessment and plan and discussed the findings and management plan with the patient and family. . - Ezequiel Florence MD

## 2022-10-24 NOTE — ANESTHESIA PREPROCEDURE EVALUATION
Anesthesia Pre-Procedure Evaluation    Patient: Alfreda Baxter   MRN: [id number] : 1945        Procedure : Procedure(s):  RIGHT EYE PHACOEMULSIFICATION, CATARACT, WITH STANDARD INTRAOCULAR LENS IMPLANT INSERTION          Past Medical History:   Diagnosis Date     Acute idiopathic gout of left foot 2015     Arrhythmia      Asthma     controlled with inhaler     Cataract      Central retinal artery occlusion of left eye 9/15/2018     CRAO (central retinal artery occlusion), left      DJD (degenerative joint disease)     knees     GERD (gastroesophageal reflux disease)      Glaucoma      High cholesterol      HTN (hypertension)      Need for prophylactic hormone replacement therapy (postmenopausal)     off  after benign breast bx     Neovascular glaucoma of left eye, moderate stage 2019     PFO (patent foramen ovale)       Past Surgical History:   Procedure Laterality Date     ABDOMEN SURGERY  , ,     2 C-sections,  total hysterectomy     APPENDECTOMY      age 7 yrs.     BREAST BIOPSY, RT/LT  04    left benign     BREAST SURGERY      see above     COLONOSCOPY  2013    needed q 3 yrs.     COLONOSCOPY N/A 9/10/2020    Procedure: Colonoscopy, With Polypectomy And Biopsy;  Surgeon: Brian Cleaning MD;  Location: [facility]     COLONOSCOPY WITH CO2 INSUFFLATION N/A 2016    Procedure: COLONOSCOPY WITH CO2 INSUFFLATION;  Surgeon: Brian Cleaning MD;  Location: [facility]     COLONOSCOPY WITH CO2 INSUFFLATION N/A 9/10/2020    Procedure: COLONOSCOPY, WITH CO2 INSUFFLATION;  Surgeon: Brian Cleaning MD;  Location: [facility]     EP COMPREHENSIVE EP STUDY N/A 2018    Procedure: LOOP RECORDER;  Surgeon: Buck Butterfield MD;  Location:  HE[facility] CARDI[facility] CATH LAB     GENITOURINARY SURGERY      see above     HYSTERECTOMY, PAP NO LONGER INDICATED       HYSTERECTOMY, POORNIMA      bleeding fibroids     ORTHOPEDIC SURGERY      meniscus repair     [facility] TOTAL KNEE  ARTHROPLASTY Left 2015    at [facility]      Allergies   Allergen Reactions     Codeine Anaphylaxis     Niacin      No Clinical Screening - See Comments Other (See Comments)     senisitive to non steroidals  Aleve, ibuprofen celebrex cause bad stomach pains  senisitive to non steroidals  Aleve, ibuprofen celebrex cause bad stomach pains     Oxycodone Hives     Other reaction(s): Unknown  ... With facial swelling     Trazodone      nausea     Nsaids Nausea and Vomiting     Other reaction(s): Abdominal Pain      Social History     Tobacco Use     Smoking status: Former     Packs/day: 0.10     Years: 1.00     Pack years: 0.10     Types: Cigarettes     Start date: 1963     Quit date: 1965     Years since quittin.8     Smokeless tobacco: Never   Substance Use Topics     Alcohol use: Not Currently     Comment: one a month      Wt Readings from Last 1 Encounters:   10/21/22 89.4 kg (197 lb)        Anesthesia Evaluation            ROS/MED HX  ENT/Pulmonary:     (+) Intermittent, asthma Treatment: Inhaler prn,      Neurologic:       Cardiovascular:     (+) hypertension-----congenital heart disease  Dysrhythmias: pfo.   METS/Exercise Tolerance:     Hematologic:       Musculoskeletal:       GI/Hepatic:     (+) GERD, Asymptomatic on medication,     Renal/Genitourinary:       Endo:     (+) Obesity,     Psychiatric/Substance Use:       Infectious Disease:       Malignancy:       Other:               OUTSIDE LABS:  CBC:   Lab Results   Component Value Date    WBC 6.4 2022    WBC 11.2 (H) 2021    HGB 14.5 2022    HGB 14.8 2021    HCT 44.3 2022    HCT 46.1 2021     2022     2021     BMP:   Lab Results   Component Value Date     2022     2021    POTASSIUM 5.0 2022    POTASSIUM 4.0 2021    CHLORIDE 106 2022    CHLORIDE 110 (H) 2021    CO2 31 2022    CO2 28 2021    BUN 19 2022    BUN 18  12/03/2021    CR 0.78 08/24/2022    CR 0.94 12/03/2021     (H) 08/24/2022     (H) 12/03/2021     COAGS:   Lab Results   Component Value Date    INR 1.24 (H) 11/26/2020     POC: No results found for: BGM, HCG, HCGS  HEPATIC:   Lab Results   Component Value Date    ALBUMIN 3.6 11/26/2020    PROTTOTAL 8.6 11/26/2020    ALT 16 11/26/2020    AST 9 11/26/2020    ALKPHOS 84 11/26/2020    BILITOTAL 0.5 11/26/2020     OTHER:   Lab Results   Component Value Date    LACT 1.0 11/26/2020    A1C 5.6 08/24/2022    RUDOLPH 9.7 08/24/2022    MAG 1.6 06/30/2014    LIPASE 130 11/26/2020    TSH 0.40 11/26/2020    CRP <2.9 07/27/2021    SED 9 07/27/2021       Anesthesia Plan    ASA Status:  2      Anesthesia Type: MAC.     - Reason for MAC: immobility needed              Consents    Anesthesia Plan(s) and associated risks, benefits, and realistic alternatives discussed. Questions answered and patient/representative(s) expressed understanding.     - Discussed: Risks, Benefits and Alternatives for BOTH SEDATION and the PROCEDURE were discussed     - Discussed with:  Patient      - Extended Intubation/Ventilatory Support Discussed: No.      - Patient is DNR/DNI Status: No    Use of blood products discussed: No .     Postoperative Care    Pain management: IV analgesics, Oral pain medications.        Comments:           H&P reviewed: Unable to attach H&P to encounter due to EHR limitations. H&P Update: appropriate H&P reviewed, patient examined. No interval changes since H&P (within 30 days).         Benjamin Marquez MD, MD

## 2022-10-24 NOTE — OP NOTE
PREOPERATIVE DIAGNOSIS:   1. Combined forms of age-related cataract, mild-mod, both eyes    POSTOPERATIVE DIAGNOSIS: Same   PROCEDURES:   1. Complex cataract extraction with intraocular lens implant Right eye.  SURGEON: Ezequiel Florence M.D.  Assistant: Danyelle Chavez MD   INDICATIONS: The patient Alfreda Baxter presented to the eye clinic with decreased vision secondary to cataract in the Right eye. The risks, benefits and alternatives to cataract extraction were discussed. The patient elected to proceed. All questions were answered to the patient's satisfaction.   DESCRIPTION OF PROCEDURE:   Prior to the procedure, appropriate cardiac and respiratory monitors were applied to the patient.  In the pre-operative holding area, a drop of topical tetracaine followed by lidocaine gel followed by povidone iodine.  The patient was brought to the operating room where a surgical pause was carried out to identify with all members of the surgical team the correct surgical site.  With adequate anesthesia, the Right eye was prepped and draped in the usual sterile fashion. A lid speculum was placed, and the operating microscope was rotated into position. A paracentesis was created.  Through this limbal paracentesis, the anterior chamber was filled with preservative-free lidocaine followed by viscoelastic.  A temporal wound was created at the limbus using a 2.6 mm blade. A capsulorrhexis was initiated using a bent 25-gauge needle and was completed in continuous and circular fashion using the capsulorrhexis forceps. During capsulotomy creation, there was evidence of diffuse zonular laxity and inferior lens tilt.  The lens nucleus was carefullyhydrodissected using balanced salt solution.  The lens nucleus was rotated and removed using phacoemulsification in a stop and chop technique.  Residual cortical material was removed using irrigation-aspiration.  A Morcher type capsular tension ring (14) was inserted.  The capsular bag  was reinflated to its maximal extent with cohesive viscoelastic.  A 24.5 diopter [id number] into the capsular bag.  The lens power selected was reviewed using the intraocular lens power measurements that were obtained preoperatively to confirm that the correct lens was selected for the desired post-operative refractive state. The residual viscoelastic was removed in its entirety, the wound were hydrated and found to be self-sealing.  Intracameral moxifloxacin was administered. Tactile pressure was confirmed to be in a normal range.  Subconjunctival Dexamethasone (2 mg) was injected.. The lid speculum was removed and a patch and shield were applied.  The patient tolerated the procedure well, and there were no complications.    PLAN: The patient will be discharged to home and will follow up tomorrow morning in the eye clinic.  EBL:  None  Complications:  Diffuse zonular laxity  Implant Name Type Inv. Item Serial No.  Lot No. LRB No. Used Action   EYE IMP IOL SUZIE ACYSOF UV SA60WF 24.5D - [id number] Lens/Eye Implant EYE IMP IOL SUZIE ACYSOF UV SA60WF 24.5D [id number] [employer]  Right 1 Implanted   EYE RING MORCHER TENSION PRELOAD CC LT 12.3MM TYPE 14 LEFT - [id number] Lens/Eye Implant EYE RING MORCHER TENSION PRELOAD CC LT 12.3MM TYPE 14 LEFT  skilled nursing [employer] [id number] Right 1 Implanted       Attending Physician Procedure Attestation: I was present for the entire procedure       Ezequiel Florence MD  , Comprehensive Ophthalmology  Department of Ophthalmology and Visual Neurosciences  [facility]

## 2022-10-24 NOTE — ANESTHESIA POSTPROCEDURE EVALUATION
Patient: Alfreda Baxter    Procedure: Procedure(s):  COMPLE RIGHT EYE PHACOEMULSIFICATION, CATARACT, WITH STANDARD INTRAOCULAR LENS IMPLANT INSERTION       Anesthesia Type:  MAC    Note:  Disposition: Outpatient   Postop Pain Control: Uneventful            Sign Out: Well controlled pain   PONV: No   Neuro/Psych: Uneventful            Sign Out: Acceptable/Baseline neuro status   Airway/Respiratory: Uneventful            Sign Out: Acceptable/Baseline resp. status   CV/Hemodynamics: Uneventful            Sign Out: Acceptable CV status; No obvious hypovolemia; No obvious fluid overload   Other NRE: NONE   DID A NON-ROUTINE EVENT OCCUR? No           Last vitals:  Vitals Value Taken Time   /68 10/24/22 0800   Temp 36.4  C (97.5  F) 10/24/22 0800   Pulse 73 10/24/22 0800   Resp 16 10/24/22 0800   SpO2 96 % 10/24/22 0800       Electronically Signed By: Benjamin Marquez MD, MD  October 24, 2022  10:05 AM

## 2022-10-24 NOTE — DISCHARGE INSTRUCTIONS
"[facility]  Home Care Following Anesthesia  For 24 hours after surgery:  Get plenty of rest.  A responsible adult must stay with you for at least 24 hours after you leave the surgery center.  Do not drive or use heavy equipment.  If you have weakness or tingling, don't drive or use heavy equipment until this feeling goes away.   Do not drink alcohol.   Avoid strenuous or risky activities.  Ask for help when climbing stairs.  You may feel lightheaded.  IF so, sit for a few minutes before standing.  Have someone help you get up.   If you have nausea (feel sick to your stomach): Drink only clear liquids such as apple juice, ginger ale, broth or 7-Up.  Rest may also help.  Be sure to drink enough fluids.  Move to a regular diet as you feel able.   You may have a slight fever.  Call the doctor if your fever is over 100 F (37.7 C) (taken under the tongue) or lasts longer than 24 hours.  You may have a dry mouth, a sore throat, muscle aches or trouble sleeping. These should go away after 24 hours.  Do not make important or legal decisions.   It is recommended to avoid smoking.        Today you received a Marcaine or bupivacaine block to numb the nerves near your surgery site.  This is a block using local anesthetic or \"numbing\" medication injected around the nerves to anesthetize or \"numb\" the area supplied by those nerves.  This block is injected into the muscle layer near your surgical site.  The medication may numb the location where you had surgery for 6-18 hours, but may last up to 24 hours.  If your surgical site is an arm or leg you should be careful with your affected limb, since it is possible to injure your limb without being aware of it due to the numbing.  Until full feeling returns, you should guard against bumping or hitting your limb, and avoid extreme hot or cold temperatures on the skin.  As the block wears off, the feeling will return as a tingling or prickly " sensation near your surgical site.  You will experience more discomfort from your incision as the feeling returns.  You may want to take a pain pill (a narcotic or Tylenol if this was prescribed by your surgeon) when you start to experience mild pain before the pain beccomes more severe.  If your pain medications do not control your pain you should notifiy your surgeon.    Tips for taking pain medications  To get the best pain relief possible, remember these points:  Take pain medications as directed, before pain becomes severe.  Pain medication can upset your stomach: taking it with food may help.  Constipation is a common side effect of pain medication. Drink plenty of  fluids.  Eat foods high in fiber. Take a stool softener if recommended by your doctor or pharmacist.  Do not drink alcohol, drive or operate machinery while taking pain medications.  Ask about other ways to control pain, such as with heat, ice or relaxation.    Tylenol/Acetaminophen Consumption  To help encourage the safe use of acetaminophen, the makers of TYLENOL  have lowered the maximum daily dose for single-ingredient Extra Strength TYLENOL  (acetaminophen) products sold in the [place] from 8 pills per day (4,000 mg) to 6 pills per day (3,000 mg). The dosing interval has also changed from 2 pills every 4-6 hours to 2 pills every 6 hours.  If you feel your pain relief is insufficient, you may take Tylenol/Acetaminophen in addition to your narcotic pain medication.   Be careful not to exceed 3,000 mg of Tylenol/Acetaminophen in a 24 hour period from all sources.  If you are taking extra strength Tylenol/acetaminophen (500 mg), the maximum dose is 6 tablets in 24 hours.  If you are taking regular strength acetaminophen (325 mg), the maximum dose is 9 tablets in 24 hours.    Call a doctor for any of the following:  Signs of infection (fever, growing tenderness at the surgery site, a large amount of drainage or bleeding, severe pain, foul-smelling  drainage, redness, swelling).  It has been over 8 to 10 hours since surgery and you are still not able to urinate (pass water).  Headache for over 24 hours.  Signs of Covid-19 infection (temperature over 100 degrees, shortness of breath, cough, loss of taste/smell, generalized body aches, persistent headache, chills, sore throat, nausea/vomiting/diarrhea)  Your doctor is:       Dr. Ezequiel Florence, Ophthalmology: [phone]               Or dial [phone] and ask for the resident on call for:  Ophthalmology  For emergency care, call the:  [facility]:  [phone] (TTY for hearing impaired: [phone])

## 2022-10-24 NOTE — ANESTHESIA CARE TRANSFER NOTE
Patient: Alfreda Baxter    Procedure: Procedure(s):  COMPLE RIGHT EYE PHACOEMULSIFICATION, CATARACT, WITH STANDARD INTRAOCULAR LENS IMPLANT INSERTION       Diagnosis: Combined forms of age-related cataract of both eyes [H25.813]  Diagnosis Additional Information: No value filed.    Anesthesia Type:   MAC     Note:    Oropharynx: oropharynx clear of all foreign objects  Level of Consciousness: awake  Oxygen Supplementation: room air    Independent Airway: airway patency satisfactory and stable  Dentition: dentition unchanged  Vital Signs Stable: post-procedure vital signs reviewed and stable  Report to RN Given: handoff report given  Patient transferred to: Phase II    Handoff Report: Identifed the Patient, Identified the Reponsible Provider, Reviewed the pertinent medical history, Discussed the surgical course, Reviewed Intra-OP anesthesia mangement and issues during anesthesia, Set expectations for post-procedure period and Allowed opportunity for questions and acknowledgement of understanding      Vitals:  Vitals Value Taken Time   BP     Temp     Pulse     Resp     SpO2         Electronically Signed By: SVEN Saha CRNA  October 24, 2022  7:42 AM

## 2022-11-14 ENCOUNTER — APPOINTMENT (OUTPATIENT)
Dept: CT IMAGING | Facility: CLINIC | Age: 77
DRG: 070 | End: 2022-11-14
Attending: EMERGENCY MEDICINE
Payer: MEDICARE

## 2022-11-14 ENCOUNTER — APPOINTMENT (OUTPATIENT)
Dept: MRI IMAGING | Facility: CLINIC | Age: 77
DRG: 070 | End: 2022-11-14
Attending: EMERGENCY MEDICINE
Payer: MEDICARE

## 2022-11-14 ENCOUNTER — HOSPITAL ENCOUNTER (INPATIENT)
Facility: CLINIC | Age: 77
LOS: 1 days | Discharge: HOME | DRG: 070 | End: 2022-11-15
Attending: EMERGENCY MEDICINE | Admitting: INTERNAL MEDICINE
Payer: MEDICARE

## 2022-11-14 DIAGNOSIS — G45.4 TRANSIENT GLOBAL AMNESIA: ICD-10-CM

## 2022-11-14 DIAGNOSIS — I48.0 PAROXYSMAL ATRIAL FIBRILLATION (H): ICD-10-CM

## 2022-11-14 DIAGNOSIS — I63.40 CEREBROVASCULAR ACCIDENT (CVA) DUE TO EMBOLISM OF CEREBRAL ARTERY (H): Primary | ICD-10-CM

## 2022-11-14 DIAGNOSIS — I63.9 CEREBROVASCULAR ACCIDENT (CVA), UNSPECIFIED MECHANISM (H): ICD-10-CM

## 2022-11-14 LAB
ALBUMIN SERPL BCG-MCNC: 4.2 G/DL (ref 3.5–5.2)
ALP SERPL-CCNC: 71 U/L (ref 35–104)
ALT SERPL W P-5'-P-CCNC: 13 U/L (ref 10–35)
ANION GAP SERPL CALCULATED.3IONS-SCNC: 14 MMOL/L (ref 7–15)
APTT PPP: 32 SECONDS (ref 22–38)
AST SERPL W P-5'-P-CCNC: 24 U/L (ref 10–35)
BASOPHILS # BLD AUTO: 0 10E3/UL (ref 0–0.2)
BASOPHILS NFR BLD AUTO: 0 %
BILIRUB SERPL-MCNC: 0.5 MG/DL
BUN SERPL-MCNC: 22.3 MG/DL (ref 8–23)
CALCIUM SERPL-MCNC: 9.1 MG/DL (ref 8.8–10.2)
CHLORIDE SERPL-SCNC: 103 MMOL/L (ref 98–107)
CHOLEST SERPL-MCNC: 157 MG/DL
CREAT SERPL-MCNC: 0.78 MG/DL (ref 0.51–0.95)
DEPRECATED HCO3 PLAS-SCNC: 23 MMOL/L (ref 22–29)
EOSINOPHIL # BLD AUTO: 0.1 10E3/UL (ref 0–0.7)
EOSINOPHIL NFR BLD AUTO: 1 %
ERYTHROCYTE [DISTWIDTH] IN BLOOD BY AUTOMATED COUNT: 12.9 % (ref 10–15)
GFR SERPL CREATININE-BSD FRML MDRD: 77 ML/MIN/1.73M2
GLUCOSE BLDC GLUCOMTR-MCNC: 110 MG/DL (ref 70–99)
GLUCOSE SERPL-MCNC: 99 MG/DL (ref 70–99)
HBA1C MFR BLD: 5.8 %
HCT VFR BLD AUTO: 43.8 % (ref 35–47)
HDLC SERPL-MCNC: 55 MG/DL
HGB BLD-MCNC: 14.2 G/DL (ref 11.7–15.7)
HOLD SPECIMEN: NORMAL
IMM GRANULOCYTES # BLD: 0 10E3/UL
IMM GRANULOCYTES NFR BLD: 0 %
INR PPP: 1.14 (ref 0.85–1.15)
LDLC SERPL CALC-MCNC: 74 MG/DL
LYMPHOCYTES # BLD AUTO: 2.3 10E3/UL (ref 0.8–5.3)
LYMPHOCYTES NFR BLD AUTO: 20 %
MCH RBC QN AUTO: 29 PG (ref 26.5–33)
MCHC RBC AUTO-ENTMCNC: 32.4 G/DL (ref 31.5–36.5)
MCV RBC AUTO: 89 FL (ref 78–100)
MONOCYTES # BLD AUTO: 0.6 10E3/UL (ref 0–1.3)
MONOCYTES NFR BLD AUTO: 6 %
NEUTROPHILS # BLD AUTO: 7.9 10E3/UL (ref 1.6–8.3)
NEUTROPHILS NFR BLD AUTO: 73 %
NONHDLC SERPL-MCNC: 102 MG/DL
NRBC # BLD AUTO: 0 10E3/UL
NRBC BLD AUTO-RTO: 0 /100
PLATELET # BLD AUTO: 226 10E3/UL (ref 150–450)
POTASSIUM SERPL-SCNC: 4.1 MMOL/L (ref 3.4–5.3)
PROT SERPL-MCNC: 7.4 G/DL (ref 6.4–8.3)
RADIOLOGIST FLAGS: ABNORMAL
RBC # BLD AUTO: 4.9 10E6/UL (ref 3.8–5.2)
SARS-COV-2 RNA RESP QL NAA+PROBE: NEGATIVE
SODIUM SERPL-SCNC: 140 MMOL/L (ref 136–145)
TRIGL SERPL-MCNC: 142 MG/DL
TROPONIN T SERPL HS-MCNC: 11 NG/L
WBC # BLD AUTO: 11 10E3/UL (ref 4–11)

## 2022-11-14 PROCEDURE — 250N000013 HC RX MED GY IP 250 OP 250 PS 637

## 2022-11-14 PROCEDURE — 250N000011 HC RX IP 250 OP 636: Performed by: EMERGENCY MEDICINE

## 2022-11-14 PROCEDURE — 99291 CRITICAL CARE FIRST HOUR: CPT | Mod: GC | Performed by: STUDENT IN AN ORGANIZED HEALTH CARE EDUCATION/TRAINING PROGRAM

## 2022-11-14 PROCEDURE — G1010 CDSM STANSON: HCPCS

## 2022-11-14 PROCEDURE — 85004 AUTOMATED DIFF WBC COUNT: CPT | Performed by: EMERGENCY MEDICINE

## 2022-11-14 PROCEDURE — 36415 COLL VENOUS BLD VENIPUNCTURE: CPT | Performed by: EMERGENCY MEDICINE

## 2022-11-14 PROCEDURE — 99285 EMERGENCY DEPT VISIT HI MDM: CPT | Mod: 25 | Performed by: EMERGENCY MEDICINE

## 2022-11-14 PROCEDURE — 85730 THROMBOPLASTIN TIME PARTIAL: CPT | Performed by: EMERGENCY MEDICINE

## 2022-11-14 PROCEDURE — 70498 CT ANGIOGRAPHY NECK: CPT | Mod: 26 | Performed by: RADIOLOGY

## 2022-11-14 PROCEDURE — 83036 HEMOGLOBIN GLYCOSYLATED A1C: CPT | Performed by: STUDENT IN AN ORGANIZED HEALTH CARE EDUCATION/TRAINING PROGRAM

## 2022-11-14 PROCEDURE — 70551 MRI BRAIN STEM W/O DYE: CPT | Mod: 26 | Performed by: RADIOLOGY

## 2022-11-14 PROCEDURE — 99291 CRITICAL CARE FIRST HOUR: CPT | Mod: 25 | Performed by: EMERGENCY MEDICINE

## 2022-11-14 PROCEDURE — G1010 CDSM STANSON: HCPCS | Mod: GC | Performed by: RADIOLOGY

## 2022-11-14 PROCEDURE — 85610 PROTHROMBIN TIME: CPT | Performed by: EMERGENCY MEDICINE

## 2022-11-14 PROCEDURE — 84155 ASSAY OF PROTEIN SERUM: CPT | Performed by: EMERGENCY MEDICINE

## 2022-11-14 PROCEDURE — 85610 PROTHROMBIN TIME: CPT

## 2022-11-14 PROCEDURE — 99223 1ST HOSP IP/OBS HIGH 75: CPT | Mod: AI | Performed by: INTERNAL MEDICINE

## 2022-11-14 PROCEDURE — 250N000013 HC RX MED GY IP 250 OP 250 PS 637: Performed by: STUDENT IN AN ORGANIZED HEALTH CARE EDUCATION/TRAINING PROGRAM

## 2022-11-14 PROCEDURE — 120N000002 HC R&B MED SURG/OB UMMC

## 2022-11-14 PROCEDURE — 82565 ASSAY OF CREATININE: CPT

## 2022-11-14 PROCEDURE — 93010 ELECTROCARDIOGRAM REPORT: CPT | Performed by: EMERGENCY MEDICINE

## 2022-11-14 PROCEDURE — 70496 CT ANGIOGRAPHY HEAD: CPT | Mod: 26 | Performed by: RADIOLOGY

## 2022-11-14 PROCEDURE — U0005 INFEC AGEN DETEC AMPLI PROBE: HCPCS | Performed by: STUDENT IN AN ORGANIZED HEALTH CARE EDUCATION/TRAINING PROGRAM

## 2022-11-14 PROCEDURE — 84484 ASSAY OF TROPONIN QUANT: CPT | Performed by: EMERGENCY MEDICINE

## 2022-11-14 PROCEDURE — 80061 LIPID PANEL: CPT | Performed by: STUDENT IN AN ORGANIZED HEALTH CARE EDUCATION/TRAINING PROGRAM

## 2022-11-14 RX ORDER — CARBOXYMETHYLCELLULOSE SODIUM 5 MG/ML
1 SOLUTION/ DROPS OPHTHALMIC 3 TIMES DAILY
Status: DISCONTINUED | OUTPATIENT
Start: 2022-11-14 | End: 2022-11-15 | Stop reason: HOSPADM

## 2022-11-14 RX ORDER — IOPAMIDOL 755 MG/ML
75 INJECTION, SOLUTION INTRAVASCULAR ONCE
Status: COMPLETED | OUTPATIENT
Start: 2022-11-14 | End: 2022-11-14

## 2022-11-14 RX ORDER — TIMOLOL MALEATE 5 MG/ML
1 SOLUTION/ DROPS OPHTHALMIC EVERY MORNING
Status: DISCONTINUED | OUTPATIENT
Start: 2022-11-15 | End: 2022-11-15 | Stop reason: HOSPADM

## 2022-11-14 RX ORDER — POLYETHYLENE GLYCOL 3350 17 G/17G
17 POWDER, FOR SOLUTION ORAL DAILY PRN
Status: DISCONTINUED | OUTPATIENT
Start: 2022-11-14 | End: 2022-11-15 | Stop reason: HOSPADM

## 2022-11-14 RX ORDER — AMOXICILLIN 250 MG
2 CAPSULE ORAL 2 TIMES DAILY PRN
Status: DISCONTINUED | OUTPATIENT
Start: 2022-11-14 | End: 2022-11-15 | Stop reason: HOSPADM

## 2022-11-14 RX ORDER — LISINOPRIL 5 MG/1
10 TABLET ORAL DAILY
Status: DISCONTINUED | OUTPATIENT
Start: 2022-11-14 | End: 2022-11-14

## 2022-11-14 RX ORDER — LATANOPROST 50 UG/ML
1 SOLUTION/ DROPS OPHTHALMIC AT BEDTIME
Status: DISCONTINUED | OUTPATIENT
Start: 2022-11-14 | End: 2022-11-15 | Stop reason: HOSPADM

## 2022-11-14 RX ORDER — ACETAMINOPHEN 325 MG/1
650 TABLET ORAL EVERY 6 HOURS PRN
Status: DISCONTINUED | OUTPATIENT
Start: 2022-11-14 | End: 2022-11-15 | Stop reason: HOSPADM

## 2022-11-14 RX ORDER — AMOXICILLIN 250 MG
1 CAPSULE ORAL 2 TIMES DAILY PRN
Status: DISCONTINUED | OUTPATIENT
Start: 2022-11-14 | End: 2022-11-15 | Stop reason: HOSPADM

## 2022-11-14 RX ORDER — LORAZEPAM 2 MG/ML
1 INJECTION INTRAMUSCULAR ONCE
Status: COMPLETED | OUTPATIENT
Start: 2022-11-14 | End: 2022-11-14

## 2022-11-14 RX ORDER — ALBUTEROL SULFATE 90 UG/1
2 AEROSOL, METERED RESPIRATORY (INHALATION) EVERY 6 HOURS PRN
Status: DISCONTINUED | OUTPATIENT
Start: 2022-11-14 | End: 2022-11-15 | Stop reason: HOSPADM

## 2022-11-14 RX ORDER — ATORVASTATIN CALCIUM 20 MG/1
20 TABLET, FILM COATED ORAL DAILY
Status: DISCONTINUED | OUTPATIENT
Start: 2022-11-15 | End: 2022-11-15

## 2022-11-14 RX ADMIN — Medication 1 DROP: at 20:43

## 2022-11-14 RX ADMIN — LORAZEPAM 1 MG: 2 INJECTION INTRAMUSCULAR; INTRAVENOUS at 17:28

## 2022-11-14 RX ADMIN — APIXABAN 5 MG: 5 TABLET, FILM COATED ORAL at 23:05

## 2022-11-14 RX ADMIN — IOPAMIDOL 75 ML: 755 INJECTION, SOLUTION INTRAVENOUS at 15:08

## 2022-11-14 RX ADMIN — LATANOPROST 1 DROP: 50 SOLUTION OPHTHALMIC at 22:11

## 2022-11-14 ASSESSMENT — VISUAL ACUITY
OU: BASELINE;GLASSES;OTHER (SEE COMMENT)

## 2022-11-14 ASSESSMENT — ACTIVITIES OF DAILY LIVING (ADL)
ADLS_ACUITY_SCORE: 35

## 2022-11-14 NOTE — CONSULTS
"      [facility]    Stroke Consult Note    Reason for Consult: Stroke Code     Chief Complaint: Stroke Symptoms      HPI  Alfreda Baxter is a 77 year old female that was brought in by her son due to confusion and concern for stroke. Her son called her today around 12:30pm-1:30pm and noticed she was confused and not herself. Son stated he first noticed it because she forgot why a box was located in her home, and after the son reminded her about it, she went back to normal for a bit within the conversation, but then repeated \"why is this box here?\" The last time her son physically saw her before this was at 4pm the day before, after [place]. Otherwise, she has been at home with her  until the son called today. In the ED, she is repeating questions such as \"Where am I?\", \"How did we get here?\", \"Why am I here?\", \"Why did you bring me here?\", \"Did daddy call you and tell you to bring me here?\", \"Did I have a stroke?\", \"I don't have vision in my left eye.\" If asked about her vision, she believes she lost it today due to having a stroke; she does not recall losing her vision around 10 years prior due to a retinal artery occlusion. The son stated that other than her confusion, he has not seen facial drooping or changes in ability to move extremities. Son denies her experiencing any recent trauma.     Her past medical history includes atrial fib on Eliquis, hypertension, and retinal artery occlusion. Her chart reports that she is on Eliquis but she is unable to comment on whether or not she took this medication this morning. 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 The son did state she took 10 baby aspirins at 1:30pm today.    Regarding her hypertension, she was diagnosed in her 40's. [facility] [place] due to not feeling well, related to her HTN.     Regarding her past stroke (retinal " artery occlusion), it occurred within the past 10 years. When it occurred, her left eye went dark, she was alert and sat down to check if anything else was wrong physically before being driven to a hospital then redirected to her eye doctor. Her son said she was told she had experienced retinal artery occlusion and lost complete vision of the left eye since.     Last known well reported to be approximately 1600 on 11/13.  BP upon arrival to the /89.  Blood glucose in the ED 99.      Imaging Findings  CTH w/o contrast - negative for hemorrhage or other acute intracranial pathology.  CTA H&N - no LVO. Some intracranial atherosclerosis apparent. Non-critical R ICA stenosis.     Intravenous Thrombolysis  Not given due to:   - stroke mimic: likely transient global amnesia  - unclear or unfavorable risk-benefit profile for extended window thrombolysis beyond the conventional 4.5 hour time window    Endovascular Treatment  Not initiated due to absence of proximal vessel occlusion    Impression   #New onset confusion  #Suspect transient global amnesia (TGA)  Ms. Baxter is a 77-year-old female with a history of atrial fibrillation on Eliquis, hyperlipidemia, hypertension, and left CRAO who presents to the ED accompanied by her son for evaluation of new onset confusion.  On exam, she is noted to have repetitive questioning as well as loss of vision in her left eye (known deficit from prior left CRAO) but her exam is otherwise nonfocal.  CT head was negative for acute hemorrhage or other acute intracranial pathology and CTA of the head and neck was unrevealing of any large vessel occlusion.  Given her presentation outside the conventional time window, she was not deemed to be a candidate for thrombolytics.  Furthermore, her presentation seem to be much more consistent with common stroke mimic transient global amnesia.  Her presentation could also be consistent with a less common entity, transient epileptic amnesia, for  which we will also evaluate today.  More than likely, suspect this is TGA and the patient will likely clear within 12 to 24 hours.  If she does return to her baseline and her MRI returns reassuring, she would be safe to discharge from a neurologic standpoint.    Recommendations  - MRI brain w/wo contrast to rule out stroke and alternative pathology (e.g., PRES)  - Routine EEG to rule out transient epileptic amnesia  - If MRI returns reassuring and patient returns to baseline, OK to discharge from a neurology standpoint  - In keeping with radiology's recommendations, agree with referral to neuro IR for further imaging of prominent anterior communicating artery    Patient Follow-up     - final recommendation pending work-up    Thank you for this consult. Please feel free to reach out with any questions or concerns.     The patient was seen and discussed with the attending, Dr. Boles.    Zurdo Nicholas MD  Neurology Resident, PGY-3  Ascom: *[phone]  ______________________________________________________    Clinically Significant Risk Factors Present on Admission               # Drug Induced Coagulation Defect: home medication list includes an anticoagulant medication           Past Medical History   Past Medical History:   Diagnosis Date     Acute idiopathic gout of left foot 11/4/2015     Arrhythmia      Asthma     controlled with inhaler     Cataract      Central retinal artery occlusion of left eye 9/15/2018     CRAO (central retinal artery occlusion), left      DJD (degenerative joint disease)     knees     GERD (gastroesophageal reflux disease)      Glaucoma      High cholesterol      HTN (hypertension)      Need for prophylactic hormone replacement therapy (postmenopausal)     off 11/04 after benign breast bx     Neovascular glaucoma of left eye, 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     BREAST BIOPSY, RT/LT  02/17/04    left benign     BREAST SURGERY      see above     COLONOSCOPY  2013    needed q 3 yrs.     COLONOSCOPY N/A 9/10/2020    Procedure: Colonoscopy, With Polypectomy And Biopsy;  Surgeon: Brian Cleaning MD;  Location: [facility]     COLONOSCOPY WITH CO2 INSUFFLATION N/A 11/21/2016    Procedure: COLONOSCOPY WITH CO2 INSUFFLATION;  Surgeon: Brian Cleaning MD;  Location: [facility]     COLONOSCOPY WITH CO2 INSUFFLATION N/A 9/10/2020    Procedure: COLONOSCOPY, WITH CO2 INSUFFLATION;  Surgeon: Brian Cleaning MD;  Location: [facility] OR     EP COMPREHENSIVE EP STUDY N/A 12/31/2018    Procedure: LOOP RECORDER;  Surgeon: Buck Butterfield MD;  Location:  [facility]     GENITOURINARY SURGERY      see above     HYSTERECTOMY, PAP NO LONGER INDICATED       HYSTERECTOMY, POORNIMA  1997    bleeding fibroids     ORTHOPEDIC SURGERY  2009    meniscus repair     PHACOEMULSIFICATION WITH STANDARD INTRAOCULAR LENS IMPLANT Right 10/24/2022    Procedure: COMPLE RIGHT EYE PHACOEMULSIFICATION, CATARACT, WITH STANDARD INTRAOCULAR LENS IMPLANT INSERTION;  Surgeon: Ezequiel Florence MD;  Location: [facility] TOTAL KNEE ARTHROPLASTY Left 11/2015    at [facility]     Medications   Home Meds  Prior to Admission medications    Medication Sig Start Date End Date Taking? Authorizing Provider   acetaminophen (TYLENOL) 500 MG tablet Take 500-1,000 mg by mouth every 6 hours as needed.    Reported, Patient   albuterol (PROAIR HFA/PROVENTIL HFA/VENTOLIN HFA) 108 (90 Base) MCG/ACT inhaler Inhale 2 puffs into the lungs every 6 hours as needed for shortness of breath / dyspnea or wheezing 8/29/22   Brandi Sims MD   apixaban ANTICOAGULANT (ELIQUIS ANTICOAGULANT) 2.5 MG tablet Take 1 tablet (2.5 mg) by mouth 2 times daily 8/29/22   Brandi Sims MD   atorvastatin (LIPITOR) 20 MG tablet Take 1 tablet  (20 mg) by mouth daily 8/29/22   Brandi Sims MD   B-COMPLEX OR Take  by mouth daily.    Reported, Patient   CALCIUM 500 +D OR one daily    Reported, Patient   hypromellose (ARTIFICIAL TEARS) 0.5 % SOLN ophthalmic solution Place 1 drop into both eyes 3 times daily    Reported, Patient   latanoprost (XALATAN) 0.005 % ophthalmic solution Place 1 drop into both eyes At Bedtime 12/16/21   Bryan Morrison MD   lisinopril (ZESTRIL) 10 MG tablet Take 1 tablet (10 mg) by mouth daily 8/29/22   Brandi Sims MD   moxifloxacin (VIGAMOX) 0.5 % ophthalmic solution Apply 1 drop to eye 3 times daily To operative eye 10/24/22   Ezequiel Florence MD   multivitamin (OCUVITE) TABS tablet Take 1 tablet by mouth daily    Reported, Patient   NEW MED Latanoprost eye drops     Reported, Patient   prednisoLONE acetate (PRED FORTE) 1 % ophthalmic suspension Apply 1 drop to eye 4 times daily To operative eye 10/24/22   Ezequiel Florence MD   timolol maleate (TIMOPTIC) 0.5 % ophthalmic solution Place 1 drop Into the left eye every morning 9/12/22   Bryan Morrison MD   zolpidem (AMBIEN) 5 MG tablet TAKE ONE TABLET BY MOUTH IN THE EVENING AS NEEDED FOR SLEEP 8/29/22   Brandi Sims MD       Scheduled Meds    LORazepam  1 mg Intravenous Once       Infusion Meds      PRN Meds      Allergies   Allergies   Allergen Reactions     Codeine Anaphylaxis     Niacin      No Clinical Screening - See Comments Other (See Comments)     senisitive to non steroidals  Aleve, ibuprofen celebrex cause bad stomach pains  senisitive to non steroidals  Aleve, ibuprofen celebrex cause bad stomach pains     Oxycodone Hives     Other reaction(s): Unknown  ... With facial swelling     Trazodone      nausea     Nsaids Nausea and Vomiting     Other reaction(s): Abdominal Pain     Family History   Family History   Problem Relation Age of Onset     Respiratory Father      Glaucoma Father      Asthma Father      Allergies Sister      Eye  Disorder Sister      Lipids Sister      Neurologic Disorder Sister      Osteoporosis Sister      Glaucoma Sister      Hypertension Sister      Arthritis Mother      Cancer Mother      Hypertension Mother      Other Cancer Mother      Thyroid Disease Mother      Gastrointestinal Disease Son      Macular Degeneration Sister      Hyperlipidemia Sister      Hypertension Sister      Osteoporosis Sister      Glaucoma Other      Social History   Social History     Tobacco Use     Smoking status: Former     Packs/day: 0.10     Years: 1.00     Pack years: 0.10     Types: Cigarettes     Start date: 1963     Quit date: 1965     Years since quittin.9     Smokeless tobacco: Never   Substance Use Topics     Alcohol use: Not Currently     Comment: one a month     Drug use: No       Review of Systems   The 10 point Review of Systems is negative other than noted in the HPI.       PHYSICAL EXAMINATION  Pulse:  [] 93  Resp:  [14-20] 18  BP: (144-167)/(73-89) 144/87  SpO2:  [98 %] 98 %     General Exam  General:  Elderly woman lying in bed, appears anxious and intermittently tearful    HEENT:  normocephalic/atraumatic  Pulmonary:  no respiratory distress    Neuro Exam  Mental Status: Awake, alert and generally attentive.  Oriented to self and age but not month.  Not oriented to place or situation.  Repeats the same questions approximately once every minute or so.  Naming and repetition are intact.  Cranial Nerves:  PERRL, there is no vision in the left eye but full vision in the right eye, EOMI with normal smooth pursuit, facial sensation intact and symmetric, facial movements symmetric, hearing not formally tested but intact to conversation, palate elevation symmetric and uvula midline, no dysarthria, shoulder shrug strong bilaterally, tongue protrusion midline  Motor:  no abnormal movements, able to move all limbs spontaneously, no drift in the upper or lower extremities bilaterally  Sensory:  light touch sensation  intact and symmetric throughout upper and lower extremities, no extinction on double simultaneous stimulation   Coordination:  normal finger-to-nose and heel-to-shin bilaterally without dysmetria  Station/Gait:  deferred    Dysphagia Screen  Per Nursing    Stroke Scales    NIHSS  1a. Level of Consciousness 0-->Alert, keenly responsive   1b. LOC Questions 1-->Answers one question correctly   1c. LOC Commands 0-->Performs both tasks correctly   2.   Best Gaze 0-->Normal   3.   Visual 1-->Partial hemianopia   4.   Facial Palsy 0-->Normal symmetrical movements   5a. Motor Arm, Left 0-->No drift, limb holds 90 (or 45) degrees for full 10 secs   5b. Motor Arm, Right 0-->No drift, limb holds 90 (or 45) degrees for full 10 secs   6a. Motor Leg, Left 0-->No drift, leg holds 30 degree position for full 5 secs   6b. Motor Leg, right 0-->No drift, leg holds 30 degree position for full 5 secs   7.   Limb Ataxia 0-->Absent   8.   Sensory 0-->Normal, no sensory loss   9.   Best Language 0-->No aphasia, normal   10. Dysarthria 0-->Normal   11. Extinction and Inattention  0-->No abnormality   Total 2 (11/14/22 1606)       Modified Chris Score (Pre-morbid)  1 - No significant disability.  Able to carry out all usual activities, despite some symptoms.    Imaging  I personally reviewed all imaging; relevant findings per HPI.     Lab Results Data   CBC  Recent Labs   Lab 11/14/22  1536   WBC 11.0   RBC 4.90   HGB 14.2   HCT 43.8        Basic Metabolic Panel    Recent Labs   Lab 11/14/22  1459   *     Liver Panel  No results for input(s): PROTTOTAL, ALBUMIN, BILITOTAL, ALKPHOS, AST, ALT, BILIDIRECT in the last 168 hours.  INR    Recent Labs   Lab Test 11/14/22  1536 11/26/20  1141 12/31/18  1043   INR 1.14 1.24* 0.94      Lipid Profile    Recent Labs   Lab Test 08/24/22  1057 07/27/21  1004 07/24/20  1013 03/15/16  1027 12/11/14  0840   CHOL 157 179 163   < > 155   HDL 55 73 54   < > 55   LDL 68 67 73   < > 72   TRIG 168*  195* 179*   < > 138   CHOLHDLRATIO  --   --   --   --  2.8    < > = values in this interval not displayed.     A1C    Recent Labs   Lab Test 08/24/22  1057 07/27/21  1004 10/15/20  1108   A1C 5.6 5.7* 5.3     Troponin  No results for input(s): CTROPT, TROPONINIS, TROPONINI, GHTROP in the last 168 hours.       Stroke Code Data Data   Stroke Code Data  (for stroke code without tele)  Stroke code activated 11/14/22   1453   First stroke provider response 11/14/22   1457   Last known normal 11/13/22   1600   Time of discovery   (or onset of symptoms) 11/14/22   1200   Head CT read by Stroke Neuro Dr/Provider 11/14/22   1516   Was stroke code de-escalated? Yes 11/14/22 [id number]

## 2022-11-14 NOTE — ED TRIAGE NOTES
Pt became confused earlier today at home with . Son was called and brought pt to ed at 1500. Pt took asa at 1330. Forgetful, repetitive with questions, confused on arrival. Recent thrombolytic stroke a few weeks ago per son.     Hx afib per son     Triage Assessment     Row Name 11/14/22 1450       Triage Assessment (Adult)    Airway WDL WDL       Respiratory WDL    Respiratory WDL WDL       Skin Circulation/Temperature WDL    Skin Circulation/Temperature WDL WDL       Cardiac WDL    Cardiac WDL WDL       Peripheral/Neurovascular WDL    Peripheral Neurovascular WDL WDL       Cognitive/Neuro/Behavioral WDL    Cognitive/Neuro/Behavioral WDL WDL

## 2022-11-14 NOTE — PHARMACY-CONSULT NOTE
Pharmacy Stroke Code Response    Pharmacist responded as part of the Stroke Code Team activation to patient care area ED16. The Stroke Team determined that the patient was not a candidate for IV thrombolytic therapy and the pharmacy team was dismissed at 1512.    Savana OrrD, BCPS

## 2022-11-14 NOTE — H&P
[facility]    History and Physical - Medicine Service, MAROON TEAM 3  Date of Admission:  11/14/2022    Assessment & Plan      Alfreda Baxter is a 77 year old woman with history of hypertension, hyperlipidemia, GERD admitted on 11/14/2022 for confusion and     Acute encephalopathy   Started with confusion and inability to remember things that happened minutes ago.  No cranial nerve deficits or motor or sensory deficits on exam. CTA head and neck unremarkable for acute stroke, and was evaluated by neurology.  Differential includes transient global amnesia, seizure, TIA, psychogenic cause.  At this time exam is only remarkable for amnesia, and patient is attentive without aphasia or significant motor deficits.  - Neurology following   - MRI brain with and without contrast   - EEG per neurology   - Referral to neuro IR at discharge for further imaging of prominent SIDDHARTH    50% to 60% right carotid artery stenosis  Possible that these could be related to symptoms.   - Will need repeat imaging and consider vascular consultation   - PTA atorvastatin     Paroxysmal atrial fibrillation   - rate controlled not on metoprolol  - PTA low-dose Eliquis (2.5 mg twice daily) hold for now pending MRI brain     History of L central retinal artery occlusion   S/p R cataract surgery   Required hyperbaric oxygen therapy at [facility] 9/2018 for CRAO. Baseline difficulty seeing out of L eye.    - PTA timolol, latanoprost, and artificial tears    Hypertension - hold PTA lisinopril   Insomnia - hold PTA ambien given confusion, prn melatonin      Diet: NPO for Medical/Clinical Reasons Except for: No Exceptions    DVT Prophylaxis: SCDs  Schumacher Catheter: Not present  Fluids: None, PO  Central Lines: None  Cardiac Monitoring: None  Code Status:  Full code    Clinically Significant Risk Factors Present on Admission               # Drug Induced Coagulation Defect: home medication list includes an  "anticoagulant medication    # Hypertension: home medication list includes antihypertensive(s)     # Obesity: Estimated body mass index is 36.03 kg/m  as calculated from the following:    Height as of 10/24/22: 1.575 m (5' 2\").    Weight as of 10/24/22: 89.4 kg (197 lb).         Disposition Plan      Expected Discharge Date: 11/15/2022                The patient's care was discussed with the Attending Physician, Dr. Pierre.    Rocio Guerrero MD  Medicine Service, [address]  Securely message with the Vocera Web Console (learn more here)  Text page via [facility] Paging/Directory   Please see signed in provider for up to date coverage information  ______________________________________________________________________    Chief Complaint   Confusion    History is obtained from the patient, chart review, and patient's son.     History of Present Illness   Alfreda Baxter is a 77 year old female who presents with memory changes and confusion.     Patient reports she does not really know why she came here.  Her son is at bedside who works contributes to most of the history.  He says that he went on a trip up [place] with her and got back yesterday she was her normal self at that time.  Today he called her around 1230 and she was very confused and was asking questions about what was going on and could not recall what they had talked about the day prior.    He reports that his dad, her  gave her 10 baby aspirin's and brought her to the ED.  He is unsure if she is on any blood thinners, says she was on some a few years ago.  She is a retired nurse.  At baseline she is alert and oriented and able to have all conversations.    Denies any falls, recent illnesses, fevers, chest pain, trouble breathing.  In the ED stroke code was called and CTA head and neck was obtained which showed no acute stroke.  Neurology was consulted and recommended MRI and observation " overnight.  Labs are unremarkable.    Review of Systems    The 10 point Review of Systems is negative other than noted in the HPI or here.     Past Medical History    I have reviewed this patient's medical history and updated it with pertinent information if needed.   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  Past Surgical History:   Procedure Laterality Date     ABDOMEN SURGERY  1976, 1978, 1995    2 C-sections,  total hysterectomy     APPENDECTOMY      age 7 yrs.     BREAST BIOPSY, RT/LT  02/17/04    left benign     BREAST SURGERY      see above     COLONOSCOPY  2013    needed q 3 yrs.     COLONOSCOPY N/A 9/10/2020    Procedure: Colonoscopy, With Polypectomy And Biopsy;  Surgeon: Brian Cleaning MD;  Location: [facility] OR     COLONOSCOPY WITH CO2 INSUFFLATION N/A 11/21/2016    Procedure: COLONOSCOPY WITH CO2 INSUFFLATION;  Surgeon: Brian Cleaning MD;  Location: [facility] OR     COLONOSCOPY WITH CO2 INSUFFLATION N/A 9/10/2020    Procedure: COLONOSCOPY, WITH CO2 INSUFFLATION;  Surgeon: Brian Cleaning MD;  Location: [facility] OR     EP COMPREHENSIVE EP STUDY N/A 12/31/2018    Procedure: LOOP RECORDER;  Surgeon: Buck Butterfield MD;  Location:  [facility]     GENITOURINARY SURGERY      see above      HYSTERECTOMY, PAP NO LONGER INDICATED       HYSTERECTOMY, POORNIMA  1997    bleeding fibroids     ORTHOPEDIC SURGERY  2009    meniscus repair     PHACOEMULSIFICATION WITH STANDARD INTRAOCULAR LENS IMPLANT Right 10/24/2022    Procedure: COMPLE RIGHT EYE PHACOEMULSIFICATION, CATARACT, WITH STANDARD INTRAOCULAR LENS IMPLANT INSERTION;  Surgeon: Ezequiel Florence MD;  Location: [facility] TOTAL KNEE ARTHROPLASTY Left 11/2015    at [facility]      Social History   I have reviewed this patient's social history and updated it with pertinent information if needed. Alfreda Baxter  reports that she quit smoking about 57 years ago. Her smoking use included cigarettes. She started smoking about 59 years ago. She has a 0.10 pack-year smoking history. She has never used smokeless tobacco. She reports that she does not currently use alcohol. She reports that she does not use drugs.    Family History   I have reviewed this patient's family history and updated it with pertinent information if needed.  Family History   Problem Relation Age of Onset     Respiratory Father      Glaucoma Father      Asthma Father      Allergies Sister      Eye Disorder Sister      Lipids Sister      Neurologic Disorder Sister      Osteoporosis Sister      Glaucoma Sister      Hypertension Sister      Arthritis Mother      Cancer Mother      Hypertension Mother      Other Cancer Mother      Thyroid Disease Mother      Gastrointestinal Disease Son      Macular Degeneration Sister      Hyperlipidemia Sister      Hypertension Sister      Osteoporosis Sister      Glaucoma Other        Prior to Admission Medications   Prior to Admission Medications   Prescriptions Last Dose Informant Patient Reported? Taking?   B-COMPLEX OR   Yes No   Sig: Take  by mouth daily.   CALCIUM 500 +D OR   Yes No   Sig: one daily   NEW MED   Yes No   Sig: Latanoprost eye drops    acetaminophen (TYLENOL) 500 MG tablet   Yes No   Sig: Take 500-1,000 mg by mouth every 6 hours  as needed.   albuterol (PROAIR HFA/PROVENTIL HFA/VENTOLIN HFA) 108 (90 Base) MCG/ACT inhaler   No No   Sig: Inhale 2 puffs into the lungs every 6 hours as needed for shortness of breath / dyspnea or wheezing   apixaban ANTICOAGULANT (ELIQUIS ANTICOAGULANT) 2.5 MG tablet   No No   Sig: Take 1 tablet (2.5 mg) by mouth 2 times daily   atorvastatin (LIPITOR) 20 MG tablet   No No   Sig: Take 1 tablet (20 mg) by mouth daily   hypromellose (ARTIFICIAL TEARS) 0.5 % SOLN ophthalmic solution   Yes No   Sig: Place 1 drop into both eyes 3 times daily   latanoprost (XALATAN) 0.005 % ophthalmic solution   No No   Sig: Place 1 drop into both eyes At Bedtime   lisinopril (ZESTRIL) 10 MG tablet   No No   Sig: Take 1 tablet (10 mg) by mouth daily   moxifloxacin (VIGAMOX) 0.5 % ophthalmic solution   No No   Sig: Apply 1 drop to eye 3 times daily To operative eye   multivitamin (OCUVITE) TABS tablet   Yes No   Sig: Take 1 tablet by mouth daily   prednisoLONE acetate (PRED FORTE) 1 % ophthalmic suspension   No No   Sig: Apply 1 drop to eye 4 times daily To operative eye   timolol maleate (TIMOPTIC) 0.5 % ophthalmic solution   No No   Sig: Place 1 drop Into the left eye every morning   zolpidem (AMBIEN) 5 MG tablet   No No   Sig: TAKE ONE TABLET BY MOUTH IN THE EVENING AS NEEDED FOR SLEEP      Facility-Administered Medications: None     Allergies   Allergies   Allergen Reactions     Codeine Anaphylaxis     Niacin      No Clinical Screening - See Comments Other (See Comments)     senisitive to non steroidals  Aleve, ibuprofen celebrex cause bad stomach pains  senisitive to non steroidals  Aleve, ibuprofen celebrex cause bad stomach pains     Oxycodone Hives     Other reaction(s): Unknown  ... With facial swelling     Trazodone      nausea     Nsaids Nausea and Vomiting     Other reaction(s): Abdominal Pain       Physical Exam   Vital Signs:     BP: (P) 108/47 Pulse: (P) 81   Resp: (P) 16 SpO2: (P) 96 %      Weight: 0 lbs 0 oz  General  Appearance: Sitting up in bed, no acute distress  HEENT: Anicteric conjunctiva  Respiratory: Normal work of breathing on ambient air  Cardiovascular: Normal rate, regular rhythm, no murmurs auscultated  GI: Soft, nontender, nondistended, obese, positive bowel sounds  Skin: Warm well perfused, no rashes on exposed skin surfaces  Neurologic: Alert and oriented to person and place only, not able to state year says it is 2021, guesses season is winter, not able to state president, cranial nerves II through XII intact, strength 5 out of 5 in upper and lower extremities, moves all extremities spontaneously, finger-to-nose intact, able to spell world backwards, able to name objects, unable to recall objects, forgets where she is and why she came in after several minutes in conversation  Psychiatric: Appropriate, making eye contact    Data   Data reviewed today: I reviewed all medications, new labs and imaging results over the last 24 hours.    Recent Labs   Lab 11/14/22  1536 11/14/22  1459   WBC 11.0  --    HGB 14.2  --    MCV 89  --      --    INR 1.14  --      --    POTASSIUM 4.1  --    CHLORIDE 103  --    CO2 23  --    BUN 22.3  --    CR 0.78  --    ANIONGAP 14  --    RUDOLPH 9.1  --    GLC 99 110*   ALBUMIN 4.2  --    PROTTOTAL 7.4  --    BILITOTAL 0.5  --    ALKPHOS 71  --    ALT 13  --    AST 24  --      Most Recent 3 CBC's:  Recent Labs   Lab Test 11/14/22  1536 08/24/22  1057 07/27/21  1004   WBC 11.0 6.4 11.2*   HGB 14.2 14.5 14.8   MCV 89 91 91    242 299     Most Recent 3 BMP's:  Recent Labs   Lab Test 11/14/22  1536 11/14/22  1459 08/24/22  1057 12/03/21  1538     --  139 144   POTASSIUM 4.1  --  5.0 4.0   CHLORIDE 103  --  106 110*   CO2 23  --  31 28   BUN 22.3  --  19 18   CR 0.78  --  0.78 0.94   ANIONGAP 14  --  2* 6   RUDOLPH 9.1  --  9.7 9.2   GLC 99 110* 110* 109*     Most Recent 2 LFT's:  Recent Labs   Lab Test 11/14/22  1536 11/26/20  1141   AST 24 9   ALT 13 16   ALKPHOS 71 84    BILITOTAL 0.5 0.5     Recent Results (from the past 24 hour(s))   CT Head w/o Contrast    Narrative    CT HEAD W/O CONTRAST 11/14/2022 3:16 PM    History: confusion     Comparison: MRI brain and head CT 11/26/2020.    Technique: Using multidetector thin collimation helical acquisition  technique, axial, coronal and sagittal CT images from the skull base  to the vertex were obtained without intravenous contrast.   (topogram) image(s) also obtained and reviewed.    Findings: There is no intracranial hemorrhage, mass effect, or midline  shift. Gray/white matter differentiation in both cerebral hemispheres  is preserved. Ventricles are proportionate to the cerebral sulci. The  basal cisterns are clear. There are nonspecific hypodensities within  the subcortical and deep cerebral white matter, unchanged and most  likely related to chronic small vessel ischemic disease given the  patient's age.  Mild-to-moderate leukoaraiosis. Atherosclerotic  calcification of the carotid siphons and the distal vertebral  arteries.    The bony calvaria and the bones of the skull base are normal. The  visualized portions of the paranasal sinuses and mastoid air cells are  clear. Right pseudophakia. Degenerative changes of the bilateral  temporomandibular joints, right greater than left.      Impression    Impression:  1. No acute intracranial pathology.   2. Moderate chronic small vessel ischemic disease.    I have personally reviewed the examination and initial interpretation  and I agree with the findings.    RICHIE BENJAMIN MD         SYSTEM ID:  [id number]   CTA Head Neck with Contrast    Narrative    CTA HEAD NECK W CONTRAST 11/14/2022 3:18 PM    CT angiogram of the neck   CT angiogram of the base of the brain with contrast  Reconstruction on the 3D workstation    Provided History:  confusion    Comparison:  Same day head CT. MR 11/26/2010.      Technique:  HEAD and NECK CTA: During rapid bolus intravenous injection of  nonionic  contrast material, axial images were obtained using thin  collimation multidetector helical technique from the base of the upper  aortic arch through the [place]. This CT angiogram data was  reconstructed at thin intervals with mild overlap. Images were sent to  the 3D workstation, and 3D reconstructions were obtained. The axial  source images, multiplanar reformations, 3D reconstructions in both  maximum intensity projection display and volume rendered models were  reviewed, with reconstructions performed by the technologist.    Contrast: iopamidol (ISOVUE-370) solution 75 mL    Findings:    Head CTA demonstrates no aneurysm or stenosis of the major  intracranial arteries. Prominent anterior communicating artery,  unchanged.    Neck CTA demonstrates atherosclerotic plaque present at the carotid  bulb. Short segment associated narrowing of the proximal right carotid  artery with residual lumen diameter of 2.2 mm and normal distal  diameter of 5.2 mm, corresponding to 50-60% diameter stenosis.  Atherosclerotic plaque of the left carotid bifurcation without  significant associated stenosis. No significant stenosis of the  vertebral arteries. The origins of the great vessels from the aortic  arch are patent.    Multilevel cervical degenerative changes.    No mass within the visualized portions of the cervical soft tissues or  lung apices.       Impression    Impression:    1. Head CTA demonstrates no stenosis of the major intracranial  arteries. Unchanged prominent anterior communicating artery. Although  there is no discrete associated aneurysm and the appearance is stable  over almost 2 years, consider neurointerventional referral for  follow-up imaging options.  2. Neck CTA demonstrates atherosclerotic short segment right internal  carotid artery stenosis of 50-60%.    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    This report will be copied to the [facility] to  ensure a  provider acknowledges the finding.        I have personally reviewed the examination and initial interpretation  and I agree with the findings.    RICHIE BENJAMIN MD         SYSTEM ID:  [id number]   MR Brain w/o Contrast    Impression    RESIDENT PRELIMINARY INTERPRETATION  Impression:   1. There are two foci of acute infarction, one along the left corona  radiata/posterior caudate nucleus and another along the left splenium  of the corpus callosum.  2. Moderate leukoaraiosis.    [Urgent Result: 2 foci of infarction]    Finding was identified on 11/14/2022 6:37 PM.     Dr. Hooks was contacted by Dr. Tricia Mejia at 11/14/2022 6:39 PM  and verbalized understanding of the urgent finding.

## 2022-11-14 NOTE — PROGRESS NOTES
Stroke Code Nurse-Responder Note    Arrival Time to Stroke Code: 1455    Stroke Code Team interventions:   - De-escalated at 1543 by Stroke Team    ED/Bedside Nurse providing handoff: Shavon    Time left for CT: 1403    Time arrived to next location (ED/Unit/IR): 1412    ED/Bedside Nurse given handoff (name/time): Kenroy Swartz RN

## 2022-11-15 ENCOUNTER — APPOINTMENT (OUTPATIENT)
Dept: NEUROLOGY | Facility: CLINIC | Age: 77
DRG: 070 | End: 2022-11-15
Payer: MEDICARE

## 2022-11-15 ENCOUNTER — APPOINTMENT (OUTPATIENT)
Dept: OCCUPATIONAL THERAPY | Facility: CLINIC | Age: 77
DRG: 070 | End: 2022-11-15
Payer: MEDICARE

## 2022-11-15 ENCOUNTER — APPOINTMENT (OUTPATIENT)
Dept: CARDIOLOGY | Facility: CLINIC | Age: 77
DRG: 070 | End: 2022-11-15
Payer: MEDICARE

## 2022-11-15 VITALS
DIASTOLIC BLOOD PRESSURE: 72 MMHG | TEMPERATURE: 97.9 F | RESPIRATION RATE: 16 BRPM | OXYGEN SATURATION: 94 % | SYSTOLIC BLOOD PRESSURE: 111 MMHG | HEART RATE: 78 BPM

## 2022-11-15 LAB
ALBUMIN SERPL BCG-MCNC: 3.7 G/DL (ref 3.5–5.2)
ALP SERPL-CCNC: 62 U/L (ref 35–104)
ALT SERPL W P-5'-P-CCNC: 7 U/L (ref 10–35)
ANION GAP SERPL CALCULATED.3IONS-SCNC: 11 MMOL/L (ref 7–15)
AST SERPL W P-5'-P-CCNC: 20 U/L (ref 10–35)
ATRIAL RATE - MUSE: 94 BPM
BASOPHILS # BLD AUTO: 0 10E3/UL (ref 0–0.2)
BASOPHILS NFR BLD AUTO: 0 %
BILIRUB SERPL-MCNC: 0.5 MG/DL
BUN SERPL-MCNC: 16.4 MG/DL (ref 8–23)
CALCIUM SERPL-MCNC: 8.9 MG/DL (ref 8.8–10.2)
CHLORIDE SERPL-SCNC: 103 MMOL/L (ref 98–107)
CREAT BLD-MCNC: 0.9 MG/DL (ref 0.5–1)
CREAT SERPL-MCNC: 0.63 MG/DL (ref 0.51–0.95)
DEPRECATED HCO3 PLAS-SCNC: 23 MMOL/L (ref 22–29)
DIASTOLIC BLOOD PRESSURE - MUSE: NORMAL MMHG
EOSINOPHIL # BLD AUTO: 0.2 10E3/UL (ref 0–0.7)
EOSINOPHIL NFR BLD AUTO: 2 %
ERYTHROCYTE [DISTWIDTH] IN BLOOD BY AUTOMATED COUNT: 13.2 % (ref 10–15)
GFR SERPL CREATININE-BSD FRML MDRD: 90 ML/MIN/1.73M2
GFR SERPL CREATININE-BSD FRML MDRD: >60 ML/MIN/1.73M2
GLUCOSE SERPL-MCNC: 95 MG/DL (ref 70–99)
HCT VFR BLD AUTO: 41.3 % (ref 35–47)
HGB BLD-MCNC: 13.3 G/DL (ref 11.7–15.7)
IMM GRANULOCYTES # BLD: 0 10E3/UL
IMM GRANULOCYTES NFR BLD: 1 %
INR BLD: 1.2 (ref 2–3)
INTERPRETATION ECG - MUSE: NORMAL
LVEF ECHO: NORMAL
LYMPHOCYTES # BLD AUTO: 2 10E3/UL (ref 0.8–5.3)
LYMPHOCYTES NFR BLD AUTO: 23 %
MCH RBC QN AUTO: 29.3 PG (ref 26.5–33)
MCHC RBC AUTO-ENTMCNC: 32.2 G/DL (ref 31.5–36.5)
MCV RBC AUTO: 91 FL (ref 78–100)
MONOCYTES # BLD AUTO: 0.6 10E3/UL (ref 0–1.3)
MONOCYTES NFR BLD AUTO: 7 %
NEUTROPHILS # BLD AUTO: 5.8 10E3/UL (ref 1.6–8.3)
NEUTROPHILS NFR BLD AUTO: 67 %
NRBC # BLD AUTO: 0 10E3/UL
NRBC BLD AUTO-RTO: 0 /100
P AXIS - MUSE: 79 DEGREES
PLATELET # BLD AUTO: 215 10E3/UL (ref 150–450)
POTASSIUM SERPL-SCNC: 3.8 MMOL/L (ref 3.4–5.3)
PR INTERVAL - MUSE: 160 MS
PROT SERPL-MCNC: 6.6 G/DL (ref 6.4–8.3)
QRS DURATION - MUSE: 86 MS
QT - MUSE: 386 MS
QTC - MUSE: 482 MS
R AXIS - MUSE: -34 DEGREES
RBC # BLD AUTO: 4.54 10E6/UL (ref 3.8–5.2)
SODIUM SERPL-SCNC: 137 MMOL/L (ref 136–145)
SYSTOLIC BLOOD PRESSURE - MUSE: NORMAL MMHG
T AXIS - MUSE: -3 DEGREES
VENTRICULAR RATE- MUSE: 94 BPM
WBC # BLD AUTO: 8.7 10E3/UL (ref 4–11)

## 2022-11-15 PROCEDURE — 99232 SBSQ HOSP IP/OBS MODERATE 35: CPT | Mod: GC | Performed by: STUDENT IN AN ORGANIZED HEALTH CARE EDUCATION/TRAINING PROGRAM

## 2022-11-15 PROCEDURE — 93306 TTE W/DOPPLER COMPLETE: CPT

## 2022-11-15 PROCEDURE — 36415 COLL VENOUS BLD VENIPUNCTURE: CPT | Performed by: STUDENT IN AN ORGANIZED HEALTH CARE EDUCATION/TRAINING PROGRAM

## 2022-11-15 PROCEDURE — 80053 COMPREHEN METABOLIC PANEL: CPT | Performed by: STUDENT IN AN ORGANIZED HEALTH CARE EDUCATION/TRAINING PROGRAM

## 2022-11-15 PROCEDURE — 85025 COMPLETE CBC W/AUTO DIFF WBC: CPT | Performed by: STUDENT IN AN ORGANIZED HEALTH CARE EDUCATION/TRAINING PROGRAM

## 2022-11-15 PROCEDURE — 250N000009 HC RX 250: Performed by: STUDENT IN AN ORGANIZED HEALTH CARE EDUCATION/TRAINING PROGRAM

## 2022-11-15 PROCEDURE — 99239 HOSP IP/OBS DSCHRG MGMT >30: CPT | Mod: GC | Performed by: STUDENT IN AN ORGANIZED HEALTH CARE EDUCATION/TRAINING PROGRAM

## 2022-11-15 PROCEDURE — 97530 THERAPEUTIC ACTIVITIES: CPT | Mod: GO

## 2022-11-15 PROCEDURE — 97535 SELF CARE MNGMENT TRAINING: CPT | Mod: GO

## 2022-11-15 PROCEDURE — 95816 EEG AWAKE AND DROWSY: CPT | Mod: 26 | Performed by: PSYCHIATRY & NEUROLOGY

## 2022-11-15 PROCEDURE — 93306 TTE W/DOPPLER COMPLETE: CPT | Mod: 26 | Performed by: INTERNAL MEDICINE

## 2022-11-15 PROCEDURE — 250N000013 HC RX MED GY IP 250 OP 250 PS 637

## 2022-11-15 PROCEDURE — 95816 EEG AWAKE AND DROWSY: CPT

## 2022-11-15 PROCEDURE — 97165 OT EVAL LOW COMPLEX 30 MIN: CPT | Mod: GO

## 2022-11-15 PROCEDURE — 250N000013 HC RX MED GY IP 250 OP 250 PS 637: Performed by: STUDENT IN AN ORGANIZED HEALTH CARE EDUCATION/TRAINING PROGRAM

## 2022-11-15 PROCEDURE — 999N000147 HC STATISTIC PT IP EVAL DEFER

## 2022-11-15 RX ORDER — PREDNISOLONE ACETATE 10 MG/ML
1 SUSPENSION/ DROPS OPHTHALMIC DAILY
Status: DISCONTINUED | OUTPATIENT
Start: 2022-11-16 | End: 2022-11-15 | Stop reason: HOSPADM

## 2022-11-15 RX ORDER — PREDNISOLONE ACETATE 10 MG/ML
1 SUSPENSION/ DROPS OPHTHALMIC 4 TIMES DAILY
Status: DISCONTINUED | OUTPATIENT
Start: 2022-11-15 | End: 2022-11-15

## 2022-11-15 RX ORDER — ATORVASTATIN CALCIUM 40 MG/1
40 TABLET, FILM COATED ORAL DAILY
Qty: 30 TABLET | Refills: 0 | Status: SHIPPED | OUTPATIENT
Start: 2022-11-16 | End: 2022-12-06

## 2022-11-15 RX ORDER — ATORVASTATIN CALCIUM 40 MG/1
40 TABLET, FILM COATED ORAL DAILY
Status: DISCONTINUED | OUTPATIENT
Start: 2022-11-16 | End: 2022-11-15 | Stop reason: HOSPADM

## 2022-11-15 RX ADMIN — APIXABAN 5 MG: 5 TABLET, FILM COATED ORAL at 08:24

## 2022-11-15 RX ADMIN — Medication 1 DROP: at 08:25

## 2022-11-15 RX ADMIN — ATORVASTATIN CALCIUM 20 MG: 20 TABLET, FILM COATED ORAL at 08:25

## 2022-11-15 RX ADMIN — ACETAMINOPHEN 650 MG: 325 TABLET, FILM COATED ORAL at 10:28

## 2022-11-15 RX ADMIN — PREDNISOLONE ACETATE 1 DROP: 10 SUSPENSION/ DROPS OPHTHALMIC at 10:28

## 2022-11-15 RX ADMIN — TIMOLOL MALEATE 1 DROP: 5 SOLUTION OPHTHALMIC at 08:25

## 2022-11-15 ASSESSMENT — ACTIVITIES OF DAILY LIVING (ADL)
ADLS_ACUITY_SCORE: 35

## 2022-11-15 NOTE — PROGRESS NOTES
[facility]    Stroke Progress Note    Interval EventsMRI completed and revealing of two punctate infarcts involving the L corona radiata/posterior caudate nucleus and the L splenium of the corpus callosum. EEG does not appear to have been completed overnight. This AM, patient reports feeling almost entirely back to baseline, although she cannot recall any of the events that took place yesterday. She shares that she had previously been on Xarelto for her A-fib but had to switch to Eliquis due to gum bleeding.     HPI Summary  Ms. Baxter is a 78 yo F h/o atrial fibrillation on Eliquis 2.5 mg BID, HTN, HLD and prior L CRAO who presented on 11/14/2022 with acute onset anterograde amnesia prompting stroke code activation. Initial NIHSS was 2, scoring for LOC questions and chronic L eye vision loss. CT/CTA were unrevealing of any acute pathological findings and the patient's presentation was thought to be very consistent with transient global amnesia, but MRI was recommended to definitively rule out the possibility of stroke. 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     Stroke Evaluation Summarized    MRI 1. There are two foci of acute infarction, one along the left corona radiata/posterior caudate nucleus and another along the left splenium of the corpus callosum.  2. Moderate leukoaraiosis.   Intracranial Vasculature Head CTA demonstrates no stenosis of the major intracranial arteries. Unchanged prominent anterior communicating artery. Although there is no discrete associated aneurysm and the appearance is stable  over almost 2 years, consider neurointerventional referral for follow-up imaging options.   Cervical Vasculature Neck CTA demonstrates atherosclerotic short segment right internal carotid artery stenosis of 50-60%.     Echocardiogram  LVEF 55-60%. Mild left atrial enlargement.    EKG/Telemetry Normal sinus rhythm.   Other Testing EEG pending     LDL  11/14/2022: 74 mg/dL   A1C  8/24/2022: 5.6 %  11/14/2022: 5.8 %   Troponin 11/14/2022: 11 ng/L       Impression   #Acute ischemic strokes of the L corona radiata/posterior caudate nucleus and the L splenium of the corpus callosum, likely cardioembolic 2/2 atrial fibrillation  #Transient global amnesia (TGA), resolved    Infarcts noted on MRI were clinically silent and patient had no new symptoms after clearing her amnesia. Would not consider this to be a failure of Eliquis as she was on sub-optimal dosing (2.5 mg BID) prior to admission and would resume her Eliquis at 5 mg BID for stroke prevention going forward.     Recommendations:  - Eliquis 5 mg BID for secondary stroke prevention  - Increase atorvastatin to 40 mg at bedtime   - Stroke education and screening for apnea and depression prior to discharge  - Long-term BP goal: <130/80  - Long-term LDL goal: <70  - Long-term Hgb A1C goal: <7.0  - Smoking cessation (if applicable)  - Follow-up as outpatient as below    Patient Follow-up (ordered for you)  - Follow-up with general neurology as an outpatient in 4-6 weeks following discharge for stroke follow-up.  - Follow-up with neuro IR as an outpatient for further evaluation of prominent anterior communicating artery.    No further stroke evaluation is recommended, so we will sign off. Please contact us with any additional questions.    The patient was seen and discussed with the attending, Dr. Boles.    Zurdo Nicholas MD  Neurology Resident  Ascom: *[phone]  ______________________________________________________    Clinically Significant Risk Factors Present on Admission               # Drug Induced Coagulation Defect: home medication list includes an anticoagulant medication           Medications   Scheduled Meds    apixaban ANTICOAGULANT  5 mg Oral BID     atorvastatin  20 mg Oral Daily      carboxymethylcellulose PF  1 drop Both Eyes TID     latanoprost  1 drop Both Eyes At Bedtime     timolol maleate  1 drop Left Eye QAM     PRN Meds  acetaminophen **OR** acetaminophen, albuterol, melatonin, melatonin, polyethylene glycol, senna-docusate **OR** senna-docusate       PHYSICAL EXAMINATION  Temp:  [97.9  F (36.6  C)-98  F (36.7  C)] 97.9  F (36.6  C)  Pulse:  [] 77  Resp:  [14-20] 18  BP: (108-167)/(47-89) 128/75  SpO2:  [95 %-98 %] 97 %      General Exam  General:  patient lying in bed without any acute distress    HEENT:  normocephalic/atraumatic  Pulmonary:  breathing comfortably on ambient room air    Neuro Exam  Mental Status:  Awake, alert and oriented to self, age, month, year, place and situation. Following commands across midline. Naming and repetition are intact. Delayed recall is now intact.   Cranial Nerves: pupils are equal and round, there is no vision in the left eye but full vision in the right eye, EOMI, facial sensation intact and symmetric, facial movements symmetric, hearing intact to conversation, no dysarthria, tongue protrudes midline.  Motor:  no abnormal movements, able to move all limbs spontaneously, no drift in upper or lower extremities  Sensory:  light touch sensation intact and symmetric throughout upper and lower extremities, no extinction on double simultaneous stimulation   Coordination:  normal finger-to-nose and heel-to-shin bilaterally without dysmetria  Station/Gait:  deferred    Stroke Scales    NIHSS  1a. Level of Consciousness 0-->Alert, keenly responsive   1b. LOC Questions 0-->Answers both questions correctly   1c. LOC Commands 0-->Performs both tasks correctly   2.   Best Gaze 0-->Normal   3.   Visual 1-->Partial hemianopia   4.   Facial Palsy 0-->Normal symmetrical movements   5a. Motor Arm, Left 0-->No drift, limb holds 90 (or 45) degrees for full 10 secs   5b. Motor Arm, Right 0-->No drift, limb holds 90 (or 45) degrees for full 10 secs   6a. Motor Leg,  Left 0-->No drift, leg holds 30 degree position for full 5 secs   6b. Motor Leg, right 0-->No drift, leg holds 30 degree position for full 5 secs   7.   Limb Ataxia 0-->Absent   8.   Sensory 0-->Normal, no sensory loss   9.   Best Language 0-->No aphasia, normal   10. Dysarthria 0-->Normal   11. Extinction and Inattention  0-->No abnormality   Total 1 (11/15/22 0730)       Imaging  I personally reviewed all imaging; relevant findings per HPI.     Lab Results Data   CBC  Recent Labs   Lab 11/15/22  0632 11/14/22  1536   WBC 8.7 11.0   RBC 4.54 4.90   HGB 13.3 14.2   HCT 41.3 43.8    226     Basic Metabolic Panel    Recent Labs   Lab 11/14/22  1536 11/14/22  1459     --    POTASSIUM 4.1  --    CHLORIDE 103  --    CO2 23  --    BUN 22.3  --    CR 0.78  --    GLC 99 110*   RUDOLPH 9.1  --      Liver Panel  Recent Labs   Lab 11/14/22  1536   PROTTOTAL 7.4   ALBUMIN 4.2   BILITOTAL 0.5   ALKPHOS 71   AST 24   ALT 13     INR    Recent Labs   Lab Test 11/14/22  1536 11/26/20  1141 12/31/18  1043   INR 1.14 1.24* 0.94      Lipid Profile    Recent Labs   Lab Test 11/14/22  1536 08/24/22  1057 07/27/21  1004 03/15/16  1027 12/11/14  0840   CHOL 157 157 179   < > 155   HDL 55 55 73   < > 55   LDL 74 68 67   < > 72   TRIG 142 168* 195*   < > 138   CHOLHDLRATIO  --   --   --   --  2.8    < > = values in this interval not displayed.     A1C    Recent Labs   Lab Test 11/14/22  1536 08/24/22  1057 07/27/21  1004   A1C 5.8* 5.6 5.7*     Troponin    Recent Labs   Lab 11/14/22  1536   CTROPT 11          Data

## 2022-11-15 NOTE — PROGRESS NOTES
Brief neurology note:    MRI obtained and showed the following:  Impression:   1. There are two foci of acute infarction, one along the left corona  radiata/posterior caudate nucleus and another along the left splenium  of the corpus callosum.  2. Moderate leukoaraiosis.    History was confirmed that she was taking only 2.5mg BID of Apixaban rather than 5mg BID, presumably from some intermittent bleeding of the gums. Due to these acute infarcts, we recommend starting full dose Apixaban 5mg BID tonight. Additional labs and Echo are still pending.     Juon Alfonso MD  Neurology Resident, PGY-4  Securely message with the Vocera Web Console (learn more here)  Text page via [facility] Paging/Directory   11/14/2022

## 2022-11-15 NOTE — PLAN OF CARE
ED PT Deferral: Per discussion with OT patient without acute needs. Patient observed up ambulating IND with OT. PT to defer orders at this time. MD made aware.

## 2022-11-15 NOTE — ED PROVIDER NOTES
ED Provider Note  [facility]      History     Chief Complaint   Patient presents with     Stroke Symptoms     HPI  Alfreda Baxter is a 77 year old female who presents emergency department with onset of altered mental status, confusion.  Patient has a history of prior stroke with residual left-sided deficits.  On Eliquis at baseline.  History is primarily provided by the patient's son.  He was contacted by the patient's , his father that the patient was confused.  He spoke with her over the phone around 1:30 PM and noticed that she was rather confused.  Prior to that she was in her usual state of health.  No history of diabetes.    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     BREAST BIOPSY, RT/LT  02/17/04    left benign     BREAST SURGERY      see above     COLONOSCOPY  2013    needed q 3 yrs.     COLONOSCOPY N/A 9/10/2020    Procedure: Colonoscopy, With Polypectomy And Biopsy;  Surgeon: Brian Cleaning MD;  Location: [facility] OR     COLONOSCOPY WITH CO2 INSUFFLATION N/A 11/21/2016    Procedure: COLONOSCOPY WITH CO2 INSUFFLATION;  Surgeon: Brian Cleaning MD;  Location: [facility] OR     COLONOSCOPY  WITH CO2 INSUFFLATION N/A 9/10/2020    Procedure: COLONOSCOPY, WITH CO2 INSUFFLATION;  Surgeon: Brian Cleaning MD;  Location:  OR      COMPREHENSIVE EP STUDY N/A 12/31/2018    Procedure: LOOP RECORDER;  Surgeon: Buck Butterfield MD;  Location:  [facility] CATH LAB     GENITOURINARY SURGERY      see above     HYSTERECTOMY, PAP NO LONGER INDICATED       HYSTERECTOMY, POORNIMA  1997    bleeding fibroids     ORTHOPEDIC SURGERY  2009    meniscus repair     PHACOEMULSIFICATION WITH STANDARD INTRAOCULAR LENS IMPLANT Right 10/24/2022    Procedure: COMPLE RIGHT EYE PHACOEMULSIFICATION, CATARACT, WITH STANDARD INTRAOCULAR LENS IMPLANT INSERTION;  Surgeon: Ezequiel Florence MD;  Location: [facility] OR     [facility] TOTAL KNEE ARTHROPLASTY Left 11/2015    at [facility]     acetaminophen (TYLENOL) 500 MG tablet  albuterol (PROAIR HFA/PROVENTIL HFA/VENTOLIN HFA) 108 (90 Base) MCG/ACT inhaler  apixaban ANTICOAGULANT (ELIQUIS ANTICOAGULANT) 2.5 MG tablet  atorvastatin (LIPITOR) 20 MG tablet  B-COMPLEX OR  CALCIUM 500 +D OR  hypromellose (ARTIFICIAL TEARS) 0.5 % SOLN ophthalmic solution  latanoprost (XALATAN) 0.005 % ophthalmic solution  lisinopril (ZESTRIL) 10 MG tablet  moxifloxacin (VIGAMOX) 0.5 % ophthalmic solution  multivitamin (OCUVITE) TABS tablet  NEW MED  prednisoLONE acetate (PRED FORTE) 1 % ophthalmic suspension  timolol maleate (TIMOPTIC) 0.5 % ophthalmic solution  zolpidem (AMBIEN) 5 MG tablet      Allergies   Allergen Reactions     Codeine Anaphylaxis     Niacin      No Clinical Screening - See Comments Other (See Comments)     senisitive to non steroidals  Aleve, ibuprofen celebrex cause bad stomach pains  senisitive to non steroidals  Aleve, ibuprofen celebrex cause bad stomach pains     Oxycodone Hives     Other reaction(s): Unknown  ... With facial swelling     Trazodone      nausea     Nsaids Nausea and Vomiting     Other reaction(s): Abdominal Pain     Family History  Family History   Problem Relation  Age of Onset     Respiratory Father      Glaucoma Father      Asthma Father      Allergies Sister      Eye Disorder Sister      Lipids Sister      Neurologic Disorder Sister      Osteoporosis Sister      Glaucoma Sister      Hypertension Sister      Arthritis Mother      Cancer Mother      Hypertension Mother      Other Cancer Mother      Thyroid Disease Mother      Gastrointestinal Disease Son      Macular Degeneration Sister      Hyperlipidemia Sister      Hypertension Sister      Osteoporosis Sister      Glaucoma Other      Social History   Social History     Tobacco Use     Smoking status: Former     Packs/day: 0.10     Years: 1.00     Pack years: 0.10     Types: Cigarettes     Start date: 1963     Quit date: 1965     Years since quittin.9     Smokeless tobacco: Never   Substance Use Topics     Alcohol use: Not Currently     Comment: one a month     Drug use: No      Past medical history, past surgical history, medications, allergies, family history, and social history were reviewed with the patient. No additional pertinent items.       Review of Systems  A complete review of systems was attempted but limited due to altered mental status.    Physical Exam   BP: (!) 160/73  Pulse: 118  Resp: 14  SpO2: 98 %  Physical Exam  Constitutional:       General: She is awake. She is not in acute distress.     Appearance: Normal appearance. She is well-developed. She is not ill-appearing or toxic-appearing.   HENT:      Head: Atraumatic.      Mouth/Throat:      Pharynx: No oropharyngeal exudate.   Eyes:      General: No scleral icterus.     Pupils: Pupils are equal, round, and reactive to light.      Comments: Residual left-sided visual field deficit   Cardiovascular:      Rate and Rhythm: Normal rate and regular rhythm.   Pulmonary:      Effort: Pulmonary effort is normal. No respiratory distress.   Abdominal:      General: Abdomen is flat.   Musculoskeletal:         General: No tenderness.   Skin:      General: Skin is warm.      Findings: No rash.   Neurological:      Mental Status: She is alert. She is disoriented and confused.      Comments: Deferred due to acuity   Psychiatric:         Attention and Perception: Attention normal.         Mood and Affect: Mood is anxious.         Speech: Speech normal.         Behavior: Behavior normal. Behavior is cooperative.         ED Course      Procedures             EKG Interpretation:      Interpreted by MATEUS LAND MD  Time reviewed: 1540  Symptoms at time of EKG: Altered mental status   Rhythm: normal sinus   Rate: Normal  Axis: Left Axis Deviation  Ectopy: premature atrial contraction  Conduction: normal  ST Segments/ T Waves: No ST-T wave changes and Poor R wave progression  Q Waves: v3, v4, III and aVf  Comparison to prior: No old EKG available    Clinical Impression: no acute changes                Critical Care Addendum    My initial assessment, based on my review of nursing observations, review of vital signs, focused history, physical exam, review of cardiac rhythm monitor, 12 lead ECG analysis and discussion with Neuro stroke, Medicine, established that Alfreda Baxter has altered mental status, which requires immediate intervention, and therefore she is critically ill.     After the initial assessment, the care team initiated multiple lab tests and consulted with stroke neuro to provide stabilization care. Due to the critical nature of this patient, I reassessed vital signs, physical exam, review of cardiac rhythm monitor, 12 lead ECG analysis, neurologic status and respiratory status multiple times prior to her disposition.     Time also spent performing documentation and discussion with consultants.     Critical care time (excluding teaching time and procedures): 43 minutes.               Results for orders placed or performed during the hospital encounter of 11/14/22   CT Head w/o Contrast     Status: None    Narrative    CT HEAD W/O CONTRAST 11/14/2022  3:16 PM    History: confusion     Comparison: MRI brain and head CT 11/26/2020.    Technique: Using multidetector thin collimation helical acquisition  technique, axial, coronal and sagittal CT images from the skull base  to the vertex were obtained without intravenous contrast.   (topogram) image(s) also obtained and reviewed.    Findings: There is no intracranial hemorrhage, mass effect, or midline  shift. Gray/white matter differentiation in both cerebral hemispheres  is preserved. Ventricles are proportionate to the cerebral sulci. The  basal cisterns are clear. There are nonspecific hypodensities within  the subcortical and deep cerebral white matter, unchanged and most  likely related to chronic small vessel ischemic disease given the  patient's age.  Mild-to-moderate leukoaraiosis. Atherosclerotic  calcification of the carotid siphons and the distal vertebral  arteries.    The bony calvaria and the bones of the skull base are normal. The  visualized portions of the paranasal sinuses and mastoid air cells are  clear. Right pseudophakia. Degenerative changes of the bilateral  temporomandibular joints, right greater than left.      Impression    Impression:  1. No acute intracranial pathology.   2. Moderate chronic small vessel ischemic disease.    I have personally reviewed the examination and initial interpretation  and I agree with the findings.    RICHIE BENJAMIN MD         SYSTEM ID:  [id number]   CTA Head Neck with Contrast     Status: None    Narrative    CTA HEAD NECK W CONTRAST 11/14/2022 3:18 PM    CT angiogram of the neck   CT angiogram of the base of the brain with contrast  Reconstruction on the 3D workstation    Provided History:  confusion    Comparison:  Same day head CT. MR 11/26/2010.      Technique:  HEAD and NECK CTA: During rapid bolus intravenous injection of  nonionic contrast material, axial images were obtained using thin  collimation multidetector helical technique from the base of  the upper  aortic arch through the [place]. This CT angiogram data was  reconstructed at thin intervals with mild overlap. Images were sent to  the 3D workstation, and 3D reconstructions were obtained. The axial  source images, multiplanar reformations, 3D reconstructions in both  maximum intensity projection display and volume rendered models were  reviewed, with reconstructions performed by the technologist.    Contrast: iopamidol (ISOVUE-370) solution 75 mL    Findings:    Head CTA demonstrates no aneurysm or stenosis of the major  intracranial arteries. Prominent anterior communicating artery,  unchanged.    Neck CTA demonstrates atherosclerotic plaque present at the carotid  bulb. Short segment associated narrowing of the proximal right carotid  artery with residual lumen diameter of 2.2 mm and normal distal  diameter of 5.2 mm, corresponding to 50-60% diameter stenosis.  Atherosclerotic plaque of the left carotid bifurcation without  significant associated stenosis. No significant stenosis of the  vertebral arteries. The origins of the great vessels from the aortic  arch are patent.    Multilevel cervical degenerative changes.    No mass within the visualized portions of the cervical soft tissues or  lung apices.       Impression    Impression:    1. Head CTA demonstrates no stenosis of the major intracranial  arteries. Unchanged prominent anterior communicating artery. Although  there is no discrete associated aneurysm and the appearance is stable  over almost 2 years, consider neurointerventional referral for  follow-up imaging options.  2. Neck CTA demonstrates atherosclerotic short segment right internal  carotid artery stenosis of 50-60%.    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    This report will be copied to the [facility] to ensure a  provider acknowledges the finding.        I have personally reviewed the examination and initial  interpretation  and I agree with the findings.    RICHIE BENJAMIN MD         SYSTEM ID:  [id number]   MR Brain w/o Contrast     Status: None (Preliminary result)    Impression    RESIDENT PRELIMINARY INTERPRETATION  Impression:   1. There are two foci of acute infarction, one along the left corona  radiata/posterior caudate nucleus and another along the left splenium  of the corpus callosum.  2. Moderate leukoaraiosis.    [Urgent Result: 2 foci of infarction]    Finding was identified on 11/14/2022 6:37 PM.     Dr. Hooks was contacted by Dr. Tricia Mejia at 11/14/2022 6:39 PM  and verbalized understanding of the urgent finding.    INR     Status: Normal   Result Value Ref Range    INR 1.14 0.85 - 1.15   Partial thromboplastin time     Status: Normal   Result Value Ref Range    aPTT 32 22 - 38 Seconds   Troponin T, High Sensitivity     Status: Normal   Result Value Ref Range    Troponin T, High Sensitivity 11 <=14 ng/L   Comprehensive metabolic panel     Status: Normal   Result Value Ref Range    Sodium 140 136 - 145 mmol/L    Potassium 4.1 3.4 - 5.3 mmol/L    Chloride 103 98 - 107 mmol/L    Carbon Dioxide (CO2) 23 22 - 29 mmol/L    Anion Gap 14 7 - 15 mmol/L    Urea Nitrogen 22.3 8.0 - 23.0 mg/dL    Creatinine 0.78 0.51 - 0.95 mg/dL    Calcium 9.1 8.8 - 10.2 mg/dL    Glucose 99 70 - 99 mg/dL    Alkaline Phosphatase 71 35 - 104 U/L    AST 24 10 - 35 U/L    ALT 13 10 - 35 U/L    Protein Total 7.4 6.4 - 8.3 g/dL    Albumin 4.2 3.5 - 5.2 g/dL    Bilirubin Total 0.5 <=1.2 mg/dL    GFR Estimate 77 >60 mL/min/1.73m2   Glucose by meter     Status: Abnormal   Result Value Ref Range    GLUCOSE BY METER POCT 110 (H) 70 - 99 mg/dL   CBC with platelets and differential     Status: None   Result Value Ref Range    WBC Count 11.0 4.0 - 11.0 10e3/uL    RBC Count 4.90 3.80 - 5.20 10e6/uL    Hemoglobin 14.2 11.7 - 15.7 g/dL    Hematocrit 43.8 35.0 - 47.0 %    MCV 89 78 - 100 fL    MCH 29.0 26.5 - 33.0 pg    MCHC 32.4 31.5 - 36.5 g/dL     RDW 12.9 10.0 - 15.0 %    Platelet Count 226 150 - 450 10e3/uL    % Neutrophils 73 %    % Lymphocytes 20 %    % Monocytes 6 %    % Eosinophils 1 %    % Basophils 0 %    % Immature Granulocytes 0 %    NRBCs per 100 WBC 0 <1 /100    Absolute Neutrophils 7.9 1.6 - 8.3 10e3/uL    Absolute Lymphocytes 2.3 0.8 - 5.3 10e3/uL    Absolute Monocytes 0.6 0.0 - 1.3 10e3/uL    Absolute Eosinophils 0.1 0.0 - 0.7 10e3/uL    Absolute Basophils 0.0 0.0 - 0.2 10e3/uL    Absolute Immature Granulocytes 0.0 <=0.4 10e3/uL    Absolute NRBCs 0.0 10e3/uL   Asymptomatic COVID-19 Virus (Coronavirus) by PCR Nasopharyngeal     Status: Normal    Specimen: Nasopharyngeal; Swab   Result Value Ref Range    SARS CoV2 PCR Negative Negative    Narrative    Testing was performed using the Xpert Xpress SARS-CoV-2 Assay on the  Cepheid Gene-Xpert Instrument Systems. Additional information about  this Emergency Use Authorization (EUA) assay can be found via the Lab  Guide. This test should be ordered for the detection of SARS-CoV-2 in  individuals who meet SARS-CoV-2 clinical and/or epidemiological  criteria. Test performance is unknown in asymptomatic patients. This  test is for in vitro diagnostic use under the FDA EUA for  laboratories certified under CLIA to perform high complexity testing.  This test has not been FDA cleared or approved. A negative result  does not rule out the presence of PCR inhibitors in the specimen or  target RNA in concentration below the limit of detection for the  assay. The possibility of a false negative should be considered if  the patient's recent exposure or clinical presentation suggests  COVID-19. This test was validated by the [facility]. This laboratory is certified under  the Clinical Laboratory Improvement Amendments of 1988 (CLIA-88) as  qualified to perform high complexity laboratory testing.     Extra Tube ([place] Draw)     Status: None    Narrative    The  following orders were created for panel order Extra Tube ([place] Draw).  Procedure                               Abnormality         Status                     ---------                               -----------         ------                     Extra Red Top Tube[[id number]]                               Final result                 Please view results for these tests on the individual orders.   Extra Red Top Tube     Status: None   Result Value Ref Range    Hold Specimen [facility]    EKG 12-lead, tracing only     Status: None (Preliminary result)   Result Value Ref Range    Systolic Blood Pressure  mmHg    Diastolic Blood Pressure  mmHg    Ventricular Rate 94 BPM    Atrial Rate 94 BPM    TN Interval 160 ms    QRS Duration 86 ms     ms    QTc 482 ms    P Axis 79 degrees    R AXIS -34 degrees    T Axis -3 degrees    Interpretation ECG       Sinus rhythm with Premature atrial complexes  Left axis deviation  Inferior infarct , age undetermined  Anterior infarct , age undetermined  Abnormal ECG     CBC with Platelets & Differential     Status: None    Narrative    The following orders were created for panel order CBC with Platelets & Differential.  Procedure                               Abnormality         Status                     ---------                               -----------         ------                     CBC with platelets and d...[[id number]]                      Final result                 Please view results for these tests on the individual orders.     Medications   atorvastatin (LIPITOR) tablet 20 mg (has no administration in time range)   carboxymethylcellulose PF (REFRESH PLUS) 0.5 % ophthalmic solution 1 drop (has no administration in time range)   latanoprost (XALATAN) 0.005 % ophthalmic solution 1 drop (has no administration in time range)   timolol maleate (TIMOPTIC) 0.5 % ophthalmic solution 1 drop (has no administration in time range)   melatonin tablet 5 mg (has no administration in time  range)   iopamidol (ISOVUE-370) solution 75 mL (75 mLs Intravenous Given 11/14/22 1508)   sodium chloride (PF) 0.9% PF flush 90 mL (90 mLs Intravenous Given 11/14/22 1508)   LORazepam (ATIVAN) injection 1 mg (1 mg Intravenous Given 11/14/22 1728)        Assessments & Plan (with Medical Decision Making)   Alfreda Baxter is a 77 year old female who presents emergency department with onset of altered mental status, confusion.  Stroke code immediately activated to facilitate and expedite care with concern for acute neurologic emergency.  Not a candidate for tPA given her anticoagulation and last known normal which based on my discussion with the patient's son is actually yesterday according to him.  Neurology able to obtain additional information, please see their note.  CT scans unrevealing and reassuring.  Neurology assessed her and believes that she is having transient global amnesia.  Recommend an MRI however.  Will give Ativan as well.    I have reviewed the nursing notes. I have reviewed the findings, diagnosis, plan and need for follow up with the patient.    Will proceed with admission to hospitalist service for observation given her transient global amnesia. MRI pending      MRI resulted with small infarcts.  Patient has been compliant with her medications.  Notified neurology.     New Prescriptions    No medications on file       Final diagnoses:   Transient global amnesia       --  Luc Hooks MD PhD  [facility] EMERGENCY DEPARTMENT  11/14/2022     Luc Hooks MD  11/14/22 1917

## 2022-11-15 NOTE — PROGRESS NOTES
Occupational Therapy Evaluation 11/15/22 1100   Appointment Info   Signing Clinician's Name / Credentials (OT) Saniya Waite, OTR/L   Living Environment   People in Home spouse   Current Living Arrangements house   Home Accessibility stairs within home   Number of Stairs, Within Home, Primary other (see comments)  (one flight into the basement)   Stair Railings, Within Home, Primary railings on both sides of stairs   Transportation Anticipated family or friend will provide;car, drives self   Living Environment Comments Pt lives in a single story home w/ her .Laundry in the basement. Walk-in shower w/ GBs.   Self-Care   Usual Activity Tolerance good   Current Activity Tolerance moderate   Equipment Currently Used at Home grab bar, tub/shower   Fall history within last six months no   Activity/Exercise/Self-Care Comment Pt reports IND with I/ADLs and mobility at baseline. Pt does own a walker from a previous knee surgery.   General Information   Onset of Illness/Injury or Date of Surgery 11/14/22   Referring Physician Rocio Guerrero MD   Additional Occupational Profile Info/Pertinent History of Current Problem Ms. Baxter is a 78 yo F h/o atrial fibrillation on Eliquis 2.5 mg BID, HTN, HLD and prior L CRAO who presented on 11/14/2022 with acute onset anterograde amnesia prompting stroke code activation. Initial NIHSS was 2, scoring for LOC questions and chronic L eye vision loss. CT/CTA were unrevealing of any acute pathological findings and the patient's presentation was thought to be very consistent with transient global amnesia, but MRI was recommended to definitively rule out the possibility of stroke. 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   Existing Precautions/Restrictions fall   Limitations/Impairments visual   Cognitive Status Examination   Orientation Status orientation  to person, place and time   Affect/Mental Status (Cognitive) WNL   Cognitive Status Comments Cognition appears intact. Pt endorses return of STM/LTM after transient global amnesia   Visual Perception   Impact of Vision Impairment on Function (Vision) L eye defiits at baseline   Sensory   Sensory Quick Adds sensation intact   Pain Assessment   Patient Currently in Pain No   Posture   Posture not impaired   Range of Motion Comprehensive   General Range of Motion no range of motion deficits identified   Strength Comprehensive (MMT)   General Manual Muscle Testing (MMT) Assessment no strength deficits identified   Comment, General Manual Muscle Testing (MMT) Assessment BUE grossly +4/5   Coordination   Upper Extremity Coordination No deficits were identified   Bed Mobility   Bed Mobility No deficits identified   Transfers   Transfers sit-stand transfer   Sit-Stand Transfer   Sit-Stand [place] (Transfers) contact guard   Activities of Daily Living   BADL Assessment/Intervention lower body dressing   Lower Body Dressing Assessment/Training   [place] Level (Lower Body Dressing) supervision;set up   Clinical Impression   Criteria for Skilled Therapeutic Interventions Met (OT) Yes, treatment indicated   OT Diagnosis At risk for decreased I/ADL IND   OT Problem List-Impairments impacting ADL problems related to;activity tolerance impaired;balance   Assessment of Occupational Performance 1-3 Performance Deficits   Identified Performance Deficits home mgmt, transfers   Planned Therapy Interventions (OT) ADL retraining;IADL retraining;progressive activity/exercise   Clinical Decision Making Complexity (OT) low complexity   Risk & Benefits of therapy have been explained evaluation/treatment results reviewed;care plan/treatment goals reviewed;risks/benefits reviewed;current/potential barriers reviewed;participants voiced agreement with care plan;participants included;patient;son   OT Total Evaluation Time   OT Eval, Low  Complexity Minutes (69281) 7   OT Goals   Therapy Frequency (OT) One time eval and treatment   OT Predicted Duration/Target Date for Goal Attainment 11/15/22   OT Goals Lower Body Dressing;Bed Mobility   OT: Lower Body Dressing Independent   OT: Bed Mobility Independent   OT Discharge Planning   OT Plan DC OT   OT Discharge Recommendation (DC Rec) home with assist  (OP PT)   OT Rationale for DC Rec Pt presents near functional baseline w/o any new deficits noted. Anticipate higher level balance deficits as well as balance deficits d/t baseline L visual deficits. Rec OP PT to progress balance and reduce future risk for falls.   OT Brief overview of current status SBA w/ IV pole   Total Session Time   Total Session Time (sum of timed and untimed services) 7

## 2022-11-16 ENCOUNTER — OFFICE VISIT (OUTPATIENT)
Dept: OPHTHALMOLOGY | Facility: CLINIC | Age: 77
End: 2022-11-16
Attending: OPHTHALMOLOGY
Payer: MEDICARE

## 2022-11-16 DIAGNOSIS — Z86.69 HISTORY OF CENTRAL RETINAL ARTERY OCCLUSION: ICD-10-CM

## 2022-11-16 DIAGNOSIS — Z96.1 PSEUDOPHAKIA: Primary | ICD-10-CM

## 2022-11-16 PROCEDURE — 99024 POSTOP FOLLOW-UP VISIT: CPT | Performed by: OPHTHALMOLOGY

## 2022-11-16 PROCEDURE — G0463 HOSPITAL OUTPT CLINIC VISIT: HCPCS | Mod: 25

## 2022-11-16 PROCEDURE — 92015 DETERMINE REFRACTIVE STATE: CPT | Mod: GY

## 2022-11-16 ASSESSMENT — VISUAL ACUITY
OD_PH_SC+: -2
OD_PH_SC: 20/25
METHOD: SNELLEN - LINEAR
OD_SC: 20/200
OS_SC: NLP

## 2022-11-16 ASSESSMENT — REFRACTION_MANIFEST
OD_ADD: +2.25
OS_SPHERE: BALANCE
OD_SPHERE: -2.75
OD_CYLINDER: +0.50
OD_AXIS: 150

## 2022-11-16 ASSESSMENT — TONOMETRY
OS_IOP_MMHG: 22
IOP_METHOD: TONOPEN
OD_IOP_MMHG: 19

## 2022-11-16 ASSESSMENT — CONF VISUAL FIELD
OS_SUPERIOR_TEMPORAL_RESTRICTION: 1
OS_INFERIOR_NASAL_RESTRICTION: 1
OS_INFERIOR_TEMPORAL_RESTRICTION: 1
OS_SUPERIOR_NASAL_RESTRICTION: 1

## 2022-11-16 ASSESSMENT — EXTERNAL EXAM - RIGHT EYE: OD_EXAM: NORMAL

## 2022-11-16 ASSESSMENT — SLIT LAMP EXAM - LIDS
COMMENTS: DERMATOCHALASIS
COMMENTS: DERMATOCHALASIS

## 2022-11-16 ASSESSMENT — CUP TO DISC RATIO: OD_RATIO: 0.5

## 2022-11-16 ASSESSMENT — EXTERNAL EXAM - LEFT EYE: OS_EXAM: 2+ BROW PTOSIS, MILD TO MOD BROW

## 2022-11-16 NOTE — NURSING NOTE
Chief Complaints and History of Present Illnesses   Patient presents with     Post Op (Ophthalmology) Right Eye     Chief Complaint(s) and History of Present Illness(es)     Post Op (Ophthalmology) Right Eye            Laterality: right eye    Associated symptoms: Negative for dryness, eye pain, tearing, flashes, floaters and itching    Pain scale: 0/10          Comments    Malinda is here post cataract surgery RE (10-24). She says RE sees very well LE does not see. History of glaucoma. She uses Latanoprost BE and Timolol LE.     Jose David Vincent COT 9:05 AM November 16, 2022

## 2022-11-16 NOTE — PROGRESS NOTES
Chief Complaint(s) and History of Present Illness(es)     Post Op (Ophthalmology) Right Eye            Laterality: right eye    Associated symptoms: Negative for dryness, eye pain, tearing, flashes,   floaters and itching    Pain scale: 0/10          Comments    Malinda is here post cataract surgery RE (10-24). She says RE sees very well   LE does not see. History of glaucoma. She uses Latanoprost BE and Timolol   LE.     Jose David Dasd COT 9:05 AM November 16, 2022                Review of systems for the eyes was negative other than the pertinent positives/negatives listed in the HPI.      Assessment & Plan      Alfreda Baxter is a 77 year old female with the following diagnoses:   1. Pseudophakia - Right Eye    2. History of central retinal artery occlusion, os         Doing well, very happy with vision  Ok to resume normal activities  Taper Predforte as directed  Glasses prescription updated  Artificial tears as needed        Patient disposition:   Return for Dr. Morrison as scheduled.   Florence as needed            Attending Physician Attestation:  Complete documentation of historical and exam elements from today's encounter can be found in the full encounter summary report (not reduplicated in this progress note).  I personally obtained the chief complaint(s) and history of present illness.  I confirmed and edited as necessary the review of systems, past medical/surgical history, family history, social history, and examination findings as documented by others; and I examined the patient myself.  I personally reviewed the relevant tests, images, and reports as documented above.  I formulated and edited as necessary the assessment and plan and discussed the findings and management plan with the patient and family. . - Ezequiel Florence MD

## 2022-11-16 NOTE — DISCHARGE SUMMARY
[facility]  Discharge Summary - Medicine & Pediatrics       Date of Admission:  11/14/2022  Date of Discharge:  11/15/2022  2:48 PM  Discharging Provider: João Craig  Discharge Service: Medicine Service, ARVIND TEAM 3    Discharge Diagnoses   Transient global amnesia, resolved    Acute CVA in L corona radiata and L corpus callosum   Prominent anterior communicating artery  Severe mitral annular calcification  Paroxysmal atrial fibrillation   Hypertension   Hyperlipidemia  50% to 60% right carotid artery stenosis  History of L central retinal artery occlusion   S/p R cataract surgery     Follow-ups Needed After Discharge   1) PCP follow up (Follow up apixaban, will need referral to neuro IR for further imaging of prominent SIDDHARTH)  2) Neurology follow up - general neurology for TGA    Discharge Disposition   Discharged to home  Condition at discharge: Stable    Hospital Course     Alfreda Baxter is a 77 year old woman with history of hypertension, hyperlipidemia, GERD admitted on 11/14/2022 for confusion and amnesia       Transient global amnesia, resolved  Started with confusion and inability to remember things that happened minutes ago and pt was only oriented to self. No focal neurologic deficits. CTA head and neck unremarkable for acute stroke, and was evaluated by neurology. MRI ordered, and done as below. Overnight she had improvement of amnesia and at time of discharge she was back to baseline per her son and she was alert and orientex x4 and able to remember medical history and details of her medication doses. Evaluated by OT and was cleared to go home.     Acute CVA in L corona radiata and L corpus callosum   MRI showed two foci of acute infarction, one along the left corona radiata/posterior caudate nucleus and another along the left splenium of the corpus callosum. Per neurology, these do not explain amnesia and recommended continuation of apixaban for afib as  below. Thought to be cardioembolic in origin.   - Apixaban as below, does not need antiplatelets  - Increased atorvastatin to 40 mg     Prominent anterior communicating artery  -  Referral placed to neuro IR for further imaging of prominent SIDDHARTH    Paroxysmal atrial fibrillation   Rate controlled not on metoprolol. She had been on half dose of apixaban due to gum bleeding. Restarted PTA apixaban at 5 mg bid dose per neurology recs. Discussed risks and benefits with pt and recommended higher dose and pt amenable.      50% to 60% right carotid artery stenosis  No need for vascular intervention. Recommend outpateint follow up.      Severe mitral annular calcification  Seen on echocardiogram. Continue working on modifiable risk factors.      History of L central retinal artery occlusion   S/p R cataract surgery   [place] [facility] [place] for CRAO. Baseline difficulty seeing out of L eye. PTA eye drops.      Hypertension - held PTA lisinopril in setting of acute stroke, resumed at discharge per neurology recs.     Consultations This Hospital Stay   PHYSICAL THERAPY ADULT IP CONSULT  OCCUPATIONAL THERAPY ADULT IP CONSULT  PATIENT [facility] IP CONSULT    Code Status   Prior     The patient was seen and plan of care discussed with Dr. Craig.    Rocio Guerrero MD  PGY3 Internal Medicine  [facility] 3 Service  [facility]  ______________________________________________________________________    Physical Exam   Vital Signs: Temp: 97.9  F (36.6  C) Temp src: Oral BP: 111/72 Pulse: 78   Resp: 16 SpO2: 94 % O2 Device: None (Room air) Oxygen Delivery: 2 LPM  Weight: 0 lbs 0 oz  General Appearance: Well appearing in NAD  Respiratory: normal WOB on ambient air  Cardiovascular: nromal rate regular rhythm   GI: soft nd nt  Skin: wwp  Other: Alert and oriented x4, able to recall events and able to move all extremities spontaneously, able to recall details of medications and history, no focal deficits       Primary Care Physician   Brandi Sims    Discharge Orders      Adult Neurology  Referral      Adult Neurology  Referral      Reason for your hospital stay    You were admitted for confusion and were seen by neurology. We did scans of your brain and found two small strokes. We think that the strokes are not what caused your confusion. We think what you had is called Transient global amnesia (TGA) which is an uncommon, benign, self-limited condition.     You improved significantly and you were deemed ready to discharge home. We recommend following up with neurology. We also recommend increasing your apixaban to 5 mg twice daily. We talked with neurology and they recommend continuing your lisinopril as usual. We only needed to hold it for 24 hours but now we would like your blood pressure to be less than 130/80.     Please follow up with your primary doctor for hospital follow up.     Activity    Your activity upon discharge: activity as tolerated     Adult [facility] Follow-up and recommended labs and tests    Follow up with primary care provider, Brandi Sims, within 7 days for hospital follow- up.    Follow up with neurology.     Appointments on [place] and/or [facility] (with [facility] or [facility] provider or service). Call [phone] if you haven't heard regarding these appointments within 7 days of discharge.     Diet    Follow this diet upon discharge: Orders Placed This Encounter      Regular Diet Adult       Significant Results and Procedures   Results for orders placed or performed during the hospital encounter of 11/14/22   CT Head w/o Contrast    Narrative    CT HEAD W/O CONTRAST 11/14/2022 3:16 PM    History: confusion     Comparison: MRI brain and head CT 11/26/2020.    Technique: Using multidetector thin collimation helical acquisition  technique, axial, coronal and sagittal CT images from the skull base  to the vertex were obtained without intravenous contrast.    (topogram) image(s) also obtained and reviewed.    Findings: There is no intracranial hemorrhage, mass effect, or midline  shift. Gray/white matter differentiation in both cerebral hemispheres  is preserved. Ventricles are proportionate to the cerebral sulci. The  basal cisterns are clear. There are nonspecific hypodensities within  the subcortical and deep cerebral white matter, unchanged and most  likely related to chronic small vessel ischemic disease given the  patient's age.  Mild-to-moderate leukoaraiosis. Atherosclerotic  calcification of the carotid siphons and the distal vertebral  arteries.    The bony calvaria and the bones of the skull base are normal. The  visualized portions of the paranasal sinuses and mastoid air cells are  clear. Right pseudophakia. Degenerative changes of the bilateral  temporomandibular joints, right greater than left.      Impression    Impression:  1. No acute intracranial pathology.   2. Moderate chronic small vessel ischemic disease.    I have personally reviewed the examination and initial interpretation  and I agree with the findings.    RICHIE BENJAMIN MD         SYSTEM ID:  [id number]   CTA Head Neck with Contrast    Narrative    CTA HEAD NECK W CONTRAST 11/14/2022 3:18 PM    CT angiogram of the neck   CT angiogram of the base of the brain with contrast  Reconstruction on the 3D workstation    Provided History:  confusion    Comparison:  Same day head CT. MR 11/26/2010.      Technique:  HEAD and NECK CTA: During rapid bolus intravenous injection of  nonionic contrast material, axial images were obtained using thin  collimation multidetector helical technique from the base of the upper  aortic arch through the [place]. This CT angiogram data was  reconstructed at thin intervals with mild overlap. Images were sent to  the 3D workstation, and 3D reconstructions were obtained. The axial  source images, multiplanar reformations, 3D reconstructions in both  maximum  intensity projection display and volume rendered models were  reviewed, with reconstructions performed by the technologist.    Contrast: iopamidol (ISOVUE-370) solution 75 mL    Findings:    Head CTA demonstrates no aneurysm or stenosis of the major  intracranial arteries. Prominent anterior communicating artery,  unchanged.    Neck CTA demonstrates atherosclerotic plaque present at the carotid  bulb. Short segment associated narrowing of the proximal right carotid  artery with residual lumen diameter of 2.2 mm and normal distal  diameter of 5.2 mm, corresponding to 50-60% diameter stenosis.  Atherosclerotic plaque of the left carotid bifurcation without  significant associated stenosis. No significant stenosis of the  vertebral arteries. The origins of the great vessels from the aortic  arch are patent.    Multilevel cervical degenerative changes.    No mass within the visualized portions of the cervical soft tissues or  lung apices.       Impression    Impression:    1. Head CTA demonstrates no stenosis of the major intracranial  arteries. Unchanged prominent anterior communicating artery. Although  there is no discrete associated aneurysm and the appearance is stable  over almost 2 years, consider neurointerventional referral for  follow-up imaging options.  2. Neck CTA demonstrates atherosclerotic short segment right internal  carotid artery stenosis of 50-60%.    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    This report will be copied to the [facility] to ensure a  provider acknowledges the finding.        I have personally reviewed the examination and initial interpretation  and I agree with the findings.    RICHIE BENJAMIN MD         SYSTEM ID:  [id number]   MR Brain w/o Contrast     Value    Radiologist flags 2 foci of infarction (Urgent)    Narrative    MR BRAIN W/O CONTRAST 11/14/2022 6:33 PM    Provided History: confusion, r/o PRESS and stroke    Comparison:   2020     Technique: Sagittal T1-weighted, coronal T2-weighted, axial T2 FLAIR,  axial susceptibility weighted, and axial diffusion-weighted with ADC  map images of the brain were obtained without intravenous contrast.    Findings: No intracranial mass lesion, mass effect, midline shift, or  abnormal fluid collection. The ventricles and sulci are normal for  age. There are 2 areas of restricted diffusion, along the left  posterior corona radiata/caudate nucleus (series 3 image 67) and  another in the splenium of the corpus callosum posterior to the left  lateral ventricle (series 3 image 65). Patchy. Lower matter T2  hyperintensities, nonspecific and likely secondary to chronic small  vessel ischemic disease.  Normal intravascular flow voids.      Impression    Impression:   1. There are two foci of acute infarction, one along the left corona  radiata/posterior caudate nucleus and another along the left splenium  of the corpus callosum.  2. Moderate leukoaraiosis.    [Urgent Result: 2 foci of infarction]    Finding was identified on 2022 6:37 PM.     Dr. Hooks was contacted by Dr. Tricia Mejia at 2022 6:39 PM  and verbalized understanding of the urgent finding.    I have personally reviewed the examination and initial interpretation  and I agree with the findings.    CATRACHITA SILVA MD         SYSTEM ID:  [id number]   Echo Complete     Value    LVEF  55-60%    Narrative    [id number]  [id number]  [id number]  [id number]^JAYANT^SIRI     [facility]  Echocardiography Laboratory  [address] [place] [id number]     Name: BELEN STARKEY  MRN: [id number]  : 1945  Study Date: 11/15/2022 08:50 AM  Age: 77 yrs  Gender: Female  Patient Location: [facility]  Reason For Study: CVA  Ordering Physician: SIRI SABA  Performed By: Qiana Barry     BSA: 1.9 m2  Height: 62 in  Weight: 197  lb  ______________________________________________________________________________  Procedure  Complete Portable Echo Adult.  ______________________________________________________________________________  Interpretation Summary  Global and regional left ventricular function is normal with an EF of 55-60%.  Global right ventricular function is normal.  Severe mitral annular calcification is present. Moderate mitral insufficiency  is present.  Mild dilatation of the aorta is present.  IVC diameter <2.1 cm collapsing >50% with sniff suggests a normal RA pressure  of 3 mmHg.  No pericardial effusion is present.  There is no prior study for direct comparison.  ______________________________________________________________________________  Left Ventricle  Global and regional left ventricular function is normal with an EF of 55-60%.  Left ventricular size is normal. Left ventricular wall thickness is normal.  Diastolic function not assessed due to significant mitral annular  calcification.     Right Ventricle  The right ventricle is normal size. Global right ventricular function is  normal.     Atria  The right atria appears normal. Mild left atrial enlargement is present.     Mitral Valve  Severe mitral annular calcification is present. Moderate mitral insufficiency  is present. The mean mitral valve gradient is 3.0 mmHg.     Aortic Valve  The valve leaflets are not well visualized. Mild aortic valve calcification is  present.     Tricuspid Valve  Trace to mild tricuspid insufficiency is present. The right ventricular  systolic pressure is approximated at 32.5 mmHg plus the right atrial pressure.     Pulmonic Valve  The pulmonic valve is normal.     Vessels  The aorta root is normal. Mild dilatation of the aorta is present. Ascending  aorta 4.0 cm. IVC diameter <2.1 cm collapsing >50% with sniff suggests a  normal RA pressure of 3 mmHg.     Pericardium  No pericardial effusion is present.     Compared to Previous  Study  There is no prior study for direct comparison.  ______________________________________________________________________________  MMode/2D Measurements & Calculations  IVSd: 0.90 cm  LVIDd: 5.5 cm  LVIDs: 3.9 cm  LVPWd: 0.98 cm  FS: 28.2 %  LV mass(C)d: 194.1 grams  LV mass(C)dI: 102.2 grams/m2  Ao root diam: 3.2 cm  asc Aorta Diam: 4.0 cm  LVOT diam: 2.3 cm  LVOT area: 4.2 cm2  LA Volume (BP): 46.7 ml     LA Volume Index (BP): 24.6 ml/m2  RWT: 0.36     Doppler Measurements & Calculations  MV E max sherif: 121.0 cm/sec  MV A max sherif: 110.0 cm/sec  MV E/A: 1.1  MV max P.7 mmHg  MV mean PG: 3.0 mmHg  MV V2 VTI: 36.4 cm  MV dec time: 0.19 sec  TR max sherif: 285.0 cm/sec  TR max P.5 mmHg  E/E' av.7  Lateral E/e': 19.1  Medial E/e': 22.3     ______________________________________________________________________________  Report approved by: Ronel Chandler 11/15/2022 10:34 AM               Discharge Medications   Discharge Medication List as of 11/15/2022  2:20 PM      CONTINUE these medications which have CHANGED    Details   apixaban ANTICOAGULANT (ELIQUIS) 5 MG tablet Take 1 tablet (5 mg) by mouth 2 times daily for 30 days, Disp-60 tablet, R-0, E-Prescribe      atorvastatin (LIPITOR) 40 MG tablet Take 1 tablet (40 mg) by mouth daily for 30 days, Disp-30 tablet, R-0, E-Prescribe         CONTINUE these medications which have NOT CHANGED    Details   acetaminophen (TYLENOL) 500 MG tablet Take 500-1,000 mg by mouth every 6 hours as needed., 500-1,000 mg, Oral, EVERY 6 HOURS PRN, Until Discontinued, Historical      albuterol (PROAIR HFA/PROVENTIL HFA/VENTOLIN HFA) 108 (90 Base) MCG/ACT inhaler Inhale 2 puffs into the lungs every 6 hours as needed for shortness of breath / dyspnea or wheezing, Disp-18 g, R-3, E-PrescribePharmacy may dispense brand covered by insurance (Proair, or proventil or ventolin or generic albuterol inhaler)      B-COMPLEX OR Take  by mouth daily., Oral, Historical      CALCIUM 500  +D OR one daily, Oral, Historical      hypromellose (ARTIFICIAL TEARS) 0.5 % SOLN ophthalmic solution Place 1 drop into both eyes 3 times daily, Historical      latanoprost (XALATAN) 0.005 % ophthalmic solution Place 1 drop into both eyes At Bedtime, Disp-7.5 mL, R-3, E-Prescribe      lisinopril (ZESTRIL) 10 MG tablet Take 1 tablet (10 mg) by mouth daily, Disp-90 tablet, R-3, E-Prescribe      moxifloxacin (VIGAMOX) 0.5 % ophthalmic solution Apply 1 drop to eye 3 times daily To operative eye, Disp-3 mL, R-1, E-Prescribe      multivitamin (OCUVITE) TABS tablet Take 1 tablet by mouth daily, Historical      NEW MED Latanoprost eye drops , Historical      prednisoLONE acetate (PRED FORTE) 1 % ophthalmic suspension Apply 1 drop to eye 4 times daily To operative eye, Disp-10 mL, R-1, E-Prescribe      timolol maleate (TIMOPTIC) 0.5 % ophthalmic solution Place 1 drop Into the left eye every morning, Disp-10 mL, R-3, [id number] day supply ok if patient prefers      zolpidem (AMBIEN) 5 MG tablet TAKE ONE TABLET BY MOUTH IN THE EVENING AS NEEDED FOR SLEEP, Disp-30 tablet, R-3, E-Prescribe           Allergies   Allergies   Allergen Reactions     Codeine Anaphylaxis     Niacin      No Clinical Screening - See Comments Other (See Comments)     senisitive to non steroidals  Aleve, ibuprofen celebrex cause bad stomach pains  senisitive to non steroidals  Aleve, ibuprofen celebrex cause bad stomach pains     Oxycodone Hives     Other reaction(s): Unknown  ... With facial swelling     Trazodone      nausea     Nsaids Nausea and Vomiting     Other reaction(s): Abdominal Pain

## 2022-11-17 ENCOUNTER — PATIENT OUTREACH (OUTPATIENT)
Dept: CARE COORDINATION | Facility: CLINIC | Age: 77
End: 2022-11-17

## 2022-11-17 NOTE — PROGRESS NOTES
Clinic Care Coordination Contact  [facility]/Voicemail       Clinical Data: Care Coordinator Outreach  Outreach attempted x 2.  Left message on patient's voicemail with call back information and requested return call.  Plan:  Care Coordinator will do no further outreaches at this time.    Susy NG Community Health Worker  Clinic Care Coordination  [facility]  Phone: [phone]

## 2022-12-03 PROBLEM — H53.9 DISTURBANCES OF VISION, LATE EFFECT OF STROKE: Status: ACTIVE | Noted: 2022-12-03

## 2022-12-03 PROBLEM — I69.398 DISTURBANCES OF VISION, LATE EFFECT OF STROKE: Status: ACTIVE | Noted: 2022-12-03

## 2022-12-06 ENCOUNTER — OFFICE VISIT (OUTPATIENT)
Dept: INTERNAL MEDICINE | Facility: CLINIC | Age: 77
End: 2022-12-06
Payer: MEDICARE

## 2022-12-06 VITALS
HEART RATE: 79 BPM | WEIGHT: 197 LBS | SYSTOLIC BLOOD PRESSURE: 136 MMHG | BODY MASS INDEX: 36.03 KG/M2 | OXYGEN SATURATION: 98 % | DIASTOLIC BLOOD PRESSURE: 83 MMHG | TEMPERATURE: 97.4 F

## 2022-12-06 DIAGNOSIS — H54.40 BLINDNESS OF LEFT EYE WITH NORMAL VISION IN CONTRALATERAL EYE: ICD-10-CM

## 2022-12-06 DIAGNOSIS — I63.40 CEREBROVASCULAR ACCIDENT (CVA) DUE TO EMBOLISM OF CEREBRAL ARTERY (H): ICD-10-CM

## 2022-12-06 DIAGNOSIS — H53.9 DISTURBANCES OF VISION, LATE EFFECT OF STROKE: ICD-10-CM

## 2022-12-06 DIAGNOSIS — I63.9 RECURRENT CEREBROVASCULAR ACCIDENTS (CVAS) (H): Primary | ICD-10-CM

## 2022-12-06 DIAGNOSIS — I69.398 DISTURBANCES OF VISION, LATE EFFECT OF STROKE: ICD-10-CM

## 2022-12-06 DIAGNOSIS — Z87.898 HISTORY OF GINGIVAL BLEEDING: ICD-10-CM

## 2022-12-06 DIAGNOSIS — Z79.01 LONG TERM CURRENT USE OF ANTICOAGULANT THERAPY: ICD-10-CM

## 2022-12-06 PROCEDURE — 99214 OFFICE O/P EST MOD 30 MIN: CPT | Performed by: INTERNAL MEDICINE

## 2022-12-06 PROCEDURE — 36415 COLL VENOUS BLD VENIPUNCTURE: CPT | Performed by: INTERNAL MEDICINE

## 2022-12-06 PROCEDURE — 86147 CARDIOLIPIN ANTIBODY EA IG: CPT | Mod: 59 | Performed by: INTERNAL MEDICINE

## 2022-12-06 PROCEDURE — 86146 BETA-2 GLYCOPROTEIN ANTIBODY: CPT | Performed by: INTERNAL MEDICINE

## 2022-12-06 RX ORDER — ATORVASTATIN CALCIUM 40 MG/1
40 TABLET, FILM COATED ORAL DAILY
Qty: 90 TABLET | Refills: 3 | Status: SHIPPED | OUTPATIENT
Start: 2022-12-06 | End: 2023-08-23

## 2022-12-06 NOTE — PROGRESS NOTES
"  Assessment & Plan     Recurrent cerebrovascular accidents (CVAs) (H)  This is second CVA   This one had no sequelae  She has had very infrequent self limited afib per REVEAL monitor.   Reviewed MRI and EKG findings with patient at length.  Answered questions. She will defer also to neuro.   Cerebrovascular accident (CVA) due to embolism of cerebral artery (H)  - Beta 2 Glycoprotein 1 Antibody IgG;  Rule out hypercoagulable state needing coumadin therapy   - Cardiolipin Keisha IgG and IgM; Future  \"  - Lupus Anticoagulant Panel; Future  \"  - apixaban ANTICOAGULANT (ELIQUIS) 5 MG tablet; Take 1 tablet (5 mg) by mouth 2 times daily  Back to full dose (had cut by half due to gingival bleeding)   - atorvastatin (LIPITOR) 40 MG tablet; Take 1 tablet (40 mg) by mouth daily   Up from 20 mg.      Long term current use of anticoagulant therapy   check for antiphospholipid Keisha: might change choice of anticoagulant.     Disturbances of vision, late effect of stroke   her first stroke, a couple years ago, caused L eye blindness.   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         No LOS data to display   Time spent doing chart review, history and exam, documentation and further activities per the note      MED REC REQUIRED   Post Medication Reconciliation Status: discharge medications reconciled, continue medications without change  See Patient Instructions    No follow-ups on file.    Brandi Sims MD  [facility] MAGY Petty is a 77 year old, presenting for the following health issues:  No chief complaint on file.      HPI     11/16/22: Hospitalized with Transient global amnesia: resolved..       >Acute CVA in L corona radiata and L corpus callosum , Prominent Anterior communicating artery   >>Back on full dose apixiban    > at PCP f/u. recheck apixiban, referral to neuro IR for further imaging of the " anterior communicating artery. ..      Reveal had shown rare afib.     Hospital Follow-up Visit:    Hospital/Nursing Home/IP Rehab Facility: [facility]  Date of Admission: 11/14/22  Date of Discharge: 11/15/22  Reason(s) for Admission CVA    Was your hospitalization related to COVID-19? No   Problems taking medications regularly:  None  Medication changes since discharge: elliquis increased   Problems adhering to non-medication therapy:  None    Summary of hospitalization:  See HPI    Diagnostic Tests/Treatments reviewed.  Follow up needed: Has f/u with Neurology 12/28/22, Opth 12/12/22    Other Healthcare Providers Involved in Patient s Care:            None  Update since discharge: improved.         Plan of care communicated with patient                 Review of Systems   Constitutional, HEENT, cardiovascular, pulmonary, gi and gu systems are negative, except as otherwise noted.      Objective    /83 (BP Location: Right arm, Patient Position: Sitting, Cuff Size: Adult Large)   Pulse 79   Temp 97.4  F (36.3  C) (Oral)   Wt 89.4 kg (197 lb)   SpO2 98%   BMI 36.03 kg/m    There is no height or weight on file to calculate BMI.  Physical Exam   GENERAL: healthy, alert and no distress  EYES: Eyes grossly normal to inspection, PERRL and conjunctivae and sclerae normal  RESP: lungs clear to auscultation - no rales, rhonchi or wheezes  CV: regular rate and rhythm, normal S1 S2, no S3 or S4, no murmur, click or rub, no peripheral edema and peripheral pulses strong  CV:reveal monitor still barely palpable at L upper chest.   MS: no gross musculoskeletal defects noted, no edema  NEURO: Normal strength and tone, mentation intact and speech normal  PSYCH: mentation appears normal, affect normal/bright    Anticardiolipin ABYs are pending    Admission on 11/14/2022, Discharged on 11/15/2022   Component Date Value Ref Range Status     INR 11/14/2022 1.14  0.85 - 1.15 Final      aPTT 11/14/2022 32  22 - 38 Seconds Final     Troponin T, High Sensitivity 11/14/2022 11  <=14 ng/L Final    Either a High Sensitivity Troponin T baseline (0 hours) value = 100 ng/mL, or an increase in High Sensitivity Troponin T = 7 ng/mL at 2 hours compared to 0 hours (2-0 hours), suggests myocardial injury, and urgent clinical attention is required.    If the 2-0 hours increase is<7 ng/mL, a High Sensitivity Troponin T result above gender-specific reference ranges warrants further evaluation.   Recommendations for further evaluation include correlation with clinical decision-making tool (e.g., HEART), a 3rd High Sensitivity Troponin T test 2 hours after the 2nd (a 20% change from baseline would represent concern), admission for observation, close PCC/cardiology follow-up, or urgent outpatient provocative testing.     Ventricular Rate 11/14/2022 94  BPM Final     Atrial Rate 11/14/2022 94  BPM Final     RI Interval 11/14/2022 160  ms Final     QRS Duration 11/14/2022 86  ms Final     QT 11/14/2022 386  ms Final     QTc 11/14/2022 482  ms Final     P Axis 11/14/2022 79  degrees Final     R AXIS 11/14/2022 -34  degrees Final     T Axis 11/14/2022 -3  degrees Final     Interpretation ECG 11/14/2022    Final                    Value:Sinus rhythm with Premature atrial complexes  Left axis deviation  Inferior infarct , age undetermined  Anterior infarct , age undetermined  Abnormal ECG  Unconfirmed report - interpretation of this ECG is computer generated - see medical record for final interpretation  Confirmed by - EMERGENCY ROOM, PHYSICIAN ([id number]),  CELIA ESCALANTE ([id number]) on 11/15/2022 6:43:28 AM       Sodium 11/14/2022 140  136 - 145 mmol/L Final     Potassium 11/14/2022 4.1  3.4 - 5.3 mmol/L Final     Chloride 11/14/2022 103  98 - 107 mmol/L Final     Carbon Dioxide (CO2) 11/14/2022 23  22 - 29 mmol/L Final     Anion Gap 11/14/2022 14  7 - 15 mmol/L Final     Urea Nitrogen 11/14/2022 22.3  8.0 - 23.0  mg/dL Final     Creatinine 11/14/2022 0.78  0.51 - 0.95 mg/dL Final     Calcium 11/14/2022 9.1  8.8 - 10.2 mg/dL Final     Glucose 11/14/2022 99  70 - 99 mg/dL Final     Alkaline Phosphatase 11/14/2022 71  35 - 104 U/L Final     AST 11/14/2022 24  10 - 35 U/L Final     ALT 11/14/2022 13  10 - 35 U/L Final     Protein Total 11/14/2022 7.4  6.4 - 8.3 g/dL Final     Albumin 11/14/2022 4.2  3.5 - 5.2 g/dL Final     Bilirubin Total 11/14/2022 0.5  <=1.2 mg/dL Final     GFR Estimate 11/14/2022 77  >60 mL/min/1.73m2 Final    Effective December 21, 2021 eGFRcr in adults is calculated using the 2021 CKD-EPI creatinine equation which includes age and gender (Anthony et al., [facility] ([place]), DOI: 10.[facility]6/KEDCml2225430)     GLUCOSE BY METER POCT 11/14/2022 110 (H)  70 - 99 mg/dL Final     WBC Count 11/14/2022 11.0  4.0 - 11.0 10e3/uL Final     RBC Count 11/14/2022 4.90  3.80 - 5.20 10e6/uL Final     Hemoglobin 11/14/2022 14.2  11.7 - 15.7 g/dL Final     Hematocrit 11/14/2022 43.8  35.0 - 47.0 % Final     MCV 11/14/2022 89  78 - 100 fL Final     MCH 11/14/2022 29.0  26.5 - 33.0 pg Final     MCHC 11/14/2022 32.4  31.5 - 36.5 g/dL Final     RDW 11/14/2022 12.9  10.0 - 15.0 % Final     Platelet Count 11/14/2022 226  150 - 450 10e3/uL Final     % Neutrophils 11/14/2022 73  % Final     % Lymphocytes 11/14/2022 20  % Final     % Monocytes 11/14/2022 6  % Final     % Eosinophils 11/14/2022 1  % Final     % Basophils 11/14/2022 0  % Final     % Immature Granulocytes 11/14/2022 0  % Final     NRBCs per 100 WBC 11/14/2022 0  <1 /100 Final     Absolute Neutrophils 11/14/2022 7.9  1.6 - 8.3 10e3/uL Final     Absolute Lymphocytes 11/14/2022 2.3  0.8 - 5.3 10e3/uL Final     Absolute Monocytes 11/14/2022 0.6  0.0 - 1.3 10e3/uL Final     Absolute Eosinophils 11/14/2022 0.1  0.0 - 0.7 10e3/uL Final     Absolute Basophils 11/14/2022 0.0  0.0 - 0.2 10e3/uL Final     Absolute Immature Granulocytes 11/14/2022 0.0  <=0.4 10e3/uL Final     Absolute NRBCs  11/14/2022 0.0  10e3/uL Final     SARS CoV2 PCR 11/14/2022 Negative  Negative Final    NEGATIVE: SARS-CoV-2 (COVID-19) RNA not detected, presumed negative.     Hold Specimen 11/14/2022 [facility]   Final     Radiologist flags 11/14/2022 2 foci of infarction (Urgent)   Final     Cholesterol 11/14/2022 157  <200 mg/dL Final     Triglycerides 11/14/2022 142  <150 mg/dL Final     Direct Measure HDL 11/14/2022 55  >=50 mg/dL Final     LDL Cholesterol Calculated 11/14/2022 74  <=100 mg/dL Final     Non HDL Cholesterol 11/14/2022 102  <130 mg/dL Final     Hemoglobin A1C 11/14/2022 5.8 (H)  <5.7 % Final    Normal <5.7%   Prediabetes 5.7-6.4%    Diabetes 6.5% or higher     Note: Adopted from ADA consensus guidelines.     LVEF  11/15/2022 55-60%   Final     Sodium 11/15/2022 137  136 - 145 mmol/L Final     Potassium 11/15/2022 3.8  3.4 - 5.3 mmol/L Final     Chloride 11/15/2022 103  98 - 107 mmol/L Final     Carbon Dioxide (CO2) 11/15/2022 23  22 - 29 mmol/L Final     Anion Gap 11/15/2022 11  7 - 15 mmol/L Final     Urea Nitrogen 11/15/2022 16.4  8.0 - 23.0 mg/dL Final     Creatinine 11/15/2022 0.63  0.51 - 0.95 mg/dL Final     Calcium 11/15/2022 8.9  8.8 - 10.2 mg/dL Final     Glucose 11/15/2022 95  70 - 99 mg/dL Final     Alkaline Phosphatase 11/15/2022 62  35 - 104 U/L Final     AST 11/15/2022 20  10 - 35 U/L Final     ALT 11/15/2022 7 (L)  10 - 35 U/L Final     Protein Total 11/15/2022 6.6  6.4 - 8.3 g/dL Final     Albumin 11/15/2022 3.7  3.5 - 5.2 g/dL Final     Bilirubin Total 11/15/2022 0.5  <=1.2 mg/dL Final     GFR Estimate 11/15/2022 90  >60 mL/min/1.73m2 Final    Effective December 21, 2021 eGFRcr in adults is calculated using the 2021 CKD-EPI creatinine equation which includes age and gender (Anthony et al., [place], DOI: 10.1056/SDSKgm6533409)     WBC Count 11/15/2022 8.7  4.0 - 11.0 10e3/uL Final     RBC Count 11/15/2022 4.54  3.80 - 5.20 10e6/uL Final     Hemoglobin 11/15/2022 13.3  11.7 - 15.7 g/dL Final     Hematocrit  11/15/2022 41.3  35.0 - 47.0 % Final     MCV 11/15/2022 91  78 - 100 fL Final     MCH 11/15/2022 29.3  26.5 - 33.0 pg Final     MCHC 11/15/2022 32.2  31.5 - 36.5 g/dL Final     RDW 11/15/2022 13.2  10.0 - 15.0 % Final     Platelet Count 11/15/2022 215  150 - 450 10e3/uL Final     % Neutrophils 11/15/2022 67  % Final     % Lymphocytes 11/15/2022 23  % Final     % Monocytes 11/15/2022 7  % Final     % Eosinophils 11/15/2022 2  % Final     % Basophils 11/15/2022 0  % Final     % Immature Granulocytes 11/15/2022 1  % Final     NRBCs per 100 WBC 11/15/2022 0  <1 /100 Final     Absolute Neutrophils 11/15/2022 5.8  1.6 - 8.3 10e3/uL Final     Absolute Lymphocytes 11/15/2022 2.0  0.8 - 5.3 10e3/uL Final     Absolute Monocytes 11/15/2022 0.6  0.0 - 1.3 10e3/uL Final     Absolute Eosinophils 11/15/2022 0.2  0.0 - 0.7 10e3/uL Final     Absolute Basophils 11/15/2022 0.0  0.0 - 0.2 10e3/uL Final     Absolute Immature Granulocytes 11/15/2022 0.0  <=0.4 10e3/uL Final     Absolute NRBCs 11/15/2022 0.0  10e3/uL Final     INR POCT 11/14/2022 1.2 (L)  2.0 - 3.0 Final     Creatinine POCT 11/14/2022 0.9  0.5 - 1.0 mg/dL Final     GFR, ESTIMATED POCT 11/14/2022 >60  >60 mL/min/1.73m2 Final

## 2022-12-06 NOTE — PATIENT INSTRUCTIONS
Keep the elliquis at 5 mg twice daily    I think we should recheck the Echo in early 2024 (mitral valve)

## 2022-12-07 LAB
B2 GLYCOPROT1 IGG SERPL IA-ACNC: 1 U/ML
CARDIOLIPIN IGG SER IA-ACNC: <2 GPL-U/ML
CARDIOLIPIN IGG SER IA-ACNC: NEGATIVE
CARDIOLIPIN IGM SER IA-ACNC: 8.1 MPL-U/ML
CARDIOLIPIN IGM SER IA-ACNC: NEGATIVE
LA PPP-IMP: NORMAL

## 2022-12-07 NOTE — RESULT ENCOUNTER NOTE
Alfreda Baxter    I was looking for some weird antibodies which could contribute to clotting/stroke risk.   The testing is negative/normal.  No further recommendations:  keep the Elliquis at full dose and the lipitor at 40 mg.  Follow up with Neurologist as scheduled.     Sincerely,       EMILEE MARTÍNEZ M.D.

## 2022-12-12 ENCOUNTER — OFFICE VISIT (OUTPATIENT)
Dept: OPHTHALMOLOGY | Facility: CLINIC | Age: 77
End: 2022-12-12
Payer: MEDICARE

## 2022-12-12 DIAGNOSIS — H40.051 OCULAR HYPERTENSION OF RIGHT EYE: Primary | ICD-10-CM

## 2022-12-12 DIAGNOSIS — Z96.1 PSEUDOPHAKIA: ICD-10-CM

## 2022-12-12 DIAGNOSIS — H40.52X2 NEOVASCULAR GLAUCOMA OF LEFT EYE, MODERATE STAGE: ICD-10-CM

## 2022-12-12 PROCEDURE — 92083 EXTENDED VISUAL FIELD XM: CPT | Performed by: OPHTHALMOLOGY

## 2022-12-12 PROCEDURE — 92133 CPTRZD OPH DX IMG PST SGM ON: CPT | Performed by: OPHTHALMOLOGY

## 2022-12-12 PROCEDURE — 92012 INTRM OPH EXAM EST PATIENT: CPT | Performed by: OPHTHALMOLOGY

## 2022-12-12 ASSESSMENT — TONOMETRY
IOP_METHOD: APPLANATION
OS_IOP_MMHG: 19
OD_IOP_MMHG: 17

## 2022-12-12 ASSESSMENT — VISUAL ACUITY
OD_CC: 20/20
METHOD: SNELLEN - LINEAR
OS_CC: NLP
CORRECTION_TYPE: GLASSES
OD_CC+: -2

## 2022-12-12 ASSESSMENT — EXTERNAL EXAM - RIGHT EYE: OD_EXAM: 2+ BROW PTOSIS, MILD TO MOD BROW

## 2022-12-12 ASSESSMENT — EXTERNAL EXAM - LEFT EYE: OS_EXAM: 2+ BROW PTOSIS, MILD TO MOD BROW

## 2022-12-12 NOTE — PATIENT INSTRUCTIONS
Continue:   Latanoprost (green top) every evening both eyes.  Timolol (yellow top) every morning left eye.  Continue care with Dr. Florence as needed.  Return visit in 6 months for a complete exam.   Bryan Morrison M.D.  [phone]

## 2022-12-12 NOTE — LETTER
12/12/2022         RE: Alfreda Baxter  [address] [facility] [address]        Dear Colleague,    Thank you for referring your patient, Alfreda Baxter, to [facility]. Please see a copy of my visit note below.     Current Eye Medications:    Timolol left eye each morning - forgot to use this morning   Latanoprost both eyes every evening - used last night     Subjective:  Here for intraocular pressure check, glaucoma OCT, retinal OCT, and Hill Visual Field. Patient had cataract surgery 10/2022 right eye. Vision improved at distance and near. Patient complains of skin around eyes being itchy.    Had an episode of transient global amnesia; neuro appt end of month.     Objective:  See Ophthalmology Exam.       Assessment:  Doing well status/post Kelman phacoemulsification/ posterior chamber lens right eye.  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        Plan:  Continue:   Latanoprost (green top) every evening both eyes.  Timolol (yellow top) every morning left eye.  Continue care with Dr. Florence as needed.  Return visit in 6 months for a complete exam.   Bryan Morrison M.D.  [phone]           Again, thank you for allowing me to participate in the care of your patient.        Sincerely,        Bryan Morrison MD

## 2022-12-12 NOTE — PROGRESS NOTES
Current Eye Medications:    Timolol left eye each morning - forgot to use this morning   Latanoprost both eyes every evening - used last night     Subjective:  Here for intraocular pressure check, glaucoma OCT, retinal OCT, and Hill Visual Field. Patient had cataract surgery 10/2022 right eye. Vision improved at distance and near. Patient complains of skin around eyes being itchy.    Had an episode of transient global amnesia; neuro appt end of month.     Objective:  See Ophthalmology Exam.       Assessment:  Doing well status/post Kelman phacoemulsification/ posterior chamber lens right eye.  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        Plan:  Continue:   Latanoprost (green top) every evening both eyes.  Timolol (yellow top) every morning left eye.  Continue care with Dr. Florence as needed.  Return visit in 6 months for a complete exam.   Bryan Morrison M.D.  [phone]

## 2022-12-13 PROBLEM — H25.812 COMBINED FORMS OF AGE-RELATED CATARACT OF LEFT EYE: Status: ACTIVE | Noted: 2022-12-13

## 2022-12-13 PROBLEM — Z96.1 PSEUDOPHAKIA: Status: ACTIVE | Noted: 2022-12-13

## 2022-12-29 NOTE — PROGRESS NOTES
__________________________________________________________    ___________________________________  ESTABLISHED PATIENT NEUROLOGY NOTE    DATE OF VISIT: 12/30/2022  MRN: [id number]  PATIENT NAME: Alfreda Baxter  YOB: 1945      Chief Complaint: Patient presents with:  Stroke      SUBJECTIVE:                                                      HISTORY OF PRESENT ILLNESS:  History of Present Illness: Alfreda Baxter is a 77 year old female presenting for follow-up for Acute CVA in L corona radiata and L corpus callosum.     She was hospitalized at [facility] on 11/14/22 - 11/15/22. Prior to the hospital stay, she had a past medical history of atrial fibrillation on Eliquis 2.5 mg BID, HTN, HLD and prior L central retinal artery occlusion.    She presented to the hospital on 11/14/2022 with acute onset anterograde amnesia prompting stroke code activation. Initial NIHSS was 2, scoring for LOC questions and chronic L eye vision loss. CT/CTA were unrevealing of any acute pathological findings and the patient's presentation was thought to be very consistent with transient global amnesia, but MRI was recommended to definitively rule out the possibility of stroke. 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      Stroke Evaluation summarized:     MRI 1. There are two foci of acute infarction, one along the left corona radiata/posterior caudate nucleus and another along the left splenium of the corpus callosum.  2. Moderate leukoaraiosis.   Intracranial Vasculature Head CTA demonstrates no stenosis of the major intracranial arteries. Unchanged prominent anterior communicating artery. Although there is no discrete associated aneurysm and the appearance is stable  over almost 2 years, consider neurointerventional referral for follow-up imaging options.   Cervical  Vasculature Neck CTA demonstrates atherosclerotic short segment right internal carotid artery stenosis of 50-60%.      Echocardiogram LVEF 55-60%. Mild left atrial enlargement.    EKG/Telemetry Normal sinus rhythm.   Other Testing EEG pending      LDL  11/14/2022: 74 mg/dL   A1C  8/24/2022: 5.6 %  11/14/2022: 5.8 %   Troponin 11/14/2022: 11 ng/L   Acute ischemic strokes of the L corona radiata/posterior caudate nucleus and the L splenium of the corpus callosum, likely cardioembolic 2/2 atrial fibrillation  #Transient global amnesia (TGA), resolved     Infarcts noted on MRI were clinically silent and patient had no new symptoms after clearing her amnesia. Would not consider this to be a failure of Eliquis as she was on sub-optimal dosing (2.5 mg BID) prior to admission and would resume her Eliquis at 5 mg BID for stroke prevention going forward    Today 12/30/22  Jaz arrived for her poststroke follow-up today.  She states that she was brought into the emergency department with acute memory loss.  She said she arrived to the ED around 1 PM and felt her symptoms resolved by about 4:30 PM.  She denies any recurrent episodes of amnesia.  She denies any focal weakness or numbness and tingling.  No facial droop.  No difficulty swallowing or changes in speech.  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  No other changes in vision.    Malinda is taking Eliquis atorvastatin and lisinopril on a daily basis.  She states that she took atorvastatin 40 mg for 30 days as recommended in the hospital and then transitioned back to 20 mg daily due to mental fog and back pain.  She is tolerating this dose well.  She states that she was taking Eliquis 2.5 mg twice daily due to her gums bleeding.  She has made modifications (softer tooth brush, gentle brushing) since increasing her dose to 5 mg twice daily, and is tolerating well.  She does not have a smoker or diabetic history.  She states that at  times she wakes up fatigued but that is related to stress.  Does not feel like she snores or has excessive daytime sleepiness.  She will continue to monitor this and if she continues to have daytime fatigue we can further discuss an appointment with the sleep clinic to assess for EDISON.     Current Prevention Medications: Taking as prescribed  Eliquis 5 mg twice daily  Atorvastatin 20 mg daily  Lisinopril 10 mg tablet daily    Perceived Side Effects: No    Medication Notes:   AED Medication Compliance:  compliant   Using a pill box:  Yes     Psycho-Social History: Alfreda Baxter currently lives [place], with . Highest level of education [employer]. Employment status: LPN retired at 69    Patient does not smoke, rare alcohol use, no recreational drug use.  Currently, patient denies feeling depressed, denies feeling anhedonia, denies suicidal  thoughts, and denies having feelings of excessive guilt/worthlessness.  We reviewed importance of mental and emotional wellbeing and impact on health.      Current Medications:   acetaminophen (TYLENOL) 500 MG tablet, Take 500-1,000 mg by mouth every 6 hours as needed.  albuterol (PROAIR HFA/PROVENTIL HFA/VENTOLIN HFA) 108 (90 Base) MCG/ACT inhaler, Inhale 2 puffs into the lungs every 6 hours as needed for shortness of breath / dyspnea or wheezing  apixaban ANTICOAGULANT (ELIQUIS) 5 MG tablet, Take 1 tablet (5 mg) by mouth 2 times daily  atorvastatin (LIPITOR) 40 MG tablet, Take 1 tablet (40 mg) by mouth daily  B-COMPLEX OR, Take  by mouth daily.  CALCIUM 500 +D OR, one daily  hypromellose (ARTIFICIAL TEARS) 0.5 % SOLN ophthalmic solution, Place 1 drop into both eyes 3 times daily  latanoprost (XALATAN) 0.005 % ophthalmic solution, Place 1 drop into both eyes At Bedtime  lisinopril (ZESTRIL) 10 MG tablet, Take 1 tablet (10 mg) by mouth daily  multivitamin (OCUVITE) TABS tablet, Take 1 tablet by mouth daily  NEW MED, Latanoprost eye drops   timolol maleate (TIMOPTIC)  0.5 % ophthalmic solution, Place 1 drop Into the left eye every morning  zolpidem (AMBIEN) 5 MG tablet, TAKE ONE TABLET BY MOUTH IN THE EVENING AS NEEDED FOR SLEEP    No current facility-administered medications on file prior to visit.    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  Surgical History:  She  has a past surgical history that includes hysterectomy, ankush (1997); breast biopsy, rt/lt (02/17/04); hysterectomy, pap no longer indicated; Abdomen surgery (1976, 1978, 1995); appendectomy; Breast surgery; colonoscopy (2013); Abdomen surgery; orthopedic surgery (2009); TOTAL KNEE ARTHROPLASTY (Left, 11/2015); Colonoscopy with CO2 insufflation (N/A, 11/21/2016); Comprehensive Study (N/A, 12/31/2018); Colonoscopy with CO2 insufflation (N/A, 9/10/2020); Colonoscopy (N/A, 9/10/2020); and Phacoemulsification with standard intraocular lens implant (Right, 10/24/2022).  Family and Social History:  Reviewed, and she  reports that she quit smoking about 58 years ago. Her smoking use included cigarettes. She started smoking about 60 years ago. She has a 0.10 pack-year smoking history. She has never used smokeless tobacco. She reports that she does not currently use alcohol. She reports that she does not use drugs.  Reviewed, and family history includes Allergies in her sister; Arthritis in her mother; Asthma in her father; Cancer in her mother; Eye Disorder in her sister; Gastrointestinal Disease in her son; Glaucoma in  her father, sister, and another family member; Hyperlipidemia in her sister; Hypertension in her mother, sister, and sister; Lipids in her sister; Macular Degeneration in her sister; Neurologic Disorder in her sister; Osteoporosis in her sister and sister; Other Cancer in her mother; Respiratory in her father; Thyroid Disease in her mother.    RECENT DIAGNOSTIC STUDIES:   Labs:    Coagulation studies:  Recent Labs   Lab Test 11/14/22  1536 11/14/22  1500 11/26/20  1141   INR 1.14 1.2* 1.24*        Lipid panel:  Recent Labs   Lab Test 11/14/22  1536 08/24/22  1057   CHOL 157 157   HDL 55 55   LDL 74 68   TRIG 142 168*       HbA1C:  Recent Labs   Lab Test 11/14/22  1536 08/24/22  1057 07/27/21  1004   A1C 5.8* 5.6 5.7*       Troponin:  No lab results found.  No lab results found.  No lab results found.    Imaging: see HPI     REVIEW OF SYSTEMS:                                                      10-point review of systems is negative except as mentioned above in HPI.    EXAM:                                                      Physical Exam:   Vitals: BP (!) 143/87   Pulse 81   Wt 88.5 kg (195 lb)   BMI 35.67 kg/m    BMI= Body mass index is 35.67 kg/m .  GENERAL: NAD.  HEENT: NC/AT.  PULM: Non-labored breathing.   Neurologic:  MENTAL STATUS: Alert, attentive. Speech is fluent. Normal comprehension. Normal concentration. Adequate fund of knowledge.   CRANIAL NERVES:  Pupils equally, round and reactive. Left eye (blind) reactive to right eye stimuli. Facial sensation and movement normal. EOM full. Hearing intact to conversation. Trapezius strength intact. Palate moves symmetrically. Tongue midline.  MOTOR: 5/5 in proximal and distal muscle groups of upper and lower extremities. Tone and bulk normal.   DTRs: Intact and symmetric in biceps, BR and patellae.   SENSATION: Normal light touch throughout.   COORDINATION: Normal finger nose finger. Finger tapping normal. Knee heel shin normal.  STATION AND GAIT: Romberg  Negative. Good postural reflexes. Casual gait wide based, steady   Right hand-dominant.      ASSESSMENT and PLAN:                                                      Assessment:    ICD-10-CM    1. Cerebrovascular accident (CVA) due to embolism of cerebral artery (H)  I63.40 Adult Neurology  Referral        Alfreda Baxter is a 77 year old female presenting for follow-up for Acute CVA in L corona radiata and L corpus callosum. She was hospitalized at [facility] on 11/14/22 - 11/15/22. Prior to the hospital stay, she had a past medical history of atrial fibrillation on Eliquis 2.5 mg BID, HTN, HLD and prior L central retinal artery occlusion. She presented to the hospital on 11/14/2022 with acute onset anterograde amnesia prompting stroke code activation.  She denies any reoccurrence of symptoms.  She is taking her medications as prescribed and tolerating without adverse effects.  She is managing her vascular risk factors with her primary care provider.  We discussed interventions, will plan to see the patient back in 6 months. Malinda understands and agrees with this plan.     Plan:     Secondary prevention  Eliquis 5 mg twice daily     Lifestyle  - Mediterranean diet  - Exercise    Risk Factors   Elevated blood Pressure  BP: 143/87  - Goal <140/90  - Continue preventive therapy: Lisinopril 10 mg tablet daily     Elevated cholesterol levels   LDL: 74  - Goal < 70 mg/dl  - Continue preventive therapy:Atorvastatin 20 mg daily    --- Plan to follow-up with your primary care provider to manage your vascular risk factors  --- Plan on follow up in the Neurology Clinic in 6 months.  --- Please feel free to reach out if you have any further questions or concerns.  --- Seek immediate medical attention if an emergency arises or if your health becomes progressively worse.     For any acute neurologic deficits she was advised to  go directly to the hospital rather than call the clinic.    It was a  pleasure to meet you today!     Total Time: Total time spent for face to face visit, reviewing labs/imaging studies, counseling and coordination of care was: 45 Minutes spent on the date of the encounter doing chart review, review of test results, patient visit and documentation       This note was dictated using voice recognition software.  Any grammatical or context distortions are unintentional and inherent to the software.    Zaina Yanez, DNP, APRN, CNP  [facility]

## 2022-12-30 ENCOUNTER — OFFICE VISIT (OUTPATIENT)
Dept: NEUROLOGY | Facility: CLINIC | Age: 77
End: 2022-12-30
Payer: MEDICARE

## 2022-12-30 VITALS
DIASTOLIC BLOOD PRESSURE: 87 MMHG | BODY MASS INDEX: 35.67 KG/M2 | SYSTOLIC BLOOD PRESSURE: 143 MMHG | HEART RATE: 81 BPM | WEIGHT: 195 LBS

## 2022-12-30 DIAGNOSIS — I63.40 CEREBROVASCULAR ACCIDENT (CVA) DUE TO EMBOLISM OF CEREBRAL ARTERY (H): ICD-10-CM

## 2022-12-30 PROCEDURE — 99204 OFFICE O/P NEW MOD 45 MIN: CPT

## 2022-12-30 NOTE — PATIENT INSTRUCTIONS
Plan:     Secondary prevention  Eliquis 5 mg twice daily     Lifestyle  - Mediterranean diet  - Exercise    Risk Factors   Elevated blood Pressure  BP: 143/87  - Goal <140/90  - Continue preventive therapy: Lisinopril 10 mg tablet daily     Elevated cholesterol levels   LDL: 74  - Goal < 70 mg/dl  - Continue preventive therapy:Atorvastatin 20 mg daily    --- Plan to follow-up with your primary care provider to manage your vascular risk factors  --- Plan on follow up in the Neurology Clinic in 6 months.  --- Please feel free to reach out if you have any further questions or concerns.  --- Seek immediate medical attention if an emergency arises or if your health becomes progressively worse.     For any acute neurologic deficits she was advised to  go directly to the hospital rather than call the clinic.    It was a pleasure to meet you today!

## 2022-12-30 NOTE — LETTER
12/30/2022         RE: Alfreda Baxter  [address] [facility] [address]        Dear Colleague,    Thank you for referring your patient, Alfreda Baxter, to the [facility] [place]. Please see a copy of my visit note below.    __________________________________________________________    ___________________________________  ESTABLISHED PATIENT NEUROLOGY NOTE    DATE OF VISIT: 12/30/2022  MRN: [id number]  PATIENT NAME: Alfreda Baxter  YOB: 1945      Chief Complaint: Patient presents with:  Stroke      SUBJECTIVE:                                                      HISTORY OF PRESENT ILLNESS:  History of Present Illness: Alfreda Baxter is a 77 year old female presenting for follow-up for Acute CVA in L corona radiata and L corpus callosum.     She was hospitalized at [facility] on 11/14/22 - 11/15/22. Prior to the hospital stay, she had a past medical history of atrial fibrillation on Eliquis 2.5 mg BID, HTN, HLD and prior L central retinal artery occlusion.    She presented to the hospital on 11/14/2022 with acute onset anterograde amnesia prompting stroke code activation. Initial NIHSS was 2, scoring for LOC questions and chronic L eye vision loss. CT/CTA were unrevealing of any acute pathological findings and the patient's presentation was thought to be very consistent with transient global amnesia, but MRI was recommended to definitively rule out the possibility of stroke. 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      Stroke Evaluation summarized:     MRI 1. There are two foci of acute infarction, one along the left corona radiata/posterior caudate nucleus and another along the left splenium of the corpus callosum.  2. Moderate leukoaraiosis.   Intracranial Vasculature Head CTA demonstrates no stenosis of the  major intracranial arteries. Unchanged prominent anterior communicating artery. Although there is no discrete associated aneurysm and the appearance is stable  over almost 2 years, consider neurointerventional referral for follow-up imaging options.   Cervical Vasculature Neck CTA demonstrates atherosclerotic short segment right internal carotid artery stenosis of 50-60%.      Echocardiogram LVEF 55-60%. Mild left atrial enlargement.    EKG/Telemetry Normal sinus rhythm.   Other Testing EEG pending      LDL  11/14/2022: 74 mg/dL   A1C  8/24/2022: 5.6 %  11/14/2022: 5.8 %   Troponin 11/14/2022: 11 ng/L   Acute ischemic strokes of the L corona radiata/posterior caudate nucleus and the L splenium of the corpus callosum, likely cardioembolic 2/2 atrial fibrillation  #Transient global amnesia (TGA), resolved     Infarcts noted on MRI were clinically silent and patient had no new symptoms after clearing her amnesia. Would not consider this to be a failure of Eliquis as she was on sub-optimal dosing (2.5 mg BID) prior to admission and would resume her Eliquis at 5 mg BID for stroke prevention going forward    Today 12/30/22  Jaz arrived for her poststroke follow-up today.  She states that she was brought into the emergency department with acute memory loss.  She said she arrived to the ED around 1 PM and felt her symptoms resolved by about 4:30 PM.  She denies any recurrent episodes of amnesia.  She denies any focal weakness or numbness and tingling.  No facial droop.  No difficulty swallowing or changes in speech.  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  No other changes in vision.    Malinda is taking Eliquis atorvastatin and lisinopril on a daily basis.  She states that she took atorvastatin 40 mg for 30 days as recommended in the hospital and then transitioned back to 20 mg daily due to mental fog and back pain.  She is tolerating this dose well.  She states that she was  taking Eliquis 2.5 mg twice daily due to her gums bleeding.  She has made modifications (softer tooth brush, gentle brushing) since increasing her dose to 5 mg twice daily, and is tolerating well.  She does not have a smoker or diabetic history.  She states that at times she wakes up fatigued but that is related to stress.  Does not feel like she snores or has excessive daytime sleepiness.  She will continue to monitor this and if she continues to have daytime fatigue we can further discuss an appointment with the sleep clinic to assess for EDISON.     Current Prevention Medications: Taking as prescribed  Eliquis 5 mg twice daily  Atorvastatin 20 mg daily  Lisinopril 10 mg tablet daily    Perceived Side Effects: No    Medication Notes:   AED Medication Compliance:  compliant   Using a pill box:  Yes     Psycho-Social History: Alfreda Baxter currently lives [place], with . Highest level of education [employer]. Employment status: LPN retired at 69    Patient does not smoke, rare alcohol use, no recreational drug use.  Currently, patient denies feeling depressed, denies feeling anhedonia, denies suicidal  thoughts, and denies having feelings of excessive guilt/worthlessness.  We reviewed importance of mental and emotional wellbeing and impact on health.      Current Medications:   acetaminophen (TYLENOL) 500 MG tablet, Take 500-1,000 mg by mouth every 6 hours as needed.  albuterol (PROAIR HFA/PROVENTIL HFA/VENTOLIN HFA) 108 (90 Base) MCG/ACT inhaler, Inhale 2 puffs into the lungs every 6 hours as needed for shortness of breath / dyspnea or wheezing  apixaban ANTICOAGULANT (ELIQUIS) 5 MG tablet, Take 1 tablet (5 mg) by mouth 2 times daily  atorvastatin (LIPITOR) 40 MG tablet, Take 1 tablet (40 mg) by mouth daily  B-COMPLEX OR, Take  by mouth daily.  CALCIUM 500 +D OR, one daily  hypromellose (ARTIFICIAL TEARS) 0.5 % SOLN ophthalmic solution, Place 1 drop into both eyes 3 times daily  latanoprost (XALATAN)  0.005 % ophthalmic solution, Place 1 drop into both eyes At Bedtime  lisinopril (ZESTRIL) 10 MG tablet, Take 1 tablet (10 mg) by mouth daily  multivitamin (OCUVITE) TABS tablet, Take 1 tablet by mouth daily  NEW MED, Latanoprost eye drops   timolol maleate (TIMOPTIC) 0.5 % ophthalmic solution, Place 1 drop Into the left eye every morning  zolpidem (AMBIEN) 5 MG tablet, TAKE ONE TABLET BY MOUTH IN THE EVENING AS NEEDED FOR SLEEP    No current facility-administered medications on file prior to visit.    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  Surgical History:  She  has a past surgical history that includes hysterectomy, ankush (1997); breast biopsy, rt/lt (02/17/04); hysterectomy, pap no longer indicated; Abdomen surgery (1976, 1978, 1995); appendectomy; Breast surgery; colonoscopy (2013); Abdomen surgery; orthopedic surgery (2009); TOTAL KNEE ARTHROPLASTY (Left, 11/2015); Colonoscopy with CO2 insufflation (N/A, 11/21/2016); Comprehensive Study (N/A, 12/31/2018); Colonoscopy with CO2 insufflation (N/A, 9/10/2020); Colonoscopy (N/A, 9/10/2020); and Phacoemulsification with standard intraocular lens implant (Right, 10/24/2022).  Family and Social History:  Reviewed, and she  reports that she quit smoking about 58 years ago. Her smoking use included cigarettes. She started smoking about 60 years ago. She has a 0.10 pack-year smoking history. She has never used smokeless tobacco. She reports that she does not  currently use alcohol. She reports that she does not use drugs.  Reviewed, and family history includes Allergies in her sister; Arthritis in her mother; Asthma in her father; Cancer in her mother; Eye Disorder in her sister; Gastrointestinal Disease in her son; Glaucoma in her father, sister, and another family member; Hyperlipidemia in her sister; Hypertension in her mother, sister, and sister; Lipids in her sister; Macular Degeneration in her sister; Neurologic Disorder in her sister; Osteoporosis in her sister and sister; Other Cancer in her mother; Respiratory in her father; Thyroid Disease in her mother.    RECENT DIAGNOSTIC STUDIES:   Labs:    Coagulation studies:  Recent Labs   Lab Test 11/14/22  1536 11/14/22  1500 11/26/20  1141   INR 1.14 1.2* 1.24*        Lipid panel:  Recent Labs   Lab Test 11/14/22  1536 08/24/22  1057   CHOL 157 157   HDL 55 55   LDL 74 68   TRIG 142 168*       HbA1C:  Recent Labs   Lab Test 11/14/22  1536 08/24/22  1057 07/27/21  1004   A1C 5.8* 5.6 5.7*       Troponin:  No lab results found.  No lab results found.  No lab results found.    Imaging: see HPI     REVIEW OF SYSTEMS:                                                      10-point review of systems is negative except as mentioned above in HPI.    EXAM:                                                      Physical Exam:   Vitals: BP (!) 143/87   Pulse 81   Wt 88.5 kg (195 lb)   BMI 35.67 kg/m    BMI= Body mass index is 35.67 kg/m .  GENERAL: NAD.  HEENT: NC/AT.  PULM: Non-labored breathing.   Neurologic:  MENTAL STATUS: Alert, attentive. Speech is fluent. Normal comprehension. Normal concentration. Adequate fund of knowledge.   CRANIAL NERVES:  Pupils equally, round and reactive. Left eye (blind) reactive to right eye stimuli. Facial sensation and movement normal. EOM full. Hearing intact to conversation. Trapezius strength intact. Palate moves symmetrically. Tongue midline.  MOTOR: 5/5 in proximal and distal muscle groups  of upper and lower extremities. Tone and bulk normal.   DTRs: Intact and symmetric in biceps, BR and patellae.   SENSATION: Normal light touch throughout.   COORDINATION: Normal finger nose finger. Finger tapping normal. Knee heel shin normal.  STATION AND GAIT: Romberg Negative. Good postural reflexes. Casual gait wide based, steady   Right hand-dominant.      ASSESSMENT and PLAN:                                                      Assessment:    ICD-10-CM    1. Cerebrovascular accident (CVA) due to embolism of cerebral artery (H)  I63.40 Adult Neurology  Referral        Alfreda Baxter is a 77 year old female presenting for follow-up for Acute CVA in L corona radiata and L corpus callosum. She was hospitalized at [facility] on 11/14/22 - 11/15/22. Prior to the hospital stay, she had a past medical history of atrial fibrillation on Eliquis 2.5 mg BID, HTN, HLD and prior L central retinal artery occlusion. She presented to the hospital on 11/14/2022 with acute onset anterograde amnesia prompting stroke code activation.  She denies any reoccurrence of symptoms.  She is taking her medications as prescribed and tolerating without adverse effects.  She is managing her vascular risk factors with her primary care provider.  We discussed interventions, will plan to see the patient back in 6 months. Malinda understands and agrees with this plan.     Plan:     Secondary prevention  Eliquis 5 mg twice daily     Lifestyle  - Mediterranean diet  - Exercise    Risk Factors   Elevated blood Pressure  BP: 143/87  - Goal <140/90  - Continue preventive therapy: Lisinopril 10 mg tablet daily     Elevated cholesterol levels   LDL: 74  - Goal < 70 mg/dl  - Continue preventive therapy:Atorvastatin 20 mg daily    --- Plan to follow-up with your primary care provider to manage your vascular risk factors  --- Plan on follow up in the Neurology Clinic in 6 months.  --- Please feel free to reach out if you have  any further questions or concerns.  --- Seek immediate medical attention if an emergency arises or if your health becomes progressively worse.     For any acute neurologic deficits she was advised to  go directly to the hospital rather than call the clinic.    It was a pleasure to meet you today!     Total Time: Total time spent for face to face visit, reviewing labs/imaging studies, counseling and coordination of care was: 45 Minutes spent on the date of the encounter doing chart review, review of test results, patient visit and documentation       This note was dictated using voice recognition software.  Any grammatical or context distortions are unintentional and inherent to the software.    Zaina Yanez, AGAPITO, APRN, CNP  [facility]       Again, thank you for allowing me to participate in the care of your patient.        Sincerely,        SVEN Dozier CNP

## 2023-03-01 ENCOUNTER — MYC MEDICAL ADVICE (OUTPATIENT)
Dept: INTERNAL MEDICINE | Facility: CLINIC | Age: 78
End: 2023-03-01
Payer: MEDICARE

## 2023-03-01 DIAGNOSIS — M79.10 GENERALIZED MUSCLE ACHE: Primary | ICD-10-CM

## 2023-03-02 NOTE — TELEPHONE ENCOUNTER
[employer] message sent to patient.     Thanks,  KEELEY Trammell  [facility]

## 2023-03-08 ENCOUNTER — LAB (OUTPATIENT)
Dept: LAB | Facility: CLINIC | Age: 78
End: 2023-03-08
Payer: MEDICARE

## 2023-03-08 DIAGNOSIS — M79.10 GENERALIZED MUSCLE ACHE: ICD-10-CM

## 2023-03-08 LAB
ALBUMIN SERPL-MCNC: 3.8 G/DL (ref 3.4–5)
ALBUMIN UR-MCNC: NEGATIVE MG/DL
ALP SERPL-CCNC: 69 U/L (ref 40–150)
ALT SERPL W P-5'-P-CCNC: 20 U/L (ref 0–50)
ANION GAP SERPL CALCULATED.3IONS-SCNC: 4 MMOL/L (ref 3–14)
APPEARANCE UR: CLEAR
AST SERPL W P-5'-P-CCNC: 18 U/L (ref 0–45)
BILIRUB SERPL-MCNC: 0.6 MG/DL (ref 0.2–1.3)
BILIRUB UR QL STRIP: NEGATIVE
BUN SERPL-MCNC: 22 MG/DL (ref 7–30)
CALCIUM SERPL-MCNC: 9.7 MG/DL (ref 8.5–10.1)
CHLORIDE BLD-SCNC: 106 MMOL/L (ref 94–109)
CO2 SERPL-SCNC: 27 MMOL/L (ref 20–32)
COLOR UR AUTO: YELLOW
CREAT SERPL-MCNC: 0.81 MG/DL (ref 0.52–1.04)
CRP SERPL-MCNC: 13.4 MG/L (ref 0–8)
ERYTHROCYTE [SEDIMENTATION RATE] IN BLOOD BY WESTERGREN METHOD: 18 MM/HR (ref 0–30)
GFR SERPL CREATININE-BSD FRML MDRD: 74 ML/MIN/1.73M2
GLUCOSE BLD-MCNC: 103 MG/DL (ref 70–99)
GLUCOSE UR STRIP-MCNC: NEGATIVE MG/DL
HGB UR QL STRIP: ABNORMAL
KETONES UR STRIP-MCNC: NEGATIVE MG/DL
LEUKOCYTE ESTERASE UR QL STRIP: NEGATIVE
MAGNESIUM SERPL-MCNC: 1.9 MG/DL (ref 1.6–2.3)
NITRATE UR QL: NEGATIVE
PH UR STRIP: 5 [PH] (ref 5–7)
POTASSIUM BLD-SCNC: 4.3 MMOL/L (ref 3.4–5.3)
PROT SERPL-MCNC: 8.1 G/DL (ref 6.8–8.8)
RBC #/AREA URNS AUTO: NORMAL /HPF
SODIUM SERPL-SCNC: 137 MMOL/L (ref 133–144)
SP GR UR STRIP: 1.02 (ref 1–1.03)
TSH SERPL DL<=0.005 MIU/L-ACNC: 0.67 MU/L (ref 0.4–4)
UROBILINOGEN UR STRIP-ACNC: 0.2 E.U./DL
WBC #/AREA URNS AUTO: NORMAL /HPF

## 2023-03-08 PROCEDURE — 86431 RHEUMATOID FACTOR QUANT: CPT

## 2023-03-08 PROCEDURE — 86200 CCP ANTIBODY: CPT

## 2023-03-08 PROCEDURE — 86140 C-REACTIVE PROTEIN: CPT

## 2023-03-08 PROCEDURE — 83735 ASSAY OF MAGNESIUM: CPT

## 2023-03-08 PROCEDURE — 84443 ASSAY THYROID STIM HORMONE: CPT

## 2023-03-08 PROCEDURE — 36415 COLL VENOUS BLD VENIPUNCTURE: CPT

## 2023-03-08 PROCEDURE — 86038 ANTINUCLEAR ANTIBODIES: CPT

## 2023-03-08 PROCEDURE — 81001 URINALYSIS AUTO W/SCOPE: CPT

## 2023-03-08 PROCEDURE — 80053 COMPREHEN METABOLIC PANEL: CPT

## 2023-03-08 PROCEDURE — 85652 RBC SED RATE AUTOMATED: CPT

## 2023-03-09 LAB
ANA SER QL IF: NEGATIVE
CCP AB SER IA-ACNC: 0.7 U/ML
RHEUMATOID FACT SER NEPH-ACNC: <7 IU/ML

## 2023-03-10 NOTE — RESULT ENCOUNTER NOTE
Alfreda Baxter    Inflammatory work up is basically negative.   That slightly elevated CRP is really insignificant in the context of all the other labs being normal.      If you are interested in a second opinion from Rheumatology, let me know.  This would be to render an opinion as to whether you have an inflammatory arthritis (which I think I have ruled out, but nonetheless sometime we want specialist second opinions and that is okay).   An inflammatory arthritis would be treated much differently than dejenerative joint arthritis    I would ask you to seek some kind of pool therapy // pool exercises in your area.  This may help with your pain and range of motion.       Sincerely,       EMILEE MARTÍNEZ M.D.

## 2023-03-15 ENCOUNTER — MYC MEDICAL ADVICE (OUTPATIENT)
Dept: INTERNAL MEDICINE | Facility: CLINIC | Age: 78
End: 2023-03-15
Payer: MEDICARE

## 2023-03-15 DIAGNOSIS — M25.50 PAIN IN JOINT INVOLVING MULTIPLE SITES: Primary | ICD-10-CM

## 2023-03-16 RX ORDER — PREDNISONE 20 MG/1
TABLET ORAL
Qty: 20 TABLET | Refills: 0 | Status: SHIPPED | OUTPATIENT
Start: 2023-03-16 | End: 2023-08-23

## 2023-05-10 ENCOUNTER — TELEPHONE (OUTPATIENT)
Dept: OPHTHALMOLOGY | Facility: CLINIC | Age: 78
End: 2023-05-10
Payer: MEDICARE

## 2023-05-10 DIAGNOSIS — H40.52X2 NEOVASCULAR GLAUCOMA OF LEFT EYE, MODERATE STAGE: ICD-10-CM

## 2023-05-10 DIAGNOSIS — H40.051 OCULAR HYPERTENSION OF RIGHT EYE: ICD-10-CM

## 2023-05-10 RX ORDER — LATANOPROST 50 UG/ML
1 SOLUTION/ DROPS OPHTHALMIC AT BEDTIME
Qty: 7.5 ML | Refills: 3 | Status: SHIPPED | OUTPATIENT
Start: 2023-05-10 | End: 2024-06-24

## 2023-05-10 NOTE — TELEPHONE ENCOUNTER
Will refill Latanoprost for patient to [facility]. Sent to Dr. Morrison to sign. Also will have [facility] call patient, she is scheduled two days earlier than she can be with Medicare insurance.

## 2023-05-10 NOTE — TELEPHONE ENCOUNTER
[facility] Call Center    Phone Message    May a detailed message be left on voicemail: yes     Reason for Call: Medication Refill Request    Has the patient contacted the pharmacy for the refill? Yes   Name of medication being requested: latanoprost (XALATAN) 0.005 % ophthalmic solution  Provider who prescribed the medication: Dr. Morrison  Pharmacy: [facility], [place]   Date medication is needed: ASAP       Action Taken: Other: eye    Travel Screening: Not Applicable

## 2023-06-19 ENCOUNTER — OFFICE VISIT (OUTPATIENT)
Dept: OPHTHALMOLOGY | Facility: CLINIC | Age: 78
End: 2023-06-19
Payer: MEDICARE

## 2023-06-19 DIAGNOSIS — H52.4 PRESBYOPIA: ICD-10-CM

## 2023-06-19 DIAGNOSIS — H40.051 OCULAR HYPERTENSION OF RIGHT EYE: ICD-10-CM

## 2023-06-19 DIAGNOSIS — Z96.1 PSEUDOPHAKIA: ICD-10-CM

## 2023-06-19 DIAGNOSIS — H25.812 COMBINED FORMS OF AGE-RELATED CATARACT OF LEFT EYE: Primary | ICD-10-CM

## 2023-06-19 DIAGNOSIS — H54.40 BLINDNESS OF LEFT EYE WITH NORMAL VISION IN CONTRALATERAL EYE: ICD-10-CM

## 2023-06-19 DIAGNOSIS — Z01.01 ENCOUNTER FOR EXAMINATION OF EYES AND VISION WITH ABNORMAL FINDINGS: ICD-10-CM

## 2023-06-19 DIAGNOSIS — H40.52X2 NEOVASCULAR GLAUCOMA OF LEFT EYE, MODERATE STAGE: ICD-10-CM

## 2023-06-19 DIAGNOSIS — Z86.69 HISTORY OF CENTRAL RETINAL ARTERY OCCLUSION: ICD-10-CM

## 2023-06-19 PROCEDURE — 92014 COMPRE OPH EXAM EST PT 1/>: CPT | Performed by: OPHTHALMOLOGY

## 2023-06-19 PROCEDURE — 92015 DETERMINE REFRACTIVE STATE: CPT | Mod: GY | Performed by: OPHTHALMOLOGY

## 2023-06-19 ASSESSMENT — CONF VISUAL FIELD
OS_INFERIOR_TEMPORAL_RESTRICTION: 1
OD_NORMAL: 1
OD_SUPERIOR_NASAL_RESTRICTION: 0
OD_SUPERIOR_TEMPORAL_RESTRICTION: 0
OD_INFERIOR_TEMPORAL_RESTRICTION: 0
OS_INFERIOR_NASAL_RESTRICTION: 1
OD_INFERIOR_NASAL_RESTRICTION: 0
OS_SUPERIOR_NASAL_RESTRICTION: 1
OS_SUPERIOR_TEMPORAL_RESTRICTION: 1

## 2023-06-19 ASSESSMENT — TONOMETRY
OD_IOP_MMHG: 14
IOP_METHOD: APPLANATION
OS_IOP_MMHG: 17

## 2023-06-19 ASSESSMENT — REFRACTION_WEARINGRX
OS_CYLINDER: +0.25
SPECS_TYPE: PAL
OS_AXIS: 152
OD_AXIS: 142
OS_ADD: +2.25
OD_ADD: +2.25
OD_SPHERE: -2.75
OD_CYLINDER: +0.75
OS_SPHERE: -2.75

## 2023-06-19 ASSESSMENT — CUP TO DISC RATIO
OS_RATIO: 0.8
OD_RATIO: 0.5

## 2023-06-19 ASSESSMENT — REFRACTION_MANIFEST
OD_AXIS: 170
OD_CYLINDER: +1.25
OD_ADD: +2.50
OS_SPHERE: BALANCE
OS_ADD: +2.50
OD_SPHERE: -2.50

## 2023-06-19 ASSESSMENT — EXTERNAL EXAM - RIGHT EYE: OD_EXAM: 2+ BROW PTOSIS

## 2023-06-19 ASSESSMENT — EXTERNAL EXAM - LEFT EYE: OS_EXAM: 2+ BROW PTOSIS, MILD TO MOD BROW

## 2023-06-19 ASSESSMENT — VISUAL ACUITY
METHOD: SNELLEN - LINEAR
OS_CC: NLP
OD_CC: 20/20
OD_CC+: -1
CORRECTION_TYPE: GLASSES

## 2023-06-19 NOTE — LETTER
"    6/19/2023         RE: Alfreda Baxter  [address] [facility] [address]        Dear Colleague,    Thank you for referring your patient, Alfreda Baxter, to the [facility]. Please see a copy of my visit note below.     Current Eye Medications:  Latanoprost (green top) every evening both eyes.  Timolol (yellow top) every morning left eye.     Subjective:  Here for complete eye exam today. Reading is difficult with the glasses, reads better without them. Blind left eye, worried about ptosis     Objective:  See Ophthalmology Exam.       Assessment:  Stable eye exam.      ICD-10-CM    1. Combined forms of age-related cataract, mild-mod, of left eye  H25.812 EYE EXAM (SIMPLE-NONBILLABLE)      2. Pseudophakia, od  Z96.1 EYE EXAM (SIMPLE-NONBILLABLE)      3. History of central retinal artery occlusion, os  Z86.69 EYE EXAM (SIMPLE-NONBILLABLE)      4. Blindness of left eye with normal vision in contralateral eye  H54.40 EYE EXAM (SIMPLE-NONBILLABLE)      5. Neovascular glaucoma of left eye, moderate stage  H40.52X2       6. Ocular hypertension of right eye, treated  H40.051 EYE EXAM (SIMPLE-NONBILLABLE)      7. Encounter for examination of eyes and vision with abnormal findings  Z01.01       8. Presbyopia  H52.4 REFRACTIVE STATUS           Plan:  Continue:   Latanoprost (green top) every evening both eyes.  Timolol (yellow top) every morning left eyes.    Wait at least 5 minutes between drops if using more than one at a time.   Glasses prescription given - optional  Possible clouding of posterior capsule right eye discussed.   May use artificial tears up to four times a day (like Refresh Optive, Systane Balance, TheraTears, or generic artificial tears are ok. Avoid \"get the red out\" drops).   Return visit 6 months for an intraocular pressure check, glaucoma OCT, retinal OCT, and Hill Visual Field.   Bryan Morrison M.D.  [phone]           Again, thank you for allowing me to " participate in the care of your patient.        Sincerely,        Bryan Morrison MD

## 2023-06-19 NOTE — PATIENT INSTRUCTIONS
"Continue:   Latanoprost (green top) every evening both eyes.  Timolol (yellow top) every morning left eyes.    Wait at least 5 minutes between drops if using more than one at a time.   Glasses prescription given - optional  Possible clouding of posterior capsule right eye discussed.   May use artificial tears up to four times a day (like Refresh Optive, Systane Balance, TheraTears, or generic artificial tears are ok. Avoid \"get the red out\" drops).   Return visit 6 months for an intraocular pressure check, glaucoma OCT, retinal OCT, and Hill Visual Field.   Bryan Morrison M.D.  [phone]    "

## 2023-06-19 NOTE — PROGRESS NOTES
" Current Eye Medications:  Latanoprost (green top) every evening both eyes.  Timolol (yellow top) every morning left eye.     Subjective:  Here for complete eye exam today. Reading is difficult with the glasses, reads better without them. Blind left eye, worried about ptosis     Objective:  See Ophthalmology Exam.       Assessment:  Stable eye exam.      ICD-10-CM    1. Combined forms of age-related cataract, mild-mod, of left eye  H25.812 EYE EXAM (SIMPLE-NONBILLABLE)      2. Pseudophakia, od  Z96.1 EYE EXAM (SIMPLE-NONBILLABLE)      3. History of central retinal artery occlusion, os  Z86.69 EYE EXAM (SIMPLE-NONBILLABLE)      4. Blindness of left eye with normal vision in contralateral eye  H54.40 EYE EXAM (SIMPLE-NONBILLABLE)      5. Neovascular glaucoma of left eye, moderate stage  H40.52X2       6. Ocular hypertension of right eye, treated  H40.051 EYE EXAM (SIMPLE-NONBILLABLE)      7. Encounter for examination of eyes and vision with abnormal findings  Z01.01       8. Presbyopia  H52.4 REFRACTIVE STATUS           Plan:  Continue:   Latanoprost (green top) every evening both eyes.  Timolol (yellow top) every morning left eyes.    Wait at least 5 minutes between drops if using more than one at a time.   Glasses prescription given - optional  Possible clouding of posterior capsule right eye discussed.   May use artificial tears up to four times a day (like Refresh Optive, Systane Balance, TheraTears, or generic artificial tears are ok. Avoid \"get the red out\" drops).   Return visit 6 months for an intraocular pressure check, glaucoma OCT, retinal OCT, and Hill Visual Field.   Bryan Morrison M.D.  [phone]       "

## 2023-08-14 DIAGNOSIS — H40.52X2 NEOVASCULAR GLAUCOMA OF LEFT EYE, MODERATE STAGE: ICD-10-CM

## 2023-08-14 RX ORDER — TIMOLOL MALEATE 5 MG/ML
1 SOLUTION/ DROPS OPHTHALMIC EVERY MORNING
Qty: 10 ML | Refills: 3 | Status: SHIPPED | OUTPATIENT
Start: 2023-08-14 | End: 2024-06-24

## 2023-08-14 NOTE — CONFIDENTIAL NOTE
Received refill request from [facility]'s pharmacy for Timolol. Patient was last seen 06/19/2023.

## 2023-08-23 ENCOUNTER — ANCILLARY ORDERS (OUTPATIENT)
Dept: MAMMOGRAPHY | Facility: CLINIC | Age: 78
End: 2023-08-23

## 2023-08-23 DIAGNOSIS — Z12.31 VISIT FOR SCREENING MAMMOGRAM: Primary | ICD-10-CM

## 2023-08-23 PROBLEM — E66.01 MORBID OBESITY (H): Status: RESOLVED | Noted: 2019-07-11 | Resolved: 2023-08-23

## 2023-08-23 PROBLEM — Z79.01 LONG TERM CURRENT USE OF ANTICOAGULANT THERAPY: Status: RESOLVED | Noted: 2019-07-08 | Resolved: 2023-08-23

## 2023-08-23 PROBLEM — G45.4 TRANSIENT GLOBAL AMNESIA: Status: RESOLVED | Noted: 2022-11-14 | Resolved: 2023-08-23

## 2023-08-23 ASSESSMENT — ENCOUNTER SYMPTOMS
CHILLS: 0
EYE PAIN: 0
DIARRHEA: 0
HEARTBURN: 0
NERVOUS/ANXIOUS: 0
CONSTIPATION: 0
SORE THROAT: 0
HEMATOCHEZIA: 0
ARTHRALGIAS: 1
BREAST MASS: 0
HEMATURIA: 0
PALPITATIONS: 1
NAUSEA: 0
MYALGIAS: 1
FEVER: 0
SHORTNESS OF BREATH: 0
WEAKNESS: 0
ABDOMINAL PAIN: 0
HEADACHES: 0
FREQUENCY: 0
JOINT SWELLING: 0
DIZZINESS: 0
PARESTHESIAS: 0
DYSURIA: 0
COUGH: 0

## 2023-08-23 ASSESSMENT — ASTHMA QUESTIONNAIRES: ACT_TOTALSCORE: 22

## 2023-08-23 ASSESSMENT — ACTIVITIES OF DAILY LIVING (ADL): CURRENT_FUNCTION: NO ASSISTANCE NEEDED

## 2023-08-25 ENCOUNTER — ANCILLARY PROCEDURE (OUTPATIENT)
Dept: BONE DENSITY | Facility: CLINIC | Age: 78
End: 2023-08-25
Attending: INTERNAL MEDICINE
Payer: MEDICARE

## 2023-08-25 DIAGNOSIS — Z78.0 ASYMPTOMATIC POSTMENOPAUSAL STATUS: ICD-10-CM

## 2023-08-25 PROCEDURE — 77080 DXA BONE DENSITY AXIAL: CPT | Performed by: INTERNAL MEDICINE

## 2023-08-28 PROBLEM — M85.80 LOW BONE MASS: Status: ACTIVE | Noted: 2023-08-28

## 2023-08-28 NOTE — RESULT ENCOUNTER NOTE
Malinda,    You have low bone mass. Let's consider a medication to lower your risk for broken bones (such as alendronate weekly for 5 years).     See you at your upcoming appointment.  Let's discuss these results then.    Mercedes Urena MD

## 2023-08-30 ENCOUNTER — OFFICE VISIT (OUTPATIENT)
Dept: FAMILY MEDICINE | Facility: CLINIC | Age: 78
End: 2023-08-30
Payer: MEDICARE

## 2023-08-30 VITALS
TEMPERATURE: 98.1 F | HEIGHT: 62 IN | RESPIRATION RATE: 16 BRPM | SYSTOLIC BLOOD PRESSURE: 117 MMHG | OXYGEN SATURATION: 95 % | HEART RATE: 97 BPM | DIASTOLIC BLOOD PRESSURE: 76 MMHG | WEIGHT: 193 LBS | BODY MASS INDEX: 35.51 KG/M2

## 2023-08-30 DIAGNOSIS — F51.01 PRIMARY INSOMNIA: ICD-10-CM

## 2023-08-30 DIAGNOSIS — J45.20 MILD INTERMITTENT ASTHMA WITHOUT COMPLICATION: ICD-10-CM

## 2023-08-30 DIAGNOSIS — E66.812 CLASS 2 SEVERE OBESITY DUE TO EXCESS CALORIES WITH SERIOUS COMORBIDITY IN ADULT, UNSPECIFIED BMI (H): ICD-10-CM

## 2023-08-30 DIAGNOSIS — E78.5 HYPERLIPIDEMIA LDL GOAL <130: ICD-10-CM

## 2023-08-30 DIAGNOSIS — I63.9 RECURRENT CEREBROVASCULAR ACCIDENTS (CVAS) (H): ICD-10-CM

## 2023-08-30 DIAGNOSIS — E66.01 CLASS 2 SEVERE OBESITY DUE TO EXCESS CALORIES WITH SERIOUS COMORBIDITY IN ADULT, UNSPECIFIED BMI (H): ICD-10-CM

## 2023-08-30 DIAGNOSIS — Z00.00 ENCOUNTER FOR MEDICARE ANNUAL WELLNESS EXAM: Primary | ICD-10-CM

## 2023-08-30 DIAGNOSIS — I10 HYPERTENSION GOAL BP (BLOOD PRESSURE) < 140/90: ICD-10-CM

## 2023-08-30 DIAGNOSIS — I48.0 PAROXYSMAL A-FIB (H): ICD-10-CM

## 2023-08-30 PROBLEM — I47.29 PAROXYSMAL VENTRICULAR TACHYCARDIA (H): Status: RESOLVED | Noted: 2018-11-20 | Resolved: 2023-08-30

## 2023-08-30 PROBLEM — I47.20 PAROXYSMAL VENTRICULAR TACHYCARDIA (H): Status: RESOLVED | Noted: 2018-11-20 | Resolved: 2023-08-30

## 2023-08-30 PROBLEM — E66.9 OBESITY: Status: RESOLVED | Noted: 2018-06-28 | Resolved: 2023-08-30

## 2023-08-30 PROCEDURE — G0439 PPPS, SUBSEQ VISIT: HCPCS | Performed by: FAMILY MEDICINE

## 2023-08-30 RX ORDER — ATORVASTATIN CALCIUM 20 MG/1
20 TABLET, FILM COATED ORAL DAILY
Qty: 90 TABLET | Refills: 3 | Status: SHIPPED | OUTPATIENT
Start: 2023-08-30 | End: 2024-09-09

## 2023-08-30 RX ORDER — LISINOPRIL 10 MG/1
10 TABLET ORAL DAILY
Qty: 90 TABLET | Refills: 3 | Status: SHIPPED | OUTPATIENT
Start: 2023-08-30 | End: 2024-08-13

## 2023-08-30 RX ORDER — ALBUTEROL SULFATE 90 UG/1
2 AEROSOL, METERED RESPIRATORY (INHALATION) EVERY 6 HOURS PRN
Qty: 18 G | Refills: 11 | Status: SHIPPED | OUTPATIENT
Start: 2023-08-30 | End: 2024-09-09

## 2023-08-30 RX ORDER — ZOLPIDEM TARTRATE 5 MG/1
TABLET ORAL
Qty: 30 TABLET | Refills: 5 | Status: SHIPPED | OUTPATIENT
Start: 2023-08-30 | End: 2024-09-09

## 2023-08-30 ASSESSMENT — ENCOUNTER SYMPTOMS
DIZZINESS: 0
CONSTIPATION: 0
HEARTBURN: 0
EYE PAIN: 0
HEMATURIA: 0
BREAST MASS: 0
HEMATOCHEZIA: 0
FEVER: 0
JOINT SWELLING: 0
NAUSEA: 0
MYALGIAS: 1
WEAKNESS: 0
COUGH: 0
ABDOMINAL PAIN: 0
CHILLS: 0
SHORTNESS OF BREATH: 0
PALPITATIONS: 1
DIARRHEA: 0
HEADACHES: 0
PARESTHESIAS: 0
ARTHRALGIAS: 1
NERVOUS/ANXIOUS: 0
DYSURIA: 0
SORE THROAT: 0
FREQUENCY: 0

## 2023-08-30 ASSESSMENT — ACTIVITIES OF DAILY LIVING (ADL): CURRENT_FUNCTION: NO ASSISTANCE NEEDED

## 2023-08-30 NOTE — PATIENT INSTRUCTIONS
Patient Education   Personalized Prevention Plan  You are due for the preventive services outlined below.  Your care team is available to assist you in scheduling these services.  If you have already completed any of these items, please share that information with your care team to update in your medical record.  Health Maintenance Due   Topic Date Due     COVID-19 Vaccine (5 - Pfizer series) 04/12/2023     ANNUAL REVIEW OF HM ORDERS  08/29/2023     Asthma Action Plan - yearly  08/29/2023     Hearing Loss: Care Instructions  Overview     Hearing loss is a sudden or slow decrease in how well you hear. It can range from slight to profound. Permanent hearing loss can occur with aging. It also can happen when you are exposed long-term to loud noise. Examples include listening to loud music, riding motorcycles, or being around other loud machines.  Hearing loss can affect your work and home life. It can make you feel lonely or depressed. You may feel that you have lost your independence. But hearing aids and other devices can help you hear better and feel connected to others.  Follow-up care is a key part of your treatment and safety. Be sure to make and go to all appointments, and call your doctor if you are having problems. It's also a good idea to know your test results and keep a list of the medicines you take.  How can you care for yourself at home?  Avoid loud noises whenever possible. This helps keep your hearing from getting worse.  Always wear hearing protection around loud noises.  Wear a hearing aid as directed.  A professional can help you pick a hearing aid that will work best for you.  You can also get hearing aids over the counter for mild to moderate hearing loss.  Have hearing tests as your doctor suggests. They can show whether your hearing has changed. Your hearing aid may need to be adjusted.  Use other devices as needed. These may include:  Telephone amplifiers and hearing aids that can connect to a  "television, stereo, radio, or microphone.  Devices that use lights or vibrations. These alert you to the doorbell, a ringing telephone, or a baby monitor.  Television closed-captioning. This shows the words at the bottom of the screen. Most new TVs can do this.  TTY (text telephone). This lets you type messages back and forth on the telephone instead of talking or listening. These devices are also called TDD. When messages are typed on the keyboard, they are sent over the phone line to a receiving TTY. The message is shown on a monitor.  Use text messaging, social media, and email if it is hard for you to communicate by telephone.  Try to learn a listening technique called speechreading. It is not lipreading. You pay attention to people's gestures, expressions, posture, and tone of voice. These clues can help you understand what a person is saying. Face the person you are talking to, and have them face you. Make sure the lighting is good. You need to see the other person's face clearly.  Think about counseling if you need help to adjust to your hearing loss.  When should you call for help?  Watch closely for changes in your health, and be sure to contact your doctor if:    You think your hearing is getting worse.     You have new symptoms, such as dizziness or nausea.   Where can you learn more?  Go to https://www.[employer].net/patiented  Enter R798 in the search box to learn more about \"Hearing Loss: Care Instructions.\"  Current as of: March 1, 2023               Content Version: 13.7    7925-2066 [employer].   Care instructions adapted under license by your healthcare professional. If you have questions about a medical condition or this instruction, always ask your healthcare professional. [employer] disclaims any warranty or liability for your use of this information.      Bladder Training: Care Instructions  Your Care Instructions     Bladder training is used to treat urge " incontinence and stress incontinence. Urge incontinence means that the need to urinate comes on so fast that you can't get to a toilet in time. Stress incontinence means that you leak urine because of pressure on your bladder. For example, it may happen when you laugh, cough, or lift something heavy.  Bladder training can increase how long you can wait before you have to urinate. It can also help your bladder hold more urine. And it can give you better control over the urge to urinate.  It is important to remember that bladder training takes a few weeks to a few months to make a difference. You may not see results right away, but don't give up.  Follow-up care is a key part of your treatment and safety. Be sure to make and go to all appointments, and call your doctor if you are having problems. It's also a good idea to know your test results and keep a list of the medicines you take.  How can you care for yourself at home?  Work with your doctor to come up with a bladder training program that is right for you. You may use one or more of the following methods.  Delayed urination  In the beginning, try to keep from urinating for 5 minutes after you first feel the need to go.  While you wait, take deep, slow breaths to relax. Kegel exercises can also help you delay the need to go to the bathroom.  After some practice, when you can easily wait 5 minutes to urinate, try to wait 10 minutes before you urinate.  Slowly increase the waiting period until you are able to control when you have to urinate.  Scheduled urination  Empty your bladder when you first wake up in the morning.  Schedule times throughout the day when you will urinate.  Start by going to the bathroom every hour, even if you don't need to go.  Slowly increase the time between trips to the bathroom.  When you have found a schedule that works well for you, keep doing it.  If you wake up during the night and have to urinate, do it. Apply your schedule to  "waking hours only.  Kegel exercises  These tighten and strengthen pelvic muscles, which can help you control the flow of urine. (If doing these exercises causes pain, stop doing them and talk with your doctor.) To do Kegel exercises:  Squeeze your muscles as if you were trying not to pass gas. Or squeeze your muscles as if you were stopping the flow of urine. Your belly, legs, and buttocks shouldn't move.  Hold the squeeze for 3 seconds, then relax for 5 to 10 seconds.  Start with 3 seconds, then add 1 second each week until you are able to squeeze for 10 seconds.  Repeat the exercise 10 times a session. Do 3 to 8 sessions a day.  When should you call for help?  Watch closely for changes in your health, and be sure to contact your doctor if:    Your incontinence is getting worse.     You do not get better as expected.   Where can you learn more?  Go to https://www.[employer].net/patiented  Enter V684 in the search box to learn more about \"Bladder Training: Care Instructions.\"  Current as of: March 1, 2023               Content Version: 13.7    5494-3643 [employer].   Care instructions adapted under license by your healthcare professional. If you have questions about a medical condition or this instruction, always ask your healthcare professional. [employer] disclaims any warranty or liability for your use of this information.         "

## 2023-08-30 NOTE — LETTER
My Asthma Action Plan    Name: Alfreda Baxter   YOB: 1945  Date: 8/30/2023   My doctor: Mercedes Urena MD   My clinic: [facility]        My Rescue Medicine:   Albuterol inhaler (Proair/Ventolin/Proventil HFA)  2-4 puffs EVERY 4 HOURS as needed. Use a spacer if recommended by your provider.   My Asthma Severity:   Intermittent / Exercise Induced  Know your asthma triggers: upper respiratory infections             GREEN ZONE   Good Control  I feel good  No cough or wheeze  Can work, sleep and play without asthma symptoms       Take your asthma control medicine every day.     If exercise triggers your asthma, take your rescue medication  15 minutes before exercise or sports, and  During exercise if you have asthma symptoms  Spacer to use with inhaler: If you have a spacer, make sure to use it with your inhaler             YELLOW ZONE Getting Worse  I have ANY of these:  I do not feel good  Cough or wheeze  Chest feels tight  Wake up at night   Keep taking your Green Zone medications  Start taking your rescue medicine:  every 20 minutes for up to 1 hour. Then every 4 hours for 24-48 hours.  If you stay in the Yellow Zone for more than 12-24 hours, contact your doctor.  If you do not return to the Green Zone in 12-24 hours or you get worse, start taking your oral steroid medicine if prescribed by your provider.           RED ZONE Medical Alert - Get Help  I have ANY of these:  I feel awful  Medicine is not helping  Breathing getting harder  Trouble walking or talking  Nose opens wide to breathe       Take your rescue medicine NOW  If your provider has prescribed an oral steroid medicine, start taking it NOW  Call your doctor NOW  If you are still in the Red Zone after 20 minutes and you have not reached your doctor:  Take your rescue medicine again and  Call 911 or go to the emergency room right away    See your regular doctor within 2 weeks of an Emergency Room or Urgent Care  visit for follow-up treatment.          Annual Reminders:  Meet with Asthma Educator,  Flu Shot in the Fall, consider Pneumonia Vaccination for patients with asthma (aged 19 and older).    Pharmacy:    [facility] [address] [place], [place] [address] [address] [place] [address] [address]  WRITTEN PRESCRIPTION REQUESTED    Electronically signed by Mercedes Urena MD   Date: 08/30/23                    Asthma Triggers  How To Control Things That Make Your Asthma Worse    Triggers are things that make your asthma worse.  Look at the list below to help you find your triggers and   what you can do about them. You can help prevent asthma flare-ups by staying away from your triggers.      Trigger                                                          What you can do   Cigarette Smoke  Tobacco smoke can make asthma worse. Do not allow smoking in your home, car or around you.  Be sure no one smokes at a child s day care or school.  If you smoke, ask your health care provider for ways to help you quit.  Ask family members to quit too.  Ask your health care provider for a referral to Quit Plan to help you quit smoking, or call 9-050-365-PLAN.     Colds, Flu, Bronchitis  These are common triggers of asthma. Wash your hands often.  Don t touch your eyes, nose or mouth.  Get a flu shot every year.     Dust Mites  These are tiny bugs that live in cloth or carpet. They are too small to see. Wash sheets and blankets in hot water every week.   Encase pillows and mattress in dust mite proof covers.  Avoid having carpet if you can. If you have carpet, vacuum weekly.   Use a dust mask and HEPA vacuum.   Pollen and Outdoor Mold  Some people are allergic to trees, grass, or weed pollen, or molds. Try to keep your windows closed.  Limit time out doors when pollen count is high.   Ask you health care provider about taking medicine during allergy season.     Animal Dander  Some people are allergic to skin flakes, urine or saliva from pets with fur or  feathers. Keep pets with fur or feathers out of your home.    If you can t keep the pet outdoors, then keep the pet out of your bedroom.  Keep the bedroom door closed.  Keep pets off cloth furniture and away from stuffed toys.     Mice, Rats, and Cockroaches  Some people are allergic to the waste from these pests.   Cover food and garbage.  Clean up spills and food crumbs.  Store grease in the refrigerator.   Keep food out of the bedroom.   Indoor Mold  This can be a trigger if your home has high moisture. Fix leaking faucets, pipes, or other sources of water.   Clean moldy surfaces.  Dehumidify basement if it is damp and smelly.   Smoke, Strong Odors, and Sprays  These can reduce air quality. Stay away from strong odors and sprays, such as perfume, powder, hair spray, paints, smoke incense, paint, cleaning products, candles and new carpet.   Exercise or Sports  Some people with asthma have this trigger. Be active!  Ask your doctor about taking medicine before sports or exercise to prevent symptoms.    Warm up for 5-10 minutes before and after sports or exercise.     Other Triggers of Asthma  Cold air:  Cover your nose and mouth with a scarf.  Sometimes laughing or crying can be a trigger.  Some medicines and food can trigger asthma.

## 2023-08-30 NOTE — PROGRESS NOTES
"SUBJECTIVE:   Malinda is a 78 year old who presents for Preventive Visit.      8/30/2023    10:32 AM   Additional Questions   Roomed by Carolyn LÓPEZ CMA   Accompanied by Self       Are you in the first 12 months of your Medicare coverage?  No    She is on chronic anticoagulation for recurrent CVA. Hypertension well controlled on current medications without side effects, chest pain, or dyspnea. Hypercholesterolemia well controlled with current treatment plan without side effects. Patient also has asthma. Patient has had asthma for years. Occasional wheezing and shortness of breath are triggered by allergies and relieved by albuterol.     Patient has chronic insomnia controlled with medications without depression or anxiety.     Healthy Habits:     In general, how would you rate your overall health?  Good    Frequency of exercise:  2-3 days/week    Duration of exercise:  15-30 minutes    Do you usually eat at least 4 servings of fruit and vegetables a day, include whole grains    & fiber and avoid regularly eating high fat or \"junk\" foods?  Yes    Taking medications regularly:  Yes    Medication side effects:  None    Ability to successfully perform activities of daily living:  No assistance needed    Home Safety:  No safety concerns identified    Hearing Impairment:  Difficulty following a conversation in a noisy restaurant or crowded room, feel that people are mumbling or not speaking clearly and need to ask people to speak up or repeat themselves    In the past 6 months, have you been bothered by leaking of urine? Yes    In general, how would you rate your overall mental or emotional health?  Good    Additional concerns today:  No        Have you ever done Advance Care Planning? (For example, a Health Directive, POLST, or a discussion with a medical provider or your loved ones about your wishes): Yes, advance care planning is on file.       Fall risk  Fallen 2 or more times in the past year?: No  Any fall with injury in " the past year?: No    Cognitive Screening   1) Repeat 3 items (Leader, Season, Table)     2) Clock draw:  NORMAL  3) 3 item recall:  Recalls 3 objects  Results: 3 items recalled: COGNITIVE IMPAIRMENT LESS LIKELY    Mini-CogTM Copyright S Thanh. Licensed by the author for use in [facility]; reprinted with permission (willis@[facility]). All rights reserved.      Do you have sleep apnea, excessive snoring or daytime drowsiness? : yes    Reviewed and updated as needed this visit by clinical staff   Tobacco  Allergies  Meds  Problems  Med Hx  Surg Hx  Fam Hx          Reviewed and updated as needed this visit by Provider   Tobacco  Allergies  Meds  Problems  Med Hx  Surg Hx  Fam Hx         Social History     Tobacco Use    Smoking status: Former     Packs/day: 0.10     Years: 1.00     Pack years: 0.10     Types: Cigarettes     Start date: 1963     Quit date: 1965     Years since quittin.6    Smokeless tobacco: Never   Substance Use Topics    Alcohol use: Not Currently     Comment: one a month             2023    10:15 AM   Alcohol Use   Prescreen: >3 drinks/day or >7 drinks/week? No     Do you have a current opioid prescription? No  Do you use any other controlled substances or medications that are not prescribed by a provider? None     Current providers sharing in care for this patient include:   Patient Care Team:  Mercedes Urena MD as PCP - Brandi Allison MD as Assigned PCP  Ezequiel Florence MD as MD (Ophthalmology)  Zaina Yanez APRN CNP as Nurse Practitioner (Neurology)  Bryan Morrison MD as Assigned Surgical Provider  Bryan Morrison MD as MD (Ophthalmology)    The following health maintenance items are reviewed in Epic and correct as of today:  Health Maintenance   Topic Date Due    COVID-19 Vaccine (5 - Pfizer series) 2023    INFLUENZA VACCINE (1) 2023    A1C  2023    LIPID  2023    ASTHMA CONTROL TEST   2024    BMP  2024    EYE EXAM  2024    MEDICARE ANNUAL WELLNESS VISIT  2024    ANNUAL REVIEW OF HM ORDERS  2024    ASTHMA ACTION PLAN  2024    FALL RISK ASSESSMENT  2024    DEXA  2025    COLORECTAL CANCER SCREENING  09/10/2025    DTAP/TDAP/TD IMMUNIZATION (3 - Td or Tdap) 2028    ADVANCE CARE PLANNING  2028    HEPATITIS C SCREENING  Completed    PHQ-2 (once per calendar year)  Completed    Pneumococcal Vaccine: 65+ Years  Completed    ZOSTER IMMUNIZATION  Completed    IPV IMMUNIZATION  Aged Out    MENINGITIS IMMUNIZATION  Aged Out    MAMMO SCREENING  Discontinued     Patient Active Problem List   Diagnosis    DJD (degenerative joint disease)    Hypertension goal BP (blood pressure) < 140/90    Hyperlipidemia LDL goal <70    Lumbar spinal stenosis    Mild intermittent asthma without complication    History of bilateral knee replacement    Primary insomnia    Posterior vitreous detachment of both eyes    PFO (patent foramen ovale)    Neovascular glaucoma of left eye, moderate stage    Blind left eye    Paroxysmal A-fib (H)    Ocular hypertension of right eye, treated    History of central retinal artery occlusion, os    Recurrent cerebrovascular accidents (CVAs) (H)    Disturbances of vision, late effect of stroke    Combined forms of age-related cataract, mild-mod, of left eye    Pseudophakia, od    Low bone mass    Class 2 severe obesity due to excess calories with serious comorbidity in adult (H)     Past Surgical History:   Procedure Laterality Date    APPENDECTOMY      age 7 yrs.    BREAST BIOPSY, RT/LT  2004    left benign    CATARACT IOL, RT/LT       SECTION      x 2    COLONOSCOPY  2013    needed q 3 yrs.    COLONOSCOPY N/A 09/10/2020    Procedure: Colonoscopy, With Polypectomy And Biopsy;  Surgeon: Brian Cleaning MD;  Location: [facility] OR    COLONOSCOPY WITH CO2 INSUFFLATION N/A 2016    Procedure: COLONOSCOPY WITH CO2  INSUFFLATION;  Surgeon: Brian Cleaning MD;  Location: [facility] OR    COLONOSCOPY WITH CO2 INSUFFLATION N/A 09/10/2020    Procedure: COLONOSCOPY, WITH CO2 INSUFFLATION;  Surgeon: Brian Cleaning MD;  Location: [facility] OR    EP COMPREHENSIVE EP STUDY N/A 2018    Procedure: LOOP RECORDER;  Surgeon: Buck Butterfield MD;  Location:  [facility]    HYSTERECTOMY, POORNIMA  1997    bleeding fibroids    ORTHOPEDIC SURGERY      meniscus repair    PHACOEMULSIFICATION WITH STANDARD INTRAOCULAR LENS IMPLANT Right 10/24/2022    Procedure: COMPLE RIGHT EYE PHACOEMULSIFICATION, CATARACT, WITH STANDARD INTRAOCULAR LENS IMPLANT INSERTION;  Surgeon: Ezequiel Florence MD;  Location: [facility] OR    [facility] TOTAL KNEE ARTHROPLASTY Left 2015    at [facility]       Social History     Tobacco Use    Smoking status: Former     Packs/day: 0.10     Years: 1.00     Pack years: 0.10     Types: Cigarettes     Start date: 1963     Quit date: 1965     Years since quittin.6    Smokeless tobacco: Never   Substance Use Topics    Alcohol use: Not Currently     Comment: one a month     Family History   Problem Relation Age of Onset    Arthritis Mother     Cancer Mother     Hypertension Mother     Other Cancer Mother     Thyroid Disease Mother     Respiratory Father     Glaucoma Father     Asthma Father     Allergies Sister     Eye Disorder Sister     Lipids Sister     Neurologic Disorder Sister     Osteoporosis Sister     Glaucoma Sister     Hypertension Sister     Macular Degeneration Sister     Hyperlipidemia Sister     Hypertension Sister     Osteoporosis Sister     Gastrointestinal Disease Son         Ulcerative Colitis    Glaucoma Other          Current Outpatient Medications   Medication Sig Dispense Refill    acetaminophen (TYLENOL) 500 MG tablet Take 500-1,000 mg by mouth every 6 hours as needed.      albuterol (PROAIR HFA/PROVENTIL HFA/VENTOLIN HFA) 108 (90 Base) MCG/ACT inhaler Inhale 2 puffs into the  lungs every 6 hours as needed for shortness of breath or wheezing 18 g 11    apixaban ANTICOAGULANT (ELIQUIS) 5 MG tablet Take 1 tablet (5 mg) by mouth 2 times daily 180 tablet 3    atorvastatin (LIPITOR) 20 MG tablet Take 1 tablet (20 mg) by mouth daily 90 tablet 3    B-COMPLEX OR Take  by mouth daily.      CALCIUM 500 +D OR one daily      hypromellose (ARTIFICIAL TEARS) 0.5 % SOLN ophthalmic solution Place 1 drop into both eyes 3 times daily      latanoprost (XALATAN) 0.005 % ophthalmic solution Place 1 drop into both eyes At Bedtime 7.5 mL 3    lisinopril (ZESTRIL) 10 MG tablet Take 1 tablet (10 mg) by mouth daily 90 tablet 3    multivitamin (OCUVITE) TABS tablet Take 1 tablet by mouth daily      timolol maleate (TIMOPTIC) 0.5 % ophthalmic solution Place 1 drop Into the left eye every morning 10 mL 3    zolpidem (AMBIEN) 5 MG tablet TAKE ONE TABLET BY MOUTH IN THE EVENING AS NEEDED FOR SLEEP 30 tablet 5     Allergies   Allergen Reactions    No Clinical Screening - See Comments Other (See Comments)     senisitive to non steroidals  Aleve, ibuprofen celebrex cause bad stomach pains  senisitive to non steroidals  Aleve, ibuprofen celebrex cause bad stomach pains    Oxycodone Hives     Other reaction(s): Unknown  ... With facial swelling    Nsaids Nausea and Vomiting     Other reaction(s): Abdominal Pain           Pertinent mammograms are reviewed under the imaging tab.    Review of Systems   Constitutional:  Negative for chills and fever.   HENT:  Negative for congestion, ear pain, hearing loss and sore throat.    Eyes:  Negative for pain and visual disturbance.   Respiratory:  Negative for cough and shortness of breath.    Cardiovascular:  Positive for palpitations. Negative for chest pain and peripheral edema.   Gastrointestinal:  Negative for abdominal pain, constipation, diarrhea, heartburn, hematochezia and nausea.   Breasts:  Negative for tenderness, breast mass and discharge.   Genitourinary:  Positive for  "urgency. Negative for dysuria, frequency, genital sores, hematuria, pelvic pain, vaginal bleeding and vaginal discharge.   Musculoskeletal:  Positive for arthralgias and myalgias. Negative for joint swelling.   Skin:  Negative for rash.   Neurological:  Negative for dizziness, weakness, headaches and paresthesias.   Psychiatric/Behavioral:  Negative for mood changes. The patient is not nervous/anxious.          OBJECTIVE:   /76 (BP Location: Right arm, Patient Position: Sitting, Cuff Size: Adult Regular)   Pulse 97   Temp 98.1  F (36.7  C) (Oral)   Resp 16   Ht 1.57 m (5' 1.81\")   Wt 87.5 kg (193 lb)   SpO2 95%   BMI 35.52 kg/m   Estimated body mass index is 35.52 kg/m  as calculated from the following:    Height as of this encounter: 1.57 m (5' 1.81\").    Weight as of this encounter: 87.5 kg (193 lb).  Physical Exam  GENERAL: alert, no distress, and obese  NECK: no adenopathy, no asymmetry, masses, or scars and thyroid normal to palpation  RESP: lungs clear to auscultation - no rales, rhonchi or wheezes  CV: regular rate and rhythm, normal S1 S2, no S3 or S4, no murmur, click or rub, no peripheral edema    MS: no gross musculoskeletal defects noted, no edema  PSYCH: mentation appears normal, affect normal/bright    Diagnostic Test Results:  Labs reviewed in Epic    ASSESSMENT / PLAN:   (Z00.00) Encounter for Medicare annual wellness exam  (primary encounter diagnosis)    (I63.9) Recurrent cerebrovascular accidents (CVAs) (H)  (I48.0) Paroxysmal A-fib (H)  Comment: rate controlled and anticoagulated   Plan: apixaban ANTICOAGULANT (ELIQUIS) 5 MG tablet,         OFFICE/OUTPT VISIT,EST,LEVL IV          (I10) Hypertension goal BP (blood pressure) < 140/90  Comment: Well controlled with medications without side effects.   Plan: lisinopril (ZESTRIL) 10 MG tablet, OFFICE/OUTPT        VISIT,EST,LEVL IV          (E78.5) Hyperlipidemia LDL goal <70  Comment: Well controlled with medications without side effects. " "  Plan: atorvastatin (LIPITOR) 20 MG tablet,         OFFICE/OUTPT VISIT,EST,LEVL IV         (E66.01) Class 2 severe obesity due to excess calories with serious comorbidity in adult, unspecified BMI (H)  Comment: associated with hypertension and hypercholesterolemia; patient is active   Plan: OFFICE/OUTPT VISIT,EST,LEVL IV        Continue lifestyle strategies for weight loss     (J45.20) Mild intermittent asthma without complication  Comment: Well controlled with medications without side effects.   Plan: albuterol (PROAIR HFA/PROVENTIL HFA/VENTOLIN         HFA) 108 (90 Base) MCG/ACT inhaler,         OFFICE/OUTPT VISIT,EST,LEVL IV          (F51.01) Primary insomnia  Plan: zolpidem (AMBIEN) 5 MG tablet, OFFICE/OUTPT         VISIT,EST,LEVL IV          Patient has been advised of split billing requirements and indicates understanding: Yes      COUNSELING:  Reviewed preventive health counseling, as reflected in patient instructions  Special attention given to:       Regular exercise       Healthy diet/nutrition       Fall risk prevention       Osteoporosis prevention/bone health       The ASCVD Risk score (Antony MACKENZIE, et al., 2019) failed to calculate for the following reasons:    The patient has a prior MI or stroke diagnosis      BMI:   Estimated body mass index is 35.52 kg/m  as calculated from the following:    Height as of this encounter: 1.57 m (5' 1.81\").    Weight as of this encounter: 87.5 kg (193 lb).   Weight management plan: Discussed healthy diet and exercise guidelines      She reports that she quit smoking about 58 years ago. Her smoking use included cigarettes. She started smoking about 60 years ago. She has a 0.10 pack-year smoking history. She has never used smokeless tobacco.      Appropriate preventive services were discussed with this patient, including applicable screening as appropriate for cardiovascular disease, diabetes, osteopenia/osteoporosis, and glaucoma.  As appropriate for age/gender, " discussed screening for colorectal cancer, prostate cancer, breast cancer, and cervical cancer. Checklist reviewing preventive services available has been given to the patient.    Reviewed patients plan of care and provided an AVS. The Intermediate Care Plan ( asthma action plan, low back pain action plan, and migraine action plan) for Alfreda meets the Care Plan requirement. This Care Plan has been established and reviewed with the Patient.          Mercedes Urena MD  [facility]    Identified Health Risks:  I have reviewed Opioid Use Disorder and Substance Use Disorder risk factors and made any needed referrals. The patient was provided with written information regarding signs of hearing loss.  Information on urinary incontinence and treatment options given to patient.

## 2023-09-08 ENCOUNTER — ANCILLARY PROCEDURE (OUTPATIENT)
Dept: MAMMOGRAPHY | Facility: CLINIC | Age: 78
End: 2023-09-08
Attending: FAMILY MEDICINE
Payer: MEDICARE

## 2023-09-08 DIAGNOSIS — Z12.31 VISIT FOR SCREENING MAMMOGRAM: ICD-10-CM

## 2023-09-08 PROCEDURE — 77067 SCR MAMMO BI INCL CAD: CPT | Mod: TC | Performed by: STUDENT IN AN ORGANIZED HEALTH CARE EDUCATION/TRAINING PROGRAM

## 2023-09-27 ENCOUNTER — OFFICE VISIT (OUTPATIENT)
Dept: FAMILY MEDICINE | Facility: CLINIC | Age: 78
End: 2023-09-27
Payer: MEDICARE

## 2023-09-27 VITALS
SYSTOLIC BLOOD PRESSURE: 122 MMHG | WEIGHT: 193.4 LBS | BODY MASS INDEX: 35.59 KG/M2 | DIASTOLIC BLOOD PRESSURE: 72 MMHG | HEIGHT: 62 IN | TEMPERATURE: 98.2 F | OXYGEN SATURATION: 96 % | RESPIRATION RATE: 14 BRPM | HEART RATE: 89 BPM

## 2023-09-27 DIAGNOSIS — M54.2 CERVICALGIA: Primary | ICD-10-CM

## 2023-09-27 DIAGNOSIS — M48.061 SPINAL STENOSIS OF LUMBAR REGION, UNSPECIFIED WHETHER NEUROGENIC CLAUDICATION PRESENT: ICD-10-CM

## 2023-09-27 DIAGNOSIS — M25.552 HIP PAIN, LEFT: ICD-10-CM

## 2023-09-27 PROCEDURE — 99214 OFFICE O/P EST MOD 30 MIN: CPT | Performed by: STUDENT IN AN ORGANIZED HEALTH CARE EDUCATION/TRAINING PROGRAM

## 2023-09-27 RX ORDER — PREGABALIN 25 MG/1
25 CAPSULE ORAL 2 TIMES DAILY
Qty: 60 CAPSULE | Refills: 0 | Status: SHIPPED | OUTPATIENT
Start: 2023-09-27 | End: 2023-12-23

## 2023-09-27 NOTE — PROGRESS NOTES
Assessment & Plan     (M54.2) Cervicalgia  (primary encounter diagnosis)  Comment: Chronic, uncontrolled. Due to worsening symptoms I did recommend patient have a second opinion with neurosurgery. Did also recommend use of lyrica to help with pain and neuropathy. Pt to hold ambien when taking lyrica. Drug-drug interaction discussed checked with up to date.   Plan: Neurosurgery Referral, pregabalin (LYRICA) 25         MG capsule            (M48.061) Spinal stenosis of lumbar region, unspecified whether neurogenic claudication present  Comment: Chronic, uncontrolled. See above  Plan: Neurosurgery Referral, pregabalin (LYRICA) 25         MG capsule            (M25.552) Hip pain, left  Comment: Chronic, uncontrolled. Pt pending seeing ortho surgery for further evaluation   Plan: pregabalin (LYRICA) 25 MG capsule          Dictation Disclaimer: Some of this Note has been completed with voice-recognition dictation software. Although errors are generally corrected real-time, there is the potential for a rare error to be present in the completed chart.                    SELVIN CHILDERS MD  [employer] MAGY Petty is a 78 year old, presenting for the following health issues:  Pain        9/27/2023    11:00 AM   Additional Questions   Roomed by [facility]       History of Present Illness       Back Pain:  She presents for follow up of back pain. Patient's back pain is a chronic problem.  Location of back pain:  Left lower back  Description of back pain: cramping and dull ache  Back pain spreads: left buttocks    Since patient first noticed back pain, pain is: unchanged  Does back pain interfere with her job:  Not applicable       Reason for visit:  Painand stiffness in both hands and arms  Symptom onset:  3-4 weeks ago  Symptoms include:  Pain and stiffness in both arms and hands  Symptom intensity:  Severe  Symptom progression:  Staying the same  Had these symptoms before:  Yes  Has  tried/received treatment for these symptoms:  Yes  Previous treatment was successful:  Yes  Prior treatment description:  Epidural in neck x 2  What makes it worse:  Trying to sleep  What makes it better:  Taking 3000mg per day    She eats 2-3 servings of fruits and vegetables daily.She consumes 0 sweetened beverage(s) daily.She exercises with enough effort to increase her heart rate 20 to 29 minutes per day.  She exercises with enough effort to increase her heart rate 3 or less days per week.   She is taking medications regularly.     HISTORY OF PRESENT ILLNESS  The patient is a 78-year-old female who presents for evaluation of multiple medical concerns.    She thinks she has neuropathy. It started back in 02/2023. Dr. NG, her physician, did a lot of blood work. Her inflammation markers were up. She was recommended to see a rheumatologist, but she could not get in to see him until 11/2023. Her symptoms kept getting worse, so she decided to seek orthopedic. She was formally treated by Dr. Teran, an orthopedist, who works for [employer] and went to [facility]. Dr. Teran has retired, so she got involved with new doctors. It took her about 2 months to get to a neck doctor after getting shoulder and back injections.     She went to the neck doctor, who ordered an MRI of her neck. She had many bulging discs in her neck. She had an epidural in 05/2023, which worked for her. She had her first night's sleep in 3 months, which lasted about 2 months. She was recommended to do PT, which she did to strengthen her neck, but her symptoms kept getting worse. She ended up back with another epidural on Wednesday. Her right hand is so sore that she can not even grasp anything. She is not getting sleep. She has always had trouble with her back. She has stenosis. She has had several epidurals. Her last injection was in 07/2023.     About 3 weeks ago, she started walking funny. When she stood up, she got a pain in her  "back that radiated down into her groin. She is concerned that the pain is radiating from her stenosis. She is not as active as she used to be because of the pain. She can not take NSAIDs because she is on Eliquis.                 Review of Systems   CONSTITUTIONAL: NEGATIVE for fever, chills, change in weight  ENT/MOUTH: NEGATIVE for ear, mouth and throat problems  RESP: NEGATIVE for significant cough or SOB  CV: NEGATIVE for chest pain, palpitations or peripheral edema  MUSCULOSKELETAL: POSITIVE  for back pain lower back, joint instability left hip , and joint pain left hip. Numbness and tingling of b/l hands       Objective    /72   Pulse 89   Temp 98.2  F (36.8  C) (Temporal)   Resp 14   Ht 1.57 m (5' 1.81\")   Wt 87.7 kg (193 lb 6.4 oz)   SpO2 96%   BMI 35.59 kg/m    Body mass index is 35.59 kg/m .  Physical Exam   GENERAL: healthy, alert and no distress  EYES: Eyes grossly normal to inspection, PERRL and conjunctivae and sclerae normal  MS: gait abnormality with limb   PSYCH: mentation appears normal, affect normal/bright    No results found for any visits on 09/27/23.                  "

## 2023-09-28 ENCOUNTER — TELEPHONE (OUTPATIENT)
Dept: NEUROSURGERY | Facility: CLINIC | Age: 78
End: 2023-09-28
Payer: MEDICARE

## 2023-09-28 NOTE — TELEPHONE ENCOUNTER
1st attempt at workque referral. I called and left a voice mail.    Referred by Kalyn Person MD in  [facility]ACT[facility]  Cervicalgia [M54.2]  Spinal stenosis of lumbar region, unspecified whether neurogenic claudication  MRI   PT  Inj  Surg

## 2023-09-29 DIAGNOSIS — M25.552 HIP PAIN, LEFT: Primary | ICD-10-CM

## 2023-09-29 DIAGNOSIS — M48.061 SPINAL STENOSIS OF LUMBAR REGION, UNSPECIFIED WHETHER NEUROGENIC CLAUDICATION PRESENT: ICD-10-CM

## 2023-09-29 DIAGNOSIS — M54.2 CERVICALGIA: ICD-10-CM

## 2023-10-03 ENCOUNTER — TRANSFERRED RECORDS (OUTPATIENT)
Dept: HEALTH INFORMATION MANAGEMENT | Facility: CLINIC | Age: 78
End: 2023-10-03
Payer: MEDICARE

## 2023-10-09 ENCOUNTER — TELEPHONE (OUTPATIENT)
Dept: NEUROSURGERY | Facility: CLINIC | Age: 78
End: 2023-10-09
Payer: MEDICARE

## 2023-10-09 NOTE — TELEPHONE ENCOUNTER
2nd attempt at workque referral. I called and spoke with family member. Patient was not available and will call clinic back to schedule appt.     Referred by Kalyn Person MD in  FAMILY PRACTICE  Cervicalgia [M54.2]  Spinal stenosis of lumbar region, unspecified whether neurogenic claudication  MRI   PT  Inj  Surg

## 2023-10-13 ENCOUNTER — TELEPHONE (OUTPATIENT)
Dept: NEUROSURGERY | Facility: CLINIC | Age: 78
End: 2023-10-13
Payer: MEDICARE

## 2023-10-13 NOTE — TELEPHONE ENCOUNTER
3rd attempt with patient. Patient is getting treated at [facility] and has declined services for now.      Referred by Kalyn Person MD in [facility]  Cervicalgia [M54.2]  Spinal stenosis of lumbar region, unspecified whether neurogenic claudication  MRI   PT  Inj  Surg

## 2023-11-08 ENCOUNTER — ANCILLARY PROCEDURE (OUTPATIENT)
Dept: GENERAL RADIOLOGY | Facility: CLINIC | Age: 78
End: 2023-11-08
Attending: INTERNAL MEDICINE
Payer: MEDICARE

## 2023-11-08 ENCOUNTER — OFFICE VISIT (OUTPATIENT)
Dept: RHEUMATOLOGY | Facility: CLINIC | Age: 78
End: 2023-11-08
Attending: INTERNAL MEDICINE
Payer: MEDICARE

## 2023-11-08 VITALS
HEART RATE: 72 BPM | WEIGHT: 192.2 LBS | SYSTOLIC BLOOD PRESSURE: 130 MMHG | OXYGEN SATURATION: 94 % | RESPIRATION RATE: 14 BRPM | BODY MASS INDEX: 35.37 KG/M2 | DIASTOLIC BLOOD PRESSURE: 70 MMHG

## 2023-11-08 DIAGNOSIS — Z79.899 HIGH RISK MEDICATION USE: ICD-10-CM

## 2023-11-08 DIAGNOSIS — M19.041 PRIMARY OSTEOARTHRITIS OF BOTH HANDS: ICD-10-CM

## 2023-11-08 DIAGNOSIS — M19.042 PRIMARY OSTEOARTHRITIS OF BOTH HANDS: ICD-10-CM

## 2023-11-08 DIAGNOSIS — M06.09 RHEUMATOID ARTHRITIS OF MULTIPLE SITES WITH NEGATIVE RHEUMATOID FACTOR (H): ICD-10-CM

## 2023-11-08 DIAGNOSIS — M06.09 RHEUMATOID ARTHRITIS OF MULTIPLE SITES WITH NEGATIVE RHEUMATOID FACTOR (H): Primary | ICD-10-CM

## 2023-11-08 DIAGNOSIS — M25.511 CHRONIC PAIN OF BOTH SHOULDERS: ICD-10-CM

## 2023-11-08 DIAGNOSIS — M25.512 CHRONIC PAIN OF BOTH SHOULDERS: ICD-10-CM

## 2023-11-08 DIAGNOSIS — G89.29 CHRONIC PAIN OF BOTH SHOULDERS: ICD-10-CM

## 2023-11-08 LAB
BASOPHILS # BLD AUTO: 0 10E3/UL (ref 0–0.2)
BASOPHILS NFR BLD AUTO: 0 %
EOSINOPHIL # BLD AUTO: 0.1 10E3/UL (ref 0–0.7)
EOSINOPHIL NFR BLD AUTO: 1 %
ERYTHROCYTE [DISTWIDTH] IN BLOOD BY AUTOMATED COUNT: 13 % (ref 10–15)
ERYTHROCYTE [SEDIMENTATION RATE] IN BLOOD BY WESTERGREN METHOD: 48 MM/HR (ref 0–30)
HCT VFR BLD AUTO: 39.5 % (ref 35–47)
HGB BLD-MCNC: 12.7 G/DL (ref 11.7–15.7)
IMM GRANULOCYTES # BLD: 0 10E3/UL
IMM GRANULOCYTES NFR BLD: 0 %
LYMPHOCYTES # BLD AUTO: 1.9 10E3/UL (ref 0.8–5.3)
LYMPHOCYTES NFR BLD AUTO: 22 %
MCH RBC QN AUTO: 28.6 PG (ref 26.5–33)
MCHC RBC AUTO-ENTMCNC: 32.2 G/DL (ref 31.5–36.5)
MCV RBC AUTO: 89 FL (ref 78–100)
MONOCYTES # BLD AUTO: 0.7 10E3/UL (ref 0–1.3)
MONOCYTES NFR BLD AUTO: 8 %
NEUTROPHILS # BLD AUTO: 6.1 10E3/UL (ref 1.6–8.3)
NEUTROPHILS NFR BLD AUTO: 69 %
PLATELET # BLD AUTO: 342 10E3/UL (ref 150–450)
RBC # BLD AUTO: 4.44 10E6/UL (ref 3.8–5.2)
WBC # BLD AUTO: 8.8 10E3/UL (ref 4–11)

## 2023-11-08 PROCEDURE — 36415 COLL VENOUS BLD VENIPUNCTURE: CPT | Performed by: INTERNAL MEDICINE

## 2023-11-08 PROCEDURE — 86704 HEP B CORE ANTIBODY TOTAL: CPT | Performed by: INTERNAL MEDICINE

## 2023-11-08 PROCEDURE — 86618 LYME DISEASE ANTIBODY: CPT | Performed by: INTERNAL MEDICINE

## 2023-11-08 PROCEDURE — 86706 HEP B SURFACE ANTIBODY: CPT | Performed by: INTERNAL MEDICINE

## 2023-11-08 PROCEDURE — 86140 C-REACTIVE PROTEIN: CPT | Performed by: INTERNAL MEDICINE

## 2023-11-08 PROCEDURE — 99205 OFFICE O/P NEW HI 60 MIN: CPT | Performed by: INTERNAL MEDICINE

## 2023-11-08 PROCEDURE — 85652 RBC SED RATE AUTOMATED: CPT | Performed by: INTERNAL MEDICINE

## 2023-11-08 PROCEDURE — 71046 X-RAY EXAM CHEST 2 VIEWS: CPT | Mod: TC | Performed by: RADIOLOGY

## 2023-11-08 PROCEDURE — 85025 COMPLETE CBC W/AUTO DIFF WBC: CPT | Performed by: INTERNAL MEDICINE

## 2023-11-08 PROCEDURE — 73630 X-RAY EXAM OF FOOT: CPT | Mod: TC | Performed by: RADIOLOGY

## 2023-11-08 PROCEDURE — 87340 HEPATITIS B SURFACE AG IA: CPT | Mod: GZ | Performed by: INTERNAL MEDICINE

## 2023-11-08 PROCEDURE — 82565 ASSAY OF CREATININE: CPT | Performed by: INTERNAL MEDICINE

## 2023-11-08 PROCEDURE — 73130 X-RAY EXAM OF HAND: CPT | Mod: TC | Performed by: RADIOLOGY

## 2023-11-08 PROCEDURE — 86803 HEPATITIS C AB TEST: CPT | Performed by: INTERNAL MEDICINE

## 2023-11-08 PROCEDURE — 80076 HEPATIC FUNCTION PANEL: CPT | Performed by: INTERNAL MEDICINE

## 2023-11-08 RX ORDER — METHOTREXATE 2.5 MG/1
TABLET ORAL
Qty: 32 TABLET | Refills: 3 | Status: SHIPPED | OUTPATIENT
Start: 2023-11-08 | End: 2024-02-15

## 2023-11-08 RX ORDER — PREDNISONE 5 MG/1
TABLET ORAL
Qty: 165 TABLET | Refills: 0 | Status: SHIPPED | OUTPATIENT
Start: 2023-11-08 | End: 2024-01-27

## 2023-11-08 RX ORDER — FOLIC ACID 1 MG/1
1 TABLET ORAL DAILY
Qty: 100 TABLET | Refills: 3 | Status: SHIPPED | OUTPATIENT
Start: 2023-11-08 | End: 2024-02-15

## 2023-11-08 NOTE — NURSING NOTE
RAPID3 (0-30) Cumulative Score  16.5          RAPID3 Weighted Score (divide #4 by 3 and that is the weighted score)  5.5

## 2023-11-08 NOTE — PROGRESS NOTES
"Rheumatology Clinic Visit      Alfreda Baxter MRN# [id number]   YOB: 1945 Age: 78 year old      Date of visit: 11/08/23   Referring provider: Dr. Brandi Sims  PCP: Dr. Mercedes Urena    Chief Complaint   Patient presents with:  Consult: Joint pain, both shoulders hurt. Arms hurt, back hurt, hands ache. Has gone to [facility]. Has been on prednisone burst which has helped a lot.    Assessment and Plan     1.  Seronegative rheumatoid arthritis: Inflammatory arthritis symptoms affecting her MCPs, PIPs, wrists, and shoulders started in February 2023.  Establish care with me on 11/8/2023 at which point she had symmetric synovitis of the MCPs, PIPs, and wrists; and symptoms of impingement syndrome of the shoulders.  She has been following at [facility] where steroid injections of the shoulders provide benefit for no more than 10 days; and systemic steroid taper starting with 60 mg daily from her primary care provider was a \"miracle\" with resolution of MCP, PIP, wrist, and shoulder pain while on prednisone.  Reviewed the diagnosis and treatment options.  Start methotrexate.  Note that she has a family history of a son with ulcerative colitis; her son's paternal cousins have psoriasis.  Due to significant symptoms at this time she would like to use prednisone.  Risk and side effects of methotrexate and prednisone were reviewed in detail today.  - Start Methotrexate 10mg once weekly for 1 week, then increase by 2.5mg each week until taking 20mg once weekly; then continue 20mg once weekly thereafter.  - Start folic acid 1mg daily  - Start prednisone 20 mg daily x5 days, then 15 mg daily x5 days, then 10 mg daily x60 days, then 5 mg daily x10 days, then stop  - X-rays today: Chest, bilateral hands/feet  - Labs today: CBC, Creatinine, Hepatic panel, ESR, CRP, hepatitis B/C, Lyme  - Labs monthly [id number]: CBC, Cr, Hepatic Panel  - Labs in 3 months: CBC, Creatinine, Hepatic Panel, ESR, CRP    # " Methotrexate Risks and Benefits: The risks and benefits of methotrexate were discussed in detail and the patient verbalized understanding.  The risks discussed include, but are not limited to, the risk for hypersensitivity, anaphylaxis, anaphylactoid reactions, infections, bone marrow suppression, renal toxicity, hepatotoxicity, pulmonary toxicity, malignancy, impaired fertility, GI upset, alopecia, and oral and nasal sores.  Folic acid supplementation is recommended during methotrexate therapy to help prevent some of the side effects. Pregnancy prevention and planning was discussed; it is recommended that women of childbearing potential use reliable contraception during therapy.  The risks of taking both methotrexate and alcohol were reviewed; complete alcohol avoidance was discussed.  Routine laboratory monitoring is required during methotrexate therapy. Taking MTX once weekly, all within a 24 hour period was stressed and the patient verbalized this instruction back to me.  I encouraged reviewing the package insert and asking any questions about the medication.     # Prednisone Risks and Benefits: The risks and benefits of prednisone were discussed in detail and the patient verbalized understanding.  The risks discussed include, but are not limited to, weight gain, fluid retention, impaired wound healing, hyperglycemia, adrenal suppression, GI upset, peptic ulcer, hepatotoxicity, aseptic necrosis of the femoral and humeral heads, osteoporosis, myopathy, tendon rupture (particularly Achilles tendon), ocular changes including an increased intraocular pressure.  I encouraged reviewing the package insert and asking any questions about the medication.      2.  Osteoarthritis of the hands: Significant degenerative changes of the hands with large Heberden's and Alba's nodes, and squaring of the first CMC joints.  Reportedly this has been stable for many years, preceding the inflammatory arthritis symptoms started in  February 2023.  Minimal symptom severity from degenerative arthritis at this time.    3.  Degenerative arthritis of the spine: Many degenerative changes that have been managed with epidural steroid injections from [facility].  She will continue following with her spine  pecialist and plans to see a pain management specialist at [facility] in December for chronic back pain.    4.  Bilateral shoulder pain: Exam consistent with impingement syndrome.  Reportedly all shoulder symptoms resolved when she is on prednisone.  This could be related to the rheumatoid arthritis or a degenerative issue.  Monitor for improvement with methotrexate use and depending on response may need steroid injection of the shoulder(s), and/or potentially imaging.    5.  History of squamous cell carcinoma: 1 lesion removed per patient report.  She will continue following with dermatology at least once yearly for monitoring.  She verbalized understanding about the increased risk for malignancy with methotrexate use.    6.  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    7.  Vaccinations: Vaccinations reviewed with Ms. Baxter.  Risks and benefits of vaccinations were discussed.    - Influenza: encouraged yearly vaccination  - COVID-19: Advised updating.  If she is planning to receive in the near future then advised getting the COVID-19 vaccination now and then starting methotrexate and prednisone 1 week later    Total minutes spent in evaluation with patient, documentation, , and review of pertinent studies and chart notes: 62     Ms. Baxter verbalized agreement with and understanding of the rational for the diagnosis and treatment plan.  All questions were answered to best of my ability and the patient's satisfaction. Ms. Baxter was advised to contact the clinic with any questions that may arise after the clinic visit.      Thank you for involving me in the care  of the patient    Return to clinic: 3 months      HPI   Alfreda Baxter is a 78 year old female with a past medical history significant for hypertension, hyperlipidemia, lumbar spine stenosis, paroxysmal atrial fibrillation on anticoagulation, history of CVA with left eye vision loss, insomnia, and bilateral TKA who presents for initial rheumatology evaluation of multiple joint pain.    Today, 11/8/2023: Chronic degenerative changes of her hands and spine; the degenerative changes of her hands do not bother her.  The degenerative changes of her spine are managed at [facility] where steroid injections have been effective; she has seen a spine surgeon and is next going to see a pain management specialist at [facility].  She had been fairly stable until February 2023 when she had sudden onset bilateral shoulder pain.  This was followed by pain at the wrists, MCPs, and PIPs.  MCP, PIP, wrist, and shoulder pain is worse in the morning and improves with time and activity; morning stiffness for most of the day, reaching a baseline after about 1-2 hours.  When she received a epidural steroid injection of her lumbar spine her shoulders felt better.  She then received a high dose of prednisone starting with 60 mg daily and tapered off from her primary care clinic and while on prednisone she felt like it was a miracle drug.  She has been given other courses of prednisone and she says that she typically only starts with 40 mg a day and that that is very effective also.  Reduced  strength.  History of bilateral TKA.  Knees, ankles, and feet without any issues.  She notes that her son has ulcerative colitis and that her son's cousins on his father side have psoriasis; no other autoimmune disease on her side of the family.  Squamous of carcinoma x1; following with dermatology regularly.  History of CVA 5 years ago with loss of vision in the left eye.  No history of scalp tenderness, jaw or tongue  claudication, or new headaches now around the time of the vision loss.    Denies fevers, chills, nausea, vomiting, constipation, diarrhea. No abdominal pain.  No black or bloody stools.  No chest pain/pressure or shortness of breath. No LE swelling. No neck pain. No oral or nasal sores.  No rash. No sicca symptoms. No photosensitivity or photophobia. No eye pain or redness. No history of uveitis.  No history of inflammatory bowel disease.  No history of DVT, pulmonary embolism, or miscarriage; CVA history.   No history of serositis.  No history of Raynaud's Phenomenon.  No known seizure disorder.  No known renal disorder.      Tobacco: Former cigarette smoker  EtOH: None  Drugs: None  Occupation: retired nurse    ROS   12 point review of system was completed and negative except as noted in the HPI     Active Problem List     Patient Active Problem List   Diagnosis    DJD (degenerative joint disease)    Hypertension goal BP (blood pressure) < 140/90    Hyperlipidemia LDL goal <70    Lumbar spinal stenosis    Mild intermittent asthma without complication    History of bilateral knee replacement    Primary insomnia    Posterior vitreous detachment of both eyes    PFO (patent foramen ovale)    Neovascular glaucoma of left eye, moderate stage    Blind left eye    Paroxysmal A-fib (H)    Ocular hypertension of right eye, treated    History of central retinal artery occlusion, os    Recurrent cerebrovascular accidents (CVAs) (H)    Disturbances of vision, late effect of stroke    Combined forms of age-related cataract, mild-mod, of left eye    Pseudophakia, od    Low bone mass    Class 2 severe obesity due to excess calories with serious comorbidity in adult (H)    Cervicalgia     Past Medical History     Past Medical History:   Diagnosis Date    Acute idiopathic gout of left foot 11/04/2015    Adenomatous colon polyp 06/05/2013    Colonoscopy due 2025    Asthma     controlled with inhaler    Blind left eye 07/08/2019     Due to central retinal occlusion    Cataract     Central retinal artery occlusion of left eye 09/15/2018    Disturbances of vision, late effect of stroke 2022    DJD (degenerative joint disease)     knees    GERD (gastroesophageal reflux disease)     Glaucoma     Gout     History of central retinal artery occlusion, os 2020    HTN (hypertension)     Hyperlipidemia LDL goal <70 2010    Low bone mass     Lumbar spinal stenosis 2011    Sees Dr Sage    Mild persistent asthma 2013    Neovascular glaucoma of left eye, moderate stage 2019    Ocular hypertension of right eye, treated 2019    Paroxysmal A-fib (H) 2019    Per loop recorder, 2018    On Rivaroxaban.   H/o R central retinal artery occlusion    Paroxysmal ventricular tachycardia (H) 2018    Added automatically from request for surgery [id number]    PFO (patent foramen ovale)     Recurrent cerebrovascular accidents (CVAs) (H) 2022    Transient global amnesia 2022     Past Surgical History     Past Surgical History:   Procedure Laterality Date    APPENDECTOMY      age 7 yrs.    BREAST BIOPSY, RT/LT  2004    left benign    CATARACT IOL, RT/LT       SECTION      x 2    COLONOSCOPY  2013    needed q 3 yrs.    COLONOSCOPY N/A 09/10/2020    Procedure: Colonoscopy, With Polypectomy And Biopsy;  Surgeon: Brian Cleaning MD;  Location: [facility] OR    COLONOSCOPY WITH CO2 INSUFFLATION N/A 2016    Procedure: COLONOSCOPY WITH CO2 INSUFFLATION;  Surgeon: Brian Cleaning MD;  Location: [facility] OR    COLONOSCOPY WITH CO2 INSUFFLATION N/A 09/10/2020    Procedure: COLONOSCOPY, WITH CO2 INSUFFLATION;  Surgeon: Brian Cleaning MD;  Location: [facility] OR    EP COMPREHENSIVE EP STUDY N/A 2018    Procedure: LOOP RECORDER;  Surgeon: Buck Butterfield MD;  Location:  [facility]    HYSTERECTOMY, POORNIMA      bleeding fibroids    ORTHOPEDIC SURGERY      meniscus repair     PHACOEMULSIFICATION WITH STANDARD INTRAOCULAR LENS IMPLANT Right 10/24/2022    Procedure: COMPLE RIGHT EYE PHACOEMULSIFICATION, CATARACT, WITH STANDARD INTRAOCULAR LENS IMPLANT INSERTION;  Surgeon: Ezequiel Florence MD;  Location: [facility] TOTAL KNEE ARTHROPLASTY Left 11/2015    at [facility]     Allergy     Allergies   Allergen Reactions    No Clinical Screening - See Comments Other (See Comments)     senisitive to non steroidals  Aleve, ibuprofen celebrex cause bad stomach pains  senisitive to non steroidals  Aleve, ibuprofen celebrex cause bad stomach pains    Oxycodone Hives     Other reaction(s): Unknown  ... With facial swelling    Nsaids Nausea and Vomiting     Other reaction(s): Abdominal Pain     Current Medication List     Current Outpatient Medications   Medication Sig    acetaminophen (TYLENOL) 500 MG tablet Take 500-1,000 mg by mouth every 6 hours as needed.    albuterol (PROAIR HFA/PROVENTIL HFA/VENTOLIN HFA) 108 (90 Base) MCG/ACT inhaler Inhale 2 puffs into the lungs every 6 hours as needed for shortness of breath or wheezing    apixaban ANTICOAGULANT (ELIQUIS) 5 MG tablet Take 1 tablet (5 mg) by mouth 2 times daily    atorvastatin (LIPITOR) 20 MG tablet Take 1 tablet (20 mg) by mouth daily    B-COMPLEX OR Take  by mouth daily.    CALCIUM 500 +D OR one daily    hypromellose (ARTIFICIAL TEARS) 0.5 % SOLN ophthalmic solution Place 1 drop into both eyes 3 times daily    latanoprost (XALATAN) 0.005 % ophthalmic solution Place 1 drop into both eyes At Bedtime    lisinopril (ZESTRIL) 10 MG tablet Take 1 tablet (10 mg) by mouth daily    multivitamin (OCUVITE) TABS tablet Take 1 tablet by mouth daily    timolol maleate (TIMOPTIC) 0.5 % ophthalmic solution Place 1 drop Into the left eye every morning    zolpidem (AMBIEN) 5 MG tablet TAKE ONE TABLET BY MOUTH IN THE EVENING AS NEEDED FOR SLEEP    pregabalin (LYRICA) 25 MG capsule Take 1 capsule (25 mg) by mouth 2 times daily     No current  "facility-administered medications for this visit.         Social History   See HPI    Family History     Family History   Problem Relation Age of Onset    Arthritis Mother     Cancer Mother     Hypertension Mother     Other Cancer Mother     Thyroid Disease Mother     Respiratory Father     Glaucoma Father     Asthma Father     Allergies Sister     Eye Disorder Sister     Lipids Sister     Neurologic Disorder Sister     Osteoporosis Sister     Glaucoma Sister     Hypertension Sister     Macular Degeneration Sister     Hyperlipidemia Sister     Hypertension Sister     Osteoporosis Sister     Gastrointestinal Disease Son         Ulcerative Colitis    Glaucoma Other      Son: Ulcerative colitis, psoriatic arthritis, psoriasis.  Her son's paternal cousins have psoriasis    Physical Exam     Temp Readings from Last 3 Encounters:   09/27/23 98.2  F (36.8  C) (Temporal)   08/30/23 98.1  F (36.7  C) (Oral)   12/06/22 97.4  F (36.3  C) (Oral)     BP Readings from Last 5 Encounters:   09/27/23 122/72   08/30/23 117/76   12/30/22 (!) 143/87   12/06/22 136/83   11/15/22 111/72     Pulse Readings from Last 1 Encounters:   09/27/23 89     Resp Readings from Last 1 Encounters:   09/27/23 14     Estimated body mass index is 35.59 kg/m  as calculated from the following:    Height as of 9/27/23: 1.57 m (5' 1.81\").    Weight as of 9/27/23: 87.7 kg (193 lb 6.4 oz).    GEN: NAD. Healthy appearing adult.   HEENT:  Anicteric, noninjected sclera. No obvious external lesions of the ear and nose. Hearing intact.  CV: S1, S2. RRR. No m/r/g  PULM: No increased work of breathing. CTA bilaterally   MSK: Synovial swelling and tenderness to palpation of the bilateral second-third MCPs and second-fourth PIPs.  Large Heberden's and Alba's nodes present.  DIPs nontender to palpation.  Wrists with synovial swelling and tenderness to palpation.  Unable to make a complete fist bilaterally.  Elbows without swelling or tenderness to palpation.  " Shoulders nontender to palpation without swelling; pain with abduction above approximately 75 degrees.  Shoulders with normal range of motion.  Knees, ankles, and MTPs without swelling or tenderness to palpation.    SKIN: No rash or jaundice seen  PSYCH: Alert. Appropriate.      Labs / Imaging (select studies)     RF/CCP  Recent Labs   Lab Test 03/08/23  1413 09/10/15  1353   CCPABY  --  <20  Interpretation:  Negative     CCPIGG 0.7  --    RHF <7 <20     RAGHAV  Recent Labs   Lab Test 03/08/23  1413 09/10/15  1353   SHIKHA  --  <1.0  Interpretation:  Negative     ANSLEY Negative  --      Antiphospholipid Antibodies  Recent Labs   Lab Test 12/06/22  1056   B2GPG 1.0   CARDG Negative   CARDM Negative     CBC  Recent Labs   Lab Test 11/15/22  0632 11/14/22  1536 08/24/22  1057 07/27/21  1004 07/20/21  1248 11/26/20  1141 10/15/20  1108 07/24/20  0829 08/10/19  1410   WBC 8.7 11.0 6.4   < > 12.3* 5.0   < > 5.7 18.6*   RBC 4.54 4.90 4.89   < > 4.73 4.74   < > 4.84 4.31   HGB 13.3 14.2 14.5   < > 13.9 13.4   < > 14.3 12.9   HCT 41.3 43.8 44.3   < > 42.9 43.1   < > 43.4 40.0   MCV 91 89 91   < > 91 91   < > 90 93   RDW 13.2 12.9 13.2   < > 13.5 12.5   < > 13.5 13.8    226 242   < > 258 338   < > 246 252   MCH 29.3 29.0 29.7   < > 29.4 28.3   < > 29.5 29.9   MCHC 32.2 32.4 32.7   < > 32.4 31.1*   < > 32.9 32.3   NEUTROPHIL 67 73  --   --  69 66.6  --  48.3 85.6   LYMPH 23 20  --   --  24 25.2  --  41.3 9.7   MONOCYTE 7 6  --   --  7 7.0  --  7.9 4.6   EOSINOPHIL 2 1  --   --  1 0.4  --  2.3 0.0   BASOPHIL 0 0  --   --  0 0.6  --  0.2 0.1   ANEU  --   --   --   --   --  3.3  --  2.7 16.0*   ALYM  --   --   --   --   --  1.3  --  2.3 1.8   MANINDER  --   --   --   --   --  0.4  --  0.5 0.9   AEOS  --   --   --   --   --  0.0  --  0.1 0.0   ABAS  --   --   --   --   --  0.0  --  0.0 0.0   ANEUTAUTO 5.8 7.9  --   --  8.4*  --   --   --   --    ALYMPAUTO 2.0 2.3  --   --  3.0  --   --   --   --    AMONOAUTO 0.6 0.6  --   --  0.8  --    --   --   --    AEOSAUTO 0.2 0.1  --   --  0.1  --   --   --   --    ABSBASO 0.0 0.0  --   --  0.0  --   --   --   --     < > = values in this interval not displayed.     CMP  Recent Labs   Lab Test 03/08/23  1413 11/15/22  0632 11/14/22  1536 11/14/22  1502 11/14/22  1459 08/24/22  1057 12/03/21  1538 07/27/21  1004 11/26/20  1141 10/15/20  1108 07/24/20  1013 08/10/19  1618    137 140  --   --  139 144   < > 138 141 143 135   POTASSIUM 4.3 3.8 4.1  --   --  5.0 4.0   < > 3.8 4.6 4.1 3.8   CHLORIDE 106 103 103  --   --  106 110*   < > 104 106 108 104   CO2 27 23 23  --   --  31 28   < > 29 33* 26 22   ANIONGAP 4 11 14  --   --  2* 6   < > 6 2* 9 9   * 95 99  --  110* 110* 109*   < > 114* 100* 99 189*   BUN 22 16.4 22.3  --   --  19 18   < > 16 24 19 18   CR 0.81 0.63 0.78 0.9  --  0.78 0.94   < > 0.70 0.70 0.85 0.67   GFRESTIMATED 74 90 77 >60  --  77 59*   < > 84 84 66 86   GFRESTBLACK  --   --   --   --   --   --   --   --  >90 >90 77 >90   RUDOLPH 9.7 8.9 9.1  --   --  9.7 9.2   < > 10.0 9.6 9.1 8.7   BILITOTAL 0.6 0.5 0.5  --   --   --   --   --  0.5  --   --   --    ALBUMIN 3.8 3.7 4.2  --   --   --   --   --  3.6  --   --   --    PROTTOTAL 8.1 6.6 7.4  --   --   --   --   --  8.6  --   --   --    ALKPHOS 69 62 71  --   --   --   --   --  84  --   --   --    AST 18 20 24  --   --   --   --   --  9  --   --   --    ALT 20 7* 13  --   --   --   --   --  16  --  22  --     < > = values in this interval not displayed.     HgA1c  Recent Labs   Lab Test 11/14/22  1536 08/24/22  1057 07/27/21  1004   A1C 5.8* 5.6 5.7*     Uric Acid  Recent Labs   Lab Test 12/03/21  1538 07/27/21  1004 07/20/21  1248 06/01/17  0808 09/10/15  1353   URIC 4.0 6.0 5.9 8.3* 8.4*     Calcium/VitaminD  Recent Labs   Lab Test 03/08/23  1413 11/15/22  0632 11/14/22  1536 12/31/18  1043 06/25/18  0834   RUDOLPH 9.7 8.9 9.1   < > 9.4   VITDT  --   --   --   --  22    < > = values in this interval not displayed.     ESR/CRP  Recent Labs    Lab Test 03/08/23  1413 07/27/21  1004 12/03/15  1043   SED 18 9 38*   CRP 13.4* <2.9 21.5*     CK/Aldolase  Recent Labs   Lab Test 09/10/15  1353        TSH/T4  Recent Labs   Lab Test 03/08/23  1413 11/26/20  1141 09/10/15  1353   TSH 0.67 0.40 0.53     Hepatitis C  Recent Labs   Lab Test 12/03/21  1538   HCVAB Nonreactive     Lyme ab screening  Recent Labs   Lab Test 09/10/15  1353   LYMEGM 0.05     UA  Recent Labs   Lab Test 03/08/23  1413 11/26/20  1318 11/04/15  1147   COLOR Yellow Light Yellow Yellow   APPEARANCE Clear Clear Clear   URINEGLC Negative Negative Negative   URINEBILI Negative Negative Negative   SG 1.025 1.004 1.015   URINEPH 5.0 6.5 5.5   PROTEIN Negative Negative Negative   UROBILINOGEN 0.2  --  0.2   NITRITE Negative Negative Negative   UBLD Trace* Negative Negative   LEUKEST Negative Moderate* Trace*   WBCU 0-5 4 O - 2   RBCU 0-2 1 O - 2   SQUAMOUSEPI  --   --  Few   BACTERIA  --  Few* Few*     Urine Microscopic  Recent Labs   Lab Test 03/08/23  1413 11/26/20  1318 11/04/15  1147   WBCU 0-5 4 O - 2   RBCU 0-2 1 O - 2   SQUAMOUSEPI  --   --  Few   BACTERIA  --  Few* Few*     Immunization History     Immunization History   Administered Date(s) Administered    COVID-19 Bivalent 12+ (Pfizer) 12/12/2022    COVID-19 MONOVALENT 12+ (Pfizer) 03/10/2021, 03/31/2021, 12/07/2021    Influenza (High Dose) 3 valent vaccine 10/21/2016, 11/16/2017, 10/16/2019, 10/10/2022    Influenza (IIV3) PF 11/29/2006, 11/02/2012, 11/01/2013, 11/07/2014, 10/20/2015    Influenza Vaccine 18-64 (Flublok) 10/19/2021    Influenza Vaccine 65+ (FLUAD) 10/19/2021, 10/10/2022, 11/06/2023    Influenza Vaccine 65+ (Fluzone HD) 10/13/2020    Pneumo Conj 13-V (2010&after) 06/28/2018    Pneumococcal 23 valent 11/29/2006, 07/27/2020    TD,PF 7+ (Tenivac) 06/28/2018    TDAP Vaccine (Adacel) 11/19/2007    Td (Adult), Adsorbed 01/17/2000    Zoster recombinant adjuvanted (SHINGRIX) 07/15/2019, 09/26/2019    Zoster vaccine, live  11/19/2008          Chart documentation done in part with Dragon Voice recognition Software. Although reviewed after completion, some word and grammatical error may remain.    Lucho Huber MD

## 2023-11-08 NOTE — PATIENT INSTRUCTIONS
RHEUMATOLOGY    [facility]  [address] [place] [address]  [place] [address]    Phone number: [phone]  Fax number: [phone]    If you need a medication refill, please contact us as you may need lab work and/or a follow up visit prior to your refill.      Thank you for choosing [facility]!    Loraine Seaman CMA Rheumatology

## 2023-11-09 ENCOUNTER — TELEPHONE (OUTPATIENT)
Dept: FAMILY MEDICINE | Facility: CLINIC | Age: 78
End: 2023-11-09
Payer: MEDICARE

## 2023-11-09 LAB
ALBUMIN SERPL BCG-MCNC: 3.8 G/DL (ref 3.5–5.2)
ALP SERPL-CCNC: 62 U/L (ref 35–104)
ALT SERPL W P-5'-P-CCNC: 8 U/L (ref 0–50)
AST SERPL W P-5'-P-CCNC: 25 U/L (ref 0–45)
B BURGDOR IGG+IGM SER QL: 0.03
BILIRUB DIRECT SERPL-MCNC: <0.2 MG/DL (ref 0–0.3)
BILIRUB SERPL-MCNC: 0.5 MG/DL
CREAT SERPL-MCNC: 0.67 MG/DL (ref 0.51–0.95)
CRP SERPL-MCNC: 29.2 MG/L
EGFRCR SERPLBLD CKD-EPI 2021: 88 ML/MIN/1.73M2
HBV CORE AB SERPL QL IA: NONREACTIVE
HBV SURFACE AB SERPL IA-ACNC: 0.89 M[IU]/ML
HBV SURFACE AB SERPL IA-ACNC: NONREACTIVE M[IU]/ML
HBV SURFACE AG SERPL QL IA: NONREACTIVE
HCV AB SERPL QL IA: NONREACTIVE
PROT SERPL-MCNC: 7.5 G/DL (ref 6.4–8.3)

## 2023-11-09 NOTE — TELEPHONE ENCOUNTER
Pt calling regarding test results ordered by Dr. Huber. She is not able to see them in [employer]. Pt was given number for [employer] support. Pt asked about CRP, sed rate, CBC, lymes, creatinine. Lab values reviewed with pt. Pt was advised that there is no result message at this time. Pt will contact [employer] support for assistance with her [employer] account.    Ofelia Sy RN  [facility]

## 2023-11-26 ENCOUNTER — HEALTH MAINTENANCE LETTER (OUTPATIENT)
Age: 78
End: 2023-11-26

## 2023-11-29 ENCOUNTER — TELEPHONE (OUTPATIENT)
Dept: RHEUMATOLOGY | Facility: CLINIC | Age: 78
End: 2023-11-29
Payer: MEDICARE

## 2023-11-29 NOTE — TELEPHONE ENCOUNTER
[facility] Call Center    Phone Message    May a detailed message be left on voicemail: yes     Reason for Call: Medication Question or concern regarding medication   Prescription Clarification  Name of Medication: Methotrexate  Prescribing Provider: [facility]   What is the question? Pt will be due to take her next dose of methotrexate this upcoming Monday (12/4/23), however, she is also due to have her lab tests done that same day (if she takes her methotrexate on schedule, that means she would be taking it only about 1-2 hours prior to having her lab tests done). Pt is wondering if this will be ok? Or should she delay taking her medications that day, or reschedule her lab tests?        Action Taken: Message routed to:  Other: BLESSING RHEUMATOLOGY/ENDOCRINE RN POOL    Travel Screening: Not Applicable

## 2023-11-29 NOTE — TELEPHONE ENCOUNTER
Called patient and notified her that it is ok to take methotrexate and have labs done in the same day. Patient confirmed understanding.     Evelyn RODGERS RN, [facility] 11/29/23 11:35 AM

## 2023-12-04 ENCOUNTER — LAB (OUTPATIENT)
Dept: LAB | Facility: CLINIC | Age: 78
End: 2023-12-04
Payer: MEDICARE

## 2023-12-04 DIAGNOSIS — Z79.899 HIGH RISK MEDICATION USE: ICD-10-CM

## 2023-12-04 DIAGNOSIS — M06.09 RHEUMATOID ARTHRITIS OF MULTIPLE SITES WITH NEGATIVE RHEUMATOID FACTOR (H): ICD-10-CM

## 2023-12-04 LAB
ALBUMIN SERPL BCG-MCNC: 4.1 G/DL (ref 3.5–5.2)
ALP SERPL-CCNC: 51 U/L (ref 40–150)
ALT SERPL W P-5'-P-CCNC: 17 U/L (ref 0–50)
AST SERPL W P-5'-P-CCNC: 21 U/L (ref 0–45)
BASOPHILS # BLD AUTO: 0 10E3/UL (ref 0–0.2)
BASOPHILS NFR BLD AUTO: 0 %
BILIRUB DIRECT SERPL-MCNC: <0.2 MG/DL (ref 0–0.3)
BILIRUB SERPL-MCNC: 0.6 MG/DL
CREAT SERPL-MCNC: 0.8 MG/DL (ref 0.51–0.95)
EGFRCR SERPLBLD CKD-EPI 2021: 75 ML/MIN/1.73M2
EOSINOPHIL # BLD AUTO: 0.1 10E3/UL (ref 0–0.7)
EOSINOPHIL NFR BLD AUTO: 1 %
ERYTHROCYTE [DISTWIDTH] IN BLOOD BY AUTOMATED COUNT: 15.6 % (ref 10–15)
HCT VFR BLD AUTO: 43.5 % (ref 35–47)
HGB BLD-MCNC: 13.5 G/DL (ref 11.7–15.7)
IMM GRANULOCYTES # BLD: 0 10E3/UL
IMM GRANULOCYTES NFR BLD: 0 %
LYMPHOCYTES # BLD AUTO: 3.1 10E3/UL (ref 0.8–5.3)
LYMPHOCYTES NFR BLD AUTO: 28 %
MCH RBC QN AUTO: 29.4 PG (ref 26.5–33)
MCHC RBC AUTO-ENTMCNC: 31 G/DL (ref 31.5–36.5)
MCV RBC AUTO: 95 FL (ref 78–100)
MONOCYTES # BLD AUTO: 0.6 10E3/UL (ref 0–1.3)
MONOCYTES NFR BLD AUTO: 6 %
NEUTROPHILS # BLD AUTO: 7.3 10E3/UL (ref 1.6–8.3)
NEUTROPHILS NFR BLD AUTO: 65 %
PLATELET # BLD AUTO: 231 10E3/UL (ref 150–450)
PROT SERPL-MCNC: 7.1 G/DL (ref 6.4–8.3)
RBC # BLD AUTO: 4.59 10E6/UL (ref 3.8–5.2)
WBC # BLD AUTO: 11.2 10E3/UL (ref 4–11)

## 2023-12-04 PROCEDURE — 36415 COLL VENOUS BLD VENIPUNCTURE: CPT

## 2023-12-04 PROCEDURE — 85025 COMPLETE CBC W/AUTO DIFF WBC: CPT

## 2023-12-04 PROCEDURE — 80076 HEPATIC FUNCTION PANEL: CPT

## 2023-12-04 PROCEDURE — 82565 ASSAY OF CREATININE: CPT

## 2023-12-21 ENCOUNTER — OFFICE VISIT (OUTPATIENT)
Dept: OPHTHALMOLOGY | Facility: CLINIC | Age: 78
End: 2023-12-21
Payer: MEDICARE

## 2023-12-21 DIAGNOSIS — H40.52X2 NEOVASCULAR GLAUCOMA OF LEFT EYE, MODERATE STAGE: Primary | ICD-10-CM

## 2023-12-21 DIAGNOSIS — H40.051 OCULAR HYPERTENSION OF RIGHT EYE: ICD-10-CM

## 2023-12-21 PROCEDURE — 92083 EXTENDED VISUAL FIELD XM: CPT | Performed by: OPHTHALMOLOGY

## 2023-12-21 PROCEDURE — 92133 CPTRZD OPH DX IMG PST SGM ON: CPT | Performed by: OPHTHALMOLOGY

## 2023-12-21 PROCEDURE — 92012 INTRM OPH EXAM EST PATIENT: CPT | Performed by: OPHTHALMOLOGY

## 2023-12-21 ASSESSMENT — CONF VISUAL FIELD
METHOD: COUNTING FINGERS
OS_INFERIOR_NASAL_RESTRICTION: 1
OS_SUPERIOR_NASAL_RESTRICTION: 1
OS_SUPERIOR_TEMPORAL_RESTRICTION: 1
OS_INFERIOR_TEMPORAL_RESTRICTION: 1

## 2023-12-21 ASSESSMENT — TONOMETRY
OD_IOP_MMHG: 13
IOP_METHOD: APPLANATION
OS_IOP_MMHG: 18

## 2023-12-21 ASSESSMENT — VISUAL ACUITY
OS_CC: NLP
METHOD: SNELLEN - LINEAR
OD_CC: 20/25
OD_CC+: -2
CORRECTION_TYPE: GLASSES

## 2023-12-21 ASSESSMENT — REFRACTION_WEARINGRX
OD_AXIS: 150
OS_SPHERE: BALANCE LENS
OD_SPHERE: -2.75
OD_CYLINDER: +0.50
SPECS_TYPE: PAL
OD_ADD: +2.25

## 2023-12-21 NOTE — LETTER
12/21/2023         RE: Alfreda Baxter  [address] [facility] [address]        Dear Colleague,    Thank you for referring your patient, Alfreda Baxter, to the [facility]. Please see a copy of my visit note below.     Current Eye Medications:  Latanoprost every evening both eyes,Timolol every morning left eye. Artificial tears each eye BID, Ocuvite or Preservision 1 tab PO every day.     Subjective:  Pt returns for a glaucoma follow-up and pressure check. She is scheduled for RNFL OCT, macular OCT and 24-2 HVF screening today. She states that she is compliant with but is having a little itching each eye. She is taking oral prednisone and methotrexate and wonders if these are contributing to the itching. She didn't use the Timolol this morning. She adds she is having some increased blur right eye. Wonders if the posterior opacification right eye has worsened.     *difficult OCT left eye secondary to NLP and cataract. Didn't do HVF 24-2 left eye.     Objective:  See Ophthalmology Exam.       Assessment:  Stable intraocular pressure, glaucoma OCT, and Hill Visual Field right eye in patient with treated ocular hypertension.  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      Plan:  Continue:   Latanoprost (green top) every evening both eyes.  Timolol (yellow top) every morning left eye.     Return visit in 6 months for a complete exam.     Bryan Morrison M.D.  [phone]         Again, thank you for allowing me to participate in the care of your patient.        Sincerely,        Bryan Morrison MD

## 2023-12-21 NOTE — PATIENT INSTRUCTIONS
Continue:   Latanoprost (green top) every evening both eyes.  Timolol (yellow top) every morning left eye.     Return visit in 6 months for a complete exam.     Bryan Morrison M.D.  [phone]

## 2023-12-21 NOTE — PROGRESS NOTES
Current Eye Medications:  Latanoprost every evening both eyes,Timolol every morning left eye. Artificial tears each eye BID, Ocuvite or Preservision 1 tab PO every day.     Subjective:  Pt returns for a glaucoma follow-up and pressure check. She is scheduled for RNFL OCT, macular OCT and 24-2 HVF screening today. She states that she is compliant with but is having a little itching each eye. She is taking oral prednisone and methotrexate and wonders if these are contributing to the itching. She didn't use the Timolol this morning. She adds she is having some increased blur right eye. Wonders if the posterior opacification right eye has worsened.     *difficult OCT left eye secondary to NLP and cataract. Didn't do HVF 24-2 left eye.     Objective:  See Ophthalmology Exam.       Assessment:  Stable intraocular pressure, glaucoma OCT, and Hill Visual Field right eye in patient with treated ocular hypertension.  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      Plan:  Continue:   Latanoprost (green top) every evening both eyes.  Timolol (yellow top) every morning left eye.     Return visit in 6 months for a complete exam.     Bryan Morrison M.D.  [phone]

## 2024-01-02 ENCOUNTER — TELEPHONE (OUTPATIENT)
Dept: RHEUMATOLOGY | Facility: CLINIC | Age: 79
End: 2024-01-02

## 2024-01-02 NOTE — TELEPHONE ENCOUNTER
Cough   Fever  Headache   Short of breathe  Nauseas   Patient has not tested for COVID or Flu  Patient thinks she has the flu     Patient is on methotrexate and just started it 11/8  patient also on Prednisone. Patient instructed to hold methotrexate for 1-2 weeks after last symptom and she may continue prednisone. Patient is also currently getting monthly labs and was supposed to get a lab today, but do to being ill she will not get lab. When should patient have lab? Will reach out to the Doctor if he instructs something differently.    Evelyn SANCHEZ RN, [facility] 01/02/24 8:40 AM

## 2024-01-02 NOTE — TELEPHONE ENCOUNTER
Called patient and confirmed ok to get labs when feeling better. Patient confirmed understanding.     Evelyn SANCHEZ RN, [facility] 01/02/24 9:03 AM

## 2024-01-02 NOTE — TELEPHONE ENCOUNTER
[facility] Call Center    Phone Message    May a detailed message be left on voicemail: yes     Reason for Call: Medication Question or concern regarding medication   Prescription Clarification  Name of Medication: Methotrexate   Prescribing Provider: Dr. Huber       What on the order needs clarification? Due to illness patient did not take weekly dose of this medication or go in for the labs related to taking RX. they need instructions on how to proceed from here with medication.       Action Taken: Other: RHEUM    Travel Screening: Not Applicable

## 2024-01-16 ENCOUNTER — LAB (OUTPATIENT)
Dept: LAB | Facility: CLINIC | Age: 79
End: 2024-01-16
Payer: MEDICARE

## 2024-01-16 DIAGNOSIS — M06.09 RHEUMATOID ARTHRITIS OF MULTIPLE SITES WITH NEGATIVE RHEUMATOID FACTOR (H): ICD-10-CM

## 2024-01-16 DIAGNOSIS — E66.01 CLASS 2 SEVERE OBESITY DUE TO EXCESS CALORIES WITH SERIOUS COMORBIDITY IN ADULT (H): Primary | ICD-10-CM

## 2024-01-16 DIAGNOSIS — Z79.899 HIGH RISK MEDICATION USE: ICD-10-CM

## 2024-01-16 DIAGNOSIS — E78.5 HYPERLIPIDEMIA LDL GOAL <130: ICD-10-CM

## 2024-01-16 DIAGNOSIS — E66.812 CLASS 2 SEVERE OBESITY DUE TO EXCESS CALORIES WITH SERIOUS COMORBIDITY IN ADULT (H): Primary | ICD-10-CM

## 2024-01-16 LAB
ALBUMIN SERPL BCG-MCNC: 4 G/DL (ref 3.5–5.2)
ALP SERPL-CCNC: 42 U/L (ref 40–150)
ALT SERPL W P-5'-P-CCNC: 16 U/L (ref 0–50)
AST SERPL W P-5'-P-CCNC: 21 U/L (ref 0–45)
BASOPHILS # BLD AUTO: 0 10E3/UL (ref 0–0.2)
BASOPHILS NFR BLD AUTO: 0 %
BILIRUB DIRECT SERPL-MCNC: <0.2 MG/DL (ref 0–0.3)
BILIRUB SERPL-MCNC: 0.5 MG/DL
CREAT SERPL-MCNC: 0.89 MG/DL (ref 0.51–0.95)
EGFRCR SERPLBLD CKD-EPI 2021: 66 ML/MIN/1.73M2
EOSINOPHIL # BLD AUTO: 0.1 10E3/UL (ref 0–0.7)
EOSINOPHIL NFR BLD AUTO: 1 %
ERYTHROCYTE [DISTWIDTH] IN BLOOD BY AUTOMATED COUNT: 15.4 % (ref 10–15)
HCT VFR BLD AUTO: 42.9 % (ref 35–47)
HGB BLD-MCNC: 13.3 G/DL (ref 11.7–15.7)
IMM GRANULOCYTES # BLD: 0.1 10E3/UL
IMM GRANULOCYTES NFR BLD: 1 %
LYMPHOCYTES # BLD AUTO: 3.3 10E3/UL (ref 0.8–5.3)
LYMPHOCYTES NFR BLD AUTO: 38 %
MCH RBC QN AUTO: 30 PG (ref 26.5–33)
MCHC RBC AUTO-ENTMCNC: 31 G/DL (ref 31.5–36.5)
MCV RBC AUTO: 97 FL (ref 78–100)
MONOCYTES # BLD AUTO: 0.8 10E3/UL (ref 0–1.3)
MONOCYTES NFR BLD AUTO: 9 %
NEUTROPHILS # BLD AUTO: 4.5 10E3/UL (ref 1.6–8.3)
NEUTROPHILS NFR BLD AUTO: 52 %
PLATELET # BLD AUTO: 278 10E3/UL (ref 150–450)
PROT SERPL-MCNC: 6.9 G/DL (ref 6.4–8.3)
RBC # BLD AUTO: 4.43 10E6/UL (ref 3.8–5.2)
WBC # BLD AUTO: 8.8 10E3/UL (ref 4–11)

## 2024-01-16 PROCEDURE — 85025 COMPLETE CBC W/AUTO DIFF WBC: CPT

## 2024-01-16 PROCEDURE — 82565 ASSAY OF CREATININE: CPT

## 2024-01-16 PROCEDURE — 36415 COLL VENOUS BLD VENIPUNCTURE: CPT

## 2024-01-16 PROCEDURE — 80076 HEPATIC FUNCTION PANEL: CPT

## 2024-02-13 ENCOUNTER — NURSE TRIAGE (OUTPATIENT)
Dept: FAMILY MEDICINE | Facility: CLINIC | Age: 79
End: 2024-02-13

## 2024-02-13 ENCOUNTER — APPOINTMENT (OUTPATIENT)
Dept: MRI IMAGING | Facility: CLINIC | Age: 79
End: 2024-02-13
Attending: FAMILY MEDICINE
Payer: MEDICARE

## 2024-02-13 ENCOUNTER — HOSPITAL ENCOUNTER (EMERGENCY)
Facility: CLINIC | Age: 79
Discharge: HOME | End: 2024-02-13
Attending: FAMILY MEDICINE | Admitting: FAMILY MEDICINE
Payer: MEDICARE

## 2024-02-13 VITALS
HEART RATE: 92 BPM | HEIGHT: 61 IN | TEMPERATURE: 98.3 F | RESPIRATION RATE: 29 BRPM | BODY MASS INDEX: 38.14 KG/M2 | OXYGEN SATURATION: 95 % | SYSTOLIC BLOOD PRESSURE: 141 MMHG | DIASTOLIC BLOOD PRESSURE: 83 MMHG | WEIGHT: 202 LBS

## 2024-02-13 DIAGNOSIS — Q67.0 FACIAL ASYMMETRY: ICD-10-CM

## 2024-02-13 LAB
ALBUMIN SERPL BCG-MCNC: 3.7 G/DL (ref 3.5–5.2)
ALBUMIN UR-MCNC: NEGATIVE MG/DL
ALP SERPL-CCNC: 52 U/L (ref 40–150)
ALT SERPL W P-5'-P-CCNC: 9 U/L (ref 0–50)
ANION GAP SERPL CALCULATED.3IONS-SCNC: 11 MMOL/L (ref 7–15)
APPEARANCE UR: CLEAR
APTT PPP: 37 SECONDS (ref 22–38)
AST SERPL W P-5'-P-CCNC: 20 U/L (ref 0–45)
ATRIAL RATE - MUSE: 82 BPM
BASOPHILS # BLD AUTO: 0 10E3/UL (ref 0–0.2)
BASOPHILS NFR BLD AUTO: 0 %
BILIRUB SERPL-MCNC: 0.5 MG/DL
BILIRUB UR QL STRIP: NEGATIVE
BUN SERPL-MCNC: 15.4 MG/DL (ref 8–23)
CALCIUM SERPL-MCNC: 9.2 MG/DL (ref 8.8–10.2)
CHLORIDE SERPL-SCNC: 107 MMOL/L (ref 98–107)
COLOR UR AUTO: YELLOW
CREAT SERPL-MCNC: 0.7 MG/DL (ref 0.51–0.95)
CRP SERPL-MCNC: 8.84 MG/L
DEPRECATED HCO3 PLAS-SCNC: 24 MMOL/L (ref 22–29)
DIASTOLIC BLOOD PRESSURE - MUSE: NORMAL MMHG
EGFRCR SERPLBLD CKD-EPI 2021: 87 ML/MIN/1.73M2
EOSINOPHIL # BLD AUTO: 0.1 10E3/UL (ref 0–0.7)
EOSINOPHIL NFR BLD AUTO: 1 %
ERYTHROCYTE [DISTWIDTH] IN BLOOD BY AUTOMATED COUNT: 14.6 % (ref 10–15)
ERYTHROCYTE [SEDIMENTATION RATE] IN BLOOD BY WESTERGREN METHOD: 24 MM/HR (ref 0–30)
GLUCOSE SERPL-MCNC: 123 MG/DL (ref 70–99)
GLUCOSE UR STRIP-MCNC: NEGATIVE MG/DL
HCT VFR BLD AUTO: 36.8 % (ref 35–47)
HGB BLD-MCNC: 12.1 G/DL (ref 11.7–15.7)
HGB UR QL STRIP: NEGATIVE
HOLD SPECIMEN: NORMAL
IMM GRANULOCYTES # BLD: 0 10E3/UL
IMM GRANULOCYTES NFR BLD: 1 %
INR PPP: 1.35 (ref 0.85–1.15)
INTERPRETATION ECG - MUSE: NORMAL
KETONES UR STRIP-MCNC: NEGATIVE MG/DL
LEUKOCYTE ESTERASE UR QL STRIP: NEGATIVE
LYMPHOCYTES # BLD AUTO: 1.1 10E3/UL (ref 0.8–5.3)
LYMPHOCYTES NFR BLD AUTO: 15 %
MAGNESIUM SERPL-MCNC: 1.7 MG/DL (ref 1.7–2.3)
MCH RBC QN AUTO: 31.4 PG (ref 26.5–33)
MCHC RBC AUTO-ENTMCNC: 32.9 G/DL (ref 31.5–36.5)
MCV RBC AUTO: 96 FL (ref 78–100)
MONOCYTES # BLD AUTO: 0.5 10E3/UL (ref 0–1.3)
MONOCYTES NFR BLD AUTO: 7 %
NEUTROPHILS # BLD AUTO: 5.7 10E3/UL (ref 1.6–8.3)
NEUTROPHILS NFR BLD AUTO: 76 %
NITRATE UR QL: NEGATIVE
NRBC # BLD AUTO: 0 10E3/UL
NRBC BLD AUTO-RTO: 0 /100
NT-PROBNP SERPL-MCNC: 778 PG/ML (ref 0–1800)
P AXIS - MUSE: NORMAL DEGREES
PH UR STRIP: 5 [PH] (ref 5–7)
PLATELET # BLD AUTO: 246 10E3/UL (ref 150–450)
POTASSIUM SERPL-SCNC: 3.8 MMOL/L (ref 3.4–5.3)
PR INTERVAL - MUSE: 140 MS
PROT SERPL-MCNC: 6.6 G/DL (ref 6.4–8.3)
QRS DURATION - MUSE: 82 MS
QT - MUSE: 380 MS
QTC - MUSE: 443 MS
R AXIS - MUSE: -29 DEGREES
RBC # BLD AUTO: 3.85 10E6/UL (ref 3.8–5.2)
RBC URINE: 1 /HPF
SODIUM SERPL-SCNC: 142 MMOL/L (ref 135–145)
SP GR UR STRIP: 1.02 (ref 1–1.03)
SQUAMOUS EPITHELIAL: <1 /HPF
SYSTOLIC BLOOD PRESSURE - MUSE: NORMAL MMHG
T AXIS - MUSE: -1 DEGREES
TROPONIN T SERPL HS-MCNC: 12 NG/L
TSH SERPL DL<=0.005 MIU/L-ACNC: 0.76 UIU/ML (ref 0.3–4.2)
UROBILINOGEN UR STRIP-MCNC: NORMAL MG/DL
VENTRICULAR RATE- MUSE: 82 BPM
WBC # BLD AUTO: 7.5 10E3/UL (ref 4–11)
WBC URINE: 1 /HPF

## 2024-02-13 PROCEDURE — 80053 COMPREHEN METABOLIC PANEL: CPT | Performed by: FAMILY MEDICINE

## 2024-02-13 PROCEDURE — 85610 PROTHROMBIN TIME: CPT | Performed by: FAMILY MEDICINE

## 2024-02-13 PROCEDURE — 93010 ELECTROCARDIOGRAM REPORT: CPT | Performed by: FAMILY MEDICINE

## 2024-02-13 PROCEDURE — 255N000002 HC RX 255 OP 636: Performed by: FAMILY MEDICINE

## 2024-02-13 PROCEDURE — 99285 EMERGENCY DEPT VISIT HI MDM: CPT | Mod: 25 | Performed by: FAMILY MEDICINE

## 2024-02-13 PROCEDURE — 86140 C-REACTIVE PROTEIN: CPT | Performed by: FAMILY MEDICINE

## 2024-02-13 PROCEDURE — 36415 COLL VENOUS BLD VENIPUNCTURE: CPT | Performed by: FAMILY MEDICINE

## 2024-02-13 PROCEDURE — 83880 ASSAY OF NATRIURETIC PEPTIDE: CPT | Performed by: FAMILY MEDICINE

## 2024-02-13 PROCEDURE — 70553 MRI BRAIN STEM W/O & W/DYE: CPT | Mod: MA

## 2024-02-13 PROCEDURE — 84443 ASSAY THYROID STIM HORMONE: CPT | Performed by: FAMILY MEDICINE

## 2024-02-13 PROCEDURE — 85730 THROMBOPLASTIN TIME PARTIAL: CPT | Performed by: FAMILY MEDICINE

## 2024-02-13 PROCEDURE — 81001 URINALYSIS AUTO W/SCOPE: CPT | Performed by: FAMILY MEDICINE

## 2024-02-13 PROCEDURE — 85025 COMPLETE CBC W/AUTO DIFF WBC: CPT | Performed by: FAMILY MEDICINE

## 2024-02-13 PROCEDURE — 70549 MR ANGIOGRAPH NECK W/O&W/DYE: CPT | Mod: MA

## 2024-02-13 PROCEDURE — A9585 GADOBUTROL INJECTION: HCPCS | Performed by: FAMILY MEDICINE

## 2024-02-13 PROCEDURE — 70544 MR ANGIOGRAPHY HEAD W/O DYE: CPT | Mod: MA,GZ,XU

## 2024-02-13 PROCEDURE — 84484 ASSAY OF TROPONIN QUANT: CPT | Performed by: FAMILY MEDICINE

## 2024-02-13 PROCEDURE — 93005 ELECTROCARDIOGRAM TRACING: CPT | Performed by: FAMILY MEDICINE

## 2024-02-13 PROCEDURE — 85652 RBC SED RATE AUTOMATED: CPT | Performed by: FAMILY MEDICINE

## 2024-02-13 PROCEDURE — 70553 MRI BRAIN STEM W/O & W/DYE: CPT | Mod: 26 | Performed by: RADIOLOGY

## 2024-02-13 PROCEDURE — 99221 1ST HOSP IP/OBS SF/LOW 40: CPT | Mod: GC | Performed by: STUDENT IN AN ORGANIZED HEALTH CARE EDUCATION/TRAINING PROGRAM

## 2024-02-13 PROCEDURE — 70549 MR ANGIOGRAPH NECK W/O&W/DYE: CPT | Mod: 26 | Performed by: RADIOLOGY

## 2024-02-13 PROCEDURE — 83735 ASSAY OF MAGNESIUM: CPT | Performed by: FAMILY MEDICINE

## 2024-02-13 RX ORDER — GADOBUTROL 604.72 MG/ML
10 INJECTION INTRAVENOUS ONCE
Status: COMPLETED | OUTPATIENT
Start: 2024-02-13 | End: 2024-02-13

## 2024-02-13 RX ORDER — LORAZEPAM 2 MG/ML
0.5 INJECTION INTRAMUSCULAR
Status: DISCONTINUED | OUTPATIENT
Start: 2024-02-13 | End: 2024-02-13

## 2024-02-13 RX ORDER — LIDOCAINE 40 MG/G
CREAM TOPICAL
Status: DISCONTINUED | OUTPATIENT
Start: 2024-02-13 | End: 2024-02-13 | Stop reason: HOSPADM

## 2024-02-13 RX ADMIN — GADOBUTROL 9 ML: 604.72 INJECTION INTRAVENOUS at 14:13

## 2024-02-13 ASSESSMENT — ACTIVITIES OF DAILY LIVING (ADL)
ADLS_ACUITY_SCORE: 35

## 2024-02-13 NOTE — ED TRIAGE NOTES
Pt arriv ed via EMS Left face dripping called .when EMS came she was negative except facial left face . Hx. Stroke 6 years  ago.  Left eye blind.Denied SOB ,SOB or pain. No fever or diarrhea.  BP (!) 147/91   Pulse 100   Temp 98.3  F (36.8  C) (Oral)   Resp 18   SpO2 97%

## 2024-02-13 NOTE — TELEPHONE ENCOUNTER
"Patient calling in stating she has had a large stroke and lost vision in her L eye years ago, she reports she also has had multiple TIA's years ago. She states she has a L facial droop and just noticed it this morning when she looked in the mirror. Patient states she attempted to take BP with wrist cuff and systolic number was over 200. Patient states she is able to stick her tongue out ok, does not feel weakness or numbness in her extremities and only symptom is L sided facial droop. Patient stated she never knew she was even having a stroke with her previous ones and did not show classic signs.     RN advised to call 911, patient was agreeable to this. RN had colleague RN call 911 while RN stayed on phone with patient. EMT arrived around approx 10:15am and RN disconnected call with patient.       Cailin Doran RN on 2/13/2024 at 10:23 AM                 Reason for Disposition   New neurologic deficit that is present NOW, sudden onset of ANY of the following: * Weakness of the face, arm, or leg on one side of the body* Numbness of the face, arm, or leg on one side of the body* Loss of speech or garbled speech     Patient is reporting sudden onset of L facial droop this morning.    Additional Information   Negative: Difficult to awaken or acting confused (e.g., disoriented, slurred speech)    Answer Assessment - Initial Assessment Questions  1. SYMPTOM: \"What is the main symptom you are concerned about?\" (e.g., weakness, numbness)      L sided facial droop   2. ONSET: \"When did this start?\" (minutes, hours, days; while sleeping)      This morning, patient just went to go look in mirror and noticed left side of face is drooping   3. LAST NORMAL: \"When was the last time you (the patient) were normal (no symptoms)?\"      Last night facial droop is new this morning   4. PATTERN \"Does this come and go, or has it been constant since it started?\"  \"Is it present now?\"      Present now and constant since staref  5. " "CARDIAC SYMPTOMS: \"Have you had any of the following symptoms: chest pain, difficulty breathing, palpitations?\"      Patient denies any other symptoms and states she has a hx of L sided stroke and multiple TIA's a couple years ago   6. NEUROLOGIC SYMPTOMS: \"Have you had any of the following symptoms: headache, dizziness, vision loss, double vision, changes in speech, unsteady on your feet?\"      Patient does not have vision in L eye due to previous stroke   7. OTHER SYMPTOMS: \"Do you have any other symptoms?\"      Patient states L facial droop is only symptom. She attempted to take her BP with wrist cuff and systolic reading was over 200 but she is unsure if it is working correctly   8. PREGNANCY: \"Is there any chance you are pregnant?\" \"When was your last menstrual period?\"      NO    Protocols used: Neurologic Deficit-A-[place]    "

## 2024-02-13 NOTE — ED PROVIDER NOTES
ED Provider Note  [facility]      History     Chief Complaint   Patient presents with    Stroke Symptoms     The history is provided by the patient and medical records.     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    Patient is post CVA from 6 years ago, where she lost all vision in left eye.  Patient woke up this morning, and noticed a left facial droop.  Patient denies difficulty swallowing, walking, any speech impairments, headache, neck pain, or cold sores.  No change in sensory issues.  Patient states she was worried due to previous CVAs so came into the ER.  Patient had a full stroke workup 2 years ago due to transient amnesia.  She states that while she was there somebody told her that she had had several small strokes, but patient had never had symptoms.  Patient has been on methotrexate since November for rheumatoid arthritis.  She has also been on prednisone.  While on prednisone, patient has had 3 episodes of severe mid chest pain.  Patient will be sitting watching TV, have a sudden onset of chest pain, and 10 to 15 minutes later it would dissipate.  Patient denied radiation of pain or sweating.  The second time patient took a rollator and it went away.  The third time patient took half an Ambien and it went away.  Patient is a retired nurse.    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    Due to central retinal occlusion    Cataract     Central retinal artery occlusion of left eye 09/15/2018    Disturbances of vision, late effect  of stroke 2022    DJD (degenerative joint disease)     knees    GERD (gastroesophageal reflux disease)     Glaucoma     Gout     History of central retinal artery occlusion, os 2020    HTN (hypertension)     Hyperlipidemia LDL goal <70 2010    Low bone mass     Lumbar spinal stenosis 2011    Sees Dr Sage    Mild persistent asthma 2013    Neovascular glaucoma of left eye, moderate stage 2019    Ocular hypertension of right eye, treated 2019    Paroxysmal A-fib (H) 2019    Per loop recorder, 2018    On Rivaroxaban.   H/o R central retinal artery occlusion    Paroxysmal ventricular tachycardia (H) 2018    Added automatically from request for surgery [id number]    PFO (patent foramen ovale)     Recurrent cerebrovascular accidents (CVAs) (H) 2022    Recurrent cerebrovascular accidents (CVAs) (H) 2022    Transient global amnesia 2022     Past Surgical History:   Procedure Laterality Date    APPENDECTOMY      age 7 yrs.    BREAST BIOPSY, RT/LT  2004    left benign    CATARACT IOL, RT/LT       SECTION      x 2    COLONOSCOPY  2013    needed q 3 yrs.    COLONOSCOPY N/A 09/10/2020    Procedure: Colonoscopy, With Polypectomy And Biopsy;  Surgeon: Brian Cleaning MD;  Location: [facility] OR    COLONOSCOPY WITH CO2 INSUFFLATION N/A 2016    Procedure: COLONOSCOPY WITH CO2 INSUFFLATION;  Surgeon: Brian Cleaning MD;  Location: [facility] OR    COLONOSCOPY WITH CO2 INSUFFLATION N/A 09/10/2020    Procedure: COLONOSCOPY, WITH CO2 INSUFFLATION;  Surgeon: Brian Cleaning MD;  Location: [facility] OR    EP COMPREHENSIVE EP STUDY N/A 2018    Procedure: LOOP RECORDER;  Surgeon: Buck Butterfield MD;  Location:  [facility]    HYSTERECTOMY, POORNIMA      bleeding fibroids    ORTHOPEDIC SURGERY      meniscus repair    PHACOEMULSIFICATION WITH STANDARD INTRAOCULAR LENS IMPLANT Right 10/24/2022    Procedure: COMPLE RIGHT EYE  PHACOEMULSIFICATION, CATARACT, WITH STANDARD INTRAOCULAR LENS IMPLANT INSERTION;  Surgeon: Ezequiel Florence MD;  [facility]    [facility] TOTAL KNEE ARTHROPLASTY Left 11/2015    at [facility]     acetaminophen (TYLENOL) 500 MG tablet  albuterol (PROAIR HFA/PROVENTIL HFA/VENTOLIN HFA) 108 (90 Base) MCG/ACT inhaler  apixaban ANTICOAGULANT (ELIQUIS) 5 MG tablet  atorvastatin (LIPITOR) 20 MG tablet  B-COMPLEX OR  CALCIUM 500 +D OR  folic acid (FOLVITE) 1 MG tablet  hypromellose (ARTIFICIAL TEARS) 0.5 % SOLN ophthalmic solution  latanoprost (XALATAN) 0.005 % ophthalmic solution  lisinopril (ZESTRIL) 10 MG tablet  methotrexate 2.5 MG tablet  multivitamin (OCUVITE) TABS tablet  timolol maleate (TIMOPTIC) 0.5 % ophthalmic solution  zolpidem (AMBIEN) 5 MG tablet      Allergies   Allergen Reactions    No Clinical Screening - See Comments Other (See Comments)     senisitive to non steroidals  Aleve, ibuprofen celebrex cause bad stomach pains  senisitive to non steroidals  Aleve, ibuprofen celebrex cause bad stomach pains    Oxycodone Hives     Other reaction(s): Unknown  ... With facial swelling    Nsaids Nausea and Vomiting     Other reaction(s): Abdominal Pain     Family History  Family History   Problem Relation Age of Onset    Arthritis Mother     Cancer Mother     Hypertension Mother     Other Cancer Mother     Thyroid Disease Mother     Respiratory Father     Glaucoma Father     Asthma Father     Allergies Sister     Eye Disorder Sister     Lipids Sister     Neurologic Disorder Sister     Osteoporosis Sister     Glaucoma Sister     Hypertension Sister     Macular Degeneration Sister     Hyperlipidemia Sister     Hypertension Sister     Osteoporosis Sister     Gastrointestinal Disease Son         Ulcerative Colitis    Glaucoma Other      Social History   Social History     Tobacco Use    Smoking status: Former     Packs/day: 0.10     Years: 1.00     Additional pack years: 0.00     Total pack years: 0.10      "Types: Cigarettes     Start date: 1963     Quit date: 1965     Years since quittin.1    Smokeless tobacco: Never   Vaping Use    Vaping Use: Never used   Substance Use Topics    Alcohol use: Not Currently     Comment: one a month    Drug use: No         A medically appropriate review of systems was performed with pertinent positives and negatives noted in the HPI, and all other systems negative.    Physical Exam   BP: (!) 147/91  Pulse: 100  Temp: 98.3  F (36.8  C)  Resp: 18  Height: 155 cm (5' 1.02\")  Weight: 91.6 kg (202 lb)  SpO2: 96 %  Physical Exam  Vitals and nursing note reviewed.   Constitutional:       General: She is in acute distress.      Appearance: Normal appearance. She is well-developed. She is not ill-appearing.   HENT:      Head: Normocephalic and atraumatic.      Nose: Nose normal.      Mouth/Throat:      Mouth: Mucous membranes are moist.      Pharynx: Oropharynx is clear.   Eyes:      General: No scleral icterus.     Extraocular Movements: Extraocular movements intact.      Conjunctiva/sclera: Conjunctivae normal.   Cardiovascular:      Rate and Rhythm: Normal rate and regular rhythm.   Pulmonary:      Effort: Pulmonary effort is normal. No respiratory distress.      Breath sounds: No stridor.   Abdominal:      General: Abdomen is flat.      Tenderness: There is no abdominal tenderness. There is no guarding.   Musculoskeletal:         General: No swelling or tenderness.      Cervical back: Normal range of motion and neck supple.   Skin:     General: Skin is warm and dry.      Capillary Refill: Capillary refill takes less than 2 seconds.      Coloration: Skin is not jaundiced or pale.      Findings: No erythema or rash.   Neurological:      Mental Status: She is alert and oriented to person, place, and time.      Comments: Patient with subtle left lower facial dissymmetry but able to smile symmetrically forehead not involved able to close eyelid etc. sensory is intact otherwise.  No " other neurofocal findings seen   Psychiatric:      Comments: Appropriate here in the ER           ED Course, Procedures, & Data        Record in epic.  Patient history of rheumatoid arthritis.  Patient history of carcinoma in situ of the skin also previous imaging noted etc.  History of stroke prior with left eye complete vision loss at [facility] approximately 5 years ago.      In the ER patient was quickly evaluated talk to the neurostroke team also.  At this point recommended MRI.  Stroke protocol ordered    Other labs reviewed.    EKG done revealing sinus rhythm without any hyperacute changes.  Sodium 142 potassium 3.8.  Bicarb 24 gap 11 glucose 123.  CRP is 8.84.  Urinalysis negative TSH 0.76  Troponin is 12.  White count 7.5 hemoglobin 12.1.  INR 1.35    Patient is stable here in the ER.  Seen by neurology also MRI stroke protocol was ordered head and neck with MRI/MRA etc.  Findings reviewed by radiology reviewed with neurology also no acute stroke identified on this.  Patient otherwise stable seen by neurology recommendations can be discharged patient has follow-up with neurology for other issues anyway no other changes with this patient at this point feels comfortable wants to go reassessed no neurochanges.  This point patient may have just some mild facial dissymmetry no sign of any acute stroke reassured this comfortably discharged will return if any concerns at all continue current medications which she is on Eliquis I believe.    Procedures            EKG Interpretation:      Interpreted by Steven Lopez MD  Time reviewed: 1115  Symptoms at time of EKG: Left facial droop   Rhythm: normal sinus   Rate: 82 bpm  Comparison to prior: Unchanged from 11/14/22    Clinical Impression: Normal sinus rhythm with anterior infarct, age undetermined, with no acute ischemia.               Results for orders placed or performed during the hospital encounter of 02/13/24   MR Brain w/o & w Contrast     Status: None     Narrative    MRI brain without and with contrast 2/13/2024  MRA of the head without contrast  Neck MRA without and with contrast    Provided History:  left central 7th upon wakening this am hx of prev  stroke.  ICD-10:    Comparison:  Brain MRI 11/14/2022. CTA head and neck 11/14/2022.     Technique:   Brain MRI:  Axial diffusion, FLAIR, T2-weighted, susceptibility, and  coronal T1-weighted images were obtained without intravenous contrast.  Following intravenous gadolinium-based contrast administration, axial  and coronal T1-weighted images were obtained.    Head MRA: 3D time-of-flight MRA of the [place] was performed  without intravenous contrast.  Neck MRA:  Limited non contrast 2DTOF images were obtained of the  mid-cervical region. Following intravenous gadolinium-based contrast  administration, a contrast enhanced MRA of the neck/cervical vessels  was performed.  Three-dimensional reconstructions of the neck and head MRA were  created, which were reviewed by the radiologist.    Contrast: IV Gadavist.     Findings:   Brain MRI: Axial diffusion weighted images demonstrate no acute  infarct. Mild generalized proximal volume loss. Moderate confluent and  patchy T2 hyperintensity in the subcortical and deep white matter of  both cerebral hemispheres, as well as in the luz. There is no  intracranial hemorrhage on susceptibility images. Ventricles are  proportionate to the cerebral sulci.    Contrast-enhanced images of the brain demonstrate no abnormal  intracranial enhancement.    Head MRA demonstrates no aneurysm or stenosis of the major  intracranial arteries. Small-caliber P1 segments of the bilateral  posterior cerebral arteries.    Neck MRA demonstrates patent great vessel origins arising from the  aortic arch. Antegrade flow in the major cervical vasculature. Short  segment atherosclerotic narrowing of the proximal right internal  carotid artery with residual lumen diameter of 2.4 mm and  normal  distal diameter of 4.9 mm corresponding to 50-60% diameter stenosis.  Short segment narrowing of the proximal left internal carotid artery  with residual lumen diameter 3.5 mm and normal distal diameter of 4.7  mm corresponding to 20-30% diameter stenosis.      Impression    Impression:  1. No acute infarct.  2. Head MRA demonstrates no aneurysm or stenosis of the major  intracranial arteries.  3. Neck MRA demonstrates 50-60% diameter stenosis of the proximal  right internal carotid artery. 20-30% diameter stenosis of the  proximal left internal carotid artery. No vertebral artery stenosis.         RICHIE BENJAMIN MD         SYSTEM ID:  [id number]   MRA Angiogram Head w/o Contrast     Status: None    Narrative    MRI brain without and with contrast 2/13/2024  MRA of the head without contrast  Neck MRA without and with contrast    Provided History:  left central 7th upon wakening this am hx of prev  stroke.  ICD-10:    Comparison:  Brain MRI 11/14/2022. CTA head and neck 11/14/2022.     Technique:   Brain MRI:  Axial diffusion, FLAIR, T2-weighted, susceptibility, and  coronal T1-weighted images were obtained without intravenous contrast.  Following intravenous gadolinium-based contrast administration, axial  and coronal T1-weighted images were obtained.    Head MRA: 3D time-of-flight MRA of the [place] was performed  without intravenous contrast.  Neck MRA:  Limited non contrast 2DTOF images were obtained of the  mid-cervical region. Following intravenous gadolinium-based contrast  administration, a contrast enhanced MRA of the neck/cervical vessels  was performed.  Three-dimensional reconstructions of the neck and head MRA were  created, which were reviewed by the radiologist.    Contrast: IV Gadavist.     Findings:   Brain MRI: Axial diffusion weighted images demonstrate no acute  infarct. Mild generalized proximal volume loss. Moderate confluent and  patchy T2 hyperintensity in the subcortical and  deep white matter of  both cerebral hemispheres, as well as in the luz. There is no  intracranial hemorrhage on susceptibility images. Ventricles are  proportionate to the cerebral sulci.    Contrast-enhanced images of the brain demonstrate no abnormal  intracranial enhancement.    Head MRA demonstrates no aneurysm or stenosis of the major  intracranial arteries. Small-caliber P1 segments of the bilateral  posterior cerebral arteries.    Neck MRA demonstrates patent great vessel origins arising from the  aortic arch. Antegrade flow in the major cervical vasculature. Short  segment atherosclerotic narrowing of the proximal right internal  carotid artery with residual lumen diameter of 2.4 mm and normal  distal diameter of 4.9 mm corresponding to 50-60% diameter stenosis.  Short segment narrowing of the proximal left internal carotid artery  with residual lumen diameter 3.5 mm and normal distal diameter of 4.7  mm corresponding to 20-30% diameter stenosis.      Impression    Impression:  1. No acute infarct.  2. Head MRA demonstrates no aneurysm or stenosis of the major  intracranial arteries.  3. Neck MRA demonstrates 50-60% diameter stenosis of the proximal  right internal carotid artery. 20-30% diameter stenosis of the  proximal left internal carotid artery. No vertebral artery stenosis.         RICHIE BENJAMIN MD         SYSTEM ID:  [id number]   MRA Angiogram Neck w/o & w Contrast     Status: None    Narrative    MRI brain without and with contrast 2/13/2024  MRA of the head without contrast  Neck MRA without and with contrast    Provided History:  left central 7th upon wakening this am hx of prev  stroke.  ICD-10:    Comparison:  Brain MRI 11/14/2022. CTA head and neck 11/14/2022.     Technique:   Brain MRI:  Axial diffusion, FLAIR, T2-weighted, susceptibility, and  coronal T1-weighted images were obtained without intravenous contrast.  Following intravenous gadolinium-based contrast administration, axial  and  coronal T1-weighted images were obtained.    Head MRA: 3D time-of-flight MRA of the [place] was performed  without intravenous contrast.  Neck MRA:  Limited non contrast 2DTOF images were obtained of the  mid-cervical region. Following intravenous gadolinium-based contrast  administration, a contrast enhanced MRA of the neck/cervical vessels  was performed.  Three-dimensional reconstructions of the neck and head MRA were  created, which were reviewed by the radiologist.    Contrast: IV Gadavist.     Findings:   Brain MRI: Axial diffusion weighted images demonstrate no acute  infarct. Mild generalized proximal volume loss. Moderate confluent and  patchy T2 hyperintensity in the subcortical and deep white matter of  both cerebral hemispheres, as well as in the luz. There is no  intracranial hemorrhage on susceptibility images. Ventricles are  proportionate to the cerebral sulci.    Contrast-enhanced images of the brain demonstrate no abnormal  intracranial enhancement.    Head MRA demonstrates no aneurysm or stenosis of the major  intracranial arteries. Small-caliber P1 segments of the bilateral  posterior cerebral arteries.    Neck MRA demonstrates patent great vessel origins arising from the  aortic arch. Antegrade flow in the major cervical vasculature. Short  segment atherosclerotic narrowing of the proximal right internal  carotid artery with residual lumen diameter of 2.4 mm and normal  distal diameter of 4.9 mm corresponding to 50-60% diameter stenosis.  Short segment narrowing of the proximal left internal carotid artery  with residual lumen diameter 3.5 mm and normal distal diameter of 4.7  mm corresponding to 20-30% diameter stenosis.      Impression    Impression:  1. No acute infarct.  2. Head MRA demonstrates no aneurysm or stenosis of the major  intracranial arteries.  3. Neck MRA demonstrates 50-60% diameter stenosis of the proximal  right internal carotid artery. 20-30% diameter stenosis of  the  proximal left internal carotid artery. No vertebral artery stenosis.         RICHIE BENJAMIN MD         SYSTEM ID:  [id number]   [place] Draw     Status: None (In process)    Narrative    The following orders were created for panel order [place] Draw.  Procedure                               Abnormality         Status                     ---------                               -----------         ------                     Extra Blue Top Tube[[id number]]                                                         Extra Red Top Tube[[id number]]                               Final result               Extra Green Top (Lithium...[[id number]]                                                 Extra Purple Top Tube[[id number]]                                                         Please view results for these tests on the individual orders.   Erythrocyte sedimentation rate auto     Status: Normal   Result Value Ref Range    Erythrocyte Sedimentation Rate 24 0 - 30 mm/hr   CRP inflammation     Status: Abnormal   Result Value Ref Range    CRP Inflammation 8.84 (H) <5.00 mg/L   INR     Status: Abnormal   Result Value Ref Range    INR 1.35 (H) 0.85 - 1.15   Partial thromboplastin time     Status: Normal   Result Value Ref Range    aPTT 37 22 - 38 Seconds   Comprehensive metabolic panel     Status: Abnormal   Result Value Ref Range    Sodium 142 135 - 145 mmol/L    Potassium 3.8 3.4 - 5.3 mmol/L    Carbon Dioxide (CO2) 24 22 - 29 mmol/L    Anion Gap 11 7 - 15 mmol/L    Urea Nitrogen 15.4 8.0 - 23.0 mg/dL    Creatinine 0.70 0.51 - 0.95 mg/dL    GFR Estimate 87 >60 mL/min/1.73m2    Calcium 9.2 8.8 - 10.2 mg/dL    Chloride 107 98 - 107 mmol/L    Glucose 123 (H) 70 - 99 mg/dL    Alkaline Phosphatase 52 40 - 150 U/L    AST 20 0 - 45 U/L    ALT 9 0 - 50 U/L    Protein Total 6.6 6.4 - 8.3 g/dL    Albumin 3.7 3.5 - 5.2 g/dL    Bilirubin Total 0.5 <=1.2 mg/dL   Magnesium     Status: Normal   Result Value Ref Range    Magnesium 1.7 1.7 - 2.3  mg/dL   Troponin T, High Sensitivity     Status: Normal   Result Value Ref Range    Troponin T, High Sensitivity 12 <=14 ng/L   Nt probnp inpatient (BNP)     Status: Normal   Result Value Ref Range    N terminal Pro BNP Inpatient 778 0 - 1,800 pg/mL   TSH     Status: Normal   Result Value Ref Range    TSH 0.76 0.30 - 4.20 uIU/mL   UA with Microscopic reflex to Culture     Status: Normal    Specimen: Urine, Midstream   Result Value Ref Range    Color Urine Yellow Colorless, Straw, Light Yellow, Yellow    Appearance Urine Clear Clear    Glucose Urine Negative Negative mg/dL    Bilirubin Urine Negative Negative    Ketones Urine Negative Negative mg/dL    Specific Gravity Urine 1.019 1.003 - 1.035    Blood Urine Negative Negative    pH Urine 5.0 5.0 - 7.0    Protein Albumin Urine Negative Negative mg/dL    Urobilinogen Urine Normal Normal, 2.0 mg/dL    Nitrite Urine Negative Negative    Leukocyte Esterase Urine Negative Negative    RBC Urine 1 <=2 /HPF    WBC Urine 1 <=5 /HPF    Squamous Epithelials Urine <1 <=1 /HPF    Narrative    Urine Culture not indicated   Extra Red Top Tube     Status: None   Result Value Ref Range    Hold Specimen [facility]    CBC with platelets and differential     Status: None   Result Value Ref Range    WBC Count 7.5 4.0 - 11.0 10e3/uL    RBC Count 3.85 3.80 - 5.20 10e6/uL    Hemoglobin 12.1 11.7 - 15.7 g/dL    Hematocrit 36.8 35.0 - 47.0 %    MCV 96 78 - 100 fL    MCH 31.4 26.5 - 33.0 pg    MCHC 32.9 31.5 - 36.5 g/dL    RDW 14.6 10.0 - 15.0 %    Platelet Count 246 150 - 450 10e3/uL    % Neutrophils 76 %    % Lymphocytes 15 %    % Monocytes 7 %    % Eosinophils 1 %    % Basophils 0 %    % Immature Granulocytes 1 %    NRBCs per 100 WBC 0 <1 /100    Absolute Neutrophils 5.7 1.6 - 8.3 10e3/uL    Absolute Lymphocytes 1.1 0.8 - 5.3 10e3/uL    Absolute Monocytes 0.5 0.0 - 1.3 10e3/uL    Absolute Eosinophils 0.1 0.0 - 0.7 10e3/uL    Absolute Basophils 0.0 0.0 - 0.2 10e3/uL    Absolute Immature  Granulocytes 0.0 <=0.4 10e3/uL    Absolute NRBCs 0.0 10e3/uL   EKG 12-lead, tracing only     Status: None   Result Value Ref Range    Systolic Blood Pressure  mmHg    Diastolic Blood Pressure  mmHg    Ventricular Rate 82 BPM    Atrial Rate 82 BPM    VT Interval 140 ms    QRS Duration 82 ms     ms    QTc 443 ms    P Axis  degrees    R AXIS -29 degrees    T Axis -1 degrees    Interpretation ECG       Sinus rhythm  Anterior infarct , age undetermined  Abnormal ECG     CBC with platelets differential     Status: None    Narrative    The following orders were created for panel order CBC with platelets differential.  Procedure                               Abnormality         Status                     ---------                               -----------         ------                     CBC with platelets and d...[[id number]]                      Final result                 Please view results for these tests on the individual orders.     Medications   gadobutrol (GADAVIST) injection 10 mL (9 mLs Intravenous $Given 2/13/24 6992)     Labs Ordered and Resulted from Time of ED Arrival to Time of ED Departure   CRP INFLAMMATION - Abnormal       Result Value    CRP Inflammation 8.84 (*)    INR - Abnormal    INR 1.35 (*)    COMPREHENSIVE METABOLIC PANEL - Abnormal    Sodium 142      Potassium 3.8      Carbon Dioxide (CO2) 24      Anion Gap 11      Urea Nitrogen 15.4      Creatinine 0.70      GFR Estimate 87      Calcium 9.2      Chloride 107      Glucose 123 (*)     Alkaline Phosphatase 52      AST 20      ALT 9      Protein Total 6.6      Albumin 3.7      Bilirubin Total 0.5     ERYTHROCYTE SEDIMENTATION RATE AUTO - Normal    Erythrocyte Sedimentation Rate 24     PARTIAL THROMBOPLASTIN TIME - Normal    aPTT 37     MAGNESIUM - Normal    Magnesium 1.7     TROPONIN T, HIGH SENSITIVITY - Normal    Troponin T, High Sensitivity 12     NT PROBNP INPATIENT - Normal    N terminal Pro BNP Inpatient 778     TSH - Normal    TSH  0.76     ROUTINE UA WITH MICROSCOPIC REFLEX TO CULTURE - Normal    Color Urine Yellow      Appearance Urine Clear      Glucose Urine Negative      Bilirubin Urine Negative      Ketones Urine Negative      Specific Gravity Urine 1.019      Blood Urine Negative      pH Urine 5.0      Protein Albumin Urine Negative      Urobilinogen Urine Normal      Nitrite Urine Negative      Leukocyte Esterase Urine Negative      RBC Urine 1      WBC Urine 1      Squamous Epithelials Urine <1     CBC WITH PLATELETS AND DIFFERENTIAL    WBC Count 7.5      RBC Count 3.85      Hemoglobin 12.1      Hematocrit 36.8      MCV 96      MCH 31.4      MCHC 32.9      RDW 14.6      Platelet Count 246      % Neutrophils 76      % Lymphocytes 15      % Monocytes 7      % Eosinophils 1      % Basophils 0      % Immature Granulocytes 1      NRBCs per 100 WBC 0      Absolute Neutrophils 5.7      Absolute Lymphocytes 1.1      Absolute Monocytes 0.5      Absolute Eosinophils 0.1      Absolute Basophils 0.0      Absolute Immature Granulocytes 0.0      Absolute NRBCs 0.0       MRA Angiogram Neck w/o & w Contrast   Final Result   Impression:   1. No acute infarct.   2. Head MRA demonstrates no aneurysm or stenosis of the major   intracranial arteries.   3. Neck MRA demonstrates 50-60% diameter stenosis of the proximal   right internal carotid artery. 20-30% diameter stenosis of the   proximal left internal carotid artery. No vertebral artery stenosis.             RICHIE BENJAMIN MD            SYSTEM ID:  [id number]      MRA Angiogram Head w/o Contrast   Final Result   Impression:   1. No acute infarct.   2. Head MRA demonstrates no aneurysm or stenosis of the major   intracranial arteries.   3. Neck MRA demonstrates 50-60% diameter stenosis of the proximal   right internal carotid artery. 20-30% diameter stenosis of the   proximal left internal carotid artery. No vertebral artery stenosis.             RICHIE BENJAMIN MD            SYSTEM ID:  [id number]       MR Brain w/o & w Contrast   Final Result   Impression:   1. No acute infarct.   2. Head MRA demonstrates no aneurysm or stenosis of the major   intracranial arteries.   3. Neck MRA demonstrates 50-60% diameter stenosis of the proximal   right internal carotid artery. 20-30% diameter stenosis of the   proximal left internal carotid artery. No vertebral artery stenosis.             RICHIE BENJAMIN MD            SYSTEM ID:  [id number]             Critical care was not performed.     Medical Decision Making  The patient's presentation was of high complexity (an acute health issue posing potential threat to life or bodily function).    The patient's evaluation involved:  review of external note(s) from 3+ sources (see separate area of note for details)  review of 3+ test result(s) ordered prior to this encounter (see separate area of note for details)  ordering and/or review of 3+ test(s) in this encounter (see separate area of note for details)  discussion of management or test interpretation with another health professional (see separate area of note for details)    The patient's management necessitated moderate risk (prescription drug management including medications given in the ED).    Assessment & Plan   78-year-old female with previous stroke with left eye vision loss presents ER with left facial droop noted today.  Patient typically does not notice this looked in the mirror today.  Unclear when it started has findings possibly of just some slight left lower facial dissymmetry but able to smile eyes not involved for noninvolved no other findings this point we were worried about possible central seventh stroke.  Talk to neurology also did see the patient MRI stroke protocol ordered was noted be negative other labs stable EKG etc.  Patient stable here without decompensation neuro feels can go home continue Eliquis patient will follow-up with neurology as planned as an outpatient and will return if any concerns at  all at this point has some mild facial dissymmetry which appears to be stable no signs of acute stroke by MRI.  Patient is comfortable this plan wants to go home able to eat here in the ER without distress       I have reviewed the nursing notes. I have reviewed the findings, diagnosis, plan and need for follow up with the patient.    Discharge Medication List as of 2/13/2024  5:18 PM          Final diagnoses:   Facial asymmetry - negative stroke work up   I, Ganga Cordero, am serving as a trained medical scribe to document services personally performed by Steven Lopez MD, based to the provider's statements to me.     I, Steven Lopez MD, was physically present and have reviewed and verified the accuracy of this note documented by Ganga Cordero.       Steven Lopez MD  [facility] EMERGENCY DEPARTMENT  2/13/2024    This note was created at least in part by the use of dragon voice dictation system. Inadvertent typographical errors may still exist.  Steven Lopez MD.  Patient evaluated in the emergency department during the COVID-19 pandemic period. Careful attention to patients safety was addressed throughout the evaluation. Evaluation and treatment management was initiated with disposition made efficiently and appropriate as possible to minimize any risk of potential exposure to patient during this evaluation.       Steven Lopez MD  02/13/24 [id number]

## 2024-02-13 NOTE — DISCHARGE INSTRUCTIONS
Home.  Continue your Eliquis and other medications with follow up MD.  Return if any concerns.  Follow up with neurology as scheduled.    Results for orders placed or performed during the hospital encounter of 02/13/24   MR Brain w/o & w Contrast     Status: None    Narrative    MRI brain without and with contrast 2/13/2024  MRA of the head without contrast  Neck MRA without and with contrast    Provided History:  left central 7th upon wakening this am hx of prev  stroke.  ICD-10:    Comparison:  Brain MRI 11/14/2022. CTA head and neck 11/14/2022.     Technique:   Brain MRI:  Axial diffusion, FLAIR, T2-weighted, susceptibility, and  coronal T1-weighted images were obtained without intravenous contrast.  Following intravenous gadolinium-based contrast administration, axial  and coronal T1-weighted images were obtained.    Head MRA: 3D time-of-flight MRA of the [place] was performed  without intravenous contrast.  Neck MRA:  Limited non contrast 2DTOF images were obtained of the  mid-cervical region. Following intravenous gadolinium-based contrast  administration, a contrast enhanced MRA of the neck/cervical vessels  was performed.  Three-dimensional reconstructions of the neck and head MRA were  created, which were reviewed by the radiologist.    Contrast: IV Gadavist.     Findings:   Brain MRI: Axial diffusion weighted images demonstrate no acute  infarct. Mild generalized proximal volume loss. Moderate confluent and  patchy T2 hyperintensity in the subcortical and deep white matter of  both cerebral hemispheres, as well as in the luz. There is no  intracranial hemorrhage on susceptibility images. Ventricles are  proportionate to the cerebral sulci.    Contrast-enhanced images of the brain demonstrate no abnormal  intracranial enhancement.    Head MRA demonstrates no aneurysm or stenosis of the major  intracranial arteries. Small-caliber P1 segments of the bilateral  posterior cerebral arteries.    Neck  MRA demonstrates patent great vessel origins arising from the  aortic arch. Antegrade flow in the major cervical vasculature. Short  segment atherosclerotic narrowing of the proximal right internal  carotid artery with residual lumen diameter of 2.4 mm and normal  distal diameter of 4.9 mm corresponding to 50-60% diameter stenosis.  Short segment narrowing of the proximal left internal carotid artery  with residual lumen diameter 3.5 mm and normal distal diameter of 4.7  mm corresponding to 20-30% diameter stenosis.      Impression    Impression:  1. No acute infarct.  2. Head MRA demonstrates no aneurysm or stenosis of the major  intracranial arteries.  3. Neck MRA demonstrates 50-60% diameter stenosis of the proximal  right internal carotid artery. 20-30% diameter stenosis of the  proximal left internal carotid artery. No vertebral artery stenosis.         RICHIE BENJAMIN MD         SYSTEM ID:  [id number]   MRA Angiogram Head w/o Contrast     Status: None    Narrative    MRI brain without and with contrast 2/13/2024  MRA of the head without contrast  Neck MRA without and with contrast    Provided History:  left central 7th upon wakening this am hx of prev  stroke.  ICD-10:    Comparison:  Brain MRI 11/14/2022. CTA head and neck 11/14/2022.     Technique:   Brain MRI:  Axial diffusion, FLAIR, T2-weighted, susceptibility, and  coronal T1-weighted images were obtained without intravenous contrast.  Following intravenous gadolinium-based contrast administration, axial  and coronal T1-weighted images were obtained.    Head MRA: 3D time-of-flight MRA of the [place] was performed  without intravenous contrast.  Neck MRA:  Limited non contrast 2DTOF images were obtained of the  mid-cervical region. Following intravenous gadolinium-based contrast  administration, a contrast enhanced MRA of the neck/cervical vessels  was performed.  Three-dimensional reconstructions of the neck and head MRA were  created, which were  reviewed by the radiologist.    Contrast: IV Gadavist.     Findings:   Brain MRI: Axial diffusion weighted images demonstrate no acute  infarct. Mild generalized proximal volume loss. Moderate confluent and  patchy T2 hyperintensity in the subcortical and deep white matter of  both cerebral hemispheres, as well as in the luz. There is no  intracranial hemorrhage on susceptibility images. Ventricles are  proportionate to the cerebral sulci.    Contrast-enhanced images of the brain demonstrate no abnormal  intracranial enhancement.    Head MRA demonstrates no aneurysm or stenosis of the major  intracranial arteries. Small-caliber P1 segments of the bilateral  posterior cerebral arteries.    Neck MRA demonstrates patent great vessel origins arising from the  aortic arch. Antegrade flow in the major cervical vasculature. Short  segment atherosclerotic narrowing of the proximal right internal  carotid artery with residual lumen diameter of 2.4 mm and normal  distal diameter of 4.9 mm corresponding to 50-60% diameter stenosis.  Short segment narrowing of the proximal left internal carotid artery  with residual lumen diameter 3.5 mm and normal distal diameter of 4.7  mm corresponding to 20-30% diameter stenosis.      Impression    Impression:  1. No acute infarct.  2. Head MRA demonstrates no aneurysm or stenosis of the major  intracranial arteries.  3. Neck MRA demonstrates 50-60% diameter stenosis of the proximal  right internal carotid artery. 20-30% diameter stenosis of the  proximal left internal carotid artery. No vertebral artery stenosis.         RICHIE BENJAMIN MD         SYSTEM ID:  [id number]   MRA Angiogram Neck w/o & w Contrast     Status: None    Narrative    MRI brain without and with contrast 2/13/2024  MRA of the head without contrast  Neck MRA without and with contrast    Provided History:  left central 7th upon wakening this am hx of prev  stroke.  ICD-10:    Comparison:  Brain MRI 11/14/2022. CTA head  and neck 11/14/2022.     Technique:   Brain MRI:  Axial diffusion, FLAIR, T2-weighted, susceptibility, and  coronal T1-weighted images were obtained without intravenous contrast.  Following intravenous gadolinium-based contrast administration, axial  and coronal T1-weighted images were obtained.    Head MRA: 3D time-of-flight MRA of the [place] was performed  without intravenous contrast.  Neck MRA:  Limited non contrast 2DTOF images were obtained of the  mid-cervical region. Following intravenous gadolinium-based contrast  administration, a contrast enhanced MRA of the neck/cervical vessels  was performed.  Three-dimensional reconstructions of the neck and head MRA were  created, which were reviewed by the radiologist.    Contrast: IV Gadavist.     Findings:   Brain MRI: Axial diffusion weighted images demonstrate no acute  infarct. Mild generalized proximal volume loss. Moderate confluent and  patchy T2 hyperintensity in the subcortical and deep white matter of  both cerebral hemispheres, as well as in the luz. There is no  intracranial hemorrhage on susceptibility images. Ventricles are  proportionate to the cerebral sulci.    Contrast-enhanced images of the brain demonstrate no abnormal  intracranial enhancement.    Head MRA demonstrates no aneurysm or stenosis of the major  intracranial arteries. Small-caliber P1 segments of the bilateral  posterior cerebral arteries.    Neck MRA demonstrates patent great vessel origins arising from the  aortic arch. Antegrade flow in the major cervical vasculature. Short  segment atherosclerotic narrowing of the proximal right internal  carotid artery with residual lumen diameter of 2.4 mm and normal  distal diameter of 4.9 mm corresponding to 50-60% diameter stenosis.  Short segment narrowing of the proximal left internal carotid artery  with residual lumen diameter 3.5 mm and normal distal diameter of 4.7  mm corresponding to 20-30% diameter stenosis.       Impression    Impression:  1. No acute infarct.  2. Head MRA demonstrates no aneurysm or stenosis of the major  intracranial arteries.  3. Neck MRA demonstrates 50-60% diameter stenosis of the proximal  right internal carotid artery. 20-30% diameter stenosis of the  proximal left internal carotid artery. No vertebral artery stenosis.         RICHIE BENJAMIN MD         SYSTEM ID:  [id number]   [employer] Draw     Status: None (In process)    Narrative    The following orders were created for panel order [employer] Draw.  Procedure                               Abnormality         Status                     ---------                               -----------         ------                     Extra Blue Top Tube[[id number]]                                                         Extra Red Top Tube[[id number]]                               Final result               Extra Green Top (Lithium...[[id number]]                                                 Extra Purple Top Tube[[id number]]                                                         Please view results for these tests on the individual orders.   Erythrocyte sedimentation rate auto     Status: Normal   Result Value Ref Range    Erythrocyte Sedimentation Rate 24 0 - 30 mm/hr   CRP inflammation     Status: Abnormal   Result Value Ref Range    CRP Inflammation 8.84 (H) <5.00 mg/L   INR     Status: Abnormal   Result Value Ref Range    INR 1.35 (H) 0.85 - 1.15   Partial thromboplastin time     Status: Normal   Result Value Ref Range    aPTT 37 22 - 38 Seconds   Comprehensive metabolic panel     Status: Abnormal   Result Value Ref Range    Sodium 142 135 - 145 mmol/L    Potassium 3.8 3.4 - 5.3 mmol/L    Carbon Dioxide (CO2) 24 22 - 29 mmol/L    Anion Gap 11 7 - 15 mmol/L    Urea Nitrogen 15.4 8.0 - 23.0 mg/dL    Creatinine 0.70 0.51 - 0.95 mg/dL    GFR Estimate 87 >60 mL/min/1.73m2    Calcium 9.2 8.8 - 10.2 mg/dL    Chloride 107 98 - 107 mmol/L    Glucose 123 (H) 70 - 99 mg/dL     Alkaline Phosphatase 52 40 - 150 U/L    AST 20 0 - 45 U/L    ALT 9 0 - 50 U/L    Protein Total 6.6 6.4 - 8.3 g/dL    Albumin 3.7 3.5 - 5.2 g/dL    Bilirubin Total 0.5 <=1.2 mg/dL   Magnesium     Status: Normal   Result Value Ref Range    Magnesium 1.7 1.7 - 2.3 mg/dL   Troponin T, High Sensitivity     Status: Normal   Result Value Ref Range    Troponin T, High Sensitivity 12 <=14 ng/L   Nt probnp inpatient (BNP)     Status: Normal   Result Value Ref Range    N terminal Pro BNP Inpatient 778 0 - 1,800 pg/mL   TSH     Status: Normal   Result Value Ref Range    TSH 0.76 0.30 - 4.20 uIU/mL   UA with Microscopic reflex to Culture     Status: Normal    Specimen: Urine, Midstream   Result Value Ref Range    Color Urine Yellow Colorless, Straw, Light Yellow, Yellow    Appearance Urine Clear Clear    Glucose Urine Negative Negative mg/dL    Bilirubin Urine Negative Negative    Ketones Urine Negative Negative mg/dL    Specific Gravity Urine 1.019 1.003 - 1.035    Blood Urine Negative Negative    pH Urine 5.0 5.0 - 7.0    Protein Albumin Urine Negative Negative mg/dL    Urobilinogen Urine Normal Normal, 2.0 mg/dL    Nitrite Urine Negative Negative    Leukocyte Esterase Urine Negative Negative    RBC Urine 1 <=2 /HPF    WBC Urine 1 <=5 /HPF    Squamous Epithelials Urine <1 <=1 /HPF    Narrative    Urine Culture not indicated   Extra Red Top Tube     Status: None   Result Value Ref Range    Hold Specimen [facility]    CBC with platelets and differential     Status: None   Result Value Ref Range    WBC Count 7.5 4.0 - 11.0 10e3/uL    RBC Count 3.85 3.80 - 5.20 10e6/uL    Hemoglobin 12.1 11.7 - 15.7 g/dL    Hematocrit 36.8 35.0 - 47.0 %    MCV 96 78 - 100 fL    MCH 31.4 26.5 - 33.0 pg    MCHC 32.9 31.5 - 36.5 g/dL    RDW 14.6 10.0 - 15.0 %    Platelet Count 246 150 - 450 10e3/uL    % Neutrophils 76 %    % Lymphocytes 15 %    % Monocytes 7 %    % Eosinophils 1 %    % Basophils 0 %    % Immature Granulocytes 1 %    NRBCs per 100 WBC 0 <1  /100    Absolute Neutrophils 5.7 1.6 - 8.3 10e3/uL    Absolute Lymphocytes 1.1 0.8 - 5.3 10e3/uL    Absolute Monocytes 0.5 0.0 - 1.3 10e3/uL    Absolute Eosinophils 0.1 0.0 - 0.7 10e3/uL    Absolute Basophils 0.0 0.0 - 0.2 10e3/uL    Absolute Immature Granulocytes 0.0 <=0.4 10e3/uL    Absolute NRBCs 0.0 10e3/uL   EKG 12-lead, tracing only     Status: None   Result Value Ref Range    Systolic Blood Pressure  mmHg    Diastolic Blood Pressure  mmHg    Ventricular Rate 82 BPM    Atrial Rate 82 BPM    PA Interval 140 ms    QRS Duration 82 ms     ms    QTc 443 ms    P Axis  degrees    R AXIS -29 degrees    T Axis -1 degrees    Interpretation ECG       Sinus rhythm  Anterior infarct , age undetermined  Abnormal ECG     CBC with platelets differential     Status: None    Narrative    The following orders were created for panel order CBC with platelets differential.  Procedure                               Abnormality         Status                     ---------                               -----------         ------                     CBC with platelets and d...[[id number]]                      Final result                 Please view results for these tests on the individual orders.

## 2024-02-14 NOTE — CONSULTS
[facility]    Stroke Consult Note    Reason for Consult: Left facial droop    Chief Complaint: Stroke Symptoms       HPI  Alfreda Baxter is a 78 year old female with history of CRAO with residual left eye blindness, rheumatoid arthritis, HTN, atrial fibrillation, HLD, transient global amnesia who presented to the emergency department due to left facial droop.  Patient went to bed last night around 9 or 10 PM feeling normal.  She woke up this morning around 8 AM.  When she looked in the mirror she thought that she noticed a left facial droop.  She denies any additional symptoms such as vision changes, weakness, numbness, balance issues.  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 Patient has been on methotrexate since November for rheumatoid arthritis.  She has also been on prednisone.  On exam, the left corner of the mouth does appear lower than the right, however there is no flattening of the nasolabial fold so this likely does not represent true facial weakness.  NIHSS of 0.  MRI imaging was obtained which showed no acute stroke    Imaging:  MRI Brain/MRA Head/ MRA Neck  Impression:  1. No acute infarct.  2. Head MRA demonstrates no aneurysm or stenosis of the major  intracranial arteries.  3. Neck MRA demonstrates 50-60% diameter stenosis of the proximal  right internal carotid artery. 20-30% diameter stenosis of the  proximal left internal carotid artery. No vertebral artery stenosis.    Impression  # Concern for left facial droop  Alfreda Baxter is a 78 year old female with history of CRAO with residual left eye blindness, rheumatoid arthritis, HTN, atrial fibrillation, HLD, transient global amnesia who was evaluated by the stroke neurology team for acute left facial droop.  This presenting symptom is not appreciable by exam. Vital signs were  "wnl. Given her relatively benign examination, we recommended MRI brain w/o contrast as well as MRA head and neck. This imaging did not reveal any acute pathology. We were reassured that she did not have true left facial weakness, and no further acute stroke workup was deemed necessary at this time. Malinda sees neurology as an OP due to her past stroke history, and we would encourage her to continue as scheduled. She should remain on her apixaban 5mg BID as well as her statin.     Recommendations  - Continue apixaban 5mg BID  - Continue atorvastatin 20mg daily      Patient Follow-up    - Follow up with neurology as scheduled    Thank you for this consult. No further stroke evaluation is recommended, so we will sign off. Please contact us with any additional questions.    The patient was discussed with the Stroke Staff Dr. Boles.    LIZA ALCARAZ MD  Neurology Resident  Stroke Ascom: *[phone]  _____________________________________________________    Clinically Significant Risk Factors Present on Admission               # Drug Induced Coagulation Defect: home medication list includes an anticoagulant medication    # Hypertension: Noted on problem list      # Obesity: Estimated body mass index is 38.14 kg/m  as calculated from the following:    Height as of this encounter: 1.55 m (5' 1.02\").    Weight as of this encounter: 91.6 kg (202 lb).       # Asthma: noted on problem list           Past Medical History    Past Medical History:   Diagnosis Date    Acute idiopathic gout of left foot 11/04/2015    Adenomatous colon polyp 06/05/2013    Colonoscopy due 2025    Asthma     controlled with inhaler    Blind left eye 07/08/2019    Due to central retinal occlusion    Cataract     Central retinal artery occlusion of left eye 09/15/2018    Disturbances of vision, late effect of stroke 12/03/2022    DJD (degenerative joint disease)     knees    GERD (gastroesophageal reflux disease)     Glaucoma     Gout     History of central " retinal artery occlusion, os 12/11/2020    HTN (hypertension)     Hyperlipidemia LDL goal <70 12/31/2010    Low bone mass     Lumbar spinal stenosis 12/08/2011    Sees Dr Sage    Mild persistent asthma 12/12/2013    Neovascular glaucoma of left eye, 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   H/o R central retinal artery occlusion    Paroxysmal ventricular tachycardia (H) 11/20/2018    Added automatically from request for surgery [id number]    PFO (patent foramen ovale)     Recurrent cerebrovascular accidents (CVAs) (H) 11/14/2022    Recurrent cerebrovascular accidents (CVAs) (H) 11/14/2022    Transient global amnesia 11/14/2022     Medications   Home Meds  Prior to Admission medications    Medication Sig Start Date End Date Taking? Authorizing Provider   acetaminophen (TYLENOL) 500 MG tablet Take 500-1,000 mg by mouth every 6 hours as needed.    Reported, Patient   albuterol (PROAIR HFA/PROVENTIL HFA/VENTOLIN HFA) 108 (90 Base) MCG/ACT inhaler Inhale 2 puffs into the lungs every 6 hours as needed for shortness of breath or wheezing 8/30/23   Mercedes Urena MD   apixaban ANTICOAGULANT (ELIQUIS) 5 MG tablet Take 1 tablet (5 mg) by mouth 2 times daily 8/30/23   Mercedes Urena MD   atorvastatin (LIPITOR) 20 MG tablet Take 1 tablet (20 mg) by mouth daily 8/30/23   Mercedes Urena MD   B-COMPLEX OR Take  by mouth daily.    Reported, Patient   CALCIUM 500 +D OR one daily    Reported, Patient   folic acid (FOLVITE) 1 MG tablet Take 1 tablet (1 mg) by mouth daily 11/8/23   Lucho Huber MD   hypromellose (ARTIFICIAL TEARS) 0.5 % SOLN ophthalmic solution Place 1 drop into both eyes 3 times daily    Reported, Patient   latanoprost (XALATAN) 0.005 % ophthalmic solution Place 1 drop into both eyes At Bedtime 5/10/23   Bryan Morrison MD   lisinopril (ZESTRIL) 10 MG tablet Take  1 tablet (10 mg) by mouth daily 8/30/23   Mercedes Urena MD   methotrexate 2.5 MG tablet 10mg (4 tabs) once weekly x1 week, then increase by 2.5mg (1 tab) each week until taking the goal weekly dose of 20mg (8 tabs) once weekly 11/8/23   Lucho Huber MD   multivitamin (OCUVITE) TABS tablet Take 1 tablet by mouth daily    Reported, Patient   timolol maleate (TIMOPTIC) 0.5 % ophthalmic solution Place 1 drop Into the left eye every morning 8/14/23   Bryan Morrison MD   zolpidem (AMBIEN) 5 MG tablet TAKE ONE TABLET BY MOUTH IN THE EVENING AS NEEDED FOR SLEEP 8/30/23   Mercedes Urena MD       Scheduled Meds      Infusion Meds      Allergies   Allergies   Allergen Reactions    No Clinical Screening - See Comments Other (See Comments)     senisitive to non steroidals  Aleve, ibuprofen celebrex cause bad stomach pains  senisitive to non steroidals  Aleve, ibuprofen celebrex cause bad stomach pains    Oxycodone Hives     Other reaction(s): Unknown  ... With facial swelling    Nsaids Nausea and Vomiting     Other reaction(s): Abdominal Pain          PHYSICAL EXAMINATION   Temp:  [98.3  F (36.8  C)] 98.3  F (36.8  C)  Pulse:  [] 92  Resp:  [17-31] 29  BP: (121-147)/() 141/83  SpO2:  [94 %-97 %] 95 %    General Exam  General:  patient lying in bed without any acute distress    HEENT:  normocephalic/atraumatic  Pulmonary:  no respiratory distress  Extremities:  no edema  Skin:  intact, warm/dry     Neuro Exam  Mental Status:  alert, oriented x 3, follows commands, speech clear and fluent, naming and repetition normal  Cranial Nerves:  visual fields intact, PERRL, EOMI with normal smooth pursuit, facial sensation intact and symmetric, facial movements symmetric, hearing not formally tested but intact to conversation, palate elevation symmetric and uvula midline, no dysarthria, shoulder shrug strong bilaterally, tongue protrusion midline  Motor:  normal muscle tone and bulk, no abnormal  movements, able to move all limbs spontaneously, strength 5/5 throughout upper and lower extremities, no pronator drift  Reflexes:  toes down-going  Sensory:  light touch sensation intact and symmetric throughout upper and lower extremities  Coordination:  normal finger-to-nose and heel-to-shin bilaterally without dysmetria  Station/Gait:  normal width, turn, arm swing    Stroke Scales    NIHSS  1a. Level of Consciousness 0-->Alert, keenly responsive   1b. LOC Questions 0-->Answers both questions correctly   1c. LOC Commands 0-->Performs both tasks correctly   2.   Best Gaze 0-->Normal   3.   Visual 0-->No visual loss (Complete vision loss in left eye at baseline)   4.   Facial Palsy 0-->Normal symmetrical movements   5a. Motor Arm, Left 0-->No drift, limb holds 90 (or 45) degrees for full 10 secs   5b. Motor Arm, Right 0-->No drift, limb holds 90 (or 45) degrees for full 10 secs   6a. Motor Leg, Left 0-->No drift, leg holds 30 degree position for full 5 secs   6b. Motor Leg, right 0-->No drift, leg holds 30 degree position for full 5 secs   7.   Limb Ataxia 0-->Absent   8.   Sensory 0-->Normal, no sensory loss   9.   Best Language 0-->No aphasia, normal   10. Dysarthria 0-->Normal   11. Extinction and Inattention  0-->No abnormality   Total 0 (02/13/24 1700)       Imaging  I personally reviewed all imaging; relevant findings per HPI.    Labs Data   CBC  Recent Labs   Lab 02/13/24  1114   WBC 7.5   RBC 3.85   HGB 12.1   HCT 36.8        Basic Metabolic Panel   Recent Labs   Lab 02/13/24  1114      POTASSIUM 3.8   CHLORIDE 107   CO2 24   BUN 15.4   CR 0.70   *   RUDOLPH 9.2     Liver Panel  Recent Labs   Lab 02/13/24  1114   PROTTOTAL 6.6   ALBUMIN 3.7   BILITOTAL 0.5   ALKPHOS 52   AST 20   ALT 9     INR    Recent Labs   Lab Test 02/13/24  1114 11/14/22  1536 11/14/22  1500   INR 1.35* 1.14 1.2*           Stroke Consult Data Data   This was a non-emergent, non-telestroke consult.

## 2024-02-15 ENCOUNTER — OFFICE VISIT (OUTPATIENT)
Dept: RHEUMATOLOGY | Facility: CLINIC | Age: 79
End: 2024-02-15
Payer: MEDICARE

## 2024-02-15 VITALS
SYSTOLIC BLOOD PRESSURE: 135 MMHG | DIASTOLIC BLOOD PRESSURE: 84 MMHG | BODY MASS INDEX: 37.19 KG/M2 | OXYGEN SATURATION: 98 % | HEART RATE: 90 BPM | WEIGHT: 197 LBS

## 2024-02-15 DIAGNOSIS — Z79.899 HIGH RISK MEDICATION USE: ICD-10-CM

## 2024-02-15 DIAGNOSIS — M06.09 RHEUMATOID ARTHRITIS OF MULTIPLE SITES WITH NEGATIVE RHEUMATOID FACTOR (H): Primary | ICD-10-CM

## 2024-02-15 PROCEDURE — G2211 COMPLEX E/M VISIT ADD ON: HCPCS | Performed by: INTERNAL MEDICINE

## 2024-02-15 PROCEDURE — 99215 OFFICE O/P EST HI 40 MIN: CPT | Performed by: INTERNAL MEDICINE

## 2024-02-15 RX ORDER — PREDNISONE 5 MG/1
5 TABLET ORAL DAILY PRN
Qty: 90 TABLET | Refills: 1 | Status: SHIPPED | OUTPATIENT
Start: 2024-02-15 | End: 2024-06-18

## 2024-02-15 RX ORDER — FOLIC ACID 1 MG/1
2 TABLET ORAL DAILY
Qty: 200 TABLET | Refills: 1 | Status: SHIPPED | OUTPATIENT
Start: 2024-02-15 | End: 2024-06-18

## 2024-02-15 RX ORDER — METHOTREXATE 2.5 MG/1
20 TABLET ORAL
Qty: 96 TABLET | Refills: 1 | Status: SHIPPED | OUTPATIENT
Start: 2024-02-15 | End: 2024-06-18

## 2024-02-15 NOTE — PROGRESS NOTES
"Rheumatology Clinic Visit      Alfreda Baxter MRN# [id number]   YOB: 1945 Age: 78 year old      Date of visit: 2/15/24   Referring provider: Dr. Brandi Sims  PCP: Dr. Mercedes Urena    Chief Complaint   Patient presents with:  RECHECK    Assessment and Plan     1.  Seronegative rheumatoid arthritis: Inflammatory arthritis symptoms affecting her MCPs, PIPs, wrists, and shoulders started in February 2023.  Establish care with me on 11/8/2023 at which point she had symmetric synovitis of the MCPs, PIPs, and wrists; and symptoms of impingement syndrome of the shoulders.  She has been following at [facility] where steroid injections of the shoulders provide benefit for no more than 10 days; and systemic steroid taper starting with 60 mg daily from her primary care provider was a \"miracle\" with resolution of MCP, PIP, wrist, and shoulder pain while on prednisone.  Methotrexate was started in November 2023.  2/15/2024: Patient reports having improvement with methotrexate but still active disease.  Prednisone was effective but higher anxiety at 20 mg daily, mild intermittent anxiety at 10 mg daily, and tolerated well at 5 mg daily.  Discussed prednisone 5mg daily PRN and she is in agreement; this will provide a bridge to the DMARDs gaining efficacy.  Missed several doses of methotrexate due to illness; discussed when to hold methotrexate.  Discussed hydroxychloroquine (pt prefers to avoid due to only having vision in one eye), sulfasalazine (but significant GI upset with NSAIDs in the past), TNF inhibitor (avoid due to squamous cell carcinoma hx), and Orencia (advised using).  She would like to remain on methotrexate for now and consider orencia in the future.  Oral sores possibly methotrexate related so increase folic acid to 2mg daily; if not resolved then increase folic acid to 3mg daily.   - Continue Methotrexate 20mg once every 7 days  - Increase folic acid from 1mg daily, to 2 mg " daily  - Start prednisone 5mg daily PRN RA symptoms  - Labs in 3 months: CBC, Creatinine, Hepatic Panel, ESR, CRP    2.  Osteoarthritis of the hands: Significant degenerative changes of the hands with large Heberden's and Alba's nodes, and squaring of the first CMC joints.  Reportedly this has been stable for many years, preceding the inflammatory arthritis symptoms started in February 2023.  Minimal symptom severity from degenerative arthritis at this time.    3.  Degenerative arthritis of the spine: Many degenerative changes that have been managed with epidural steroid injections from [facility].  She will continue following with her spine  pecialist and plans to see a pain management specialist at [facility] in December for chronic back pain.    4.  Bilateral shoulder pain: Exam consistent with impingement syndrome.  Reportedly all shoulder symptoms resolved when she is on prednisone.  This could be related to the rheumatoid arthritis or a degenerative issue.  Monitor for improvement with methotrexate use and depending on response may need steroid injection of the shoulder(s), and/or potentially imaging.    5.  History of squamous cell carcinoma: 1 lesion removed per patient report.  She will continue following with dermatology at least once yearly for monitoring.  She verbalized understanding about the increased risk for malignancy with methotrexate use.    6.  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    7.  Vaccinations: Vaccinations reviewed with Ms. Baxter.  Risks and benefits of vaccinations were discussed.    - Influenza: encouraged yearly vaccination  - COVID-19: Advised keeping updated and to hold methotrexate x1-2 weeks afterward    Total minutes spent in evaluation with patient, documentation, , and review of pertinent studies and chart notes: 42  The longitudinal plan of care for the rheumatology problem(s)  were addressed during this visit.  Due to added complexity of care, we will continue to support the patient and the subsequent management of this condition with ongoing continuity of care.        Ms. Baxter verbalized agreement with and understanding of the rational for the diagnosis and treatment plan.  All questions were answered to best of my ability and the patient's satisfaction. Ms. Baxter was advised to contact the clinic with any questions that may arise after the clinic visit.      Thank you for involving me in the care of the patient    Return to clinic: 3 months      HPI   Alfreda Baxter is a 78 year old female with a past medical history significant for hypertension, hyperlipidemia, lumbar spine stenosis, paroxysmal atrial fibrillation on anticoagulation, history of CVA with left eye vision loss, insomnia, and bilateral TKA who presents for follow-up of rheumatoid arthritis.     11/8/2023: Chronic degenerative changes of her hands and spine; the degenerative changes of her hands do not bother her.  The degenerative changes of her spine are managed at [facility] where steroid injections have been effective; she has seen a spine surgeon and is next going to see a pain management specialist at [facility].  She had been fairly stable until February 2023 when she had sudden onset bilateral shoulder pain.  This was followed by pain at the wrists, MCPs, and PIPs.  MCP, PIP, wrist, and shoulder pain is worse in the morning and improves with time and activity; morning stiffness for most of the day, reaching a baseline after about 1-2 hours.  When she received a epidural steroid injection of her lumbar spine her shoulders felt better.  She then received a high dose of prednisone starting with 60 mg daily and tapered off from her primary care clinic and while on prednisone she felt like it was a miracle drug.  She has been given other courses of prednisone and she says that she typically only  starts with 40 mg a day and that that is very effective also.  Reduced  strength.  History of bilateral TKA.  Knees, ankles, and feet without any issues.  She notes that her son has ulcerative colitis and that her son's cousins on his father side have psoriasis; no other autoimmune disease on her side of the family.  Squamous of carcinoma x1; following with dermatology regularly.  History of CVA 5 years ago with loss of vision in the left eye.  No history of scalp tenderness, jaw or tongue claudication, or new headaches now around the time of the vision loss.    Today, 2/15/2024: Prednisone was effective but higher anxiety at 20 mg daily, mild intermittent anxiety at 10 mg daily, and tolerated well at 5 mg daily. Prednisone works well for arthritis.  Methotrexate has been helpful; held methotrexate for several doses because of infections.  Still with pain/swelling/stiffness at the MCPs, PIPs, wrists, but all improved with methotrexate.  She would like to continue on methotrexate monotherapy for now to see if a longer duration will be helpful.  She has several questions about rheumatoid arthritis and methotrexate that were discussed today.  Occasional oral sore since starting methotrexate.  No nasal sores.    Denies fevers, chills, nausea, vomiting, constipation, diarrhea. No abdominal pain.  No black or bloody stools.  No chest pain/pressure or shortness of breath. No LE swelling. No neck pain. No rash. No sicca symptoms.     Tobacco: Former cigarette smoker  EtOH: None  Drugs: None  Occupation: retired nurse    ROS   12 point review of system was completed and negative except as noted in the HPI     Active Problem List     Patient Active Problem List   Diagnosis    DJD (degenerative joint disease)    Hypertension goal BP (blood pressure) < 140/90    Hyperlipidemia LDL goal <70    Lumbar spinal stenosis    Mild intermittent asthma without complication    History of bilateral knee replacement    Primary insomnia     Posterior vitreous detachment of both eyes    PFO (patent foramen ovale)    Neovascular glaucoma of left eye, moderate stage    Blind left eye    Paroxysmal A-fib (H)    Ocular hypertension of right eye, treated    History of central retinal artery occlusion, os    Recurrent cerebrovascular accidents (CVAs) (H)    Disturbances of vision, late effect of stroke    Combined forms of age-related cataract, mild-mod, of left eye    Pseudophakia, od    Low bone mass    Class 2 severe obesity due to excess calories with serious comorbidity in adult (H)    Cervicalgia     Past Medical History     Past Medical History:   Diagnosis Date    Acute idiopathic gout of left foot 11/04/2015    Adenomatous colon polyp 06/05/2013    Colonoscopy due 2025    Asthma     controlled with inhaler    Blind left eye 07/08/2019    Due to central retinal occlusion    Cataract     Central retinal artery occlusion of left eye 09/15/2018    Disturbances of vision, late effect of stroke 12/03/2022    DJD (degenerative joint disease)     knees    GERD (gastroesophageal reflux disease)     Glaucoma     Gout     History of central retinal artery occlusion, os 12/11/2020    HTN (hypertension)     Hyperlipidemia LDL goal <70 12/31/2010    Low bone mass     Lumbar spinal stenosis 12/08/2011    Sees Dr Sage    Mild persistent asthma 12/12/2013    Neovascular glaucoma of left eye, 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   H/o R central retinal artery occlusion    Paroxysmal ventricular tachycardia (H) 11/20/2018    Added automatically from request for surgery [id number]    PFO (patent foramen ovale)     Recurrent cerebrovascular accidents (CVAs) (H) 11/14/2022    Recurrent cerebrovascular accidents (CVAs) (H) 11/14/2022    Transient global amnesia 11/14/2022     Past Surgical History     Past Surgical History:   Procedure Laterality  Date    APPENDECTOMY      age 7 yrs.    BREAST BIOPSY, RT/LT  2004    left benign    CATARACT IOL, RT/LT       SECTION      x 2    COLONOSCOPY  2013    needed q 3 yrs.    COLONOSCOPY N/A 09/10/2020    Procedure: Colonoscopy, With Polypectomy And Biopsy;  Surgeon: Brian Cleaning MD;  Location: [facility]    COLONOSCOPY WITH CO2 INSUFFLATION N/A 2016    Procedure: COLONOSCOPY WITH CO2 INSUFFLATION;  Surgeon: Brian Cleaning MD;  Location: [facility]    COLONOSCOPY WITH CO2 INSUFFLATION N/A 09/10/2020    Procedure: COLONOSCOPY, WITH CO2 INSUFFLATION;  Surgeon: Brian Cleaning MD;  Location: [facility]    EP COMPREHENSIVE EP STUDY N/A 2018    Procedure: LOOP RECORDER;  Surgeon: Buck Butterfield MD;  Location:  [facility]    HYSTERECTOMY, POORNIMA  1997    bleeding fibroids    ORTHOPEDIC SURGERY      meniscus repair    PHACOEMULSIFICATION WITH STANDARD INTRAOCULAR LENS IMPLANT Right 10/24/2022    Procedure: COMPLE RIGHT EYE PHACOEMULSIFICATION, CATARACT, WITH STANDARD INTRAOCULAR LENS IMPLANT INSERTION;  Surgeon: Ezequiel Florence MD;  Location: [facility] TOTAL KNEE ARTHROPLASTY Left 2015    at [facility]     Allergy     Allergies   Allergen Reactions    No Clinical Screening - See Comments Other (See Comments)     senisitive to non steroidals  Aleve, ibuprofen celebrex cause bad stomach pains  senisitive to non steroidals  Aleve, ibuprofen celebrex cause bad stomach pains    Oxycodone Hives     Other reaction(s): Unknown  ... With facial swelling    Nsaids Nausea and Vomiting     Other reaction(s): Abdominal Pain     Current Medication List     Current Outpatient Medications   Medication Sig    acetaminophen (TYLENOL) 500 MG tablet Take 500-1,000 mg by mouth every 6 hours as needed.    albuterol (PROAIR HFA/PROVENTIL HFA/VENTOLIN HFA) 108 (90 Base) MCG/ACT inhaler Inhale 2 puffs into the lungs every 6 hours as needed for shortness of breath or wheezing     apixaban ANTICOAGULANT (ELIQUIS) 5 MG tablet Take 1 tablet (5 mg) by mouth 2 times daily    atorvastatin (LIPITOR) 20 MG tablet Take 1 tablet (20 mg) by mouth daily    B-COMPLEX OR Take  by mouth daily.    CALCIUM 500 +D OR one daily    folic acid (FOLVITE) 1 MG tablet Take 1 tablet (1 mg) by mouth daily    hypromellose (ARTIFICIAL TEARS) 0.5 % SOLN ophthalmic solution Place 1 drop into both eyes 3 times daily    latanoprost (XALATAN) 0.005 % ophthalmic solution Place 1 drop into both eyes At Bedtime    lisinopril (ZESTRIL) 10 MG tablet Take 1 tablet (10 mg) by mouth daily    methotrexate 2.5 MG tablet 10mg (4 tabs) once weekly x1 week, then increase by 2.5mg (1 tab) each week until taking the goal weekly dose of 20mg (8 tabs) once weekly    multivitamin (OCUVITE) TABS tablet Take 1 tablet by mouth daily    timolol maleate (TIMOPTIC) 0.5 % ophthalmic solution Place 1 drop Into the left eye every morning    zolpidem (AMBIEN) 5 MG tablet TAKE ONE TABLET BY MOUTH IN THE EVENING AS NEEDED FOR SLEEP     No current facility-administered medications for this visit.         Social History   See HPI    Family History     Family History   Problem Relation Age of Onset    Arthritis Mother     Cancer Mother     Hypertension Mother     Other Cancer Mother     Thyroid Disease Mother     Respiratory Father     Glaucoma Father     Asthma Father     Allergies Sister     Eye Disorder Sister     Lipids Sister     Neurologic Disorder Sister     Osteoporosis Sister     Glaucoma Sister     Hypertension Sister     Macular Degeneration Sister     Hyperlipidemia Sister     Hypertension Sister     Osteoporosis Sister     Gastrointestinal Disease Son         Ulcerative Colitis    Glaucoma Other      Son: Ulcerative colitis, psoriatic arthritis, psoriasis.  Her son's paternal cousins have psoriasis    Physical Exam     Temp Readings from Last 3 Encounters:   02/13/24 98.3  F (36.8  C) (Oral)   09/27/23 98.2  F (36.8  C) (Temporal)   08/30/23  "98.1  F (36.7  C) (Oral)     BP Readings from Last 5 Encounters:   02/15/24 135/84   02/13/24 (!) 141/83   11/08/23 130/70   09/27/23 122/72   08/30/23 117/76     Pulse Readings from Last 1 Encounters:   02/15/24 90     Resp Readings from Last 1 Encounters:   02/13/24 29     Estimated body mass index is 37.19 kg/m  as calculated from the following:    Height as of 2/13/24: 1.55 m (5' 1.02\").    Weight as of this encounter: 89.4 kg (197 lb).    GEN: NAD. Healthy appearing adult.   HEENT:  Anicteric, noninjected sclera. No obvious external lesions of the ear and nose. Hearing intact.  CV: S1, S2. RRR. No m/r/g  PULM: No increased work of breathing. CTA bilaterally   MSK: Synovial swelling and tenderness to palpation of the bilateral second-third MCPs and second-fourth PIPs.  Large Heberden's and Alba's nodes present.  DIPs nontender to palpation.  Wrists with synovial swelling and tenderness to palpation.  U Elbows without swelling or tenderness to palpation.  Shoulders nontender to palpation without swelling.  Knees, ankles, and MTPs without swelling or tenderness to palpation.    SKIN: No rash or jaundice seen  PSYCH: Alert. Appropriate.      Labs / Imaging (select studies)     RF/CCP  Recent Labs   Lab Test 03/08/23  1413   CCPIGG 0.7   RHF <7     CBC  Recent Labs   Lab Test 02/13/24  1114 01/16/24  0826 12/04/23  1040 07/20/21  1248 11/26/20  1141 10/15/20  1108 07/24/20  0829 08/10/19  1410   WBC 7.5 8.8 11.2*   < > 5.0   < > 5.7 18.6*   RBC 3.85 4.43 4.59   < > 4.74   < > 4.84 4.31   HGB 12.1 13.3 13.5   < > 13.4   < > 14.3 12.9   HCT 36.8 42.9 43.5   < > 43.1   < > 43.4 40.0   MCV 96 97 95   < > 91   < > 90 93   RDW 14.6 15.4* 15.6*   < > 12.5   < > 13.5 13.8    278 231   < > 338   < > 246 252   MCH 31.4 30.0 29.4   < > 28.3   < > 29.5 29.9   MCHC 32.9 31.0* 31.0*   < > 31.1*   < > 32.9 32.3   NEUTROPHIL 76 52 65   < > 66.6  --  48.3 85.6   LYMPH 15 38 28   < > 25.2  --  41.3 9.7   MONOCYTE 7 9 6   < " > 7.0  --  7.9 4.6   EOSINOPHIL 1 1 1   < > 0.4  --  2.3 0.0   BASOPHIL 0 0 0   < > 0.6  --  0.2 0.1   ANEU  --   --   --   --  3.3  --  2.7 16.0*   ALYM  --   --   --   --  1.3  --  2.3 1.8   MANINDER  --   --   --   --  0.4  --  0.5 0.9   AEOS  --   --   --   --  0.0  --  0.1 0.0   ABAS  --   --   --   --  0.0  --  0.0 0.0   ANEUTAUTO 5.7 4.5 7.3   < >  --   --   --   --    ALYMPAUTO 1.1 3.3 3.1   < >  --   --   --   --    AMONOAUTO 0.5 0.8 0.6   < >  --   --   --   --    AEOSAUTO 0.1 0.1 0.1   < >  --   --   --   --    ABSBASO 0.0 0.0 0.0   < >  --   --   --   --     < > = values in this interval not displayed.     CMP  Recent Labs   Lab Test 02/13/24  1114 01/16/24  0826 12/04/23  1040 11/08/23  1242 03/08/23  1413 11/15/22  0632 07/27/21  1004 11/26/20  1141 10/15/20  1108 07/24/20  1013     --   --   --  137 137   < > 138 141 143   POTASSIUM 3.8  --   --   --  4.3 3.8   < > 3.8 4.6 4.1   CHLORIDE 107  --   --   --  106 103   < > 104 106 108   CO2 24  --   --   --  27 23   < > 29 33* 26   ANIONGAP 11  --   --   --  4 11   < > 6 2* 9   *  --   --   --  103* 95   < > 114* 100* 99   BUN 15.4  --   --   --  22 16.4   < > 16 24 19   CR 0.70 0.89 0.80   < > 0.81 0.63   < > 0.70 0.70 0.85   GFRESTIMATED 87 66 75   < > 74 90   < > 84 84 66   GFRESTBLACK  --   --   --   --   --   --   --  >90 >90 77   RUDOLPH 9.2  --   --   --  9.7 8.9   < > 10.0 9.6 9.1   BILITOTAL 0.5 0.5 0.6   < > 0.6 0.5   < > 0.5  --   --    ALBUMIN 3.7 4.0 4.1   < > 3.8 3.7   < > 3.6  --   --    PROTTOTAL 6.6 6.9 7.1   < > 8.1 6.6   < > 8.6  --   --    ALKPHOS 52 42 51   < > 69 62   < > 84  --   --    AST 20 21 21   < > 18 20   < > 9  --   --    ALT 9 16 17   < > 20 7*   < > 16  --  22    < > = values in this interval not displayed.     Calcium/VitaminD  Recent Labs   Lab Test 02/13/24  1114 03/08/23  1413 11/15/22  0632 12/31/18  1043 06/25/18  0834   RUDOLPH 9.2 9.7 8.9   < > 9.4   VITDT  --   --   --   --  22    < > = values in this interval  not displayed.     ESR/CRP  Recent Labs   Lab Test 02/13/24  1114 11/08/23  1242 03/08/23  1413 07/27/21  1004 07/27/21  1004 12/03/15  1043   SED 24 48* 18   < > 9 38*   CRP  --   --  13.4*  --  <2.9 21.5*   CRPI 8.84* 29.20*  --   --   --   --     < > = values in this interval not displayed.     Lipid Panel  Recent Labs   Lab Test 11/14/22  1536 08/24/22  1057 07/27/21  1004   CHOL 157 157 179   TRIG 142 168* 195*   HDL 55 55 73   LDL 74 68 67   NHDL 102 102 106     Hepatitis B  Recent Labs   Lab Test 11/08/23  1242   AUSAB 0.89   HBCAB Nonreactive   HEPBANG Nonreactive     Hepatitis C  Recent Labs   Lab Test 11/08/23  1242 12/03/21  1538   HCVAB Nonreactive Nonreactive     Lyme ab screening  Recent Labs   Lab Test 11/08/23  1242   LYMEGM 0.03     Immunization History     Immunization History   Administered Date(s) Administered    COVID-19 Bivalent 12+ (Pfizer) 12/12/2022    COVID-19 MONOVALENT 12+ (Pfizer) 03/10/2021, 03/31/2021, 12/07/2021    Influenza (High Dose) 3 valent vaccine 10/21/2016, 11/16/2017, 10/16/2019, 10/10/2022    Influenza (IIV3) PF 11/29/2006, 11/02/2012, 11/01/2013, 11/07/2014, 10/20/2015    Influenza Vaccine 18-64 (Flublok) 10/19/2021    Influenza Vaccine 65+ (FLUAD) 10/19/2021, 10/10/2022, 11/06/2023    Influenza Vaccine 65+ (Fluzone HD) 10/13/2020    Pneumo Conj 13-V (2010&after) 06/28/2018    Pneumococcal 23 valent 11/29/2006, 07/27/2020    TD,PF 7+ (Tenivac) 06/28/2018    TDAP Vaccine (Adacel) 11/19/2007    Td (Adult), Adsorbed 01/17/2000    Zoster recombinant adjuvanted (SHINGRIX) 07/15/2019, 09/26/2019    Zoster vaccine, live 11/19/2008          Chart documentation done in part with Dragon Voice recognition Software. Although reviewed after completion, some word and grammatical error may remain.    Lucho Huber MD

## 2024-03-26 ENCOUNTER — TELEPHONE (OUTPATIENT)
Dept: RHEUMATOLOGY | Facility: CLINIC | Age: 79
End: 2024-03-26
Payer: MEDICARE

## 2024-03-26 NOTE — TELEPHONE ENCOUNTER
[facility] Call Center    Phone Message    May a detailed message be left on voicemail: yes     Reason for Call: Other: Patient states that she has an upper respiratory infection again. She would like to know how long to stay of methotrexate 2.5 MG tablet. She also states that she is on predniSONE (DELTASONE) 5 MG tablet and not sure what to do about that medication either. Please advise and call back pt asap. Thanks!     Action Taken: Other: RHEUM    Travel Screening: Not Applicable

## 2024-03-26 NOTE — TELEPHONE ENCOUNTER
Called patient and told her to take her methotrexate as prescribed and to not hold it. Patient confirmed understanding. Patient also does not need prednisone unless have Rheumatologic symptoms.     Evelyn SANCHEZ RN, [facility] 03/26/24 1:31 PM

## 2024-03-29 ENCOUNTER — OFFICE VISIT (OUTPATIENT)
Dept: FAMILY MEDICINE | Facility: CLINIC | Age: 79
End: 2024-03-29
Payer: MEDICARE

## 2024-03-29 VITALS
WEIGHT: 193.7 LBS | OXYGEN SATURATION: 96 % | RESPIRATION RATE: 20 BRPM | BODY MASS INDEX: 36.57 KG/M2 | HEIGHT: 61 IN | DIASTOLIC BLOOD PRESSURE: 80 MMHG | SYSTOLIC BLOOD PRESSURE: 130 MMHG | HEART RATE: 80 BPM | TEMPERATURE: 97.9 F

## 2024-03-29 DIAGNOSIS — J45.31 MILD PERSISTENT ASTHMA WITH ACUTE EXACERBATION: Primary | ICD-10-CM

## 2024-03-29 PROCEDURE — 99213 OFFICE O/P EST LOW 20 MIN: CPT | Performed by: FAMILY MEDICINE

## 2024-03-29 RX ORDER — PREDNISONE 10 MG/1
TABLET ORAL
Qty: 15 TABLET | Refills: 0 | Status: SHIPPED | OUTPATIENT
Start: 2024-03-29 | End: 2024-03-29

## 2024-03-29 RX ORDER — PREDNISONE 10 MG/1
TABLET ORAL
Qty: 15 TABLET | Refills: 0 | Status: SHIPPED | OUTPATIENT
Start: 2024-03-29 | End: 2024-05-14

## 2024-03-29 RX ORDER — AZITHROMYCIN 250 MG/1
TABLET, FILM COATED ORAL
Qty: 6 TABLET | Refills: 0 | Status: SHIPPED | OUTPATIENT
Start: 2024-03-29 | End: 2024-04-03

## 2024-03-29 RX ORDER — AZITHROMYCIN 250 MG/1
TABLET, FILM COATED ORAL
Qty: 6 TABLET | Refills: 0 | Status: SHIPPED | OUTPATIENT
Start: 2024-03-29 | End: 2024-03-29

## 2024-03-29 ASSESSMENT — ASTHMA QUESTIONNAIRES
QUESTION_3 LAST FOUR WEEKS HOW OFTEN DID YOUR ASTHMA SYMPTOMS (WHEEZING, COUGHING, SHORTNESS OF BREATH, CHEST TIGHTNESS OR PAIN) WAKE YOU UP AT NIGHT OR EARLIER THAN USUAL IN THE MORNING: TWO OR THREE NIGHTS A WEEK
QUESTION_2 LAST FOUR WEEKS HOW OFTEN HAVE YOU HAD SHORTNESS OF BREATH: ONCE A DAY
QUESTION_1 LAST FOUR WEEKS HOW MUCH OF THE TIME DID YOUR ASTHMA KEEP YOU FROM GETTING AS MUCH DONE AT WORK, SCHOOL OR AT HOME: SOME OF THE TIME
ACT_TOTALSCORE: 12
ACT_TOTALSCORE: 12
QUESTION_5 LAST FOUR WEEKS HOW WOULD YOU RATE YOUR ASTHMA CONTROL: SOMEWHAT CONTROLLED
QUESTION_4 LAST FOUR WEEKS HOW OFTEN HAVE YOU USED YOUR RESCUE INHALER OR NEBULIZER MEDICATION (SUCH AS ALBUTEROL): ONE OR TWO TIMES PER DAY

## 2024-03-29 ASSESSMENT — PAIN SCALES - GENERAL: PAINLEVEL: NO PAIN (0)

## 2024-03-29 NOTE — TELEPHONE ENCOUNTER
Called Malinda and let her know that it is completely fine to keep taking her methotrexate. Malinda confirmed understanding.     Evelyn SANCHEZ RN, [facility] 03/29/24 11:23 AM

## 2024-03-29 NOTE — PROGRESS NOTES
"  Assessment & Plan     Mild persistent asthma with acute exacerbation  Advised with supportive care  Continue with albuterol inhalers.  - azithromycin (ZITHROMAX) 250 MG tablet; Take 2 tablets (500 mg) by mouth daily for 1 day, THEN 1 tablet (250 mg) daily for 4 days.  - predniSONE (DELTASONE) 10 MG tablet; 2 tab daily for 5 days, then one tab daily until gone    History of degenerative joint disease, rheumatoid arthritis,  she is on methotrexate, however she is been off of her methotrexate for a week since she becomes sick.    BMI  Estimated body mass index is 36.3 kg/m  as calculated from the following:    Height as of this encounter: 1.556 m (5' 1.25\").    Weight as of this encounter: 87.9 kg (193 lb 11.2 oz).   Weight management plan: Discussed healthy diet and exercise guidelines      Work on weight loss  Regular exercise    Ian Petty is a 79 year old, presenting for the following health issues:  URI  History of rheumatoid arthritis, asthma she has been off of her methotrexate for a week.  For the past 10 days or so she has been having nasal drainage, postnasal drainage cough short of breath, wheezing.  She has been using her albuterol inhaler more frequent.  She has no chest pain, no fever, no chills.  No lower extremity edema.  Chronic issues after 5 mg of prednisone.    URI    History of Present Illness       Reason for visit:  Cough and chest congestion  Symptom onset:  1-2 weeks ago  Symptoms include:  Asthma excerbation, cough, wheezing  Symptom intensity:  Moderate  Symptom progression:  Improving  Had these symptoms before:  Yes  Has tried/received treatment for these symptoms:  Yes  Previous treatment was successful:  Yes  Prior treatment description:  Albuteral inhaler, pred burst, mucinex  What makes it worse:  Feel short of breath when walking distance.  What makes it better:  Resting and sitting upright    She eats 2-3 servings of fruits and vegetables daily.She consumes 0 sweetened " "beverage(s) daily.She exercises with enough effort to increase her heart rate 10 to 19 minutes per day.  She exercises with enough effort to increase her heart rate 4 days per week.   She is taking medications regularly.           Review of Systems  Constitutional, HEENT, cardiovascular, pulmonary, GI, , musculoskeletal, neuro, skin, endocrine and psych systems are negative, except as otherwise noted.      Objective    Ht 1.556 m (5' 1.25\")   BMI 36.92 kg/m    Body mass index is 36.92 kg/m .  Physical Exam   GENERAL: alert and no distress  HENT: ear canals and TM's normal, nose and mouth without ulcers or lesions  NECK: no adenopathy, no asymmetry, masses, or scars  RESP: inspiratory wheezes throughout  CV: regular rate and rhythm, normal S1 S2, no S3 or S4, no murmur, click or rub, no peripheral edema  ABDOMEN: soft, nontender, no hepatosplenomegaly, no masses and bowel sounds normal  MS: no gross musculoskeletal defects noted, no edema  PSYCH: mentation appears normal, affect normal/bright            Signed Electronically by: Didi Poole MD    "

## 2024-03-29 NOTE — TELEPHONE ENCOUNTER
Patient would like a call back.  States that she is now on a z-pack (zythromax) as well as prednesone. (Pred burst 20 mg) Patient wants to know if she should continue with her methotrexate on Monday.  Pls leave message on mobile number if she does not answer.

## 2024-05-07 ENCOUNTER — LAB (OUTPATIENT)
Dept: LAB | Facility: CLINIC | Age: 79
End: 2024-05-07
Payer: MEDICARE

## 2024-05-07 DIAGNOSIS — Z79.899 HIGH RISK MEDICATION USE: ICD-10-CM

## 2024-05-07 DIAGNOSIS — R73.01 IMPAIRED FASTING GLUCOSE: Primary | ICD-10-CM

## 2024-05-07 DIAGNOSIS — E66.812 CLASS 2 SEVERE OBESITY DUE TO EXCESS CALORIES WITH SERIOUS COMORBIDITY IN ADULT (H): ICD-10-CM

## 2024-05-07 DIAGNOSIS — E66.01 CLASS 2 SEVERE OBESITY DUE TO EXCESS CALORIES WITH SERIOUS COMORBIDITY IN ADULT (H): ICD-10-CM

## 2024-05-07 DIAGNOSIS — E78.5 HYPERLIPIDEMIA LDL GOAL <130: ICD-10-CM

## 2024-05-07 DIAGNOSIS — M06.09 RHEUMATOID ARTHRITIS OF MULTIPLE SITES WITH NEGATIVE RHEUMATOID FACTOR (H): ICD-10-CM

## 2024-05-07 LAB
ALBUMIN SERPL BCG-MCNC: 4.1 G/DL (ref 3.5–5.2)
ALP SERPL-CCNC: 51 U/L (ref 40–150)
ALT SERPL W P-5'-P-CCNC: 17 U/L (ref 0–50)
AST SERPL W P-5'-P-CCNC: 18 U/L (ref 0–45)
BASOPHILS # BLD AUTO: 0 10E3/UL (ref 0–0.2)
BASOPHILS NFR BLD AUTO: 0 %
BILIRUB DIRECT SERPL-MCNC: 0.23 MG/DL (ref 0–0.3)
BILIRUB SERPL-MCNC: 0.7 MG/DL
CREAT SERPL-MCNC: 0.89 MG/DL (ref 0.51–0.95)
CRP SERPL-MCNC: <3 MG/L
EGFRCR SERPLBLD CKD-EPI 2021: 65 ML/MIN/1.73M2
EOSINOPHIL # BLD AUTO: 0.1 10E3/UL (ref 0–0.7)
EOSINOPHIL NFR BLD AUTO: 2 %
ERYTHROCYTE [DISTWIDTH] IN BLOOD BY AUTOMATED COUNT: 14.8 % (ref 10–15)
ERYTHROCYTE [SEDIMENTATION RATE] IN BLOOD BY WESTERGREN METHOD: 8 MM/HR (ref 0–30)
HCT VFR BLD AUTO: 39.7 % (ref 35–47)
HGB BLD-MCNC: 12.9 G/DL (ref 11.7–15.7)
IMM GRANULOCYTES # BLD: 0 10E3/UL
IMM GRANULOCYTES NFR BLD: 1 %
LYMPHOCYTES # BLD AUTO: 1.7 10E3/UL (ref 0.8–5.3)
LYMPHOCYTES NFR BLD AUTO: 26 %
MCH RBC QN AUTO: 31.5 PG (ref 26.5–33)
MCHC RBC AUTO-ENTMCNC: 32.5 G/DL (ref 31.5–36.5)
MCV RBC AUTO: 97 FL (ref 78–100)
MONOCYTES # BLD AUTO: 0.6 10E3/UL (ref 0–1.3)
MONOCYTES NFR BLD AUTO: 8 %
NEUTROPHILS # BLD AUTO: 4.2 10E3/UL (ref 1.6–8.3)
NEUTROPHILS NFR BLD AUTO: 63 %
PLATELET # BLD AUTO: 274 10E3/UL (ref 150–450)
PROT SERPL-MCNC: 6.9 G/DL (ref 6.4–8.3)
RBC # BLD AUTO: 4.1 10E6/UL (ref 3.8–5.2)
WBC # BLD AUTO: 6.6 10E3/UL (ref 4–11)

## 2024-05-07 PROCEDURE — 85025 COMPLETE CBC W/AUTO DIFF WBC: CPT

## 2024-05-07 PROCEDURE — 85652 RBC SED RATE AUTOMATED: CPT

## 2024-05-07 PROCEDURE — 36415 COLL VENOUS BLD VENIPUNCTURE: CPT

## 2024-05-07 PROCEDURE — 86140 C-REACTIVE PROTEIN: CPT

## 2024-05-07 PROCEDURE — 82565 ASSAY OF CREATININE: CPT

## 2024-05-07 PROCEDURE — 80076 HEPATIC FUNCTION PANEL: CPT

## 2024-05-13 ENCOUNTER — NURSE TRIAGE (OUTPATIENT)
Dept: FAMILY MEDICINE | Facility: CLINIC | Age: 79
End: 2024-05-13
Payer: MEDICARE

## 2024-05-13 ASSESSMENT — ASTHMA QUESTIONNAIRES
QUESTION_4 LAST FOUR WEEKS HOW OFTEN HAVE YOU USED YOUR RESCUE INHALER OR NEBULIZER MEDICATION (SUCH AS ALBUTEROL): TWO OR THREE TIMES PER WEEK
QUESTION_3 LAST FOUR WEEKS HOW OFTEN DID YOUR ASTHMA SYMPTOMS (WHEEZING, COUGHING, SHORTNESS OF BREATH, CHEST TIGHTNESS OR PAIN) WAKE YOU UP AT NIGHT OR EARLIER THAN USUAL IN THE MORNING: ONCE OR TWICE
QUESTION_1 LAST FOUR WEEKS HOW MUCH OF THE TIME DID YOUR ASTHMA KEEP YOU FROM GETTING AS MUCH DONE AT WORK, SCHOOL OR AT HOME: SOME OF THE TIME
QUESTION_5 LAST FOUR WEEKS HOW WOULD YOU RATE YOUR ASTHMA CONTROL: SOMEWHAT CONTROLLED
ACT_TOTALSCORE: 14
QUESTION_2 LAST FOUR WEEKS HOW OFTEN HAVE YOU HAD SHORTNESS OF BREATH: MORE THAN ONCE A DAY
ACT_TOTALSCORE: 14

## 2024-05-13 NOTE — TELEPHONE ENCOUNTER
"Scheduled same day slot tomorrow.    Reason for Disposition    Longstanding difficulty breathing and not responding to usual therapy    Additional Information    Negative: SEVERE difficulty breathing (e.g., struggling for each breath, speaks in single words, pulse > 120)    Negative: Breathing stopped and hasn't returned    Negative: Choking on something    Negative: Bluish (or gray) lips or face    Negative: Difficult to awaken or acting confused (e.g., disoriented, slurred speech)    Negative: Passed out (i.e., fainted, collapsed and was not responding)    Negative: Wheezing started suddenly after medicine, an allergic food, or bee sting    Negative: Stridor (harsh sound while breathing in)    Negative: Slow, shallow and weak breathing    Negative: Sounds like a life-threatening emergency to the triager    Negative: Chest pain    Negative: Wheezing (high pitched whistling sound) and previous asthma attacks or use of asthma medicines    Negative: Difficulty breathing and within 14 days of COVID-19 Exposure    Negative: Difficulty breathing and only present when coughing    Negative: Difficulty breathing and only from stuffy nose    Negative: Difficulty breathing and only from stuffy nose or runny nose from common cold    Negative: MODERATE difficulty breathing (e.g., speaks in phrases, SOB even at rest, pulse 100-120) of new-onset or worse than normal    Negative: Oxygen level (e.g., pulse oximetry) 90% or lower    Negative: Wheezing can be heard across the room    Negative: Drooling or spitting out saliva (because can't swallow)    Negative: Any history of prior \"blood clot\" in leg or lungs    Negative: Illness requiring prolonged bedrest in past month (e.g., immobilization, long hospital stay)    Negative: Hip or leg fracture (broken bone) in past month (or had cast on leg or ankle in past month)    Negative: Major surgery in the past month    Negative: Long-distance travel in past month (e.g., car, bus, train, " "plane; with trip lasting 6 or more hours)    Negative: Cancer treatment in past six months (or has cancer now)    Negative: Extra heartbeats, irregular heart beating, or heart is beating very fast (i.e., 'palpitations')    Negative: Fever > 103 F (39.4 C)    Negative: Fever > 101 F (38.3 C) and over 60 years of age    Negative: Fever > 100.0 F (37.8 C) and bedridden (e.g., nursing home patient, stroke, chronic illness, recovering from surgery)    Negative: Fever > 100.0 F (37.8 C) and diabetes mellitus or weak immune system (e.g., HIV positive, cancer chemo, splenectomy, organ transplant, chronic steroids)    Negative: Periods where breathing stops and then resumes normally and bedridden (e.g., nursing home patient, CVA)    Negative: Pregnant or postpartum (from 0 to 6 weeks after delivery)    Negative: Patient sounds very sick or weak to the triager    Answer Assessment - Initial Assessment Questions  1. RESPIRATORY STATUS: \"Describe your breathing?\" (e.g., wheezing, shortness of breath, unable to speak, severe coughing)       Shortness of breath with exertion  2. ONSET: \"When did this breathing problem begin?\"       2 weeks   3. PATTERN \"Does the difficult breathing come and go, or has it been constant since it started?\"       Occurs daily  4. SEVERITY: \"How bad is your breathing?\" (e.g., mild, moderate, severe)     - MILD: No SOB at rest, mild SOB with walking, speaks normally in sentences, can lie down, no retractions, pulse < 100.     - MODERATE: SOB at rest, SOB with minimal exertion and prefers to sit, cannot lie down flat, speaks in phrases, mild retractions, audible wheezing, pulse 100-120.     - SEVERE: Very SOB at rest, speaks in single words, struggling to breathe, sitting hunched forward, retractions, pulse > 120       None at rest - mild and not responding to asthma medications  5. RECURRENT SYMPTOM: \"Have you had difficulty breathing before?\" If Yes, ask: \"When was the last time?\" and \"What happened " "that time?\"       Previously treated with prednisone burst  6. CARDIAC HISTORY: \"Do you have any history of heart disease?\" (e.g., heart attack, angina, bypass surgery, angioplasty)       no  7. LUNG HISTORY: \"Do you have any history of lung disease?\"  (e.g., pulmonary embolus, asthma, emphysema)      asthma  8. CAUSE: \"What do you think is causing the breathing problem?\"       unsure  9. OTHER SYMPTOMS: \"Do you have any other symptoms? (e.g., dizziness, runny nose, cough, chest pain, fever)      none  10. O2 SATURATION MONITOR:  \"Do you use an oxygen saturation monitor (pulse oximeter) at home?\" If Yes, ask: \"What is your reading (oxygen level) today?\" \"What is your usual oxygen saturation reading?\" (e.g., 95%)        deferred  11. PREGNANCY: \"Is there any chance you are pregnant?\" \"When was your last menstrual period?\"        no  12. TRAVEL: \"Have you traveled out of the country in the last month?\" (e.g., travel history, exposures)        no    Protocols used: Breathing Difficulty-A-[place]    "

## 2024-05-14 ENCOUNTER — OFFICE VISIT (OUTPATIENT)
Dept: FAMILY MEDICINE | Facility: CLINIC | Age: 79
End: 2024-05-14
Payer: MEDICARE

## 2024-05-14 VITALS
OXYGEN SATURATION: 98 % | HEIGHT: 61 IN | BODY MASS INDEX: 37.81 KG/M2 | WEIGHT: 200.25 LBS | TEMPERATURE: 98 F | HEART RATE: 123 BPM | SYSTOLIC BLOOD PRESSURE: 128 MMHG | RESPIRATION RATE: 16 BRPM | DIASTOLIC BLOOD PRESSURE: 68 MMHG

## 2024-05-14 DIAGNOSIS — R06.09 DYSPNEA ON EXERTION: ICD-10-CM

## 2024-05-14 DIAGNOSIS — I65.23 BILATERAL CAROTID ARTERY STENOSIS: ICD-10-CM

## 2024-05-14 DIAGNOSIS — J45.40 MODERATE PERSISTENT ASTHMA, UNSPECIFIED WHETHER COMPLICATED: Primary | ICD-10-CM

## 2024-05-14 DIAGNOSIS — I10 HYPERTENSION GOAL BP (BLOOD PRESSURE) < 140/90: ICD-10-CM

## 2024-05-14 DIAGNOSIS — I48.0 PAROXYSMAL A-FIB (H): ICD-10-CM

## 2024-05-14 PROCEDURE — 99214 OFFICE O/P EST MOD 30 MIN: CPT | Performed by: FAMILY MEDICINE

## 2024-05-14 RX ORDER — FLUTICASONE PROPIONATE 110 UG/1
1 AEROSOL, METERED RESPIRATORY (INHALATION) 2 TIMES DAILY
Qty: 12 G | Refills: 1 | Status: SHIPPED | OUTPATIENT
Start: 2024-05-14 | End: 2024-06-21

## 2024-05-14 RX ORDER — RESPIRATORY SYNCYTIAL VIRUS VACCINE 120MCG/0.5
0.5 KIT INTRAMUSCULAR ONCE
Qty: 1 EACH | Refills: 0 | Status: CANCELLED | OUTPATIENT
Start: 2024-05-14 | End: 2024-05-14

## 2024-05-14 ASSESSMENT — ENCOUNTER SYMPTOMS: SHORTNESS OF BREATH: 1

## 2024-05-14 ASSESSMENT — PAIN SCALES - GENERAL: PAINLEVEL: NO PAIN (0)

## 2024-05-14 NOTE — PROGRESS NOTES
"      Ian Petty is a 79 year old, presenting for the following health issues:  Shortness of Breath        5/14/2024    11:45 AM   Additional Questions   Roomed by Eunice Montez     History of Present Illness       Reason for visit:  SOB and fatigue  Symptom onset:  3-4 weeks ago  Symptoms include:  Feel SOB after walking less than 25 ft.  Symptom intensity:  Moderate  Symptom progression:  Staying the same  Had these symptoms before:  No  What makes it worse:  Not really  What makes it better:  Resting or sitting.    She eats 2-3 servings of fruits and vegetables daily.She consumes 0 sweetened beverage(s) daily.She exercises with enough effort to increase her heart rate 10 to 19 minutes per day.  She exercises with enough effort to increase her heart rate 3 or less days per week.   She is taking medications regularly.           Stroke  about 5-7 years ago    Main concern  is breathing    Limited activities    Worked in allergy/  asthma for years    On 5 mg pred since Nov    But now off pred for a month    On methotrexate per rheumatology    Not using albuterol much    Helps when  needed     About 3x per week    Limitinig  activity due to breathing    Taking fexofenadinie during this allergy season              Objective    /68 (BP Location: Right arm, Patient Position: Chair, Cuff Size: Adult Regular)   Pulse (!) 123   Temp 98  F (36.7  C) (Temporal)   Resp 16   Ht 1.556 m (5' 1.25\")   Wt 90.8 kg (200 lb 4 oz)   SpO2 98%   BMI 37.53 kg/m    Body mass index is 37.53 kg/m .  Physical Exam  Constitutional:       Appearance: She is well-developed.   HENT:      Head: Normocephalic and atraumatic.   Eyes:      Conjunctiva/sclera: Conjunctivae normal.   Neck:      Vascular: No carotid bruit.   Cardiovascular:      Rate and Rhythm: Normal rate.      Heart sounds: Normal heart sounds.      Comments: Irregularly irregular   Pulmonary:      Effort: Pulmonary effort is normal. No respiratory distress.      " Breath sounds: Wheezing (scattered wheezes present agnieszka anteriorly) present.   Neurological:      Mental Status: She is alert and oriented to person, place, and time.      No edema    Radials symmetric        ASSESSMENT / PLAN:  (J45.40) Moderate persistent asthma, unspecified whether complicated  (primary encounter diagnosis)  Comment: add inhaled steroid on regular basis.  Of note, went off the low dose prednisone about a month ago.  Breathing worse the last few weeks.  Continue the prn albuterol.   Plan: fluticasone (FLOVENT HFA) 110 MCG/ACT inhaler             (R06.09) Dyspnea on exertion  Comment: coronary artery disease another possible contributor to symptoms.  Patient to schedule lexiscan.    Plan: NM Lexiscan stress test             (I10) Hypertension goal BP (blood pressure) < 140/90  Comment: blood pressure okay   Plan: no change     (I48.0) Paroxysmal A-fib (H)  Comment: on DOAC and rate okay   Plan: as above     (I65.23) Bilateral carotid artery stenosis  Comment: had recent mra, stenosis present but not severe  Plan: as above       I reviewed the patient's medications, allergies, medical history, family history, and social history.    Be seen promptly if symptoms acutely worsen     Wallace Mixon MD          Signed Electronically by: Wallace Mixon MD

## 2024-05-14 NOTE — PATIENT INSTRUCTIONS
Add the inhaled steroid on regular basis    Use albuterol as needed    Continue fexofenadine    Schedule heart stress test     Be seen promptly if symptoms acutely worsen

## 2024-05-17 ENCOUNTER — TRANSFERRED RECORDS (OUTPATIENT)
Dept: HEALTH INFORMATION MANAGEMENT | Facility: CLINIC | Age: 79
End: 2024-05-17
Payer: MEDICARE

## 2024-06-03 ENCOUNTER — HOSPITAL ENCOUNTER (OUTPATIENT)
Dept: CARDIOLOGY | Facility: CLINIC | Age: 79
Discharge: HOME | End: 2024-06-03
Attending: FAMILY MEDICINE
Payer: MEDICARE

## 2024-06-03 ENCOUNTER — HOSPITAL ENCOUNTER (OUTPATIENT)
Dept: NUCLEAR MEDICINE | Facility: CLINIC | Age: 79
Setting detail: NUCLEAR MEDICINE
Discharge: HOME | End: 2024-06-03
Attending: FAMILY MEDICINE
Payer: MEDICARE

## 2024-06-03 DIAGNOSIS — R06.09 DYSPNEA ON EXERTION: ICD-10-CM

## 2024-06-03 LAB
CV STRESS MAX HR HE: 162
RATE PRESSURE PRODUCT: NORMAL
STRESS ECHO BASELINE DIASTOLIC HE: 95
STRESS ECHO BASELINE HR: 127 BPM
STRESS ECHO BASELINE SYSTOLIC BP: 116
STRESS ECHO CALCULATED PERCENT HR: 116 %
STRESS ECHO LAST STRESS DIASTOLIC BP: 96
STRESS ECHO LAST STRESS SYSTOLIC BP: 119
STRESS ECHO TARGET HR: 140

## 2024-06-03 PROCEDURE — 343N000001 HC RX 343: Performed by: FAMILY MEDICINE

## 2024-06-03 PROCEDURE — G1010 CDSM STANSON: HCPCS | Performed by: INTERNAL MEDICINE

## 2024-06-03 PROCEDURE — 93016 CV STRESS TEST SUPVJ ONLY: CPT | Performed by: INTERNAL MEDICINE

## 2024-06-03 PROCEDURE — 93018 CV STRESS TEST I&R ONLY: CPT | Performed by: INTERNAL MEDICINE

## 2024-06-03 PROCEDURE — A9502 TC99M TETROFOSMIN: HCPCS | Performed by: FAMILY MEDICINE

## 2024-06-03 PROCEDURE — 78452 HT MUSCLE IMAGE SPECT MULT: CPT | Mod: 26 | Performed by: INTERNAL MEDICINE

## 2024-06-03 PROCEDURE — 78452 HT MUSCLE IMAGE SPECT MULT: CPT | Mod: ME

## 2024-06-03 PROCEDURE — 93017 CV STRESS TEST TRACING ONLY: CPT

## 2024-06-03 PROCEDURE — 250N000011 HC RX IP 250 OP 636: Performed by: INTERNAL MEDICINE

## 2024-06-03 RX ORDER — AMINOPHYLLINE 25 MG/ML
50-100 INJECTION, SOLUTION INTRAVENOUS
Status: DISCONTINUED | OUTPATIENT
Start: 2024-06-03 | End: 2024-06-04 | Stop reason: HOSPADM

## 2024-06-03 RX ORDER — REGADENOSON 0.08 MG/ML
0.4 INJECTION, SOLUTION INTRAVENOUS ONCE
Status: COMPLETED | OUTPATIENT
Start: 2024-06-03 | End: 2024-06-03

## 2024-06-03 RX ORDER — CAFFEINE CITRATE 20 MG/ML
60 SOLUTION INTRAVENOUS
Status: DISCONTINUED | OUTPATIENT
Start: 2024-06-03 | End: 2024-06-04 | Stop reason: HOSPADM

## 2024-06-03 RX ORDER — ACYCLOVIR 200 MG/1
0-1 CAPSULE ORAL
Status: DISCONTINUED | OUTPATIENT
Start: 2024-06-03 | End: 2024-06-04 | Stop reason: HOSPADM

## 2024-06-03 RX ORDER — ALBUTEROL SULFATE 90 UG/1
2 AEROSOL, METERED RESPIRATORY (INHALATION) EVERY 5 MIN PRN
Status: DISCONTINUED | OUTPATIENT
Start: 2024-06-03 | End: 2024-06-04 | Stop reason: HOSPADM

## 2024-06-03 RX ADMIN — TETROFOSMIN 10.1 MILLICURIE: 1.38 INJECTION, POWDER, LYOPHILIZED, FOR SOLUTION INTRAVENOUS at 09:45

## 2024-06-03 RX ADMIN — TETROFOSMIN 41.2 MILLICURIE: 1.38 INJECTION, POWDER, LYOPHILIZED, FOR SOLUTION INTRAVENOUS at 10:55

## 2024-06-03 RX ADMIN — REGADENOSON 0.4 MG: 0.08 INJECTION, SOLUTION INTRAVENOUS at 10:52

## 2024-06-03 NOTE — PROGRESS NOTES
Pt here for Lexiscan nuclear stress test. Medication and side effects reviewed with patient. Lung sounds clear to auscultation bilaterally. Pt is in afib with RVR (HR is 130s-150s). OK per Dr. Church to proceed. Denied caffeine use. Patient tolerated Lexiscan dose without any adverse reactions. VSS. Monitored post injection and then taken to nuclear medicine for follow up imaging.

## 2024-06-12 NOTE — PROGRESS NOTES
I am delighted to see Alfreda Baxter as a new patient in cardiology clinic for evaluation of atrial fibrillation.     History of Present Illness:  The patient is a 79 year old  female with central retinal artery occlusion in August 2018, s/p ILR implant where paroxysmal atrial fibrillation was discovered. She has been on anticoagulation since then. ILR reached battery depletion in June 2022 and she elected to leave it in place. At  baseline she is moderately active, takes care of her house, garden, , limited by back pain.     She tells me that she started feeling short of breath around Mother's Day, when she was working in her garden and could not continue due to shortness of breath. She's not sleeping well, and has occasional nausea. No chest discomfort. No lightheadedness or syncope. She was seen by PCP 5/14/2024, heart rate was documented to be 123 bpm, exam noted irregularly irregular heart rate, no ECGs done. She was treated for possible asthma exacerbation, and a nuclear stress test was order to determine if ischemia may be a cause of shortness of breath. She presented for a nuclear lexiscan stress and was noted to be in AF 130s-150s. Nuclear stress test showed no ischemia.       Past Medical History:  Atrial fibrillation, paroxysmal, discovered on ILR  Central retinal artery occlusion August 2018, blind left eye  ILR implant Dec 2018; reached RRT 6/6/2022  Hypertension  Hyperlipidemia      Medications:   Apixaban 5 bid  Atorvastatin 20 every day  Lisinopril 10 every day    Methotrexate 20 qweek  Eye drops  Inhalers    Allergies:    Allergies   Allergen Reactions    No Clinical Screening - See Comments Other (See Comments)     senisitive to non steroidals  Aleve, ibuprofen celebrex cause bad stomach pains  senisitive to non steroidals  Aleve, ibuprofen celebrex cause bad stomach pains    Oxycodone Hives     Other reaction(s): Unknown  ... With facial swelling    Nsaids Nausea and Vomiting     Other  reaction(s): Abdominal Pain       Physical examination  Vitals: 95/56,  (repeat BP manually by me, same)  BMI= 37.9 (202 lbs; on 3/29/2024 weight was 193 lbs)    Constitutional: In general, the patient is a pleasant female in no acute distress but in some discomfort.  Cardiovascular: Carotids +2/2 bilaterally without bruits.  Jugular venous distension slightly up ~ 10 cm. Irregularly irregular. Normal S1, S2. No murmur, rub, click, or gallop.   Extremities: Pulses are normal bilaterally throughout. 1-2+ ankle peripheral edema bilaterally  Respiratory: Clear to asculation.  No ronchi, wheezes, rales.  No dullness to percussion.       I have personally and independently reviewed the following:  Labs:   5/7/2024: cr 0.89, hgb 12.9, plt 274K  2/13/2024: TSH 0.76    Echo:  11/14/2022: EF 55-60%, normal RV, severe MAC with moderate MR, mitral gradient 3 mmHg, NEW 24.6    Nuclear lexiscan stress test 6/3/2024:  EF 69%, no ischemia    EKG:  TODAY 6/13/2024:  bpm   2/13/2024: sinus 82 bpm, PRWP    Assessment :  Atrial fibrillation, persistent since at least Mother's Day. She is symptomatic with shortness of breath. By exam she is volume up; baseline weight between 193-197 lbs, she is probably up by about 5 lbs. She initially stated that she does not miss any doses of apixaban, however upon further questioning she admits she misses several evening doses. Offered DIRK/CV in the next few days for symptomatic relief. I did not change meds today given her lower than normal BP, but would prefer to restore sinus rhythm as soon as possible.        Plan:  DIRK/CV asap; continue apixaban, do not miss any doses  Follow up 1 month post CV, either at [facility] with EP CARLOS or with Dr. Asif gonzales [place] whom she had seen previously.        I spent a total of 30 minutes face to face with  Alfreda Baxter during today's office visit. I have spend an additional 30 minutes today on chart review and documentation.        The patient is to  return as above . The patient understood the treatment plan as outlined above.  There were no barriers to learning.      Monica Michelle MD

## 2024-06-13 ENCOUNTER — OFFICE VISIT (OUTPATIENT)
Dept: CARDIOLOGY | Facility: CLINIC | Age: 79
End: 2024-06-13
Payer: MEDICARE

## 2024-06-13 VITALS
DIASTOLIC BLOOD PRESSURE: 56 MMHG | SYSTOLIC BLOOD PRESSURE: 95 MMHG | HEART RATE: 123 BPM | OXYGEN SATURATION: 98 % | BODY MASS INDEX: 38.19 KG/M2 | WEIGHT: 202.3 LBS | HEIGHT: 61 IN

## 2024-06-13 DIAGNOSIS — I48.19 PERSISTENT ATRIAL FIBRILLATION (H): Primary | ICD-10-CM

## 2024-06-13 DIAGNOSIS — R06.09 DYSPNEA ON EXERTION: ICD-10-CM

## 2024-06-13 PROCEDURE — 93000 ELECTROCARDIOGRAM COMPLETE: CPT | Performed by: INTERNAL MEDICINE

## 2024-06-13 PROCEDURE — 99205 OFFICE O/P NEW HI 60 MIN: CPT | Mod: 25 | Performed by: INTERNAL MEDICINE

## 2024-06-13 RX ORDER — POTASSIUM CHLORIDE 1500 MG/1
20 TABLET, EXTENDED RELEASE ORAL
Status: CANCELLED | OUTPATIENT
Start: 2024-06-13

## 2024-06-13 RX ORDER — POTASSIUM CHLORIDE 1500 MG/1
40 TABLET, EXTENDED RELEASE ORAL
Status: CANCELLED | OUTPATIENT
Start: 2024-06-13

## 2024-06-13 RX ORDER — LIDOCAINE 40 MG/G
CREAM TOPICAL
Status: CANCELLED | OUTPATIENT
Start: 2024-06-13

## 2024-06-13 ASSESSMENT — PAIN SCALES - GENERAL: PAINLEVEL: NO PAIN (0)

## 2024-06-13 NOTE — NURSING NOTE
Med Reconcile: Reviewed and verified all current medications with the patient. The updated medication list was printed and given to the patient./No medication changes.       Return Appointment  -Follow-up in 1 year post cardioversion    EP Procedure:  -DIRK/Cardioversion scheduled 6/19/2024      You are scheduled for a Cardioversion with Transesophageal Echocardiogram (DIRK), at [facility]. The hospital is located at [address] [place] [address] on the [place] of the [place].  If you need to cancel this procedure, please call [phone].       Visitor Policy: Two visitors.    Date:__6/19/2024____  Time: ___7 AM check-in time____________To the [facility] Waiting Room at the [facility]  Cardioversion    Please do not eat anything for 8 hours prior to your procedure. You may have sips of water up until 2 hours prior to your arrival.  2. Medications to continue:  - Eliquis  - Take all meds as prescribed, except for those noted below.  3. Medications to hold: None     4. You will discharge same day. You will need a .    If you have further questions, please utilize [employer] to contact us.   If your question concerns the above instructions, contact:  Ghada Orozco, KEELEY   Electrophysiology Nurse Coordinator.  [phone]    If your question concerns the schedule/appointment times, contact:  Sherry Dunlap  EP Procedure   [phone]

## 2024-06-13 NOTE — NURSING NOTE
"Chief Complaint   Patient presents with    New Patient     New cardiology for a-fib       Initial BP 95/56 (BP Location: Right arm, Patient Position: Sitting, Cuff Size: Adult Regular)   Pulse (!) 123   Ht 1.556 m (5' 1.26\")   Wt 91.8 kg (202 lb 4.8 oz)   SpO2 98%   BMI 37.90 kg/m   Estimated body mass index is 37.9 kg/m  as calculated from the following:    Height as of this encounter: 1.556 m (5' 1.26\").    Weight as of this encounter: 91.8 kg (202 lb 4.8 oz)..  BP completed using cuff size: regular    Alison Henley, EMT  "

## 2024-06-13 NOTE — PATIENT INSTRUCTIONS
Take your medicines every day, as directed     Changes made today:  None    Schedule outpatient cardioversion next available    Keep taking Eliquis do not miss any doses    Follow up 1 month after cardioversion with EP CARLOS at [facility] or Dr. Gold at [place]       Cardiology Care Coordinators:      Sofie MOULTON RN     Cardiology Rooming staff:  Alison TURNER CNA    Phone  [phone]      Fax [phone]    To Contact us     During Business Hours:  [phone]     If you are needing refills please contact your pharmacy.     For urgent after hour care please call the [facility] at [phone] or the [facility] at [phone] and ask to speak to the cardiologist on call.            HOW TO CHECK YOUR BLOOD PRESSURE AT HOME:     Avoid eating, smoking, and exercising for at least 30 minutes before taking a reading.     Be sure you have taken your BP medication at least 2-3 hours before you check it.      Sit quietly for 10 minutes before a reading.      Sit in a chair with your feet flat on the floor. Rest your  arm on a table so that the arm cuff is at the same level as your heart.     Remain still during the reading.  Record your blood pressure and pulse in a log and bring to your next appointment.       Use [employer] allows you to communicate directly with your heart team through secure messaging.  [employer] can be accessed any time on your phone, computer, or tablet.  If you need assistance, we'd be happy to help!             Keep your Heart Appointments:

## 2024-06-14 LAB
ATRIAL RATE - MUSE: NORMAL BPM
DIASTOLIC BLOOD PRESSURE - MUSE: NORMAL MMHG
INTERPRETATION ECG - MUSE: NORMAL
P AXIS - MUSE: NORMAL DEGREES
PR INTERVAL - MUSE: NORMAL MS
QRS DURATION - MUSE: 80 MS
QT - MUSE: 304 MS
QTC - MUSE: 431 MS
R AXIS - MUSE: -63 DEGREES
SYSTOLIC BLOOD PRESSURE - MUSE: NORMAL MMHG
T AXIS - MUSE: 33 DEGREES
VENTRICULAR RATE- MUSE: 121 BPM

## 2024-06-18 ENCOUNTER — OFFICE VISIT (OUTPATIENT)
Dept: RHEUMATOLOGY | Facility: CLINIC | Age: 79
End: 2024-06-18
Payer: MEDICARE

## 2024-06-18 ENCOUNTER — ANESTHESIA EVENT (OUTPATIENT)
Dept: SURGERY | Facility: CLINIC | Age: 79
DRG: 291 | End: 2024-06-18
Payer: MEDICARE

## 2024-06-18 VITALS
WEIGHT: 203 LBS | SYSTOLIC BLOOD PRESSURE: 100 MMHG | DIASTOLIC BLOOD PRESSURE: 65 MMHG | BODY MASS INDEX: 38.03 KG/M2 | OXYGEN SATURATION: 98 % | HEART RATE: 61 BPM

## 2024-06-18 DIAGNOSIS — Z79.899 HIGH RISK MEDICATION USE: ICD-10-CM

## 2024-06-18 DIAGNOSIS — M06.09 RHEUMATOID ARTHRITIS OF MULTIPLE SITES WITH NEGATIVE RHEUMATOID FACTOR (H): Primary | ICD-10-CM

## 2024-06-18 PROCEDURE — 99214 OFFICE O/P EST MOD 30 MIN: CPT | Performed by: INTERNAL MEDICINE

## 2024-06-18 PROCEDURE — G2211 COMPLEX E/M VISIT ADD ON: HCPCS | Performed by: INTERNAL MEDICINE

## 2024-06-18 RX ORDER — FOLIC ACID 1 MG/1
2 TABLET ORAL DAILY
Qty: 200 TABLET | Refills: 2 | Status: SHIPPED | OUTPATIENT
Start: 2024-06-18

## 2024-06-18 RX ORDER — METHOTREXATE 2.5 MG/1
20 TABLET ORAL
Qty: 96 TABLET | Refills: 2 | Status: SHIPPED | OUTPATIENT
Start: 2024-06-18

## 2024-06-18 NOTE — PROGRESS NOTES
"Rheumatology Clinic Visit      Alfreda Baxter MRN# [id number]   YOB: 1945 Age: 79 year old      Date of visit: 6/18/24   PCP: Dr. Mercedes Urena    Chief Complaint   Patient presents with:  RECHECK    Assessment and Plan     1.  Seronegative rheumatoid arthritis: Inflammatory arthritis symptoms affecting her MCPs, PIPs, wrists, and shoulders started in February 2023.  Establish care with me on 11/8/2023 at which point she had symmetric synovitis of the MCPs, PIPs, and wrists; and symptoms of impingement syndrome of the shoulders.  She has been following at [facility] where steroid injections of the shoulders provide benefit for no more than 10 days; and systemic steroid taper starting with 60 mg daily from her primary care provider was a \"miracle\" with resolution of MCP, PIP, wrist, and shoulder pain while on prednisone.  Methotrexate was started in November 2023.  2/15/2024: Patient reports having improvement with methotrexate but still active disease.  Prednisone was effective but higher anxiety at 20 mg daily, mild intermittent anxiety at 10 mg daily, and tolerated well at 5 mg daily.  Was able to stop prednisone completely on 5/1/2024.  Currently doing well on methotrexate monotherapy.  Chronic illness, stable.    - Continue Methotrexate 20mg once every 7 days  - Continue folic acid 2 mg daily  - Labs in mid-Aug: CBC, Creatinine, Hepatic Panel, ESR, CRP    2.  Osteoarthritis of the hands: Significant degenerative changes of the hands with large Heberden's and Alba's nodes, and squaring of the first CMC joints.  Reportedly this has been stable for many years, preceding the inflammatory arthritis symptoms started in February 2023.  Minimal symptom severity from degenerative arthritis at this time.    3.  Degenerative arthritis of the spine: Many degenerative changes that have been managed with epidural steroid injections from [facility].  She will continue following " with her spine specialist as needed.    4.  History of squamous cell carcinoma: 1 lesion removed per patient report.  She will continue following with dermatology at least once yearly for monitoring    5.  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    6.  A-fib with RVR: Planning for ablation tomorrow.  Following with cardiology.    7.  Vaccinations: Vaccinations reviewed with Ms. Baxter.  Risks and benefits of vaccinations were discussed.    - Influenza: encouraged yearly vaccination  - COVID-19: Advised keeping updated and to hold methotrexate x1-2 weeks afterward    Total minutes spent in evaluation with patient, documentation, , and review of pertinent studies and chart notes: 24  The longitudinal plan of care for the rheumatology problem(s) were addressed during this visit.  Due to added complexity of care, we will continue to support the patient and the subsequent management of this condition with ongoing continuity of care.      Ms. Baxter verbalized agreement with and understanding of the rational for the diagnosis and treatment plan.  All questions were answered to best of my ability and the patient's satisfaction. Ms. Baxter was advised to contact the clinic with any questions that may arise after the clinic visit.      Thank you for involving me in the care of the patient    Return to clinic: mid-August      [facility]   Alfreda Baxter is a 79 year old female with a past medical history significant for hypertension, hyperlipidemia, lumbar spine stenosis, paroxysmal atrial fibrillation on anticoagulation, history of CVA with left eye vision loss, insomnia, and bilateral TKA who presents for follow-up of rheumatoid arthritis.     11/8/2023: Chronic degenerative changes of her hands and spine; the degenerative changes of her hands do not bother her.  The degenerative changes of her spine are managed at [facility] where steroid injections  have been effective; she has seen a spine surgeon and is next going to see a pain management specialist at [facility].  She had been fairly stable until February 2023 when she had sudden onset bilateral shoulder pain.  This was followed by pain at the wrists, MCPs, and PIPs.  MCP, PIP, wrist, and shoulder pain is worse in the morning and improves with time and activity; morning stiffness for most of the day, reaching a baseline after about 1-2 hours.  When she received a epidural steroid injection of her lumbar spine her shoulders felt better.  She then received a high dose of prednisone starting with 60 mg daily and tapered off from her primary care clinic and while on prednisone she felt like it was a miracle drug.  She has been given other courses of prednisone and she says that she typically only starts with 40 mg a day and that that is very effective also.  Reduced  strength.  History of bilateral TKA.  Knees, ankles, and feet without any issues.  She notes that her son has ulcerative colitis and that her son's cousins on his father side have psoriasis; no other autoimmune disease on her side of the family.  Squamous of carcinoma x1; following with dermatology regularly.  History of CVA 5 years ago with loss of vision in the left eye.  No history of scalp tenderness, jaw or tongue claudication, or new headaches now around the time of the vision loss.    2/15/2024: Prednisone was effective but higher anxiety at 20 mg daily, mild intermittent anxiety at 10 mg daily, and tolerated well at 5 mg daily. Prednisone works well for arthritis.  Methotrexate has been helpful; held methotrexate for several doses because of infections.  Still with pain/swelling/stiffness at the MCPs, PIPs, wrists, but all improved with methotrexate.  She would like to continue on methotrexate monotherapy for now to see if a longer duration will be helpful.  She has several questions about rheumatoid arthritis and  methotrexate that were discussed today.  Occasional oral sore since starting methotrexate.  No nasal sores.    Today, 6/18/2024: Doing well on methotrexate.  Was able to stop prednisone on 5/1/2024.  No joint pain or swelling.  Morning stiffness for less than 10 minutes.  Arthritis not limiting her daily activities.  Since last seen she developed shortness of breath and was found to have A-fib with RVR; following with cardiology and has planned ablation tomorrow.    Tobacco: Former cigarette smoker  EtOH: None  Drugs: None  Occupation: retired nurse    ROS   12 point review of system was completed and negative except as noted in the HPI     Active Problem List     Patient Active Problem List   Diagnosis    DJD (degenerative joint disease)    Hypertension goal BP (blood pressure) < 140/90    Hyperlipidemia LDL goal <70    Lumbar spinal stenosis    Mild intermittent asthma without complication    History of bilateral knee replacement    Primary insomnia    Posterior vitreous detachment of both eyes    PFO (patent foramen ovale)    Neovascular glaucoma of left eye, moderate stage    Blind left eye    Paroxysmal A-fib (H)    Ocular hypertension of right eye, treated    History of central retinal artery occlusion, os    Recurrent cerebrovascular accidents (CVAs) (H)    Disturbances of vision, late effect of stroke    Combined forms of age-related cataract, mild-mod, of left eye    Pseudophakia, od    Low bone mass    Class 2 severe obesity due to excess calories with serious comorbidity in adult (H)    Cervicalgia     Past Medical History     Past Medical History:   Diagnosis Date    Acute idiopathic gout of left foot 11/04/2015    Adenomatous colon polyp 06/05/2013    Colonoscopy due 2025    Asthma     controlled with inhaler    Blind left eye 07/08/2019    Due to central retinal occlusion    Cataract     Central retinal artery occlusion of left eye 09/15/2018    Disturbances of vision, late effect of stroke 12/03/2022     DJD (degenerative joint disease)     knees    GERD (gastroesophageal reflux disease)     Glaucoma     Gout     History of central retinal artery occlusion, os 2020    HTN (hypertension)     Hyperlipidemia LDL goal <70 2010    Low bone mass     Lumbar spinal stenosis 2011    Sees Dr Sage    Mild persistent asthma 2013    Neovascular glaucoma of left eye, moderate stage 2019    Ocular hypertension of right eye, treated 2019    Paroxysmal A-fib (H) 2019    Per loop recorder, 2018    On Rivaroxaban.   H/o R central retinal artery occlusion    Paroxysmal ventricular tachycardia (H) 2018    Added automatically from request for surgery [id number]    PFO (patent foramen ovale)     Recurrent cerebrovascular accidents (CVAs) (H) 2022    Recurrent cerebrovascular accidents (CVAs) (H) 2022    Transient global amnesia 2022     Past Surgical History     Past Surgical History:   Procedure Laterality Date    APPENDECTOMY      age 7 yrs.    BREAST BIOPSY, RT/LT  2004    left benign    CATARACT IOL, RT/LT       SECTION      x 2    COLONOSCOPY  2013    needed q 3 yrs.    COLONOSCOPY N/A 09/10/2020    Procedure: Colonoscopy, With Polypectomy And Biopsy;  Surgeon: Brian Cleaning MD;  Location: [facility] OR    COLONOSCOPY WITH CO2 INSUFFLATION N/A 2016    Procedure: COLONOSCOPY WITH CO2 INSUFFLATION;  Surgeon: Brian Cleaning MD;  Location: [facility] OR    COLONOSCOPY WITH CO2 INSUFFLATION N/A 09/10/2020    Procedure: COLONOSCOPY, WITH CO2 INSUFFLATION;  Surgeon: Brian Cleaning MD;  Location: [facility] OR    EP COMPREHENSIVE EP STUDY N/A 2018    Procedure: LOOP RECORDER;  Surgeon: Buck Butterfield MD;  Location:  [facility]    HYSTERECTOMY, POORNIMA      bleeding fibroids    ORTHOPEDIC SURGERY      meniscus repair    PHACOEMULSIFICATION WITH STANDARD INTRAOCULAR LENS IMPLANT Right 10/24/2022    Procedure: COMPLE RIGHT EYE  PHACOEMULSIFICATION, CATARACT, WITH STANDARD INTRAOCULAR LENS IMPLANT INSERTION;  Surgeon: Ezequiel Florence MD;  Location: [facility] TOTAL KNEE ARTHROPLASTY Left 11/2015    at [facility]     Allergy     Allergies   Allergen Reactions    No Clinical Screening - See Comments Other (See Comments)     senisitive to non steroidals  Aleve, ibuprofen celebrex cause bad stomach pains  senisitive to non steroidals  Aleve, ibuprofen celebrex cause bad stomach pains    Oxycodone Hives     Other reaction(s): Unknown  ... With facial swelling    Nsaids Nausea and Vomiting     Other reaction(s): Abdominal Pain     Current Medication List     Current Outpatient Medications   Medication Sig Dispense Refill    acetaminophen (TYLENOL) 500 MG tablet Take 500-1,000 mg by mouth every 6 hours as needed.      albuterol (PROAIR HFA/PROVENTIL HFA/VENTOLIN HFA) 108 (90 Base) MCG/ACT inhaler Inhale 2 puffs into the lungs every 6 hours as needed for shortness of breath or wheezing 18 g 11    apixaban ANTICOAGULANT (ELIQUIS) 5 MG tablet Take 1 tablet (5 mg) by mouth 2 times daily 180 tablet 3    atorvastatin (LIPITOR) 20 MG tablet Take 1 tablet (20 mg) by mouth daily 90 tablet 3    B-COMPLEX OR Take  by mouth daily.      CALCIUM 500 +D OR one daily      fluticasone (ARNUITY ELLIPTA) 100 MCG/ACT inhaler Inhale 1 puff into the lungs daily 30 each 1    fluticasone (FLOVENT HFA) 110 MCG/ACT inhaler Inhale 1 puff into the lungs 2 times daily 12 g 1    folic acid (FOLVITE) 1 MG tablet Take 2 tablets (2 mg) by mouth daily 200 tablet 1    hypromellose (ARTIFICIAL TEARS) 0.5 % SOLN ophthalmic solution Place 1 drop into both eyes 3 times daily      latanoprost (XALATAN) 0.005 % ophthalmic solution Place 1 drop into both eyes At Bedtime 7.5 mL 3    lisinopril (ZESTRIL) 10 MG tablet Take 1 tablet (10 mg) by mouth daily 90 tablet 3    methotrexate 2.5 MG tablet Take 8 tablets (20 mg) by mouth every 7 days . Methotrexate is a once weekly  "medication, taken on the same day of each week. 96 tablet 1    multivitamin (OCUVITE) TABS tablet Take 1 tablet by mouth daily      predniSONE (DELTASONE) 5 MG tablet Take 1 tablet (5 mg) by mouth daily as needed for rheumatoid arthritis symptoms 90 tablet 1    timolol maleate (TIMOPTIC) 0.5 % ophthalmic solution Place 1 drop Into the left eye every morning 10 mL 3    zolpidem (AMBIEN) 5 MG tablet TAKE ONE TABLET BY MOUTH IN THE EVENING AS NEEDED FOR SLEEP (Patient not taking: Reported on 6/13/2024) 30 tablet 5     No current facility-administered medications for this visit.     Social History   See HPI    Family History     Family History   Problem Relation Age of Onset    Arthritis Mother     Cancer Mother     Hypertension Mother     Other Cancer Mother     Thyroid Disease Mother     Respiratory Father     Glaucoma Father     Asthma Father     Allergies Sister     Eye Disorder Sister     Lipids Sister     Neurologic Disorder Sister     Osteoporosis Sister     Glaucoma Sister     Hypertension Sister     Macular Degeneration Sister     Hyperlipidemia Sister     Hypertension Sister     Osteoporosis Sister     Gastrointestinal Disease Son         Ulcerative Colitis    Glaucoma Other      Son: Ulcerative colitis, psoriatic arthritis, psoriasis.  Her son's paternal cousins have psoriasis    Physical Exam     Temp Readings from Last 3 Encounters:   05/14/24 98  F (36.7  C) (Temporal)   03/29/24 97.9  F (36.6  C) (Oral)   02/13/24 98.3  F (36.8  C) (Oral)     BP Readings from Last 5 Encounters:   06/18/24 100/65   06/13/24 95/56   05/14/24 128/68   03/29/24 130/80   02/15/24 135/84     Pulse Readings from Last 1 Encounters:   06/18/24 61     Resp Readings from Last 1 Encounters:   05/14/24 16     Estimated body mass index is 38.03 kg/m  as calculated from the following:    Height as of 6/13/24: 1.556 m (5' 1.26\").    Weight as of this encounter: 92.1 kg (203 lb).    GEN: NAD. Healthy appearing adult.   HEENT:  Anicteric, " noninjected sclera. No obvious external lesions of the ear and nose. Hearing intact.  CV: S1, S2.  Irregularly irregular rhythm; pulse of 68.   PULM: No increased work of breathing. CTA bilaterally   MSK: MCP and PIP without swelling or tenderness to palpation.   Large Heberden's and Alba's nodes present.  DIPs nontender to palpation.  Wrists with synovial swelling but no tenderness to palpation.  Elbows without swelling or tenderness to palpation.  Shoulders nontender to palpation and without swelling.  Knees, ankles, and MTPs without swelling or tenderness to palpation.    LYMPH: 2+ pitting edema of the bilateral lower extremities  SKIN: No rash or jaundice seen  PSYCH: Alert. Appropriate.      Labs / Imaging (select studies)     RF/CCP  Recent Labs   Lab Test 03/08/23  1413   CCPIGG 0.7   RHF <7     CBC  Recent Labs   Lab Test 05/07/24  1031 02/13/24  1114 01/16/24  0826 07/20/21  1248 11/26/20  1141 10/15/20  1108 07/24/20  0829 08/10/19  1410   WBC 6.6 7.5 8.8   < > 5.0   < > 5.7 18.6*   RBC 4.10 3.85 4.43   < > 4.74   < > 4.84 4.31   HGB 12.9 12.1 13.3   < > 13.4   < > 14.3 12.9   HCT 39.7 36.8 42.9   < > 43.1   < > 43.4 40.0   MCV 97 96 97   < > 91   < > 90 93   RDW 14.8 14.6 15.4*   < > 12.5   < > 13.5 13.8    246 278   < > 338   < > 246 252   MCH 31.5 31.4 30.0   < > 28.3   < > 29.5 29.9   MCHC 32.5 32.9 31.0*   < > 31.1*   < > 32.9 32.3   NEUTROPHIL 63 76 52   < > 66.6  --  48.3 85.6   LYMPH 26 15 38   < > 25.2  --  41.3 9.7   MONOCYTE 8 7 9   < > 7.0  --  7.9 4.6   EOSINOPHIL 2 1 1   < > 0.4  --  2.3 0.0   BASOPHIL 0 0 0   < > 0.6  --  0.2 0.1   ANEU  --   --   --   --  3.3  --  2.7 16.0*   ALYM  --   --   --   --  1.3  --  2.3 1.8   MANINDER  --   --   --   --  0.4  --  0.5 0.9   AEOS  --   --   --   --  0.0  --  0.1 0.0   ABAS  --   --   --   --  0.0  --  0.0 0.0   ANEUTAUTO 4.2 5.7 4.5   < >  --   --   --   --    ALYMPAUTO 1.7 1.1 3.3   < >  --   --   --   --    AMONOAUTO 0.6 0.5 0.8   < >  --    --   --   --    AEOSAUTO 0.1 0.1 0.1   < >  --   --   --   --    ABSBASO 0.0 0.0 0.0   < >  --   --   --   --     < > = values in this interval not displayed.     CMP  Recent Labs   Lab Test 05/07/24  1031 02/13/24  1114 01/16/24  0826 11/08/23  1242 03/08/23  1413 11/15/22  0632 07/27/21  1004 11/26/20  1141 10/15/20  1108 07/24/20  1013   NA  --  142  --   --  137 137   < > 138 141 143   POTASSIUM  --  3.8  --   --  4.3 3.8   < > 3.8 4.6 4.1   CHLORIDE  --  107  --   --  106 103   < > 104 106 108   CO2  --  24  --   --  27 23   < > 29 33* 26   ANIONGAP  --  11  --   --  4 11   < > 6 2* 9   GLC  --  123*  --   --  103* 95   < > 114* 100* 99   BUN  --  15.4  --   --  22 16.4   < > 16 24 19   CR 0.89 0.70 0.89   < > 0.81 0.63   < > 0.70 0.70 0.85   GFRESTIMATED 65 87 66   < > 74 90   < > 84 84 66   GFRESTBLACK  --   --   --   --   --   --   --  >90 >90 77   RUDOLPH  --  9.2  --   --  9.7 8.9   < > 10.0 9.6 9.1   BILITOTAL 0.7 0.5 0.5   < > 0.6 0.5   < > 0.5  --   --    ALBUMIN 4.1 3.7 4.0   < > 3.8 3.7   < > 3.6  --   --    PROTTOTAL 6.9 6.6 6.9   < > 8.1 6.6   < > 8.6  --   --    ALKPHOS 51 52 42   < > 69 62   < > 84  --   --    AST 18 20 21   < > 18 20   < > 9  --   --    ALT 17 9 16   < > 20 7*   < > 16  --  22    < > = values in this interval not displayed.     Calcium/VitaminD  Recent Labs   Lab Test 02/13/24  1114 03/08/23  1413 11/15/22  0632 12/31/18  1043 06/25/18  0834   RUDOLPH 9.2 9.7 8.9   < > 9.4   VITDT  --   --   --   --  22    < > = values in this interval not displayed.     ESR/CRP  Recent Labs   Lab Test 05/07/24  1031 02/13/24  1114 11/08/23  1242 03/08/23  1413 07/27/21  1004   SED 8 24 48* 18 9   CRP  --   --   --  13.4* <2.9   CRPI <3.00 8.84* 29.20*  --   --      Lipid Panel  Recent Labs   Lab Test 11/14/22  1536 08/24/22  1057 07/27/21  1004   CHOL 157 157 179   TRIG 142 168* 195*   HDL 55 55 73   LDL 74 68 67   NHDL 102 102 106     Hepatitis B  Recent Labs   Lab Test 11/08/23  1242   AUSAB 0.89    HBCAB Nonreactive   HEPBANG Nonreactive     Hepatitis C  Recent Labs   Lab Test 11/08/23  1242 12/03/21  1538   HCVAB Nonreactive Nonreactive     Lyme ab screening  Recent Labs   Lab Test 11/08/23  1242   LYMEGM 0.03       Immunization History     Immunization History   Administered Date(s) Administered    COVID-19 Bivalent 12+ (Pfizer) 12/12/2022    COVID-19 MONOVALENT 12+ (Pfizer) 03/10/2021, 03/31/2021, 12/07/2021    Influenza (High Dose) 3 valent vaccine 10/21/2016, 11/16/2017, 10/16/2019, 10/10/2022    Influenza (IIV3) PF 11/29/2006, 11/02/2012, 11/01/2013, 11/07/2014, 10/20/2015    Influenza Vaccine 18-64 (Flublok) 10/19/2021    Influenza Vaccine 65+ (FLUAD) 10/19/2021, 10/10/2022, 11/06/2023    Influenza Vaccine 65+ (Fluzone HD) 10/13/2020    Pneumo Conj 13-V (2010&after) 06/28/2018    Pneumococcal 23 valent 11/29/2006, 07/27/2020    TD,PF 7+ (Tenivac) 06/28/2018    TDAP Vaccine (Adacel) 11/19/2007    Td (Adult), Adsorbed 01/17/2000    Zoster recombinant adjuvanted (SHINGRIX) 07/15/2019, 09/26/2019    Zoster vaccine, live 11/19/2008          Chart documentation done in part with Dragon Voice recognition Software. Although reviewed after completion, some word and grammatical error may remain.    Lucho Huber MD

## 2024-06-18 NOTE — H&P
H&P from office visit with Dr STEPHANIE Michelle on 6/13/24 reviewed. Following today's exam there are no interval changes.       Libertad Howard PA-C  [facility]  Electrophysiology Consult Service  Pager: [phone]

## 2024-06-18 NOTE — ANESTHESIA PREPROCEDURE EVALUATION
Anesthesia Pre-Procedure Evaluation    Patient: Alfreda Baxter   MRN: [id number] : 1945        Procedure : Procedure(s):  Anesthesia cardioversion @0930          Past Medical History:   Diagnosis Date    Acute idiopathic gout of left foot 2015    Adenomatous colon polyp 2013    Colonoscopy due     Asthma     controlled with inhaler    Blind left eye 2019    Due to central retinal occlusion    Cataract     Central retinal artery occlusion of left eye 09/15/2018    Disturbances of vision, late effect of stroke 2022    DJD (degenerative joint disease)     knees    GERD (gastroesophageal reflux disease)     Glaucoma     Gout     History of central retinal artery occlusion, os 2020    HTN (hypertension)     Hyperlipidemia LDL goal <70 2010    Low bone mass     Lumbar spinal stenosis 2011    Sees Dr Sage    Mild persistent asthma 2013    Neovascular glaucoma of left eye, moderate stage 2019    Ocular hypertension of right eye, treated 2019    Paroxysmal A-fib (H) 2019    Per loop recorder, 2018    On Rivaroxaban.   H/o R central retinal artery occlusion    Paroxysmal ventricular tachycardia (H) 2018    Added automatically from request for surgery [id number]    PFO (patent foramen ovale)     Recurrent cerebrovascular accidents (CVAs) (H) 2022    Recurrent cerebrovascular accidents (CVAs) (H) 2022    Transient global amnesia 2022      Past Surgical History:   Procedure Laterality Date    APPENDECTOMY      age 7 yrs.    BREAST BIOPSY, RT/LT  2004    left benign    CATARACT IOL, RT/LT       SECTION      x 2    COLONOSCOPY  2013    needed q 3 yrs.    COLONOSCOPY N/A 09/10/2020    Procedure: Colonoscopy, With Polypectomy And Biopsy;  Surgeon: Brian Cleaning MD;  Location: [facility]    COLONOSCOPY WITH CO2 INSUFFLATION N/A 2016    Procedure: COLONOSCOPY WITH CO2 INSUFFLATION;  Surgeon: Brian Cleaning  MD Luis Enrique;  Location: [facility]    COLONOSCOPY WITH CO2 INSUFFLATION N/A 09/10/2020    Procedure: COLONOSCOPY, WITH CO2 INSUFFLATION;  Surgeon: Brian Cleaning MD;  Location: [facility]    EP COMPREHENSIVE EP STUDY N/A 2018    Procedure: LOOP RECORDER;  Surgeon: Buck Butterfield MD;  Location:  [facility]    HYSTERECTOMY, POORNIMA  1997    bleeding fibroids    ORTHOPEDIC SURGERY  2009    meniscus repair    PHACOEMULSIFICATION WITH STANDARD INTRAOCULAR LENS IMPLANT Right 10/24/2022    Procedure: COMPLE RIGHT EYE PHACOEMULSIFICATION, CATARACT, WITH STANDARD INTRAOCULAR LENS IMPLANT INSERTION;  Surgeon: Ezequiel Florence MD;  Location: [facility] TOTAL KNEE ARTHROPLASTY Left 2015    at [facility]      Allergies   Allergen Reactions    No Clinical Screening - See Comments Other (See Comments)     senisitive to non steroidals  Aleve, ibuprofen celebrex cause bad stomach pains  senisitive to non steroidals  Aleve, ibuprofen celebrex cause bad stomach pains    Oxycodone Hives     Other reaction(s): Unknown  ... With facial swelling    Nsaids Nausea and Vomiting     Other reaction(s): Abdominal Pain      Social History     Tobacco Use    Smoking status: Former     Current packs/day: 0.00     Average packs/day: 0.1 packs/day for 2.0 years (0.2 ttl pk-yrs)     Types: Cigarettes     Start date: 1963     Quit date: 1965     Years since quittin.5     Passive exposure: Never    Smokeless tobacco: Never   Substance Use Topics    Alcohol use: Not Currently     Comment: one a month      Wt Readings from Last 1 Encounters:   24 92.1 kg (203 lb)        Anesthesia Evaluation            ROS/MED HX  ENT/Pulmonary: Comment: Former smoker 0.2PY. Quit     (+)                     Intermittent, asthma  Treatment: Inhaler prn,                 Neurologic: Comment: Transient global amnesia  Recurrent cerebrovascular accidents.  MRA head and neck 2024:  1. No acute infarct.  2. Head MRA: no  aneurysm or stenosis of the major intracranial arteries.  3. Neck MRA: 50-60% diameter stenosis of the proximal right internal carotid artery. 20-30% diameter stenosis of the proximal left internal carotid artery. No vertebral artery stenosis.        Cardiovascular: Comment:   # pAfib with RVR-- here for cardioversion on 6/19/2024  -- No recent Echo. Order for DIRK in chart, but no report.   # PFO  # Current meds: Lisinopril, Atorvastatin, Apixaban 5BID    (+)  hypertension- -   -  - -                                 Previous cardiac testing   Echo: Date: Results:  11/2022 Echo   Global and regional LV function is normal. LVEF 55-60%.  Global RV function is normal.  Severe mitral annular calcification is present. Moderate mitral insufficiency.  Mild dilatation of the aorta. No pericardial effusion.    Stress Test:  Date: Results:  5/2024 NM Lexiscan Stress Test:     The nuclear stress test is negative for inducible myocardial ischemia or infarction.     LVEDv 105ml. LVESv 33ml. LV EF 69%.     A prior study was conducted on 1/24/2013.  This study has no change when compared with the prior study.    ECG Reviewed:  Date: Results:    Cath:  Date: Results:   Dysrhythmias: pfo.   METS/Exercise Tolerance:     Hematologic: Comments: On Apixaban 5 BID.     (+) History of blood clots,    pt is anticoagulated,        (-) anemia   Musculoskeletal: Comment:   Degenerative joint disease and degenerative arthritis of spine  Seronegative rheumatoid arthritis, on MTX  Osteopenia  Gout  (+)  arthritis,             GI/Hepatic:     (+) GERD, Asymptomatic on medication,                  Renal/Genitourinary: Comment: GFR >60   (-) renal disease   Endo:     (+)               Obesity,       Psychiatric/Substance Use:       Infectious Disease:       Malignancy:       Other: Comment: Hx/o Glaucoma  Blind left eye.   Hx/o CRAO  Hx/o Posterior vitreous detachment of both eyes               Physical Exam    Airway  airway exam normal     "  Mallampati: I   TM distance: > 3 FB   Neck ROM: full   Mouth opening: > 3 cm    Respiratory Devices and Support         Dental       (+) Minor Abnormalities - some fillings, tiny chips      Cardiovascular   cardiovascular exam normal    Comment: aFib, -140s   Rhythm and rate: irregular     Pulmonary   pulmonary exam normal            Other findings: Awake, alert and oriented during preop eval. Denies chest pain. No dyspnea at rest.     OUTSIDE LABS:  CBC:   Lab Results   Component Value Date    WBC 6.6 05/07/2024    WBC 7.5 02/13/2024    HGB 12.9 05/07/2024    HGB 12.1 02/13/2024    HCT 39.7 05/07/2024    HCT 36.8 02/13/2024     05/07/2024     02/13/2024     BMP:   Lab Results   Component Value Date     02/13/2024     03/08/2023    POTASSIUM 3.8 02/13/2024    POTASSIUM 4.3 03/08/2023    CHLORIDE 107 02/13/2024    CHLORIDE 106 03/08/2023    CO2 24 02/13/2024    CO2 27 03/08/2023    BUN 15.4 02/13/2024    BUN 22 03/08/2023    CR 0.89 05/07/2024    CR 0.70 02/13/2024     (H) 02/13/2024     (H) 03/08/2023     COAGS:   Lab Results   Component Value Date    PTT 37 02/13/2024    INR 1.35 (H) 02/13/2024     POC: No results found for: \"BGM\", \"HCG\", \"HCGS\"  HEPATIC:   Lab Results   Component Value Date    ALBUMIN 4.1 05/07/2024    PROTTOTAL 6.9 05/07/2024    ALT 17 05/07/2024    AST 18 05/07/2024    ALKPHOS 51 05/07/2024    BILITOTAL 0.7 05/07/2024     OTHER:   Lab Results   Component Value Date    LACT 1.0 11/26/2020    A1C 5.8 (H) 11/14/2022    RUDOLPH 9.2 02/13/2024    MAG 1.7 02/13/2024    LIPASE 130 11/26/2020    TSH 0.76 02/13/2024    CRP 13.4 (H) 03/08/2023    SED 8 05/07/2024       Anesthesia Plan    ASA Status:  3    NPO Status:  NPO Appropriate    Anesthesia Type: General.     - Airway: Native airway   Induction: Intravenous.           Consents    Anesthesia Plan(s) and associated risks, benefits, and realistic alternatives discussed. Questions answered and " "patient/representative(s) expressed understanding.     - Discussed:     - Discussed with:  Patient      - Extended Intubation/Ventilatory Support Discussed: No.      - Patient is DNR/DNI Status: No     Use of blood products discussed: No .     Postoperative Care    Pain management: Oral pain medications.   PONV prophylaxis: Ondansetron (or other 5HT-3), Dexamethasone or Solumedrol     Comments:    Other Comments: Afib with RVR, symptomatic. Here for cardioversion.   Patient will have echo before the procedure.   Additional anesthesia considerations and plan as stated above.   MD Gillian Greenberg MD    I have reviewed the pertinent notes and labs in the chart from the past 30 days and (re)examined the patient.  Any updates or changes from those notes are reflected in this note.              # Obesity: Estimated body mass index is 38.03 kg/m  as calculated from the following:    Height as of 6/13/24: 1.556 m (5' 1.26\").    Weight as of 6/18/24: 92.1 kg (203 lb).      "

## 2024-06-18 NOTE — NURSING NOTE
RAPID3 (0-30) Cumulative Score  2.8          RAPID3 Weighted Score (divide #4 by 3 and that is the weighted score)  0.9

## 2024-06-19 ENCOUNTER — ANESTHESIA (OUTPATIENT)
Dept: SURGERY | Facility: CLINIC | Age: 79
DRG: 291 | End: 2024-06-19
Payer: MEDICARE

## 2024-06-19 ENCOUNTER — HOSPITAL ENCOUNTER (OUTPATIENT)
Facility: CLINIC | Age: 79
Discharge: HOME | DRG: 291 | End: 2024-06-19
Attending: INTERNAL MEDICINE | Admitting: INTERNAL MEDICINE
Payer: MEDICARE

## 2024-06-19 ENCOUNTER — APPOINTMENT (OUTPATIENT)
Dept: LAB | Facility: CLINIC | Age: 79
DRG: 291 | End: 2024-06-19
Attending: INTERNAL MEDICINE
Payer: MEDICARE

## 2024-06-19 ENCOUNTER — HOSPITAL ENCOUNTER (OUTPATIENT)
Dept: CARDIOLOGY | Facility: CLINIC | Age: 79
Discharge: HOME | DRG: 291 | End: 2024-06-19
Attending: INTERNAL MEDICINE
Payer: MEDICARE

## 2024-06-19 ENCOUNTER — APPOINTMENT (OUTPATIENT)
Dept: MEDSURG UNIT | Facility: CLINIC | Age: 79
DRG: 291 | End: 2024-06-19
Attending: INTERNAL MEDICINE
Payer: MEDICARE

## 2024-06-19 ENCOUNTER — APPOINTMENT (OUTPATIENT)
Dept: CARDIOLOGY | Facility: CLINIC | Age: 79
DRG: 291 | End: 2024-06-19
Payer: MEDICARE

## 2024-06-19 VITALS
DIASTOLIC BLOOD PRESSURE: 87 MMHG | OXYGEN SATURATION: 94 % | SYSTOLIC BLOOD PRESSURE: 109 MMHG | HEART RATE: 117 BPM | RESPIRATION RATE: 21 BRPM

## 2024-06-19 DIAGNOSIS — I48.19 PERSISTENT ATRIAL FIBRILLATION (H): ICD-10-CM

## 2024-06-19 DIAGNOSIS — I48.0 PAROXYSMAL A-FIB (H): Primary | ICD-10-CM

## 2024-06-19 DIAGNOSIS — I48.19 PERSISTENT ATRIAL FIBRILLATION (H): Primary | ICD-10-CM

## 2024-06-19 LAB
ALT SERPL W P-5'-P-CCNC: 24 U/L (ref 0–50)
AST SERPL W P-5'-P-CCNC: 29 U/L (ref 0–45)
INR PPP: 1.75 (ref 0.85–1.15)
LVEF ECHO: NORMAL
MAGNESIUM SERPL-MCNC: 1.7 MG/DL (ref 1.7–2.3)
POTASSIUM SERPL-SCNC: 4 MMOL/L (ref 3.4–5.3)
TSH SERPL DL<=0.005 MIU/L-ACNC: 1.09 UIU/ML (ref 0.3–4.2)

## 2024-06-19 PROCEDURE — 92960 CARDIOVERSION ELECTRIC EXT: CPT

## 2024-06-19 PROCEDURE — 84132 ASSAY OF SERUM POTASSIUM: CPT | Performed by: INTERNAL MEDICINE

## 2024-06-19 PROCEDURE — 99100 ANES PT EXTEME AGE<1 YR&>70: CPT | Performed by: NURSE ANESTHETIST, CERTIFIED REGISTERED

## 2024-06-19 PROCEDURE — 93325 DOPPLER ECHO COLOR FLOW MAPG: CPT | Mod: 26 | Performed by: INTERNAL MEDICINE

## 2024-06-19 PROCEDURE — 92960 CARDIOVERSION ELECTRIC EXT: CPT | Performed by: NURSE ANESTHETIST, CERTIFIED REGISTERED

## 2024-06-19 PROCEDURE — 250N000011 HC RX IP 250 OP 636: Mod: JZ | Performed by: INTERNAL MEDICINE

## 2024-06-19 PROCEDURE — 84450 TRANSFERASE (AST) (SGOT): CPT

## 2024-06-19 PROCEDURE — 93010 ELECTROCARDIOGRAM REPORT: CPT | Mod: XU | Performed by: INTERNAL MEDICINE

## 2024-06-19 PROCEDURE — 93320 DOPPLER ECHO COMPLETE: CPT | Mod: 26 | Performed by: INTERNAL MEDICINE

## 2024-06-19 PROCEDURE — 99152 MOD SED SAME PHYS/QHP 5/>YRS: CPT | Performed by: INTERNAL MEDICINE

## 2024-06-19 PROCEDURE — 93312 ECHO TRANSESOPHAGEAL: CPT | Mod: 26 | Performed by: INTERNAL MEDICINE

## 2024-06-19 PROCEDURE — 370N000017 HC ANESTHESIA TECHNICAL FEE, PER MIN

## 2024-06-19 PROCEDURE — 99100 ANES PT EXTEME AGE<1 YR&>70: CPT | Performed by: STUDENT IN AN ORGANIZED HEALTH CARE EDUCATION/TRAINING PROGRAM

## 2024-06-19 PROCEDURE — 85610 PROTHROMBIN TIME: CPT | Performed by: INTERNAL MEDICINE

## 2024-06-19 PROCEDURE — 84460 ALANINE AMINO (ALT) (SGPT): CPT

## 2024-06-19 PROCEDURE — 250N000009 HC RX 250: Performed by: NURSE ANESTHETIST, CERTIFIED REGISTERED

## 2024-06-19 PROCEDURE — 999N000096 CARDIAC MOBILE TELEMETRY MONITOR

## 2024-06-19 PROCEDURE — 93325 DOPPLER ECHO COLOR FLOW MAPG: CPT

## 2024-06-19 PROCEDURE — 999N000054 HC STATISTIC EKG NON-CHARGEABLE

## 2024-06-19 PROCEDURE — 83735 ASSAY OF MAGNESIUM: CPT | Performed by: INTERNAL MEDICINE

## 2024-06-19 PROCEDURE — 5A2204Z RESTORATION OF CARDIAC RHYTHM, SINGLE: ICD-10-PCS

## 2024-06-19 PROCEDURE — 93320 DOPPLER ECHO COMPLETE: CPT

## 2024-06-19 PROCEDURE — 92960 CARDIOVERSION ELECTRIC EXT: CPT | Performed by: STUDENT IN AN ORGANIZED HEALTH CARE EDUCATION/TRAINING PROGRAM

## 2024-06-19 PROCEDURE — 250N000009 HC RX 250: Performed by: INTERNAL MEDICINE

## 2024-06-19 PROCEDURE — 84443 ASSAY THYROID STIM HORMONE: CPT

## 2024-06-19 RX ORDER — ACYCLOVIR 200 MG/1
9.5 CAPSULE ORAL
Status: DISCONTINUED | OUTPATIENT
Start: 2024-06-19 | End: 2024-06-20 | Stop reason: HOSPADM

## 2024-06-19 RX ORDER — POTASSIUM CHLORIDE 1500 MG/1
20 TABLET, EXTENDED RELEASE ORAL
Status: CANCELLED | OUTPATIENT
Start: 2024-06-19

## 2024-06-19 RX ORDER — HYDROMORPHONE HCL IN WATER/PF 6 MG/30 ML
0.2 PATIENT CONTROLLED ANALGESIA SYRINGE INTRAVENOUS EVERY 5 MIN PRN
Status: CANCELLED | OUTPATIENT
Start: 2024-06-19

## 2024-06-19 RX ORDER — HYDRALAZINE HYDROCHLORIDE 20 MG/ML
2.5-5 INJECTION INTRAMUSCULAR; INTRAVENOUS EVERY 10 MIN PRN
Status: CANCELLED | OUTPATIENT
Start: 2024-06-19

## 2024-06-19 RX ORDER — LIDOCAINE HYDROCHLORIDE 20 MG/ML
15 SOLUTION OROPHARYNGEAL ONCE
Status: COMPLETED | OUTPATIENT
Start: 2024-06-19 | End: 2024-06-19

## 2024-06-19 RX ORDER — MAGNESIUM HYDROXIDE/ALUMINUM HYDROXICE/SIMETHICONE 120; 1200; 1200 MG/30ML; MG/30ML; MG/30ML
30 SUSPENSION ORAL EVERY 8 HOURS PRN
Status: DISCONTINUED | OUTPATIENT
Start: 2024-06-19 | End: 2024-06-20 | Stop reason: HOSPADM

## 2024-06-19 RX ORDER — FENTANYL CITRATE 50 UG/ML
25 INJECTION, SOLUTION INTRAMUSCULAR; INTRAVENOUS
Status: DISCONTINUED | OUTPATIENT
Start: 2024-06-19 | End: 2024-06-20 | Stop reason: HOSPADM

## 2024-06-19 RX ORDER — ONDANSETRON 2 MG/ML
4 INJECTION INTRAMUSCULAR; INTRAVENOUS EVERY 30 MIN PRN
Status: CANCELLED | OUTPATIENT
Start: 2024-06-19

## 2024-06-19 RX ORDER — NALOXONE HYDROCHLORIDE 0.4 MG/ML
0.2 INJECTION, SOLUTION INTRAMUSCULAR; INTRAVENOUS; SUBCUTANEOUS
Status: DISCONTINUED | OUTPATIENT
Start: 2024-06-19 | End: 2024-06-20 | Stop reason: HOSPADM

## 2024-06-19 RX ORDER — SODIUM CHLORIDE, SODIUM LACTATE, POTASSIUM CHLORIDE, CALCIUM CHLORIDE 600; 310; 30; 20 MG/100ML; MG/100ML; MG/100ML; MG/100ML
INJECTION, SOLUTION INTRAVENOUS CONTINUOUS
Status: CANCELLED | OUTPATIENT
Start: 2024-06-19

## 2024-06-19 RX ORDER — POTASSIUM CHLORIDE 1500 MG/1
40 TABLET, EXTENDED RELEASE ORAL
Status: CANCELLED | OUTPATIENT
Start: 2024-06-19

## 2024-06-19 RX ORDER — POTASSIUM CHLORIDE 750 MG/1
40 TABLET, EXTENDED RELEASE ORAL
Status: DISCONTINUED | OUTPATIENT
Start: 2024-06-19 | End: 2024-06-20 | Stop reason: HOSPADM

## 2024-06-19 RX ORDER — FENTANYL CITRATE 50 UG/ML
25 INJECTION, SOLUTION INTRAMUSCULAR; INTRAVENOUS EVERY 5 MIN PRN
Status: CANCELLED | OUTPATIENT
Start: 2024-06-19

## 2024-06-19 RX ORDER — DEXAMETHASONE SODIUM PHOSPHATE 4 MG/ML
4 INJECTION, SOLUTION INTRA-ARTICULAR; INTRALESIONAL; INTRAMUSCULAR; INTRAVENOUS; SOFT TISSUE
Status: CANCELLED | OUTPATIENT
Start: 2024-06-19

## 2024-06-19 RX ORDER — FENTANYL CITRATE 50 UG/ML
50 INJECTION, SOLUTION INTRAMUSCULAR; INTRAVENOUS EVERY 5 MIN PRN
Status: CANCELLED | OUTPATIENT
Start: 2024-06-19

## 2024-06-19 RX ORDER — POTASSIUM CHLORIDE 750 MG/1
20 TABLET, EXTENDED RELEASE ORAL
Status: DISCONTINUED | OUTPATIENT
Start: 2024-06-19 | End: 2024-06-20 | Stop reason: HOSPADM

## 2024-06-19 RX ORDER — MAGNESIUM SULFATE HEPTAHYDRATE 40 MG/ML
2 INJECTION, SOLUTION INTRAVENOUS
Status: DISCONTINUED | OUTPATIENT
Start: 2024-06-19 | End: 2024-06-20 | Stop reason: HOSPADM

## 2024-06-19 RX ORDER — LIDOCAINE 40 MG/G
CREAM TOPICAL
Status: DISCONTINUED | OUTPATIENT
Start: 2024-06-19 | End: 2024-06-20 | Stop reason: HOSPADM

## 2024-06-19 RX ORDER — SODIUM CHLORIDE 9 MG/ML
1000 INJECTION, SOLUTION INTRAVENOUS CONTINUOUS
Status: DISCONTINUED | OUTPATIENT
Start: 2024-06-19 | End: 2024-06-20 | Stop reason: HOSPADM

## 2024-06-19 RX ORDER — NALOXONE HYDROCHLORIDE 0.4 MG/ML
0.4 INJECTION, SOLUTION INTRAMUSCULAR; INTRAVENOUS; SUBCUTANEOUS
Status: DISCONTINUED | OUTPATIENT
Start: 2024-06-19 | End: 2024-06-20 | Stop reason: HOSPADM

## 2024-06-19 RX ORDER — HYDROMORPHONE HCL IN WATER/PF 6 MG/30 ML
0.4 PATIENT CONTROLLED ANALGESIA SYRINGE INTRAVENOUS EVERY 5 MIN PRN
Status: CANCELLED | OUTPATIENT
Start: 2024-06-19

## 2024-06-19 RX ORDER — ACETAMINOPHEN 325 MG/1
650 TABLET ORAL EVERY 4 HOURS PRN
Status: DISCONTINUED | OUTPATIENT
Start: 2024-06-19 | End: 2024-06-20 | Stop reason: HOSPADM

## 2024-06-19 RX ORDER — ACETAMINOPHEN 325 MG/1
975 TABLET ORAL ONCE
Status: CANCELLED | OUTPATIENT
Start: 2024-06-19 | End: 2024-06-19

## 2024-06-19 RX ORDER — LABETALOL HYDROCHLORIDE 5 MG/ML
10 INJECTION, SOLUTION INTRAVENOUS
Status: CANCELLED | OUTPATIENT
Start: 2024-06-19

## 2024-06-19 RX ORDER — METHOHEXITAL IN WATER/PF 100MG/10ML
SYRINGE (ML) INTRAVENOUS PRN
Status: DISCONTINUED | OUTPATIENT
Start: 2024-06-19 | End: 2024-06-19

## 2024-06-19 RX ORDER — NALOXONE HYDROCHLORIDE 0.4 MG/ML
0.1 INJECTION, SOLUTION INTRAMUSCULAR; INTRAVENOUS; SUBCUTANEOUS
Status: CANCELLED | OUTPATIENT
Start: 2024-06-19

## 2024-06-19 RX ORDER — ONDANSETRON 4 MG/1
4 TABLET, ORALLY DISINTEGRATING ORAL EVERY 30 MIN PRN
Status: CANCELLED | OUTPATIENT
Start: 2024-06-19

## 2024-06-19 RX ORDER — BENZOCAINE/MENTHOL 6 MG-10 MG
1 LOZENGE MUCOUS MEMBRANE 3 TIMES DAILY PRN
Status: DISCONTINUED | OUTPATIENT
Start: 2024-06-19 | End: 2024-06-20 | Stop reason: HOSPADM

## 2024-06-19 RX ORDER — FLUMAZENIL 0.1 MG/ML
0.2 INJECTION, SOLUTION INTRAVENOUS
Status: DISCONTINUED | OUTPATIENT
Start: 2024-06-19 | End: 2024-06-20 | Stop reason: HOSPADM

## 2024-06-19 RX ORDER — AMIODARONE HYDROCHLORIDE 200 MG/1
TABLET ORAL
Qty: 90 TABLET | Refills: 3 | Status: SHIPPED | OUTPATIENT
Start: 2024-06-19 | End: 2024-08-13

## 2024-06-19 RX ADMIN — TOPICAL ANESTHETIC 0.5 ML: 200 SPRAY DENTAL; PERIODONTAL at 09:30

## 2024-06-19 RX ADMIN — MIDAZOLAM 0.5 MG: 1 INJECTION INTRAMUSCULAR; INTRAVENOUS at 09:33

## 2024-06-19 RX ADMIN — MIDAZOLAM 0.5 MG: 1 INJECTION INTRAMUSCULAR; INTRAVENOUS at 09:40

## 2024-06-19 RX ADMIN — FENTANYL CITRATE 50 MCG: 50 INJECTION, SOLUTION INTRAMUSCULAR; INTRAVENOUS at 09:34

## 2024-06-19 RX ADMIN — LIDOCAINE HYDROCHLORIDE 30 ML: 20 SOLUTION OROPHARYNGEAL at 09:25

## 2024-06-19 RX ADMIN — Medication 40 MG: at 10:19

## 2024-06-19 RX ADMIN — Medication 50 MG: at 10:17

## 2024-06-19 ASSESSMENT — ACTIVITIES OF DAILY LIVING (ADL)
ADLS_ACUITY_SCORE: 35

## 2024-06-19 NOTE — ANESTHESIA POSTPROCEDURE EVALUATION
Patient: Alfreda Baxter    Procedure: Procedure(s):  Anesthesia cardioversion @0992       Anesthesia Type:  General    Note:     Postop Pain Control: Uneventful            Sign Out: Well controlled pain   PONV: No   Neuro/Psych: Uneventful            Sign Out: Acceptable/Baseline neuro status   Airway/Respiratory: Uneventful            Sign Out: Acceptable/Baseline resp. status   CV/Hemodynamics: Uneventful            Sign Out: Acceptable CV status; No obvious hypovolemia; No obvious fluid overload   Other NRE: NONE   DID A NON-ROUTINE EVENT OCCUR? No    Event details/Postop Comments:  Failed cardioversion (shocked x 3 with no resolution of Afib). No anesthesia related complications.   Gillian Luu MD               Last vitals:  There were no vitals filed for this visit.    Electronically Signed By: Gillian Luu MD  June 19, 2024  1:33 PM

## 2024-06-19 NOTE — PROCEDURES
[facility]    Procedure: Cardioversion    Date/Time: 6/19/2024 10:55 AM    Performed by: Libertad Howard PA-C  Authorized by: Monica Michelle MD      UNIVERSAL PROTOCOL   Site Marked: NA  Prior Images Obtained and Reviewed:  NA  Required items: Required blood products, implants, devices and special equipment available    Patient identity confirmed:  Verbally with patient  Patient was reevaluated immediately before administering moderate or deep sedation or anesthesia  Confirmation Checklist:  Patient's identity using two indicators, procedure was appropriate and matched the consent or emergent situation, correct equipment/implants were available and relevant allergies  Time out: Immediately prior to the procedure a time out was called    Universal Protocol: the Joint Commission Universal Protocol was followed       ANESTHESIA    Anesthesia was administered and monitored by anesthesiology.  See anesthesia documentation for details.    PROCEDURE DETAILS  Pre-procedure rhythm: atrial fibrillation  Patient position: patient was placed in a supine position  Chest area: chest area exposed  Electrodes: pads  Electrodes placed: anterior-posterior  Number of attempts: 3    Details of Attempts:  One, 150 J biphasic synchronized shock delivered without conversion.   Second, 200 J biphasic synchronized shock delivered without conversion.  Final, 200 J biphasic synchronized shock delivered without conversion.    Post-procedure rhythm: atrial fibrillation  Complications: no complications      PROCEDURE    Patient Tolerance:  Patient tolerated the procedure well with no immediate complications    Anticoagulation: Eliquis 5 mg bid, DIRK prior without OPAL thrombus.     Medication Changes:   DIRK prior to DCCV with  LVEF decreased to 40%, negative lexiscan 6/3/2024. She remains symptomatic with dyspnea on exertion. I discussed with Dr Michelle, we will have her start PO amio load, will send her home  on ambulatory monitor for monitoring of rates with initiation. Will plan for repeat cardioversion in 1-2 weeks on amiodarone. Discussed not missing any eliquis doses in the meantime.       Libertad Howard PA-C  [facility]  Electrophysiology Consult Service  Pager: [phone]

## 2024-06-19 NOTE — PRE-PROCEDURE
GENERAL PRE-PROCEDURE:   Procedure:  Trans Esophgeal Echocardiogram.  Date/Time:  6/19/2024 8:45 AM    Verbal consent obtained?: Yes    Risks and benefits: Risks, benefits and alternatives were discussed    Patient states understanding of procedure being performed: Yes    Patient's understanding of procedure matches consent: Yes    Procedure consent matches procedure scheduled: Yes    Appropriately NPO:  Yes  ASA Class:  2  Lungs:  Lungs clear with good breath sounds bilaterally  Heart:  Normal heart sounds and rate  History & Physical reviewed:  History and physical reviewed and no updates needed  Statement of review:  I have reviewed the lab findings, diagnostic data, medications, and the plan for sedation

## 2024-06-19 NOTE — SEDATION DOCUMENTATION
Pt arrived in ECHO department for scheduled DIRK/cardioversion.   Procedure explained, questions answered and consent signed. Discharge instructions discussed with patient.  Pt's throat sprayed at 0925, therefore pt will not be able to eat or drink until 2 hours after at 1125. Informed pt of this time and encouraged to start with warm fluids and soft foods.    Pt tolerated procedure well, and was given a total of 50 mcg IV fentanyl and 1 mg IV versed for conscious sedation. Pt denied throat or chest pain after DIRK complete.   DIRK probe 66 used for procedure.  No clots visualized on DIRK so proceeded with DCCV. Anesthesia gave pt 90 mg IV brevital for sedation and pt was DCCVx3 at 150, 150, and 200 Joules. Cardioversion unsuccessful, remains in Afib.   Pt denied chest or throat pain after procedure and was D/C home after awake and VSS. Escorted out to front lobby by staff in w/c to meet pt's ride home.

## 2024-06-19 NOTE — ANESTHESIA CARE TRANSFER NOTE
Patient: Alfreda Baxter    Procedure: Procedure(s):  Anesthesia cardioversion @0930       Diagnosis: Persistent atrial fibrillation (H) [I48.19]  Diagnosis Additional Information: No value filed.    Anesthesia Type:   General     Note:    Oropharynx: oropharynx clear of all foreign objects and spontaneously breathing  Level of Consciousness: drowsy  Oxygen Supplementation: nasal cannula    Independent Airway: airway patency satisfactory and stable  Dentition: dentition unchanged  Vital Signs Stable: post-procedure vital signs reviewed and stable  Report to RN Given: handoff report given    Comments: S/p Cardioversion under general with native airway/. Shocked x3. Patient remained in AFib. Hemodynamically stable.         Vitals:  Vitals Value Taken Time   BP     Temp     Pulse     Resp     SpO2         Electronically Signed By: Gillian Luu MD  June 19, 2024  1:31 PM

## 2024-06-19 NOTE — PROGRESS NOTES
"Date: 6/19/2024    Time of Call: 1:00 PM     Diagnosis: AFIB      [ TORB ] Ordering provider: Libertad BRIZUELA   Order:  \" - I cardioverted this patient today. Was unsuccessful her her LVEF was decreased. I talked with STEPHANIE Michelle, I started her on PO amio today and we will repeat DCCV in 1-2 weeks. without DIRK.\"         Order received by: Sofie Tariq RN       Follow-up/additional notes: Noted above. Admit orders placed for cardioversion with no DIRK. Ep  notified, instructions letter drafted.      "

## 2024-06-19 NOTE — DISCHARGE INSTRUCTIONS
GOING HOME AFTER YOUR TRANSESOPHAGEAL ECHOCARDIOGRAM AND CARDIOVERSION    FOR NEXT 24 HOURS:  An adult should stay with you.  Relax and take it easy.  DO NOT make any important legal decisions.  DO NOT drive or operate machines at home or at work.  DO NOT consume any alcohol today.  Resume your regular diet 2 HOURS after procedure @ __________ and drink plenty of fluids.  If your throat is sore, eat cold, bland, soft foods.  If you have any redness/skin sorness where the patches were placed, you may use aloe vera gel or 1% hydrocortisone cream to the skin (sold at drug stores)  Take Tylenol (Acetaminophen) per your provider's recommendations as needed to help relieve local pain.     CALL YOUR HEALTHCARE PROVIDER IF:  New pain or trouble swallowing  New stomach or chest pain  You develop nausea or vomiting  Fever above 100.6 F or greater  You develop hives or a rash or any unexplained itching  Have irregular heartbeat or fast pulse  Feel faint, dizzy, or lightheaded  Have chest pain with increased activity  Have bleeding issues from blood-thinning medicines (vomiting that looks like coffee grounds or contains blood OR black, bloody stools).    CALL 911 RIGHT AWAY IF YOU HAVE:  Pain in your chest, arm, shoulder, neck, or upper back  Shortness of breath  Loss of vision, speech, or strength or coordination in any body part  Weakness in your arm or leg  Uncontrolled bleeding  Feel unstable in any way     FOLLOW UP APPOINTMENT:    Follow up as scheduled with EP/Cardiology Provider     ADDITIONAL INFORMATION:    If you have any questions:        Call the Echo Lab Nurse at [phone], press 4 (Monday-Friday 7:00 am - 4:00 pm)        Call the hospital: [phone] and ask them to page [phone] (after 4:00 pm and on   weekends or holidays)      Cardiovascular Clinic:   [address] [place] [address]. [place] [address]  Your Care Team:  EP Cardiology   Telephone Number     Ghada Lagunas RN [phone]    After  business hours: [phone], select option 4, ask for EP Fellow on call to be paged.     For scheduling appts or procedures:    Sherry Dunlap   [phone]   For the Device Clinic (Pacemakers and ICD's)   RN's  During business hours: [phone]     After business hours:   [phone]- select option 4 and ask for job code [phone].       As always, Thank you for trusting us with your health care needs!

## 2024-06-20 ENCOUNTER — TELEPHONE (OUTPATIENT)
Dept: CARDIOLOGY | Facility: CLINIC | Age: 79
End: 2024-06-20

## 2024-06-20 ENCOUNTER — HOSPITAL ENCOUNTER (INPATIENT)
Facility: CLINIC | Age: 79
LOS: 2 days | Discharge: HOME | DRG: 291 | End: 2024-06-22
Attending: EMERGENCY MEDICINE | Admitting: INTERNAL MEDICINE
Payer: MEDICARE

## 2024-06-20 ENCOUNTER — TELEPHONE (OUTPATIENT)
Dept: CARDIOLOGY | Facility: CLINIC | Age: 79
End: 2024-06-20
Payer: MEDICARE

## 2024-06-20 ENCOUNTER — APPOINTMENT (OUTPATIENT)
Dept: GENERAL RADIOLOGY | Facility: CLINIC | Age: 79
DRG: 291 | End: 2024-06-20
Attending: EMERGENCY MEDICINE
Payer: MEDICARE

## 2024-06-20 DIAGNOSIS — Z79.01 LONG TERM (CURRENT) USE OF ANTICOAGULANTS: ICD-10-CM

## 2024-06-20 DIAGNOSIS — I48.0 PAROXYSMAL A-FIB (H): ICD-10-CM

## 2024-06-20 DIAGNOSIS — I48.20 CHRONIC A-FIB (H): ICD-10-CM

## 2024-06-20 DIAGNOSIS — I50.20 HEART FAILURE WITH REDUCED EJECTION FRACTION (H): Primary | ICD-10-CM

## 2024-06-20 LAB
ALBUMIN SERPL BCG-MCNC: 3.8 G/DL (ref 3.5–5.2)
ALP SERPL-CCNC: 60 U/L (ref 40–150)
ALT SERPL W P-5'-P-CCNC: 23 U/L (ref 0–50)
ANION GAP SERPL CALCULATED.3IONS-SCNC: 11 MMOL/L (ref 7–15)
AST SERPL W P-5'-P-CCNC: 35 U/L (ref 0–45)
ATRIAL RATE - MUSE: 117 BPM
ATRIAL RATE - MUSE: NORMAL BPM
BASOPHILS # BLD AUTO: 0 10E3/UL (ref 0–0.2)
BASOPHILS NFR BLD AUTO: 0 %
BILIRUB SERPL-MCNC: 0.7 MG/DL
BUN SERPL-MCNC: 23.6 MG/DL (ref 8–23)
CALCIUM SERPL-MCNC: 9.1 MG/DL (ref 8.8–10.2)
CHLORIDE SERPL-SCNC: 107 MMOL/L (ref 98–107)
CREAT SERPL-MCNC: 0.95 MG/DL (ref 0.51–0.95)
DEPRECATED HCO3 PLAS-SCNC: 21 MMOL/L (ref 22–29)
DIASTOLIC BLOOD PRESSURE - MUSE: NORMAL MMHG
DIASTOLIC BLOOD PRESSURE - MUSE: NORMAL MMHG
EGFRCR SERPLBLD CKD-EPI 2021: 60 ML/MIN/1.73M2
EOSINOPHIL # BLD AUTO: 0.2 10E3/UL (ref 0–0.7)
EOSINOPHIL NFR BLD AUTO: 2 %
ERYTHROCYTE [DISTWIDTH] IN BLOOD BY AUTOMATED COUNT: 15.9 % (ref 10–15)
GLUCOSE SERPL-MCNC: 100 MG/DL (ref 70–99)
HCT VFR BLD AUTO: 40 % (ref 35–47)
HGB BLD-MCNC: 12.7 G/DL (ref 11.7–15.7)
HOLD SPECIMEN: NORMAL
HOLD SPECIMEN: NORMAL
IMM GRANULOCYTES # BLD: 0 10E3/UL
IMM GRANULOCYTES NFR BLD: 0 %
INTERPRETATION ECG - MUSE: NORMAL
INTERPRETATION ECG - MUSE: NORMAL
LYMPHOCYTES # BLD AUTO: 1.9 10E3/UL (ref 0.8–5.3)
LYMPHOCYTES NFR BLD AUTO: 24 %
MCH RBC QN AUTO: 30 PG (ref 26.5–33)
MCHC RBC AUTO-ENTMCNC: 31.8 G/DL (ref 31.5–36.5)
MCV RBC AUTO: 95 FL (ref 78–100)
MONOCYTES # BLD AUTO: 0.5 10E3/UL (ref 0–1.3)
MONOCYTES NFR BLD AUTO: 6 %
NEUTROPHILS # BLD AUTO: 5.4 10E3/UL (ref 1.6–8.3)
NEUTROPHILS NFR BLD AUTO: 68 %
NRBC # BLD AUTO: 0 10E3/UL
NRBC BLD AUTO-RTO: 0 /100
NT-PROBNP SERPL-MCNC: 2092 PG/ML (ref 0–1800)
P AXIS - MUSE: NORMAL DEGREES
P AXIS - MUSE: NORMAL DEGREES
PLATELET # BLD AUTO: 231 10E3/UL (ref 150–450)
POTASSIUM SERPL-SCNC: 4.5 MMOL/L (ref 3.4–5.3)
PR INTERVAL - MUSE: NORMAL MS
PR INTERVAL - MUSE: NORMAL MS
PROT SERPL-MCNC: 6.5 G/DL (ref 6.4–8.3)
QRS DURATION - MUSE: 76 MS
QRS DURATION - MUSE: 84 MS
QT - MUSE: 342 MS
QT - MUSE: 352 MS
QTC - MUSE: 465 MS
QTC - MUSE: 516 MS
R AXIS - MUSE: -42 DEGREES
R AXIS - MUSE: -53 DEGREES
RBC # BLD AUTO: 4.23 10E6/UL (ref 3.8–5.2)
SODIUM SERPL-SCNC: 139 MMOL/L (ref 135–145)
SYSTOLIC BLOOD PRESSURE - MUSE: NORMAL MMHG
SYSTOLIC BLOOD PRESSURE - MUSE: NORMAL MMHG
T AXIS - MUSE: -30 DEGREES
T AXIS - MUSE: 0 DEGREES
TROPONIN T SERPL HS-MCNC: 15 NG/L
VENTRICULAR RATE- MUSE: 105 BPM
VENTRICULAR RATE- MUSE: 137 BPM
WBC # BLD AUTO: 8 10E3/UL (ref 4–11)

## 2024-06-20 PROCEDURE — 258N000003 HC RX IP 258 OP 636: Mod: JZ | Performed by: EMERGENCY MEDICINE

## 2024-06-20 PROCEDURE — 96366 THER/PROPH/DIAG IV INF ADDON: CPT | Performed by: EMERGENCY MEDICINE

## 2024-06-20 PROCEDURE — 93010 ELECTROCARDIOGRAM REPORT: CPT | Performed by: EMERGENCY MEDICINE

## 2024-06-20 PROCEDURE — 120N000005 HC R&B MS OVERFLOW UMMC

## 2024-06-20 PROCEDURE — 99223 1ST HOSP IP/OBS HIGH 75: CPT | Mod: AI | Performed by: INTERNAL MEDICINE

## 2024-06-20 PROCEDURE — 250N000011 HC RX IP 250 OP 636: Mod: JZ | Performed by: EMERGENCY MEDICINE

## 2024-06-20 PROCEDURE — 99285 EMERGENCY DEPT VISIT HI MDM: CPT | Mod: 25 | Performed by: EMERGENCY MEDICINE

## 2024-06-20 PROCEDURE — 999N000285 HC STATISTIC VASC ACCESS LAB DRAW WITH PIV START

## 2024-06-20 PROCEDURE — 96365 THER/PROPH/DIAG IV INF INIT: CPT | Performed by: EMERGENCY MEDICINE

## 2024-06-20 PROCEDURE — 36415 COLL VENOUS BLD VENIPUNCTURE: CPT | Performed by: EMERGENCY MEDICINE

## 2024-06-20 PROCEDURE — 71045 X-RAY EXAM CHEST 1 VIEW: CPT | Mod: 26 | Performed by: RADIOLOGY

## 2024-06-20 PROCEDURE — 93005 ELECTROCARDIOGRAM TRACING: CPT | Performed by: EMERGENCY MEDICINE

## 2024-06-20 PROCEDURE — 85025 COMPLETE CBC W/AUTO DIFF WBC: CPT | Performed by: EMERGENCY MEDICINE

## 2024-06-20 PROCEDURE — 84484 ASSAY OF TROPONIN QUANT: CPT | Performed by: EMERGENCY MEDICINE

## 2024-06-20 PROCEDURE — 83880 ASSAY OF NATRIURETIC PEPTIDE: CPT | Performed by: EMERGENCY MEDICINE

## 2024-06-20 PROCEDURE — 99291 CRITICAL CARE FIRST HOUR: CPT | Performed by: EMERGENCY MEDICINE

## 2024-06-20 PROCEDURE — 80053 COMPREHEN METABOLIC PANEL: CPT | Performed by: EMERGENCY MEDICINE

## 2024-06-20 PROCEDURE — 71045 X-RAY EXAM CHEST 1 VIEW: CPT

## 2024-06-20 RX ORDER — FOLIC ACID 1 MG/1
2 TABLET ORAL DAILY
Status: DISCONTINUED | OUTPATIENT
Start: 2024-06-21 | End: 2024-06-22 | Stop reason: HOSPADM

## 2024-06-20 RX ORDER — LIDOCAINE 40 MG/G
CREAM TOPICAL
Status: DISCONTINUED | OUTPATIENT
Start: 2024-06-20 | End: 2024-06-22 | Stop reason: HOSPADM

## 2024-06-20 RX ORDER — VITS A,C,E/LUTEIN/MINERALS 300MCG-200
1 TABLET ORAL DAILY
Status: DISCONTINUED | OUTPATIENT
Start: 2024-06-21 | End: 2024-06-22 | Stop reason: HOSPADM

## 2024-06-20 RX ORDER — ATORVASTATIN CALCIUM 20 MG/1
20 TABLET, FILM COATED ORAL DAILY
Status: DISCONTINUED | OUTPATIENT
Start: 2024-06-21 | End: 2024-06-22 | Stop reason: HOSPADM

## 2024-06-20 RX ORDER — LISINOPRIL 5 MG/1
10 TABLET ORAL DAILY
Status: DISCONTINUED | OUTPATIENT
Start: 2024-06-21 | End: 2024-06-21

## 2024-06-20 RX ORDER — MAGNESIUM HYDROXIDE/ALUMINUM HYDROXICE/SIMETHICONE 120; 1200; 1200 MG/30ML; MG/30ML; MG/30ML
30 SUSPENSION ORAL EVERY 4 HOURS PRN
Status: DISCONTINUED | OUTPATIENT
Start: 2024-06-20 | End: 2024-06-22 | Stop reason: HOSPADM

## 2024-06-20 RX ORDER — TIMOLOL MALEATE 5 MG/ML
1 SOLUTION/ DROPS OPHTHALMIC EVERY MORNING
Status: DISCONTINUED | OUTPATIENT
Start: 2024-06-21 | End: 2024-06-22 | Stop reason: HOSPADM

## 2024-06-20 RX ORDER — FUROSEMIDE 10 MG/ML
20 INJECTION INTRAMUSCULAR; INTRAVENOUS ONCE
Status: COMPLETED | OUTPATIENT
Start: 2024-06-20 | End: 2024-06-20

## 2024-06-20 RX ORDER — ACETAMINOPHEN 650 MG/1
650 SUPPOSITORY RECTAL EVERY 4 HOURS PRN
Status: DISCONTINUED | OUTPATIENT
Start: 2024-06-20 | End: 2024-06-22 | Stop reason: HOSPADM

## 2024-06-20 RX ORDER — LATANOPROST 50 UG/ML
1 SOLUTION/ DROPS OPHTHALMIC AT BEDTIME
Status: DISCONTINUED | OUTPATIENT
Start: 2024-06-20 | End: 2024-06-22 | Stop reason: HOSPADM

## 2024-06-20 RX ORDER — ACETAMINOPHEN 325 MG/1
650 TABLET ORAL EVERY 4 HOURS PRN
Status: DISCONTINUED | OUTPATIENT
Start: 2024-06-20 | End: 2024-06-22 | Stop reason: HOSPADM

## 2024-06-20 RX ORDER — ALBUTEROL SULFATE 90 UG/1
2 AEROSOL, METERED RESPIRATORY (INHALATION) EVERY 6 HOURS PRN
Status: DISCONTINUED | OUTPATIENT
Start: 2024-06-20 | End: 2024-06-22 | Stop reason: HOSPADM

## 2024-06-20 RX ADMIN — AMIODARONE HYDROCHLORIDE 1 MG/MIN: 50 INJECTION, SOLUTION INTRAVENOUS at 20:55

## 2024-06-20 RX ADMIN — FUROSEMIDE 20 MG: 10 INJECTION, SOLUTION INTRAVENOUS at 23:23

## 2024-06-20 RX ADMIN — AMIODARONE HYDROCHLORIDE 150 MG: 1.5 INJECTION, SOLUTION INTRAVENOUS at 20:29

## 2024-06-20 ASSESSMENT — ACTIVITIES OF DAILY LIVING (ADL)
ADLS_ACUITY_SCORE: 35

## 2024-06-20 ASSESSMENT — COLUMBIA-SUICIDE SEVERITY RATING SCALE - C-SSRS
1. IN THE PAST MONTH, HAVE YOU WISHED YOU WERE DEAD OR WISHED YOU COULD GO TO SLEEP AND NOT WAKE UP?: NO
2. HAVE YOU ACTUALLY HAD ANY THOUGHTS OF KILLING YOURSELF IN THE PAST MONTH?: NO

## 2024-06-20 NOTE — TELEPHONE ENCOUNTER
[facility] Call Center    Phone Message    May a detailed message be left on voicemail: yes     Reason for Call: Other: Patient wants you to call home first and then cell.  Patient will be out for a couple hours.      Action Taken: Other: cardiology     Travel Screening: Not Applicable    Thank you!  [facility]       Date of Service:

## 2024-06-20 NOTE — TELEPHONE ENCOUNTER
EP  called the patient to schedule her repeat cardioversion in 1-2 weeks and the patient stated that she wasn't supposed to be having another cardioversion.    She was asking to see or talk to Libertad Howard PA-C again because she's so short of breath and she's got questions about the meds that she was put on.     She was not willing to schedule the cardioversion until she hears it from Libertad or her team, she didn't understand that to be the plan.     Sherry Dunlap  Periop Electrophysiology   [phone]

## 2024-06-20 NOTE — TELEPHONE ENCOUNTER
Health Call Center    Phone Message    May a detailed message be left on voicemail: yes     Reason for Call: Other: Patient called stating she was told by her team that a supervisor or manager would be calling her to get a follow up scheduled with . Please review and call patient back to address.     Action Taken: Message routed to:  Other: Cardiology    Travel Screening: Not Applicable     Thank you!  [facility]

## 2024-06-20 NOTE — TELEPHONE ENCOUNTER
[facility] Call Center    Phone Message    May a detailed message be left on voicemail: yes     Reason for Call: Other: Patient is asking if someone could call her today regarding information below. Thank you     Action Taken: Other: cardiology     Travel Screening: Not Applicable     Date of Service:

## 2024-06-20 NOTE — TELEPHONE ENCOUNTER
Situation   Repeat cardioversion     Background   AFIB     Assessment   RN called and spoke with patient to review plan to repeat cardioversion in 2 weeks  as discussed yesterday post cardioversion. Pt stated she was not made aware of this plan.   Patient confirmed taking amiodarone 400 mg BID as instructed. . Pt complained of increase shortness of breath, lower extremity edema and reported 3 lbs of weight gain since last visit.  Patient had question regarding new drug/ Amiodarone. Patient is currently wearing mobile telemetry to monitor amiodarone initiation.     Recs: RN reviewed to take eliquis every day with no missed dose.Patient was educated regarding newly prescribed medication ( amio), including discussion of  the indication, administration.Patient agreeable with the plan and stated her questions were answered to satisfaction.     Message routed to patient care team for further recommendations.     Recs: Patient was sent to ER.

## 2024-06-21 ENCOUNTER — TELEPHONE (OUTPATIENT)
Dept: CARDIOLOGY | Facility: CLINIC | Age: 79
End: 2024-06-21
Payer: MEDICARE

## 2024-06-21 DIAGNOSIS — I48.19 PERSISTENT ATRIAL FIBRILLATION (H): Primary | ICD-10-CM

## 2024-06-21 LAB
ANION GAP SERPL CALCULATED.3IONS-SCNC: 13 MMOL/L (ref 7–15)
BUN SERPL-MCNC: 24.1 MG/DL (ref 8–23)
CALCIUM SERPL-MCNC: 9.1 MG/DL (ref 8.8–10.2)
CHLORIDE SERPL-SCNC: 104 MMOL/L (ref 98–107)
CHOLEST SERPL-MCNC: 85 MG/DL
CREAT SERPL-MCNC: 0.93 MG/DL (ref 0.51–0.95)
DEPRECATED HCO3 PLAS-SCNC: 18 MMOL/L (ref 22–29)
EGFRCR SERPLBLD CKD-EPI 2021: 62 ML/MIN/1.73M2
ERYTHROCYTE [DISTWIDTH] IN BLOOD BY AUTOMATED COUNT: 16.1 % (ref 10–15)
GLUCOSE SERPL-MCNC: 128 MG/DL (ref 70–99)
HCT VFR BLD AUTO: 40.9 % (ref 35–47)
HDLC SERPL-MCNC: 39 MG/DL
HGB BLD-MCNC: 13 G/DL (ref 11.7–15.7)
LDLC SERPL CALC-MCNC: 17 MG/DL
MAGNESIUM SERPL-MCNC: 1.8 MG/DL (ref 1.7–2.3)
MCH RBC QN AUTO: 30.3 PG (ref 26.5–33)
MCHC RBC AUTO-ENTMCNC: 31.8 G/DL (ref 31.5–36.5)
MCV RBC AUTO: 95 FL (ref 78–100)
NONHDLC SERPL-MCNC: 46 MG/DL
PLATELET # BLD AUTO: 228 10E3/UL (ref 150–450)
POTASSIUM SERPL-SCNC: 4.2 MMOL/L (ref 3.4–5.3)
RBC # BLD AUTO: 4.29 10E6/UL (ref 3.8–5.2)
SODIUM SERPL-SCNC: 135 MMOL/L (ref 135–145)
TRIGL SERPL-MCNC: 147 MG/DL
TROPONIN T SERPL HS-MCNC: 13 NG/L
WBC # BLD AUTO: 9 10E3/UL (ref 4–11)

## 2024-06-21 PROCEDURE — 36415 COLL VENOUS BLD VENIPUNCTURE: CPT

## 2024-06-21 PROCEDURE — 84484 ASSAY OF TROPONIN QUANT: CPT

## 2024-06-21 PROCEDURE — 83735 ASSAY OF MAGNESIUM: CPT

## 2024-06-21 PROCEDURE — 999N000128 HC STATISTIC PERIPHERAL IV START W/O US GUIDANCE

## 2024-06-21 PROCEDURE — 250N000011 HC RX IP 250 OP 636: Mod: JZ

## 2024-06-21 PROCEDURE — 250N000013 HC RX MED GY IP 250 OP 250 PS 637: Performed by: INTERNAL MEDICINE

## 2024-06-21 PROCEDURE — 250N000013 HC RX MED GY IP 250 OP 250 PS 637

## 2024-06-21 PROCEDURE — 93010 ELECTROCARDIOGRAM REPORT: CPT | Performed by: INTERNAL MEDICINE

## 2024-06-21 PROCEDURE — 99233 SBSQ HOSP IP/OBS HIGH 50: CPT | Mod: FS

## 2024-06-21 PROCEDURE — 80048 BASIC METABOLIC PNL TOTAL CA: CPT

## 2024-06-21 PROCEDURE — 85027 COMPLETE CBC AUTOMATED: CPT

## 2024-06-21 PROCEDURE — 93005 ELECTROCARDIOGRAM TRACING: CPT

## 2024-06-21 PROCEDURE — 80061 LIPID PANEL: CPT

## 2024-06-21 PROCEDURE — 120N000005 HC R&B MS OVERFLOW UMMC

## 2024-06-21 PROCEDURE — 250N000009 HC RX 250

## 2024-06-21 RX ORDER — LISINOPRIL 10 MG/1
10 TABLET ORAL DAILY
Status: DISCONTINUED | OUTPATIENT
Start: 2024-06-21 | End: 2024-06-22 | Stop reason: HOSPADM

## 2024-06-21 RX ORDER — MAGNESIUM OXIDE 400 MG/1
400 TABLET ORAL EVERY 4 HOURS
Status: COMPLETED | OUTPATIENT
Start: 2024-06-21 | End: 2024-06-21

## 2024-06-21 RX ORDER — SPIRONOLACTONE 25 MG
12.5 TABLET ORAL DAILY
Status: DISCONTINUED | OUTPATIENT
Start: 2024-06-22 | End: 2024-06-22 | Stop reason: HOSPADM

## 2024-06-21 RX ORDER — POTASSIUM CHLORIDE 1500 MG/1
20 TABLET, EXTENDED RELEASE ORAL
Status: CANCELLED | OUTPATIENT
Start: 2024-06-21

## 2024-06-21 RX ORDER — FUROSEMIDE 10 MG/ML
40 INJECTION INTRAMUSCULAR; INTRAVENOUS ONCE
Status: COMPLETED | OUTPATIENT
Start: 2024-06-21 | End: 2024-06-21

## 2024-06-21 RX ORDER — AMIODARONE HYDROCHLORIDE 200 MG/1
200 TABLET ORAL DAILY
Status: DISCONTINUED | OUTPATIENT
Start: 2024-06-28 | End: 2024-06-22 | Stop reason: HOSPADM

## 2024-06-21 RX ORDER — POTASSIUM CHLORIDE 1500 MG/1
40 TABLET, EXTENDED RELEASE ORAL
Status: CANCELLED | OUTPATIENT
Start: 2024-06-21

## 2024-06-21 RX ORDER — AMIODARONE HYDROCHLORIDE 200 MG/1
400 TABLET ORAL 2 TIMES DAILY
Status: DISCONTINUED | OUTPATIENT
Start: 2024-06-21 | End: 2024-06-22 | Stop reason: HOSPADM

## 2024-06-21 RX ADMIN — Medication 400 MG: at 11:00

## 2024-06-21 RX ADMIN — LISINOPRIL 10 MG: 10 TABLET ORAL at 09:23

## 2024-06-21 RX ADMIN — APIXABAN 5 MG: 5 TABLET, FILM COATED ORAL at 20:09

## 2024-06-21 RX ADMIN — FUROSEMIDE 40 MG: 10 INJECTION, SOLUTION INTRAVENOUS at 09:35

## 2024-06-21 RX ADMIN — LATANOPROST 1 DROP: 50 SOLUTION OPHTHALMIC at 22:50

## 2024-06-21 RX ADMIN — Medication 400 MG: at 14:10

## 2024-06-21 RX ADMIN — APIXABAN 5 MG: 5 TABLET, FILM COATED ORAL at 09:23

## 2024-06-21 RX ADMIN — ACETAMINOPHEN 650 MG: 325 TABLET, FILM COATED ORAL at 03:48

## 2024-06-21 RX ADMIN — AMIODARONE HYDROCHLORIDE 400 MG: 200 TABLET ORAL at 20:09

## 2024-06-21 RX ADMIN — TIMOLOL MALEATE 1 DROP: 5 SOLUTION OPHTHALMIC at 09:27

## 2024-06-21 RX ADMIN — B-COMPLEX W/ C & FOLIC ACID TAB 1 TABLET: TAB at 09:27

## 2024-06-21 RX ADMIN — FUROSEMIDE 40 MG: 10 INJECTION, SOLUTION INTRAVENOUS at 16:36

## 2024-06-21 RX ADMIN — FOLIC ACID 2 MG: 1 TABLET ORAL at 09:23

## 2024-06-21 RX ADMIN — ATORVASTATIN CALCIUM 20 MG: 20 TABLET, FILM COATED ORAL at 09:26

## 2024-06-21 RX ADMIN — Medication 1 TABLET: at 09:26

## 2024-06-21 ASSESSMENT — ACTIVITIES OF DAILY LIVING (ADL)
ADLS_ACUITY_SCORE: 35
ADLS_ACUITY_SCORE: 35
ADLS_ACUITY_SCORE: 38
ADLS_ACUITY_SCORE: 35
ADLS_ACUITY_SCORE: 27
ADLS_ACUITY_SCORE: 37
ADLS_ACUITY_SCORE: 27
ADLS_ACUITY_SCORE: 35
ADLS_ACUITY_SCORE: 26
ADLS_ACUITY_SCORE: 26
ADLS_ACUITY_SCORE: 35
ADLS_ACUITY_SCORE: 38
ADLS_ACUITY_SCORE: 26
ADLS_ACUITY_SCORE: 35
ADLS_ACUITY_SCORE: 35
ADLS_ACUITY_SCORE: 27
ADLS_ACUITY_SCORE: 27

## 2024-06-21 NOTE — PROGRESS NOTES
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 Subsequent cardioversion on 6/18/24 failed (shocked x3 times). Started on Amiodarone and sent home with ElationEMR. Patient called in on 6/19 with concern for increased SOB and edema. Encouraged to report to ER. On arrival, BP normotensive, , afebrile. Labs with BNP 2000, troponin 15, otherwise CBC and BMP WNL. EKG without ST elevations. CXR with mild pulmonary edema. Suspect acute decompensated heart failure in the setting of atrial fibrillation. S/p lasix 20 mg IV in the ER.  Nuclear lexiscan stress test - 6/3/2024: EF 69%, no ischemia  DIRK 6/19/2024: EF 40-45%, mild RV dilation, moderate MR, RV systolic pressure of 32 mmHg   Dry weight: unknown (reported baseline wt 196 lbs, on admission 203 lbs)  - apixaban 5 mg bid  - amiodarone drip to continue through this afternoon, then transition to PO Amio 400 mg BID for 1 week, followed by 200 mg daily  - outpatient DCCV scheduled for next week  GDMT:     - fluid status: hypervolemic, Lasix IV 40 mg x 2 today     - BB: defer d/t hypotension     - ACE/ARB/ARNI: lisinopril 10 mg     - SGLT2: pending pharmacy liaison consult for coverage     - MRA: start spironolactone 12.5 mg daily  - CORE consult placed  - telemetry  - strict I &O   -  daily weights  - daily BMP  - electrolyte protocols     HLD  Lipid panel 11/2022 with LDL 74, .  - atorvastatin 20 mg daily    Moderate persistent asthma  - albuterol prn     Seronegative RA  - methotrexate 20 mg once weekly (Mondays not ordered here)  - folate 2 mg daily    FEN: 2g Na  Code status: Full  Prophylaxis: DOAC, ambulation  Isolation: none  Disposition: pending diuresis and rate-controlled rhythm    Patient seen and discussed with Dr. Phipps, who agrees with above plan.    Medically Ready for Discharge: Anticipated Tomorrow       Claudine MACHUCA, CNP  [facility] Cardiology Team    Physical Exam:  Temp:  [97.8  F (36.6  C)-98  F (36.7  C)] 98  F (36.7  C)  Pulse:  [] 89  Resp:  [16-22] 18  BP: ()/(69-91) 108/77  SpO2:  [93 %-98 %] 98 %      Intake/Output Summary (Last 24 hours) at 6/21/2024 1554  Last data filed at 6/21/2024 1406  Gross per 24 hour   Intake 460.42 ml   Output --   Net 460.42 ml        Wt:   Wt Readings from Last 5 Encounters:   06/18/24 92.1 kg (203 lb)   06/13/24 91.8 kg (202 lb 4.8 oz)   05/14/24 90.8 kg (200 lb 4 oz)   03/29/24 87.9 kg (193 lb 11.2 oz)   02/15/24 89.4 kg (197 lb)       Interval History:  Patient denies any chest pain, difficulty breathing upon exam. States that she did not sleep well in ER overnight due to chronic back pain. She reports she only peed once with the IV Lasix 20 that was given overnight.    General: NAD  HEENT:  PERRLA, EOMI.   CV: Irregularly irregular. No murmur appreciated. No rubs or gallops. Peripheral radial pulse intact.  Resp: No increased work of breathing or use of accessory muscles, breathing comfortably on room air.  Lung sounds clear throughout/bilaterally  Abdomen:  Normal active bowel sounds.  Abdomen is soft. No distension, non-tender to palpation.    Extremities: Warm. Capillary refill less than 3 sec. 2/4 pedal pulses bilaterally. No pre-tibial edema. No cyanosis or clubbing.  Skin:  Warm and dry. No erythema, rashes,  ulceration or diaphoresis.  Neuro: Alert and oriented x3.      Medications:  Current Facility-Administered Medications   Medication Dose Route Frequency Provider Last Rate Last Admin    apixaban ANTICOAGULANT (ELIQUIS) tablet 5 mg  5 mg Oral BID Trista Manning MD   5 mg at 06/21/24 0923    atorvastatin (LIPITOR) tablet 20 mg  20 mg Oral Daily Trista Manning MD   20 mg at 06/21/24 0926    folic acid (FOLVITE) tablet 2 mg  2 mg Oral Daily Trista Manning MD   2 mg at 06/21/24 0923    latanoprost (XALATAN) 0.005 % ophthalmic solution 1 drop  1 drop Both Eyes At Bedtime Trista Manning MD        lisinopril (ZESTRIL) tablet 10 mg  10 mg Oral Daily Claudine Mosquera APRN CNP   10 mg at 06/21/24 0923    multivitamin (OCUVITE) tablet 1 tablet  1 tablet Oral Daily Trista Manning MD   1 tablet at 06/21/24 0926    sodium chloride (PF) 0.9% PF flush 3 mL  3 mL Intracatheter Q8H Trista Manning MD   3 mL at 06/21/24 0054    timolol maleate (TIMOPTIC) 0.5 % ophthalmic solution 1 drop  1 drop Left Eye QAM Trista Manning MD   1 drop at 06/21/24 0927    vitamin B complex with vitamin C (STRESS TAB) tablet 1 tablet  1 tablet Oral Daily Wagner Gongora MD   1 tablet at 06/21/24 0927     Current Facility-Administered Medications   Medication Dose Route Frequency Provider Last Rate Last Admin    amiodarone (NEXTERONE) 1.8 mg/mL in sodium chloride 0.9% in non-PVC container 500 mL ADULT STANDARD infusion  0.5 mg/min Intravenous Continuous Shady Martins MD 16.67 mL/hr at 06/21/24 1406 0.5 mg/min at 06/21/24 1406    medication instruction   Does not apply Continuous PRN Trista Manning MD        Patient is already receiving anticoagulation with heparin, enoxaparin (LOVENOX), warfarin (COUMADIN)  or other anticoagulant medication   Does not apply Continuous PRN Trista Manning MD           Labs:   CMP  Recent Labs   Lab 06/21/24  0608 06/20/24  2027 06/19/24  0819    230   --    POTASSIUM 4.2 4.5 4.0   CHLORIDE 104 107  --    CO2 18* 21*  --    ANIONGAP 13 11  --    * 100*  --    BUN 24.1* 23.6*  --    CR 0.93 0.95  --    GFRESTIMATED 62 60*  --    RUDOLPH 9.1 9.1  --    MAG 1.8  --  1.7   PROTTOTAL  --  6.5  --    ALBUMIN  --  3.8  --    BILITOTAL  --  0.7  --    ALKPHOS  --  60  --    AST  --  35 29   ALT  --  23 24     CBC  Recent Labs   Lab 06/21/24  0608 06/20/24  2027   WBC 9.0 8.0   RBC 4.29 4.23   HGB 13.0 12.7   HCT 40.9 40.0   MCV 95 95   MCH 30.3 30.0   MCHC 31.8 31.8   RDW 16.1* 15.9*    231     INR  Recent Labs   Lab 06/19/24  0819   INR 1.75*       Diagnostics  ECG 6/19/24:      Echo 6/19/24:  Left Ventricle  Left ventricular function is decreased. The ejection fraction is 40-45%  (mildly reduced).     Right Ventricle  The right ventricle is normal size. Mild right ventricular dilation is  present.     Atria  Both atria appear normal. The left atrial appendage is normal. It is free of  spontaneous echo contrast and thrombus. The left atrial appendage Doppler  velocities are normal. Small PFO noted by color doppler.     Mitral Valve  Mild to moderate mitral annular calcification is present. Moderate mitral  insufficiency is present.     Aortic Valve  Aortic valve sclerosis is present.     Tricuspid Valve  Moderate tricuspid insufficiency is present. Right ventricular systolic  pressure is 32mmHg above the right atrial pressure.     Pulmonic Valve  The pulmonic valve is normal. Trace pulmonic insufficiency is present.     Vessels  The aorta root is normal. The pulmonary artery and bifurcation cannot be  assessed.     Pericardium  No pericardial effusion is present.    Lexiscan 6/3/24:    The nuclear stress test is negative for inducible myocardial ischemia or infarction.    LVEDv 105ml. LVESv 33ml. LV EF 69%.    A prior study was conducted on 1/24/2013.  This study has no change when compared with the prior study.        ECG Summary  ECG Baseline  electrocardiogram demonstrates atrial fibrillation.with RVR. The stress electrocardiogram is negative for inducible ischemic EKG changes.  Arrhythmias during stress: Occasional PVCs.   Technical Comments    Cardiac Protocol A pharmacologic stress test was performed following a supine Lexiscan protocol using 0.4 mg of intravenous regadenoson administered over 10 seconds under the supervision of Dr. Church. The patient reported dyspnea during the stress test.  The supervising registered nurse observed typical vasodilator side effects during the stress test.   Isotope Administration Nuclear imaging was accomplished using a one day protocol with 41.2 mCi of technetium tetrofosmin injected at the completion of Lexiscan infusion on 6/3/2024 and 10.1 mCi of technetium tetrofosmin at rest on 6/3/2024.   Nuclear Study Quality Final image quality is satisfactory.   Stress Measurements    Baseline Vitals   Baseline  bpm         Baseline Systolic          Baseline Diastolic BP 95         Peak Stress Vitals   Max          Last Stress Systolic          Last Stress Diastolic BP 96         Exercise Data   Target          Max Predicted  %            Nuclear Perfusion    Stress Summed Score: 0 Percent Abnormal: 0.00%        The left ventricular perfusion is normal.         Resting Summed Score: 0 Percent Abnormal: 0.00%        The left ventricular perfusion is normal.               Wall Score    Wall Motion    Score Index: 1.00         The left ventricular wall motion is normal.                   Recent Results (from the past 24 hour(s))   XR Chest Port 1 View    Narrative    EXAM: XR CHEST PORT 1 VIEW  6/20/2024 7:43 PM      HISTORY: SOB, CHF concern    COMPARISON: CT 6/3/2024    FINDINGS: Single view of the chest. Prominent right paratracheal  stripe, similar to findings seen on CT dated 6/3/2024 and possibly  secondary to prominent pericardial recess. Loop recorder. Trachea is  midline. Cardiac  silhouette is enlarged.  Streaky perihilar and left  greater than right basilar opacities. Small bilateral pleural  effusions. No pneumothorax.      Impression    IMPRESSION: Cardiomegaly with streaky perihilar and bibasilar  opacities, likely mild pulmonary edema. Small bilateral pleural  effusions.    I have personally reviewed the examination and initial interpretation  and I agree with the findings.    NILDA BRIONES MD         SYSTEM ID:  [id number]       Medical Decision Making   55 MINUTES SPENT BY ME on the date of service doing chart review, history, exam, documentation & further activities per the note.

## 2024-06-21 NOTE — H&P
[facility]    Cardiology History and Physical - Cardiology         Date of Admission:  6/20/2024    Assessment & Plan: SL    Alfreda Baxter is a 79 year old female admitted on 6/20/2024. She has CVA 8/11/2018, left eye blindness, rheumatoid arthritis, carcinoma in situ of left upper limb, including shoulder, HTN, paroxysmal A-fib, paroxysmal ventricular tachycardia, and HLD presents to the emergency department for shortness of breath found to be in atrial fibrillation with RVR.    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 Subsequent cardioversion on 6/18/24 failed (shocked x3 times). After felt more short of breath prompting arrival to the ED. On arrival BP normotensive, , afebrile. Labs with BNP 2000, troponin 15, otherwise CBC and BMP WNL. EKG without ST elevations. Chest x ray with mild pulmonary edema. Suspect acute decompensated heart failure in the setting of atrial fibrillation.   Last DIRK - 6/19/2024: EF 40-45%, mild RV dilation, moderate MR, RV systolic pressure of 32 mmHg   Nuclear lexiscan stress test - 6/3/2024: EF 69%, no ischemia   Dry weight: unknown (reported baseline wt 196 lbs, on admission 203 lbs)  - telemetry  - strict I &O   - daily weights  - frequent electrolyte checks  - replace K prn (rn protocol in place)  - amiodarone drip (hold pta po amiodarone while on drip)  - apixaban 5 mg bid  - s/p lasix 20 mg iv (will likely need re dosing in the morning)  - hold lisinopril 10 mg (may need medication for afterload reduction in morning, but will observe bp closely tonight)  - atorvastatin 20 mg daily    Moderate persistent asthma  - albuterol prn    Seronegative RA  - methotrexate 20 mg once weekly (Mondays not ordered here)  - folate 2 mg daily       Diet: Combination Diet Regular Diet Adult; No  "Caffeine Diet    DVT Prophylaxis: DOAC  Schumacher Catheter: Not present  Cardiac Monitoring: ACTIVE order. Indication: Tachyarrhythmias, acute (48 hours)  Code Status: Full Code          Clinically Significant Risk Factors Present on Admission               # Drug Induced Coagulation Defect: home medication list includes an anticoagulant medication    # Hypertension: Noted on problem list             # Obesity: Estimated body mass index is 38.03 kg/m  as calculated from the following:    Height as of 6/13/24: 1.556 m (5' 1.26\").    Weight as of 6/18/24: 92.1 kg (203 lb).       # Asthma: noted on problem list     Disposition Plan   Expected discharge: 4 - 7 days, recommended to prior living arrangement once fluid volume status optimized on oral medication.    Entered: Trista Manning MD 06/20/2024, 11:26 PM     Care to be formally discussed in the morning.    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    Malinda explains that ever since Mothers day she has notice a significant increase in shortness of breath and fatigue.  Given that she was having shortness of breath she thought maybe it was related to her heart so she was able to complete a stress test but that was normal.  During that stress test however was found to be in atrial fibrillation with elevated heart rates.  She never really knew she was in atrial fibrillation and did not experience any palpitations or any other symptoms such as chest pain.  " With this she was seen by cardiology who planned for cardioversion which was attempted yesterday without success.  Given her progressive shortness of breath and fatigue she decided to come to the ER today.    Currently Malinda does not have any chest pain, palpitations, nausea, vomiting, abdominal pain, dysuria, hematuria, melena, hematochezia, or new rashes of concern.  Has already received IV Lasix and feels that her breathing has already slightly improved. Does not that she has had a very limited interest in food recently and gained weight throughout her abdomen.    Past Medical History    Past Medical History:   Diagnosis Date    Acute idiopathic gout of left foot 11/04/2015    Adenomatous colon polyp 06/05/2013    Colonoscopy due 2025    Asthma     controlled with inhaler    Blind left eye 07/08/2019    Due to central retinal occlusion    Cataract     Central retinal artery occlusion of left eye 09/15/2018    Disturbances of vision, late effect of stroke 12/03/2022    DJD (degenerative joint disease)     knees    GERD (gastroesophageal reflux disease)     Glaucoma     Gout     History of central retinal artery occlusion, os 12/11/2020    HTN (hypertension)     Hyperlipidemia LDL goal <70 12/31/2010    Low bone mass     Lumbar spinal stenosis 12/08/2011    Sees Dr Sage    Mild persistent asthma 12/12/2013    Neovascular glaucoma of left eye, 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   H/o R central retinal artery occlusion    Paroxysmal ventricular tachycardia (H) 11/20/2018    Added automatically from request for surgery [id number]    PFO (patent foramen ovale)     Recurrent cerebrovascular accidents (CVAs) (H) 11/14/2022    Recurrent cerebrovascular accidents (CVAs) (H) 11/14/2022    Transient global amnesia 11/14/2022       Past Surgical History   Past Surgical History:   Procedure Laterality  Date    ANESTHESIA CARDIOVERSION N/A 2024    Procedure: Anesthesia cardioversion @0930;  Surgeon: GENERIC ANESTHESIA PROVIDER;  Location: [facility] OR    APPENDECTOMY      age 7 yrs.    BREAST BIOPSY, RT/LT  2004    left benign    CATARACT IOL, RT/LT       SECTION      x 2    COLONOSCOPY  2013    needed q 3 yrs.    COLONOSCOPY N/A 09/10/2020    Procedure: Colonoscopy, With Polypectomy And Biopsy;  Surgeon: Brian Cleaning MD;  Location: [facility] OR    COLONOSCOPY WITH CO2 INSUFFLATION N/A 2016    Procedure: COLONOSCOPY WITH CO2 INSUFFLATION;  Surgeon: Brian Cleaning MD;  Location: [facility] OR    COLONOSCOPY WITH CO2 INSUFFLATION N/A 09/10/2020    Procedure: COLONOSCOPY, WITH CO2 INSUFFLATION;  Surgeon: Brian Cleaning MD;  Location: [facility] OR    EP COMPREHENSIVE EP STUDY N/A 2018    Procedure: LOOP RECORDER;  Surgeon: Buck Butterfield MD;  Location:  [facility]    HYSTERECTOMY, POORNIMA      bleeding fibroids    ORTHOPEDIC SURGERY      meniscus repair    PHACOEMULSIFICATION WITH STANDARD INTRAOCULAR LENS IMPLANT Right 10/24/2022    Procedure: COMPLE RIGHT EYE PHACOEMULSIFICATION, CATARACT, WITH STANDARD INTRAOCULAR LENS IMPLANT INSERTION;  Surgeon: Ezequiel Florence MD;  Location: [facility] TOTAL KNEE ARTHROPLASTY Left 2015    at [facility]       Prior to Admission Medications   Prior to Admission Medications   Prescriptions Last Dose Informant Patient Reported? Taking?   B-COMPLEX OR   Yes No   Sig: Take  by mouth daily.   CALCIUM 500 +D OR   Yes No   Sig: one daily   acetaminophen (TYLENOL) 500 MG tablet   Yes No   Sig: Take 500-1,000 mg by mouth every 6 hours as needed.   albuterol (PROAIR HFA/PROVENTIL HFA/VENTOLIN HFA) 108 (90 Base) MCG/ACT inhaler   No No   Sig: Inhale 2 puffs into the lungs every 6 hours as needed for shortness of breath or wheezing   amiodarone (PACERONE) 200 MG tablet   No No   Sig: Take 400 mg (2 tablets) twice a day x 2  weeks. Then take 200 mg (1 tablet) daily.   apixaban ANTICOAGULANT (ELIQUIS) 5 MG tablet   No No   Sig: Take 1 tablet (5 mg) by mouth 2 times daily   atorvastatin (LIPITOR) 20 MG tablet   No No   Sig: Take 1 tablet (20 mg) by mouth daily   fluticasone (ARNUITY ELLIPTA) 100 MCG/ACT inhaler   No No   Sig: Inhale 1 puff into the lungs daily   fluticasone (FLOVENT HFA) 110 MCG/ACT inhaler   No No   Sig: Inhale 1 puff into the lungs 2 times daily   folic acid (FOLVITE) 1 MG tablet   No No   Sig: Take 2 tablets (2 mg) by mouth daily   hypromellose (ARTIFICIAL TEARS) 0.5 % SOLN ophthalmic solution   Yes No   Sig: Place 1 drop into both eyes 3 times daily   latanoprost (XALATAN) 0.005 % ophthalmic solution   No No   Sig: Place 1 drop into both eyes At Bedtime   lisinopril (ZESTRIL) 10 MG tablet   No No   Sig: Take 1 tablet (10 mg) by mouth daily   methotrexate 2.5 MG tablet   No No   Sig: Take 8 tablets (20 mg) by mouth every 7 days . Methotrexate is a once weekly medication, taken on the same day of each week.   multivitamin (OCUVITE) TABS tablet   Yes No   Sig: Take 1 tablet by mouth daily   timolol maleate (TIMOPTIC) 0.5 % ophthalmic solution   No No   Sig: Place 1 drop Into the left eye every morning   zolpidem (AMBIEN) 5 MG tablet   No No   Sig: TAKE ONE TABLET BY MOUTH IN THE EVENING AS NEEDED FOR SLEEP   Patient not taking: Reported on 2024      Facility-Administered Medications: None        Social History   I have reviewed this patient's social history and updated it with pertinent information if needed.  Social History     Tobacco Use    Smoking status: Former     Current packs/day: 0.00     Average packs/day: 0.1 packs/day for 2.0 years (0.2 ttl pk-yrs)     Types: Cigarettes     Start date: 1963     Quit date: 1965     Years since quittin.5     Passive exposure: Never    Smokeless tobacco: Never   Vaping Use    Vaping status: Never Used   Substance Use Topics    Alcohol use: Not Currently      Comment: one a month    Drug use: No         Family History   I have reviewed this patient's family history and updated it with pertinent information if needed.  Family History   Problem Relation Age of Onset    Arthritis Mother     Cancer Mother     Hypertension Mother     Other Cancer Mother     Thyroid Disease Mother     Respiratory Father     Glaucoma Father     Asthma Father     Allergies Sister     Eye Disorder Sister     Lipids Sister     Neurologic Disorder Sister     Osteoporosis Sister     Glaucoma Sister     Hypertension Sister     Macular Degeneration Sister     Hyperlipidemia Sister     Hypertension Sister     Osteoporosis Sister     Gastrointestinal Disease Son         Ulcerative Colitis    Glaucoma Other          Allergies   Allergies   Allergen Reactions    No Clinical Screening - See Comments Other (See Comments)     senisitive to non steroidals  Aleve, ibuprofen celebrex cause bad stomach pains  senisitive to non steroidals  Aleve, ibuprofen celebrex cause bad stomach pains    Oxycodone Hives     Other reaction(s): Unknown  ... With facial swelling    Nsaids Nausea and Vomiting     Other reaction(s): Abdominal Pain        Physical Exam   Vital Signs: Temp: 97.8  F (36.6  C) Temp src: Oral BP: 138/82 Pulse: 114   Resp: 22 SpO2: 95 % O2 Device: None (Room air)    Weight: 0 lbs 0 oz    Constitutional: Awake, alert, cooperative, in NAD.  Eyes: Sclera clear, conjunctiva normal.  ENT: Normocephalic, without obvious abnormality, oral pharynx with moist mucus membranes  Respiratory: Non-labored breathing, good air exchange, clear to auscultation bilaterally, no crackles or wheezing.  Cardiovascular: Irregularly irregular, no murmur noted. JVP at jaw sitting at 90 degrees.  GI: + bowel sounds, soft, non-distended, non-tender to palpation.  Skin: No concerning lesions or rash on exposed areas.  Musculoskeletal: 3+ pitting edema kaylen LEs.  Neurologic: Awake, alert & oriented x3.  Cranial nerves II-XII are  grossly intact.   Psych: appropriate affect    Medical Decision Making       Please see A&P for additional details of medical decision making.      Data     I have personally reviewed the following data over the past 24 hrs:    8.0  \   12.7   / 231     139 107 23.6 (H) /  100 (H)   4.5 21 (L) 0.95 \     ALT: 23 AST: 35 AP: 60 TBILI: 0.7   ALB: 3.8 TOT PROTEIN: 6.5 LIPASE: N/A     Trop: 15 (H) BNP: 2,092 (H)       Imaging results reviewed over the past 24 hrs:   Recent Results (from the past 24 hour(s))   XR Chest Port 1 View    Narrative    EXAM: XR CHEST PORT 1 VIEW  6/20/2024 7:43 PM      HISTORY: SOB, CHF concern    COMPARISON: CT 6/3/2024    FINDINGS: Single view of the chest. Prominent right paratracheal  stripe, similar to findings seen on CT dated 6/3/2024 and possibly  secondary to prominent pericardial recess. Loop recorder. Trachea is  midline. Cardiac silhouette is enlarged.  Streaky perihilar and left  greater than right basilar opacities. Small bilateral pleural  effusions. No pneumothorax.      Impression    IMPRESSION: Cardiomegaly with streaky perihilar and bibasilar  opacities, likely mild pulmonary edema. Small bilateral pleural  effusions.    I have personally reviewed the examination and initial interpretation  and I agree with the findings.    NILDA BRIONES MD         SYSTEM ID:  [id number]        no fever/no numbness/no vomiting/no pain/no chills/no dizziness/no decreased eating/drinking/no nausea/no weakness/no tingling

## 2024-06-21 NOTE — TELEPHONE ENCOUNTER
"Date: 6/21/2024    Time of Call: 2:45 PM     Diagnosis:  AFIB     [ TORB ] Ordering provider: Libertad Howard   Order: \" Yes no problem. I think she will likely go home today or tomorrow. Can you still place a cardioversion order for this coming week as an outpatient? I talked with her primary team this morning and they are not doing cardioversion this admission - I want more time for amio to kick in\"     Order received by: Sofie Tariq RN       Follow-up/additional notes: Called and spoke with patient to review the above plan to repeat cardioversion next week Thursday. Pt stated she was told today they wouldn't repeat the cardioversion again. RN explained based on the above recommendation they wouldn't do it in patient , but outpatient to allow more time for amiodarone to kick in. Patient care team was notified for further clarification.      "

## 2024-06-21 NOTE — TELEPHONE ENCOUNTER
Patient aware of scheduled appointment:  Date: 7/2/2024  Time: 10:15AM  Visit type: Enrollment CORE  Provider: SVEN Sanches CNP  Location: [address] Floor, [place] [address]  Testing/imaging: NA  Additional notes: 6/21 Curtis scheduled Enrollment CORE w/ Claudine Mosquera 7/2 in [place]. Ok per Kina Navas. MJ

## 2024-06-21 NOTE — MEDICATION SCRIBE - ADMISSION MEDICATION HISTORY
Medication Scribe Admission Medication History    Admission medication history is complete. The information provided in this note is only as accurate as the sources available at the time of the update.    Information Source(s): Patient via in-person    Pertinent Information: Pt reported taking medications on PTA medication list as directed.    Changes made to PTA medication list:  Added: None  Deleted: Arnuity Ellipta 100 mcg/act inhaler, Flovent HFA 110mcg/ct inhaler.    Changed: None    Allergies reviewed with patient and updates made in EHR: yes    Medication History Completed By: Lauren Chance 6/21/2024 12:40 AM    PTA Med List   Medication Sig Last Dose    acetaminophen (TYLENOL) 500 MG tablet Take 500-1,000 mg by mouth every 6 hours as needed. 6/20/2024    albuterol (PROAIR HFA/PROVENTIL HFA/VENTOLIN HFA) 108 (90 Base) MCG/ACT inhaler Inhale 2 puffs into the lungs every 6 hours as needed for shortness of breath or wheezing Past Week    amiodarone (PACERONE) 200 MG tablet Take 400 mg (2 tablets) twice a day x 2 weeks. Then take 200 mg (1 tablet) daily. 6/20/2024 at 6pm    apixaban ANTICOAGULANT (ELIQUIS) 5 MG tablet Take 1 tablet (5 mg) by mouth 2 times daily 6/20/2024 at 6pm    atorvastatin (LIPITOR) 20 MG tablet Take 1 tablet (20 mg) by mouth daily 6/20/2024 at am    B-COMPLEX OR Take  by mouth daily. 6/20/2024 at am    CALCIUM 500 +D OR one daily 6/20/2024 at am    folic acid (FOLVITE) 1 MG tablet Take 2 tablets (2 mg) by mouth daily 6/20/2024 at am    hypromellose (ARTIFICIAL TEARS) 0.5 % SOLN ophthalmic solution Place 1 drop into both eyes 3 times daily 6/20/2024 at am    latanoprost (XALATAN) 0.005 % ophthalmic solution Place 1 drop into both eyes At Bedtime 6/20/2024 at elidia    lisinopril (ZESTRIL) 10 MG tablet Take 1 tablet (10 mg) by mouth daily 6/20/2024 at am    methotrexate 2.5 MG tablet Take 8 tablets (20 mg) by mouth every 7 days . Methotrexate is a once weekly medication, taken on the same day  Received acceptance from [facility]. Notified ANU [facility]iva via voicemail.   of each week. 6/17/2024    multivitamin (OCUVITE) TABS tablet Take 1 tablet by mouth daily 6/20/2024 at am    timolol maleate (TIMOPTIC) 0.5 % ophthalmic solution Place 1 drop Into the left eye every morning 6/20/2024 at am    zolpidem (AMBIEN) 5 MG tablet TAKE ONE TABLET BY MOUTH IN THE EVENING AS NEEDED FOR SLEEP  at about 3 days ago

## 2024-06-21 NOTE — PROGRESS NOTES
ED 29-MJ. Pt is on Amiodaron gtt, PIV is having a redness and gtt stopped to prevent complete infiltration. This is the 5th Piv been placed with 24 hrs. Wondering if she needs PICC at this point. Advise nestor Santillan RN  ED  Paged: Dr. Moise Benedict@[phone]  Order to discontinue gtt received

## 2024-06-21 NOTE — PROGRESS NOTES
/77   Pulse 89   Temp 98  F (36.7  C) (Oral)   Resp 18   SpO2 98%     Neuro: A&Ox4.   Cardiac: Afebrile, VSS. A-fib   Respiratory: RA   GI/: Voiding spontaneously. No BM this shift.   Diet/appetite: Tolerating diet. Denies nausea   Activity: Up SBA  Pain: Denies   Skin: No new deficits noted.  Lines:PIV- Amio gtt 0.5mg/min  Drains:None    No new complaints.Will continue to monitor and follow plan of care.

## 2024-06-21 NOTE — PROGRESS NOTES
Admission  Diagnosis: Afib w/ RVR   Transferred: from ED report from Tyrell RN   Via: wheelchair   Reason for transfer: Pt appropriate for 6C   Belongings: Received with pt, declined sending any items to security   Chart: Received w/ Pt   Medications: Meds received from old unit w/ pt   Teaching: orientation to unit, call don't fall, use of call light, when to call RN (angina/dizziness/sob, etc)  Access: L. PIV    Telemetry: Placed on patient   Height/Weight: Complete   Suction/Ambu bag/ Flowmeter at bedside:Yes   2 RN Skin Assessment Completed by: Ginny MOULTON And Zenobia HODGE   Skin findings: erythema surrounding PIV that is tender, Iv flushes well. Site marked with skin marker. Skin irritation on left back of old cardioversion pad site. Bilateral +2 dependent edema.

## 2024-06-21 NOTE — TELEPHONE ENCOUNTER
----- Message from Ofelia WADE sent at 6/21/2024  3:05 PM CDT -----  Regarding: RE: scheudle patient - don't need to call  Sorry - its for Claudine Mosquera in [place]! Will have to do a manual override.  ----- Message -----  From: Curtis Kebede  Sent: 6/21/2024   2:28 PM CDT  To: Ofelia Navas RN  Subject: RE: scheudle patient - don't need to call        Its not a scheduled day for both Nathalia or Filemon. Which provider is it for?  ----- Message -----  From: Ofelia Navas RN  Sent: 6/21/2024   2:11 PM CDT  To: [facility]  Subject: scheudle patient - don't need to call            Hi - can you schedule patient for an Enrollment CORE in [place] July 2nd at 10:15? Will figure out labs later.     No need to call. Please do today as patient will discharge this weekdn   Thanks!

## 2024-06-21 NOTE — ED TRIAGE NOTES
Pt ambulatory to triage with c/o SOB, and generalized weakness that's progressively been getting worse over the last several weeks. Pt with hx of afib w/ RVR- with unsuccessful cardioversion attempt yesterday.      Triage Assessment (Adult)       Row Name 06/20/24 6731          Triage Assessment    Airway WDL WDL        Respiratory WDL    Respiratory WDL X  SOB        Skin Circulation/Temperature WDL    Skin Circulation/Temperature WDL WDL        Cardiac WDL    Cardiac WDL X;rhythm     Pulse Rate & Regularity apical pulse irregular

## 2024-06-21 NOTE — ED PROVIDER NOTES
[place] EMERGENCY DEPARTMENT ([facility])    6/20/24       ED PROVIDER NOTE       History     Chief Complaint   Patient presents with    Shortness of Breath    Generalized Weakness     HPI  Alfreda Baxter is a 79 year old female  with a history of CVA 8/11/2018 paroxysmal A-fib, paroxysmal ventricular tachycardia, and HLD who presents to the ED with shortness of breath.  She had been evaluated in the cardiology clinic earlier this month for shortness of breath and atrial fibrillation, was scheduled for elective cardioversion which was attempted on 6/19 but was unsuccessful.  Today she had noted feeling more shortness of breath and there was concern about possible fluid overload so cardiology referred her to the ED.    The patient continues to note some shortness of breath, fatigue, general weakness.     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    Due to central retinal occlusion    Cataract     Central retinal artery occlusion of left eye 09/15/2018    Disturbances of vision, late effect of stroke 12/03/2022    DJD (degenerative joint disease)     knees    GERD (gastroesophageal reflux disease)     Glaucoma     Gout     History of central retinal artery occlusion, os 12/11/2020    HTN (hypertension)     Hyperlipidemia LDL goal <70 12/31/2010    Low bone mass     Lumbar spinal stenosis 12/08/2011    Sees Dr Sage    Mild persistent asthma 12/12/2013    Neovascular glaucoma of left eye, 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   H/o R central retinal artery occlusion    Paroxysmal ventricular tachycardia (H) 11/20/2018    Added automatically from request for surgery [id number]    PFO (patent  foramen ovale)     Recurrent cerebrovascular accidents (CVAs) (H) 2022    Recurrent cerebrovascular accidents (CVAs) (H) 2022    Transient global amnesia 2022     Past Surgical History:   Procedure Laterality Date    ANESTHESIA CARDIOVERSION N/A 2024    Procedure: Anesthesia cardioversion @0930;  Surgeon: GENERIC ANESTHESIA PROVIDER;  Location: [facility] OR    APPENDECTOMY      age 7 yrs.    BREAST BIOPSY, RT/LT  2004    left benign    CATARACT IOL, RT/LT       SECTION      x 2    COLONOSCOPY  2013    needed q 3 yrs.    COLONOSCOPY N/A 09/10/2020    Procedure: Colonoscopy, With Polypectomy And Biopsy;  Surgeon: Brian Cleaning MD;  Location: [facility] OR    COLONOSCOPY WITH CO2 INSUFFLATION N/A 2016    Procedure: COLONOSCOPY WITH CO2 INSUFFLATION;  Surgeon: Brian Cleaning MD;  Location: [facility] OR    COLONOSCOPY WITH CO2 INSUFFLATION N/A 09/10/2020    Procedure: COLONOSCOPY, WITH CO2 INSUFFLATION;  Surgeon: Brian Cleaning MD;  Location: [facility] OR    EP COMPREHENSIVE EP STUDY N/A 2018    Procedure: LOOP RECORDER;  Surgeon: Buck Butterfield MD;  Location:  [facility]    HYSTERECTOMY, POORNIMA  1997    bleeding fibroids    ORTHOPEDIC SURGERY      meniscus repair    PHACOEMULSIFICATION WITH STANDARD INTRAOCULAR LENS IMPLANT Right 10/24/2022    Procedure: COMPLE RIGHT EYE PHACOEMULSIFICATION, CATARACT, WITH STANDARD INTRAOCULAR LENS IMPLANT INSERTION;  Surgeon: Ezequiel Florence MD;  [place] [facility] [place]    [facility] TOTAL KNEE ARTHROPLASTY Left 2015    at [facility]     acetaminophen (TYLENOL) 500 MG tablet  albuterol (PROAIR HFA/PROVENTIL HFA/VENTOLIN HFA) 108 (90 Base) MCG/ACT inhaler  amiodarone (PACERONE) 200 MG tablet  apixaban ANTICOAGULANT (ELIQUIS) 5 MG tablet  atorvastatin (LIPITOR) 20 MG tablet  B-COMPLEX OR  CALCIUM 500 +D OR  fluticasone (ARNUITY ELLIPTA) 100 MCG/ACT inhaler  fluticasone (FLOVENT HFA) 110 MCG/ACT inhaler  folic acid  (FOLVITE) 1 MG tablet  hypromellose (ARTIFICIAL TEARS) 0.5 % SOLN ophthalmic solution  latanoprost (XALATAN) 0.005 % ophthalmic solution  lisinopril (ZESTRIL) 10 MG tablet  methotrexate 2.5 MG tablet  multivitamin (OCUVITE) TABS tablet  timolol maleate (TIMOPTIC) 0.5 % ophthalmic solution  zolpidem (AMBIEN) 5 MG tablet      Allergies   Allergen Reactions    No Clinical Screening - See Comments Other (See Comments)     senisitive to non steroidals  Aleve, ibuprofen celebrex cause bad stomach pains  senisitive to non steroidals  Aleve, ibuprofen celebrex cause bad stomach pains    Oxycodone Hives     Other reaction(s): Unknown  ... With facial swelling    Nsaids Nausea and Vomiting     Other reaction(s): Abdominal Pain     Family History  Family History   Problem Relation Age of Onset    Arthritis Mother     Cancer Mother     Hypertension Mother     Other Cancer Mother     Thyroid Disease Mother     Respiratory Father     Glaucoma Father     Asthma Father     Allergies Sister     Eye Disorder Sister     Lipids Sister     Neurologic Disorder Sister     Osteoporosis Sister     Glaucoma Sister     Hypertension Sister     Macular Degeneration Sister     Hyperlipidemia Sister     Hypertension Sister     Osteoporosis Sister     Gastrointestinal Disease Son         Ulcerative Colitis    Glaucoma Other      Social History   Social History     Tobacco Use    Smoking status: Former     Current packs/day: 0.00     Average packs/day: 0.1 packs/day for 2.0 years (0.2 ttl pk-yrs)     Types: Cigarettes     Start date: 1963     Quit date: 1965     Years since quittin.5     Passive exposure: Never    Smokeless tobacco: Never   Vaping Use    Vaping status: Never Used   Substance Use Topics    Alcohol use: Not Currently     Comment: one a month    Drug use: No      Past medical history, past surgical history, medications, allergies, family history, and social history were reviewed with the patient. No additional pertinent  items.     A complete review of systems was performed with pertinent positives and negatives noted in the HPI, and all other systems negative.    Physical Exam   BP: 114/78  Pulse: 116  Temp: 97.8  F (36.6  C)  Resp: 22  SpO2: 94 %  Physical Exam  Constitutional:       General: She is not in acute distress.     Appearance: She is not toxic-appearing.   HENT:      Head: Normocephalic and atraumatic.      Nose: Nose normal.      Mouth/Throat:      Mouth: Mucous membranes are moist.      Pharynx: Oropharynx is clear.   Eyes:      Pupils: Pupils are equal, round, and reactive to light.   Cardiovascular:      Rate and Rhythm: Normal rate. Rhythm irregular.      Heart sounds: No murmur heard.  Pulmonary:      Effort: Pulmonary effort is normal. No respiratory distress.      Breath sounds: No wheezing or rales.   Musculoskeletal:         General: Normal range of motion.      Right lower leg: Edema present.      Left lower leg: Edema present.   Skin:     General: Skin is warm and dry.   Neurological:      General: No focal deficit present.      Mental Status: She is alert and oriented to person, place, and time.           ED Course, Procedures, & Data      Procedures            EKG Interpretation:      Interpreted by Shady Martins MD  Time reviewed: 1955  Symptoms at time of EKG: Short of breath   Rhythm: atrial fibrillation - rapid  Rate: 100-110  Ectopy: none  ST Segments/ T Waves: No acute ischemic changes      Clinical Impression: atrial fibrillation with elevated rate                 Results for orders placed or performed during the hospital encounter of 06/20/24   XR Chest Port 1 View     Status: None    Narrative    EXAM: XR CHEST PORT 1 VIEW  6/20/2024 7:43 PM      HISTORY: SOB, CHF concern    COMPARISON: CT 6/3/2024    FINDINGS: Single view of the chest. Prominent right paratracheal  stripe, similar to findings seen on CT dated 6/3/2024 and possibly  secondary to prominent pericardial recess. Loop recorder.  Trachea is  midline. Cardiac silhouette is enlarged.  Streaky perihilar and left  greater than right basilar opacities. Small bilateral pleural  effusions. No pneumothorax.      Impression    IMPRESSION: Cardiomegaly with streaky perihilar and bibasilar  opacities, likely mild pulmonary edema. Small bilateral pleural  effusions.    I have personally reviewed the examination and initial interpretation  and I agree with the findings.    NILDA BRIONES MD         SYSTEM ID:  [id number]   Comprehensive metabolic panel     Status: Abnormal   Result Value Ref Range    Sodium 139 135 - 145 mmol/L    Potassium 4.5 3.4 - 5.3 mmol/L    Carbon Dioxide (CO2) 21 (L) 22 - 29 mmol/L    Anion Gap 11 7 - 15 mmol/L    Urea Nitrogen 23.6 (H) 8.0 - 23.0 mg/dL    Creatinine 0.95 0.51 - 0.95 mg/dL    GFR Estimate 60 (L) >60 mL/min/1.73m2    Calcium 9.1 8.8 - 10.2 mg/dL    Chloride 107 98 - 107 mmol/L    Glucose 100 (H) 70 - 99 mg/dL    Alkaline Phosphatase 60 40 - 150 U/L    AST 35 0 - 45 U/L    ALT 23 0 - 50 U/L    Protein Total 6.5 6.4 - 8.3 g/dL    Albumin 3.8 3.5 - 5.2 g/dL    Bilirubin Total 0.7 <=1.2 mg/dL   Troponin T, High Sensitivity     Status: Abnormal   Result Value Ref Range    Troponin T, High Sensitivity 15 (H) <=14 ng/L   BNP     Status: Abnormal   Result Value Ref Range    N terminal Pro BNP Inpatient 2,092 (H) 0 - 1,800 pg/mL   CBC with platelets and differential     Status: Abnormal   Result Value Ref Range    WBC Count 8.0 4.0 - 11.0 10e3/uL    RBC Count 4.23 3.80 - 5.20 10e6/uL    Hemoglobin 12.7 11.7 - 15.7 g/dL    Hematocrit 40.0 35.0 - 47.0 %    MCV 95 78 - 100 fL    MCH 30.0 26.5 - 33.0 pg    MCHC 31.8 31.5 - 36.5 g/dL    RDW 15.9 (H) 10.0 - 15.0 %    Platelet Count 231 150 - 450 10e3/uL    % Neutrophils 68 %    % Lymphocytes 24 %    % Monocytes 6 %    % Eosinophils 2 %    % Basophils 0 %    % Immature Granulocytes 0 %    NRBCs per 100 WBC 0 <1 /100    Absolute Neutrophils 5.4 1.6 - 8.3 10e3/uL    Absolute  Lymphocytes 1.9 0.8 - 5.3 10e3/uL    Absolute Monocytes 0.5 0.0 - 1.3 10e3/uL    Absolute Eosinophils 0.2 0.0 - 0.7 10e3/uL    Absolute Basophils 0.0 0.0 - 0.2 10e3/uL    Absolute Immature Granulocytes 0.0 <=0.4 10e3/uL    Absolute NRBCs 0.0 10e3/uL   Extra Tube ([address] Draw)     Status: None    Narrative    The following orders were created for panel order Extra Tube ([address] Draw).  Procedure                               Abnormality         Status                     ---------                               -----------         ------                     Extra Blue Top Tube[[id number]]                              Final result               Extra Red Top Tube[[id number]]                               Final result                 Please view results for these tests on the individual orders.   Extra Blue Top Tube     Status: None   Result Value Ref Range    Hold Specimen JIC    Extra Red Top Tube     Status: None   Result Value Ref Range    Hold Specimen JIC    EKG 12 lead     Status: None   Result Value Ref Range    Systolic Blood Pressure  mmHg    Diastolic Blood Pressure  mmHg    Ventricular Rate 105 BPM    Atrial Rate 117 BPM    FL Interval  ms    QRS Duration 84 ms     ms    QTc 465 ms    P Axis  degrees    R AXIS -42 degrees    T Axis 0 degrees    Interpretation ECG       Sinus tachycardia with 2nd degree A-V block ([place]obitz I)  Left axis deviation  Low voltage QRS  Septal infarct , age undetermined  Inferior infarct , age undetermined  Abnormal ECG  Unconfirmed report - interpretation of this ECG is computer generated - see medical record for final interpretation  Confirmed by - EMERGENCY ROOM, PHYSICIAN ([id number]),  VALARIE GONG ([id number]) on 6/20/2024 10:30:51 PM     CBC with platelets differential     Status: Abnormal    Narrative    The following orders were created for panel order CBC with platelets differential.  Procedure                               Abnormality         Status                      ---------                               -----------         ------                     CBC with platelets and d...[[id number]]  Abnormal            Final result                 Please view results for these tests on the individual orders.     Medications   amiodarone (NEXTERONE) 1.8 mg/mL in sodium chloride 0.9% in non-PVC container 500 mL ADULT STANDARD infusion (1 mg/min Intravenous $New Bag 6/20/24 2055)   amiodarone (NEXTERONE) 1.8 mg/mL in sodium chloride 0.9% in non-PVC container 500 mL ADULT STANDARD infusion ( Intravenous Canceled Entry 6/20/24 2046)   furosemide (LASIX) injection 20 mg (has no administration in time range)   amiodarone (NEXTERONE) bolus 150 mg (0 mg Intravenous Stopped 6/20/24 2045)     Labs Ordered and Resulted from Time of ED Arrival to Time of ED Departure   COMPREHENSIVE METABOLIC PANEL - Abnormal       Result Value    Sodium 139      Potassium 4.5      Carbon Dioxide (CO2) 21 (*)     Anion Gap 11      Urea Nitrogen 23.6 (*)     Creatinine 0.95      GFR Estimate 60 (*)     Calcium 9.1      Chloride 107      Glucose 100 (*)     Alkaline Phosphatase 60      AST 35      ALT 23      Protein Total 6.5      Albumin 3.8      Bilirubin Total 0.7     TROPONIN T, HIGH SENSITIVITY - Abnormal    Troponin T, High Sensitivity 15 (*)    NT PROBNP INPATIENT - Abnormal    N terminal Pro BNP Inpatient 2,092 (*)    CBC WITH PLATELETS AND DIFFERENTIAL - Abnormal    WBC Count 8.0      RBC Count 4.23      Hemoglobin 12.7      Hematocrit 40.0      MCV 95      MCH 30.0      MCHC 31.8      RDW 15.9 (*)     Platelet Count 231      % Neutrophils 68      % Lymphocytes 24      % Monocytes 6      % Eosinophils 2      % Basophils 0      % Immature Granulocytes 0      NRBCs per 100 WBC 0      Absolute Neutrophils 5.4      Absolute Lymphocytes 1.9      Absolute Monocytes 0.5      Absolute Eosinophils 0.2      Absolute Basophils 0.0      Absolute Immature Granulocytes 0.0      Absolute NRBCs 0.0       XR Chest Port 1  View   Final Result   IMPRESSION: Cardiomegaly with streaky perihilar and bibasilar   opacities, likely mild pulmonary edema. Small bilateral pleural   effusions.      I have personally reviewed the examination and initial interpretation   and I agree with the findings.      NILDA BRIONES MD            SYSTEM ID:  [id number]             Critical Care Addendum  My initial assessment, based on my review of prehospital provider report, review of nursing observations, review of vital signs, focused history, physical exam, and review of cardiac rhythm monitor, established a high suspicion that Alfreda Baxter has a critical arrhythmia, which requires immediate intervention, and therefore she is critically ill.     After the initial assessment, the care team initiated multiple lab tests, initiated medication therapy with amiodarone, and consulted with cardiology  to provide stabilization care. Due to the critical nature of this patient, I reassessed nursing observations, vital signs, and review of cardiac rhythm monitor multiple times prior to her disposition.     Time also spent performing documentation, reviewing test results, discussion with consultants, and coordination of care.     Critical care time (excluding teaching time and procedures): 33 minutes.       Assessment & Plan    This is a 79-year-old female with history of A-fib referred to the ED by cardiology for A-fib with RVR concerned about fluid overload.  She was tachycardic with an irregular rhythm on arrival and her EKG did show A-fib.  She was not hypoxic or requiring oxygen.  Cardiology recommended amiodarone which was ordered.    Blood work was obtained and did show elevated BNP and her chest x-ray also showed fluid overload as well, additionally on exam she had some signs of edema so I ordered Lasix.  Labs are otherwise notable for troponin of 15.    The patient required mission for further management, rate control and possible cardioversion.  The  patient was agreeable with plan for admission.  The patient was discussed with cardiology who accepted the patient.        I have reviewed the nursing notes. I have reviewed the findings, diagnosis, plan and need for follow up with the patient.    New Prescriptions    No medications on file       Final diagnoses:   Chronic a-fib (H)       Shady Martins MD  [facility] EMERGENCY DEPARTMENT  6/20/2024     Shady Martins MD  06/20/24 [id number]

## 2024-06-22 VITALS
WEIGHT: 205 LBS | SYSTOLIC BLOOD PRESSURE: 105 MMHG | TEMPERATURE: 98 F | BODY MASS INDEX: 38.41 KG/M2 | RESPIRATION RATE: 17 BRPM | OXYGEN SATURATION: 95 % | HEART RATE: 89 BPM | DIASTOLIC BLOOD PRESSURE: 76 MMHG

## 2024-06-22 LAB
ANION GAP SERPL CALCULATED.3IONS-SCNC: 11 MMOL/L (ref 7–15)
BUN SERPL-MCNC: 24.3 MG/DL (ref 8–23)
CALCIUM SERPL-MCNC: 8.9 MG/DL (ref 8.8–10.2)
CHLORIDE SERPL-SCNC: 102 MMOL/L (ref 98–107)
CREAT SERPL-MCNC: 0.96 MG/DL (ref 0.51–0.95)
DEPRECATED HCO3 PLAS-SCNC: 24 MMOL/L (ref 22–29)
EGFRCR SERPLBLD CKD-EPI 2021: 60 ML/MIN/1.73M2
ERYTHROCYTE [DISTWIDTH] IN BLOOD BY AUTOMATED COUNT: 16 % (ref 10–15)
GLUCOSE SERPL-MCNC: 114 MG/DL (ref 70–99)
HCT VFR BLD AUTO: 40.2 % (ref 35–47)
HGB BLD-MCNC: 12.6 G/DL (ref 11.7–15.7)
MAGNESIUM SERPL-MCNC: 1.8 MG/DL (ref 1.7–2.3)
MCH RBC QN AUTO: 30.1 PG (ref 26.5–33)
MCHC RBC AUTO-ENTMCNC: 31.3 G/DL (ref 31.5–36.5)
MCV RBC AUTO: 96 FL (ref 78–100)
PLATELET # BLD AUTO: 187 10E3/UL (ref 150–450)
POTASSIUM SERPL-SCNC: 3.8 MMOL/L (ref 3.4–5.3)
RBC # BLD AUTO: 4.18 10E6/UL (ref 3.8–5.2)
SODIUM SERPL-SCNC: 137 MMOL/L (ref 135–145)
WBC # BLD AUTO: 7.2 10E3/UL (ref 4–11)

## 2024-06-22 PROCEDURE — 82565 ASSAY OF CREATININE: CPT

## 2024-06-22 PROCEDURE — 36415 COLL VENOUS BLD VENIPUNCTURE: CPT

## 2024-06-22 PROCEDURE — 250N000009 HC RX 250

## 2024-06-22 PROCEDURE — 250N000013 HC RX MED GY IP 250 OP 250 PS 637

## 2024-06-22 PROCEDURE — 99239 HOSP IP/OBS DSCHRG MGMT >30: CPT | Mod: FS

## 2024-06-22 PROCEDURE — 82374 ASSAY BLOOD CARBON DIOXIDE: CPT

## 2024-06-22 PROCEDURE — 250N000013 HC RX MED GY IP 250 OP 250 PS 637: Performed by: INTERNAL MEDICINE

## 2024-06-22 PROCEDURE — 85041 AUTOMATED RBC COUNT: CPT

## 2024-06-22 PROCEDURE — 83735 ASSAY OF MAGNESIUM: CPT

## 2024-06-22 RX ORDER — FUROSEMIDE 20 MG
20 TABLET ORAL DAILY
Status: DISCONTINUED | OUTPATIENT
Start: 2024-06-22 | End: 2024-06-22 | Stop reason: HOSPADM

## 2024-06-22 RX ORDER — MAGNESIUM OXIDE 400 MG/1
400 TABLET ORAL EVERY 4 HOURS
Status: COMPLETED | OUTPATIENT
Start: 2024-06-22 | End: 2024-06-22

## 2024-06-22 RX ORDER — POTASSIUM CHLORIDE 750 MG/1
20 TABLET, EXTENDED RELEASE ORAL ONCE
Status: COMPLETED | OUTPATIENT
Start: 2024-06-22 | End: 2024-06-22

## 2024-06-22 RX ORDER — SPIRONOLACTONE 25 MG/1
12.5 TABLET ORAL DAILY
Qty: 45 TABLET | Refills: 3 | Status: SHIPPED | OUTPATIENT
Start: 2024-06-23 | End: 2024-07-02

## 2024-06-22 RX ORDER — FUROSEMIDE 20 MG
20 TABLET ORAL DAILY
Qty: 90 TABLET | Refills: 3 | Status: SHIPPED | OUTPATIENT
Start: 2024-06-23 | End: 2024-07-02

## 2024-06-22 RX ADMIN — LISINOPRIL 10 MG: 10 TABLET ORAL at 09:29

## 2024-06-22 RX ADMIN — SPIRONOLACTONE 12.5 MG: 25 TABLET ORAL at 09:32

## 2024-06-22 RX ADMIN — Medication 1 TABLET: at 09:32

## 2024-06-22 RX ADMIN — TIMOLOL MALEATE 1 DROP: 5 SOLUTION OPHTHALMIC at 09:39

## 2024-06-22 RX ADMIN — FOLIC ACID 2 MG: 1 TABLET ORAL at 09:32

## 2024-06-22 RX ADMIN — POTASSIUM CHLORIDE 20 MEQ: 750 TABLET, EXTENDED RELEASE ORAL at 09:32

## 2024-06-22 RX ADMIN — APIXABAN 5 MG: 5 TABLET, FILM COATED ORAL at 09:32

## 2024-06-22 RX ADMIN — AMIODARONE HYDROCHLORIDE 400 MG: 200 TABLET ORAL at 09:32

## 2024-06-22 RX ADMIN — FUROSEMIDE 20 MG: 20 TABLET ORAL at 12:28

## 2024-06-22 RX ADMIN — MAGNESIUM OXIDE TAB 400 MG (241.3 MG ELEMENTAL MG) 400 MG: 400 (241.3 MG) TAB at 12:28

## 2024-06-22 RX ADMIN — ATORVASTATIN CALCIUM 20 MG: 20 TABLET, FILM COATED ORAL at 09:32

## 2024-06-22 RX ADMIN — B-COMPLEX W/ C & FOLIC ACID TAB 1 TABLET: TAB at 09:32

## 2024-06-22 RX ADMIN — MAGNESIUM OXIDE TAB 400 MG (241.3 MG ELEMENTAL MG) 400 MG: 400 (241.3 MG) TAB at 09:32

## 2024-06-22 ASSESSMENT — ACTIVITIES OF DAILY LIVING (ADL)
ADLS_ACUITY_SCORE: 26

## 2024-06-22 NOTE — PLAN OF CARE
DISCHARGE   Discharged to: Home  Via: Automobile  Accompanied by: Family  Discharge Instructions: principal diagnosis, major findings/procedures, diet, activity, medications, follow up appointments, when to call the MD, and what to watchout for (i.e. increasing SOB, fatigue, palpitations, chest pain, lightheadness/dizziness, weight gain and increasing edema). Pt states understanding of all discharge instructions.   Prescriptions: To be filled by discharge pharmacy per pt's request.  Follow Up Appointments: arranged, info given  Belongings: All sent with pt. Pt verifies that they are taking all belongings with them.   IV: discontinued.   Telemetry: discontinued.   Pt exhibits understanding of above discharge instructions; all questions answered.  Discharge Paperwork: faxed to discharge planner.

## 2024-06-22 NOTE — PROGRESS NOTES
Shift [id number]   ?  A/I : Monitored vitals and assessed pt status. A0x4. Vitals stable on RA. Afib . Afebrile. Urinating adequately via toilet. Good appetite. Denies chest pain, palpitations, worsening shortness of breath, nausea, dizziness. Reports BUENO, but feels much improved since beginning of admission, declined PT eval. Up ad antonio. IV access: LPIV SL, sites bruised and tender.     Tests/Procedures:   Changed:  Running:   PRN:   ?  P: Continue to monitor Pt status and report changes to Cards 1.

## 2024-06-22 NOTE — DISCHARGE SUMMARY
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 Subsequent cardioversion on 6/18/24 failed (shocked x3 times). Started on Amiodarone and sent home with [facility]. Patient called in on 6/19 with concern for increased SOB and edema. Encouraged to report to ER. On arrival, BP normotensive, , afebrile. Labs with BNP 2000, troponin 15, otherwise CBC and BMP WNL. EKG without ST elevations. CXR with mild pulmonary edema. Suspect acute decompensated heart failure in the setting of atrial fibrillation. S/p lasix 20 mg IV in the ER.  Nuclear lexiscan stress test - 6/3/2024: EF 69%, no ischemia  DIRK 6/19/2024: EF 40-45%, mild RV dilation, moderate MR, RV systolic pressure of 32 mmHg   Dry weight: unknown (reported baseline wt 196 lbs, on admission 203 lbs)  - apixaban 5 mg bid  - loaded with IV amiodarone  - PO Amio 400 mg BID until DCCV next Thursday, then transition to 200 mg daily  - outpatient DCCV scheduled for 6/27  GDMT:     - fluid status: near euvolemic,  Lasix 20 mg daily     - BB: defer d/t hypotension     - ACE/ARB/ARNI: lisinopril 10 mg     - SGLT2: pending pharmacy liaison consult for coverage     - MRA: spironolactone 12.5 mg daily  - CORE consult placed: appt on July 2  - Recommend BMP in 1 weeks     HLD  Lipid panel 11/2022 with LDL 74, .  - atorvastatin 20 mg daily     Moderate persistent asthma  - albuterol prn     Seronegative RA  - methotrexate 20 mg once weekly (Mondays not ordered here)  - folate 2 mg daily    Pertinent Procedures:  none    Consults:  CORE    Medication Changes:  Start Lasix 20 mg daily  Start Spironolactone 12.5 mg (half-tab) daily    Discharge medications:   Current Discharge Medication List        START taking these medications    Details   furosemide (LASIX) 20 MG tablet Take 1 tablet (20 mg) by mouth daily  Qty: 90 tablet, Refills: 3    Associated Diagnoses: Heart failure with reduced ejection fraction (H)      spironolactone (ALDACTONE) 25 MG tablet Take 0.5 tablets (12.5 mg) by mouth daily  Qty: 45 tablet, Refills: 3    Associated Diagnoses: Heart failure with reduced ejection fraction (H)           CONTINUE these medications which have NOT CHANGED    Details   acetaminophen (TYLENOL) 500 MG tablet Take 500-1,000 mg by mouth every 6 hours as needed.      albuterol (PROAIR HFA/PROVENTIL HFA/VENTOLIN HFA) 108 (90 Base) MCG/ACT inhaler Inhale 2 puffs into the lungs every 6 hours as needed for shortness of breath or wheezing  Qty: 18 g, Refills: 11    Comments: Pharmacy may dispense brand covered by insurance (Proair, or proventil or ventolin or generic albuterol inhaler)  Associated Diagnoses: Mild intermittent asthma without complication      amiodarone (PACERONE) 200 MG tablet Take 400 mg (2 tablets) twice a day x 2 weeks. Then take 200 mg (1 tablet) daily.  Qty: 90 tablet, Refills: 3    Associated Diagnoses: Paroxysmal A-fib (H)      apixaban ANTICOAGULANT (ELIQUIS) 5 MG tablet Take 1 tablet (5 mg) by mouth 2 times  daily  Qty: 180 tablet, Refills: 3    Associated Diagnoses: Paroxysmal A-fib (H)      atorvastatin (LIPITOR) 20 MG tablet Take 1 tablet (20 mg) by mouth daily  Qty: 90 tablet, Refills: 3    Associated Diagnoses: Hyperlipidemia LDL goal <130      B-COMPLEX OR Take  by mouth daily.      CALCIUM 500 +D OR one daily      folic acid (FOLVITE) 1 MG tablet Take 2 tablets (2 mg) by mouth daily  Qty: 200 tablet, Refills: 2    Associated Diagnoses: Rheumatoid arthritis of multiple sites with negative rheumatoid factor (H)      hypromellose (ARTIFICIAL TEARS) 0.5 % SOLN ophthalmic solution Place 1 drop into both eyes 3 times daily      latanoprost (XALATAN) 0.005 % ophthalmic solution Place 1 drop into both eyes At Bedtime  Qty: 7.5 mL, Refills: 3    Associated Diagnoses: Ocular hypertension of right eye; Neovascular glaucoma of left eye, moderate stage      lisinopril (ZESTRIL) 10 MG tablet Take 1 tablet (10 mg) by mouth daily  Qty: 90 tablet, Refills: 3    Associated Diagnoses: Hypertension goal BP (blood pressure) < 140/90      methotrexate 2.5 MG tablet Take 8 tablets (20 mg) by mouth every 7 days . Methotrexate is a once weekly medication, taken on the same day of each week.  Qty: 96 tablet, Refills: 2    Associated Diagnoses: Rheumatoid arthritis of multiple sites with negative rheumatoid factor (H)      multivitamin (OCUVITE) TABS tablet Take 1 tablet by mouth daily      timolol maleate (TIMOPTIC) 0.5 % ophthalmic solution Place 1 drop Into the left eye every morning  Qty: 10 mL, Refills: 3    Associated Diagnoses: Neovascular glaucoma of left eye, moderate stage      zolpidem (AMBIEN) 5 MG tablet TAKE ONE TABLET BY MOUTH IN THE EVENING AS NEEDED FOR SLEEP  Qty: 30 tablet, Refills: 5    Associated Diagnoses: Primary insomnia             Follow-up:  PCP in 1 week  TTE/DCCV on 6/27  CORE on 7/2    Labs or imaging requiring follow-up after discharge:  BMP    Code status:  Full    Condition on discharge  Temp:  [97.7  F  (36.5  C)-98.4  F (36.9  C)] 98  F (36.7  C)  Pulse:  [86-93] 89  Resp:  [16-20] 17  BP: (100-113)/(64-84) 105/76  SpO2:  [93 %-100 %] 95 %  General: Alert, interactive, NAD  Eyes: sclera anicteric, EOMI  Cardiovascular: irregularly irregular, normal S1 and S2, no murmurs, gallops, or rubs  Resp: clear to auscultation bilaterally, no rales, wheezes, or rhonchi  GI: Soft, nontender, nondistended. +BS  Extremities: no edema, no cyanosis or clubbing, dorsalis pedis and posterior tibialis pulses 2+ bilaterally  Skin: Warm and dry, no jaundice or rash  Neuro: CN 2-12 intact, moves all extremities equally  Psych: Alert & oriented x 3    Imaging with results:  ECG 6/19/24:       Echo 6/19/24:  Left Ventricle  Left ventricular function is decreased. The ejection fraction is 40-45%  (mildly reduced).     Right Ventricle  The right ventricle is normal size. Mild right ventricular dilation is  present.     Atria  Both atria appear normal. The left atrial appendage is normal. It is free of  spontaneous echo contrast and thrombus. The left atrial appendage Doppler  velocities are normal. Small PFO noted by color doppler.     Mitral Valve  Mild to moderate mitral annular calcification is present. Moderate mitral  insufficiency is present.     Aortic Valve  Aortic valve sclerosis is present.     Tricuspid Valve  Moderate tricuspid insufficiency is present. Right ventricular systolic  pressure is 32mmHg above the right atrial pressure.     Pulmonic Valve  The pulmonic valve is normal. Trace pulmonic insufficiency is present.     Vessels  The aorta root is normal. The pulmonary artery and bifurcation cannot be  assessed.     Pericardium  No pericardial effusion is present.    Lexiscan 6/3/24:    The nuclear stress test is negative for inducible myocardial ischemia or infarction.    LVEDv 105ml. LVESv 33ml. LV EF 69%.    A prior study was conducted on 1/24/2013.  This study has no change when compared with the prior study.          ECG Summary  ECG Baseline electrocardiogram demonstrates atrial fibrillation.with RVR. The stress electrocardiogram is negative for inducible ischemic EKG changes.  Arrhythmias during stress: Occasional PVCs.   Technical Comments     Cardiac Protocol A pharmacologic stress test was performed following a supine Lexiscan protocol using 0.4 mg of intravenous regadenoson administered over 10 seconds under the supervision of Dr. Church. The patient reported dyspnea during the stress test.  The supervising registered nurse observed typical vasodilator side effects during the stress test.   Isotope Administration Nuclear imaging was accomplished using a one day protocol with 41.2 mCi of technetium tetrofosmin injected at the completion of Lexiscan infusion on 6/3/2024 and 10.1 mCi of technetium tetrofosmin at rest on 6/3/2024.   Nuclear Study Quality Final image quality is satisfactory.   Stress Measurements          Baseline Vitals   Baseline  bpm           Baseline Systolic            Baseline Diastolic BP 95           Peak Stress Vitals   Max            Last Stress Systolic            Last Stress Diastolic BP 96           Exercise Data   Target            Max Predicted  %              Nuclear Perfusion                Stress Summed Score: 0 Percent Abnormal: 0.00%           The left ventricular perfusion is normal.                   Resting Summed Score: 0 Percent Abnormal: 0.00%           The left ventricular perfusion is normal.                   Wall Score     Wall Motion               Score Index: 1.00             The left ventricular wall motion is normal.                             Recent Results (from the past 24 hour(s))   XR Chest Port 1 View     Narrative     EXAM: XR CHEST PORT 1 VIEW  6/20/2024 7:43 PM       HISTORY: SOB, CHF concern     COMPARISON: CT 6/3/2024     FINDINGS: Single view of the chest. Prominent right paratracheal  stripe, similar to findings seen on CT  dated 6/3/2024 and possibly  secondary to prominent pericardial recess. Loop recorder. Trachea is  midline. Cardiac silhouette is enlarged.  Streaky perihilar and left  greater than right basilar opacities. Small bilateral pleural  effusions. No pneumothorax.        Impression     IMPRESSION: Cardiomegaly with streaky perihilar and bibasilar  opacities, likely mild pulmonary edema. Small bilateral pleural  effusions.     I have personally reviewed the examination and initial interpretation  and I agree with the findings.     NILDA BRIONES MD         SYSTEM ID:  [id number]       Patient Care Team:  Mercedes Urena MD as PCP - General  Ezequiel Florence MD as MD (Ophthalmology)  Zaina Yanez APRN CNP as Nurse Practitioner (Neurology)  Bryan Morrison MD as Assigned Surgical Provider  Bryan Morrison MD as MD (Ophthalmology)  Mercedes Urena MD as Assigned PCP  Lucho Huber MD as Assigned Rheumatology Provider  Zaina Yanez APRN CNP as Assigned Neuroscience Provider  Monica Michelle MD as MD (Cardiovascular Disease)    Claudine MACHUCA CNP  [facility] Cardiology  Patient discussed with staff cardiologist, Dr. Phipps, who agrees with the above documentation and plan. Documentation represents joint decision making.     Time Spent on this Encounter   I, SVEN Sanches CNP, personally saw the patient today and spent greater than 30 minutes discharging this patient.      ATTENDING ATTESTATION:  Patient has been seen and evaluated by me on June 22, 2024. I agree with the discharge summary note above. I have reviewed pertinent labs and studies. More than 30 minutes was spent organizing this patient's hospital discharge.     Marichuy Phipps MD, MS

## 2024-06-22 NOTE — PLAN OF CARE
6/21/2024 @ [id number]    Activity: Up ad antonio.   Cardiac: A fib, HR 80-90s. SBP 100s.   GI/: LBM 4/20/2024. 2g Na diet. Up ad antonio to bathroom.   Skin: Bruising on LUE, monitoring site.   Pain: Denies.   LDA's: L PIV, SL; monitor bruising.      All other body systems WDL.     Plan: Continue with POC; possible disposition today 6/22/2024. Notify care team of any changes.     For vital signs and complete assessments, please see documentation flowsheets.      Belem Zelaya, RN   Cardiology

## 2024-06-24 ENCOUNTER — PATIENT OUTREACH (OUTPATIENT)
Dept: CARE COORDINATION | Facility: CLINIC | Age: 79
End: 2024-06-24

## 2024-06-24 ENCOUNTER — OFFICE VISIT (OUTPATIENT)
Dept: OPHTHALMOLOGY | Facility: CLINIC | Age: 79
End: 2024-06-24
Payer: MEDICARE

## 2024-06-24 DIAGNOSIS — H43.813 POSTERIOR VITREOUS DETACHMENT OF BOTH EYES: ICD-10-CM

## 2024-06-24 DIAGNOSIS — Z01.01 ENCOUNTER FOR EXAMINATION OF EYES AND VISION WITH ABNORMAL FINDINGS: ICD-10-CM

## 2024-06-24 DIAGNOSIS — H52.4 PRESBYOPIA: ICD-10-CM

## 2024-06-24 DIAGNOSIS — Z96.1 PSEUDOPHAKIA: ICD-10-CM

## 2024-06-24 DIAGNOSIS — H40.051 OCULAR HYPERTENSION OF RIGHT EYE: Primary | ICD-10-CM

## 2024-06-24 DIAGNOSIS — H54.40 BLINDNESS OF LEFT EYE WITH NORMAL VISION IN CONTRALATERAL EYE: ICD-10-CM

## 2024-06-24 DIAGNOSIS — H25.812 COMBINED FORMS OF AGE-RELATED CATARACT OF LEFT EYE: ICD-10-CM

## 2024-06-24 DIAGNOSIS — H40.52X2 NEOVASCULAR GLAUCOMA OF LEFT EYE, MODERATE STAGE: ICD-10-CM

## 2024-06-24 DIAGNOSIS — Z86.69 HISTORY OF CENTRAL RETINAL ARTERY OCCLUSION: ICD-10-CM

## 2024-06-24 LAB
ATRIAL RATE - MUSE: NORMAL BPM
DIASTOLIC BLOOD PRESSURE - MUSE: NORMAL MMHG
INTERPRETATION ECG - MUSE: NORMAL
P AXIS - MUSE: NORMAL DEGREES
PR INTERVAL - MUSE: NORMAL MS
QRS DURATION - MUSE: 86 MS
QT - MUSE: 368 MS
QTC - MUSE: 467 MS
R AXIS - MUSE: -45 DEGREES
SYSTOLIC BLOOD PRESSURE - MUSE: NORMAL MMHG
T AXIS - MUSE: -17 DEGREES
VENTRICULAR RATE- MUSE: 97 BPM

## 2024-06-24 PROCEDURE — 92014 COMPRE OPH EXAM EST PT 1/>: CPT | Performed by: OPHTHALMOLOGY

## 2024-06-24 PROCEDURE — 92015 DETERMINE REFRACTIVE STATE: CPT | Mod: GY | Performed by: OPHTHALMOLOGY

## 2024-06-24 RX ORDER — TIMOLOL MALEATE 5 MG/ML
1 SOLUTION/ DROPS OPHTHALMIC EVERY MORNING
Qty: 10 ML | Refills: 3 | Status: SHIPPED | OUTPATIENT
Start: 2024-06-24

## 2024-06-24 RX ORDER — LATANOPROST 50 UG/ML
1 SOLUTION/ DROPS OPHTHALMIC AT BEDTIME
Qty: 7.5 ML | Refills: 3 | Status: SHIPPED | OUTPATIENT
Start: 2024-06-24

## 2024-06-24 ASSESSMENT — CUP TO DISC RATIO
OS_RATIO: 0.8
OD_RATIO: 0.5

## 2024-06-24 ASSESSMENT — REFRACTION_WEARINGRX
OS_ADD: +2.75
OS_CYLINDER: +0.25
OS_AXIS: 165
SPECS_TYPE: PAL
OS_SPHERE: -2.75
OD_ADD: +2.75
OD_SPHERE: -2.75
OD_AXIS: 150
OD_CYLINDER: +0.50

## 2024-06-24 ASSESSMENT — CONF VISUAL FIELD
OD_SUPERIOR_TEMPORAL_RESTRICTION: 0
OS_SUPERIOR_TEMPORAL_RESTRICTION: 1
OS_SUPERIOR_NASAL_RESTRICTION: 1
OS_INFERIOR_NASAL_RESTRICTION: 1
OS_INFERIOR_TEMPORAL_RESTRICTION: 1
OD_INFERIOR_TEMPORAL_RESTRICTION: 0
OD_INFERIOR_NASAL_RESTRICTION: 0
OD_SUPERIOR_NASAL_RESTRICTION: 0
OD_NORMAL: 1

## 2024-06-24 ASSESSMENT — TONOMETRY
OD_IOP_MMHG: 11
OS_IOP_MMHG: 17
IOP_METHOD: APPLANATION

## 2024-06-24 ASSESSMENT — VISUAL ACUITY
CORRECTION_TYPE: GLASSES
OS_CC: NLP
OD_CC+: -1
OD_CC: J1
OD_CC: 20/20
METHOD: SNELLEN - LINEAR

## 2024-06-24 ASSESSMENT — REFRACTION_MANIFEST
OD_SPHERE: -3.00
OD_AXIS: 165
OD_ADD: +3.00
OD_CYLINDER: +0.75

## 2024-06-24 ASSESSMENT — EXTERNAL EXAM - LEFT EYE: OS_EXAM: 2+ BROW PTOSIS, MILD TO MOD BROW

## 2024-06-24 ASSESSMENT — EXTERNAL EXAM - RIGHT EYE: OD_EXAM: 2+ BROW PTOSIS

## 2024-06-24 NOTE — LETTER
"6/24/2024      Alfreda Baxter  [address] [address]      Dear Colleague,    Thank you for referring your patient, Alfreda Baxter, to the [facility]. Please see a copy of my visit note below.     Current Eye Medications:  Latanoprost both eyes every evening, timolol left eye each morning.  Last drops right eye:  10:30pm, left eye:  7:30am     Subjective:  Comprehensive Eye Exam.  The patient complains of decreasing vision in her right eye, especially when reading.     Objective:  See Ophthalmology Exam.       Assessment:  Stable intraocular pressures and discs both eyes.  Otherwise stable eye exam.      ICD-10-CM    1. Ocular hypertension of right eye, treated  H40.051 latanoprost (XALATAN) 0.005 % ophthalmic solution      2. Neovascular glaucoma of left eye, moderate stage  H40.52X2 latanoprost (XALATAN) 0.005 % ophthalmic solution     timolol maleate (TIMOPTIC) 0.5 % ophthalmic solution      3. Combined forms of age-related cataract, mild-mod, of left eye  H25.812       4. Pseudophakia, od  Z96.1       5. History of central retinal artery occlusion, os  Z86.69       6. Blindness of left eye with normal vision in contralateral eye  H54.40       7. Posterior vitreous detachment of both eyes  H43.813       8. Encounter for examination of eyes and vision with abnormal findings  Z01.01       9. Presbyopia  H52.4 REFRACTIVE STATUS     EYE EXAM (SIMPLE-NONBILLABLE)           Plan:  Continue:   Latanoprost (green top) every evening both eyes.  Timolol (yellow top) every morning left eye.      May use artificial tears up to four times a day (like Refresh Optive, Systane Balance, or TheraTears. Avoid \"get the red out\" drops and generic artifical tears).     Wait at least 5 minutes between drops if using more than one at a time.     Glasses prescription given - optional    Return visit 6 months for an intraocular pressure check, glaucoma OCT, retinal OCT, and [facility] " .     Bryan Morrison M.D.  [phone]         Again, thank you for allowing me to participate in the care of your patient.        Sincerely,        Bryan Morrison MD

## 2024-06-24 NOTE — PATIENT INSTRUCTIONS
"Continue:   Latanoprost (green top) every evening both eyes.  Timolol (yellow top) every morning left eye.      May use artificial tears up to four times a day (like Refresh Optive, Systane Balance, or TheraTears. Avoid \"get the red out\" drops and generic artifical tears).     Wait at least 5 minutes between drops if using more than one at a time.     Glasses prescription given - optional    Return visit 6 months for an intraocular pressure check, glaucoma OCT, retinal OCT, and [facility] Field.     Bryan Morrison M.D.  [phone]    "

## 2024-06-24 NOTE — PROGRESS NOTES
Connected Care Resource Center: [facility]    Background: Transitional Care Management program identified per system criteria and reviewed by [facility] team for possible outreach.    Assessment: Upon chart review, CCR Team member will not proceed with patient outreach related to this episode of Transitional Care Management program due to reason below:    Patient has active communication with a nurse, provider or care team for reason of post-hospital follow up plan.  Outreach call by [facility] team not indicated to minimize duplicative efforts.     Plan: Transitional Care Management episode addressed appropriately per reason noted above.      Leora Andersen  Community Health Worker  [facility]  [phone]      *Connected Care Resource Team does NOT follow patient ongoing. Referrals are identified based on internal discharge reports and the outreach is to ensure patient has an understanding of their discharge instructions.

## 2024-06-24 NOTE — PROGRESS NOTES
" Current Eye Medications:  Latanoprost both eyes every evening, timolol left eye each morning.  Last drops right eye:  10:30pm, left eye:  7:30am     Subjective:  Comprehensive Eye Exam.  The patient complains of decreasing vision in her right eye, especially when reading.     Objective:  See Ophthalmology Exam.       Assessment:  Stable intraocular pressures and discs both eyes.  Otherwise stable eye exam.      ICD-10-CM    1. Ocular hypertension of right eye, treated  H40.051 latanoprost (XALATAN) 0.005 % ophthalmic solution      2. Neovascular glaucoma of left eye, moderate stage  H40.52X2 latanoprost (XALATAN) 0.005 % ophthalmic solution     timolol maleate (TIMOPTIC) 0.5 % ophthalmic solution      3. Combined forms of age-related cataract, mild-mod, of left eye  H25.812       4. Pseudophakia, od  Z96.1       5. History of central retinal artery occlusion, os  Z86.69       6. Blindness of left eye with normal vision in contralateral eye  H54.40       7. Posterior vitreous detachment of both eyes  H43.813       8. Encounter for examination of eyes and vision with abnormal findings  Z01.01       9. Presbyopia  H52.4 REFRACTIVE STATUS     EYE EXAM (SIMPLE-NONBILLABLE)           Plan:  Continue:   Latanoprost (green top) every evening both eyes.  Timolol (yellow top) every morning left eye.      May use artificial tears up to four times a day (like Refresh Optive, Systane Balance, or TheraTears. Avoid \"get the red out\" drops and generic artifical tears).     Wait at least 5 minutes between drops if using more than one at a time.     Glasses prescription given - optional    Return visit 6 months for an intraocular pressure check, glaucoma OCT, retinal OCT, and Hill Visual Field.     Bryan Morrison M.D.  [phone]       "

## 2024-06-26 ENCOUNTER — ANESTHESIA EVENT (OUTPATIENT)
Dept: SURGERY | Facility: CLINIC | Age: 79
End: 2024-06-26
Payer: MEDICARE

## 2024-06-26 ASSESSMENT — ENCOUNTER SYMPTOMS: DYSRHYTHMIAS: 1

## 2024-06-26 NOTE — ANESTHESIA PREPROCEDURE EVALUATION
Anesthesia Pre-Procedure Evaluation    Patient: Alfreda Baxter   MRN: [id number] : 1945        Procedure : Procedure(s):  Anesthesia cardioversion @1330          Past Medical History:   Diagnosis Date     Acute idiopathic gout of left foot 2015     Adenomatous colon polyp 2013    Colonoscopy due      Asthma     controlled with inhaler     Blind left eye 2019    Due to central retinal occlusion     Cataract      Central retinal artery occlusion of left eye 09/15/2018     Disturbances of vision, late effect of stroke 2022     DJD (degenerative joint disease)     knees     GERD (gastroesophageal reflux disease)      Glaucoma      Gout      History of central retinal artery occlusion, os 2020     HTN (hypertension)      Hyperlipidemia LDL goal <70 2010     Low bone mass      Lumbar spinal stenosis 2011    Sees Dr Sage     Mild persistent asthma 2013     Neovascular glaucoma of left eye, moderate stage 2019     Ocular hypertension of right eye, treated 2019     Paroxysmal A-fib (H) 2019    Per loop recorder, 2018    On Rivaroxaban.   H/o R central retinal artery occlusion     Paroxysmal ventricular tachycardia (H) 2018    Added automatically from request for surgery [id number]     PFO (patent foramen ovale)      Recurrent cerebrovascular accidents (CVAs) (H) 2022     Recurrent cerebrovascular accidents (CVAs) (H) 2022     Transient global amnesia 2022      Past Surgical History:   Procedure Laterality Date     ANESTHESIA CARDIOVERSION N/A 2024    Procedure: Anesthesia cardioversion @0930;  Surgeon: GENERIC ANESTHESIA PROVIDER;  Location: [facility] OR     APPENDECTOMY      age 7 yrs.     BREAST BIOPSY, RT/LT  2004    left benign     CATARACT IOL, RT/LT        SECTION      x 2     COLONOSCOPY  2013    needed q 3 yrs.     COLONOSCOPY N/A 09/10/2020    Procedure: Colonoscopy, With Polypectomy And Biopsy;  Surgeon:  Brian Cleaning MD;  Location: [facility] OR     COLONOSCOPY WITH CO2 INSUFFLATION N/A 2016    Procedure: COLONOSCOPY WITH CO2 INSUFFLATION;  Surgeon: Brian Cleaning MD;  Location: [facility]     COLONOSCOPY WITH CO2 INSUFFLATION N/A 09/10/2020    Procedure: COLONOSCOPY, WITH CO2 INSUFFLATION;  Surgeon: Brian Cleaning MD;  Location: [facility] OR     EP COMPREHENSIVE EP STUDY N/A 2018    Procedure: LOOP RECORDER;  Surgeon: Buck Butterfield MD;  Location:  [facility]     HYSTERECTOMY, POORNIMA  1997    bleeding fibroids     ORTHOPEDIC SURGERY      meniscus repair     PHACOEMULSIFICATION WITH STANDARD INTRAOCULAR LENS IMPLANT Right 10/24/2022    Procedure: COMPLE RIGHT EYE PHACOEMULSIFICATION, CATARACT, WITH STANDARD INTRAOCULAR LENS IMPLANT INSERTION;  Surgeon: Ezequiel Florence MD;  Location: [facility] TOTAL KNEE ARTHROPLASTY Left 2015    at [facility]      Allergies   Allergen Reactions     No Clinical Screening - See Comments Other (See Comments)     senisitive to non steroidals  Aleve, ibuprofen celebrex cause bad stomach pains  senisitive to non steroidals  Aleve, ibuprofen celebrex cause bad stomach pains     Oxycodone Hives     Other reaction(s): Unknown  ... With facial swelling     Nsaids Nausea and Vomiting     Other reaction(s): Abdominal Pain      Social History     Tobacco Use     Smoking status: Former     Current packs/day: 0.00     Average packs/day: 0.1 packs/day for 2.0 years (0.2 ttl pk-yrs)     Types: Cigarettes     Start date: 1963     Quit date: 1965     Years since quittin.5     Passive exposure: Never     Smokeless tobacco: Never   Substance Use Topics     Alcohol use: Not Currently     Comment: one a month      Wt Readings from Last 1 Encounters:   24 93 kg (205 lb)        Anesthesia Evaluation   Pt has had prior anesthetic. Type: MAC.        ROS/MED HX  ENT/Pulmonary:     (+)     EDISON risk factors,  hypertension, obese,              Moderate Persistent, asthma  Treatment: Inhaler daily,                 Neurologic:     (+)          CVA, date: 8/11/24,                  (-) no TIA   Cardiovascular:     (+) Dyslipidemia hypertension- -   -  - -   Taking blood thinners Pt has received instructions: Instructions Given to patient: Apixaban 5mgh BD.                   dysrhythmias, a-fib,        Previous cardiac testing   Echo: Date: Results:  LVEF 40-45%  Stress Test:  Date: Results:  Negative for inducible ischemia   ECG Reviewed:  Date: Results:  Persistent A fibrillation with rapid ventricular rate  Cath:  Date: Results:      METS/Exercise Tolerance:     Hematologic:     (+) History of blood clots,    pt is anticoagulated,           Musculoskeletal:   (+)  arthritis,             GI/Hepatic:     (+) GERD, Asymptomatic on medication,                  Renal/Genitourinary:       Endo:     (+)               Obesity,       Psychiatric/Substance Use:       Infectious Disease:       Malignancy:       Other:            Physical Exam    Airway        Mallampati: I   TM distance: > 3 FB   Neck ROM: full   Mouth opening: > 3 cm    Respiratory Devices and Support         Dental       (+) Minor Abnormalities - some fillings, tiny chips      Cardiovascular   cardiovascular exam normal       Rhythm and rate: regular and normal     Pulmonary   pulmonary exam normal        breath sounds clear to auscultation       OUTSIDE LABS:  CBC:   Lab Results   Component Value Date    WBC 7.2 06/22/2024    WBC 9.0 06/21/2024    HGB 12.6 06/22/2024    HGB 13.0 06/21/2024    HCT 40.2 06/22/2024    HCT 40.9 06/21/2024     06/22/2024     06/21/2024     BMP:   Lab Results   Component Value Date     06/22/2024     06/21/2024    POTASSIUM 3.8 06/22/2024    POTASSIUM 4.2 06/21/2024    CHLORIDE 102 06/22/2024    CHLORIDE 104 06/21/2024    CO2 24 06/22/2024    CO2 18 (L) 06/21/2024    BUN 24.3 (H) 06/22/2024    BUN 24.1 (H) 06/21/2024    CR 0.96 (H)  "06/22/2024    CR 0.93 06/21/2024     (H) 06/22/2024     (H) 06/21/2024     COAGS:   Lab Results   Component Value Date    PTT 37 02/13/2024    INR 1.75 (H) 06/19/2024     POC: No results found for: \"BGM\", \"HCG\", \"HCGS\"  HEPATIC:   Lab Results   Component Value Date    ALBUMIN 3.8 06/20/2024    PROTTOTAL 6.5 06/20/2024    ALT 23 06/20/2024    AST 35 06/20/2024    ALKPHOS 60 06/20/2024    BILITOTAL 0.7 06/20/2024     OTHER:   Lab Results   Component Value Date    LACT 1.0 11/26/2020    A1C 5.8 (H) 11/14/2022    RUDOLPH 8.9 06/22/2024    MAG 1.8 06/22/2024    LIPASE 130 11/26/2020    TSH 1.09 06/19/2024    CRP 13.4 (H) 03/08/2023    SED 8 05/07/2024       Anesthesia Plan    ASA Status:  3       Anesthesia Type: General.     - Airway: Native airway   Induction: Propofol, Intravenous.   Maintenance: Balanced.        Consents    Anesthesia Plan(s) and associated risks, benefits, and realistic alternatives discussed. Questions answered and patient/representative(s) expressed understanding.     - Discussed: Risks, Benefits and Alternatives for the PROCEDURE were discussed     - Discussed with:  Patient      - Extended Intubation/Ventilatory Support Discussed: No.      - Patient is DNR/DNI Status: No     Use of blood products discussed: No .     Postoperative Care    Pain management: IV analgesics, Oral pain medications, Multi-modal analgesia.   PONV prophylaxis: Ondansetron (or other 5HT-3), Dexamethasone or Solumedrol     Comments:    Other Comments: Left eye blindness, seronegative rheumatoid arthritis on Methotrexate.             Dylon Bradford MD    I have reviewed the pertinent notes and labs in the chart from the past 30 days and (re)examined the patient.  Any updates or changes from those notes are reflected in this note.     # Hyponatremia: Lowest Na = 135 mmol/L in last 30 days, will monitor as appropriate        # Coagulation Defect: INR = 1.75 (Ref range: 0.85 - 1.15) and/or PTT = N/A, will monitor for " "bleeding   # Obesity: Estimated body mass index is 38.41 kg/m  as calculated from the following:    Height as of 6/13/24: 1.556 m (5' 1.26\").    Weight as of 6/22/24: 93 kg (205 lb).      "

## 2024-06-27 ENCOUNTER — APPOINTMENT (OUTPATIENT)
Dept: CARDIOLOGY | Facility: CLINIC | Age: 79
End: 2024-06-27
Payer: MEDICARE

## 2024-06-27 ENCOUNTER — HOSPITAL ENCOUNTER (OUTPATIENT)
Facility: CLINIC | Age: 79
Discharge: HOME | End: 2024-06-27
Attending: INTERNAL MEDICINE | Admitting: INTERNAL MEDICINE
Payer: MEDICARE

## 2024-06-27 ENCOUNTER — APPOINTMENT (OUTPATIENT)
Dept: MEDSURG UNIT | Facility: CLINIC | Age: 79
End: 2024-06-27
Attending: INTERNAL MEDICINE
Payer: MEDICARE

## 2024-06-27 ENCOUNTER — ANESTHESIA (OUTPATIENT)
Dept: SURGERY | Facility: CLINIC | Age: 79
End: 2024-06-27
Payer: MEDICARE

## 2024-06-27 ENCOUNTER — APPOINTMENT (OUTPATIENT)
Dept: LAB | Facility: CLINIC | Age: 79
End: 2024-06-27
Attending: INTERNAL MEDICINE
Payer: MEDICARE

## 2024-06-27 VITALS
SYSTOLIC BLOOD PRESSURE: 118 MMHG | DIASTOLIC BLOOD PRESSURE: 71 MMHG | OXYGEN SATURATION: 97 % | RESPIRATION RATE: 16 BRPM | HEART RATE: 68 BPM

## 2024-06-27 LAB
MAGNESIUM SERPL-MCNC: 1.8 MG/DL (ref 1.7–2.3)
POTASSIUM SERPL-SCNC: 4.1 MMOL/L (ref 3.4–5.3)

## 2024-06-27 PROCEDURE — 999N000127 HC STATISTIC PERIPHERAL IV START W US GUIDANCE

## 2024-06-27 PROCEDURE — 258N000003 HC RX IP 258 OP 636

## 2024-06-27 PROCEDURE — 93005 ELECTROCARDIOGRAM TRACING: CPT

## 2024-06-27 PROCEDURE — 370N000017 HC ANESTHESIA TECHNICAL FEE, PER MIN

## 2024-06-27 PROCEDURE — 36415 COLL VENOUS BLD VENIPUNCTURE: CPT

## 2024-06-27 PROCEDURE — 92960 CARDIOVERSION ELECTRIC EXT: CPT

## 2024-06-27 PROCEDURE — 250N000009 HC RX 250

## 2024-06-27 PROCEDURE — 84132 ASSAY OF SERUM POTASSIUM: CPT

## 2024-06-27 PROCEDURE — 83735 ASSAY OF MAGNESIUM: CPT

## 2024-06-27 PROCEDURE — 999N000054 HC STATISTIC EKG NON-CHARGEABLE

## 2024-06-27 PROCEDURE — 93010 ELECTROCARDIOGRAM REPORT: CPT | Mod: 76 | Performed by: INTERNAL MEDICINE

## 2024-06-27 PROCEDURE — 99100 ANES PT EXTEME AGE<1 YR&>70: CPT

## 2024-06-27 RX ORDER — SODIUM CHLORIDE 9 MG/ML
INJECTION, SOLUTION INTRAVENOUS CONTINUOUS PRN
Status: DISCONTINUED | OUTPATIENT
Start: 2024-06-27 | End: 2024-06-27

## 2024-06-27 RX ORDER — METHOHEXITAL IN WATER/PF 100MG/10ML
SYRINGE (ML) INTRAVENOUS PRN
Status: DISCONTINUED | OUTPATIENT
Start: 2024-06-27 | End: 2024-06-27

## 2024-06-27 RX ORDER — MAGNESIUM SULFATE HEPTAHYDRATE 40 MG/ML
2 INJECTION, SOLUTION INTRAVENOUS
Status: DISCONTINUED | OUTPATIENT
Start: 2024-06-27 | End: 2024-06-27 | Stop reason: HOSPADM

## 2024-06-27 RX ORDER — POTASSIUM CHLORIDE 750 MG/1
40 TABLET, EXTENDED RELEASE ORAL
Status: DISCONTINUED | OUTPATIENT
Start: 2024-06-27 | End: 2024-06-27 | Stop reason: HOSPADM

## 2024-06-27 RX ORDER — POTASSIUM CHLORIDE 750 MG/1
20 TABLET, EXTENDED RELEASE ORAL
Status: DISCONTINUED | OUTPATIENT
Start: 2024-06-27 | End: 2024-06-27 | Stop reason: HOSPADM

## 2024-06-27 RX ADMIN — Medication 40 MG: at 13:46

## 2024-06-27 RX ADMIN — SODIUM CHLORIDE: 9 INJECTION, SOLUTION INTRAVENOUS at 13:44

## 2024-06-27 ASSESSMENT — ACTIVITIES OF DAILY LIVING (ADL)
ADLS_ACUITY_SCORE: 35

## 2024-06-27 NOTE — ANESTHESIA CARE TRANSFER NOTE
Patient: Alfreda Baxter    Procedure: Procedure(s):  Anesthesia cardioversion @1330       Diagnosis: A-fib (H) [I48.91]  Diagnosis Additional Information: No value filed.    Anesthesia Type:   General     Note:    Oropharynx: oropharynx clear of all foreign objects  Level of Consciousness: drowsy  Oxygen Supplementation: nasal cannula  Level of Supplemental Oxygen (L/min / FiO2): 2  Independent Airway: airway patency satisfactory and stable  Dentition: dentition unchanged  Vital Signs Stable: post-procedure vital signs reviewed and stable  Report to RN Given: handoff report given  Patient transferred to: Cardiac Special [facility]          Vitals:  Vitals Value Taken Time   /82    Temp     Pulse 73    Resp     SpO2 100        Electronically Signed By: SVEN Lee CRNA  June 27, 2024  1:52 PM

## 2024-06-27 NOTE — DISCHARGE INSTRUCTIONS
GOING HOME AFTER YOUR CARDIOVERSION    FOR NEXT 24 HOURS:  An adult should stay with you.  Relax and take it easy.  DO NOT make any important legal decisions.  DO NOT drive or operate machines at home or at work.  DO NOT consume alcohol.   Resume your regular diet and drink plenty of fluids.  If you have any redness/skin sorness where the patches were placed, you may use aloe vera gel or 1% hydrocortisone cream to the skin (sold at drug stores)  Take Tylenol (Acetaminophen) per your provider's recommendations as needed to help relieve local pain.     CALL YOUR HEALTHCARE PROVIDER IF:  You develop nausea or vomiting  You develop hives or a rash or any unexplained itching  Have irregular heartbeat or fast pulse  Feel faint, dizzy, or lightheaded  Have chest pain with increased activity  Have bleeding issues from blood-thinning medicines    CALL 911 RIGHT AWAY IF YOU HAVE:  Pain in your chest, arm, shoulder, neck, or upper back  Shortness of breath  Loss of vision, speech, or strength or coordination in any body part  Weakness in your arm or leg  Uncontrolled bleeding  Feel unstable in any way     FOLLOW UP APPOINTMENT:    Follow up as scheduled with EP/Cardiology Provider in 4 weeks    ADDITIONAL INFORMATION:  Cardiovascular Clinic:   [address] [place] [address]. [place] [address]  Your Care Team:  EP Cardiology   Telephone Number     Ghada Alvares RN [phone]    After business hours: [phone], select option 4, ask for EP Fellow on call to be paged.     For scheduling appts or procedures:    Sherry Dunlap   [phone]   For the Device Clinic (Pacemakers and ICD's)   RN's :   Jaz Kemp  During business hours: [phone]     After business hours:   [phone]- select option 4 and ask for job code [id number].       As always, Thank you for trusting us with your health care needs!

## 2024-06-27 NOTE — PROCEDURES
[facility]    Procedure: EP Cardioversion External    Date/Time: 6/27/2024 4:40 PM    Performed by: Libertad Howard PA-C  Authorized by: Libertad Howard PA-C      UNIVERSAL PROTOCOL   Site Marked: NA  Prior Images Obtained and Reviewed:  NA  Required items: Required blood products, implants, devices and special equipment available    Patient identity confirmed:  Verbally with patient  Patient was reevaluated immediately before administering moderate or deep sedation or anesthesia  Confirmation Checklist:  Patient's identity using two indicators, procedure was appropriate and matched the consent or emergent situation, correct equipment/implants were available and relevant allergies  Time out: Immediately prior to the procedure a time out was called    Universal Protocol: the Joint Commission Universal Protocol was followed       ANESTHESIA    Anesthesia was administered and monitored by anesthesiology.  See anesthesia documentation for details.    PROCEDURE DETAILS  Pre-procedure rhythm: atrial fibrillation  Patient position: patient was placed in a supine position  Chest area: chest area exposed  Electrodes: pads  Electrodes placed: anterior-posterior  Number of attempts: 1    Details of Attempts:  One, 150 J biphasic synchronized shock delivered without conversion.   Second, 200 J biphasic synchronized shock delivered with conversion to sinus rhythm.   Post-procedure rhythm: normal sinus rhythm      PROCEDURE    Patient Tolerance:  Patient tolerated the procedure well with no immediate complications    Anticoagulation: Eliquis 5 mg bid, no missed doses.       Libertad Howard PA-C  [facility]  Electrophysiology Consult Service  Pager: [phone]

## 2024-06-27 NOTE — PROGRESS NOTES
Pt arrived in ECHO department for scheduled Cardioversion.   Procedure explained, questions answered and consent signed. Discharge instructions discussed with patient and given written copy.  Verified no missed doses of Eliquis since last DIRK. Anesthesia gave pt 40 mg IV brevital for sedation and pt was DCCV X2 at 150, 200 Joules to a SR. Post EKG completed.   Pt denied chest pain or any other discomfort was D/C home after awake and VSS. Escorted out to front lobby by staff in w/c to meet pt's ride home.

## 2024-06-27 NOTE — ANESTHESIA POSTPROCEDURE EVALUATION
Patient: Alfreda Baxter    Procedure: Procedure(s):  Anesthesia cardioversion @1330       Anesthesia Type:  General    Note:  Disposition: Outpatient   Postop Pain Control: Uneventful            Sign Out: Well controlled pain   PONV: No   Neuro/Psych: Uneventful            Sign Out: Acceptable/Baseline neuro status   Airway/Respiratory: Uneventful            Sign Out: Acceptable/Baseline resp. status   CV/Hemodynamics: Uneventful            Sign Out: Acceptable CV status; No obvious hypovolemia; No obvious fluid overload   Other NRE: NONE   DID A NON-ROUTINE EVENT OCCUR? No       Last vitals:  Vitals:    06/27/24 1409 06/27/24 1411 06/27/24 1417   BP: 106/65 105/62 106/81   Pulse: 67 61 65   Resp: 14 15 14   SpO2: 97% 96% 96%       Electronically Signed By: Dylon Bradford MD  June 27, 2024  2:29 PM

## 2024-06-28 LAB
ATRIAL RATE - MUSE: 59 BPM
ATRIAL RATE - MUSE: NORMAL BPM
DIASTOLIC BLOOD PRESSURE - MUSE: NORMAL MMHG
DIASTOLIC BLOOD PRESSURE - MUSE: NORMAL MMHG
INTERPRETATION ECG - MUSE: NORMAL
INTERPRETATION ECG - MUSE: NORMAL
P AXIS - MUSE: 86 DEGREES
P AXIS - MUSE: NORMAL DEGREES
PR INTERVAL - MUSE: 170 MS
PR INTERVAL - MUSE: NORMAL MS
QRS DURATION - MUSE: 88 MS
QRS DURATION - MUSE: 92 MS
QT - MUSE: 374 MS
QT - MUSE: 382 MS
QTC - MUSE: 370 MS
QTC - MUSE: 485 MS
R AXIS - MUSE: -57 DEGREES
R AXIS - MUSE: -60 DEGREES
SYSTOLIC BLOOD PRESSURE - MUSE: NORMAL MMHG
SYSTOLIC BLOOD PRESSURE - MUSE: NORMAL MMHG
T AXIS - MUSE: 14 DEGREES
T AXIS - MUSE: 8 DEGREES
VENTRICULAR RATE- MUSE: 59 BPM
VENTRICULAR RATE- MUSE: 97 BPM

## 2024-07-01 ENCOUNTER — TELEPHONE (OUTPATIENT)
Dept: CARDIOLOGY | Facility: CLINIC | Age: 79
End: 2024-07-01
Payer: MEDICARE

## 2024-07-01 NOTE — PROGRESS NOTES
[facility]  CARDIOLOGY CORE CLINIC    HPI:   Ms. 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 Presents to clinic for new CORE visit.    Recent shortness of breath prompting evaluation, found to be in atrial fibrillation w/ RVR. Subsequent cardioversion on 6/18/24 failed (shocked x3 times). Started on Amiodarone and sent home with Road[facility]. Patient then admitted from 6/20-6/22 for increased SOB and edema. Patient was found to have newly reduced EF of 40-45% in the setting of afib. Treated with IV Lasix. She was started on spironolactone and lasix for GDMT. Patient was already on lisinopril, BB was deferred d/t hypotension, and SGLT2 is cost-prohibitive. Underwent successful outpatient cardioversion on 6/27.    Today in clinic, she denies any current chest pain, palpitations, dizziness, lower extremity edema, shortness of breath, BUENO, or PND. She states as soon as she was started on the amiodarone and had some diuresis, she felt immediately better. She did report some SOB after her cardioversion last week, but states that it has improved now after the first few days.    She does report a difficulty sleeping, but states that she has anxiety and does take Ambien to help her sleep. She sleeps in her bed and uses 1 pillow and a travel pillow as well for positioning.    Blood pressures at home are 110/60-70's. She says she never sees numbers below 100 on top, and rarely higher than 120/80.    Malinda is very independent, does all her own laundry, shopping, housework.     Documented weight on 6/22 was 205 lbs. Weight today in clinic is 188 lbs.    Current Cardiac Medications  Amiodarone 200 mg daily  Eliquis 5 mg BID  Lasix 20 mg daily  Lisinopril 10 mg daily  Spironolactone 12.5 mg  Patient is a 67 female with PMH of bullous pemphigoid diagnosed in 2014 on steroids, surgical hypothyroidism following total thyroidectomy, chronic pain syndrome, endometrial CA diagnosed in 2015 with TRENT BSO, now in remission who presented with RLE pain/and worsening erythema, concerned for cellulitis    Sepsis 2/2 b/l LE cellulitis  sepsis resolved, LE cellulitis much improved  BCx negative   On Vancomycin 1.25mg IV Q12h, vancomycin ;level ~13  Discontinue vancomycin  Start on Bactrim DS 2 tablets PO Q12h - complete total 10 day course - end 1/2/21    Sepsis 2/2 UTI?  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  -ok to administer vancomycin as above    Penicillin Allergy  -pt w/ extensive skin lesions from underlying pemphigoid disease  -would follow w/ allergy as outpatient for desensitization to penicillins as cellulitis will be a recurring problem in this patient and B-lactams are ideal for cellulitis therapy    Bullous pemphigoid   -steroid dependent  -sx can be multifactorial 2/2 cellulitis and flare  -appreciate derm recs    DM2  -strict glucose control to promote resolution of infection and wound healing daily    PAST MEDICAL HISTORY:  Past Medical History:   Diagnosis Date    Acute idiopathic gout of left foot 11/04/2015    Adenomatous colon polyp 06/05/2013    Colonoscopy due 2025    Asthma     controlled with inhaler    Blind left eye 07/08/2019    Due to central retinal occlusion    Cataract     Central retinal artery occlusion of left eye 09/15/2018    Disturbances of vision, late effect of stroke 12/03/2022    DJD (degenerative joint disease)     knees    GERD (gastroesophageal reflux disease)     Glaucoma     Gout     History of central retinal artery occlusion, os 12/11/2020    HTN (hypertension)     Hyperlipidemia LDL goal <70 12/31/2010    Low bone mass     Lumbar spinal stenosis 12/08/2011    Sees Dr Sage    Mild persistent asthma 12/12/2013    Neovascular glaucoma of left eye, 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   H/o R central retinal artery occlusion    Paroxysmal ventricular tachycardia (H) 11/20/2018    Added automatically from request for surgery [id number]    PFO (patent foramen ovale)     Recurrent cerebrovascular accidents (CVAs) (H) 11/14/2022    Recurrent cerebrovascular accidents (CVAs) (H) 11/14/2022    Transient global amnesia 11/14/2022       FAMILY HISTORY:  Family History   Problem Relation Age of Onset    Arthritis Mother     Cancer Mother     Hypertension Mother     Other Cancer Mother     Thyroid Disease Mother     Respiratory Father     Glaucoma Father     Asthma Father     Allergies Sister     Eye Disorder Sister     Lipids Sister     Neurologic Disorder Sister     Osteoporosis Sister     Glaucoma Sister     Hypertension Sister     Macular Degeneration Sister     Hyperlipidemia Sister     Hypertension Sister     Osteoporosis Sister     Gastrointestinal Disease Son         Ulcerative Colitis    Glaucoma Other        SOCIAL HISTORY:  Social History      Socioeconomic History    Marital status:      Spouse name: Vaughn    Number of children: 2    Years of education: 14   Occupational History    Occupation: Retired LPN   Tobacco Use    Smoking status: Former     Current packs/day: 0.00     Average packs/day: 0.1 packs/day for 2.0 years (0.2 ttl pk-yrs)     Types: Cigarettes     Start date: 1963     Quit date: 1965     Years since quittin.5     Passive exposure: Never    Smokeless tobacco: Never   Vaping Use    Vaping status: Never Used   Substance and Sexual Activity    Alcohol use: Not Currently     Comment: one a month    Drug use: No    Sexual activity: Not Currently     Partners: Male     Birth control/protection: Post-menopausal, Female Surgical   Other Topics Concern    Parent/sibling w/ CABG, MI or angioplasty before 65F 55M? No   Social History Narrative    , lives with .  2 grown children in area     Lives in house.       Social Determinants of Health     Financial Resource Strain: Low Risk  (2023)    Financial Resource Strain     Within the past 12 months, have you or your family members you live with been unable to get utilities (heat, electricity) when it was really needed?: No   Food Insecurity: Low Risk  (2023)    Food Insecurity     Within the past 12 months, did you worry that your food would run out before you got money to buy more?: No     Within the past 12 months, did the food you bought just not last and you didn t have money to get more?: Patient refused   Transportation Needs: Low Risk  (2023)    Transportation Needs     Within the past 12 months, has lack of transportation kept you from medical appointments, getting your medicines, non-medical meetings or appointments, work, or from getting things that you need?: No   Interpersonal Safety: Low Risk  (3/29/2024)    Interpersonal Safety     Do you feel physically and emotionally safe where you currently live?: Yes     Within the past 12  months, have you been hit, slapped, kicked or otherwise physically hurt by someone?: No     Within the past 12 months, have you been humiliated or emotionally abused in other ways by your partner or ex-partner?: No   Housing Stability: Low Risk  (9/27/2023)    Housing Stability     Do you have housing? : Yes     Are you worried about losing your housing?: No       CURRENT MEDICATIONS:  Current Outpatient Medications   Medication Sig Dispense Refill    acetaminophen (TYLENOL) 500 MG tablet Take 500-1,000 mg by mouth every 6 hours as needed.      albuterol (PROAIR HFA/PROVENTIL HFA/VENTOLIN HFA) 108 (90 Base) MCG/ACT inhaler Inhale 2 puffs into the lungs every 6 hours as needed for shortness of breath or wheezing 18 g 11    amiodarone (PACERONE) 200 MG tablet Take 400 mg (2 tablets) twice a day x 2 weeks. Then take 200 mg (1 tablet) daily. 90 tablet 3    apixaban ANTICOAGULANT (ELIQUIS) 5 MG tablet Take 1 tablet (5 mg) by mouth 2 times daily 180 tablet 3    atorvastatin (LIPITOR) 20 MG tablet Take 1 tablet (20 mg) by mouth daily 90 tablet 3    B-COMPLEX OR Take  by mouth daily.      CALCIUM 500 +D OR one daily      folic acid (FOLVITE) 1 MG tablet Take 2 tablets (2 mg) by mouth daily 200 tablet 2    furosemide (LASIX) 20 MG tablet Take 1 tablet (20 mg) by mouth daily 90 tablet 3    hypromellose (ARTIFICIAL TEARS) 0.5 % SOLN ophthalmic solution Place 1 drop into both eyes 3 times daily      latanoprost (XALATAN) 0.005 % ophthalmic solution Place 1 drop into both eyes at bedtime 7.5 mL 3    lisinopril (ZESTRIL) 10 MG tablet Take 1 tablet (10 mg) by mouth daily 90 tablet 3    methotrexate 2.5 MG tablet Take 8 tablets (20 mg) by mouth every 7 days . Methotrexate is a once weekly medication, taken on the same day of each week. 96 tablet 2    multivitamin (OCUVITE) TABS tablet Take 1 tablet by mouth daily      spironolactone (ALDACTONE) 25 MG tablet Take 0.5 tablets (12.5 mg) by mouth daily 45 tablet 3    timolol maleate  (TIMOPTIC) 0.5 % ophthalmic solution Place 1 drop Into the left eye every morning 10 mL 3    zolpidem (AMBIEN) 5 MG tablet TAKE ONE TABLET BY MOUTH IN THE EVENING AS NEEDED FOR SLEEP 30 tablet 5     No current facility-administered medications for this visit.       ROS:   Refer to HPI    EXAM:  /70 (BP Location: Right arm, Patient Position: Sitting, Cuff Size: Adult Regular)   Pulse 67   Wt 85.5 kg (188 lb 9.6 oz)   SpO2 95%   BMI 35.33 kg/m    GENERAL: Appears comfortable, in no acute distress.   HEENT: Eye symmetrical, no discharge or icterus bilaterally. Mucous membranes moist and without lesions.  CV: RRR, +S1S2, no murmur, rub, or gallop.  RESPIRATORY: Respirations regular, even, and unlabored. Lungs CTA throughout.  GI: Soft and non distended with normoactive bowel sounds present in all quadrants. No tenderness, rebound, guarding. No hepatomegaly.   EXTREMITIES: no peripheral edema. 2+ bilateral pedal pulses.   NEUROLOGIC: Alert and oriented x 3. No focal deficits.   MUSCULOSKELETAL: No joint swelling or tenderness.   SKIN: No jaundice. No rashes or lesions.     Labs:  CBC RESULTS:  Lab Results   Component Value Date    WBC 7.2 06/22/2024    WBC 5.0 11/26/2020    RBC 4.18 06/22/2024    RBC 4.74 11/26/2020    HGB 12.6 06/22/2024    HGB 13.4 11/26/2020    HCT 40.2 06/22/2024    HCT 43.1 11/26/2020    MCV 96 06/22/2024    MCV 91 11/26/2020    MCH 30.1 06/22/2024    MCH 28.3 11/26/2020    MCHC 31.3 (L) 06/22/2024    MCHC 31.1 (L) 11/26/2020    RDW 16.0 (H) 06/22/2024    RDW 12.5 11/26/2020     06/22/2024     11/26/2020       CMP RESULTS:  Lab Results   Component Value Date     06/22/2024     11/26/2020    POTASSIUM 4.1 06/27/2024    POTASSIUM 4.3 03/08/2023    POTASSIUM 3.8 11/26/2020    CHLORIDE 102 06/22/2024    CHLORIDE 106 03/08/2023    CHLORIDE 104 11/26/2020    CO2 24 06/22/2024    CO2 27 03/08/2023    CO2 29 11/26/2020    ANIONGAP 11 06/22/2024    ANIONGAP 4 03/08/2023     "ANIONGAP 6 11/26/2020     (H) 06/22/2024     (H) 03/08/2023     (H) 11/26/2020    BUN 24.3 (H) 06/22/2024    BUN 22 03/08/2023    BUN 16 11/26/2020    CR 0.96 (H) 06/22/2024    CR 0.70 11/26/2020    GFRESTIMATED 60 (L) 06/22/2024    GFRESTIMATED >60 11/14/2022    GFRESTIMATED 84 11/26/2020    GFRESTBLACK >90 11/26/2020    RUDOLPH 8.9 06/22/2024    RUDOLPH 10.0 11/26/2020    BILITOTAL 0.7 06/20/2024    BILITOTAL 0.5 11/26/2020    ALBUMIN 3.8 06/20/2024    ALBUMIN 3.8 03/08/2023    ALBUMIN 3.6 11/26/2020    ALKPHOS 60 06/20/2024    ALKPHOS 84 11/26/2020    ALT 23 06/20/2024    ALT 16 11/26/2020    AST 35 06/20/2024    AST 9 11/26/2020        INR RESULTS:  Lab Results   Component Value Date    INR 1.75 (H) 06/19/2024    INR 1.24 (H) 11/26/2020       Lab Results   Component Value Date    MAG 1.8 06/27/2024    MAG 1.6 06/30/2014     Lab Results   Component Value Date    NTBNPI 2,092 (H) 06/20/2024     No results found for: \"NTBNP\"    Diagnostics:  ECG 6/19/24:       Echo 6/19/24:  Left Ventricle  Left ventricular function is decreased. The ejection fraction is 40-45%  (mildly reduced).     Right Ventricle  The right ventricle is normal size. Mild right ventricular dilation is  present.     Atria  Both atria appear normal. The left atrial appendage is normal. It is free of  spontaneous echo contrast and thrombus. The left atrial appendage Doppler  velocities are normal. Small PFO noted by color doppler.     Mitral Valve  Mild to moderate mitral annular calcification is present. Moderate mitral  insufficiency is present.     Aortic Valve  Aortic valve sclerosis is present.     Tricuspid Valve  Moderate tricuspid insufficiency is present. Right ventricular systolic  pressure is 32mmHg above the right atrial pressure.     Pulmonic Valve  The pulmonic valve is normal. Trace pulmonic insufficiency is present.     Vessels  The aorta root is normal. The pulmonary artery and bifurcation cannot be  assessed.   "   Pericardium  No pericardial effusion is present.      DCCV 6/27/24:  PROCEDURE DETAILS  Pre-procedure rhythm: atrial fibrillation  Patient position: patient was placed in a supine position  Chest area: chest area exposed  Electrodes: pads  Electrodes placed: anterior-posterior  Number of attempts: 1     Details of Attempts:  One, 150 J biphasic synchronized shock delivered without conversion.   Second, 200 J biphasic synchronized shock delivered with conversion to sinus rhythm.   Post-procedure rhythm: normal sinus rhythm     PROCEDURE     Patient Tolerance:  Patient tolerated the procedure well with no immediate complications     Anticoagulation: Eliquis 5 mg bid, no missed doses.             Recent Results (from the past 24 hour(s))   XR Chest Port 1 View     Narrative     EXAM: XR CHEST PORT 1 VIEW  6/20/2024 7:43 PM       HISTORY: SOB, CHF concern     COMPARISON: CT 6/3/2024     FINDINGS: Single view of the chest. Prominent right paratracheal  stripe, similar to findings seen on CT dated 6/3/2024 and possibly  secondary to prominent pericardial recess. Loop recorder. Trachea is  midline. Cardiac silhouette is enlarged.  Streaky perihilar and left  greater than right basilar opacities. Small bilateral pleural  effusions. No pneumothorax.        Impression     IMPRESSION: Cardiomegaly with streaky perihilar and bibasilar  opacities, likely mild pulmonary edema. Small bilateral pleural  effusions.     I have personally reviewed the examination and initial interpretation  and I agree with the findings.     NILDA BRIONES MD          Assessment and Plan:   Ms. 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 Presents to clinic for new CORE visit.      # Heart Failure with mildly reduced EF (EF 40-45%)  # HTN  Prior Nuclear lexiscan stress  test 6/3/2024 with EF 69% and no ischemia. Admission for Afib w/ RVR in June with newly reduced EF.  DIRK 6/19/2024: EF 40-45%, mild RV dilation, moderate MR, RV systolic pressure of 32 mmHg.    Fluid status: euvolemic; Lasix 20 mg daily --> can change to PRN  ACEi/ARB/ARNi/afterload reduction: lisinopril 10 mg  BB: previously contraindicated due to hypotension; patient says her  has been on it for years and would like to avoid if possible  Aldosterone antagonist: spironolactone 12.5 mg daily --> increase to 25 mg daily  SGLT2i: cost-prohibitive (Jardiance $135 and Farxiga $213 for 30-day supply)  SCD prophylaxis: does not meet criteria for implant  NSAID use: contraindicated  Sleep apnea evaluation: referral sent  - discussed importance of daily weights, 2g sodium diet, 2L fluid restriction, and medication adherence  - patient aware to call with wt gain or >2 lbs in 24 hours or >5 lbs in one week     # Atrial fibrillation w/ RVR (8/2018)  Outpatient DCCV on 6/27 with successful cardioversion to NSR after 2 shocks. Asymptomatic in clinic today.  - Apixaban 5 mg BID  - Amio 400 BID then starting 200 mg daily in ~4 days    # HLD  Lipid panel 11/2022 with LDL 74, .  - atorvastatin 20 mg daily     # Moderate persistent asthma  - albuterol prn    Follow up in 1-2 weeks with labs, 3 months with CORE.     SVEN Sanches, EVAP-C  [facility] General Cardiology  Securely message with [employer]   Text page via Digital Message Display Paging/Directory          Chart review time: 10 minutes  Visit time: 50 minutes  Chart completion/documentation: 6 minutes  Total time spent: 66 minutes       The longitudinal plan of care for the diagnosis(es)/condition(s) as documented were addressed during this visit. Due to the added complexity in care, I will continue to support Malinda in the subsequent management and with ongoing continuity of care.     weight-bearing as tolerated

## 2024-07-01 NOTE — TELEPHONE ENCOUNTER
30 days supply test claims requested for the drugs below:  Jardiance 10mg daily, Farxiga 10mg daily     Jardiance 10mg   -$135.33 copay          Farxiga 10mg  -$213.04 copay

## 2024-07-02 ENCOUNTER — LAB (OUTPATIENT)
Dept: LAB | Facility: CLINIC | Age: 79
End: 2024-07-02
Payer: MEDICARE

## 2024-07-02 ENCOUNTER — TELEPHONE (OUTPATIENT)
Dept: CARDIOLOGY | Facility: CLINIC | Age: 79
End: 2024-07-02

## 2024-07-02 ENCOUNTER — OFFICE VISIT (OUTPATIENT)
Dept: CARDIOLOGY | Facility: CLINIC | Age: 79
End: 2024-07-02
Payer: MEDICARE

## 2024-07-02 ENCOUNTER — HOSPITAL ENCOUNTER (OUTPATIENT)
Facility: CLINIC | Age: 79
Discharge: HOME | End: 2024-07-02
Admitting: FAMILY MEDICINE
Payer: MEDICARE

## 2024-07-02 VITALS
BODY MASS INDEX: 35.33 KG/M2 | HEART RATE: 67 BPM | DIASTOLIC BLOOD PRESSURE: 70 MMHG | SYSTOLIC BLOOD PRESSURE: 108 MMHG | OXYGEN SATURATION: 95 % | WEIGHT: 188.6 LBS

## 2024-07-02 DIAGNOSIS — E66.812 CLASS 2 SEVERE OBESITY DUE TO EXCESS CALORIES WITH SERIOUS COMORBIDITY IN ADULT (H): ICD-10-CM

## 2024-07-02 DIAGNOSIS — I48.19 PERSISTENT ATRIAL FIBRILLATION (H): ICD-10-CM

## 2024-07-02 DIAGNOSIS — I50.20 HEART FAILURE WITH REDUCED EJECTION FRACTION (H): Primary | ICD-10-CM

## 2024-07-02 DIAGNOSIS — R73.01 IMPAIRED FASTING GLUCOSE: ICD-10-CM

## 2024-07-02 DIAGNOSIS — E66.01 CLASS 2 SEVERE OBESITY DUE TO EXCESS CALORIES WITH SERIOUS COMORBIDITY IN ADULT (H): ICD-10-CM

## 2024-07-02 LAB
CREAT SERPL-MCNC: 0.98 MG/DL (ref 0.51–0.95)
EGFRCR SERPLBLD CKD-EPI 2021: 58 ML/MIN/1.73M2
HBA1C MFR BLD: 5.7 % (ref 0–5.6)

## 2024-07-02 PROCEDURE — 82565 ASSAY OF CREATININE: CPT

## 2024-07-02 PROCEDURE — G2211 COMPLEX E/M VISIT ADD ON: HCPCS

## 2024-07-02 PROCEDURE — 83036 HEMOGLOBIN GLYCOSYLATED A1C: CPT

## 2024-07-02 PROCEDURE — 36415 COLL VENOUS BLD VENIPUNCTURE: CPT

## 2024-07-02 PROCEDURE — 99215 OFFICE O/P EST HI 40 MIN: CPT

## 2024-07-02 RX ORDER — FUROSEMIDE 20 MG
20 TABLET ORAL PRN
Status: SHIPPED
Start: 2024-07-02 | End: 2024-08-12

## 2024-07-02 RX ORDER — SPIRONOLACTONE 25 MG/1
50 TABLET ORAL DAILY
Qty: 180 TABLET | Refills: 3 | Status: SHIPPED | OUTPATIENT
Start: 2024-07-02 | End: 2024-07-16

## 2024-07-02 NOTE — TELEPHONE ENCOUNTER
Attempted to call patient. Phone call answered by family member who stated to call back later.    Yvonne Day, RN, BSN  Cardiology RN Care Coordinator   [place]   Phone: [phone]  Fax: [phone] ([place]) [phone] ([place])

## 2024-07-02 NOTE — NURSING NOTE
Labs:  Patient to have BMP completed in 1-2 weeks. Patient demonstrated understanding of this information and agreed to call with further questions or concerns.     Med Reconcile: Reviewed and verified all current medications with the patient. The updated medication list was printed and given to the patient.    Return Appointment: Patient given instructions regarding scheduling next clinic visit. Patient demonstrated understanding of this information and agreed to call with further questions or concerns. Patient to follow up with [facility] in 3 months.    Medication Change: Patient was educated regarding prescribed medication change, including discussion of the indication, administration, side effects, and when to report to MD or RN. Patient demonstrated understanding of this information and agreed to call with further questions or concerns. Patient to increase spironolactone to 25 mg daily. Patient to take lasix as needed for shortness of breath or edema.    Sleep referral in place for patient.    Patient stated she understood all health information given and agreed to call with further questions or concerns.    Yvonne Day RN, BSN  Cardiology RN Care Coordinator   [place]   Phone: [phone]  Fax: [phone] ([place]) [phone] ([place])

## 2024-07-02 NOTE — PATIENT INSTRUCTIONS
Take your medicines every day, as directed    Changes made today:  Sleep medicine referral  Increase spironolactone to 25 mg daily  Labs in 1-2 weeks  Only take Lasix 20 mg as needed for edema or shortness of breath and let us know if you are needing to take it   Monitor Your Weight and Symptoms    Contact us if you:    Gain 2 pounds in one day or 5 pounds in one week  Feel more short of breath  Notice more leg swelling  Feel lightheadeded   Change your lifestyle    Limit Salt or Sodium:  2000 mg  Limit Fluids:  2000 mL or approximately 64 ounces  Eat a Heart Healthy Diet  Low in saturated fats  Stay Active:  Aim to move at least 150 minutes every  week         To Contact us    During Business Hours:  [phone], option # 1      After hours, weekends or holidays:   [phone], Option #4  Ask to speak to the On-Call Cardiologist. Inform them you are a CORE/heart failure patient at the [facility].     Use [employer] allows you to communicate directly with your heart team through secure messaging.  [employer] can be accessed any time on your phone, computer, or tablet.  If you need assistance, we'd be happy to help!         Keep your Heart Appointments:    BMP in 1-2 weeks  CORE in 3 months with labs     Please consider attending our virtual support group which is held monthly. Please reach out to Je at [phone] for more information if you are interested in attending.     2024 dates:  Monday, July 1st, 1-2pm     Monday, August 5th, 1-2pm     Monday, September 9th, 1-2pm     Monday, October 7th, 1-2pm     Monday, November 4th, 1-2pm     Monday, December 2nd, 1-2pm

## 2024-07-02 NOTE — LETTER
7/2/2024      RE: Alfreda Baxter  [address] [facility] [address]       Dear Colleague,    Thank you for the opportunity to participate in the care of your patient, Alfreda Baxter, at the [facility]. Please see a copy of my visit note below.     [facility]  CARDIOLOGY CORE CLINIC    HPI:   Ms. 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 Presents to clinic for new CORE visit.    Recent shortness of breath prompting evaluation, found to be in atrial fibrillation w/ RVR. Subsequent cardioversion on 6/18/24 failed (shocked x3 times). Started on Amiodarone and sent home with [employer]. Patient then admitted from 6/20-6/22 for increased SOB and edema. Patient was found to have newly reduced EF of 40-45% in the setting of afib. Treated with IV Lasix. She was started on spironolactone and lasix for GDMT. Patient was already on lisinopril, BB was deferred d/t hypotension, and SGLT2 is cost-prohibitive. Underwent successful outpatient cardioversion on 6/27.    Today in clinic, she denies any current chest pain, palpitations, dizziness, lower extremity edema, shortness of breath, BUENO, or PND. She states as soon as she was started on the amiodarone and had some diuresis, she felt immediately better. She did report some SOB after her cardioversion last week, but states that it has improved now after the first few days.    She does report a difficulty sleeping, but states that she has anxiety and does take Ambien to help her sleep. She sleeps in her bed and uses 1 pillow and a travel pillow as well for positioning.    Blood pressures at home are 110/60-70's. She says she never sees numbers below  100 on top, and rarely higher than 120/80.    Malinda is very independent, does all her own laundry, shopping, housework.     Documented weight on 6/22 was 205 lbs. Weight today in clinic is 188 lbs.    Current Cardiac Medications  Amiodarone 200 mg daily  Eliquis 5 mg BID  Lasix 20 mg daily  Lisinopril 10 mg daily  Spironolactone 12.5 mg daily    PAST MEDICAL HISTORY:  Past Medical History:   Diagnosis Date     Acute idiopathic gout of left foot 11/04/2015     Adenomatous colon polyp 06/05/2013    Colonoscopy due 2025     Asthma     controlled with inhaler     Blind left eye 07/08/2019    Due to central retinal occlusion     Cataract      Central retinal artery occlusion of left eye 09/15/2018     Disturbances of vision, late effect of stroke 12/03/2022     DJD (degenerative joint disease)     knees     GERD (gastroesophageal reflux disease)      Glaucoma      Gout      History of central retinal artery occlusion, os 12/11/2020     HTN (hypertension)      Hyperlipidemia LDL goal <70 12/31/2010     Low bone mass      Lumbar spinal stenosis 12/08/2011    Sees Dr Sage     Mild persistent asthma 12/12/2013     Neovascular glaucoma of left eye, 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   H/o R central retinal artery occlusion     Paroxysmal ventricular tachycardia (H) 11/20/2018    Added automatically from request for surgery [id number]     PFO (patent foramen ovale)      Recurrent cerebrovascular accidents (CVAs) (H) 11/14/2022     Recurrent cerebrovascular accidents (CVAs) (H) 11/14/2022     Transient global amnesia 11/14/2022       FAMILY HISTORY:  Family History   Problem Relation Age of Onset     Arthritis Mother      Cancer Mother      Hypertension Mother      Other Cancer Mother      Thyroid Disease Mother      Respiratory Father      Glaucoma Father      Asthma Father      Allergies Sister       Eye Disorder Sister      Lipids Sister      Neurologic Disorder Sister      Osteoporosis Sister      Glaucoma Sister      Hypertension Sister      Macular Degeneration Sister      Hyperlipidemia Sister      Hypertension Sister      Osteoporosis Sister      Gastrointestinal Disease Son         Ulcerative Colitis     Glaucoma Other        SOCIAL HISTORY:  Social History     Socioeconomic History     Marital status:      Spouse name: Vaughn     Number of children: 2     Years of education: 14   Occupational History     Occupation: Retired LPN   Tobacco Use     Smoking status: Former     Current packs/day: 0.00     Average packs/day: 0.1 packs/day for 2.0 years (0.2 ttl pk-yrs)     Types: Cigarettes     Start date: 1963     Quit date: 1965     Years since quittin.5     Passive exposure: Never     Smokeless tobacco: Never   Vaping Use     Vaping status: Never Used   Substance and Sexual Activity     Alcohol use: Not Currently     Comment: one a month     Drug use: No     Sexual activity: Not Currently     Partners: Male     Birth control/protection: Post-menopausal, Female Surgical   Other Topics Concern     Parent/sibling w/ CABG, MI or angioplasty before 65F 55M? No   Social History Narrative    , lives with .  2 grown children in area     Lives in house.       Social Determinants of Health     Financial Resource Strain: Low Risk  (2023)    Financial Resource Strain      Within the past 12 months, have you or your family members you live with been unable to get utilities (heat, electricity) when it was really needed?: No   Food Insecurity: Low Risk  (2023)    Food Insecurity      Within the past 12 months, did you worry that your food would run out before you got money to buy more?: No      Within the past 12 months, did the food you bought just not last and you didn t have money to get more?: Patient refused   Transportation Needs: Low Risk  (2023)    Transportation  Needs      Within the past 12 months, has lack of transportation kept you from medical appointments, getting your medicines, non-medical meetings or appointments, work, or from getting things that you need?: No   Interpersonal Safety: Low Risk  (3/29/2024)    Interpersonal Safety      Do you feel physically and emotionally safe where you currently live?: Yes      Within the past 12 months, have you been hit, slapped, kicked or otherwise physically hurt by someone?: No      Within the past 12 months, have you been humiliated or emotionally abused in other ways by your partner or ex-partner?: No   Housing Stability: Low Risk  (9/27/2023)    Housing Stability      Do you have housing? : Yes      Are you worried about losing your housing?: No       CURRENT MEDICATIONS:  Current Outpatient Medications   Medication Sig Dispense Refill     acetaminophen (TYLENOL) 500 MG tablet Take 500-1,000 mg by mouth every 6 hours as needed.       albuterol (PROAIR HFA/PROVENTIL HFA/VENTOLIN HFA) 108 (90 Base) MCG/ACT inhaler Inhale 2 puffs into the lungs every 6 hours as needed for shortness of breath or wheezing 18 g 11     amiodarone (PACERONE) 200 MG tablet Take 400 mg (2 tablets) twice a day x 2 weeks. Then take 200 mg (1 tablet) daily. 90 tablet 3     apixaban ANTICOAGULANT (ELIQUIS) 5 MG tablet Take 1 tablet (5 mg) by mouth 2 times daily 180 tablet 3     atorvastatin (LIPITOR) 20 MG tablet Take 1 tablet (20 mg) by mouth daily 90 tablet 3     B-COMPLEX OR Take  by mouth daily.       CALCIUM 500 +D OR one daily       folic acid (FOLVITE) 1 MG tablet Take 2 tablets (2 mg) by mouth daily 200 tablet 2     furosemide (LASIX) 20 MG tablet Take 1 tablet (20 mg) by mouth daily 90 tablet 3     hypromellose (ARTIFICIAL TEARS) 0.5 % SOLN ophthalmic solution Place 1 drop into both eyes 3 times daily       latanoprost (XALATAN) 0.005 % ophthalmic solution Place 1 drop into both eyes at bedtime 7.5 mL 3     lisinopril (ZESTRIL) 10 MG tablet  Take 1 tablet (10 mg) by mouth daily 90 tablet 3     methotrexate 2.5 MG tablet Take 8 tablets (20 mg) by mouth every 7 days . Methotrexate is a once weekly medication, taken on the same day of each week. 96 tablet 2     multivitamin (OCUVITE) TABS tablet Take 1 tablet by mouth daily       spironolactone (ALDACTONE) 25 MG tablet Take 0.5 tablets (12.5 mg) by mouth daily 45 tablet 3     timolol maleate (TIMOPTIC) 0.5 % ophthalmic solution Place 1 drop Into the left eye every morning 10 mL 3     zolpidem (AMBIEN) 5 MG tablet TAKE ONE TABLET BY MOUTH IN THE EVENING AS NEEDED FOR SLEEP 30 tablet 5     No current facility-administered medications for this visit.       ROS:   Refer to HPI    EXAM:  /70 (BP Location: Right arm, Patient Position: Sitting, Cuff Size: Adult Regular)   Pulse 67   Wt 85.5 kg (188 lb 9.6 oz)   SpO2 95%   BMI 35.33 kg/m    GENERAL: Appears comfortable, in no acute distress.   HEENT: Eye symmetrical, no discharge or icterus bilaterally. Mucous membranes moist and without lesions.  CV: RRR, +S1S2, no murmur, rub, or gallop.  RESPIRATORY: Respirations regular, even, and unlabored. Lungs CTA throughout.  GI: Soft and non distended with normoactive bowel sounds present in all quadrants. No tenderness, rebound, guarding. No hepatomegaly.   EXTREMITIES: no peripheral edema. 2+ bilateral pedal pulses.   NEUROLOGIC: Alert and oriented x 3. No focal deficits.   MUSCULOSKELETAL: No joint swelling or tenderness.   SKIN: No jaundice. No rashes or lesions.     Labs:  CBC RESULTS:  Lab Results   Component Value Date    WBC 7.2 06/22/2024    WBC 5.0 11/26/2020    RBC 4.18 06/22/2024    RBC 4.74 11/26/2020    HGB 12.6 06/22/2024    HGB 13.4 11/26/2020    HCT 40.2 06/22/2024    HCT 43.1 11/26/2020    MCV 96 06/22/2024    MCV 91 11/26/2020    MCH 30.1 06/22/2024    MCH 28.3 11/26/2020    MCHC 31.3 (L) 06/22/2024    MCHC 31.1 (L) 11/26/2020    RDW 16.0 (H) 06/22/2024    RDW 12.5 11/26/2020      "06/22/2024     11/26/2020       CMP RESULTS:  Lab Results   Component Value Date     06/22/2024     11/26/2020    POTASSIUM 4.1 06/27/2024    POTASSIUM 4.3 03/08/2023    POTASSIUM 3.8 11/26/2020    CHLORIDE 102 06/22/2024    CHLORIDE 106 03/08/2023    CHLORIDE 104 11/26/2020    CO2 24 06/22/2024    CO2 27 03/08/2023    CO2 29 11/26/2020    ANIONGAP 11 06/22/2024    ANIONGAP 4 03/08/2023    ANIONGAP 6 11/26/2020     (H) 06/22/2024     (H) 03/08/2023     (H) 11/26/2020    BUN 24.3 (H) 06/22/2024    BUN 22 03/08/2023    BUN 16 11/26/2020    CR 0.96 (H) 06/22/2024    CR 0.70 11/26/2020    GFRESTIMATED 60 (L) 06/22/2024    GFRESTIMATED >60 11/14/2022    GFRESTIMATED 84 11/26/2020    GFRESTBLACK >90 11/26/2020    RUDOLPH 8.9 06/22/2024    RUDOLPH 10.0 11/26/2020    BILITOTAL 0.7 06/20/2024    BILITOTAL 0.5 11/26/2020    ALBUMIN 3.8 06/20/2024    ALBUMIN 3.8 03/08/2023    ALBUMIN 3.6 11/26/2020    ALKPHOS 60 06/20/2024    ALKPHOS 84 11/26/2020    ALT 23 06/20/2024    ALT 16 11/26/2020    AST 35 06/20/2024    AST 9 11/26/2020        INR RESULTS:  Lab Results   Component Value Date    INR 1.75 (H) 06/19/2024    INR 1.24 (H) 11/26/2020       Lab Results   Component Value Date    MAG 1.8 06/27/2024    MAG 1.6 06/30/2014     Lab Results   Component Value Date    NTBNPI 2,092 (H) 06/20/2024     No results found for: \"NTBNP\"    Diagnostics:  ECG 6/19/24:       Echo 6/19/24:  Left Ventricle  Left ventricular function is decreased. The ejection fraction is 40-45%  (mildly reduced).     Right Ventricle  The right ventricle is normal size. Mild right ventricular dilation is  present.     Atria  Both atria appear normal. The left atrial appendage is normal. It is free of  spontaneous echo contrast and thrombus. The left atrial appendage Doppler  velocities are normal. Small PFO noted by color doppler.     Mitral Valve  Mild to moderate mitral annular calcification is present. Moderate " mitral  insufficiency is present.     Aortic Valve  Aortic valve sclerosis is present.     Tricuspid Valve  Moderate tricuspid insufficiency is present. Right ventricular systolic  pressure is 32mmHg above the right atrial pressure.     Pulmonic Valve  The pulmonic valve is normal. Trace pulmonic insufficiency is present.     Vessels  The aorta root is normal. The pulmonary artery and bifurcation cannot be  assessed.     Pericardium  No pericardial effusion is present.      DCCV 6/27/24:  PROCEDURE DETAILS  Pre-procedure rhythm: atrial fibrillation  Patient position: patient was placed in a supine position  Chest area: chest area exposed  Electrodes: pads  Electrodes placed: anterior-posterior  Number of attempts: 1     Details of Attempts:  One, 150 J biphasic synchronized shock delivered without conversion.   Second, 200 J biphasic synchronized shock delivered with conversion to sinus rhythm.   Post-procedure rhythm: normal sinus rhythm     PROCEDURE     Patient Tolerance:  Patient tolerated the procedure well with no immediate complications     Anticoagulation: Eliquis 5 mg bid, no missed doses.             Recent Results (from the past 24 hour(s))   XR Chest Port 1 View     Narrative     EXAM: XR CHEST PORT 1 VIEW  6/20/2024 7:43 PM       HISTORY: SOB, CHF concern     COMPARISON: CT 6/3/2024     FINDINGS: Single view of the chest. Prominent right paratracheal  stripe, similar to findings seen on CT dated 6/3/2024 and possibly  secondary to prominent pericardial recess. Loop recorder. Trachea is  midline. Cardiac silhouette is enlarged.  Streaky perihilar and left  greater than right basilar opacities. Small bilateral pleural  effusions. No pneumothorax.        Impression     IMPRESSION: Cardiomegaly with streaky perihilar and bibasilar  opacities, likely mild pulmonary edema. Small bilateral pleural  effusions.     I have personally reviewed the examination and initial interpretation  and I agree with the  findings.     NILDA BRIONES MD          Assessment and Plan:   Ms. 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 Presents to clinic for new CORE visit.      # Heart Failure with mildly reduced EF (EF 40-45%)  # HTN  Prior Nuclear lexiscan stress test 6/3/2024 with EF 69% and no ischemia. Admission for Afib w/ RVR in June with newly reduced EF.  DIRK 6/19/2024: EF 40-45%, mild RV dilation, moderate MR, RV systolic pressure of 32 mmHg.    Fluid status: euvolemic; Lasix 20 mg daily --> can change to PRN  ACEi/ARB/ARNi/afterload reduction: lisinopril 10 mg  BB: previously contraindicated due to hypotension; patient says her  has been on it for years and would like to avoid if possible  Aldosterone antagonist: spironolactone 12.5 mg daily --> increase to 25 mg daily  SGLT2i: cost-prohibitive (Jardiance $135 and Farxiga $213 for 30-day supply)  SCD prophylaxis: does not meet criteria for implant  NSAID use: contraindicated  Sleep apnea evaluation: referral sent  - discussed importance of daily weights, 2g sodium diet, 2L fluid restriction, and medication adherence  - patient aware to call with wt gain or >2 lbs in 24 hours or >5 lbs in one week     # Atrial fibrillation w/ RVR (8/2018)  Outpatient DCCV on 6/27 with successful cardioversion to NSR after 2 shocks. Asymptomatic in clinic today.  - Apixaban 5 mg BID  - Amio 400 BID then starting 200 mg daily in ~4 days    # HLD  Lipid panel 11/2022 with LDL 74, .  - atorvastatin 20 mg daily     # Moderate persistent asthma  - albuterol prn    Follow up in 1-2 weeks with labs, 3 months with CORE.     SVEN Sanches, FNP-C  [facility] General Cardiology  Securely message with [employer]   Text page via [employer] Paging/Directory          Chart review time: 10 minutes  Visit time: 50  minutes  Chart completion/documentation: 6 minutes  Total time spent: 66 minutes       The longitudinal plan of care for the diagnosis(es)/condition(s) as documented were addressed during this visit. Due to the added complexity in care, I will continue to support Malinda in the subsequent management and with ongoing continuity of care.        Please do not hesitate to contact me if you have any questions/concerns.     Sincerely,     SVEN Sanches CNP

## 2024-07-02 NOTE — TELEPHONE ENCOUNTER
Attempted to call patient. Patient's family member stated that patient is not currently available.    Yvonne Day, RN, BSN  Cardiology RN Care Coordinator   [place]   Phone: [phone]  Fax: [phone] ([place]) [phone] ([place])

## 2024-07-02 NOTE — TELEPHONE ENCOUNTER
----- Message from Libertad Howard sent at 7/2/2024 12:20 PM CDT -----  Hey,   That was just to watch her intervals with amio initiation and being in Afib with RVR so now that she's in sinus, she can take off and send back. I should have told her that at [facility] sorry.    Thanks for your help :)     Hope clinic is going okay!    Libertad  ----- Message -----  From: Claudine Mosquera APRN CNP  Sent: 7/2/2024  12:03 PM CDT  To: Libertad Howard PA-C;  Cardiology Nurse    Roxanna Maurer!    This patient is still wearing an ambulatory monitor and she wasn't sure when she was supposed to take it off and send it back. I couldn't find anything in the notes or orders that was specific. When do you want her to stop wearing it?    Thanks!  Claudine

## 2024-07-02 NOTE — NURSING NOTE
"Chief Complaint   Patient presents with    New Patient     New CORE for Acute decompensated HFmrEF (EF 40-45%)       Initial /70 (BP Location: Right arm, Patient Position: Sitting, Cuff Size: Adult Regular)   Pulse 67   Wt 85.5 kg (188 lb 9.6 oz)   SpO2 95%   BMI 35.33 kg/m   Estimated body mass index is 35.33 kg/m  as calculated from the following:    Height as of 6/13/24: 1.556 m (5' 1.26\").    Weight as of this encounter: 85.5 kg (188 lb 9.6 oz)..  BP completed using cuff size: regular    ANAYA Jimenez      "

## 2024-07-03 NOTE — TELEPHONE ENCOUNTER
Attempted to call patient. Unable to reach patient. Unable to leave message.    Yvonne Day, RN, BSN  Cardiology RN Care Coordinator   [place]   Phone: [phone]  Fax: [phone] ([place]) [phone] ([place])

## 2024-07-05 ENCOUNTER — MYC MEDICAL ADVICE (OUTPATIENT)
Dept: CARDIOLOGY | Facility: CLINIC | Age: 79
End: 2024-07-05
Payer: MEDICARE

## 2024-07-05 NOTE — TELEPHONE ENCOUNTER
Attempted to call patient. Unable to reach patient. Voicemail box not setup. [employer] message sent to patient.    Yvonne Day RN, BSN  Cardiology RN Care Coordinator   [place]   Phone: [phone]  Fax: [phone] ([place]) [phone] ([place])

## 2024-07-05 NOTE — TELEPHONE ENCOUNTER
----- Message from Libertad Howard sent at 7/2/2024 12:20 PM CDT -----  Hey,   That was just to watch her intervals with amio initiation and being in Afib with RVR so now that she's in sinus, she can take off and send back. I should have told her that at [facility] sorry.     Thanks for your help :)      Hope clinic is going okay!     Libertad  ----- Message -----  From: Claudine Mosquera APRN CNP  Sent: 7/2/2024  12:03 PM CDT  To: Libertad Howard PA-C;  Cardiology Nurse     Roxanna Maurer!     This patient is still wearing an ambulatory monitor and she wasn't sure when she was supposed to take it off and send it back. I couldn't find anything in the notes or orders that was specific. When do you want her to stop wearing it?     Thanks!  Claudine

## 2024-07-05 NOTE — TELEPHONE ENCOUNTER
Multiple attempts to reach patient to discuss monitor. Unable to speak with patient. [employer] message sent to patient.    Yvonne Day, RN, BSN  Cardiology RN Care Coordinator   [place]   Phone: [phone]  Fax: [phone] ([place]) [phone] ([place])

## 2024-07-07 ENCOUNTER — DOCUMENTATION ONLY (OUTPATIENT)
Dept: CARDIOLOGY | Facility: CLINIC | Age: 79
End: 2024-07-07
Payer: MEDICARE

## 2024-07-07 DIAGNOSIS — I50.20 HEART FAILURE WITH REDUCED EJECTION FRACTION (H): Primary | ICD-10-CM

## 2024-07-07 DIAGNOSIS — I48.19 PERSISTENT ATRIAL FIBRILLATION (H): ICD-10-CM

## 2024-07-09 NOTE — TELEPHONE ENCOUNTER
Patient saw [employer] message. No response at this time.    Yvonne Day, RN, BSN  Cardiology RN Care Coordinator   [place]   Phone: [phone]  Fax: [phone] ([place]) [phone] ([place])

## 2024-07-15 ENCOUNTER — MYC MEDICAL ADVICE (OUTPATIENT)
Dept: CARDIOLOGY | Facility: CLINIC | Age: 79
End: 2024-07-15

## 2024-07-15 ENCOUNTER — LAB (OUTPATIENT)
Dept: LAB | Facility: CLINIC | Age: 79
End: 2024-07-15
Payer: MEDICARE

## 2024-07-15 ENCOUNTER — HOSPITAL ENCOUNTER (OUTPATIENT)
Facility: CLINIC | Age: 79
Discharge: HOME | End: 2024-07-15
Attending: INTERNAL MEDICINE | Admitting: FAMILY MEDICINE
Payer: MEDICARE

## 2024-07-15 DIAGNOSIS — I48.19 PERSISTENT ATRIAL FIBRILLATION (H): ICD-10-CM

## 2024-07-15 DIAGNOSIS — R53.83 FATIGUE: Primary | ICD-10-CM

## 2024-07-15 DIAGNOSIS — I50.20 HEART FAILURE WITH REDUCED EJECTION FRACTION (H): ICD-10-CM

## 2024-07-15 LAB
ANION GAP SERPL CALCULATED.3IONS-SCNC: 12 MMOL/L (ref 7–15)
BUN SERPL-MCNC: 35.9 MG/DL (ref 8–23)
CALCIUM SERPL-MCNC: 9.6 MG/DL (ref 8.8–10.2)
CHLORIDE SERPL-SCNC: 102 MMOL/L (ref 98–107)
CREAT SERPL-MCNC: 1.26 MG/DL (ref 0.51–0.95)
EGFRCR SERPLBLD CKD-EPI 2021: 43 ML/MIN/1.73M2
GLUCOSE SERPL-MCNC: 121 MG/DL (ref 70–99)
HCO3 SERPL-SCNC: 21 MMOL/L (ref 22–29)
POTASSIUM SERPL-SCNC: 5.7 MMOL/L (ref 3.4–5.3)
SODIUM SERPL-SCNC: 135 MMOL/L (ref 135–145)

## 2024-07-15 PROCEDURE — 36415 COLL VENOUS BLD VENIPUNCTURE: CPT

## 2024-07-15 PROCEDURE — 80048 BASIC METABOLIC PNL TOTAL CA: CPT

## 2024-07-16 ENCOUNTER — LAB (OUTPATIENT)
Dept: LAB | Facility: CLINIC | Age: 79
End: 2024-07-16
Payer: MEDICARE

## 2024-07-16 ENCOUNTER — CARE COORDINATION (OUTPATIENT)
Dept: CARDIOLOGY | Facility: CLINIC | Age: 79
End: 2024-07-16

## 2024-07-16 ENCOUNTER — APPOINTMENT (OUTPATIENT)
Dept: LAB | Facility: CLINIC | Age: 79
End: 2024-07-16
Payer: MEDICARE

## 2024-07-16 DIAGNOSIS — R53.83 FATIGUE: ICD-10-CM

## 2024-07-16 DIAGNOSIS — I50.20 HEART FAILURE WITH REDUCED EJECTION FRACTION (H): Primary | ICD-10-CM

## 2024-07-16 LAB
ERYTHROCYTE [DISTWIDTH] IN BLOOD BY AUTOMATED COUNT: 15.8 % (ref 10–15)
HCT VFR BLD AUTO: 48.9 % (ref 35–47)
HGB BLD-MCNC: 15.6 G/DL (ref 11.7–15.7)
MCH RBC QN AUTO: 29.7 PG (ref 26.5–33)
MCHC RBC AUTO-ENTMCNC: 31.9 G/DL (ref 31.5–36.5)
MCV RBC AUTO: 93 FL (ref 78–100)
PLATELET # BLD AUTO: 211 10E3/UL (ref 150–450)
RBC # BLD AUTO: 5.26 10E6/UL (ref 3.8–5.2)
WBC # BLD AUTO: 5.6 10E3/UL (ref 4–11)

## 2024-07-16 PROCEDURE — 85027 COMPLETE CBC AUTOMATED: CPT

## 2024-07-16 PROCEDURE — 36415 COLL VENOUS BLD VENIPUNCTURE: CPT

## 2024-07-16 RX ORDER — SPIRONOLACTONE 25 MG/1
12.5 TABLET ORAL DAILY
Qty: 45 TABLET | Refills: 3 | Status: SHIPPED | OUTPATIENT
Start: 2024-07-16

## 2024-07-16 NOTE — TELEPHONE ENCOUNTER
"Spoke to patient.  Main concern is fatigue and daily nose bleeds that last 10-20 minutes.  Small clots will pass.  States \"I can tolerate it but it does not seem right\"    Started amiodarone 200 mg twice daily on 6/19.  Now she is titrated down to 200 mg daily.  States her nose bleeds started when her amiodarone was decreased to 200 mg daily.      Also, c/o weight loss.  6/24 was #202. Today 7/15 was #176.6.     Spironolactone was decreased to 12.5 mg daily from 25 mg daily today due to increased potassium and creatinine level.    Date: 7/16/2024    Time of Call: 10:02 AM     Diagnosis:  fatigue     [ VORB ] Ordering provider: Claudine Mosquera NP  Order: order CBC     Order received by: Sendy Laurent RN       Follow-up/additional notes: patient informed and is agreeable with the plan.  Scheduled for lab today.  Writer will also message KINDRA Howard for recommendation.  Pt verbalized understanding.    Sendy Laurent RN  Cardiology Care Coordinator  [facility]  [phone] option 1                    "

## 2024-07-17 NOTE — TELEPHONE ENCOUNTER
Spoke to patient and discussed providers recommendation below.  Patient is agreeable with the plan.  She is ok to monitor at this time.  Also advised to go to primary care to address current weight loss and fatigue.  Pt states that she is having a better day today and also gained 1 pound but will reach out to primary if needed.    Sendy Laurent, RN  Cardiology Care Coordinator  [facility]  [phone] option 1    Libertad Howard PA-C  You20 hours ago (5:26 PM)       Roxanna Kaba,  I am not sure why her nose bleeds would have started when her amiodarone was reduced - may have just been coincidental. Her chadsvasc score is high at 7, her CBC looks stable. I would hesitate to have her hold eliquis being she has had a cardioversion within the past 30 days as well and is at higher risk with holding.    She should let me know if she continues to have the nose bleeds. The fatigue seems to be and on going issue - Im not sure whether or not the amio has been contributing to that but it didn't seem to improve on a lower amio dose.    Thanks!  Libertad

## 2024-07-21 ENCOUNTER — MYC MEDICAL ADVICE (OUTPATIENT)
Dept: CARDIOLOGY | Facility: CLINIC | Age: 79
End: 2024-07-21
Payer: MEDICARE

## 2024-07-21 DIAGNOSIS — R04.0 EPISTAXIS: Primary | ICD-10-CM

## 2024-07-22 NOTE — TELEPHONE ENCOUNTER
Spoke to patient.  Today she has not had a nose bleed.  She does keep kleenex in her nostrils and she is afraid to blow her nose.  She would like a referral to ENT.  Writer and patient decided that we will be in touch tomorrow to see how the night went and will discuss situation with Claudine.  Patient verbalized understanding.      Sendy Laurent RN  Cardiology Care Coordinator  [facility]  [phone] option 1

## 2024-07-23 NOTE — TELEPHONE ENCOUNTER
"Discussed with Claudine.  Referral to ENT.      Patient informed and is agreeable with the plan.  Referral placed and # given to schedule.     Patient reports that today she did blow her nose.  Her nose bled but not \"for too long\".  She now will wipe her nose throw out the day and blood will be present with each wipe.  She is also experiencing blood dripping back of her throat that she swallows from time to time but reports that no clots.  Advised patient that if symptoms worsen to go to the ED to be assessed for a cauterization.  Patient verbalized understanding.      Will update Libertad Howard to see if any other recommendations.    Sendy Laurent RN  Cardiology Care Coordinator  [facility]  [phone] option 1    "

## 2024-07-24 NOTE — TELEPHONE ENCOUNTER
Agree with ENT referral and advisory to go to ER for further treatment if ongoing nose bleed. I see her hgb look stable.    Thanks!  Sendy Castellanos, Libertad Grimes PA-C19 hours ago (3:10 PM)     KH  Sofii. Referred pt to ENT.  Sendy Laurent RN     No other recommendations. ENT already in place and patient updated on plan. Referral already in place.    Yvonne Day RN, BSN  Cardiology RN Care Coordinator   [place]   Phone: [phone]  Fax: [phone] ([place]) [phone] ([place])

## 2024-07-29 ENCOUNTER — TELEPHONE (OUTPATIENT)
Dept: OTOLARYNGOLOGY | Facility: CLINIC | Age: 79
End: 2024-07-29
Payer: MEDICARE

## 2024-07-29 NOTE — TELEPHONE ENCOUNTER
Spoke with patient.  Scheduled with GELACIO Dempsey on 8/12.    Qiana Paulino RN Care Coordinator, [facility] 07/29/24 10:48 AM

## 2024-07-29 NOTE — TELEPHONE ENCOUNTER
Health Call Center    Phone Message    May a detailed message be left on voicemail: yes     Reason for Call: Appointment Intake    Referring Provider Name: TANO TENORIO  Diagnosis and/or Symptoms:  R04.0 (ICD-10-CM) - Epistaxis. Patient has been scheduled for the first available opening with Dr Davila on 12/03/24. We have informed the patient that the clinic will review their referral and reach out if a sooner appointment is medically necessary. Did confirm phone number and insurance. Thanks     Action Taken: Message routed to:  Other: ENT    Travel Screening: Not Applicable     Date of Service:

## 2024-08-05 NOTE — PATIENT INSTRUCTIONS
Nosebleeds: Care Instructions  Overview     Nosebleeds are common, especially if you have colds or allergies. Many things can cause a nosebleed.  Some nosebleeds stop on their own with pressure. Others need packing. Some get cauterized (sealed). If you have gauze or other packing materials in your nose, you will need to follow up with your doctor to have the packing removed. You may need more treatment if you get nosebleeds a lot.  Follow-up care is a key part of your treatment and safety. Be sure to make and go to all appointments, and call your doctor if you are having problems. It's also a good idea to know your test results and keep a list of the medicines you take.  How can you care for yourself at home?  If you get another nosebleed:  Gently blow your nose to clear any clots.  Sit up and tilt your head slightly forward. This keeps blood from going down your throat.  Soak a cotton ball in oxymetazolin nasal spray, please gently into opening of nose and apply pressure as below.  Use your thumb and index finger to pinch the front, soft part of your nose shut for at least 15 minutes. Use a clock. Do not check to see if the bleeding has stopped before the 15 minutes are up. If the bleeding has not stopped, pinch your nose shut for another 10 to 15 minutes. Using a nasal decongestant spray such as oxymetazoline (Afrin) before pinching your nose can also help to stop the bleeding. Be safe with medicines. Read and follow all instructions on the label.  When the bleeding has stopped, try not to pick, rub, or blow your nose for several hours. Avoiding these things helps keep your nose from bleeding again.  To prevent nosebleeds  Do not blow your nose too hard.  Try not to lift or strain after a nosebleed.  Raise your head on a pillow while you sleep.  Put a thin layer of a saline- or water-based nasal gel, such as NasoGel, inside your nose. Put it on the septum, which divides your nostrils. This will prevent dryness  "that can cause nosebleeds.  Use a vaporizer or humidifier to add moisture to your bedroom. Follow the directions for cleaning the machine.  Do not use aspirin, ibuprofen (Advil, Motrin), or naproxen (Aleve) for 36 to 48 hours after a nosebleed unless your doctor tells you to. You can use acetaminophen (Tylenol) for pain relief.  Talk to your doctor about stopping any other medicines you are taking. Some medicines may make you more likely to get a nosebleed.  Do not use cold medicines or nasal sprays without first talking to your doctor. They can make your nose dry.  Do not snort tobacco, drugs, or any other drying substances up your nose.  When should you call for help?   Call 911 anytime you think you may need emergency care. For example, call if:    You passed out (lost consciousness).   Call your doctor now or seek immediate medical care if:    Your nose is still bleeding after you have pinched the nose shut 2 times for 15 minutes each time (30 minutes total).     There is a lot of blood running down the back of your throat even after you pinch your nose and tilt your head forward.     You feel weak or lightheaded.     You have a nosebleed after a head injury.   Watch closely for changes in your health, and be sure to contact your doctor if:    You get nosebleeds often, even if they stop.     You do not get better as expected.     Where can you learn more?     How to Stop a Nosebleed (01:34)  Your health professional recommends that you watch this short online health video.  Learn simple steps you can use to stop a nosebleed.  Purpose:  Shows the proper way to stop a nosebleed. Includes guidance on when to call a doctor.  Goal:  The user will learn simple steps to stop a nosebleed.     How to watch the video    Scan the QR code   OR Visit the website    https://hwi.se/r/[email or web address]       Go to https://www.healthwise.net/patiented  Enter S156 in the search box to learn more about \"Nosebleeds: Care " "Instructions.\"  Current as of: September 27, 2023               Content Version: 14.0    6948-1005 [employer].   Care instructions adapted under license by your healthcare professional. If you have questions about a medical condition or this instruction, always ask your healthcare professional. [employer] disclaims any warranty or liability for your use of this information.   "

## 2024-08-05 NOTE — PROGRESS NOTES
Assessment & Plan         Problem List Items Addressed This Visit    None  Visit Diagnoses       Epistaxis    -  Primary    Relevant Orders    Nasal Cautery Simple Unilat (Completed)    Allergic rhinitis, unspecified seasonality, unspecified trigger        Relevant Medications    azelastine (ASTELIN) 0.1 % nasal spray                14 minutes spent on the date of the encounter doing chart review, history and exam, documentation and further activities per the note  {     GELACIO Velásquez  [facility]    Subjective     HPI     Patient on elliquis for chronic afib w/recurrent CVAs c/o recurrent RIGHT  epistaxis since starting amiodarone.  Has been humidifying . Last bleed yesterday was minimal.  Has been using AYR gel.   Does have known allergic rhinitis- takes oral meds.      Review of Systems   ENT as above      Objective    There were no vitals taken for this visit.    Physical Exam     Constitutional:   The patient was in no acute distress.      Head/Face:   Normocephalic and atraumatic.  No lesions or scars.     Nose:  Anterior rhinoscopy revealed midline septum and absence of purulence or polyps.   After afrin, prominent vessels appreciated to inferior anterior nasal septum    Verbal consent received.  4% lidocaine and oxymetazoline sprays used for anesthesia.  Prominent nasal septum vessel as described elsewhere was cauterized with silver nitrate.  Patient tolerated well.       Mouth:  Normal tongue, floor of mouth, buccal mucosa, and palate.  No lesions, ulceration or  masses on inspection, normal voice quality      Oropharynx:  Normal mucosa, palate symmetric with normal elevation. Tonsils  0      Neck:  Supple with normal laryngeal and tracheal landmarks. No palpable thyroid.     Lymphatic:  There is no palpable lymphadenopathy in the neck.

## 2024-08-08 ASSESSMENT — ASTHMA QUESTIONNAIRES
QUESTION_1 LAST FOUR WEEKS HOW MUCH OF THE TIME DID YOUR ASTHMA KEEP YOU FROM GETTING AS MUCH DONE AT WORK, SCHOOL OR AT HOME: NONE OF THE TIME
ACT_TOTALSCORE: 25
QUESTION_3 LAST FOUR WEEKS HOW OFTEN DID YOUR ASTHMA SYMPTOMS (WHEEZING, COUGHING, SHORTNESS OF BREATH, CHEST TIGHTNESS OR PAIN) WAKE YOU UP AT NIGHT OR EARLIER THAN USUAL IN THE MORNING: NOT AT ALL
QUESTION_5 LAST FOUR WEEKS HOW WOULD YOU RATE YOUR ASTHMA CONTROL: COMPLETELY CONTROLLED
ACT_TOTALSCORE: 25
QUESTION_2 LAST FOUR WEEKS HOW OFTEN HAVE YOU HAD SHORTNESS OF BREATH: NOT AT ALL
QUESTION_4 LAST FOUR WEEKS HOW OFTEN HAVE YOU USED YOUR RESCUE INHALER OR NEBULIZER MEDICATION (SUCH AS ALBUTEROL): NOT AT ALL

## 2024-08-09 ENCOUNTER — LAB (OUTPATIENT)
Dept: LAB | Facility: CLINIC | Age: 79
End: 2024-08-09
Payer: MEDICARE

## 2024-08-09 ENCOUNTER — HOSPITAL ENCOUNTER (OUTPATIENT)
Facility: CLINIC | Age: 79
Discharge: HOME | End: 2024-08-09
Attending: FAMILY MEDICINE | Admitting: FAMILY MEDICINE
Payer: MEDICARE

## 2024-08-09 DIAGNOSIS — M06.09 RHEUMATOID ARTHRITIS OF MULTIPLE SITES WITH NEGATIVE RHEUMATOID FACTOR (H): ICD-10-CM

## 2024-08-09 DIAGNOSIS — Z79.899 HIGH RISK MEDICATION USE: ICD-10-CM

## 2024-08-09 LAB
ALBUMIN SERPL BCG-MCNC: 4 G/DL (ref 3.5–5.2)
ALP SERPL-CCNC: 57 U/L (ref 40–150)
ALT SERPL W P-5'-P-CCNC: 16 U/L (ref 0–50)
AST SERPL W P-5'-P-CCNC: 28 U/L (ref 0–45)
BASOPHILS # BLD AUTO: 0 10E3/UL (ref 0–0.2)
BASOPHILS NFR BLD AUTO: 1 %
BILIRUB DIRECT SERPL-MCNC: <0.2 MG/DL (ref 0–0.3)
BILIRUB SERPL-MCNC: 0.6 MG/DL
CREAT SERPL-MCNC: 1.21 MG/DL (ref 0.51–0.95)
CRP SERPL-MCNC: <3 MG/L
EGFRCR SERPLBLD CKD-EPI 2021: 45 ML/MIN/1.73M2
EOSINOPHIL # BLD AUTO: 0.1 10E3/UL (ref 0–0.7)
EOSINOPHIL NFR BLD AUTO: 3 %
ERYTHROCYTE [DISTWIDTH] IN BLOOD BY AUTOMATED COUNT: 15.9 % (ref 10–15)
ERYTHROCYTE [SEDIMENTATION RATE] IN BLOOD BY WESTERGREN METHOD: 11 MM/HR (ref 0–30)
HCT VFR BLD AUTO: 42.6 % (ref 35–47)
HGB BLD-MCNC: 13.4 G/DL (ref 11.7–15.7)
IMM GRANULOCYTES # BLD: 0 10E3/UL
IMM GRANULOCYTES NFR BLD: 0 %
LYMPHOCYTES # BLD AUTO: 1.2 10E3/UL (ref 0.8–5.3)
LYMPHOCYTES NFR BLD AUTO: 24 %
MCH RBC QN AUTO: 29.6 PG (ref 26.5–33)
MCHC RBC AUTO-ENTMCNC: 31.5 G/DL (ref 31.5–36.5)
MCV RBC AUTO: 94 FL (ref 78–100)
MONOCYTES # BLD AUTO: 0.3 10E3/UL (ref 0–1.3)
MONOCYTES NFR BLD AUTO: 6 %
NEUTROPHILS # BLD AUTO: 3.4 10E3/UL (ref 1.6–8.3)
NEUTROPHILS NFR BLD AUTO: 67 %
PLATELET # BLD AUTO: 247 10E3/UL (ref 150–450)
PROT SERPL-MCNC: 7.5 G/DL (ref 6.4–8.3)
RBC # BLD AUTO: 4.52 10E6/UL (ref 3.8–5.2)
WBC # BLD AUTO: 5 10E3/UL (ref 4–11)

## 2024-08-09 PROCEDURE — 85025 COMPLETE CBC W/AUTO DIFF WBC: CPT

## 2024-08-09 PROCEDURE — 36415 COLL VENOUS BLD VENIPUNCTURE: CPT

## 2024-08-09 PROCEDURE — 82565 ASSAY OF CREATININE: CPT

## 2024-08-09 PROCEDURE — 85652 RBC SED RATE AUTOMATED: CPT

## 2024-08-09 PROCEDURE — 80076 HEPATIC FUNCTION PANEL: CPT

## 2024-08-09 PROCEDURE — 86140 C-REACTIVE PROTEIN: CPT

## 2024-08-12 ENCOUNTER — OFFICE VISIT (OUTPATIENT)
Dept: RHEUMATOLOGY | Facility: CLINIC | Age: 79
End: 2024-08-12
Payer: MEDICARE

## 2024-08-12 ENCOUNTER — OFFICE VISIT (OUTPATIENT)
Dept: OTOLARYNGOLOGY | Facility: CLINIC | Age: 79
End: 2024-08-12
Payer: MEDICARE

## 2024-08-12 ENCOUNTER — HOSPITAL ENCOUNTER (OUTPATIENT)
Facility: CLINIC | Age: 79
Discharge: HOME | End: 2024-08-12
Attending: INTERNAL MEDICINE | Admitting: INTERNAL MEDICINE
Payer: MEDICARE

## 2024-08-12 VITALS
RESPIRATION RATE: 16 BRPM | WEIGHT: 177.8 LBS | DIASTOLIC BLOOD PRESSURE: 64 MMHG | SYSTOLIC BLOOD PRESSURE: 105 MMHG | BODY MASS INDEX: 33.31 KG/M2 | HEART RATE: 75 BPM | OXYGEN SATURATION: 94 %

## 2024-08-12 DIAGNOSIS — R04.0 EPISTAXIS: Primary | ICD-10-CM

## 2024-08-12 DIAGNOSIS — M06.09 RHEUMATOID ARTHRITIS OF MULTIPLE SITES WITH NEGATIVE RHEUMATOID FACTOR (H): Primary | ICD-10-CM

## 2024-08-12 DIAGNOSIS — J30.9 ALLERGIC RHINITIS, UNSPECIFIED SEASONALITY, UNSPECIFIED TRIGGER: ICD-10-CM

## 2024-08-12 DIAGNOSIS — Z79.899 HIGH RISK MEDICATION USE: ICD-10-CM

## 2024-08-12 PROCEDURE — 30901 CONTROL OF NOSEBLEED: CPT

## 2024-08-12 PROCEDURE — G0463 HOSPITAL OUTPT CLINIC VISIT: HCPCS

## 2024-08-12 PROCEDURE — 99204 OFFICE O/P NEW MOD 45 MIN: CPT

## 2024-08-12 RX ORDER — AZELASTINE 1 MG/ML
1 SPRAY, METERED NASAL AT BEDTIME
Qty: 30 ML | Refills: 11 | Status: SHIPPED | OUTPATIENT
Start: 2024-08-12 | End: 2024-08-13

## 2024-08-12 NOTE — NURSING NOTE
RAPID3 (0-30) Cumulative Score  0.7          RAPID3 Weighted Score (divide #4 by 3 and that is the weighted score)  0.2

## 2024-08-12 NOTE — PATIENT INSTRUCTIONS
RHEUMATOLOGY    [facility]  [address] [place] [address]  [place] [address]    Phone number: [phone]  Fax number: [phone]    If you need a medication refill, please contact us as you may need lab work and/or a follow up visit prior to your refill.      Thank you for choosing [facility]!    Loraine Seaman CMA Rheumatology

## 2024-08-12 NOTE — PROGRESS NOTES
"Rheumatology Clinic Visit      Alfreda Baxter MRN# [id number]   YOB: 1945 Age: 79 year old      Date of visit: 8/12/24   PCP: Dr. Mercedes Urena    Chief Complaint   Patient presents with:  Rheumatoid Arthritis    Assessment and Plan     1.  Seronegative rheumatoid arthritis: Inflammatory arthritis symptoms affecting her MCPs, PIPs, wrists, and shoulders started in February 2023.  Establish care with me on 11/8/2023 at which point she had symmetric synovitis of the MCPs, PIPs, and wrists; and symptoms of impingement syndrome of the shoulders.  She has been following at [facility] where steroid injections of the shoulders provide benefit for no more than 10 days; and systemic steroid taper starting with 60 mg daily from her primary care provider was a \"miracle\" with resolution of MCP, PIP, wrist, and shoulder pain while on prednisone.  Methotrexate was started in November 2023.  2/15/2024: Patient reports having improvement with methotrexate but still active disease.  Prednisone was effective but higher anxiety at 20 mg daily, mild intermittent anxiety at 10 mg daily, and tolerated well at 5 mg daily.  Was able to stop prednisone completely on 5/1/2024.  Currently doing well on methotrexate monotherapy.  Chronic illness, stable.    - Continue Methotrexate 20mg once every 7 days  - Continue folic acid 2 mg daily  - Labs in 3 months: CBC, Creatinine, Hepatic Panel, ESR, CRP    2.  Osteoarthritis of the hands: Significant degenerative changes of the hands with large Heberden's and Alba's nodes, and squaring of the first CMC joints.  Reportedly this has been stable for many years, preceding the inflammatory arthritis symptoms started in February 2023.  Minimal symptom severity from degenerative arthritis at this time.    3.  Degenerative arthritis of the spine: Many degenerative changes that have been managed with epidural steroid injections from [facility].  She will " continue following with her spine specialist as needed.    4.  History of squamous cell carcinoma: 1 lesion removed per patient report.  She will continue following with dermatology at least once yearly for monitoring    5.  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    6.  Elevated creatinine: Possibly medication related.  She is going to discuss this issue with her PCP tomorrow.     7.  Vaccinations: Vaccinations reviewed with Ms. Baxter.  Risks and benefits of vaccinations were discussed.    - Influenza: encouraged yearly vaccination  - COVID-19: Advised keeping updated and to hold methotrexate x1-2 weeks afterward    Total minutes spent in evaluation with patient, documentation, , and review of pertinent studies and chart notes: 16  The longitudinal plan of care for the rheumatology problem(s) were addressed during this visit.  Due to added complexity of care, we will continue to support the patient and the subsequent management of this condition with ongoing continuity of care.      Ms. Baxter verbalized agreement with and understanding of the rational for the diagnosis and treatment plan.  All questions were answered to best of my ability and the patient's satisfaction. Ms. Baxter was advised to contact the clinic with any questions that may arise after the clinic visit.      Thank you for involving me in the care of the patient    Return to clinic: 3 months    HPI   Alfreda Baxter is a 79 year old female with a past medical history significant for hypertension, hyperlipidemia, lumbar spine stenosis, paroxysmal atrial fibrillation on anticoagulation, history of CVA with left eye vision loss, insomnia, and bilateral TKA who presents for follow-up of rheumatoid arthritis.     11/8/2023: Chronic degenerative changes of her hands and spine; the degenerative changes of her hands do not bother her.  The degenerative changes of her spine are managed at  [facility] where steroid injections have been effective; she has seen a spine surgeon and is next going to see a pain management specialist at [facility].  She had been fairly stable until February 2023 when she had sudden onset bilateral shoulder pain.  This was followed by pain at the wrists, MCPs, and PIPs.  MCP, PIP, wrist, and shoulder pain is worse in the morning and improves with time and activity; morning stiffness for most of the day, reaching a baseline after about 1-2 hours.  When she received a epidural steroid injection of her lumbar spine her shoulders felt better.  She then received a high dose of prednisone starting with 60 mg daily and tapered off from her primary care clinic and while on prednisone she felt like it was a miracle drug.  She has been given other courses of prednisone and she says that she typically only starts with 40 mg a day and that that is very effective also.  Reduced  strength.  History of bilateral TKA.  Knees, ankles, and feet without any issues.  She notes that her son has ulcerative colitis and that her son's cousins on his father side have psoriasis; no other autoimmune disease on her side of the family.  Squamous of carcinoma x1; following with dermatology regularly.  History of CVA 5 years ago with loss of vision in the left eye.  No history of scalp tenderness, jaw or tongue claudication, or new headaches now around the time of the vision loss.    2/15/2024: Prednisone was effective but higher anxiety at 20 mg daily, mild intermittent anxiety at 10 mg daily, and tolerated well at 5 mg daily. Prednisone works well for arthritis.  Methotrexate has been helpful; held methotrexate for several doses because of infections.  Still with pain/swelling/stiffness at the MCPs, PIPs, wrists, but all improved with methotrexate.  She would like to continue on methotrexate monotherapy for now to see if a longer duration will be helpful.  She has several  questions about rheumatoid arthritis and methotrexate that were discussed today.  Occasional oral sore since starting methotrexate.  No nasal sores.    6/18/2024: Doing well on methotrexate.  Was able to stop prednisone on 5/1/2024.  No joint pain or swelling.  Morning stiffness for less than 10 minutes.  Arthritis not limiting her daily activities.  Since last seen she developed shortness of breath and was found to have A-fib with RVR; following with cardiology and has planned ablation tomorrow.    Today, 8/12/2024: RA controlled.  Not on prednisone.  Methotrexate is effective for RA management.  Still with degenerative changes of the hands and back; following with a spine specialist at [facility].  Creatinine is mildly elevated; she questions if this is related to blood pressure medication changes and she plans to discuss this with her primary care provider tomorrow.    Tobacco: Former cigarette smoker  EtOH: None  Drugs: None  Occupation: retired nurse    ROS   12 point review of system was completed and negative except as noted in the HPI     Active Problem List     Patient Active Problem List   Diagnosis    DJD (degenerative joint disease)    Hypertension goal BP (blood pressure) < 140/90    Hyperlipidemia LDL goal <70    Lumbar spinal stenosis    Mild intermittent asthma without complication    History of bilateral knee replacement    Primary insomnia    Posterior vitreous detachment of both eyes    PFO (patent foramen ovale)    Neovascular glaucoma of left eye, moderate stage    Blind left eye    Paroxysmal A-fib (H)    Ocular hypertension of right eye, treated    History of central retinal artery occlusion, os    Recurrent cerebrovascular accidents (CVAs) (H)    Disturbances of vision, late effect of stroke    Combined forms of age-related cataract, mild-mod, of left eye    Pseudophakia, od    Low bone mass    Class 2 severe obesity due to excess calories with serious comorbidity in adult (H)     Cervicalgia    Chronic a-fib (H)     Past Medical History     Past Medical History:   Diagnosis Date    Acute idiopathic gout of left foot 2015    Adenomatous colon polyp 2013    Colonoscopy due     Asthma     controlled with inhaler    Blind left eye 2019    Due to central retinal occlusion    Cataract     Central retinal artery occlusion of left eye 09/15/2018    Disturbances of vision, late effect of stroke 2022    DJD (degenerative joint disease)     knees    GERD (gastroesophageal reflux disease)     Glaucoma     Gout     History of central retinal artery occlusion, os 2020    HTN (hypertension)     Hyperlipidemia LDL goal <70 2010    Low bone mass     Lumbar spinal stenosis 2011    Sees Dr Sage    Mild persistent asthma 2013    Neovascular glaucoma of left eye, moderate stage 2019    Ocular hypertension of right eye, treated 2019    Paroxysmal A-fib (H) 2019    Per loop recorder, 2018    On Rivaroxaban.   H/o R central retinal artery occlusion    Paroxysmal ventricular tachycardia (H) 2018    Added automatically from request for surgery [id number]    PFO (patent foramen ovale)     Recurrent cerebrovascular accidents (CVAs) (H) 2022    Recurrent cerebrovascular accidents (CVAs) (H) 2022    Transient global amnesia 2022     Past Surgical History     Past Surgical History:   Procedure Laterality Date    ANESTHESIA CARDIOVERSION N/A 2024    Procedure: Anesthesia cardioversion @0930;  Surgeon: GENERIC ANESTHESIA PROVIDER;  Location: [facility] OR    ANESTHESIA CARDIOVERSION N/A 2024    Procedure: Anesthesia cardioversion @1330;  Surgeon: GENERIC ANESTHESIA PROVIDER;  Location: [facility] OR    APPENDECTOMY      age 7 yrs.    BREAST BIOPSY, RT/LT  2004    left benign    CATARACT IOL, RT/LT       SECTION      x 2    COLONOSCOPY  2013    needed q 3 yrs.    COLONOSCOPY N/A 09/10/2020    Procedure: Colonoscopy, With  Polypectomy And Biopsy;  Surgeon: Brian Cleaning MD;  Location: [facility] OR    COLONOSCOPY WITH CO2 INSUFFLATION N/A 11/21/2016    Procedure: COLONOSCOPY WITH CO2 INSUFFLATION;  Surgeon: Brian Cleaning MD;  Location: [facility] OR    COLONOSCOPY WITH CO2 INSUFFLATION N/A 09/10/2020    Procedure: COLONOSCOPY, WITH CO2 INSUFFLATION;  Surgeon: Brian Cleaning MD;  Location: [facility] OR    EP COMPREHENSIVE EP STUDY N/A 12/31/2018    Procedure: LOOP RECORDER;  Surgeon: Buck Butterfield MD;  Location:  [facility]    HYSTERECTOMY, POORNIMA  1997    bleeding fibroids    ORTHOPEDIC SURGERY  2009    meniscus repair    PHACOEMULSIFICATION WITH STANDARD INTRAOCULAR LENS IMPLANT Right 10/24/2022    Procedure: COMPLE RIGHT EYE PHACOEMULSIFICATION, CATARACT, WITH STANDARD INTRAOCULAR LENS IMPLANT INSERTION;  Surgeon: Ezequiel Florence MD;  Location: [facility] TOTAL KNEE ARTHROPLASTY Left 11/2015    at [facility]     Allergy     Allergies   Allergen Reactions    No Clinical Screening - See Comments Other (See Comments)     senisitive to non steroidals  Aleve, ibuprofen celebrex cause bad stomach pains  senisitive to non steroidals  Aleve, ibuprofen celebrex cause bad stomach pains    Oxycodone Hives     Other reaction(s): Unknown  ... With facial swelling    Nsaids Nausea and Vomiting     Other reaction(s): Abdominal Pain     Current Medication List     Current Outpatient Medications   Medication Sig Dispense Refill    acetaminophen (TYLENOL) 500 MG tablet Take 500-1,000 mg by mouth every 6 hours as needed.      albuterol (PROAIR HFA/PROVENTIL HFA/VENTOLIN HFA) 108 (90 Base) MCG/ACT inhaler Inhale 2 puffs into the lungs every 6 hours as needed for shortness of breath or wheezing 18 g 11    amiodarone (PACERONE) 200 MG tablet Take 400 mg (2 tablets) twice a day x 2 weeks. Then take 200 mg (1 tablet) daily. 90 tablet 3    apixaban ANTICOAGULANT (ELIQUIS) 5 MG tablet Take 1 tablet (5 mg) by mouth 2 times  daily 180 tablet 3    atorvastatin (LIPITOR) 20 MG tablet Take 1 tablet (20 mg) by mouth daily 90 tablet 3    azelastine (ASTELIN) 0.1 % nasal spray Spray 1 spray into both nostrils at bedtime 30 mL 11    B-COMPLEX OR Take  by mouth daily.      CALCIUM 500 +D OR one daily      folic acid (FOLVITE) 1 MG tablet Take 2 tablets (2 mg) by mouth daily 200 tablet 2    hypromellose (ARTIFICIAL TEARS) 0.5 % SOLN ophthalmic solution Place 1 drop into both eyes 3 times daily      latanoprost (XALATAN) 0.005 % ophthalmic solution Place 1 drop into both eyes at bedtime 7.5 mL 3    lisinopril (ZESTRIL) 10 MG tablet Take 1 tablet (10 mg) by mouth daily 90 tablet 3    methotrexate 2.5 MG tablet Take 8 tablets (20 mg) by mouth every 7 days . Methotrexate is a once weekly medication, taken on the same day of each week. 96 tablet 2    multivitamin (OCUVITE) TABS tablet Take 1 tablet by mouth daily      spironolactone (ALDACTONE) 25 MG tablet Take 0.5 tablets (12.5 mg) by mouth daily 45 tablet 3    timolol maleate (TIMOPTIC) 0.5 % ophthalmic solution Place 1 drop Into the left eye every morning 10 mL 3    zolpidem (AMBIEN) 5 MG tablet TAKE ONE TABLET BY MOUTH IN THE EVENING AS NEEDED FOR SLEEP 30 tablet 5     No current facility-administered medications for this visit.     Social History   See HPI    Family History     Family History   Problem Relation Age of Onset    Arthritis Mother     Cancer Mother     Hypertension Mother     Other Cancer Mother     Thyroid Disease Mother     Respiratory Father     Glaucoma Father     Asthma Father     Allergies Sister     Eye Disorder Sister     Lipids Sister     Neurologic Disorder Sister     Osteoporosis Sister     Glaucoma Sister     Hypertension Sister     Macular Degeneration Sister     Hyperlipidemia Sister     Hypertension Sister     Osteoporosis Sister     Gastrointestinal Disease Son         Ulcerative Colitis    Glaucoma Other      Son: Ulcerative colitis, psoriatic arthritis, psoriasis.  " Her son's paternal cousins have psoriasis    Physical Exam     Temp Readings from Last 3 Encounters:   06/22/24 98  F (36.7  C) (Oral)   05/14/24 98  F (36.7  C) (Temporal)   03/29/24 97.9  F (36.6  C) (Oral)     BP Readings from Last 5 Encounters:   07/02/24 108/70   06/27/24 118/71   06/22/24 105/76   06/19/24 109/87   06/18/24 100/65     Pulse Readings from Last 1 Encounters:   07/02/24 67     Resp Readings from Last 1 Encounters:   06/27/24 16     Estimated body mass index is 35.33 kg/m  as calculated from the following:    Height as of 6/13/24: 1.556 m (5' 1.26\").    Weight as of 7/2/24: 85.5 kg (188 lb 9.6 oz).    GEN: NAD. Healthy appearing adult.   HEENT:  Anicteric, noninjected sclera. No obvious external lesions of the ear and nose. Hearing intact.  PULM: No increased work of breathing  MSK: MCP and PIP without swelling or tenderness to palpation.   Heberden's and Alba's nodes present.  DIPs nontender to palpation.  Wrists and elbows without swelling or tenderness to palpation.  Shoulders nontender to palpation and without swelling.  Knees, ankles, and MTPs without swelling or tenderness to palpation.    SKIN: No rash or jaundice seen  PSYCH: Alert. Appropriate.      Labs / Imaging (select studies)   RF/CCP  Recent Labs   Lab Test 03/08/23  1413   CCPIGG 0.7   RHF <7     CBC  Recent Labs   Lab Test 08/09/24  1031 07/16/24  1157 06/22/24  0619 06/21/24  0608 06/20/24  2027 05/07/24  1031 07/20/21  1248 11/26/20  1141 10/15/20  1108 07/24/20  0829 08/10/19  1410   WBC 5.0 5.6 7.2   < > 8.0 6.6   < > 5.0   < > 5.7 18.6*   RBC 4.52 5.26* 4.18   < > 4.23 4.10   < > 4.74   < > 4.84 4.31   HGB 13.4 15.6 12.6   < > 12.7 12.9   < > 13.4   < > 14.3 12.9   HCT 42.6 48.9* 40.2   < > 40.0 39.7   < > 43.1   < > 43.4 40.0   MCV 94 93 96   < > 95 97   < > 91   < > 90 93   RDW 15.9* 15.8* 16.0*   < > 15.9* 14.8   < > 12.5   < > 13.5 13.8    211 187   < > 231 274   < > 338   < > 246 252   MCH 29.6 29.7 30.1   < " > 30.0 31.5   < > 28.3   < > 29.5 29.9   MCHC 31.5 31.9 31.3*   < > 31.8 32.5   < > 31.1*   < > 32.9 32.3   NEUTROPHIL 67  --   --   --  68 63   < > 66.6  --  48.3 85.6   LYMPH 24  --   --   --  24 26   < > 25.2  --  41.3 9.7   MONOCYTE 6  --   --   --  6 8   < > 7.0  --  7.9 4.6   EOSINOPHIL 3  --   --   --  2 2   < > 0.4  --  2.3 0.0   BASOPHIL 1  --   --   --  0 0   < > 0.6  --  0.2 0.1   ANEU  --   --   --   --   --   --   --  3.3  --  2.7 16.0*   ALYM  --   --   --   --   --   --   --  1.3  --  2.3 1.8   MANINDER  --   --   --   --   --   --   --  0.4  --  0.5 0.9   AEOS  --   --   --   --   --   --   --  0.0  --  0.1 0.0   ABAS  --   --   --   --   --   --   --  0.0  --  0.0 0.0   ANEUTAUTO 3.4  --   --   --  5.4 4.2   < >  --   --   --   --    ALYMPAUTO 1.2  --   --   --  1.9 1.7   < >  --   --   --   --    AMONOAUTO 0.3  --   --   --  0.5 0.6   < >  --   --   --   --    AEOSAUTO 0.1  --   --   --  0.2 0.1   < >  --   --   --   --    ABSBASO 0.0  --   --   --  0.0 0.0   < >  --   --   --   --     < > = values in this interval not displayed.     CMP  Recent Labs   Lab Test 08/09/24  1031 07/15/24  0941 07/02/24  0923 06/27/24  1153 06/22/24  0619 06/21/24  0608 06/20/24 2027 06/19/24  0819 05/07/24  1031 07/27/21  1004 11/26/20  1141 10/15/20  1108 07/24/20  1013   NA  --  135  --   --  137 135 139  --   --    < > 138 141 143   POTASSIUM  --  5.7*  --  4.1 3.8 4.2 4.5 4.0  --    < > 3.8 4.6 4.1   CHLORIDE  --  102  --   --  102 104 107  --   --    < > 104 106 108   CO2  --  21*  --   --  24 18* 21*  --   --    < > 29 33* 26   ANIONGAP  --  12  --   --  11 13 11  --   --    < > 6 2* 9   GLC  --  121*  --   --  114* 128* 100*  --   --    < > 114* 100* 99   BUN  --  35.9*  --   --  24.3* 24.1* 23.6*  --   --    < > 16 24 19   CR 1.21* 1.26* 0.98*  --  0.96* 0.93 0.95  --  0.89   < > 0.70 0.70 0.85   GFRESTIMATED 45* 43* 58*  --  60* 62 60*  --  65   < > 84 84 66   GFRESTBLACK  --   --   --   --   --   --   --   --    --   --  >90 >90 77   RUDOLPH  --  9.6  --   --  8.9 9.1 9.1  --   --    < > 10.0 9.6 9.1   BILITOTAL 0.6  --   --   --   --   --  0.7  --  0.7   < > 0.5  --   --    ALBUMIN 4.0  --   --   --   --   --  3.8  --  4.1   < > 3.6  --   --    PROTTOTAL 7.5  --   --   --   --   --  6.5  --  6.9   < > 8.6  --   --    ALKPHOS 57  --   --   --   --   --  60  --  51   < > 84  --   --    AST 28  --   --   --   --   --  35 29 18   < > 9  --   --    ALT 16  --   --   --   --   --  23 24 17   < > 16  --  22    < > = values in this interval not displayed.     Calcium/VitaminD  Recent Labs   Lab Test 07/15/24  0941 06/22/24  0619 06/21/24  0608 12/31/18  1043 06/25/18  0834   RUDOLPH 9.6 8.9 9.1   < > 9.4   VITDT  --   --   --   --  22    < > = values in this interval not displayed.     ESR/CRP  Recent Labs   Lab Test 08/09/24  1031 05/07/24  1031 02/13/24  1114 11/08/23  1242 03/08/23  1413 07/27/21  1004   SED 11 8 24   < > 18 9   CRP  --   --   --   --  13.4* <2.9   CRPI <3.00 <3.00 8.84*   < >  --   --     < > = values in this interval not displayed.     Hepatitis B  Recent Labs   Lab Test 11/08/23  1242   AUSAB 0.89   HBCAB Nonreactive   HEPBANG Nonreactive     Hepatitis C  Recent Labs   Lab Test 11/08/23  1242 12/03/21  1538   HCVAB Nonreactive Nonreactive     Lyme ab screening  Recent Labs   Lab Test 11/08/23  1242   LYMEGM 0.03       Immunization History     Immunization History   Administered Date(s) Administered    COVID-19 Bivalent 12+ (Pfizer) 12/12/2022    COVID-19 MONOVALENT 12+ (Pfizer) 03/10/2021, 03/31/2021, 12/07/2021    Influenza (High Dose) 3 valent vaccine 10/21/2016, 11/16/2017, 10/16/2019, 10/10/2022    Influenza (IIV3) PF 11/29/2006, 11/02/2012, 11/01/2013, 11/07/2014, 10/20/2015    Influenza Vaccine 18-64 (Flublok) 10/19/2021    Influenza Vaccine 65+ (FLUAD) 10/19/2021, 10/10/2022, 11/06/2023    Influenza Vaccine 65+ (Fluzone HD) 10/13/2020    Pneumo Conj 13-V (2010&after) 06/28/2018    Pneumococcal 23 valent  11/29/2006, 07/27/2020    TD,PF 7+ (Tenivac) 06/28/2018    TDAP Vaccine (Adacel) 11/19/2007    Td (Adult), Adsorbed 01/17/2000    Zoster recombinant adjuvanted (SHINGRIX) 07/15/2019, 09/26/2019    Zoster vaccine, live 11/19/2008          Chart documentation done in part with Dragon Voice recognition Software. Although reviewed after completion, some word and grammatical error may remain.    Lucho Huber MD

## 2024-08-12 NOTE — LETTER
8/12/2024      Alfreda Baxter  [address] [id number]      Dear Colleague,    Thank you for referring your patient, Alfreda Baxter, to the [facility]. Please see a copy of my visit note below.    Assessment & Plan        Problem List Items Addressed This Visit    None  Visit Diagnoses       Epistaxis    -  Primary    Relevant Orders    Nasal Cautery Simple Unilat (Completed)    Allergic rhinitis, unspecified seasonality, unspecified trigger        Relevant Medications    azelastine (ASTELIN) 0.1 % nasal spray                14 minutes spent on the date of the encounter doing chart review, history and exam, documentation and further activities per the note  {     GELACIO Velásquez  [facility]    Subjective     HPI     Patient on elliquis for chronic afib w/recurrent CVAs c/o recurrent RIGHT  epistaxis since starting amiodarone.  Has been humidifying . Last bleed yesterday was minimal.  Has been using AYR gel.   Does have known allergic rhinitis- takes oral meds.      Review of Systems   ENT as above      Objective    There were no vitals taken for this visit.    Physical Exam     Constitutional:   The patient was in no acute distress.      Head/Face:   Normocephalic and atraumatic.  No lesions or scars.     Nose:  Anterior rhinoscopy revealed midline septum and absence of purulence or polyps.   After afrin, prominent vessels appreciated to inferior anterior nasal septum    Verbal consent received.  4% lidocaine and oxymetazoline sprays used for anesthesia.  Prominent nasal septum vessel as described elsewhere was cauterized with silver nitrate.  Patient tolerated well.       Mouth:  Normal tongue, floor of mouth, buccal mucosa, and palate.  No lesions, ulceration or  masses on inspection, normal voice quality      Oropharynx:  Normal mucosa, palate symmetric with normal elevation. Tonsils  0      Neck:  Supple with normal laryngeal and  tracheal landmarks. No palpable thyroid.     Lymphatic:  There is no palpable lymphadenopathy in the neck.                    Again, thank you for allowing me to participate in the care of your patient.        Sincerely,        GELACIO Velásquez

## 2024-08-13 ENCOUNTER — OFFICE VISIT (OUTPATIENT)
Dept: FAMILY MEDICINE | Facility: CLINIC | Age: 79
End: 2024-08-13
Payer: MEDICARE

## 2024-08-13 VITALS
BODY MASS INDEX: 32.39 KG/M2 | HEART RATE: 78 BPM | DIASTOLIC BLOOD PRESSURE: 61 MMHG | OXYGEN SATURATION: 97 % | TEMPERATURE: 97.9 F | HEIGHT: 62 IN | SYSTOLIC BLOOD PRESSURE: 92 MMHG | WEIGHT: 176 LBS | RESPIRATION RATE: 16 BRPM

## 2024-08-13 DIAGNOSIS — I48.0 PAROXYSMAL A-FIB (H): ICD-10-CM

## 2024-08-13 DIAGNOSIS — I50.20 HEART FAILURE WITH REDUCED EJECTION FRACTION (H): ICD-10-CM

## 2024-08-13 DIAGNOSIS — I48.0 PAROXYSMAL A-FIB (H): Primary | ICD-10-CM

## 2024-08-13 DIAGNOSIS — K59.00 CONSTIPATION, UNSPECIFIED CONSTIPATION TYPE: ICD-10-CM

## 2024-08-13 DIAGNOSIS — N18.31 CHRONIC KIDNEY DISEASE, STAGE 3A (H): ICD-10-CM

## 2024-08-13 DIAGNOSIS — Z29.11 NEED FOR VACCINATION AGAINST RESPIRATORY SYNCYTIAL VIRUS: ICD-10-CM

## 2024-08-13 PROBLEM — I63.9: Status: RESOLVED | Noted: 2022-11-14 | Resolved: 2024-08-13

## 2024-08-13 PROBLEM — E66.812 CLASS 2 SEVERE OBESITY DUE TO EXCESS CALORIES WITH SERIOUS COMORBIDITY IN ADULT (H): Status: RESOLVED | Noted: 2023-08-30 | Resolved: 2024-08-13

## 2024-08-13 PROBLEM — E66.01 CLASS 2 SEVERE OBESITY DUE TO EXCESS CALORIES WITH SERIOUS COMORBIDITY IN ADULT (H): Status: RESOLVED | Noted: 2023-08-30 | Resolved: 2024-08-13

## 2024-08-13 PROBLEM — I48.20 CHRONIC A-FIB (H): Status: RESOLVED | Noted: 2024-06-20 | Resolved: 2024-08-13

## 2024-08-13 PROCEDURE — G2211 COMPLEX E/M VISIT ADD ON: HCPCS | Performed by: FAMILY MEDICINE

## 2024-08-13 PROCEDURE — 99214 OFFICE O/P EST MOD 30 MIN: CPT | Performed by: FAMILY MEDICINE

## 2024-08-13 RX ORDER — SENNA AND DOCUSATE SODIUM 50; 8.6 MG/1; MG/1
1-4 TABLET, FILM COATED ORAL DAILY PRN
COMMUNITY
Start: 2024-08-13

## 2024-08-13 RX ORDER — POLYETHYLENE GLYCOL 3350 17 G/17G
1 POWDER, FOR SOLUTION ORAL DAILY PRN
COMMUNITY
Start: 2024-08-13

## 2024-08-13 RX ORDER — LISINOPRIL 5 MG/1
5 TABLET ORAL AT BEDTIME
Qty: 90 TABLET | Refills: 3 | Status: SHIPPED | OUTPATIENT
Start: 2024-08-13

## 2024-08-13 RX ORDER — AMIODARONE HYDROCHLORIDE 200 MG/1
200 TABLET ORAL DAILY
Qty: 90 TABLET | Refills: 3 | Status: SHIPPED | OUTPATIENT
Start: 2024-08-13

## 2024-08-13 NOTE — LETTER
My Asthma Action Plan    Name: Alfreda Baxter   YOB: 1945  Date: 8/13/2024   My doctor: Mercedes Urena MD   My clinic: [facility]        My Rescue Medicine:   Albuterol inhaler (Proair/Ventolin/Proventil HFA)  2-4 puffs EVERY 4 HOURS as needed. Use a spacer if recommended by your provider.   My Asthma Severity:   Intermittent / Exercise Induced  Know your asthma triggers: upper respiratory infections             GREEN ZONE   Good Control  I feel good  No cough or wheeze  Can work, sleep and play without asthma symptoms       Take your asthma control medicine every day.     If exercise triggers your asthma, take your rescue medication  15 minutes before exercise or sports, and  During exercise if you have asthma symptoms  Spacer to use with inhaler: If you have a spacer, make sure to use it with your inhaler             YELLOW ZONE Getting Worse  I have ANY of these:  I do not feel good  Cough or wheeze  Chest feels tight  Wake up at night   Keep taking your Green Zone medications  Start taking your rescue medicine:  every 20 minutes for up to 1 hour. Then every 4 hours for 24-48 hours.  If you stay in the Yellow Zone for more than 12-24 hours, contact your doctor.  If you do not return to the Green Zone in 12-24 hours or you get worse, start taking your oral steroid medicine if prescribed by your provider.           RED ZONE Medical Alert - Get Help  I have ANY of these:  I feel awful  Medicine is not helping  Breathing getting harder  Trouble walking or talking  Nose opens wide to breathe       Take your rescue medicine NOW  If your provider has prescribed an oral steroid medicine, start taking it NOW  Call your doctor NOW  If you are still in the Red Zone after 20 minutes and you have not reached your doctor:  Take your rescue medicine again and  Call 911 or go to the emergency room right away    See your regular doctor within 2 weeks of an Emergency Room or Urgent Care  visit for follow-up treatment.          Annual Reminders:  Meet with Asthma Educator,  Flu Shot in the Fall, consider Pneumonia Vaccination for patients with asthma (aged 19 and older).    Pharmacy:    [facility] [address] [place], [place] [address] [address] [place] [address] [address]  WRITTEN PRESCRIPTION REQUESTED    Electronically signed by Mercedes Urena MD   Date: 08/13/24                    Asthma Triggers  How To Control Things That Make Your Asthma Worse    Triggers are things that make your asthma worse.  Look at the list below to help you find your triggers and   what you can do about them. You can help prevent asthma flare-ups by staying away from your triggers.      Trigger                                                          What you can do   Cigarette Smoke  Tobacco smoke can make asthma worse. Do not allow smoking in your home, car or around you.  Be sure no one smokes at a child s day care or school.  If you smoke, ask your health care provider for ways to help you quit.  Ask family members to quit too.  Ask your health care provider for a referral to Quit Plan to help you quit smoking, or call 1-545-785-PLAN.     Colds, Flu, Bronchitis  These are common triggers of asthma. Wash your hands often.  Don t touch your eyes, nose or mouth.  Get a flu shot every year.     Dust Mites  These are tiny bugs that live in cloth or carpet. They are too small to see. Wash sheets and blankets in hot water every week.   Encase pillows and mattress in dust mite proof covers.  Avoid having carpet if you can. If you have carpet, vacuum weekly.   Use a dust mask and HEPA vacuum.   Pollen and Outdoor Mold  Some people are allergic to trees, grass, or weed pollen, or molds. Try to keep your windows closed.  Limit time out doors when pollen count is high.   Ask you health care provider about taking medicine during allergy season.     Animal Dander  Some people are allergic to skin flakes, urine or saliva from pets with fur or  feathers. Keep pets with fur or feathers out of your home.    If you can t keep the pet outdoors, then keep the pet out of your bedroom.  Keep the bedroom door closed.  Keep pets off cloth furniture and away from stuffed toys.     Mice, Rats, and Cockroaches  Some people are allergic to the waste from these pests.   Cover food and garbage.  Clean up spills and food crumbs.  Store grease in the refrigerator.   Keep food out of the bedroom.   Indoor Mold  This can be a trigger if your home has high moisture. Fix leaking faucets, pipes, or other sources of water.   Clean moldy surfaces.  Dehumidify basement if it is damp and smelly.   Smoke, Strong Odors, and Sprays  These can reduce air quality. Stay away from strong odors and sprays, such as perfume, powder, hair spray, paints, smoke incense, paint, cleaning products, candles and new carpet.   Exercise or Sports  Some people with asthma have this trigger. Be active!  Ask your doctor about taking medicine before sports or exercise to prevent symptoms.    Warm up for 5-10 minutes before and after sports or exercise.     Other Triggers of Asthma  Cold air:  Cover your nose and mouth with a scarf.  Sometimes laughing or crying can be a trigger.  Some medicines and food can trigger asthma.

## 2024-08-13 NOTE — PROGRESS NOTES
Assessment & Plan     Paroxysmal A-fib (H)  Rhythm controlled and anticoagulated; follow-up with cardiology as planned      Heart failure with reduced ejection fraction (H)  Euvolemic, on spironolactone and ACE/ARB with some dizziness related to relative hypotension; will reduce lisinopril to 5 mg at bedtime and follow-up with cardiology     Chronic kidney disease, stage 3a (H)  Recommended [facility] and follow-up for wellness visit     Constipation, unspecified constipation type  For the constipation, push fluids, use Miralax and Senna bid-tid to maintain soft bowel movement until normal pattern ensues. Maintain a high fiber diet with plenty of roughage Call if symptoms persist or worsen.     The longitudinal plan of care for the diagnosis(es)/condition(s) as documented were addressed during this visit. Due to the added complexity in care, I will continue to support Malinda in the subsequent management and with ongoing continuity of care.      MED REC REQUIRED   Post Medication Reconciliation Status:  Discharge medications reconciled, continue medications without change    Follow-up for wellness visit     Ian Petty is a 79 year old, presenting for the following health issues:  Hospital F/U and Weight Loss        8/13/2024     9:00 AM   Additional Questions   Roomed by Carolyn LÓPEZ CMA   Accompanied by Self         8/13/2024     9:00 AM   Patient Reported Additional Medications   Patient reports taking the following new medications None     History of Present Illness       Heart Failure:  She presents for follow up of heart failure. She is not experiencing shortness of breath at night, with rest or with activity  She is not experiencing any lower extremity edema.   She denies orthopenea and is not coughing at night. Patient is checking weight daily. She has recently had a weight decrease.  She has side effects from medications including other.  She has had no other medical visits for heart failure since the last  visit.    Vascular Disease:  She presents for follow up of vascular disease.     She never takes nitroglycerin. She is not taking daily aspirin.    She eats 4 or more servings of fruits and vegetables daily.She consumes 0 sweetened beverage(s) daily.She exercises with enough effort to increase her heart rate 10 to 19 minutes per day.  She exercises with enough effort to increase her heart rate 3 or less days per week.   She is taking medications regularly.     Last Echo:   Echo result w/o MOPS: Interpretation SummaryGlobal and regional left ventricular function is normal with an EF of 55-60%.Global right ventricular function is normal.Severe mitral annular calcification is present. Moderate mitral insufficiencyis present.Mild dilatation of the aorta is present.IVC diameter <2.1 cm collapsing >50% with sniff suggests a normal RA pressureof 3 mmHg.No pericardial effusion is present.There is no prior study for direct comparison.    Hospital Follow-up Visit:    Hospital/Nursing Home/IP Rehab Facility: [facility]  Date of Admission: 6/20/24   Date of Discharge: 6/22/24  Reason(s) for Admission: Afib, shortness of breath  Was the patient in the ICU or did the patient experience delirium during hospitalization?  No  Do you have any other stressors you would like to discuss with your provider? No    Problems taking medications regularly:  None  Medication changes since discharge: amiodarone 1 200 mg daily, spironolactone 25 mg 1/2 tablet daily  Problems adhering to non-medication therapy:  None    Summary of hospitalization:  [facility] discharge summary reviewed  Diagnostic Tests/Treatments reviewed.  Follow up needed: none  Other Healthcare Providers Involved in Patient s Care:         Specialist appointment - cardiology appointment reviewed   Update since discharge: improved.         Plan of care communicated with patient                   Review of  "Systems  CONSTITUTIONAL: POSITIVE for weight loss  ENT/MOUTH: NEGATIVE for ear, mouth and throat problems  RESP: NEGATIVE for significant cough or SOB  CV: NEGATIVE for chest pain, palpitations or peripheral edema      Objective    BP 92/61 (BP Location: Right arm, Patient Position: Sitting, Cuff Size: Adult Large)   Pulse 78   Temp 97.9  F (36.6  C) (Oral)   Resp 16   Ht 1.58 m (5' 2.21\")   Wt 79.8 kg (176 lb)   SpO2 97%   BMI 31.98 kg/m    Body mass index is 31.98 kg/m .  Physical Exam   GENERAL: alert and no distress  EYES: Eyes grossly normal to inspection, PERRL and conjunctivae and sclerae normal  NECK: no adenopathy, no asymmetry, masses, or scars  RESP: lungs clear to auscultation - no rales, rhonchi or wheezes  CV: regular rate and rhythm, normal S1 S2, no S3 or S4, no murmur, click or rub, no peripheral edema  MS: no gross musculoskeletal defects noted, no edema  PSYCH: mentation appears normal, affect normal/bright    Lab on 08/09/2024   Component Date Value Ref Range Status    Creatinine 08/09/2024 1.21 (H)  0.51 - 0.95 mg/dL Final    GFR Estimate 08/09/2024 45 (L)  >60 mL/min/1.73m2 Final    eGFR calculated using 2021 CKD-EPI equation.    Erythrocyte Sedimentation Rate 08/09/2024 11  0 - 30 mm/hr Final    CRP Inflammation 08/09/2024 <3.00  <5.00 mg/L Final    Protein Total 08/09/2024 7.5  6.4 - 8.3 g/dL Final    Albumin 08/09/2024 4.0  3.5 - 5.2 g/dL Final    Bilirubin Total 08/09/2024 0.6  <=1.2 mg/dL Final    Alkaline Phosphatase 08/09/2024 57  40 - 150 U/L Final    AST 08/09/2024 28  0 - 45 U/L Final    ALT 08/09/2024 16  0 - 50 U/L Final    Bilirubin Direct 08/09/2024 <0.20  0.00 - 0.30 mg/dL Final    WBC Count 08/09/2024 5.0  4.0 - 11.0 10e3/uL Final    RBC Count 08/09/2024 4.52  3.80 - 5.20 10e6/uL Final    Hemoglobin 08/09/2024 13.4  11.7 - 15.7 g/dL Final    Hematocrit 08/09/2024 42.6  35.0 - 47.0 % Final    MCV 08/09/2024 94  78 - 100 fL Final    MCH 08/09/2024 29.6  26.5 - 33.0 pg Final "    MCHC 08/09/2024 31.5  31.5 - 36.5 g/dL Final    RDW 08/09/2024 15.9 (H)  10.0 - 15.0 % Final    Platelet Count 08/09/2024 247  150 - 450 10e3/uL Final    % Neutrophils 08/09/2024 67  % Final    % Lymphocytes 08/09/2024 24  % Final    % Monocytes 08/09/2024 6  % Final    % Eosinophils 08/09/2024 3  % Final    % Basophils 08/09/2024 1  % Final    % Immature Granulocytes 08/09/2024 0  % Final    Absolute Neutrophils 08/09/2024 3.4  1.6 - 8.3 10e3/uL Final    Absolute Lymphocytes 08/09/2024 1.2  0.8 - 5.3 10e3/uL Final    Absolute Monocytes 08/09/2024 0.3  0.0 - 1.3 10e3/uL Final    Absolute Eosinophils 08/09/2024 0.1  0.0 - 0.7 10e3/uL Final    Absolute Basophils 08/09/2024 0.0  0.0 - 0.2 10e3/uL Final    Absolute Immature Granulocytes 08/09/2024 0.0  <=0.4 10e3/uL Final     No results found for this or any previous visit (from the past 24 hour(s)).        Signed Electronically by: Mercedes Urena MD

## 2024-08-13 NOTE — LETTER
My Heart Failure Action Plan  Name: Alfreda Baxter   YOB: 1945  Date: 8/13/2024   My doctor:   Mercedes Urena [address]  [place] [id number] [phone] My Diagnosis: HF-rEF (EF < 40%)  My Ejection Fraction:   Lab Results   Component Value Date    LVEF 40-45% (mildly reduced) 06/19/2024     41% - 44%  My Exercise Goal: Start exercise slowly - to begin, do a few minutes of exercise, several times a day. Increase your time and speed [id number] to build tolerance, with a goal of 30 minutes of exercise daily. Steady, slow, and consistent exercise is both safe and healthy. Stop and rest when you feel tired or become short of breath. Do not push yourself on days when you don t feel well.       My Weight Plan:   Wt Readings from Last 2 Encounters:   08/13/24 79.8 kg (176 lb)   08/12/24 80.6 kg (177 lb 12.8 oz)     Weigh yourself daily using the same scale. If you gain more than 2 pounds in 24 hours or 5 pounds in 7 days. call the clinic    My Diet Goal: No added salt    Emergency Room Visits:    Our goal is to improve your quality of life and help you avoid a visit to the emergency room or hospital.  If we work together, we can achieve this goal. But, if you feel you need to call 911 or go to the emergency room, please do so.  If you go to the emergency room, please bring your list of medicines and your daily weight chart with you.    Each day ask yourself:  Is my weight up?  Do I have swelling?  Do I have trouble breathing?  How did I sleep?  Other problems?       GREEN ZONE     Weight gained is no more than 2 pounds a day or 5 pounds a in 7 days.  No swelling in feet, ankles, legs or stomach.  No more swelling than usual.  No more trouble breathing than usual.  No change in my sleep.  No other problems. What should I do?  I am doing fine. I will take my medicine, follow my diet, see my doctor, exercise, and watch for symptoms.            YELLOW ZONE         Weight gain of more than 2 pounds in one day or 5 pounds in 7 days.  New swelling in ankle, leg, knee or thigh.  Bloating in belly, pants feel tighter.  Swelling in hands or face.  Coughing or trouble breathing while walking or talking.  Harder to breathe last night.  Have trouble sleeping, wake up short of breath.  Unusually tired.  Not eating.  Nausea, vomiting, or diarrhea  Pain in my chest or bad  leg cramps.  Feel weak or dizzy. What should I do?  I need to take action and call my doctor or nurse today.                 RED ZONE         Weight gain of 5 pounds overnight.  Chest pain or pressure that does not go away.  Feel less alert.  Wheezing or have trouble breathing when at rest.  Cannot sleep lying down.  Cannot take my medicines.  Pass out or faint. What should I do?  I need to call my doctor or nurse now!  Call 911 if I have chest pain or cannot breathe.

## 2024-08-22 ENCOUNTER — TELEPHONE (OUTPATIENT)
Dept: CARDIOLOGY | Facility: CLINIC | Age: 79
End: 2024-08-22
Payer: MEDICARE

## 2024-08-22 NOTE — TELEPHONE ENCOUNTER
Sent Mychart (1st Attempt) for the patient to call back and schedule the following:    Appointment type: Return EP(has to be in clinic)  Provider: Libertad Howard  Return date: next available at  or the [facility](held spot on 10/17/2024)  Specialty phone number: [phone]  Additional appointment(s) needed:   Additonal Notes:     Attempted to reschedule the appointment on 10/10/2024 with Libertad Howard(prov out for CME). Appointment must be in clinic, EKG is needed. Ok to schedule at the [facility] on 10/17/2024 in the hold spot.    Unable to leave a voicemail, sent an appointment reschedule letter and a [email or web address] message.    Meaghan PAZ/Complex Procedure    [facility]   Neurology, NeuroSurgery, NeuroPsychology, Pain Management and Cardiology Specialties  Medical/Surgical Adult Specialties

## 2024-09-04 ASSESSMENT — ASTHMA QUESTIONNAIRES
QUESTION_3 LAST FOUR WEEKS HOW OFTEN DID YOUR ASTHMA SYMPTOMS (WHEEZING, COUGHING, SHORTNESS OF BREATH, CHEST TIGHTNESS OR PAIN) WAKE YOU UP AT NIGHT OR EARLIER THAN USUAL IN THE MORNING: NOT AT ALL
ACT_TOTALSCORE: 24
QUESTION_4 LAST FOUR WEEKS HOW OFTEN HAVE YOU USED YOUR RESCUE INHALER OR NEBULIZER MEDICATION (SUCH AS ALBUTEROL): NOT AT ALL
QUESTION_1 LAST FOUR WEEKS HOW MUCH OF THE TIME DID YOUR ASTHMA KEEP YOU FROM GETTING AS MUCH DONE AT WORK, SCHOOL OR AT HOME: NONE OF THE TIME
QUESTION_2 LAST FOUR WEEKS HOW OFTEN HAVE YOU HAD SHORTNESS OF BREATH: NOT AT ALL
QUESTION_5 LAST FOUR WEEKS HOW WOULD YOU RATE YOUR ASTHMA CONTROL: WELL CONTROLLED
ACT_TOTALSCORE: 24

## 2024-09-09 ENCOUNTER — APPOINTMENT (OUTPATIENT)
Dept: LAB | Facility: CLINIC | Age: 79
End: 2024-09-09
Payer: MEDICARE

## 2024-09-09 ENCOUNTER — OFFICE VISIT (OUTPATIENT)
Dept: FAMILY MEDICINE | Facility: CLINIC | Age: 79
End: 2024-09-09
Attending: FAMILY MEDICINE
Payer: MEDICARE

## 2024-09-09 VITALS
SYSTOLIC BLOOD PRESSURE: 121 MMHG | BODY MASS INDEX: 32.2 KG/M2 | TEMPERATURE: 97.5 F | RESPIRATION RATE: 16 BRPM | HEIGHT: 62 IN | HEART RATE: 60 BPM | OXYGEN SATURATION: 95 % | DIASTOLIC BLOOD PRESSURE: 73 MMHG | WEIGHT: 175 LBS

## 2024-09-09 DIAGNOSIS — E78.5 HYPERLIPIDEMIA LDL GOAL <130: ICD-10-CM

## 2024-09-09 DIAGNOSIS — I48.0 PAROXYSMAL A-FIB (H): ICD-10-CM

## 2024-09-09 DIAGNOSIS — I50.20 HEART FAILURE WITH REDUCED EJECTION FRACTION (H): ICD-10-CM

## 2024-09-09 DIAGNOSIS — J45.20 MILD INTERMITTENT ASTHMA WITHOUT COMPLICATION: ICD-10-CM

## 2024-09-09 DIAGNOSIS — N18.31 CHRONIC KIDNEY DISEASE, STAGE 3A (H): ICD-10-CM

## 2024-09-09 DIAGNOSIS — F51.01 PRIMARY INSOMNIA: ICD-10-CM

## 2024-09-09 DIAGNOSIS — Z29.11 NEED FOR VACCINATION AGAINST RESPIRATORY SYNCYTIAL VIRUS: ICD-10-CM

## 2024-09-09 DIAGNOSIS — M85.80 LOW BONE MASS: ICD-10-CM

## 2024-09-09 DIAGNOSIS — Z00.00 ENCOUNTER FOR MEDICARE ANNUAL WELLNESS EXAM: Primary | ICD-10-CM

## 2024-09-09 LAB
ANION GAP SERPL CALCULATED.3IONS-SCNC: 12 MMOL/L (ref 7–15)
BUN SERPL-MCNC: 25.8 MG/DL (ref 8–23)
CALCIUM SERPL-MCNC: 9.4 MG/DL (ref 8.8–10.4)
CHLORIDE SERPL-SCNC: 105 MMOL/L (ref 98–107)
CREAT SERPL-MCNC: 1 MG/DL (ref 0.51–0.95)
EGFRCR SERPLBLD CKD-EPI 2021: 57 ML/MIN/1.73M2
GLUCOSE SERPL-MCNC: 92 MG/DL (ref 70–99)
HCO3 SERPL-SCNC: 21 MMOL/L (ref 22–29)
POTASSIUM SERPL-SCNC: 4.7 MMOL/L (ref 3.4–5.3)
SODIUM SERPL-SCNC: 138 MMOL/L (ref 135–145)

## 2024-09-09 PROCEDURE — G0439 PPPS, SUBSEQ VISIT: HCPCS | Performed by: FAMILY MEDICINE

## 2024-09-09 PROCEDURE — 99214 OFFICE O/P EST MOD 30 MIN: CPT | Mod: 25 | Performed by: FAMILY MEDICINE

## 2024-09-09 PROCEDURE — 36415 COLL VENOUS BLD VENIPUNCTURE: CPT

## 2024-09-09 PROCEDURE — 80048 BASIC METABOLIC PNL TOTAL CA: CPT

## 2024-09-09 RX ORDER — ZOLPIDEM TARTRATE 5 MG/1
TABLET ORAL
Qty: 30 TABLET | Refills: 5 | Status: SHIPPED | OUTPATIENT
Start: 2024-09-09

## 2024-09-09 RX ORDER — ALBUTEROL SULFATE 90 UG/1
2 AEROSOL, METERED RESPIRATORY (INHALATION) EVERY 6 HOURS PRN
Qty: 18 G | Refills: 11 | Status: SHIPPED | OUTPATIENT
Start: 2024-09-09

## 2024-09-09 RX ORDER — ATORVASTATIN CALCIUM 20 MG/1
20 TABLET, FILM COATED ORAL DAILY
Qty: 90 TABLET | Refills: 4 | Status: SHIPPED | OUTPATIENT
Start: 2024-09-09

## 2024-09-09 NOTE — PROGRESS NOTES
Preventive Care Visit  [facility] MAGY Urena MD, Family Medicine  Sep 9, 2024      Assessment & Plan     Encounter for Medicare annual wellness exam  Low bone mass  Patient declines bone health medication   - Basic metabolic panel  (Ca, Cl, CO2, Creat, Gluc, K, Na, BUN); Future    Heart failure with reduced ejection fraction (H)  Hyperkalemia recheck today; euvolemic; follow-up with cardiology as planned   - Basic metabolic panel  (Ca, Cl, CO2, Creat, Gluc, K, Na, BUN); Future    Paroxysmal A-fib (H)  Rate controlled and anticoagulated   - apixaban ANTICOAGULANT (ELIQUIS) 5 MG tablet; Take 1 tablet (5 mg) by mouth 2 times daily.  - OFFICE/OUTPT VISIT,ESTLEVL IV    Chronic kidney disease, stage 3a (H)  - Albumin Random Urine Quantitative with Creat Ratio; Future  - OFFICE/OUTPT VISIT,ESTLEVL IV  - Basic metabolic panel  (Ca, Cl, CO2, Creat, Gluc, K, Na, BUN); Future    Hyperlipidemia LDL goal <70  Well controlled with medications without side effects.   - atorvastatin (LIPITOR) 20 MG tablet; Take 1 tablet (20 mg) by mouth daily.  - OFFICE/OUTPT VISIT,CHERIELEVL IV    Primary insomnia  Well controlled with medication without side effects after other medication trials   - zolpidem (AMBIEN) 5 MG tablet; TAKE ONE TABLET BY MOUTH IN THE EVENING AS NEEDED FOR SLEEP  - OFFICE/OUTPT VISIT,EST,LEVL IV    Mild intermittent asthma without complication  - albuterol (PROAIR HFA/PROVENTIL HFA/VENTOLIN HFA) 108 (90 Base) MCG/ACT inhaler; Inhale 2 puffs into the lungs every 6 hours as needed for shortness of breath or wheezing.  - OFFICE/OUTPT VISIT,ESTLEVL IV      Counseling  Appropriate preventive services were addressed with this patient via screening, questionnaire, or discussion as appropriate for fall prevention, nutrition, physical activity, Tobacco-use cessation, social engagement, weight loss and cognition.  Checklist reviewing preventive services available has been given to the  patient.  Reviewed patient's diet, addressing concerns and/or questions.   She is at risk for lack of exercise and has been provided with information to increase physical activity for the benefit of her well-being.   The patient was instructed to see the dentist every 6 months.   She is at risk for psychosocial distress and has been provided with information to reduce risk.   Discussed possible causes of fatigue. The patient was provided with written information regarding signs of hearing loss.   Information on urinary incontinence and treatment options given to patient.       Follow-up yearly     Ian Petty is a 79 year old, presenting for the following:  Physical        9/9/2024    11:26 AM   Additional Questions   Roomed by Carolyn LÓPEZ CMA   Accompanied by Self         9/9/2024    11:26 AM   Patient Reported Additional Medications   Patient reports taking the following new medications astelin nasal spray each nostril at bedtime         Health Care Directive  Patient has a Health Care Directive on file  Advance care planning document is on file and is current.    HPI  Hypercholesterolemia well controlled with current treatment plan without side effects. Patient has chronic insomnia controlled with medications without depression or anxiety.             9/4/2024   General Health   How would you rate your overall physical health? Good   Feel stress (tense, anxious, or unable to sleep) Only a little      (!) STRESS CONCERN      9/4/2024   Nutrition   Diet: Low salt            9/4/2024   Exercise   Days per week of moderate/strenous exercise 3 days   Average minutes spent exercising at this level 20 min            9/4/2024   Social Factors   Frequency of gathering with friends or relatives Twice a week   Worry food won't last until get money to buy more No   Food not last or not have enough money for food? No   Do you have housing? (Housing is defined as stable permanent housing and does not include staying [facility]  in a car, in a tent, in an abandoned building, in an overnight shelter, or couch-surfing.) Yes   Are you worried about losing your housing? No   Lack of transportation? No   Unable to get utilities (heat,electricity)? Yes   Want help with housing or utility concern? No      (!) FINANCIAL RESOURCE STRAIN CONCERN      9/4/2024   Fall Risk   Fallen 2 or more times in the past year? No    No   Trouble with walking or balance? No    No       Multiple values from one day are sorted in reverse-chronological order          9/4/2024   Activities of Daily Living- Home Safety   Needs help with the following daily activites None of the above   Safety concerns in the home None of the above            9/4/2024   Dental   Dentist two times every year? (!) NO            9/4/2024   Hearing Screening   Hearing concerns? (!) I FEEL THAT PEOPLE ARE MUMBLING OR NOT SPEAKING CLEARLY.    (!) TROUBLE FOLLOWING DIALOGUE IN THE THEATHER.       Multiple values from one day are sorted in reverse-chronological order         9/4/2024   Driving Risk Screening   Patient/family members have concerns about driving (!) DECLINE            9/4/2024   General Alertness/Fatigue Screening   Have you been more tired than usual lately? (!) YES            9/4/2024   Urinary Incontinence Screening   Bothered by leaking urine in past 6 months Yes            9/4/2024   TB Screening   Were you born outside of the [place]? No            Today's PHQ-2 Score:       9/9/2024    11:16 AM   PHQ-2 ( 1999 [employer])   Q1: Little interest or pleasure in doing things 0   Q2: Feeling down, depressed or hopeless 0   PHQ-2 Score 0   Q1: Little interest or pleasure in doing things Not at all   Q2: Feeling down, depressed or hopeless Not at all   PHQ-2 Score 0           9/4/2024   Substance Use   Alcohol more than 3/day or more than 7/wk Not Applicable   Do you have a current opioid prescription? No   How severe/bad is pain from 1 to 10? 0/10 (No Pain)   Do you use any other  substances recreationally? No        Social History     Tobacco Use    Smoking status: Former     Current packs/day: 0.00     Average packs/day: 0.1 packs/day for 2.0 years (0.2 ttl pk-yrs)     Types: Cigarettes     Start date: 1963     Quit date: 1965     Years since quittin.7     Passive exposure: Past    Smokeless tobacco: Never   Vaping Use    Vaping status: Never Used   Substance Use Topics    Alcohol use: Not Currently     Comment: one a month    Drug use: No           2023   LAST FHS-7 RESULTS   1st degree relative breast or ovarian cancer No   Any relative bilateral breast cancer No   Any male have breast cancer No   Any ONE woman have BOTH breast AND ovarian cancer No   Any woman with breast cancer before 50yrs No   2 or more relatives with breast AND/OR ovarian cancer No   2 or more relatives with breast AND/OR bowel cancer No           Mammogram Screening - Mammogram every 1-2 years updated in Health Maintenance based on mutual decision making              Reviewed and updated as needed this visit by Provider   Tobacco  Allergies  Meds  Problems  Med Hx  Surg Hx  Fam Hx     Sexual Activity          Patient Active Problem List   Diagnosis    DJD (degenerative joint disease)    Hyperlipidemia LDL goal <70    Lumbar spinal stenosis    Mild intermittent asthma without complication    History of bilateral knee replacement    Obesity    Primary insomnia    Posterior vitreous detachment of both eyes    PFO (patent foramen ovale)    Neovascular glaucoma of left eye, moderate stage    Blind left eye    Paroxysmal A-fib (H)    Ocular hypertension of right eye, treated    History of central retinal artery occlusion, os    Disturbances of vision, late effect of stroke    Combined forms of age-related cataract, mild-mod, of left eye    Pseudophakia, od    Low bone mass    Cervicalgia    Heart failure with reduced ejection fraction (H)    Chronic kidney disease, stage 3a (H)     Past Surgical  History:   Procedure Laterality Date    ANESTHESIA CARDIOVERSION N/A 2024    Procedure: Anesthesia cardioversion @0930;  Surgeon: GENERIC ANESTHESIA PROVIDER;  Location: [facility] OR    ANESTHESIA CARDIOVERSION N/A 2024    Procedure: Anesthesia cardioversion @1330;  Surgeon: GENERIC ANESTHESIA PROVIDER;  Location: [facility] OR    APPENDECTOMY      age 7 yrs.    BREAST BIOPSY, RT/LT  2004    left benign    CATARACT IOL, RT/LT       SECTION      x 2    COLONOSCOPY  2013    needed q 3 yrs.    COLONOSCOPY N/A 09/10/2020    Procedure: Colonoscopy, With Polypectomy And Biopsy;  Surgeon: Brian Cleaning MD;  Location: [facility] OR    COLONOSCOPY WITH CO2 INSUFFLATION N/A 2016    Procedure: COLONOSCOPY WITH CO2 INSUFFLATION;  Surgeon: Brian Cleaning MD;  Location: [facility] OR    COLONOSCOPY WITH CO2 INSUFFLATION N/A 09/10/2020    Procedure: COLONOSCOPY, WITH CO2 INSUFFLATION;  Surgeon: Brian Cleaning MD;  Location: [facility] OR    EP COMPREHENSIVE EP STUDY N/A 2018    Procedure: LOOP RECORDER;  Surgeon: Buck Butterfield MD;  [facility]    HYSTERECTOMY, POORNIMA  1997    bleeding fibroids    ORTHOPEDIC SURGERY  2009    meniscus repair    PHACOEMULSIFICATION WITH STANDARD INTRAOCULAR LENS IMPLANT Right 10/24/2022    Procedure: COMPLE RIGHT EYE PHACOEMULSIFICATION, CATARACT, WITH STANDARD INTRAOCULAR LENS IMPLANT INSERTION;  Surgeon: Ezequiel Florence MD;  Location: [facility] TOTAL KNEE ARTHROPLASTY Left 2015    at [facility]       Social History     Tobacco Use    Smoking status: Former     Current packs/day: 0.00     Average packs/day: 0.1 packs/day for 2.0 years (0.2 ttl pk-yrs)     Types: Cigarettes     Start date: 1963     Quit date: 1965     Years since quittin.7     Passive exposure: Past    Smokeless tobacco: Never   Substance Use Topics    Alcohol use: Not Currently     Comment: one a month     Family History   Problem Relation Age of Onset     Arthritis Mother     Cancer Mother     Hypertension Mother     Other Cancer Mother     Thyroid Disease Mother     Respiratory Father     Glaucoma Father     Asthma Father     Allergies Sister     Eye Disorder Sister     Lipids Sister     Neurologic Disorder Sister     Osteoporosis Sister     Glaucoma Sister     Hypertension Sister     Macular Degeneration Sister     Hyperlipidemia Sister     Hypertension Sister     Osteoporosis Sister     Gastrointestinal Disease Son         Ulcerative Colitis    Glaucoma Other          Current providers sharing in care for this patient include:  Patient Care Team:  Mercedes Urena MD as PCP - General  Ezequiel Florence MD as MD (Ophthalmology)  Zaina Yanez APRN CNP as Nurse Practitioner (Neurology)  Bryan Morrison MD as Assigned Surgical Provider  Bryan Morrison MD as MD (Ophthalmology)  Mercedes Urena MD as Assigned PCP  Lucho Huber MD as Assigned Rheumatology Provider  Monica Michelle MD as MD (Cardiovascular Disease)  Tj Yoder RN as Specialty Care Coordinator (Cardiology)  Claudine Mosquera APRN CNP as Assigned Heart and Vascular Provider  Ezequiel Davila MD as MD (Otolaryngology)    The following health maintenance items are reviewed in Epic and correct as of today:  Health Maintenance   Topic Date Due    MICROALBUMIN  Never done    RSV VACCINE (1 - 1-dose 60+ series) Never done    INFLUENZA VACCINE (1) 09/01/2024    COVID-19 Vaccine (5 - 2023-24 season) 09/11/2024 (Originally 9/1/2024)    BMP  01/15/2025    ASTHMA CONTROL TEST  03/09/2025    LIPID  06/21/2025    EYE EXAM  06/24/2025    A1C  07/02/2025    ALT  08/09/2025    CBC  08/09/2025    HEMOGLOBIN  08/09/2025    ASTHMA ACTION PLAN  08/13/2025    DEXA  08/25/2025    MEDICARE ANNUAL WELLNESS VISIT  09/09/2025    ANNUAL REVIEW OF HM ORDERS  09/09/2025    FALL RISK ASSESSMENT  09/09/2025    COLORECTAL CANCER SCREENING  09/10/2025    GLUCOSE  07/15/2027    HF ACTION PLAN  08/13/2027  "   DTAP/TDAP/TD IMMUNIZATION (3 - Td or Tdap) 06/28/2028    ADVANCE CARE PLANNING  09/09/2029    TSH W/FREE T4 REFLEX  Completed    PHQ-2 (once per calendar year)  Completed    Pneumococcal Vaccine: 65+ Years  Completed    URINALYSIS  Completed    ZOSTER IMMUNIZATION  Completed    HPV IMMUNIZATION  Aged Out    MENINGITIS IMMUNIZATION  Aged Out    RSV MONOCLONAL ANTIBODY  Aged Out    MAMMO SCREENING  Discontinued            Objective    Exam  /73 (BP Location: Right arm, Patient Position: Sitting, Cuff Size: Adult Regular)   Pulse 60   Temp 97.5  F (36.4  C) (Oral)   Resp 16   Ht 1.58 m (5' 2.21\")   Wt 79.4 kg (175 lb)   SpO2 95%   BMI 31.79 kg/m     Estimated body mass index is 31.79 kg/m  as calculated from the following:    Height as of this encounter: 1.58 m (5' 2.21\").    Weight as of this encounter: 79.4 kg (175 lb).    Physical Exam  GENERAL: alert and no distress  EYES: Eyes grossly normal to inspection, PERRL and conjunctivae and sclerae normal  NECK: no adenopathy, no asymmetry, masses, or scars  RESP: lungs clear to auscultation - no rales, rhonchi or wheezes  CV: regular rate and rhythm, normal S1 S2, no S3 or S4, no murmur, click or rub, no peripheral edema  MS: no gross musculoskeletal defects noted, no edema  PSYCH: mentation appears normal, affect normal/bright        9/9/2024   Mini Cog   Clock Draw Score 2 Normal   3 Item Recall 2 objects recalled   Mini Cog Total Score 4                 Signed Electronically by: Mercedes Urena MD    "

## 2024-09-09 NOTE — RESULT ENCOUNTER NOTE
Malinda,    Your potassium is normal. Your kidney tests are stable over time. Your blood glucose is normal.     Mrecedes Urena MD

## 2024-09-09 NOTE — PATIENT INSTRUCTIONS
Patient Education   Preventive Care Advice   This is general advice given by our system to help you stay healthy. However, your care team may have specific advice just for you. Please talk to your care team about your preventive care needs.  Nutrition  Eat 5 or more servings of fruits and vegetables each day.  Try wheat bread, brown rice and whole grain pasta (instead of white bread, rice, and pasta).  Get enough calcium and vitamin D. Check the label on foods and aim for 100% of the RDA (recommended daily allowance).  Lifestyle  Exercise at least 150 minutes each week  (30 minutes a day, 5 days a week).  Do muscle strengthening activities 2 days a week. These help control your weight and prevent disease.  No smoking.  Wear sunscreen to prevent skin cancer.  Have a dental exam and cleaning every 6 months.  Yearly exams  See your health care team every year to talk about:  Any changes in your health.  Any medicines your care team has prescribed.  Preventive care, family planning, and ways to prevent chronic diseases.  Shots (vaccines)   HPV shots (up to age 26), if you've never had them before.  Hepatitis B shots (up to age 59), if you've never had them before.  COVID-19 shot: Get this shot when it's due.  Flu shot: Get a flu shot every year.  Tetanus shot: Get a tetanus shot every 10 years.  Pneumococcal, hepatitis A, and RSV shots: Ask your care team if you need these based on your risk.  Shingles shot (for age 50 and up)  General health tests  Diabetes screening:  Starting at age 35, Get screened for diabetes at least every 3 years.  If you are younger than age 35, ask your care team if you should be screened for diabetes.  Cholesterol test: At age 39, start having a cholesterol test every 5 years, or more often if advised.  Bone density scan (DEXA): At age 50, ask your care team if you should have this scan for osteoporosis (brittle bones).  Hepatitis C: Get tested at least once in your life.  STIs (sexually  transmitted infections)  Before age 24: Ask your care team if you should be screened for STIs.  After age 24: Get screened for STIs if you're at risk. You are at risk for STIs (including HIV) if:  You are sexually active with more than one person.  You don't use condoms every time.  You or a partner was diagnosed with a sexually transmitted infection.  If you are at risk for HIV, ask about PrEP medicine to prevent HIV.  Get tested for HIV at least once in your life, whether you are at risk for HIV or not.  Cancer screening tests  Cervical cancer screening: If you have a cervix, begin getting regular cervical cancer screening tests starting at age 21.  Breast cancer scan (mammogram): If you've ever had breasts, begin having regular mammograms starting at age 40. This is a scan to check for breast cancer.  Colon cancer screening: It is important to start screening for colon cancer at age 45.  Have a colonoscopy test every 10 years (or more often if you're at risk) Or, ask your provider about stool tests like a FIT test every year or Cologuard test every 3 years.  To learn more about your testing options, visit:   .  For help making a decision, visit:   https://bit.ly/of53014.  Prostate cancer screening test: If you have a prostate, ask your care team if a prostate cancer screening test (PSA) at age 55 is right for you.  Lung cancer screening: If you are a current or former smoker ages 50 to 80, ask your care team if ongoing lung cancer screenings are right for you.  For informational purposes only. Not to replace the advice of your health care provider. Copyright   2023 [facility] [employer]. All rights reserved. Clinically reviewed by the [facility] Transitions Program. Thing5 [id number] - REV 01/24.  Hearing Loss: Care Instructions  Overview     Hearing loss is a sudden or slow decrease in how well you hear. It can range from slight to profound. Permanent hearing loss can occur with aging. It also can  happen when you are exposed long-term to loud noise. Examples include listening to loud music, riding motorcycles, or being around other loud machines.  Hearing loss can affect your work and home life. It can make you feel lonely or depressed. You may feel that you have lost your independence. But hearing aids and other devices can help you hear better and feel connected to others.  Follow-up care is a key part of your treatment and safety. Be sure to make and go to all appointments, and call your doctor if you are having problems. It's also a good idea to know your test results and keep a list of the medicines you take.  How can you care for yourself at home?  Avoid loud noises whenever possible. This helps keep your hearing from getting worse.  Always wear hearing protection around loud noises.  Wear a hearing aid as directed.  A professional can help you pick a hearing aid that will work best for you.  You can also get hearing aids over the counter for mild to moderate hearing loss.  Have hearing tests as your doctor suggests. They can show whether your hearing has changed. Your hearing aid may need to be adjusted.  Use other devices as needed. These may include:  Telephone amplifiers and hearing aids that can connect to a television, stereo, radio, or microphone.  Devices that use lights or vibrations. These alert you to the doorbell, a ringing telephone, or a baby monitor.  Television closed-captioning. This shows the words at the bottom of the screen. Most new TVs can do this.  TTY (text telephone). This lets you type messages back and forth on the telephone instead of talking or listening. These devices are also called TDD. When messages are typed on the keyboard, they are sent over the phone line to a receiving TTY. The message is shown on a monitor.  Use text messaging, social media, and email if it is hard for you to communicate by telephone.  Try to learn a listening technique called speechreading. It is  "not lipreading. You pay attention to people's gestures, expressions, posture, and tone of voice. These clues can help you understand what a person is saying. Face the person you are talking to, and have them face you. Make sure the lighting is good. You need to see the other person's face clearly.  Think about counseling if you need help to adjust to your hearing loss.  When should you call for help?  Watch closely for changes in your health, and be sure to contact your doctor if:    You think your hearing is getting worse.     You have new symptoms, such as dizziness or nausea.   Where can you learn more?  Go to https://www.AppShare.net/patiented  Enter R798 in the search box to learn more about \"Hearing Loss: Care Instructions.\"  Current as of: September 27, 2023               Content Version: 14.0    9495-4309 [employer].   Care instructions adapted under license by your healthcare professional. If you have questions about a medical condition or this instruction, always ask your healthcare professional. [employer] disclaims any warranty or liability for your use of this information.      Learning About Sleeping Well  What does sleeping well mean?     Sleeping well means getting enough sleep to feel good and stay healthy. How much sleep is enough varies among people.  The number of hours you sleep and how you feel when you wake up are both important. If you do not feel refreshed, you probably need more sleep. Another sign of not getting enough sleep is feeling tired during the day.  Experts recommend that adults get at least 7 or more hours of sleep per day. Children and older adults need more sleep.  Why is getting enough sleep important?  Getting enough quality sleep is a basic part of good health. When your sleep suffers, your physical health, mood, and your thoughts can suffer too. You may find yourself feeling more grumpy or stressed. Not getting enough sleep also can lead to " "serious problems, including injury, accidents, anxiety, and depression.  What might cause poor sleeping?  Many things can cause sleep problems, including:  Changes to your sleep schedule.  Stress. Stress can be caused by fear about a single event, such as giving a speech. Or you may have ongoing stress, such as worry about work or school.  Depression, anxiety, and other mental or emotional conditions.  Changes in your sleep habits or surroundings. This includes changes that happen where you sleep, such as noise, light, or sleeping in a different bed. It also includes changes in your sleep pattern, such as having jet lag or working a late shift.  Health problems, such as pain, breathing problems, and restless legs syndrome.  Lack of regular exercise.  Using alcohol, nicotine, or caffeine before bed.  How can you help yourself?  Here are some tips that may help you sleep more soundly and wake up feeling more refreshed.  Your sleeping area   Use your bedroom only for sleeping and sex. A bit of light reading may help you fall asleep. But if it doesn't, do your reading elsewhere in the house. Try not to use your TV, computer, smartphone, or tablet while you are in bed.  Be sure your bed is big enough to stretch out comfortably, especially if you have a sleep partner.  Keep your bedroom quiet, dark, and cool. Use curtains, blinds, or a sleep mask to block out light. To block out noise, use earplugs, soothing music, or a \"white noise\" machine.  Your evening and bedtime routine   Create a relaxing bedtime routine. You might want to take a warm shower or bath, or listen to soothing music.  Go to bed at the same time every night. And get up at the same time every morning, even if you feel tired.  What to avoid   Limit caffeine (coffee, tea, caffeinated sodas) during the day, and don't have any for at least 6 hours before bedtime.  Avoid drinking alcohol before bedtime. Alcohol can cause you to wake up more often during the " "night.  Try not to smoke or use tobacco, especially in the evening. Nicotine can keep you awake.  Limit naps during the day, especially close to bedtime.  Avoid lying in bed awake for too long. If you can't fall asleep or if you wake up in the middle of the night and can't get back to sleep within about 20 minutes, get out of bed and go to another room until you feel sleepy.  Avoid taking medicine right before bed that may keep you awake or make you feel hyper or energized. Your doctor can tell you if your medicine may do this and if you can take it earlier in the day.  If you can't sleep   Imagine yourself in a peaceful, pleasant scene. Focus on the details and feelings of being in a place that is relaxing.  Get up and do a quiet or boring activity until you feel sleepy.  Avoid drinking any liquids before going to bed to help prevent waking up often to use the bathroom.  Where can you learn more?  Go to https://www.BetterYou.net/patiented  Enter J942 in the search box to learn more about \"Learning About Sleeping Well.\"  Current as of: July 10, 2023  Content Version: 14.1 2006-2024 [employer].   Care instructions adapted under license by your healthcare professional. If you have questions about a medical condition or this instruction, always ask your healthcare professional. [employer] disclaims any warranty or liability for your use of this information.    Bladder Training: Care Instructions  Your Care Instructions     Bladder training is used to treat urge incontinence and stress incontinence. Urge incontinence means that the need to urinate comes on so fast that you can't get to a toilet in time. Stress incontinence means that you leak urine because of pressure on your bladder. For example, it may happen when you laugh, cough, or lift something heavy.  Bladder training can increase how long you can wait before you have to urinate. It can also help your bladder hold more urine. And " it can give you better control over the urge to urinate.  It is important to remember that bladder training takes a few weeks to a few months to make a difference. You may not see results right away, but don't give up.  Follow-up care is a key part of your treatment and safety. Be sure to make and go to all appointments, and call your doctor if you are having problems. It's also a good idea to know your test results and keep a list of the medicines you take.  How can you care for yourself at home?  Work with your doctor to come up with a bladder training program that is right for you. You may use one or more of the following methods.  Delayed urination  In the beginning, try to keep from urinating for 5 minutes after you first feel the need to go.  While you wait, take deep, slow breaths to relax. Kegel exercises can also help you delay the need to go to the bathroom.  After some practice, when you can easily wait 5 minutes to urinate, try to wait 10 minutes before you urinate.  Slowly increase the waiting period until you are able to control when you have to urinate.  Scheduled urination  Empty your bladder when you first wake up in the morning.  Schedule times throughout the day when you will urinate.  Start by going to the bathroom every hour, even if you don't need to go.  Slowly increase the time between trips to the bathroom.  When you have found a schedule that works well for you, keep doing it.  If you wake up during the night and have to urinate, do it. Apply your schedule to waking hours only.  Kegel exercises  These tighten and strengthen pelvic muscles, which can help you control the flow of urine. (If doing these exercises causes pain, stop doing them and talk with your doctor.) To do Kegel exercises:  Squeeze your muscles as if you were trying not to pass gas. Or squeeze your muscles as if you were stopping the flow of urine. Your belly, legs, and buttocks shouldn't move.  Hold the squeeze for 3  "seconds, then relax for 5 to 10 seconds.  Start with 3 seconds, then add 1 second each week until you are able to squeeze for 10 seconds.  Repeat the exercise 10 times a session. Do 3 to 8 sessions a day.  When should you call for help?  Watch closely for changes in your health, and be sure to contact your doctor if:    Your incontinence is getting worse.     You do not get better as expected.   Where can you learn more?  Go to https://www.Voradius.net/patiented  Enter V684 in the search box to learn more about \"Bladder Training: Care Instructions.\"  Current as of: November 15, 2023               Content Version: 14.0    8278-1194 [employer].   Care instructions adapted under license by your healthcare professional. If you have questions about a medical condition or this instruction, always ask your healthcare professional. [employer] disclaims any warranty or liability for your use of this information.         "

## 2024-09-23 ENCOUNTER — OFFICE VISIT (OUTPATIENT)
Dept: CARDIOLOGY | Facility: CLINIC | Age: 79
End: 2024-09-23
Payer: MEDICARE

## 2024-09-23 VITALS
WEIGHT: 175.8 LBS | OXYGEN SATURATION: 96 % | SYSTOLIC BLOOD PRESSURE: 111 MMHG | HEART RATE: 68 BPM | DIASTOLIC BLOOD PRESSURE: 59 MMHG | RESPIRATION RATE: 18 BRPM | BODY MASS INDEX: 31.94 KG/M2

## 2024-09-23 DIAGNOSIS — Z00.6 EXAMINATION OF PARTICIPANT OR CONTROL IN CLINICAL RESEARCH: ICD-10-CM

## 2024-09-23 DIAGNOSIS — I48.0 PAROXYSMAL A-FIB (H): Primary | ICD-10-CM

## 2024-09-23 LAB
ALBUMIN SERPL BCG-MCNC: 4.2 G/DL (ref 3.5–5.2)
ALP SERPL-CCNC: 60 U/L (ref 40–150)
ALT SERPL W P-5'-P-CCNC: 17 U/L (ref 0–50)
ANION GAP SERPL CALCULATED.3IONS-SCNC: 13 MMOL/L (ref 7–15)
AST SERPL W P-5'-P-CCNC: 23 U/L (ref 0–45)
ATRIAL RATE - MUSE: 63 BPM
BILIRUB SERPL-MCNC: 0.6 MG/DL
BUN SERPL-MCNC: 20.5 MG/DL (ref 8–23)
CALCIUM SERPL-MCNC: 9.5 MG/DL (ref 8.8–10.4)
CHLORIDE SERPL-SCNC: 105 MMOL/L (ref 98–107)
CREAT SERPL-MCNC: 0.98 MG/DL (ref 0.51–0.95)
DIASTOLIC BLOOD PRESSURE - MUSE: NORMAL MMHG
EGFRCR SERPLBLD CKD-EPI 2021: 58 ML/MIN/1.73M2
ERYTHROCYTE [DISTWIDTH] IN BLOOD BY AUTOMATED COUNT: 18 % (ref 10–15)
GLUCOSE SERPL-MCNC: 94 MG/DL (ref 70–99)
HCO3 SERPL-SCNC: 23 MMOL/L (ref 22–29)
HCT VFR BLD AUTO: 41 % (ref 35–47)
HGB BLD-MCNC: 13.4 G/DL (ref 11.7–15.7)
INTERPRETATION ECG - MUSE: NORMAL
MCH RBC QN AUTO: 30.5 PG (ref 26.5–33)
MCHC RBC AUTO-ENTMCNC: 32.7 G/DL (ref 31.5–36.5)
MCV RBC AUTO: 93 FL (ref 78–100)
P AXIS - MUSE: 69 DEGREES
PLATELET # BLD AUTO: 216 10E3/UL (ref 150–450)
POTASSIUM SERPL-SCNC: 4.4 MMOL/L (ref 3.4–5.3)
PR INTERVAL - MUSE: 172 MS
PROT SERPL-MCNC: 7.3 G/DL (ref 6.4–8.3)
QRS DURATION - MUSE: 86 MS
QT - MUSE: 442 MS
QTC - MUSE: 452 MS
R AXIS - MUSE: -41 DEGREES
RBC # BLD AUTO: 4.39 10E6/UL (ref 3.8–5.2)
SODIUM SERPL-SCNC: 141 MMOL/L (ref 135–145)
SYSTOLIC BLOOD PRESSURE - MUSE: NORMAL MMHG
T AXIS - MUSE: -10 DEGREES
VENTRICULAR RATE- MUSE: 63 BPM
WBC # BLD AUTO: 5.9 10E3/UL (ref 4–11)

## 2024-09-23 RX ORDER — AZELASTINE 1 MG/ML
1 SPRAY, METERED NASAL AT BEDTIME
COMMUNITY
Start: 2024-08-12

## 2024-09-23 NOTE — PROGRESS NOTES
A Phase 3, Randomized, Double-Blind, Double-Dummy, Parallel Group, Active-Controlled Study to Evaluate the Efficacy and Safety of Milvexian, an Oral Factor Joycelyn Inhibitor, Versus Apixaban in Participants with Atrial Fibrillation     Participant seen in clinic today for study Screening Visit.       Consent process:      Research nurse met with subject to discuss participation in the above noted study.     The study discussion included the following:    Study purpose   Qualifications for participation   Length of study participation   Study procedures   Risks and side effects   Benefits (if any)   Voluntary nature of participation   Alternatives to participation   Confidentiality of records   Financial considerations      Participant verbalized understanding, asked questions and agreed that she received answers that satisfied her.     Consent and combined HIPAA form [Version 09 May 2024 Advarra Version 26 Mar 2024] signed on 23 Sep 2024 at 10:19.   Optional Real World Data form [Version 29 Mar 2024 Ad[employer] version 31 Mar 2023] signed on 23 Sep 2024 at 10:22.     A copy of the consent and HIPPA forms were given to the participant and uploaded to medical records.     No study procedures were done prior to obtaining informed consent.      Inclusion & Exclusion criteria reviewed again with the participant and they remain eligible to participate.      Demographics:    [x]Not  or   [] or    []Not Reported  [] Unknown         Race:     [] or    []   []Black or    [] or Other   [x]White   []Other, specify:       /59 (BP Location: Left arm, Patient Position: Sitting, Cuff Size: Adult Regular)   Pulse 68   Resp 18   Wt 79.7 kg (175 lb 12.8 oz)   SpO2 96%   BMI 31.94 kg/m   (BP, P, H, W)     Medical History of Interest:     Atrial Fibrillation Type  [] Persistent/Chronic (lasting >7 Days at a time) [x] Paroxysmal  (lasting = or <7 Days at a time  [] Newly Diagnosed / new onset [] Atrial Flutter  Date of onset: 08 Jul 2019    Stroke: [x] Yes [] No   Date:11 Aug 2018    Transient Ischemic Attack: [x] Yes [] No   Date: 14 Nov 2022    Non-CNS Embolism: [] Yes [x] No     Congestive Heart Failure: [x] Yes [] No  If yes, select the most appropriate type:   [] Heart Failure with reduced ejection fraction, Left ventricular ejection fraction less than 40%   [x] Heart Failure with mildly reduced ejection fraction, Left ventricular ejection fraction equal to 40% to less than 50%   [] Heart Failure with preserved ejection fraction, Left ventricular ejection fraction equal or more than 50%   [] Unknown    If yes, current NYHA:   [] Class I - Patients are without resulting limitation of physical activity   [x] Class II - Patients are comfortable at rest. Ordinary physical activity causes fatigue or dyspnea with slight limitation of physical activity   [] Class III - Patients are comfortable at rest. Less than ordinary physical activity causes fatigue or dyspnea with marked limitation of physical activity   [] Class IV - Symptoms of heart failure may be present even at rest    Has the subject had a history of less than 40% ejection fraction?    [] Yes [x] No    Most recent ejection fraction: 45%  Date of most recent ejection fraction: 19 Jun 2024    Has the subject been hospitalized for heart failure?     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 central retinal occlusion    Cataract 12/11/2008    Central retinal artery occlusion of left eye 09/15/2018    Chronic kidney disease, stage 3a (H) 08/13/2024    Disturbances of vision, late effect of stroke 12/03/2022    DJD (degenerative joint disease) 11/23/2009    knees    GERD (gastroesophageal reflux disease) 07/29/2010    Glaucoma 05/10/2012    Gout 11/15/2012    Heart failure with reduced ejection fraction (H) 08/13/2024    History of central retinal artery occlusion, os 12/11/2020    HTN (hypertension) 11/23/2009    Hyperlipidemia LDL goal <70 12/31/2010    Low bone mass 08/25/2003    Lumbar spinal stenosis 12/08/2011    Sees Dr Sage    Mild persistent asthma 12/12/2013    Neovascular glaucoma of left eye, 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   H/o R central retinal artery occlusion    Paroxysmal ventricular tachycardia (H) 11/20/2018    Added automatically from request for surgery [id number]    PFO (patent foramen ovale)     Recurrent cerebrovascular accidents (CVAs) (H) 11/14/2022    Recurrent cerebrovascular accidents (CVAs) (H) 11/14/2022    Transient global amnesia 11/14/2022       12-Lead ECG completed and evaluated @ 10:46 AM    Pregnancy test within 2 days of first dose of study intervention for women of childbearing potential N/A    Modified Baldwin Scale Assessment (mRS) completed by investigator only for participants with a history of stroke. Score = 1    Lab Collection:  Local clinical laboratory tests (hemoglobin/platelet, serum creatinine/eGFR, ALT/AST/ALP/Total bilirubin) collected @11:02 AM. Will be reviewed by investigator.     Concomitant therapies reviewed. Changes (if any) noted below.     Assessed participant regarding new or ongoing adverse events. Participant denies any adverse events.     Plan: Will schedule  randomization study visit (within 30 days) with subject back at [facility].       Current Outpatient Medications:     acetaminophen (TYLENOL) 500 MG tablet, Take 500-1,000 mg by mouth every 6 hours as needed., Disp: , Rfl:     albuterol (PROAIR HFA/PROVENTIL HFA/VENTOLIN HFA) 108 (90 Base) MCG/ACT inhaler, Inhale 2 puffs into the lungs every 6 hours as needed for shortness of breath or wheezing., Disp: 18 g, Rfl: 11    amiodarone (PACERONE) 200 MG tablet, Take 1 tablet (200 mg) by mouth daily Take 400 mg (2 tablets) twice a day x 2 weeks. Then take 200 mg (1 tablet) daily., Disp: 90 tablet, Rfl: 3    apixaban ANTICOAGULANT (ELIQUIS) 5 MG tablet, Take 1 tablet (5 mg) by mouth 2 times daily., Disp: 180 tablet, Rfl: 4    atorvastatin (LIPITOR) 20 MG tablet, Take 1 tablet (20 mg) by mouth daily., Disp: 90 tablet, Rfl: 4    azelastine (ASTELIN) 0.1 % nasal spray, Spray 1 spray into both nostrils at bedtime., Disp: , Rfl:     B-COMPLEX OR, Take 1 tablet by mouth daily., Disp: , Rfl:     CALCIUM 500 +D OR, Take 1 tablet by mouth daily., Disp: , Rfl:     folic acid (FOLVITE) 1 MG tablet, Take 2 tablets (2 mg) by mouth daily, Disp: 200 tablet, Rfl: 2    hypromellose (ARTIFICIAL TEARS) 0.5 % SOLN ophthalmic solution, Place 1 drop into both eyes 3 times daily, Disp: , Rfl:     latanoprost (XALATAN) 0.005 % ophthalmic solution, Place 1 drop into both eyes at bedtime, Disp: 7.5 mL, Rfl: 3    lisinopril (ZESTRIL) 5 MG tablet, Take 1 tablet (5 mg) by mouth at bedtime, Disp: 90 tablet, Rfl: 3    methotrexate 2.5 MG tablet, Take 8 tablets (20 mg) by mouth every 7 days . Methotrexate is a once weekly medication, taken on the same day of each week., Disp: 96 tablet, Rfl: 2    multivitamin (OCUVITE) TABS tablet, Take 1 tablet by mouth daily, Disp: , Rfl:     polyethylene glycol (MIRALAX) 17 GM/Dose powder, Take 17 g (1 Capful) by mouth daily as needed for constipation, Disp: , Rfl:     SENNA-docusate sodium (SENNA S)  8.6-50 MG tablet, Take 1-4 tablets by mouth daily as needed, Disp: , Rfl:     spironolactone (ALDACTONE) 25 MG tablet, Take 0.5 tablets (12.5 mg) by mouth daily, Disp: 45 tablet, Rfl: 3    timolol maleate (TIMOPTIC) 0.5 % ophthalmic solution, Place 1 drop Into the left eye every morning, Disp: 10 mL, Rfl: 3    zolpidem (AMBIEN) 5 MG tablet, TAKE ONE TABLET BY MOUTH IN THE EVENING AS NEEDED FOR SLEEP, Disp: 30 tablet, Rfl: 5       Varghese Garcia RN     Clinical Research Nurse  [facility]  Clinical Trials Office  [address] [place] [address]  [email or web address]   Office: [phone]

## 2024-09-23 NOTE — LETTER
9/23/2024    Mercedes Urena MD  [address] [facility] [address] [place] [address]    RE: Alfreda Baxter       Dear Colleague,     I had the pleasure of seeing Alfreda Baxter in the [facility].  A Phase 3, Randomized, Double-Blind, Double-Dummy, Parallel Group, Active-Controlled Study to Evaluate the Efficacy and Safety of Milvexian, an Oral Factor Joycelyn Inhibitor, Versus Apixaban in Participants with Atrial Fibrillation     Participant seen in clinic today for study Screening Visit.       Consent process:      Research nurse met with subject to discuss participation in the above noted study.     The study discussion included the following:    Study purpose   Qualifications for participation   Length of study participation   Study procedures   Risks and side effects   Benefits (if any)   Voluntary nature of participation   Alternatives to participation   Confidentiality of records   Financial considerations      Participant verbalized understanding, asked questions and agreed that she received answers that satisfied her.     Consent and combined HIPAA form [Version 09 May 2024 Advarra Version 26 Mar 2024] signed on 23 Sep 2024 at 10:19.   Optional Real World Data form [Version 29 Mar 2024 Advarra version 31 Mar 2023] signed on 23 Sep 2024 at 10:22.     A copy of the consent and HIPPA forms were given to the participant and uploaded to medical records.     No study procedures were done prior to obtaining informed consent.      Inclusion & Exclusion criteria reviewed again with the participant and they remain eligible to participate.      Demographics:    [x]Not  or   [] or    []Not Reported  [] Unknown         Race:     [] or    []   []Black or    [] or Other   [x]White   []Other, specify:       /59 (BP Location: Left arm, Patient Position: Sitting, Cuff Size: Adult Regular)   Pulse 68   Resp  18   Wt 79.7 kg (175 lb 12.8 oz)   SpO2 96%   BMI 31.94 kg/m   (BP, P, H, W)     Medical History of Interest:     Atrial Fibrillation Type  [] Persistent/Chronic (lasting >7 Days at a time) [x] Paroxysmal (lasting = or <7 Days at a time  [] Newly Diagnosed / new onset [] Atrial Flutter  Date of onset: 08 Jul 2019    Stroke: [x] Yes [] No   Date:11 Aug 2018    Transient Ischemic Attack: [x] Yes [] No   Date: 14 Nov 2022    Non-CNS Embolism: [] Yes [x] No     Congestive Heart Failure: [x] Yes [] No  If yes, select the most appropriate type:   [] Heart Failure with reduced ejection fraction, Left ventricular ejection fraction less than 40%   [x] Heart Failure with mildly reduced ejection fraction, Left ventricular ejection fraction equal to 40% to less than 50%   [] Heart Failure with preserved ejection fraction, Left ventricular ejection fraction equal or more than 50%   [] Unknown    If yes, current NYHA:   [] Class I - Patients are without resulting limitation of physical activity   [x] Class II - Patients are comfortable at rest. Ordinary physical activity causes fatigue or dyspnea with slight limitation of physical activity   [] Class III - Patients are comfortable at rest. Less than ordinary physical activity causes fatigue or dyspnea with marked limitation of physical activity   [] Class IV - Symptoms of heart failure may be present even at rest    Has the subject had a history of less than 40% ejection fraction?    [] Yes [x] No    Most recent ejection fraction: 45%  Date of most recent ejection fraction: 19 Jun 2024    Has the subject been hospitalized for heart failure?     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 central retinal occlusion     Cataract 12/11/2008     Central retinal artery occlusion of left eye 09/15/2018     Chronic kidney disease, stage 3a (H) 08/13/2024     Disturbances of vision, late effect of stroke 12/03/2022     DJD (degenerative joint disease) 11/23/2009    knees     GERD (gastroesophageal reflux disease) 07/29/2010     Glaucoma 05/10/2012     Gout 11/15/2012     Heart failure with reduced ejection fraction (H) 08/13/2024     History of central retinal artery occlusion, os 12/11/2020     HTN (hypertension) 11/23/2009     Hyperlipidemia LDL goal <70 12/31/2010     Low bone mass 08/25/2003     Lumbar spinal stenosis 12/08/2011    Sees Dr Sage     Mild persistent asthma 12/12/2013     Neovascular glaucoma of left eye, 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   H/o R central retinal artery occlusion     Paroxysmal ventricular tachycardia (H) 11/20/2018    Added automatically from request for surgery [id number]     PFO (patent foramen ovale)      Recurrent cerebrovascular accidents (CVAs) (H) 11/14/2022     Recurrent cerebrovascular accidents (CVAs) (H) 11/14/2022     Transient global amnesia 11/14/2022       12-Lead ECG completed and evaluated @ 10:46 AM    Pregnancy test within 2 days of first dose of study intervention for women of childbearing potential N/A    Modified Chris Scale Assessment (mRS) completed by investigator only for participants with a history of stroke. Score = 1    Lab Collection:  Local clinical laboratory tests (hemoglobin/platelet, serum creatinine/eGFR, ALT/AST/ALP/Total bilirubin) collected @11:02 AM.  Will be reviewed by investigator.     Concomitant therapies reviewed. Changes (if any) noted below.     Assessed participant regarding new or ongoing adverse events. Participant denies any adverse events.     Plan: Will schedule randomization study visit (within 30 days) with subject back at [facility].       Current Outpatient Medications:      acetaminophen (TYLENOL) 500 MG tablet, Take 500-1,000 mg by mouth every 6 hours as needed., Disp: , Rfl:      albuterol (PROAIR HFA/PROVENTIL HFA/VENTOLIN HFA) 108 (90 Base) MCG/ACT inhaler, Inhale 2 puffs into the lungs every 6 hours as needed for shortness of breath or wheezing., Disp: 18 g, Rfl: 11     amiodarone (PACERONE) 200 MG tablet, Take 1 tablet (200 mg) by mouth daily Take 400 mg (2 tablets) twice a day x 2 weeks. Then take 200 mg (1 tablet) daily., Disp: 90 tablet, Rfl: 3     apixaban ANTICOAGULANT (ELIQUIS) 5 MG tablet, Take 1 tablet (5 mg) by mouth 2 times daily., Disp: 180 tablet, Rfl: 4     atorvastatin (LIPITOR) 20 MG tablet, Take 1 tablet (20 mg) by mouth daily., Disp: 90 tablet, Rfl: 4     azelastine (ASTELIN) 0.1 % nasal spray, Spray 1 spray into both nostrils at bedtime., Disp: , Rfl:      B-COMPLEX OR, Take 1 tablet by mouth daily., Disp: , Rfl:      CALCIUM 500 +D OR, Take 1 tablet by mouth daily., Disp: , Rfl:      folic acid (FOLVITE) 1 MG tablet, Take 2 tablets (2 mg) by mouth daily, Disp: 200 tablet, Rfl: 2     hypromellose (ARTIFICIAL TEARS) 0.5 % SOLN ophthalmic solution, Place 1 drop into both eyes 3 times daily, Disp: , Rfl:      latanoprost (XALATAN) 0.005 % ophthalmic solution, Place 1 drop into both eyes at bedtime, Disp: 7.5 mL, Rfl: 3     lisinopril (ZESTRIL) 5 MG tablet, Take 1 tablet (5 mg) by mouth at bedtime, Disp: 90 tablet, Rfl: 3     methotrexate 2.5 MG tablet, Take 8 tablets (20 mg) by mouth every 7 days . Methotrexate is a once weekly medication, taken on the same day of each week., Disp: 96 tablet, Rfl: 2      multivitamin (OCUVITE) TABS tablet, Take 1 tablet by mouth daily, Disp: , Rfl:      polyethylene glycol (MIRALAX) 17 GM/Dose powder, Take 17 g (1 Capful) by mouth daily as needed for constipation, Disp: , Rfl:      SENNA-docusate sodium (SENNA S) 8.6-50 MG tablet, Take 1-4 tablets by mouth daily as needed, Disp: , Rfl:      spironolactone (ALDACTONE) 25 MG tablet, Take 0.5 tablets (12.5 mg) by mouth daily, Disp: 45 tablet, Rfl: 3     timolol maleate (TIMOPTIC) 0.5 % ophthalmic solution, Place 1 drop Into the left eye every morning, Disp: 10 mL, Rfl: 3     zolpidem (AMBIEN) 5 MG tablet, TAKE ONE TABLET BY MOUTH IN THE EVENING AS NEEDED FOR SLEEP, Disp: 30 tablet, Rfl: 5       Varghese Garcia RN     Clinical Research Nurse  [facility]  Clinical Trials Office  [address] [place] [id number]  [email or web address]   Office: [phone]      Thank you for allowing me to participate in the care of your patient.      Sincerely,     Varghese Garcia RN     [facility]  cc:   Jerardo Stafford MD  No address on file

## 2024-10-04 ENCOUNTER — DOCUMENTATION ONLY (OUTPATIENT)
Dept: CARDIOLOGY | Facility: CLINIC | Age: 79
End: 2024-10-04
Payer: MEDICARE

## 2024-10-04 ENCOUNTER — TELEPHONE (OUTPATIENT)
Dept: CARDIOLOGY | Facility: CLINIC | Age: 79
End: 2024-10-04
Payer: MEDICARE

## 2024-10-04 NOTE — PROGRESS NOTES
Alfreda Baxter has an upcoming lab appointment:Please place lab order in epic     Thank you    [facility] lab team  [phone]     Future Appointments   Date Time Provider Department Center                 10/14/2024  9:45 AM FK LAB [facility]   10/18/2024 11:20 AM Emily Sloan PA-C [facility] PSA CLIN

## 2024-10-04 NOTE — TELEPHONE ENCOUNTER
[facility] Call Center    Phone Message    May a detailed message be left on voicemail: yes     Reason for Call: Other: Patient is scheduled for labs 10/14/24 for Emily. She states she is involved in a research study with the heart clinic and just had labs done 9/23/24 with this program. She is wondering if these labs would suffice or if she has to have the lab work done still on 10/14/24. Please review and respond to the patient via [employer] with the answer. Thank you     Action Taken: Other: cardiology    Travel Screening: Not Applicable   Thank you!  [facility]      Date of Service:

## 2024-10-10 ENCOUNTER — OFFICE VISIT (OUTPATIENT)
Dept: CARDIOLOGY | Facility: CLINIC | Age: 79
End: 2024-10-10
Payer: MEDICARE

## 2024-10-10 ENCOUNTER — DOCUMENTATION ONLY (OUTPATIENT)
Dept: CARDIOLOGY | Facility: CLINIC | Age: 79
End: 2024-10-10

## 2024-10-10 VITALS
RESPIRATION RATE: 16 BRPM | DIASTOLIC BLOOD PRESSURE: 56 MMHG | SYSTOLIC BLOOD PRESSURE: 112 MMHG | HEART RATE: 66 BPM | WEIGHT: 174 LBS | BODY MASS INDEX: 32.02 KG/M2 | HEIGHT: 62 IN

## 2024-10-10 VITALS — HEART RATE: 63 BPM | SYSTOLIC BLOOD PRESSURE: 126 MMHG | DIASTOLIC BLOOD PRESSURE: 65 MMHG

## 2024-10-10 DIAGNOSIS — I48.0 PAROXYSMAL A-FIB (H): Primary | ICD-10-CM

## 2024-10-10 DIAGNOSIS — I10 BENIGN ESSENTIAL HYPERTENSION: ICD-10-CM

## 2024-10-10 DIAGNOSIS — Q21.12 PFO (PATENT FORAMEN OVALE): ICD-10-CM

## 2024-10-10 DIAGNOSIS — E66.09 CLASS 1 OBESITY DUE TO EXCESS CALORIES WITHOUT SERIOUS COMORBIDITY WITH BODY MASS INDEX (BMI) OF 31.0 TO 31.9 IN ADULT: ICD-10-CM

## 2024-10-10 DIAGNOSIS — N18.31 CHRONIC KIDNEY DISEASE, STAGE 3A (H): ICD-10-CM

## 2024-10-10 DIAGNOSIS — H53.9 DISTURBANCES OF VISION, LATE EFFECT OF STROKE: ICD-10-CM

## 2024-10-10 DIAGNOSIS — E78.5 HYPERLIPIDEMIA LDL GOAL <130: ICD-10-CM

## 2024-10-10 DIAGNOSIS — Z00.6 EXAMINATION OF PARTICIPANT OR CONTROL IN CLINICAL RESEARCH: ICD-10-CM

## 2024-10-10 DIAGNOSIS — E66.811 CLASS 1 OBESITY DUE TO EXCESS CALORIES WITHOUT SERIOUS COMORBIDITY WITH BODY MASS INDEX (BMI) OF 31.0 TO 31.9 IN ADULT: ICD-10-CM

## 2024-10-10 DIAGNOSIS — I69.398 DISTURBANCES OF VISION, LATE EFFECT OF STROKE: ICD-10-CM

## 2024-10-10 DIAGNOSIS — J45.20 MILD INTERMITTENT ASTHMA WITHOUT COMPLICATION: ICD-10-CM

## 2024-10-10 DIAGNOSIS — I50.20 HEART FAILURE WITH REDUCED EJECTION FRACTION (H): ICD-10-CM

## 2024-10-10 PROCEDURE — G2211 COMPLEX E/M VISIT ADD ON: HCPCS | Performed by: INTERNAL MEDICINE

## 2024-10-10 PROCEDURE — 99207 PR NO CHARGE-RESEARCH SERVICE: CPT | Performed by: INTERNAL MEDICINE

## 2024-10-10 PROCEDURE — 84999 UNLISTED CHEMISTRY PROCEDURE: CPT | Performed by: INTERNAL MEDICINE

## 2024-10-10 PROCEDURE — 99214 OFFICE O/P EST MOD 30 MIN: CPT | Performed by: INTERNAL MEDICINE

## 2024-10-10 PROCEDURE — 36415 COLL VENOUS BLD VENIPUNCTURE: CPT | Performed by: INTERNAL MEDICINE

## 2024-10-10 PROCEDURE — 99207 PR NO CHARGE-RESEARCH SERVICE: CPT

## 2024-10-10 RX ORDER — AMIODARONE HYDROCHLORIDE 200 MG/1
200 TABLET ORAL DAILY
Status: SHIPPED
Start: 2024-10-10 | End: 2024-11-07

## 2024-10-10 NOTE — LETTER
10/10/2024    Mercedes Urena MD  [address] [place] [address]    RE: Alfreda Baxter       Dear Colleague,     I had the pleasure of seeing Alfreda Baxter in the [facility].  A Phase 3, Randomized, Double-Blind, Double-Dummy, Parallel Group, Active-Controlled Study to Evaluate the Efficacy and Safety of Milvexian, an Oral Factor Joycelyn Inhibitor, Versus Apixaban in Participants with Atrial Fibrillation     Participant seen in clinic today for study Day 1 Randomization visit.       Participant expressed willingness to continue with participation. Inclusion/Exclusion criteria reviewed. Participant remains eligible for participation.     Physical exam completed at 12:50 PM by Dr. Mcdonald      /65 (BP Location: Right arm, Patient Position: Sitting, Cuff Size: Adult Regular)   Pulse 63      Modified [place] Scale Assessment (mRS) completed by investigator only for participants with a history of stroke.   Score = 1    Medical Resource Utilization, EQ-5D-5L, and PROMIS-29 completed as per protocol.     Clinical laboratory tests collected @13:33 PM. Results will be submitted to the central lab and scanned in to 'media' tab and reviewed by investigator.      Collected Labs:   [Hemoglobin/platelet, serum creatinine/eGFR, Cystatin C, ALT/AST/ALP/Total bilirubin, serology (retained) for viral hepatitis, microscopic urinalysis, PD samples (aPTT), biomarker samples (D-dimer) and (Exploratory proteomics)]     Screening Labs Reviewed:    CBC with platelets Clinical Significance?    Collection Time: 09/23/24 11:06 AM    Result Value Ref Range     WBC Count 5.9 4.0 - 11.0 10e3/uL        RBC Count 4.39 3.80 - 5.20 10e6/uL     Hemoglobin 13.4 11.7 - 15.7 g/dL [] Yes [] No    Hematocrit 41.0 35.0 - 47.0 %     MCV 93 78 - 100 fL     MCH 30.5 26.5 - 33.0 pg     MCHC 32.7 31.5 - 36.5 g/dL     RDW 18.0 (H) 10.0 - 15.0 %     Platelet Count 216 150 - 450 10e3/uL [] Yes [] No   Comprehensive metabolic panel      Collection Time: 09/23/24 11:06 AM    Result Value Ref Range     Sodium 141 135 - 145 mmol/L     Potassium 4.4 3.4 - 5.3 mmol/L     Carbon Dioxide (CO2) 23 22 - 29 mmol/L     Anion Gap 13 7 - 15 mmol/L     Urea Nitrogen 20.5 8.0 - 23.0 mg/dL     Creatinine 0.98 (H) 0.51 - 0.95 mg/dL [] Yes [] No    GFR Estimate 58 (L) >60 mL/min/1.73m2 [] Yes [] No    Calcium 9.5 8.8 - 10.4 mg/dL     Chloride 105 98 - 107 mmol/L     Glucose 94 70 - 99 mg/dL     Alkaline Phosphatase 60 40 - 150 U/L [] Yes [] No    AST 23 0 - 45 U/L [] Yes [] No    ALT 17 0 - 50 U/L [] Yes [] No    Protein Total 7.3 6.4 - 8.3 g/dL     Albumin 4.2 3.5 - 5.2 g/dL     Bilirubin Total 0.6 <=1.2 mg/dL [] Yes [] No          Randomization:  Participant randomized to unique intervention code via [facility] [id number].    Dispensed 70 # of tablets/capsules of study drug/placebo from treatment kit [id number]. (Apixiban or placebo)  Dispensed 70 # of tablets/capsules of study drug/placebo from treatment kit [id number]. (Milvexian or placebo)    Dosing instructions and study treatment return for next visit reviewed with participant. Remind participant, if their visit is scheduled in the morning, they should take the morning dose of study intervention at the clinic (not at home) after the PK sample is collected. Participant verbalized understanding.    Concomitant therapies reviewed.  Changes (if any) noted below.     Assessed participant regarding new or ongoing adverse events. Participant denies any adverse events.     Plan: Will schedule next study visit with participant at [facility] on 04 Nov 2024 at 09:00.       Current Outpatient Medications:      acetaminophen (TYLENOL) 500 MG tablet, Take 500-1,000 mg by mouth every 6 hours as needed., Disp: , Rfl:      albuterol (PROAIR HFA/PROVENTIL HFA/VENTOLIN HFA) 108 (90 Base) MCG/ACT inhaler, Inhale 2 puffs into the lungs every 6 hours as needed for shortness of breath or wheezing., Disp: 18 g, Rfl: 11      amiodarone (PACERONE) 200 MG tablet, Take 1 tablet (200 mg) by mouth daily. Take 400 mg (2 tablets) twice a day x 2 weeks. Then take 200 mg (1 tablet) daily., Disp: , Rfl:      apixaban ANTICOAGULANT (ELIQUIS) 5 MG tablet, Take 1 tablet (5 mg) by mouth 2 times daily., Disp: 180 tablet, Rfl: 4     atorvastatin (LIPITOR) 20 MG tablet, Take 1 tablet (20 mg) by mouth daily., Disp: 90 tablet, Rfl: 4     azelastine (ASTELIN) 0.1 % nasal spray, Spray 1 spray into both nostrils at bedtime., Disp: , Rfl:      B-COMPLEX OR, Take 1 tablet by mouth daily., Disp: , Rfl:      CALCIUM 500 +D OR, Take 1 tablet by mouth daily., Disp: , Rfl:      folic acid (FOLVITE) 1 MG tablet, Take 2 tablets (2 mg) by mouth daily, Disp: 200 tablet, Rfl: 2     hypromellose (ARTIFICIAL TEARS) 0.5 % SOLN ophthalmic solution, Place 1 drop into both eyes 3 times daily, Disp: , Rfl:      latanoprost (XALATAN) 0.005 % ophthalmic solution, Place 1 drop into both eyes at bedtime, Disp: 7.5 mL, Rfl: 3     lisinopril (ZESTRIL) 5 MG tablet, Take 1 tablet (5 mg) by mouth at bedtime, Disp: 90 tablet, Rfl: 3     methotrexate 2.5 MG tablet, Take 8 tablets (20 mg) by mouth every 7 days . Methotrexate is a once weekly medication, taken on the same day of each week., Disp: 96 tablet, Rfl: 2     multivitamin (OCUVITE) TABS tablet, Take 1 tablet by mouth daily, Disp: , Rfl:      polyethylene glycol (MIRALAX) 17 GM/Dose powder, Take 17 g (1 Capful) by mouth daily as needed for constipation, Disp: , Rfl:      SENNA-docusate sodium (SENNA S) 8.6-50 MG tablet, Take 1-4 tablets by mouth daily as needed, Disp: , Rfl:      spironolactone (ALDACTONE) 25 MG tablet, Take 0.5 tablets (12.5 mg) by mouth daily, Disp: 45 tablet, Rfl: 3     timolol maleate (TIMOPTIC) 0.5 % ophthalmic solution, Place 1 drop Into the left eye every morning, Disp: 10 mL, Rfl: 3     zolpidem (AMBIEN) 5 MG tablet, TAKE ONE TABLET BY MOUTH IN THE EVENING AS NEEDED FOR SLEEP, Disp: 30 tablet, Rfl: 5          Varghese Garcia RN     Clinical Research Nurse  [facility]  Clinical Trials Office  [address] [place] [address]  [email or web address]   Office: [phone]                      Thank you for allowing me to participate in the care of your patient.      Sincerely,     Varghese Garcia RN     [facility]  cc:   Referred Self, MD  No address on file

## 2024-10-10 NOTE — LETTER
10/10/2024    Mercedes Urena MD  [address] [place] [address]    RE: Alfreda Baxter       Dear Colleague,     I had the pleasure of seeing Alfreda Baxter in the [facility].      [facility]  Heart Care Clinic Follow-up Note    Assessment & Plan        (I48.0) Paroxysmal A-fib (H)  (primary encounter diagnosis)  Comment: Symptomatic, not valvula, and paroxysmal, possibly persistent.  Back in sinus rhythm following cardioversion, on amiodarone with blood work looking good from September of this year.  Here today to be randomized into the Labrexia AF atrial fibrillation trial comparing Eliquis to a new factor XI a agonist.  Defer further amiodarone or ablation therapies to primary cardiology team.    (I50.20) Heart failure with reduced ejection fraction (H)  Comment: Heart failure in the setting of midrange ejection fraction of 40 to 45%, no significant signs or symptoms currently.    (Q21.12) PFO (patent foramen ovale)  Comment: Small, noted on DIRK, on chronic anticoagulation.  Given her age and continued anticoagulation not sure I would consider closure of the PFO.    (I10) Benign essential hypertension  Comment: Under good control currently on lisinopril.    (E78.5) Hyperlipidemia LDL goal <70  Comment: Excellent cholesterol of 85 with an LDL of 17, might consider backing off on statin.    (E66.811,  E66.09,  Z68.31) Class 1 obesity due to excess calories without serious comorbidity with body mass index (BMI) of 31.0 to 31.9 in adult  Comment: Work on a little bit of weight loss.  Still agree with sleep evaluation.    (N18.31) Chronic kidney disease, stage 3a (H)  Comment: Minimal, GFR 58 with creatinine 0.98.    (J45.20) Mild intermittent asthma without complication  Comment: So noted with occasional inhaler.    (I69.398,  H53.9) Disturbances of vision, late effect of stroke  Comment: So noted, advanced CHADS 2 VASC score of 7, would continue current  anticoagulation.    Plan  1.  Agree with randomization into the Labrexia AF defibrillation trial, factor Xa agonist versus Eliquis.  2.  Follow-up for that trial.    The longitudinal plan of care for the diagnosis(es)/condition(s) as documented were addressed during this visit. Due to the added complexity in care, I will continue to support Malinda in the subsequent management and with ongoing continuity of care.     Subjective  CC: 79-year-old white female being seen in randomization for the Labrexia AF trial.  She lives at home independently with her , is physically active and they work on a farm as well.  Complains of back discomfort but otherwise no major fatigue, syncope, dizziness, chest pains, palpitations, PND, orthopnea or peripheral edema.    Medications  Current Outpatient Medications   Medication Sig Dispense Refill     acetaminophen (TYLENOL) 500 MG tablet Take 500-1,000 mg by mouth every 6 hours as needed.       amiodarone (PACERONE) 200 MG tablet Take 1 tablet (200 mg) by mouth daily. Take 400 mg (2 tablets) twice a day x 2 weeks. Then take 200 mg (1 tablet) daily.       apixaban ANTICOAGULANT (ELIQUIS) 5 MG tablet Take 1 tablet (5 mg) by mouth 2 times daily. 180 tablet 4     atorvastatin (LIPITOR) 20 MG tablet Take 1 tablet (20 mg) by mouth daily. 90 tablet 4     azelastine (ASTELIN) 0.1 % nasal spray Spray 1 spray into both nostrils at bedtime.       B-COMPLEX OR Take 1 tablet by mouth daily.       CALCIUM 500 +D OR Take 1 tablet by mouth daily.       folic acid (FOLVITE) 1 MG tablet Take 2 tablets (2 mg) by mouth daily 200 tablet 2     hypromellose (ARTIFICIAL TEARS) 0.5 % SOLN ophthalmic solution Place 1 drop into both eyes 3 times daily       latanoprost (XALATAN) 0.005 % ophthalmic solution Place 1 drop into both eyes at bedtime 7.5 mL 3     lisinopril (ZESTRIL) 5 MG tablet Take 1 tablet (5 mg) by mouth at bedtime 90 tablet 3     methotrexate 2.5 MG tablet Take 8 tablets (20 mg) by mouth  "every 7 days . Methotrexate is a once weekly medication, taken on the same day of each week. 96 tablet 2     multivitamin (OCUVITE) TABS tablet Take 1 tablet by mouth daily       polyethylene glycol (MIRALAX) 17 GM/Dose powder Take 17 g (1 Capful) by mouth daily as needed for constipation       SENNA-docusate sodium (SENNA S) 8.6-50 MG tablet Take 1-4 tablets by mouth daily as needed       spironolactone (ALDACTONE) 25 MG tablet Take 0.5 tablets (12.5 mg) by mouth daily 45 tablet 3     timolol maleate (TIMOPTIC) 0.5 % ophthalmic solution Place 1 drop Into the left eye every morning 10 mL 3     zolpidem (AMBIEN) 5 MG tablet TAKE ONE TABLET BY MOUTH IN THE EVENING AS NEEDED FOR SLEEP 30 tablet 5     albuterol (PROAIR HFA/PROVENTIL HFA/VENTOLIN HFA) 108 (90 Base) MCG/ACT inhaler Inhale 2 puffs into the lungs every 6 hours as needed for shortness of breath or wheezing. 18 g 11       Objective  /56 (BP Location: Left arm, Patient Position: Sitting, Cuff Size: Adult Regular)   Pulse 66   Resp 16   Ht 1.58 m (5' 2.21\")   Wt 78.9 kg (174 lb)   BMI 31.61 kg/m      General Appearance:    Alert, cooperative, no distress, appears stated age   Head:    Normocephalic, without obvious abnormality, atraumatic   Throat:   Lips, mucosa, and tongue normal; teeth and gums normal   Neck:   Supple, symmetrical, trachea midline, no adenopathy;        thyroid:  No enlargement/tenderness/nodules; no carotid    bruit or JVD   Back:     Symmetric, no curvature, ROM normal, no CVA tenderness   Lungs:     Clear to auscultation bilaterally, respirations unlabored   Chest wall:    No tenderness or deformity   Heart:    Regular rate and rhythm, S1 and S2 normal, no murmur, rub   or gallop   Abdomen:     Soft, non-tender, bowel sounds active all four quadrants,     no masses, no organomegaly   Extremities:   Normal, atraumatic, no cyanosis or edema   Pulses:   2+ and symmetric all extremities   Skin:   Skin color, texture, turgor normal, " "no rashes or lesions     Results    Lab Results personally reviewed   Lab Results   Component Value Date    CHOL 85 06/21/2024    CHOL 157 11/14/2022     Lab Results   Component Value Date    HDL 39 (L) 06/21/2024    HDL 55 11/14/2022     No components found for: \"LDLCALC\"  Lab Results   Component Value Date    TRIG 147 06/21/2024    TRIG 142 11/14/2022     Lab Results   Component Value Date    WBC 5.9 09/23/2024    HGB 13.4 09/23/2024    HCT 41.0 09/23/2024     09/23/2024     Lab Results   Component Value Date    BUN 20.5 09/23/2024     09/23/2024    CO2 23 09/23/2024       Reviewed electrocardiogram sinus rhythm, leftward axis.              Thank you for allowing me to participate in the care of your patient.      Sincerely,     CODEY KANG MD     [facility]  cc:   Referred Self, MD  No address on file      "

## 2024-10-10 NOTE — PROGRESS NOTES
[facility]  Heart Care Clinic Follow-up Note    Assessment & Plan        (I48.0) Paroxysmal A-fib (H)  (primary encounter diagnosis)  Comment: Symptomatic, not valvula, and paroxysmal, possibly persistent.  Back in sinus rhythm following cardioversion, on amiodarone with blood work looking good from September of this year.  Here today to be randomized into the Labrexia AF atrial fibrillation trial comparing Eliquis to a new factor XI a agonist.  Defer further amiodarone or ablation therapies to primary cardiology team.    (I50.20) Heart failure with reduced ejection fraction (H)  Comment: Heart failure in the setting of midrange ejection fraction of 40 to 45%, no significant signs or symptoms currently.    (Q21.12) PFO (patent foramen ovale)  Comment: Small, noted on DIRK, on chronic anticoagulation.  Given her age and continued anticoagulation not sure I would consider closure of the PFO.    (I10) Benign essential hypertension  Comment: Under good control currently on lisinopril.    (E78.5) Hyperlipidemia LDL goal <70  Comment: Excellent cholesterol of 85 with an LDL of 17, might consider backing off on statin.    (E66.811,  E66.09,  Z68.31) Class 1 obesity due to excess calories without serious comorbidity with body mass index (BMI) of 31.0 to 31.9 in adult  Comment: Work on a little bit of weight loss.  Still agree with sleep evaluation.    (N18.31) Chronic kidney disease, stage 3a (H)  Comment: Minimal, GFR 58 with creatinine 0.98.    (J45.20) Mild intermittent asthma without complication  Comment: So noted with occasional inhaler.    (I69.398,  H53.9) Disturbances of vision, late effect of stroke  Comment: So noted, advanced CHADS 2 VASC score of 7, would continue current anticoagulation.    Plan  1.  Agree with randomization into the Labrexia AF defibrillation trial, factor Xa agonist versus Eliquis.  2.  Follow-up for that trial.    The longitudinal plan of care for the diagnosis(es)/condition(s) as  documented were addressed during this visit. Due to the added complexity in care, I will continue to support Malinda in the subsequent management and with ongoing continuity of care.     Subjective  CC: 79-year-old white female being seen in randomization for the Labrexia AF trial.  She lives at home independently with her , is physically active and they work on a farm as well.  Complains of back discomfort but otherwise no major fatigue, syncope, dizziness, chest pains, palpitations, PND, orthopnea or peripheral edema.    Medications  Current Outpatient Medications   Medication Sig Dispense Refill    acetaminophen (TYLENOL) 500 MG tablet Take 500-1,000 mg by mouth every 6 hours as needed.      amiodarone (PACERONE) 200 MG tablet Take 1 tablet (200 mg) by mouth daily. Take 400 mg (2 tablets) twice a day x 2 weeks. Then take 200 mg (1 tablet) daily.      apixaban ANTICOAGULANT (ELIQUIS) 5 MG tablet Take 1 tablet (5 mg) by mouth 2 times daily. 180 tablet 4    atorvastatin (LIPITOR) 20 MG tablet Take 1 tablet (20 mg) by mouth daily. 90 tablet 4    azelastine (ASTELIN) 0.1 % nasal spray Spray 1 spray into both nostrils at bedtime.      B-COMPLEX OR Take 1 tablet by mouth daily.      CALCIUM 500 +D OR Take 1 tablet by mouth daily.      folic acid (FOLVITE) 1 MG tablet Take 2 tablets (2 mg) by mouth daily 200 tablet 2    hypromellose (ARTIFICIAL TEARS) 0.5 % SOLN ophthalmic solution Place 1 drop into both eyes 3 times daily      latanoprost (XALATAN) 0.005 % ophthalmic solution Place 1 drop into both eyes at bedtime 7.5 mL 3    lisinopril (ZESTRIL) 5 MG tablet Take 1 tablet (5 mg) by mouth at bedtime 90 tablet 3    methotrexate 2.5 MG tablet Take 8 tablets (20 mg) by mouth every 7 days . Methotrexate is a once weekly medication, taken on the same day of each week. 96 tablet 2    multivitamin (OCUVITE) TABS tablet Take 1 tablet by mouth daily      polyethylene glycol (MIRALAX) 17 GM/Dose powder Take 17 g (1 Capful) by  "mouth daily as needed for constipation      SENNA-docusate sodium (SENNA S) 8.6-50 MG tablet Take 1-4 tablets by mouth daily as needed      spironolactone (ALDACTONE) 25 MG tablet Take 0.5 tablets (12.5 mg) by mouth daily 45 tablet 3    timolol maleate (TIMOPTIC) 0.5 % ophthalmic solution Place 1 drop Into the left eye every morning 10 mL 3    zolpidem (AMBIEN) 5 MG tablet TAKE ONE TABLET BY MOUTH IN THE EVENING AS NEEDED FOR SLEEP 30 tablet 5    albuterol (PROAIR HFA/PROVENTIL HFA/VENTOLIN HFA) 108 (90 Base) MCG/ACT inhaler Inhale 2 puffs into the lungs every 6 hours as needed for shortness of breath or wheezing. 18 g 11       Objective  /56 (BP Location: Left arm, Patient Position: Sitting, Cuff Size: Adult Regular)   Pulse 66   Resp 16   Ht 1.58 m (5' 2.21\")   Wt 78.9 kg (174 lb)   BMI 31.61 kg/m      General Appearance:    Alert, cooperative, no distress, appears stated age   Head:    Normocephalic, without obvious abnormality, atraumatic   Throat:   Lips, mucosa, and tongue normal; teeth and gums normal   Neck:   Supple, symmetrical, trachea midline, no adenopathy;        thyroid:  No enlargement/tenderness/nodules; no carotid    bruit or JVD   Back:     Symmetric, no curvature, ROM normal, no CVA tenderness   Lungs:     Clear to auscultation bilaterally, respirations unlabored   Chest wall:    No tenderness or deformity   Heart:    Regular rate and rhythm, S1 and S2 normal, no murmur, rub   or gallop   Abdomen:     Soft, non-tender, bowel sounds active all four quadrants,     no masses, no organomegaly   Extremities:   Normal, atraumatic, no cyanosis or edema   Pulses:   2+ and symmetric all extremities   Skin:   Skin color, texture, turgor normal, no rashes or lesions     Results    Lab Results personally reviewed   Lab Results   Component Value Date    CHOL 85 06/21/2024    CHOL 157 11/14/2022     Lab Results   Component Value Date    HDL 39 (L) 06/21/2024    HDL 55 11/14/2022     No components " "found for: \"LDLCALC\"  Lab Results   Component Value Date    TRIG 147 06/21/2024    TRIG 142 11/14/2022     Lab Results   Component Value Date    WBC 5.9 09/23/2024    HGB 13.4 09/23/2024    HCT 41.0 09/23/2024     09/23/2024     Lab Results   Component Value Date    BUN 20.5 09/23/2024     09/23/2024    CO2 23 09/23/2024       Reviewed electrocardiogram sinus rhythm, leftward axis.          "

## 2024-10-10 NOTE — PROGRESS NOTES
A Phase 3, Randomized, Double-Blind, Double-Dummy, Parallel Group, Active-Controlled Study to Evaluate the Efficacy and Safety of Milvexian, an Oral Factor Joycelyn Inhibitor, Versus Apixaban in Participants with Atrial Fibrillation     Participant seen in clinic today for study Day 1 Randomization visit.       Participant expressed willingness to continue with participation. Inclusion/Exclusion criteria reviewed. Participant remains eligible for participation.     Physical exam completed at 12:50 PM by Dr. Mcdonald      /65 (BP Location: Right arm, Patient Position: Sitting, Cuff Size: Adult Regular)   Pulse 63      Modified Carbon Scale Assessment (mRS) completed by investigator only for participants with a history of stroke.   Score = 1    Medical Resource Utilization, EQ-5D-5L, and PROMIS-29 completed as per protocol.     Clinical laboratory tests collected @13:33 PM. Results will be submitted to the central lab and scanned in to 'media' tab and reviewed by investigator.      Collected Labs:   [Hemoglobin/platelet, serum creatinine/eGFR, Cystatin C, ALT/AST/ALP/Total bilirubin, serology (retained) for viral hepatitis, microscopic urinalysis, PD samples (aPTT), biomarker samples (D-dimer) and (Exploratory proteomics)]     Screening Labs Reviewed:    CBC with platelets Clinical Significance?    Collection Time: 09/23/24 11:06 AM    Result Value Ref Range     WBC Count 5.9 4.0 - 11.0 10e3/uL        RBC Count 4.39 3.80 - 5.20 10e6/uL     Hemoglobin 13.4 11.7 - 15.7 g/dL [] Yes [x] No    Hematocrit 41.0 35.0 - 47.0 %     MCV 93 78 - 100 fL     MCH 30.5 26.5 - 33.0 pg     MCHC 32.7 31.5 - 36.5 g/dL     RDW 18.0 (H) 10.0 - 15.0 %     Platelet Count 216 150 - 450 10e3/uL [] Yes [x] No   Comprehensive metabolic panel     Collection Time: 09/23/24 11:06 AM    Result Value Ref Range     Sodium 141 135 - 145 mmol/L     Potassium 4.4 3.4 - 5.3 mmol/L     Carbon Dioxide (CO2) 23 22 - 29 mmol/L     Anion Gap 13 7 - 15 mmol/L      Urea Nitrogen 20.5 8.0 - 23.0 mg/dL     Creatinine 0.98 (H) 0.51 - 0.95 mg/dL [] Yes [x] No    GFR Estimate 58 (L) >60 mL/min/1.73m2 [] Yes [x] No    Calcium 9.5 8.8 - 10.4 mg/dL     Chloride 105 98 - 107 mmol/L     Glucose 94 70 - 99 mg/dL     Alkaline Phosphatase 60 40 - 150 U/L [] Yes [x] No    AST 23 0 - 45 U/L [] Yes [x] No    ALT 17 0 - 50 U/L [] Yes [x] No    Protein Total 7.3 6.4 - 8.3 g/dL     Albumin 4.2 3.5 - 5.2 g/dL     Bilirubin Total 0.6 <=1.2 mg/dL [] Yes [x] No          Randomization:  Participant randomized to unique intervention code via [id number].    Dispensed 70 # of tablets/capsules of study drug/placebo from treatment kit [id number]. (Apixiban or placebo)  Dispensed 70 # of tablets/capsules of study drug/placebo from treatment kit [id number]. (Milvexian or placebo)    Dosing instructions and study treatment return for next visit reviewed with participant. Remind participant, if their visit is scheduled in the morning, they should take the morning dose of study intervention at the clinic (not at home) after the PK sample is collected. Participant verbalized understanding.    Concomitant therapies reviewed.  Changes (if any) noted below.     Assessed participant regarding new or ongoing adverse events. Participant denies any adverse events.     Plan: Will schedule next study visit with participant at [facility] on 04 Nov 2024 at 09:00.       Current Outpatient Medications:     acetaminophen (TYLENOL) 500 MG tablet, Take 500-1,000 mg by mouth every 6 hours as needed., Disp: , Rfl:     albuterol (PROAIR HFA/PROVENTIL HFA/VENTOLIN HFA) 108 (90 Base) MCG/ACT inhaler, Inhale 2 puffs into the lungs every 6 hours as needed for shortness of breath or wheezing., Disp: 18 g, Rfl: 11    amiodarone (PACERONE) 200 MG tablet, Take 1 tablet (200 mg) by mouth daily. Take 400 mg (2 tablets) twice a day x 2 weeks. Then take 200 mg (1 tablet) daily., Disp: , Rfl:     apixaban ANTICOAGULANT (ELIQUIS)  5 MG tablet, Take 1 tablet (5 mg) by mouth 2 times daily., Disp: 180 tablet, Rfl: 4    atorvastatin (LIPITOR) 20 MG tablet, Take 1 tablet (20 mg) by mouth daily., Disp: 90 tablet, Rfl: 4    azelastine (ASTELIN) 0.1 % nasal spray, Spray 1 spray into both nostrils at bedtime., Disp: , Rfl:     B-COMPLEX OR, Take 1 tablet by mouth daily., Disp: , Rfl:     CALCIUM 500 +D OR, Take 1 tablet by mouth daily., Disp: , Rfl:     folic acid (FOLVITE) 1 MG tablet, Take 2 tablets (2 mg) by mouth daily, Disp: 200 tablet, Rfl: 2    hypromellose (ARTIFICIAL TEARS) 0.5 % SOLN ophthalmic solution, Place 1 drop into both eyes 3 times daily, Disp: , Rfl:     latanoprost (XALATAN) 0.005 % ophthalmic solution, Place 1 drop into both eyes at bedtime, Disp: 7.5 mL, Rfl: 3    lisinopril (ZESTRIL) 5 MG tablet, Take 1 tablet (5 mg) by mouth at bedtime, Disp: 90 tablet, Rfl: 3    methotrexate 2.5 MG tablet, Take 8 tablets (20 mg) by mouth every 7 days . Methotrexate is a once weekly medication, taken on the same day of each week., Disp: 96 tablet, Rfl: 2    multivitamin (OCUVITE) TABS tablet, Take 1 tablet by mouth daily, Disp: , Rfl:     polyethylene glycol (MIRALAX) 17 GM/Dose powder, Take 17 g (1 Capful) by mouth daily as needed for constipation, Disp: , Rfl:     SENNA-docusate sodium (SENNA S) 8.6-50 MG tablet, Take 1-4 tablets by mouth daily as needed, Disp: , Rfl:     spironolactone (ALDACTONE) 25 MG tablet, Take 0.5 tablets (12.5 mg) by mouth daily, Disp: 45 tablet, Rfl: 3    timolol maleate (TIMOPTIC) 0.5 % ophthalmic solution, Place 1 drop Into the left eye every morning, Disp: 10 mL, Rfl: 3    zolpidem (AMBIEN) 5 MG tablet, TAKE ONE TABLET BY MOUTH IN THE EVENING AS NEEDED FOR SLEEP, Disp: 30 tablet, Rfl: 5         Varghese Jose, RN     Clinical Research Nurse  [facility]  Clinical Trials Office  [address] [place] [address]  [email or web address]   Office: [phone]

## 2024-10-10 NOTE — PATIENT INSTRUCTIONS
Ms. Alfreda Baxter,  It certainly was nice to meet you today.  Per our conversation you're having some abnormal heartbeat and the reason you are being randomized into the Labrexia AF trial.    If you have any issues with the study please let us know.  Zack Mcdonald

## 2024-10-14 ENCOUNTER — NURSE TRIAGE (OUTPATIENT)
Dept: FAMILY MEDICINE | Facility: CLINIC | Age: 79
End: 2024-10-14

## 2024-10-14 ENCOUNTER — OFFICE VISIT (OUTPATIENT)
Dept: FAMILY MEDICINE | Facility: CLINIC | Age: 79
End: 2024-10-14
Payer: MEDICARE

## 2024-10-14 VITALS
TEMPERATURE: 97.5 F | RESPIRATION RATE: 16 BRPM | HEART RATE: 65 BPM | DIASTOLIC BLOOD PRESSURE: 66 MMHG | SYSTOLIC BLOOD PRESSURE: 99 MMHG | BODY MASS INDEX: 32.2 KG/M2 | HEIGHT: 62 IN | WEIGHT: 175 LBS | OXYGEN SATURATION: 97 %

## 2024-10-14 DIAGNOSIS — L08.9 INFECTED CYST OF SKIN: Primary | ICD-10-CM

## 2024-10-14 DIAGNOSIS — L72.9 INFECTED CYST OF SKIN: Primary | ICD-10-CM

## 2024-10-14 DIAGNOSIS — L72.9 SKIN CYST: ICD-10-CM

## 2024-10-14 DIAGNOSIS — I10 BENIGN ESSENTIAL HYPERTENSION: ICD-10-CM

## 2024-10-14 PROCEDURE — 87205 SMEAR GRAM STAIN: CPT | Performed by: FAMILY MEDICINE

## 2024-10-14 PROCEDURE — 87070 CULTURE OTHR SPECIMN AEROBIC: CPT | Performed by: FAMILY MEDICINE

## 2024-10-14 PROCEDURE — 99213 OFFICE O/P EST LOW 20 MIN: CPT | Performed by: FAMILY MEDICINE

## 2024-10-14 RX ORDER — CEPHALEXIN 500 MG/1
500 CAPSULE ORAL 2 TIMES DAILY
Qty: 20 CAPSULE | Refills: 0 | Status: SHIPPED | OUTPATIENT
Start: 2024-10-14 | End: 2024-10-24

## 2024-10-14 NOTE — TELEPHONE ENCOUNTER
"  S-(situation): swollen lump on the right side of her neck    B-(background): She did report a history of having a boil on the area in the past that drained (years ago).  She is not sure that this is the same as she does not think her skin is angry or red.   She is on ameriderone which she believes can affect her thyroid. She is a retired nurse.     A-(assessment): Patient stated there is a swollen lump on the right side of her neck. It is about 1x1 inch. She noticed this on Friday. It does not appear to be red in color. It is moveable. There is only pain with movement and palpitation. When she stretches or moves her neck it can be 6-7/10 but 0/10 with sitting normally. She stated this appears to be inside the neck, does not appear to be blemish or dermatology related.  She has put heat on it but still does not appear to be derm related. Denies fever, redness. Does not appear to be pushing on throat and causing oxygen issues. Denies injury or trauma.     R-(recommendations): to be seen in clinic today. Offered several providers. She stated she has seen Dr. Mixon in the past and she agreed to visit with him today at 120 arrival.     Odette Lin RN on 10/14/2024 at 8:49 AM      Reason for Disposition   Swelling is painful to touch and no fever    Additional Information   Negative: Sounds like a life-threatening emergency to the triager   Negative: Hernia suspected (bulge in groin or abdomen) and painful or vomiting   Negative: Swollen lump in groin and pulsating (like heartbeat)   Negative: Patient sounds very sick or weak to the triager   Negative: SEVERE pain (e.g., excruciating)   Negative: Swelling is painful to touch AND fever   Negative: Swelling is red and fever   Negative: Swelling is red and size > 2 inches (5.0 cm)    Answer Assessment - Initial Assessment Questions  1. APPEARANCE of SWELLING: \"What does it look like?\"      1x1   2. SIZE: \"How large is the swelling?\" (e.g., inches, cm; or compare to size " "of pinhead, tip of pen, eraser, coin, pea, grape, ping pong ball)       Between grape and ping pong ball   3. LOCATION: \"Where is the swelling located?\"      Right side of neck   4. ONSET: \"When did the swelling start?\"      Friday   5. COLOR: \"What color is it?\" \"Is there more than one color?\"      Normal flesh colored   6. PAIN: \"Is there any pain?\" If Yes, ask: \"How bad is the pain?\" (e.g., scale 1-10; or mild, moderate, severe)      - NONE (0): no pain    - MILD (1-3): doesn't interfere with normal activities     - MODERATE (4-7): interferes with normal activities or awakens from sleep     - SEVERE (8-10): excruciating pain, unable to do any normal activities      When she stretches or moves her neck it can be 6-7/10 but 0/10 with sitting normally  7. ITCH: \"Does it itch?\" If Yes, ask: \"How bad is the itch?\"       NO   8. CAUSE: \"What do you think caused the swelling?\"  9 OTHER SYMPTOMS: \"Do you have any other symptoms?\" (e.g., fever)      NONE    Protocols used: Skin Lump or Localized Swelling-A-OH    "

## 2024-10-14 NOTE — PROGRESS NOTES
"      Ian Petty is a 79 year old, presenting for the following health issues:  Mass (Lump on the right side of her neck.)      10/14/2024     1:27 PM   Additional Questions   Roomed by Carolyn LÓPEZ CMa   Accompanied by Self         10/14/2024     1:27 PM   Patient Reported Additional Medications   Patient reports taking the following new medications None     Mass    History of Present Illness       Reason for visit:  Lump on the right side of  neck.  Symptom onset:  3-7 days ago  Symptoms include:  Tender  Symptom intensity:  Moderate  Symptom progression:  Worsening  Had these symptoms before:  Yes  Has tried/received treatment for these symptoms:  Yes  Previous treatment was successful:  Yes  Prior treatment description:  Hot water packs  What makes it worse:  Touching the area and stretching her neck.  What makes it better:  Not touching it.   She is taking medications regularly.        Noticed it 3 days ago    Getting biggeer    Some pain/ tenderness  Drained  very little, yellow     Had a cyst in this location as a child    Used hot packs    No fever/ chills    No history of thyroid     Not dizzy/ lightheaded            Objective    BP 99/66 (BP Location: Left arm, Patient Position: Sitting, Cuff Size: Adult Large)   Pulse 65   Temp 97.5  F (36.4  C) (Oral)   Resp 16   Ht 1.58 m (5' 2.21\")   Wt 79.4 kg (175 lb)   SpO2 97%   BMI 31.79 kg/m    Body mass index is 31.79 kg/m .  Physical Exam    Full physical not done     Mentation and affect are fine    No tremor of speech or extremity    Patient has approx 3 x 1.5 cm area of mild redness upper right center of chest  Does not extend onto neck proper     No neck swelling  Thyroid feels normal    At lower end of the red area is a very tiny opening, likely related to the chronic cyst  When squeezed just a tiny drop of clearish fluid obtained    No pus    Discussed in detail     With consent wiped with alcohol pad then squeezed again and did swab for " culture    Sent to lab      ASSESSMENT / PLAN:  (L72.9,  L08.9) Infected cyst of skin  (primary encounter diagnosis)  Comment: start antibiotics  await culture results.  Warm washcloths to help drainage   Plan: cephALEXin (KEFLEX) 500 MG capsule, Cyst         Aerobic Bacterial Culture Routine With Gram         Stain             (L72.9) Skin cyst  Comment: as above, then see gen surg for consult if not resolving.   Plan: Adult Gen Surg  Referral             (I10) Benign essential hypertension  Comment: blood pressure low but not dizzy or lightheaded and her acei dose was lowered recently   Plan: same meds, monitor.       I reviewed the patient's medications, allergies, medical history, family history, and social history.    Wallace Mixon MD             Signed Electronically by: Wallace Mixon MD

## 2024-10-14 NOTE — PATIENT INSTRUCTIONS
Take antibiotics    Warm washcloths to help drainage    We will send you lab result    If not resolving then  see general surgeon

## 2024-10-15 ENCOUNTER — TRANSFERRED RECORDS (OUTPATIENT)
Dept: CARDIOLOGY | Facility: CLINIC | Age: 79
End: 2024-10-15
Payer: MEDICARE

## 2024-10-16 ENCOUNTER — DOCUMENTATION ONLY (OUTPATIENT)
Dept: ANTICOAGULATION | Facility: CLINIC | Age: 79
End: 2024-10-16
Payer: MEDICARE

## 2024-10-16 LAB
BACTERIA FLD CULT: NO GROWTH
GRAM STAIN RESULT: NORMAL
GRAM STAIN RESULT: NORMAL

## 2024-10-16 NOTE — PROGRESS NOTES
Anticoagulant Therapeutic Duplication    Duplicate orders identified: same medication but different dose, form, frequency or route    The duplicate anticoagulant order(s) has been discontinued    Active anticoagulant: apixaban (Eliquis) vd placebo study    Plan made per [facility] anticoagulation protocol.    Walter Person RN  10/16/2024

## 2024-10-16 NOTE — RESULT ENCOUNTER NOTE
No definite infection based on wound culture.    Okay to finish out antibiotics.    Follow up with specialist if not resolving.    Wallace Mixon MD

## 2024-10-17 NOTE — PROGRESS NOTES
A Phase 3, Randomized, Double-Blind, Double-Dummy, Parallel Group, Active-Controlled Study to Evaluate the Efficacy and Safety of Milvexian, an Oral Factor Joycelyn Inhibitor, Versus Apixaban in Participants with Atrial Fibrillation     : Nick Gamez MD  Protocol Version: 07 Feb 2024    Inclusion Criteria    Criteria #  All must be  yes  Y/N   1  Minimum age of 18 years (or the legal age of consent in the jurisdiction in which the study is taking place)  Yes   2 Medically stable and appropriate for chronic antithrombotic treatment on the basis of physical examination, medical history, vital signs, and clinical laboratory tests performed as part of standard of care or at screening. Any abnormalities in physical examination, medical history, or vital signs must be consistent with the underlying illness in the study population. If the results of the serum chemistry panel, hematology, or urinalysis are outside the normal reference ranges, the participant may be included only if the investigator judges the abnormalities or deviations from normal to be not clinically significant or to be appropriate and reasonable for the population under study. This determination must be recorded in the participant's source documents and initialed by the  or appropriately qualified designee.  Yes   3 Atrial fibrillation or flutter, paroxysmal or sustained not due to a reversible cause, and eligible to receive anticoagulation therapy:    i. Criterion modified per Amendment 1  i.1. Criterion modified per Amendment 2  i.2. Atrial fibrillation or atrial flutter must be documented by ECG              evidence (e.g., 12-lead ECG, rhythm strip, Holter, or pacemaker             interrogation) within 1 year of randomization.    ii. If electrical cardioversion or ablation is planned, the investigator      plans to treat the patient with anticoagulation for the duration of the      trial. Yes   4 Participant  must satisfy one or both of the following categories of risk factors (a or b):     a. One or more of the following risk factors:         i. Age >=75 years at the time of screening         ii. Criterion modified per Amendment 1       ii.1. Criterion modified per Amendment 2       ii.2. History of any type of stroke, including symptomatic stroke of             any kind (ischemic, hemorrhagic, lacunar, or undetermined) and             silent brain infarcts or cerebral microbleeds (CMB): Ischemic             stroke must be >7 days prior to first dose of study intervention.             Hemorrhagic strokes, and hemorrhagic transformations must             have occurred >90 days prior and are allowed per investigator             discretion. A prior neurology consultation is recommended to             ascertain if the patient is appropriate for anticoagulation. (See             Exclusion Criterion 2 for history of subarachnoid hemorrhage,             subdural hematoma, or spinal cord hemorrhage)               Notes:   CMBs are defined as rounded foci of <10 mm in size that appear hypointense and distinct from vascular flow voids, leptomeningeal hemosiderosis, or non-hemorrhagic subcortical mineralization on T2*-weighted MRI.   Participants who received thrombolysis and/or mechanical thrombectomy for any indications other than stroke with or without stenting can be randomized, if they are at least 24 hours post-thrombolytic therapy/post-thrombectomy and have an INR <=1.5 and aPTT <=1.4 times the ULN prior to study randomization.   b. Two or more of the following risk factors:         i. Age between 65 and 74 years, inclusive, at the time of screening         ii. Criterion modified per Amendment 2       ii.1. Hypertension (defined as use of antihypertensive medications             <=6 months before the screening visit or persistent systolic blood             pressure above 140 mmHg or diastolic blood pressure above              90 mmHg)         iii. Diabetes mellitus (defined as a history of diabetes mellitus and             current use of antidiabetic medications)         iv. Atherosclerotic vascular disease meeting one or more of the             following criteria: Lower extremity PAD defined as one or both             of the following:                       a) a documented history of a resting ankle-brachial index                           (JARRED) of <0.85                       b) prior major vascular (non-traumatic) amputation (ankle                           or above), peripheral bypass, or peripheral                           percutaneous or surgical intervention for limb ischemia         AND / OR               CAD defined as one of the following:                 a) a recent or past MI, excluding periprocedural or definite                     Type 2 MI                    b) history of coronary revascularization, either percutaneous                     (PCI) or surgical (CABG)         v. Criterion modified per Amendment 2       v.1. Symptomatic heart failure defined as either:             (a) History of hospitalization with heart failure as the primary                  cause (regardless of ejection fraction)             (b) Left ventricular ejection fraction of <40% (most recent                  assessment <=1 year before randomization) regardless of                  history of heart failure hospitalization Yes   5  For participants receiving VKA, the INR must be <=2.0 at the time of randomization with the first dose of study intervention taken the same day  N/A   6 All female participants of childbearing potential must have a negative highly sensitive serum     (beta-human chorionic gonadotropin [beta-hCG]) or urine test up to 2 days before the first dose of study intervention.     A female participant must agree not to be pregnant, breastfeeding, or planning to become pregnant while enrolled in this study until 4 days (five  "half-lives) after the last dose of study intervention.  N/A   7  A female participant must be (as defined in Appendix 5, Contraceptive and Barrier Guidance)     a. Not of childbearing potential; or     b. Of childbearing potential and practicing a highly effective method of contraception (failure rate of <1% per year when used consistently and correctly) and agrees to remain on a highly effective method until 4 days (5 half lives) after the last dose of study intervention -the end of relevant exposure. The investigator must evaluate the potential for contraceptive method failure (eg, noncompliance, recently initiated) in relationship to the first dose of study intervention. Examples of highly effective methods of contraception are located in Appendix 5: Contraceptive and Barrier Guidance  N/A   8 A female participant using hormonal contraceptives should use an additional nonhormonal contraceptive method (above that required in the inclusion criterion 8b) until 4 days after the last dose (five half-lives of study intervention - the end of relevant exposure  N/A   9 Criterion modified per Amendment 1   9.1. Participant, or their legally authorized representative, must sign an informed consent form (ICF) indicating that he or she understands the purpose of, and procedures required for, the study and is willing to participate in the study and willing to provide at least vital status at the end of the study which may include the use of  agencies as permitted by local regulations. Yes   10 Willing and able to adhere to the lifestyle restrictions (Section 5.3) specified in this protocol. Yes         Exclusion Criteria    Criteria # All must be \"no\"   Y/N   1 Criterion modified per Amendment 2   1.1 History of ischemic stroke, but only if <=7 days of stroke onset. An ischemic stroke with onset 8 or more days prior to randomization is permitted. No   2 Criterion modified per Amendment 2   2.1 History of any of the " following only if <=90 days of first dose of study intervention: primary hemorrhagic stroke, hemorrhagic transformation of an ischemic stroke, subarachnoid hemorrhage, subdural hematoma, spinal cord hemorrhage No   3 A prior disabling stroke defined by a current mRS of >=3. Refer to mRS scoring in Study Specific Materials.  No   4 Criterion modified per Amendment 1   4.1. Hemodynamically significant valve disease or those with valve disease that will potentially require surgical valve replacement during the study as determined by the investigator  No   5 Criterion modified per Amendment 2   5.1 Any condition other than AF that requires chronic anticoagulation at the discretion of the investigator and/or local guidelines, such as mechanical heart valves. However, participants with an indication for chronic antiplatelet therapy after placement of a bio-prosthetic non-mechanical valve (eg, transcatheter aortic valve replacement [TAVR]) do not satisfy this exclusion criterion and are eligible for study participation. In addition, apixaban, like other oral anticoagulants (DOACs), is not recommended for patients with a history of thrombosis who are diagnosed with antiphospholipid syndrome. As apixaban serves as the comparator in the study, the investigator should consult the local labeling information in determining the suitability of a participant for study participation. No   6 Criterion modified per Amendment 2   6.1 Any condition that, in the opinion of the investigator, contraindicates anticoagulant therapy, or would, with anticoagulation, have an unacceptable risk of bleeding, or would interfere with the study endpoint assessments, eg, large cerebral infarct volume, active/recent major bleeding, major surgery <=30 days prior to randomization, known bleeding diathesis, eg, von Willebrand's Disease. No   7 Known presence of atrial myxoma or left ventricular thrombus  No   8 Active endocarditis No   9 Current active  liver disease (eg, acute hepatitis, known cirrhosis), including participants receiving antiviral treatment for hepatitis No   10 Require dialysis at time of randomization  No   11 Patients with eGFR <25 mL/min/1.73 m2 at screening No   12 History of any significant drug allergy (such as anaphylaxis, Garcia-Jurgen Syndrome, toxic epidermal necrolysis, drug reaction with eosinophilia and systemic symptoms [DRESS]). No   13 Hospitalized for acute heart failure at the time of randomization. No   14 Criterion modified per Amendment 2   14.1 Known allergies, hypersensitivity, or intolerance to milvexian, apixaban or their excipients (refer to the milvexian IB and local label information for apixaban). No   15 Unable to swallow medications, as determined by the investigator at screening  No   16 Planned use of any disallowed therapies as noted in Section 6.8, Concomitant Therapy, including isoniazid (INH)  No   17 Criterion modified per Amendment 2   17.1 a) Received an investigational intervention or used an invasive               investigational medical device <=4 weeks before the planned              first dose of study intervention or is currently enrolled in an              investigational interventional study.           b) Prior participation in a clinical study including a Factor Joycelyn               inhibitor No   18 Any of the following laboratory results, based on local laboratory, outside of the ranges specified below prior to randomization, confirmed by repeat:     a. Platelet count <50,000 mm3     b. ALT >3×ULN     c. Total bilirubin >=1.5× ULN unless an alternative causative factor         such as Gilbert's syndrome is identified     d. Criterion Modified per Amendment 2     d.1. Hemoglobin <8.0 g/dL (4.96 mmol/L) No   19 Any condition for which, in the opinion of the investigator, participation would not be in the best interest of the participant (eg, compromise the well-being) or that could prevent, limit,  "or confound the protocol-specified assessments or has a life expectancy of <12 months.  No   20 Criterion modified per Amendment 2   20.1. At the time of screening, any participant who will not consider following the study contact schedule, or not allow a contact to the participant, or to the designated family members (if provided voluntarily) or health care practitioner, to determine any endpoint events and/or vital status, up until the end of the study. No   21 Participants who are incarcerated, including prisoners or subjects compulsorily detained for treatment of psychiatric disease. No   22 Known current substance abuse that could impact study compliance  No   23 Employee of the investigator or study site, with direct involvement in the proposed study or other studies under the direction of that investigator or study site, as well as family members of the employees or the investigator No   24 Criterion added per Amendment 2   Mental condition or cognitive impairment/dementia that makes the participant unable to understand the nature, potential risks, and benefits of the study. No         Did the subject meet all Inclusion Criteria? Yes    Did the subject meet any Exclusion criteria? No    Please provide detail if any inclusion criteria is marked \"No\" and/or if any exclusion criteria is marked \"Yes\":       Dr. Gamez: I agree patient is able to move on to Randomization   [x] Yes  [] No      10/17/2024    Varghese Garcia RN   "

## 2024-10-17 NOTE — PROGRESS NOTES
HPI:   Ms. 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 Presents to clinic for CORE follow-up.    Recent shortness of breath prompting evaluation, found to be in atrial fibrillation w/ RVR. Subsequent cardioversion on 6/18/24 failed (shocked x3 times). Started on Amiodarone and sent home with [employer]. Patient then admitted from 6/20-6/22 for increased SOB and edema. Patient was found to have newly reduced EF of 40-45% in the setting of afib. Treated with IV Lasix. She was started on spironolactone and lasix for GDMT. Patient was already on lisinopril, BB was deferred d/t hypotension, and SGLT2 is cost-prohibitive. Underwent successful outpatient cardioversion on 6/27.     Today  Her lisinopril has been decreased due to some asymptomatic lower blood pressures. No SOB at rest. She is gardening and shopping- no BUENO. She is walking in to the parking lot better. She does have fatigue. No orthopnea. No PND. She did have some lightheadedness before- she doesn't need to walk with the shopping cart as much as she used to. Not sure if the lisinopril decrease helped that at all. Sometimes gets lightheaded going from sitting to standing. Some of this is impacted by her chronic vision changes. No pre-syncope or syncope. No falls! No cest pain.     She is following a low salt diet and she thinks that is helping her loose weight.      She was having bps of 96/48. Now it is higher when she is in the doctors office.    Hrs have been good since cardioversion.     She did require cautery for nosebleeds in August.     Weight at home has been 168.    She has not scheduled her sleeping.     Cardiac Medications  Gulshan study- eliquis vs alternative blood thinner  Amiodarone 200 mg daily.   Lisinopril 5 mg at  bedtime  Aldactone 12.5 mg daily  Atorvastatin 20 mg daily    PAST MEDICAL HISTORY:  Past Medical History:   Diagnosis Date    Acute idiopathic gout of left foot 11/04/2015    Adenomatous colon polyp 06/05/2013    Colonoscopy due 2025    Asthma 11/23/2009    controlled with inhaler    Blind left eye 07/08/2019    Due to central retinal occlusion    Cataract 12/11/2008    Central retinal artery occlusion of left eye 09/15/2018    Chronic kidney disease, stage 3a (H) 08/13/2024    Disturbances of vision, late effect of stroke 12/03/2022    DJD (degenerative joint disease) 11/23/2009    knees    GERD (gastroesophageal reflux disease) 07/29/2010    Glaucoma 05/10/2012    Gout 11/15/2012    Heart failure with reduced ejection fraction (H) 08/13/2024    History of central retinal artery occlusion, os 12/11/2020    HTN (hypertension) 11/23/2009    Hyperlipidemia LDL goal <70 12/31/2010    Infected cyst of skin 10/14/2024    Low bone mass 08/25/2003    Lumbar spinal stenosis 12/08/2011    Sees Dr Sage    Mild intermittent asthma without complication 06/28/2018    Mild persistent asthma 12/12/2013    Neovascular glaucoma of left eye, 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   H/o R central retinal artery occlusion    Paroxysmal ventricular tachycardia (H) 11/20/2018    Added automatically from request for surgery [id number]    PFO (patent foramen ovale) 07/29/2010    Primary insomnia 12/05/2016    Recurrent cerebrovascular accidents (CVAs) (H) 11/14/2022    Recurrent cerebrovascular accidents (CVAs) (H) 11/14/2022    Rheumatoid arthritis of multiple sites with negative rheumatoid factor (H) 11/08/2023    Transient global amnesia 11/14/2022       FAMILY HISTORY:  Family History   Problem Relation Age of Onset    Arthritis Mother     Cancer Mother     Hypertension Mother     Other Cancer Mother     Thyroid  Disease Mother     Respiratory Father     Glaucoma Father     Asthma Father     Allergies Sister     Eye Disorder Sister     Lipids Sister     Neurologic Disorder Sister     Osteoporosis Sister     Glaucoma Sister     Hypertension Sister     Macular Degeneration Sister     Hyperlipidemia Sister     Hypertension Sister     Osteoporosis Sister     Gastrointestinal Disease Son         Ulcerative Colitis    Glaucoma Other        SOCIAL HISTORY:  Social History     Socioeconomic History    Marital status:      Spouse name: Vaughn    Number of children: 2    Years of education: 14   Occupational History    Occupation: Retired LPN   Tobacco Use    Smoking status: Former     Current packs/day: 0.00     Average packs/day: 0.1 packs/day for 2.0 years (0.2 ttl pk-yrs)     Types: Cigarettes     Start date: 1963     Quit date: 1965     Years since quittin.8     Passive exposure: Past    Smokeless tobacco: Never   Vaping Use    Vaping status: Never Used   Substance and Sexual Activity    Alcohol use: Not Currently     Comment: one a month    Drug use: No    Sexual activity: Not Currently     Partners: Male     Birth control/protection: Post-menopausal, Female Surgical   Other Topics Concern    Parent/sibling w/ CABG, MI or angioplasty before 65F 55M? No   Social History Narrative    , lives with .  2 grown children in area     Lives in house.       Social Determinants of Health     Financial Resource Strain: High Risk (2024)    Financial Resource Strain     Within the past 12 months, have you or your family members you live with been unable to get utilities (heat, electricity) when it was really needed?: Yes   Food Insecurity: Low Risk  (2024)    Food Insecurity     Within the past 12 months, did you worry that your food would run out before you got money to buy more?: No     Within the past 12 months, did the food you bought just not last and you didn t have money to get more?: No    Transportation Needs: Low Risk  (9/4/2024)    Transportation Needs     Within the past 12 months, has lack of transportation kept you from medical appointments, getting your medicines, non-medical meetings or appointments, work, or from getting things that you need?: No   Physical Activity: Insufficiently Active (9/4/2024)    Exercise Vital Sign     Days of Exercise per Week: 3 days     Minutes of Exercise per Session: 20 min   Stress: No Stress Concern Present (9/4/2024)    [employer] - Occupational Stress Questionnaire     Feeling of Stress : Only a little   Social Connections: Unknown (9/4/2024)    Social Connection and Isolation Panel [NHANES]     Frequency of Social Gatherings with Friends and Family: Twice a week   Interpersonal Safety: Low Risk  (9/9/2024)    Interpersonal Safety     Do you feel physically and emotionally safe where you currently live?: Yes     Within the past 12 months, have you been hit, slapped, kicked or otherwise physically hurt by someone?: No     Within the past 12 months, have you been humiliated or emotionally abused in other ways by your partner or ex-partner?: No   Housing Stability: Low Risk  (9/4/2024)    Housing Stability     Do you have housing? : Yes     Are you worried about losing your housing?: No       CURRENT MEDICATIONS:  Current Outpatient Medications   Medication Sig Dispense Refill    acetaminophen (TYLENOL) 500 MG tablet Take 500-1,000 mg by mouth every 6 hours as needed.      albuterol (PROAIR HFA/PROVENTIL HFA/VENTOLIN HFA) 108 (90 Base) MCG/ACT inhaler Inhale 2 puffs into the lungs every 6 hours as needed for shortness of breath or wheezing. 18 g 11    amiodarone (PACERONE) 200 MG tablet Take 1 tablet (200 mg) by mouth daily. Take 400 mg (2 tablets) twice a day x 2 weeks. Then take 200 mg (1 tablet) daily.      atorvastatin (LIPITOR) 20 MG tablet Take 1 tablet (20 mg) by mouth daily. 90 tablet 4    azelastine (ASTELIN) 0.1 % nasal  spray Spray 1 spray into both nostrils at bedtime.      B-COMPLEX OR Take 1 tablet by mouth daily.      CALCIUM 500 +D OR Take 1 tablet by mouth daily.      cephALEXin (KEFLEX) 500 MG capsule Take 1 capsule (500 mg) by mouth 2 times daily for 10 days. 20 capsule 0    folic acid (FOLVITE) 1 MG tablet Take 2 tablets (2 mg) by mouth daily 200 tablet 2    hypromellose (ARTIFICIAL TEARS) 0.5 % SOLN ophthalmic solution Place 1 drop into both eyes 3 times daily      latanoprost (XALATAN) 0.005 % ophthalmic solution Place 1 drop into both eyes at bedtime 7.5 mL 3    lisinopril (ZESTRIL) 5 MG tablet Take 1 tablet (5 mg) by mouth at bedtime 90 tablet 3    methotrexate 2.5 MG tablet Take 8 tablets (20 mg) by mouth every 7 days . Methotrexate is a once weekly medication, taken on the same day of each week. 96 tablet 2    multivitamin (OCUVITE) TABS tablet Take 1 tablet by mouth daily      polyethylene glycol (MIRALAX) 17 GM/Dose powder Take 17 g (1 Capful) by mouth daily as needed for constipation      SENNA-docusate sodium (SENNA S) 8.6-50 MG tablet Take 1-4 tablets by mouth daily as needed      spironolactone (ALDACTONE) 25 MG tablet Take 0.5 tablets (12.5 mg) by mouth daily 45 tablet 3    timolol maleate (TIMOPTIC) 0.5 % ophthalmic solution Place 1 drop Into the left eye every morning 10 mL 3    zolpidem (AMBIEN) 5 MG tablet TAKE ONE TABLET BY MOUTH IN THE EVENING AS NEEDED FOR SLEEP 30 tablet 5     Current Facility-Administered Medications   Medication Dose Route Frequency Provider Last Rate Last Admin    study - apixaban vs. placebo (IDS# 6271) capsule 5 mg  5 mg Oral BID Nick Gamez MD        study - milvexian vs. placebo (IDS# 6271) tablet 100 mg  100 mg Oral BID Nick Gamez MD           ROS:   Refer to HPI    EXAM:  /66 (BP Location: Right arm, Patient Position: Sitting, Cuff Size: Adult Regular)   Pulse 61   Wt 78.9 kg (174 lb)   SpO2 100%   BMI 31.61 kg/m    GENERAL: Appears comfortable, in no acute  distress.   HEENT: Eye symmetrical, no discharge or icterus bilaterally.  CV: RRR, +S1S2, no murmur, rub, or gallop. JVP <6.   RESPIRATORY: Respirations regular, even, and unlabored. Lungs CTA throughout.   GI: Soft and non distended   EXTREMITIES: Mild peripheral edema.  All extremities are warm and well perfused   NEUROLOGIC: Alert and interacting appropriately.    MUSCULOSKELETAL: No joint swelling or tenderness.   SKIN: No jaundice.     Labs, reviewed with patient in clinic today:  CBC RESULTS:  Lab Results   Component Value Date    WBC 5.9 09/23/2024    WBC 5.0 11/26/2020    RBC 4.39 09/23/2024    RBC 4.74 11/26/2020    HGB 13.4 09/23/2024    HGB 13.4 11/26/2020    HCT 41.0 09/23/2024    HCT 43.1 11/26/2020    MCV 93 09/23/2024    MCV 91 11/26/2020    MCH 30.5 09/23/2024    MCH 28.3 11/26/2020    MCHC 32.7 09/23/2024    MCHC 31.1 (L) 11/26/2020    RDW 18.0 (H) 09/23/2024    RDW 12.5 11/26/2020     09/23/2024     11/26/2020       CMP RESULTS:  Lab Results   Component Value Date     09/23/2024     11/26/2020    POTASSIUM 4.4 09/23/2024    POTASSIUM 4.3 03/08/2023    POTASSIUM 3.8 11/26/2020    CHLORIDE 105 09/23/2024    CHLORIDE 106 03/08/2023    CHLORIDE 104 11/26/2020    CO2 23 09/23/2024    CO2 27 03/08/2023    CO2 29 11/26/2020    ANIONGAP 13 09/23/2024    ANIONGAP 4 03/08/2023    ANIONGAP 6 11/26/2020    GLC 94 09/23/2024     (H) 03/08/2023     (H) 11/26/2020    BUN 20.5 09/23/2024    BUN 22 03/08/2023    BUN 16 11/26/2020    CR 0.98 (H) 09/23/2024    CR 0.70 11/26/2020    GFRESTIMATED 58 (L) 09/23/2024    GFRESTIMATED >60 11/14/2022    GFRESTIMATED 84 11/26/2020    GFRESTBLACK >90 11/26/2020    RUDOLPH 9.5 09/23/2024    RUDOLPH 10.0 11/26/2020    BILITOTAL 0.6 09/23/2024    BILITOTAL 0.5 11/26/2020    ALBUMIN 4.2 09/23/2024    ALBUMIN 3.8 03/08/2023    ALBUMIN 3.6 11/26/2020    ALKPHOS 60 09/23/2024    ALKPHOS 84 11/26/2020    ALT 17 09/23/2024    ALT 16 11/26/2020    AST 23  "09/23/2024    AST 9 11/26/2020        INR RESULTS:  Lab Results   Component Value Date    INR 1.75 (H) 06/19/2024    INR 1.24 (H) 11/26/2020       Lab Results   Component Value Date    MAG 1.8 06/27/2024    MAG 1.6 06/30/2014     Lab Results   Component Value Date    NTBNPI 2,092 (H) 06/20/2024     No results found for: \"NTBNP\"    Diagnostics:  DIRK 6/19/24 Left Ventricle  Left ventricular function is decreased. The ejection fraction is 40-45%  (mildly reduced).    Assessment and Plan:   Ms. 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 Presents to clinic for CORE follow-up.    She has HFmrEF. Unclear if this is tachy mediated. Given there is a high likelyhood of that and that she is at high risk of polypharmacy given her age- we are going to get an echo before further aggressive GDMT titration.     She is in a clinical trial and we are waiting for a set of labs that was recently taken to be faxed over.    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 NYHA Class II.    Fluid status: euvolemic  ACEi/ARB/ARNI: continue lisinopril 5 mg dialy  BB: N/A  Aldosterone antagonist: aldactone 12.5 mg daily  SLG2i: Did not discuss today  SCD prophylaxis: does not meet criteria for implant  NSAID use: contraindicated  Sleep apnea evaluation: did not discuss today  Remote monitoring: did not discuss today  Other- will get an echo prior to next appointment    # Atrial fibrillation w/ RVR (8/2018)  Outpatient DCCV on 6/27 with successful cardioversion to NSR after 2 shocks. Asymptomatic in clinic today.  - She is in a Gulshan study: Eliquis vs alternative study drug/blood thinner  - Amio 200 mg daily (s/p oral " load)     # HLD  Lipid panel 11/2022 with LDL 74, .  - atorvastatin 20 mg daily     # Moderate persistent asthma  - albuterol prn      Follow up   - ECHO when available  - EP 11/19 as scheduled  - Will try for core appointment 11/19 when she is at the [facility]    Billing  - I spent 22 minutes face to face with the patient and an additional 2 minutes coordinating care and completing documentation on the day of service    The longitudinal plan of care for the diagnosis(es)/condition(s) as documented were addressed during this visit. Due to the added complexity in care, I will continue to support Malinda in the subsequent management and with ongoing continuity of care.       Emily Sloan PA-C  [facility]          CC

## 2024-10-18 ENCOUNTER — OFFICE VISIT (OUTPATIENT)
Dept: CARDIOLOGY | Facility: CLINIC | Age: 79
End: 2024-10-18
Payer: MEDICARE

## 2024-10-18 VITALS
HEART RATE: 61 BPM | SYSTOLIC BLOOD PRESSURE: 110 MMHG | DIASTOLIC BLOOD PRESSURE: 66 MMHG | OXYGEN SATURATION: 100 % | WEIGHT: 174 LBS | BODY MASS INDEX: 31.61 KG/M2

## 2024-10-18 DIAGNOSIS — I50.20 HEART FAILURE WITH REDUCED EJECTION FRACTION (H): Primary | ICD-10-CM

## 2024-10-18 PROCEDURE — G2211 COMPLEX E/M VISIT ADD ON: HCPCS | Performed by: PHYSICIAN ASSISTANT

## 2024-10-18 PROCEDURE — 99214 OFFICE O/P EST MOD 30 MIN: CPT | Performed by: PHYSICIAN ASSISTANT

## 2024-10-18 NOTE — PATIENT INSTRUCTIONS
Take your medicines every day, as directed    Changes made today:  No medication changes today    We will see if we can find you labs from the study   Monitor Your Weight and Symptoms    Contact us if you:    Gain 2 pounds in one day or 5 pounds in one week  Feel more short of breath  Notice more leg swelling  Feel lightheadeded   Change your lifestyle    Limit Salt or Sodium:  2000 mg  Limit Fluids:  2000 mL or approximately 64 ounces  Eat a Heart Healthy Diet  Low in saturated fats  Stay Active:  Aim to move at least 150 minutes every  week         To Contact us    During Business Hours:  [phone], option # 1      After hours, weekends or holidays:   [phone], Option #4  Ask to speak to the On-Call Cardiologist. Inform them you are a CORE/heart failure patient at the [facility].     Use [employer] allows you to communicate directly with your heart team through secure messaging.  [employer] can be accessed any time on your phone, computer, or tablet.  If you need assistance, we'd be happy to help!         Keep your Heart Appointments:    - ECHO when available    - EP 11/19 as scheduled    - We will see if we can do a core appointment on the same day as the EP appointment           Heart Failure Support Group  Virtual meetings will continue in 2024 Please reach out if you would like to attend and we can get you the information you need to log in.     2024 dates:      Monday, November 4th, 1-2pm     Monday, December 2nd, 1-2pm     Follow the American Heart Association Diet and Lifestyle recommendations:  Limit saturated fat, trans fat, sodium, red meat, sweets and sugar-sweetened beverages. If you choose to eat red meat, compare labels and select the leanest cuts available.  Aim for at least 150 minutes of moderate physical activity or 75 minutes of vigorous physical activity - or an equal combination of both - each week.     During business hours: [phone], press option # 1 to schedule an  appointment or send a message to your care team     After hours, weekends or holidays: On Call Cardiologist- [phone]   option #4 and ask to speak to the on-call Cardiologist. Inform them you are a CORE/heart failure patient at the [facility].

## 2024-10-18 NOTE — NURSING NOTE
"Chief Complaint   Patient presents with    Follow Up     79 year old with HFmrEF here for follow up. Will get labs same day if needed.       Initial /66 (BP Location: Right arm, Patient Position: Sitting, Cuff Size: Adult Regular)   Pulse 61   Wt 78.9 kg (174 lb)   SpO2 100%   BMI 31.61 kg/m   Estimated body mass index is 31.61 kg/m  as calculated from the following:    Height as of 10/14/24: 1.58 m (5' 2.21\").    Weight as of this encounter: 78.9 kg (174 lb)..  BP completed using cuff size: regular    ANAYA Jimenez    "

## 2024-10-18 NOTE — LETTER
10/18/2024      RE: Alfreda Baxter  [address] [facility] [id number]       Dear Colleague,    Thank you for the opportunity to participate in the care of your patient, Alfreda Baxter, at the [facility]. Please see a copy of my visit note below.    HPI:   Ms. 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 Presents to clinic for CORE follow-up.    Recent shortness of breath prompting evaluation, found to be in atrial fibrillation w/ RVR. Subsequent cardioversion on 6/18/24 failed (shocked x3 times). Started on Amiodarone and sent home with Cerac. Patient then admitted from 6/20-6/22 for increased SOB and edema. Patient was found to have newly reduced EF of 40-45% in the setting of afib. Treated with IV Lasix. She was started on spironolactone and lasix for GDMT. Patient was already on lisinopril, BB was deferred d/t hypotension, and SGLT2 is cost-prohibitive. Underwent successful outpatient cardioversion on 6/27.     Today  Her lisinopril has been decreased due to some asymptomatic lower blood pressures. No SOB at rest. She is gardening and shopping- no BUENO. She is walking in to the parking lot better. She does have fatigue. No orthopnea. No PND. She did have some lightheadedness before- she doesn't need to walk with the shopping cart as much as she used to. Not sure if the lisinopril decrease helped that at all. Sometimes gets lightheaded going from sitting to standing. Some of this is impacted by her chronic vision changes. No pre-syncope or syncope. No falls! No cest pain.     She is following a low salt diet and she thinks that is helping her loose weight.      She was having bps of 96/48. Now it  is higher when she is in the doctors office.    Hrs have been good since cardioversion.     She did require cautery for nosebleeds in August.     Weight at home has been 168.    She has not scheduled her sleeping.     Cardiac Medications  Gulshan study- eliquis vs alternative blood thinner  Amiodarone 200 mg daily.   Lisinopril 5 mg at bedtime  Aldactone 12.5 mg daily  Atorvastatin 20 mg daily    PAST MEDICAL HISTORY:  Past Medical History:   Diagnosis Date     Acute idiopathic gout of left foot 11/04/2015     Adenomatous colon polyp 06/05/2013    Colonoscopy due 2025     Asthma 11/23/2009    controlled with inhaler     Blind left eye 07/08/2019    Due to central retinal occlusion     Cataract 12/11/2008     Central retinal artery occlusion of left eye 09/15/2018     Chronic kidney disease, stage 3a (H) 08/13/2024     Disturbances of vision, late effect of stroke 12/03/2022     DJD (degenerative joint disease) 11/23/2009    knees     GERD (gastroesophageal reflux disease) 07/29/2010     Glaucoma 05/10/2012     Gout 11/15/2012     Heart failure with reduced ejection fraction (H) 08/13/2024     History of central retinal artery occlusion, os 12/11/2020     HTN (hypertension) 11/23/2009     Hyperlipidemia LDL goal <70 12/31/2010     Infected cyst of skin 10/14/2024     Low bone mass 08/25/2003     Lumbar spinal stenosis 12/08/2011    Sees Dr Sage     Mild intermittent asthma without complication 06/28/2018     Mild persistent asthma 12/12/2013     Neovascular glaucoma of left eye, 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   H/o R central retinal artery occlusion     Paroxysmal ventricular tachycardia (H) 11/20/2018    Added automatically from request for surgery [id number]     PFO (patent foramen ovale) 07/29/2010     Primary insomnia 12/05/2016     Recurrent cerebrovascular accidents (CVAs) (H)  2022     Recurrent cerebrovascular accidents (CVAs) (H) 2022     Rheumatoid arthritis of multiple sites with negative rheumatoid factor (H) 2023     Transient global amnesia 2022       FAMILY HISTORY:  Family History   Problem Relation Age of Onset     Arthritis Mother      Cancer Mother      Hypertension Mother      Other Cancer Mother      Thyroid Disease Mother      Respiratory Father      Glaucoma Father      Asthma Father      Allergies Sister      Eye Disorder Sister      Lipids Sister      Neurologic Disorder Sister      Osteoporosis Sister      Glaucoma Sister      Hypertension Sister      Macular Degeneration Sister      Hyperlipidemia Sister      Hypertension Sister      Osteoporosis Sister      Gastrointestinal Disease Son         Ulcerative Colitis     Glaucoma Other        SOCIAL HISTORY:  Social History     Socioeconomic History     Marital status:      Spouse name: Vuaghn     Number of children: 2     Years of education: 14   Occupational History     Occupation: Retired LPN   Tobacco Use     Smoking status: Former     Current packs/day: 0.00     Average packs/day: 0.1 packs/day for 2.0 years (0.2 ttl pk-yrs)     Types: Cigarettes     Start date: 1963     Quit date: 1965     Years since quittin.8     Passive exposure: Past     Smokeless tobacco: Never   Vaping Use     Vaping status: Never Used   Substance and Sexual Activity     Alcohol use: Not Currently     Comment: one a month     Drug use: No     Sexual activity: Not Currently     Partners: Male     Birth control/protection: Post-menopausal, Female Surgical   Other Topics Concern     Parent/sibling w/ CABG, MI or angioplasty before 65F 55M? No   Social History Narrative    , lives with .  2 grown children in area     Lives in house.       Social Determinants of Health     Financial Resource Strain: High Risk (2024)    Financial Resource Strain      Within the past 12 months, have you or  your family members you live with been unable to get utilities (heat, electricity) when it was really needed?: Yes   Food Insecurity: Low Risk  (9/4/2024)    Food Insecurity      Within the past 12 months, did you worry that your food would run out before you got money to buy more?: No      Within the past 12 months, did the food you bought just not last and you didn t have money to get more?: No   Transportation Needs: Low Risk  (9/4/2024)    Transportation Needs      Within the past 12 months, has lack of transportation kept you from medical appointments, getting your medicines, non-medical meetings or appointments, work, or from getting things that you need?: No   Physical Activity: Insufficiently Active (9/4/2024)    Exercise Vital Sign      Days of Exercise per Week: 3 days      Minutes of Exercise per Session: 20 min   Stress: No Stress Concern Present (9/4/2024)    [employer] - Occupational Stress Questionnaire      Feeling of Stress : Only a little   Social Connections: Unknown (9/4/2024)    Social Connection and Isolation Panel [NHANES]      Frequency of Social Gatherings with Friends and Family: Twice a week   Interpersonal Safety: Low Risk  (9/9/2024)    Interpersonal Safety      Do you feel physically and emotionally safe where you currently live?: Yes      Within the past 12 months, have you been hit, slapped, kicked or otherwise physically hurt by someone?: No      Within the past 12 months, have you been humiliated or emotionally abused in other ways by your partner or ex-partner?: No   Housing Stability: Low Risk  (9/4/2024)    Housing Stability      Do you have housing? : Yes      Are you worried about losing your housing?: No       CURRENT MEDICATIONS:  Current Outpatient Medications   Medication Sig Dispense Refill     acetaminophen (TYLENOL) 500 MG tablet Take 500-1,000 mg by mouth every 6 hours as needed.       albuterol (PROAIR HFA/PROVENTIL HFA/VENTOLIN HFA) 108  (90 Base) MCG/ACT inhaler Inhale 2 puffs into the lungs every 6 hours as needed for shortness of breath or wheezing. 18 g 11     amiodarone (PACERONE) 200 MG tablet Take 1 tablet (200 mg) by mouth daily. Take 400 mg (2 tablets) twice a day x 2 weeks. Then take 200 mg (1 tablet) daily.       atorvastatin (LIPITOR) 20 MG tablet Take 1 tablet (20 mg) by mouth daily. 90 tablet 4     azelastine (ASTELIN) 0.1 % nasal spray Spray 1 spray into both nostrils at bedtime.       B-COMPLEX OR Take 1 tablet by mouth daily.       CALCIUM 500 +D OR Take 1 tablet by mouth daily.       cephALEXin (KEFLEX) 500 MG capsule Take 1 capsule (500 mg) by mouth 2 times daily for 10 days. 20 capsule 0     folic acid (FOLVITE) 1 MG tablet Take 2 tablets (2 mg) by mouth daily 200 tablet 2     hypromellose (ARTIFICIAL TEARS) 0.5 % SOLN ophthalmic solution Place 1 drop into both eyes 3 times daily       latanoprost (XALATAN) 0.005 % ophthalmic solution Place 1 drop into both eyes at bedtime 7.5 mL 3     lisinopril (ZESTRIL) 5 MG tablet Take 1 tablet (5 mg) by mouth at bedtime 90 tablet 3     methotrexate 2.5 MG tablet Take 8 tablets (20 mg) by mouth every 7 days . Methotrexate is a once weekly medication, taken on the same day of each week. 96 tablet 2     multivitamin (OCUVITE) TABS tablet Take 1 tablet by mouth daily       polyethylene glycol (MIRALAX) 17 GM/Dose powder Take 17 g (1 Capful) by mouth daily as needed for constipation       SENNA-docusate sodium (SENNA S) 8.6-50 MG tablet Take 1-4 tablets by mouth daily as needed       spironolactone (ALDACTONE) 25 MG tablet Take 0.5 tablets (12.5 mg) by mouth daily 45 tablet 3     timolol maleate (TIMOPTIC) 0.5 % ophthalmic solution Place 1 drop Into the left eye every morning 10 mL 3     zolpidem (AMBIEN) 5 MG tablet TAKE ONE TABLET BY MOUTH IN THE EVENING AS NEEDED FOR SLEEP 30 tablet 5     Current Facility-Administered Medications   Medication Dose Route Frequency Provider Last Rate Last Admin      study - apixaban vs. placebo (IDS# 6271) capsule 5 mg  5 mg Oral BID Nick Gamez MD         study - milvexian vs. placebo (IDS# 6271) tablet 100 mg  100 mg Oral BID Nick Gamez MD           ROS:   Refer to HPI    EXAM:  /66 (BP Location: Right arm, Patient Position: Sitting, Cuff Size: Adult Regular)   Pulse 61   Wt 78.9 kg (174 lb)   SpO2 100%   BMI 31.61 kg/m    GENERAL: Appears comfortable, in no acute distress.   HEENT: Eye symmetrical, no discharge or icterus bilaterally.  CV: RRR, +S1S2, no murmur, rub, or gallop. JVP <6.   RESPIRATORY: Respirations regular, even, and unlabored. Lungs CTA throughout.   GI: Soft and non distended   EXTREMITIES: Mild peripheral edema.  All extremities are warm and well perfused   NEUROLOGIC: Alert and interacting appropriately.    MUSCULOSKELETAL: No joint swelling or tenderness.   SKIN: No jaundice.     Labs, reviewed with patient in clinic today:  CBC RESULTS:  Lab Results   Component Value Date    WBC 5.9 09/23/2024    WBC 5.0 11/26/2020    RBC 4.39 09/23/2024    RBC 4.74 11/26/2020    HGB 13.4 09/23/2024    HGB 13.4 11/26/2020    HCT 41.0 09/23/2024    HCT 43.1 11/26/2020    MCV 93 09/23/2024    MCV 91 11/26/2020    MCH 30.5 09/23/2024    MCH 28.3 11/26/2020    MCHC 32.7 09/23/2024    MCHC 31.1 (L) 11/26/2020    RDW 18.0 (H) 09/23/2024    RDW 12.5 11/26/2020     09/23/2024     11/26/2020       CMP RESULTS:  Lab Results   Component Value Date     09/23/2024     11/26/2020    POTASSIUM 4.4 09/23/2024    POTASSIUM 4.3 03/08/2023    POTASSIUM 3.8 11/26/2020    CHLORIDE 105 09/23/2024    CHLORIDE 106 03/08/2023    CHLORIDE 104 11/26/2020    CO2 23 09/23/2024    CO2 27 03/08/2023    CO2 29 11/26/2020    ANIONGAP 13 09/23/2024    ANIONGAP 4 03/08/2023    ANIONGAP 6 11/26/2020    GLC 94 09/23/2024     (H) 03/08/2023     (H) 11/26/2020    BUN 20.5 09/23/2024    BUN 22 03/08/2023    BUN 16 11/26/2020    CR 0.98 (H) 09/23/2024  "   CR 0.70 11/26/2020    GFRESTIMATED 58 (L) 09/23/2024    GFRESTIMATED >60 11/14/2022    GFRESTIMATED 84 11/26/2020    GFRESTBLACK >90 11/26/2020    RUDOLPH 9.5 09/23/2024    RUDOLPH 10.0 11/26/2020    BILITOTAL 0.6 09/23/2024    BILITOTAL 0.5 11/26/2020    ALBUMIN 4.2 09/23/2024    ALBUMIN 3.8 03/08/2023    ALBUMIN 3.6 11/26/2020    ALKPHOS 60 09/23/2024    ALKPHOS 84 11/26/2020    ALT 17 09/23/2024    ALT 16 11/26/2020    AST 23 09/23/2024    AST 9 11/26/2020        INR RESULTS:  Lab Results   Component Value Date    INR 1.75 (H) 06/19/2024    INR 1.24 (H) 11/26/2020       Lab Results   Component Value Date    MAG 1.8 06/27/2024    MAG 1.6 06/30/2014     Lab Results   Component Value Date    NTBNPI 2,092 (H) 06/20/2024     No results found for: \"NTBNP\"    Diagnostics:  DIRK 6/19/24 Left Ventricle  Left ventricular function is decreased. The ejection fraction is 40-45%  (mildly reduced).    Assessment and Plan:   Ms. 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 Presents to clinic for CORE follow-up.    She has HFmrEF. Unclear if this is tachy mediated. Given there is a high likelyhood of that and that she is at high risk of polypharmacy given her age- we are going to get an echo before further aggressive GDMT titration.     She is in a clinical trial and we are waiting for a set of labs that was recently taken to be faxed over.    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 NYHA Class II.    Fluid status: euvolemic  ACEi/ARB/ARNI: continue lisinopril 5 mg dialy  BB: N/A  Aldosterone antagonist: aldactone 12.5 mg daily  SLG2i: Did not discuss today  SCD prophylaxis: does not meet criteria for " implant  NSAID use: contraindicated  Sleep apnea evaluation: did not discuss today  Remote monitoring: did not discuss today  Other- will get an echo prior to next appointment    # Atrial fibrillation w/ RVR (8/2018)  Outpatient DCCV on 6/27 with successful cardioversion to NSR after 2 shocks. Asymptomatic in clinic today.  - She is in a Gulshan study: Eliquis vs alternative study drug/blood thinner  - Amio 200 mg daily (s/p oral load)     # HLD  Lipid panel 11/2022 with LDL 74, .  - atorvastatin 20 mg daily     # Moderate persistent asthma  - albuterol prn      Follow up   - ECHO when available  - EP 11/19 as scheduled  - Will try for core appointment 11/19 when she is at the [facility]    Billing  - I spent 22 minutes face to face with the patient and an additional 2 minutes coordinating care and completing documentation on the day of service    The longitudinal plan of care for the diagnosis(es)/condition(s) as documented were addressed during this visit. Due to the added complexity in care, I will continue to support Malinda in the subsequent management and with ongoing continuity of care.       Emily Sloan PA-C  [facility]          CC      Please do not hesitate to contact me if you have any questions/concerns.     Sincerely,     Emily Sloan PA-C

## 2024-10-18 NOTE — NURSING NOTE
Diet: Patient instructed regarding a heart failure healthy diet, including discussion of reduced fat and 2000 mg daily sodium restriction, daily weights, medication purpose and compliance, fluid restrictions and resources for patient and family to access for assistance with heart failure management.       Labs: Patient was given results of the laboratory testing obtained today and patient was instructed about when to return for the next laboratory testing.     Med Reconcile: Reviewed and verified all current medications with the patient. The updated medication list was printed and given to the patient. No changes    Return Appointment: Patient given instructions regarding scheduling next clinic visit. Echocardiogram. Follow up with general core in about 1 month.    Patient stated she understood all health information given and agreed to call with further questions or concerns.       Julianna Hanley RN

## 2024-10-30 NOTE — TELEPHONE ENCOUNTER
REFERRAL INFORMATION:  Referring Provider:  Wallace Mixon MD   Referring Clinic:   FAMILY [facility]   Reason for Visit/Diagnosis: L72.9, L08.9 (ICD-10-CM) - Infected cyst of skin        FUTURE VISIT INFORMATION:  Appointment Date: 11/22/24  Appointment Time:      NOTES RECORD STATUS  DETAILS   OFFICE NOTE from Referring Provider Internal 10/14/24   PERTINENT LABS Internal      Records Requested    Facility    Fax:    Outcome

## 2024-11-01 ENCOUNTER — OFFICE VISIT (OUTPATIENT)
Dept: FAMILY MEDICINE | Facility: CLINIC | Age: 79
End: 2024-11-01
Payer: MEDICARE

## 2024-11-01 VITALS
RESPIRATION RATE: 22 BRPM | BODY MASS INDEX: 31.76 KG/M2 | HEART RATE: 71 BPM | TEMPERATURE: 96.6 F | DIASTOLIC BLOOD PRESSURE: 68 MMHG | WEIGHT: 172.6 LBS | OXYGEN SATURATION: 98 % | HEIGHT: 62 IN | SYSTOLIC BLOOD PRESSURE: 107 MMHG

## 2024-11-01 DIAGNOSIS — I48.0 PAROXYSMAL A-FIB (H): ICD-10-CM

## 2024-11-01 DIAGNOSIS — L02.213 CHEST WALL ABSCESS: Primary | ICD-10-CM

## 2024-11-01 DIAGNOSIS — Z91.09 ENVIRONMENTAL ALLERGIES: ICD-10-CM

## 2024-11-01 PROCEDURE — 87186 SC STD MICRODIL/AGAR DIL: CPT | Performed by: FAMILY MEDICINE

## 2024-11-01 PROCEDURE — 99213 OFFICE O/P EST LOW 20 MIN: CPT | Performed by: FAMILY MEDICINE

## 2024-11-01 PROCEDURE — 87077 CULTURE AEROBIC IDENTIFY: CPT | Performed by: FAMILY MEDICINE

## 2024-11-01 PROCEDURE — 87205 SMEAR GRAM STAIN: CPT | Performed by: FAMILY MEDICINE

## 2024-11-01 PROCEDURE — 87070 CULTURE OTHR SPECIMN AEROBIC: CPT | Performed by: FAMILY MEDICINE

## 2024-11-01 ASSESSMENT — PAIN SCALES - GENERAL: PAINLEVEL_OUTOF10: MILD PAIN (3)

## 2024-11-01 NOTE — PATIENT INSTRUCTIONS
We will notify of culture results    Continue heat/ bandage as needed    Keep specialist appointment    Be seen promptly if symptoms acutely worsen

## 2024-11-01 NOTE — PROGRESS NOTES
"      Ian Petty is a 79 year old, presenting for the following health issues:  Cyst      11/1/2024     7:41 AM   Additional Questions   Roomed by Arren     History of Present Illness       Reason for visit:  Infected cyst on neck    She eats 4 or more servings of fruits and vegetables daily.She consumes 0 sweetened beverage(s) daily.She exercises with enough effort to increase her heart rate 20 to 29 minutes per day.  She exercises with enough effort to increase her heart rate 3 or less days per week.   She is taking medications regularly.      Draining more now than before     Using heat          Objective    /68 (BP Location: Right arm, Patient Position: Sitting, Cuff Size: Adult Large)   Pulse 71   Temp (!) 96.6  F (35.9  C) (Temporal)   Resp 22   Ht 1.58 m (5' 2.21\")   Wt 78.3 kg (172 lb 9.6 oz)   SpO2 98%   BMI 31.36 kg/m    Body mass index is 31.36 kg/m .  Physical Exam    Full physical not done     Mentation and affect are fine    No tremor of speech or extremity    Patient has small bandage over the drainage outlet very upper right chest.  The outlet is a very small opening  initially no drainage seen but when we milk the likely duct/ tract under skin from right side of neck down to opening, a small amount of thick colored pus obtained.  Swab done. No odor.    Interestingly there was no redness/ swelling in the area.  No point tenderness.    Tympanic membranes and canals fine bilat    Nose and oral mucosa okay    No sinus tenderness    Some tenderness right submandib area but no swelling      ASSESSMENT / PLAN:  (L02.213) Chest wall abscess  (primary encounter diagnosis)  Comment: interestingly patient states she has had some drainage off and on in this area since childhood but never had pus/ infection before. It looks better this time but more drainage.  Better sample this time to send to lab.   Plan: Abscess Aerobic Bacterial Culture Routine With         Gram Stain             (Z91.09) " Environmental allergies  Comment: suggested patient could try loratadine  Plan: as above     (I48.0) Paroxysmal A-fib (H)  Comment: patient in a study so put note on this entry in med list   Plan: apixaban ANTICOAGULANT (ELIQUIS) 5 MG tablet               I reviewed the patient's medications, allergies, medical history, family history, and social history.    Wallace Mixon MD             Signed Electronically by: Wallace Mixon MD

## 2024-11-03 ENCOUNTER — TELEPHONE (OUTPATIENT)
Dept: FAMILY MEDICINE | Facility: CLINIC | Age: 79
End: 2024-11-03
Payer: MEDICARE

## 2024-11-03 DIAGNOSIS — L02.213 CHEST WALL ABSCESS: Primary | ICD-10-CM

## 2024-11-03 NOTE — TELEPHONE ENCOUNTER
I called patient and discussed in detail the wound culture results.  Growing pseudomonas but patient concerned about interactions of antibiotics with her amiodarone and the swelling/ abscess/ drainage seem to be resolving.  Of note, levofloxacin interacts with amiodarone but ciprofloxacin does not.  Given that symptoms are resolving, reasonable to hold off on antibiotics for now.  Sensitivities pending.  Patient agreed.  Wallace Mixon MD

## 2024-11-04 ENCOUNTER — TELEPHONE (OUTPATIENT)
Dept: FAMILY MEDICINE | Facility: CLINIC | Age: 79
End: 2024-11-04

## 2024-11-04 ENCOUNTER — OFFICE VISIT (OUTPATIENT)
Dept: CARDIOLOGY | Facility: CLINIC | Age: 79
End: 2024-11-04
Payer: MEDICARE

## 2024-11-04 ENCOUNTER — ANCILLARY PROCEDURE (OUTPATIENT)
Dept: CARDIOLOGY | Facility: CLINIC | Age: 79
End: 2024-11-04
Attending: PHYSICIAN ASSISTANT
Payer: MEDICARE

## 2024-11-04 VITALS
WEIGHT: 174 LBS | HEART RATE: 67 BPM | DIASTOLIC BLOOD PRESSURE: 60 MMHG | RESPIRATION RATE: 16 BRPM | BODY MASS INDEX: 32.02 KG/M2 | SYSTOLIC BLOOD PRESSURE: 108 MMHG | HEIGHT: 62 IN

## 2024-11-04 DIAGNOSIS — Z00.6 EXAMINATION OF PARTICIPANT OR CONTROL IN CLINICAL RESEARCH: ICD-10-CM

## 2024-11-04 DIAGNOSIS — I50.20 HEART FAILURE WITH REDUCED EJECTION FRACTION (H): ICD-10-CM

## 2024-11-04 DIAGNOSIS — I48.0 PAROXYSMAL A-FIB (H): Primary | ICD-10-CM

## 2024-11-04 LAB
BACTERIA ABSC ANAEROBE+AEROBE CULT: ABNORMAL
GRAM STAIN RESULT: ABNORMAL
GRAM STAIN RESULT: ABNORMAL
LVEF ECHO: NORMAL

## 2024-11-04 PROCEDURE — 36415 COLL VENOUS BLD VENIPUNCTURE: CPT

## 2024-11-04 PROCEDURE — 84999 UNLISTED CHEMISTRY PROCEDURE: CPT

## 2024-11-04 PROCEDURE — 99207 PR NO CHARGE-RESEARCH SERVICE: CPT

## 2024-11-04 PROCEDURE — 93306 TTE W/DOPPLER COMPLETE: CPT | Performed by: INTERNAL MEDICINE

## 2024-11-04 NOTE — PROGRESS NOTES
"A Phase 3, Randomized, Double-Blind, Double-Dummy, Parallel Group, Active-Controlled Study to Evaluate the Efficacy and Safety of Milvexian, an Oral Factor Joycelyn Inhibitor, Versus Apixaban in Participants with Atrial Fibrillation     Participant seen in clinic today for study visit Month 1 (Week 4)     /60 (BP Location: Left arm, Patient Position: Sitting, Cuff Size: Adult Regular)   Pulse 67   Resp 16   Ht 1.562 m (5' 1.5\")   Wt 78.9 kg (174 lb)   BMI 32.34 kg/m       Endpoints Assessed:   CVD (including death due to undetermined cause), MI (eg, stable and unstable angina, and ischemic chest pain), Stroke, TIA, Major vascular (non-traumatic) limb amputation, Acute limb ischemia, Pulmonary Embolism, Symptomatic DVT, Non-CNS systemic embolism.     Endpoint assessment completed and participant denies any endpoints.     AEs requiring Additional Information:   Suspected liver injury, cutaneous adverse events, and renal adverse events     AEs requiring additional information assessment completed and participant denies any endpoints.     Modified [place] Scale Assessment (mRS) completed by investigator only for participants with a history of stroke.     Medical Resource Utilization completed as per protocol.     Collected Labs:   [Hemoglobin/platelet, serum creatinine/eGFR, Cystatin C, ALT/AST/ALP/Total bilirubin), PK samples, PD samples (aPTT), biomarker samples (D-dimer) and (Exploratory proteomics]      Clinical laboratory tests collected @09:10 AM. Results will be submitted to the central lab and scanned in to 'media' tab and reviewed by investigator.       Participant returned:  20 tablets/capsules from treatment kit [id number] for 100 % compliance.   20 tablets/capsules from treatment kit [id number] for 100 % compliance.     Dispensed via [email or web address]   210 (70x3) tablets/capsules from treatment kit [id number], [id number], [id number] (Apixiban vs placebo)  210 (70x3) tablets/capsules from treatment kit [id number], [id number], " [id number] (Milvexian vs placebo)     Dosing instructions and study treatment return for next visit reviewed with participant and he/she verbalized understanding.     Concomitant therapies reviewed.  Changes (if any) noted below.     Assessed participant regarding new or ongoing adverse events. Participant denies adverse events.      Plan: Will schedule next study visit with participant at [facility] on 09 Jan 2025 at 10:30.       Current Outpatient Medications:     acetaminophen (TYLENOL) 500 MG tablet, Take 500-1,000 mg by mouth every 6 hours as needed., Disp: , Rfl:     albuterol (PROAIR HFA/PROVENTIL HFA/VENTOLIN HFA) 108 (90 Base) MCG/ACT inhaler, Inhale 2 puffs into the lungs every 6 hours as needed for shortness of breath or wheezing., Disp: 18 g, Rfl: 11    amiodarone (PACERONE) 200 MG tablet, Take 1 tablet (200 mg) by mouth daily. Take 400 mg (2 tablets) twice a day x 2 weeks. Then take 200 mg (1 tablet) daily., Disp: , Rfl:     apixaban ANTICOAGULANT (ELIQUIS) 5 MG tablet, Patient in study, is either on Eliquis or a new med Librexia, Disp: , Rfl:     atorvastatin (LIPITOR) 20 MG tablet, Take 1 tablet (20 mg) by mouth daily., Disp: 90 tablet, Rfl: 4    azelastine (ASTELIN) 0.1 % nasal spray, Spray 1 spray into both nostrils at bedtime., Disp: , Rfl:     B-COMPLEX OR, Take 1 tablet by mouth daily., Disp: , Rfl:     CALCIUM 500 +D OR, Take 1 tablet by mouth daily., Disp: , Rfl:     folic acid (FOLVITE) 1 MG tablet, Take 2 tablets (2 mg) by mouth daily, Disp: 200 tablet, Rfl: 2    hypromellose (ARTIFICIAL TEARS) 0.5 % SOLN ophthalmic solution, Place 1 drop into both eyes 3 times daily, Disp: , Rfl:     latanoprost (XALATAN) 0.005 % ophthalmic solution, Place 1 drop into both eyes at bedtime, Disp: 7.5 mL, Rfl: 3    lisinopril (ZESTRIL) 5 MG tablet, Take 1 tablet (5 mg) by mouth at bedtime, Disp: 90 tablet, Rfl: 3    methotrexate 2.5 MG tablet, Take 8 tablets (20 mg) by mouth every 7 days .  Methotrexate is a once weekly medication, taken on the same day of each week., Disp: 96 tablet, Rfl: 2    multivitamin (OCUVITE) TABS tablet, Take 1 tablet by mouth daily, Disp: , Rfl:     polyethylene glycol (MIRALAX) 17 GM/Dose powder, Take 17 g (1 Capful) by mouth daily as needed for constipation, Disp: , Rfl:     SENNA-docusate sodium (SENNA S) 8.6-50 MG tablet, Take 1-4 tablets by mouth daily as needed, Disp: , Rfl:     spironolactone (ALDACTONE) 25 MG tablet, Take 0.5 tablets (12.5 mg) by mouth daily, Disp: 45 tablet, Rfl: 3    timolol maleate (TIMOPTIC) 0.5 % ophthalmic solution, Place 1 drop Into the left eye every morning, Disp: 10 mL, Rfl: 3    zolpidem (AMBIEN) 5 MG tablet, TAKE ONE TABLET BY MOUTH IN THE EVENING AS NEEDED FOR SLEEP, Disp: 30 tablet, Rfl: 5    Current Facility-Administered Medications:     study - apixaban vs. placebo (IDS# 6271) capsule 5 mg, 5 mg, Oral, BID, Nick Gamez MD    study - milvexian vs. placebo (IDS# 6271) tablet 100 mg, 100 mg, Oral, BID, Nick Gamez MD Jason Hyatt, RN     Clinical Research Nurse  [facility]  Clinical Trials Office  [facility] [place] [address]  [email or web address]   Office: [phone]

## 2024-11-04 NOTE — CONFIDENTIAL NOTE
I called patient with final culture result  Since symptoms resolving we will hold off on antibiotics and have patient follow up as planned with specialist soon  If it worsens again, patient will let us know  Wallace Mixon MD

## 2024-11-05 DIAGNOSIS — I48.0 PAROXYSMAL A-FIB (H): ICD-10-CM

## 2024-11-05 RX ORDER — AMIODARONE HYDROCHLORIDE 200 MG/1
200 TABLET ORAL DAILY
OUTPATIENT
Start: 2024-11-05

## 2024-11-05 RX ORDER — AMIODARONE HYDROCHLORIDE 200 MG/1
200 TABLET ORAL DAILY
Status: CANCELLED | OUTPATIENT
Start: 2024-11-05

## 2024-11-05 NOTE — TELEPHONE ENCOUNTER
[facility] Call Center    Phone Message    May a detailed message be left on voicemail: yes     Reason for Call: Medication Refill Request    Has the patient contacted the pharmacy for the refill? Yes   Name of medication being requested: amiodarone (PACERONE) 200 MG tablet   Provider who prescribed the medication: Mary Lou Mcdonald MD  Pharmacy:   [facility] [address] [place] [address] [address] [facility]     Date medication is needed: ASAP       Action Taken: Other: cardio    Travel Screening: Not Applicable    Thank you!  [facility]       Date of Service:

## 2024-11-05 NOTE — TELEPHONE ENCOUNTER
amiodarone (PACERONE) 200 MG tablet -- -- 10/10/2024 -- No   Sig - Route: Take 1 tablet (200 mg) by mouth daily. Take 400 mg (2 tablets) twice a day x 2 weeks. Then take 200 mg (1 tablet) daily. - Oral   Class: No Print Out   Order: [id number]     Last Office Visit : 10/18/24  Future Office visit:  11/19/24    Routing refill request to provider for review/approval because:  Drug not on the [facility], [facility] or [facility] refill protocol  No Print out

## 2024-11-08 NOTE — TELEPHONE ENCOUNTER
Amiodarone prescription sent in already.    Yvonne Day, RN, BSN  Cardiology RN Care Coordinator   [place]   Phone: [phone]  Fax: [phone] ([place]) [phone] ([place])

## 2024-11-11 ENCOUNTER — LAB (OUTPATIENT)
Dept: LAB | Facility: CLINIC | Age: 79
End: 2024-11-11
Payer: MEDICARE

## 2024-11-11 DIAGNOSIS — N18.31 CHRONIC KIDNEY DISEASE, STAGE 3A (H): ICD-10-CM

## 2024-11-11 DIAGNOSIS — M06.09 RHEUMATOID ARTHRITIS OF MULTIPLE SITES WITH NEGATIVE RHEUMATOID FACTOR (H): ICD-10-CM

## 2024-11-11 DIAGNOSIS — Z79.899 HIGH RISK MEDICATION USE: ICD-10-CM

## 2024-11-11 LAB
ALBUMIN SERPL BCG-MCNC: 4 G/DL (ref 3.5–5.2)
ALP SERPL-CCNC: 60 U/L (ref 40–150)
ALT SERPL W P-5'-P-CCNC: 12 U/L (ref 0–50)
AST SERPL W P-5'-P-CCNC: 25 U/L (ref 0–45)
BASOPHILS # BLD AUTO: 0 10E3/UL (ref 0–0.2)
BASOPHILS NFR BLD AUTO: 0 %
BILIRUB DIRECT SERPL-MCNC: <0.2 MG/DL (ref 0–0.3)
BILIRUB SERPL-MCNC: 0.6 MG/DL
CREAT SERPL-MCNC: 1.02 MG/DL (ref 0.51–0.95)
CRP SERPL-MCNC: 3.29 MG/L
EGFRCR SERPLBLD CKD-EPI 2021: 55 ML/MIN/1.73M2
EOSINOPHIL # BLD AUTO: 0.2 10E3/UL (ref 0–0.7)
EOSINOPHIL NFR BLD AUTO: 3 %
ERYTHROCYTE [DISTWIDTH] IN BLOOD BY AUTOMATED COUNT: 14.2 % (ref 10–15)
ERYTHROCYTE [SEDIMENTATION RATE] IN BLOOD BY WESTERGREN METHOD: 16 MM/HR (ref 0–30)
HCT VFR BLD AUTO: 39.5 % (ref 35–47)
HGB BLD-MCNC: 12.6 G/DL (ref 11.7–15.7)
IMM GRANULOCYTES # BLD: 0 10E3/UL
IMM GRANULOCYTES NFR BLD: 0 %
LYMPHOCYTES # BLD AUTO: 1.5 10E3/UL (ref 0.8–5.3)
LYMPHOCYTES NFR BLD AUTO: 20 %
MCH RBC QN AUTO: 32.4 PG (ref 26.5–33)
MCHC RBC AUTO-ENTMCNC: 31.9 G/DL (ref 31.5–36.5)
MCV RBC AUTO: 102 FL (ref 78–100)
MONOCYTES # BLD AUTO: 0.5 10E3/UL (ref 0–1.3)
MONOCYTES NFR BLD AUTO: 7 %
NEUTROPHILS # BLD AUTO: 5.1 10E3/UL (ref 1.6–8.3)
NEUTROPHILS NFR BLD AUTO: 69 %
PLATELET # BLD AUTO: 234 10E3/UL (ref 150–450)
PROT SERPL-MCNC: 7.2 G/DL (ref 6.4–8.3)
RBC # BLD AUTO: 3.89 10E6/UL (ref 3.8–5.2)
WBC # BLD AUTO: 7.3 10E3/UL (ref 4–11)

## 2024-11-11 PROCEDURE — 85025 COMPLETE CBC W/AUTO DIFF WBC: CPT

## 2024-11-11 PROCEDURE — 85652 RBC SED RATE AUTOMATED: CPT

## 2024-11-11 PROCEDURE — 80076 HEPATIC FUNCTION PANEL: CPT

## 2024-11-11 PROCEDURE — 82565 ASSAY OF CREATININE: CPT

## 2024-11-11 PROCEDURE — 36415 COLL VENOUS BLD VENIPUNCTURE: CPT

## 2024-11-11 PROCEDURE — 86140 C-REACTIVE PROTEIN: CPT

## 2024-11-12 ENCOUNTER — TRANSFERRED RECORDS (OUTPATIENT)
Dept: CARDIOLOGY | Facility: CLINIC | Age: 79
End: 2024-11-12
Payer: MEDICARE

## 2024-11-18 ENCOUNTER — OFFICE VISIT (OUTPATIENT)
Dept: RHEUMATOLOGY | Facility: CLINIC | Age: 79
End: 2024-11-18
Payer: MEDICARE

## 2024-11-18 VITALS
BODY MASS INDEX: 31.97 KG/M2 | RESPIRATION RATE: 16 BRPM | WEIGHT: 172 LBS | OXYGEN SATURATION: 98 % | HEART RATE: 68 BPM | DIASTOLIC BLOOD PRESSURE: 75 MMHG | SYSTOLIC BLOOD PRESSURE: 122 MMHG

## 2024-11-18 DIAGNOSIS — M25.511 CHRONIC RIGHT SHOULDER PAIN: ICD-10-CM

## 2024-11-18 DIAGNOSIS — M06.09 RHEUMATOID ARTHRITIS OF MULTIPLE SITES WITH NEGATIVE RHEUMATOID FACTOR (H): Primary | ICD-10-CM

## 2024-11-18 DIAGNOSIS — Z79.899 HIGH RISK MEDICATION USE: ICD-10-CM

## 2024-11-18 DIAGNOSIS — G89.29 CHRONIC RIGHT SHOULDER PAIN: ICD-10-CM

## 2024-11-18 DIAGNOSIS — M54.9 UPPER BACK PAIN: ICD-10-CM

## 2024-11-18 PROCEDURE — 99214 OFFICE O/P EST MOD 30 MIN: CPT | Performed by: INTERNAL MEDICINE

## 2024-11-18 PROCEDURE — G2211 COMPLEX E/M VISIT ADD ON: HCPCS | Performed by: INTERNAL MEDICINE

## 2024-11-18 NOTE — PATIENT INSTRUCTIONS
RHEUMATOLOGY    [facility]  [address] [place] [address]  [place] [address]    Phone number: [phone]  Fax number: [phone]    If you need a medication refill, please contact us as you may need lab work and/or a follow up visit prior to your refill.      Thank you for choosing [facility]!    Loraine Seaman CMA Rheumatology

## 2024-11-18 NOTE — PROGRESS NOTES
"Rheumatology Clinic Visit      Alfreda Baxter MRN# [id number]   YOB: 1945 Age: 79 year old      Date of visit: 11/18/24   PCP: Dr. Mercedes Urena    Chief Complaint   Patient presents with:  Rheumatoid Arthritis: Shoulders are a little worse    Assessment and Plan     1.  Seronegative rheumatoid arthritis: Inflammatory arthritis symptoms affecting her MCPs, PIPs, wrists, and shoulders started in February 2023.  Establish care with me on 11/8/2023 at which point she had symmetric synovitis of the MCPs, PIPs, and wrists; and symptoms of impingement syndrome of the shoulders.  She has been following at [facility] where steroid injections of the shoulders provide benefit for no more than 10 days; and systemic steroid taper starting with 60 mg daily from her primary care provider was a \"miracle\" with resolution of MCP, PIP, wrist, and shoulder pain while on prednisone.  Methotrexate was started in November 2023.  2/15/2024: Patient reports having improvement with methotrexate but still active disease.  Prednisone was effective but higher anxiety at 20 mg daily, mild intermittent anxiety at 10 mg daily, and tolerated well at 5 mg daily.  Was able to stop prednisone completely on 5/1/2024.  Currently doing well on methotrexate monotherapy.  Chronic illness, stable.    - Continue Methotrexate 20mg once every 7 days  - Continue folic acid 2 mg daily  - Labs in 3 months: CBC, Creatinine, Hepatic Panel, ESR, CRP    2.  Osteoarthritis of the hands: Significant degenerative changes of the hands with large Heberden's and Alba's nodes, and squaring of the first CMC joints.  Reportedly this has been stable for many years, preceding the inflammatory arthritis symptoms started in February 2023.  Minimal symptom severity from degenerative arthritis at this time.    3.  Degenerative arthritis of the spine: Many degenerative changes that have been managed with epidural steroid injections from [facility].  She will continue following with her spine specialist as needed.    4.  Right shoulder pain, upper back pain: Degenerative upper back and right shoulder pain.  Also mildly tender in the right biceps groove; reportedly achy for a few days.  Advised adding physical therapy.  - Physical therapy referral    5.  History of squamous cell carcinoma: 1 lesion removed per patient report.  She will continue following with dermatology at least once yearly for monitoring    6.  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    7.  Elevated creatinine: Avoid nephrotoxins    8.  Vaccinations: Vaccinations reviewed with Ms. Baxter.  Risks and benefits of vaccinations were discussed.    - Influenza: encouraged yearly vaccination  - [id number] and [id number]: up to date  - Shingrix: uptodate  - COVID-19: Advised keeping updated and to hold methotrexate x1-2 weeks afterward  - RSV: up to date    Total minutes spent in evaluation with patient, documentation, , and review of pertinent studies and chart notes: 16  The longitudinal plan of care for the rheumatology problem(s) were addressed during this visit.  Due to added complexity of care, we will continue to support the patient and the subsequent management of this condition with ongoing continuity of care.      Ms. Baxter verbalized agreement with and understanding of the rational for the diagnosis and treatment plan.  All questions were answered to best of my ability and the patient's satisfaction. Ms. Baxter was advised to contact the clinic with any questions that may arise after the clinic visit.      Thank you for involving me in the care of the patient    Return to clinic: 3 months    HPI   Alfreda Baxter is a 79 year old female with a past medical history significant for hypertension, hyperlipidemia, lumbar spine stenosis, paroxysmal atrial fibrillation on anticoagulation, history of CVA with left eye  vision loss, insomnia, and bilateral TKA who presents for follow-up of rheumatoid arthritis.     11/8/2023: Chronic degenerative changes of her hands and spine; the degenerative changes of her hands do not bother her.  The degenerative changes of her spine are managed at [facility] where steroid injections have been effective; she has seen a spine surgeon and is next going to see a pain management specialist at [facility].  She had been fairly stable until February 2023 when she had sudden onset bilateral shoulder pain.  This was followed by pain at the wrists, MCPs, and PIPs.  MCP, PIP, wrist, and shoulder pain is worse in the morning and improves with time and activity; morning stiffness for most of the day, reaching a baseline after about 1-2 hours.  When she received a epidural steroid injection of her lumbar spine her shoulders felt better.  She then received a high dose of prednisone starting with 60 mg daily and tapered off from her primary care clinic and while on prednisone she felt like it was a miracle drug.  She has been given other courses of prednisone and she says that she typically only starts with 40 mg a day and that that is very effective also.  Reduced  strength.  History of bilateral TKA.  Knees, ankles, and feet without any issues.  She notes that her son has ulcerative colitis and that her son's cousins on his father side have psoriasis; no other autoimmune disease on her side of the family.  Squamous of carcinoma x1; following with dermatology regularly.  History of CVA 5 years ago with loss of vision in the left eye.  No history of scalp tenderness, jaw or tongue claudication, or new headaches now around the time of the vision loss.    2/15/2024: Prednisone was effective but higher anxiety at 20 mg daily, mild intermittent anxiety at 10 mg daily, and tolerated well at 5 mg daily. Prednisone works well for arthritis.  Methotrexate has been helpful; held  methotrexate for several doses because of infections.  Still with pain/swelling/stiffness at the MCPs, PIPs, wrists, but all improved with methotrexate.  She would like to continue on methotrexate monotherapy for now to see if a longer duration will be helpful.  She has several questions about rheumatoid arthritis and methotrexate that were discussed today.  Occasional oral sore since starting methotrexate.  No nasal sores.    6/18/2024: Doing well on methotrexate.  Was able to stop prednisone on 5/1/2024.  No joint pain or swelling.  Morning stiffness for less than 10 minutes.  Arthritis not limiting her daily activities.  Since last seen she developed shortness of breath and was found to have A-fib with RVR; following with cardiology and has planned ablation tomorrow.    8/12/2024: RA controlled.  Not on prednisone.  Methotrexate is effective for RA management.  Still with degenerative changes of the hands and back; following with a spine specialist at [facility].  Creatinine is mildly elevated; she questions if this is related to blood pressure medication changes and she plans to discuss this with her primary care provider tomorrow.    Today, 11/18/2024: RA stable.  Not requiring prednisone.  Only new issue is a few days of right anterior shoulder pain worse with activity and improved with rest; also with chronic back pain, worse right now at the thoracic area and causing muscle spasms across the trapezius.  No joint swelling.  Morning stiffness for less than 20 minutes.    Tobacco: Former cigarette smoker  EtOH: None  Drugs: None  Occupation: retired nurse    ROS   12 point review of system was completed and negative except as noted in the HPI     Active Problem List     Patient Active Problem List   Diagnosis    DJD (degenerative joint disease)    Benign essential hypertension    Hyperlipidemia LDL goal <70    Lumbar spinal stenosis    Mild intermittent asthma without complication    History of  bilateral knee replacement    Obesity    Primary insomnia    Posterior vitreous detachment of both eyes    PFO (patent foramen ovale)    Neovascular glaucoma of left eye, moderate stage    Blind left eye    Paroxysmal A-fib (H)    Ocular hypertension of right eye, treated    History of central retinal artery occlusion, os    Disturbances of vision, late effect of stroke    Combined forms of age-related cataract, mild-mod, of left eye    Pseudophakia, od    Low bone mass    Cervicalgia    Heart failure with reduced ejection fraction (H)    Chronic kidney disease, stage 3a (H)     Past Medical History     Past Medical History:   Diagnosis Date    Acute idiopathic gout of left foot 11/04/2015    Adenomatous colon polyp 06/05/2013    Colonoscopy due 2025    Asthma 11/23/2009    controlled with inhaler    Blind left eye 07/08/2019    Due to central retinal occlusion    Cataract 12/11/2008    Central retinal artery occlusion of left eye 09/15/2018    Chronic kidney disease, stage 3a (H) 08/13/2024    Disturbances of vision, late effect of stroke 12/03/2022    DJD (degenerative joint disease) 11/23/2009    knees    GERD (gastroesophageal reflux disease) 07/29/2010    Glaucoma 05/10/2012    Gout 11/15/2012    Heart failure with reduced ejection fraction (H) 08/13/2024    History of central retinal artery occlusion, os 12/11/2020    HTN (hypertension) 11/23/2009    Hyperlipidemia LDL goal <70 12/31/2010    Infected cyst of skin 10/14/2024    Low bone mass 08/25/2003    Lumbar spinal stenosis 12/08/2011    Sees Dr Sage    Mild intermittent asthma without complication 06/28/2018    Mild persistent asthma 12/12/2013    Neovascular glaucoma of left eye, 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   H/o R central retinal artery occlusion    Paroxysmal ventricular tachycardia (H) 11/20/2018    Added automatically  from request for surgery [id number]    PFO (patent foramen ovale) 2010    Primary insomnia 2016    Recurrent cerebrovascular accidents (CVAs) (H) 2022    Recurrent cerebrovascular accidents (CVAs) (H) 2022    Rheumatoid arthritis of multiple sites with negative rheumatoid factor (H) 2023    Transient global amnesia 2022     Past Surgical History     Past Surgical History:   Procedure Laterality Date    ANESTHESIA CARDIOVERSION N/A 2024    Procedure: Anesthesia cardioversion @0930;  Surgeon: GENERIC ANESTHESIA PROVIDER;  Location: [facility] OR    ANESTHESIA CARDIOVERSION N/A 2024    Procedure: Anesthesia cardioversion @1330;  Surgeon: GENERIC ANESTHESIA PROVIDER;  Location: [facility] OR    APPENDECTOMY      age 7 yrs.    BREAST BIOPSY, RT/LT  2004    left benign    CATARACT IOL, RT/LT       SECTION      x 2    COLONOSCOPY  2013    needed q 3 yrs.    COLONOSCOPY N/A 09/10/2020    Procedure: Colonoscopy, With Polypectomy And Biopsy;  Surgeon: Brian Cleaning MD;  Location: [facility] OR    COLONOSCOPY WITH CO2 INSUFFLATION N/A 2016    Procedure: COLONOSCOPY WITH CO2 INSUFFLATION;  Surgeon: Brian Cleaning MD;  Location: [facility] OR    COLONOSCOPY WITH CO2 INSUFFLATION N/A 09/10/2020    Procedure: COLONOSCOPY, WITH CO2 INSUFFLATION;  Surgeon: Brian Cleaning MD;  Location: [facility] OR    EP COMPREHENSIVE EP STUDY N/A 2018    Procedure: LOOP RECORDER;  Surgeon: Buck Butterfield MD;  Location:  [facility]    HYSTERECTOMY, POORNIMA      bleeding fibroids    ORTHOPEDIC SURGERY      meniscus repair    PHACOEMULSIFICATION WITH STANDARD INTRAOCULAR LENS IMPLANT Right 10/24/2022    Procedure: COMPLE RIGHT EYE PHACOEMULSIFICATION, CATARACT, WITH STANDARD INTRAOCULAR LENS IMPLANT INSERTION;  Surgeon: Ezequiel Florence MD;  Location: [facility] OR    [facility] TOTAL KNEE ARTHROPLASTY Left 2015    at [facility]     Allergy     Allergies   Allergen Reactions     No Clinical Screening - See Comments Other (See Comments)     senisitive to non steroidals  Aleve, ibuprofen celebrex cause bad stomach pains  senisitive to non steroidals  Aleve, ibuprofen celebrex cause bad stomach pains    Oxycodone Hives     Other reaction(s): Unknown  ... With facial swelling    Nsaids Nausea and Vomiting     Other reaction(s): Abdominal Pain     Current Medication List     Current Outpatient Medications   Medication Sig Dispense Refill    acetaminophen (TYLENOL) 500 MG tablet Take 500-1,000 mg by mouth every 6 hours as needed.      albuterol (PROAIR HFA/PROVENTIL HFA/VENTOLIN HFA) 108 (90 Base) MCG/ACT inhaler Inhale 2 puffs into the lungs every 6 hours as needed for shortness of breath or wheezing. 18 g 11    amiodarone (PACERONE) 200 MG tablet Take 1 tablet (200 mg) by mouth daily. 30 tablet 0    apixaban ANTICOAGULANT (ELIQUIS) 5 MG tablet Patient in study, is either on Eliquis or a new med Librexia      atorvastatin (LIPITOR) 20 MG tablet Take 1 tablet (20 mg) by mouth daily. 90 tablet 4    azelastine (ASTELIN) 0.1 % nasal spray Spray 1 spray into both nostrils at bedtime.      B-COMPLEX OR Take 1 tablet by mouth daily.      CALCIUM 500 +D OR Take 1 tablet by mouth daily.      folic acid (FOLVITE) 1 MG tablet Take 2 tablets (2 mg) by mouth daily 200 tablet 2    hypromellose (ARTIFICIAL TEARS) 0.5 % SOLN ophthalmic solution Place 1 drop into both eyes 3 times daily      latanoprost (XALATAN) 0.005 % ophthalmic solution Place 1 drop into both eyes at bedtime 7.5 mL 3    lisinopril (ZESTRIL) 5 MG tablet Take 1 tablet (5 mg) by mouth at bedtime 90 tablet 3    methotrexate 2.5 MG tablet Take 8 tablets (20 mg) by mouth every 7 days . Methotrexate is a once weekly medication, taken on the same day of each week. 96 tablet 2    multivitamin (OCUVITE) TABS tablet Take 1 tablet by mouth daily      polyethylene glycol (MIRALAX) 17 GM/Dose powder Take 17 g (1 Capful) by mouth daily as needed for  constipation      SENNA-docusate sodium (SENNA S) 8.6-50 MG tablet Take 1-4 tablets by mouth daily as needed      spironolactone (ALDACTONE) 25 MG tablet Take 0.5 tablets (12.5 mg) by mouth daily 45 tablet 3    timolol maleate (TIMOPTIC) 0.5 % ophthalmic solution Place 1 drop Into the left eye every morning 10 mL 3    zolpidem (AMBIEN) 5 MG tablet TAKE ONE TABLET BY MOUTH IN THE EVENING AS NEEDED FOR SLEEP 30 tablet 5     Current Facility-Administered Medications   Medication Dose Route Frequency Provider Last Rate Last Admin    study - apixaban vs. placebo (IDS# 6271) capsule 5 mg  5 mg Oral BID Nick Gamez MD        study - milvexian vs. placebo (IDS# 6271) tablet 100 mg  100 mg Oral BID Nick Gamez MD         Social History   See HPI    Family History     Family History   Problem Relation Age of Onset    Arthritis Mother     Cancer Mother     Hypertension Mother     Other Cancer Mother     Thyroid Disease Mother     Respiratory Father     Glaucoma Father     Asthma Father     Allergies Sister     Eye Disorder Sister     Lipids Sister     Neurologic Disorder Sister     Osteoporosis Sister     Glaucoma Sister     Hypertension Sister     Macular Degeneration Sister     Hyperlipidemia Sister     Hypertension Sister     Osteoporosis Sister     Gastrointestinal Disease Son         Ulcerative Colitis    Glaucoma Other      Son: Ulcerative colitis, psoriatic arthritis, psoriasis.  Her son's paternal cousins have psoriasis    Physical Exam     Temp Readings from Last 3 Encounters:   11/01/24 (!) 96.6  F (35.9  C) (Temporal)   10/14/24 97.5  F (36.4  C) (Oral)   09/09/24 97.5  F (36.4  C) (Oral)     BP Readings from Last 5 Encounters:   11/18/24 122/75   11/04/24 108/60   11/01/24 107/68   10/18/24 110/66   10/14/24 99/66     Pulse Readings from Last 1 Encounters:   11/18/24 68     Resp Readings from Last 1 Encounters:   11/18/24 16     Estimated body mass index is 31.97 kg/m  as calculated from the following:     "Height as of 11/4/24: 1.562 m (5' 1.5\").    Weight as of this encounter: 78 kg (172 lb).    GEN: NAD. Healthy appearing adult.   HEENT:  Anicteric, noninjected sclera. No obvious external lesions of the ear and nose. Hearing intact.  PULM: No increased work of breathing  MSK: MCP and PIP without swelling or tenderness to palpation.   Heberden's and Alba's nodes present.  DIPs nontender to palpation.  Wrists and elbows without swelling or tenderness to palpation.  Shoulders nontender to palpation except at the right biceps groove; no swelling of either shoulder.  Knees and ankles without swelling or tenderness to palpation.  Negative MTP squeeze.  SKIN: No rash or jaundice seen  PSYCH: Alert. Appropriate.      Labs / Imaging (select studies)     RF/CCP  Recent Labs   Lab Test 03/08/23  1413   CCPIGG 0.7   RHF <7     CBC  Recent Labs   Lab Test 11/11/24  1027 09/23/24  1106 08/09/24  1031 06/21/24  0608 06/20/24  2027 07/20/21  1248 11/26/20  1141 10/15/20  1108 07/24/20  0829 08/10/19  1410   WBC 7.3 5.9 5.0   < > 8.0   < > 5.0   < > 5.7 18.6*   RBC 3.89 4.39 4.52   < > 4.23   < > 4.74   < > 4.84 4.31   HGB 12.6 13.4 13.4   < > 12.7   < > 13.4   < > 14.3 12.9   HCT 39.5 41.0 42.6   < > 40.0   < > 43.1   < > 43.4 40.0   * 93 94   < > 95   < > 91   < > 90 93   RDW 14.2 18.0* 15.9*   < > 15.9*   < > 12.5   < > 13.5 13.8    216 247   < > 231   < > 338   < > 246 252   MCH 32.4 30.5 29.6   < > 30.0   < > 28.3   < > 29.5 29.9   MCHC 31.9 32.7 31.5   < > 31.8   < > 31.1*   < > 32.9 32.3   NEUTROPHIL 69  --  67  --  68   < > 66.6  --  48.3 85.6   LYMPH 20  --  24  --  24   < > 25.2  --  41.3 9.7   MONOCYTE 7  --  6  --  6   < > 7.0  --  7.9 4.6   EOSINOPHIL 3  --  3  --  2   < > 0.4  --  2.3 0.0   BASOPHIL 0  --  1  --  0   < > 0.6  --  0.2 0.1   ANEU  --   --   --   --   --   --  3.3  --  2.7 16.0*   ALYM  --   --   --   --   --   --  1.3  --  2.3 1.8   MANINDER  --   --   --   --   --   --  0.4  --  0.5 0.9 "   AEOS  --   --   --   --   --   --  0.0  --  0.1 0.0   ABAS  --   --   --   --   --   --  0.0  --  0.0 0.0   ANEUTAUTO 5.1  --  3.4  --  5.4   < >  --   --   --   --    ALYMPAUTO 1.5  --  1.2  --  1.9   < >  --   --   --   --    AMONOAUTO 0.5  --  0.3  --  0.5   < >  --   --   --   --    AEOSAUTO 0.2  --  0.1  --  0.2   < >  --   --   --   --    ABSBASO 0.0  --  0.0  --  0.0   < >  --   --   --   --     < > = values in this interval not displayed.     CMP  Recent Labs   Lab Test 11/11/24  1027 09/23/24  1106 09/09/24  1205 08/09/24  1031 07/15/24  0941 07/27/21  1004 11/26/20  1141 10/15/20  1108 07/24/20  1013   NA  --  141 138  --  135   < > 138 141 143   POTASSIUM  --  4.4 4.7  --  5.7*   < > 3.8 4.6 4.1   CHLORIDE  --  105 105  --  102   < > 104 106 108   CO2  --  23 21*  --  21*   < > 29 33* 26   ANIONGAP  --  13 12  --  12   < > 6 2* 9   GLC  --  94 92  --  121*   < > 114* 100* 99   BUN  --  20.5 25.8*  --  35.9*   < > 16 24 19   CR 1.02* 0.98* 1.00* 1.21* 1.26*   < > 0.70 0.70 0.85   GFRESTIMATED 55* 58* 57* 45* 43*   < > 84 84 66   GFRESTBLACK  --   --   --   --   --   --  >90 >90 77   RUDOLPH  --  9.5 9.4  --  9.6   < > 10.0 9.6 9.1   BILITOTAL 0.6 0.6  --  0.6  --    < > 0.5  --   --    ALBUMIN 4.0 4.2  --  4.0  --    < > 3.6  --   --    PROTTOTAL 7.2 7.3  --  7.5  --    < > 8.6  --   --    ALKPHOS 60 60  --  57  --    < > 84  --   --    AST 25 23  --  28  --    < > 9  --   --    ALT 12 17  --  16  --    < > 16  --  22    < > = values in this interval not displayed.     Calcium/VitaminD  Recent Labs   Lab Test 09/23/24  1106 09/09/24  1205 07/15/24  0941 12/31/18  1043 06/25/18  0834   RUDOLPH 9.5 9.4 9.6   < > 9.4   VITDT  --   --   --   --  22    < > = values in this interval not displayed.     ESR/CRP  Recent Labs   Lab Test 11/11/24  1027 08/09/24  1031 05/07/24  1031 11/08/23  1242 03/08/23  1413 07/27/21  1004   SED 16 11 8   < > 18 9   CRP  --   --   --   --  13.4* <2.9   CRPI 3.29 <3.00 <3.00   < >  --    --     < > = values in this interval not displayed.       Hepatitis B  Recent Labs   Lab Test 11/08/23  1242   AUSAB 0.89   HBCAB Nonreactive   HEPBANG Nonreactive     Hepatitis C  Recent Labs   Lab Test 11/08/23  1242 12/03/21  1538   HCVAB Nonreactive Nonreactive     Lyme ab screening  Recent Labs   Lab Test 11/08/23  1242   LYMEGM 0.03     Immunization History     Immunization History   Administered Date(s) Administered    COVID-19 Bivalent 12+ (Pfizer) 12/12/2022    COVID-19 MONOVALENT 12+ (Pfizer) 03/10/2021, 03/31/2021, 12/07/2021    Flu 65+ (Fluad) 10/15/2024    Influenza (High Dose) Trivalent,PF (Fluzone) 10/21/2016, 11/16/2017, 10/16/2019, 10/10/2022    Influenza (IIV3) PF 11/29/2006, 11/02/2012, 11/01/2013, 11/07/2014, 10/20/2015    Influenza Vaccine 18-64 (Flublok) 10/19/2021    Influenza Vaccine 65+ (FLUAD) 10/19/2021, 10/10/2022, 11/06/2023    Influenza Vaccine 65+ (Fluzone HD) 10/13/2020    Pneumo Conj 13-V (2010&after) 06/28/2018    Pneumococcal 23 valent 11/29/2006, 07/27/2020    RSV Vaccine (Arexvy) 09/09/2024    TD,PF 7+ (Tenivac) 06/28/2018    TDAP Vaccine (Adacel) 11/19/2007    Td (Adult), Adsorbed 01/17/2000    Zoster recombinant adjuvanted (SHINGRIX) 07/15/2019, 09/26/2019    Zoster vaccine, live 11/19/2008          Chart documentation done in part with Dragon Voice recognition Software. Although reviewed after completion, some word and grammatical error may remain.    Lucho Huber MD

## 2024-11-19 ENCOUNTER — OFFICE VISIT (OUTPATIENT)
Dept: CARDIOLOGY | Facility: CLINIC | Age: 79
End: 2024-11-19
Payer: MEDICARE

## 2024-11-19 VITALS
WEIGHT: 172 LBS | BODY MASS INDEX: 31.97 KG/M2 | DIASTOLIC BLOOD PRESSURE: 75 MMHG | SYSTOLIC BLOOD PRESSURE: 121 MMHG | OXYGEN SATURATION: 97 % | HEART RATE: 74 BPM

## 2024-11-19 DIAGNOSIS — I48.0 PAROXYSMAL A-FIB (H): Primary | ICD-10-CM

## 2024-11-19 PROCEDURE — 93005 ELECTROCARDIOGRAM TRACING: CPT

## 2024-11-19 PROCEDURE — G0463 HOSPITAL OUTPT CLINIC VISIT: HCPCS

## 2024-11-19 RX ORDER — AMIODARONE HYDROCHLORIDE 200 MG/1
200 TABLET ORAL DAILY
Qty: 90 TABLET | Refills: 3 | Status: SHIPPED | OUTPATIENT
Start: 2024-11-19

## 2024-11-19 ASSESSMENT — PAIN SCALES - GENERAL: PAINLEVEL_OUTOF10: NO PAIN (0)

## 2024-11-19 NOTE — NURSING NOTE
Chief Complaint   Patient presents with    Follow Up      RETURN EP     Vitals were taken, medications reconciled, and EKG was performed.    Leland Schmidt, EMT  2:22 PM

## 2024-11-19 NOTE — PROGRESS NOTES
ELECTROPHYSIOLOGY CLINIC VISIT    Assessment/Recommendations   Assessment/Plan:    Aflreda Baxter is a 79 year old female with past medical history significant for persistent atrial fibrillation, NICM LVEF 40-45% (now improved to 55-60%), central retinal artery occlusion (8/2018), ILR implant Dec 2018, hypertension and hyperlipidemia.     NICM, LVEF 40-45%, now improved to 55-60%   1. ACEi/ARB/ARNi: continue lisinopril, dose decreased due to some asymptomatic low BPs  2. BB: deferred d/t hypotension   3. Aldosterone antagonist: continue spironolactone.  4. SGLT2i: cost prohibitive   5.  SCD prophylaxis: LVEF improved to 55-60%  6. Fluid status: euvolemic on exam   7. Etiology: stress test negative, LVEF improved in sinus, presumed tachy mediated     Persistent Atrial Fibrillation   1. Stroke Prophylaxis: CHADSVASC 7 ++age, +female, +HF, +HTN, ++retinal artery occlusion 7, corresponding to a 9.6% annual stroke / systemic embolism event rate. She is enrolled in the Gulshan study, eliquis vs a new factor XI agonist . Follows with [place] cardiology team for this.   2. Rate Control: not on AVN agents.   3. Rhythm Control: Presented for stress test 6/3/24, -130 bpm, stress test negative. DCCV done 6/19/24 and was unsuccessful. She was started on amio, BUENO and LE edema worsened and she was sent to ER or IV diuresis and IV amio initiation. She had successful DCCV done 6/27/24. Repeat echo done in sinus with LVEF improved to 55-60%. She has remained on amio 200 mg daily. LFTs wnl, TSH and PFTs ordered today. We discussed if remains in sinus, at follow up we  could consider decreasing amio dose to 100 mg daily or could consider switching to AAD such as sotalol or EPS/abaltion as well.   4. Risk Factor Management: Statin, BP control, and EDISON evaluation as indicated.     Complete TSH and PFTs.   Follow up in 6 months.      History of Present Illness/Subjective    Ms. Alfreda Baxter is a 79 year old female who  comes in today for EP follow-up of AF.    Patient is a 79 year old female with central retinal artery occlusion in August 2018, s/p ILR implant where paroxysmal atrial fibrillation was discovered. She was started on anticoagulation at that time. ILR reached RRT June 2022, she elected to leave it retained.   She was last seen by Dr STEPHANIE Michelle 6/13/24, at this visit, she reported dyspnea on exertion that had been occurring since mothers day, also noted she was not sleeping well and having occasional nausea. She was seen by PCP 5/14/2024, heart rate was documented to be 123 bpm, exam noted irregularly irregular heart rate, no ECG done. She was treated for possible asthma exacerbation, and a nuclear stress test was order to determine if ischemia may be a cause of shortness of breath. She presented for a nuclear lexiscan stress and was noted to be in AF 130s-150s. Nuclear stress test showed no ischemia. Dr Michelle discussed proceed with DCCV given worsening BUENO and hypervolemia on exam.   She presented for DCCV 6/19/24, DIRK done with LVEF reduced to 40-45%, DCCV attempted x 3 and was unsuccessful. I discussed with Dr STEPHANIE Michelle who recommended initiation of PO amio load and ambulatory monitor for monitoring with initiation, metoprolol was reduced. Planned for repeat DCCV after 1 week on amio. She then called in with worsening BUENO, recommend she present to the ER for IV amio loading, diuresis and GDMT initiation. She was admitted 6/20, IV diuresis and amio loading done, discharged 6/22/24. She then had successful cardioversion done 6/27/24.     She presents today for follow up. She reports symptoms have improved significantly since this summer. She does note not any dyspnea on exertion or limitations with activity. Weights have been stable since discharge, feels she has been losing weight with eating low sodium diet. She checks her HR every morning, resting HR have been in the 70s. Blood pressures 110-120s/70s. She denies chest  discomfort, palpitations, abdominal fullness/bloating or peripheral edema, shortness of breath, paroxysmal nocturnal dyspnea, orthopnea, pre-syncope, or syncope. Presenting 12 lead ECG shows sinus Vent Rate 71 bpm,  ms, QRS 90 ms, QTc 473 ms.     I have reviewed and updated the patient's Past Medical History, Social History, Family History and Medication List.     Cardiographics (Personally Reviewed) :   Echo: 11/4/24 *in sinus  Interpretation Summary  Global and regional left ventricular function is normal with an EF of 55-60%.  Mild diastolic dysfunction.  Global right ventricular function is normal.  Moderate left atrial enlargement is present.  Moderate mitral insufficiency is present.  The ascending aorta is mildly dilated to 4.2 cm.  Moderate pulmonary hypertension is present. Pulmonary artery systolic pressure  is 50mmH.    DIRK 6/19/24 *in AF prior to DCCV   LVEF 40-45%   Normal RV function, mild dilation   Moderate MI     NM Stress Test: 6/3/24    The nuclear stress test is negative for inducible myocardial ischemia or infarction.    LVEDv 105ml. LVESv 33ml. LV EF 69%.    EKG:  TODAY 11/19/24: sinus 71 bpm   6/13/2024:  bpm   2/13/2024: sinus 82 bpm, PRWP       Physical Examination   /75 (BP Location: Right arm, Patient Position: Chair, Cuff Size: Adult Regular)   Pulse 74   Wt 78 kg (172 lb)   SpO2 97%   BMI 31.97 kg/m    Wt Readings from Last 3 Encounters:   11/19/24 78 kg (172 lb)   11/18/24 78 kg (172 lb)   11/04/24 78.9 kg (174 lb)     General Appearance:   Alert, well-appearing and in no acute distress.   HEENT: Atraumatic, normocephalic. MMM.   Chest/Lungs:   Respirations unlabored.  Lungs are clear to auscultation.   Cardiovascular:   Regular rate and rhythm.  S1/S2. No murmur.    Abdomen:  Soft, nontender, nondistended.   Extremities: No cyanosis or clubbing. No edema.    Musculoskeletal: Moves all extremities.     Skin: Warm, dry, intact.    Neurologic: Mood and affect are  appropriate.  Alert and oriented to person, place, time, and situation.          Medications  Allergies   Current Outpatient Medications   Medication Sig Dispense Refill    acetaminophen (TYLENOL) 500 MG tablet Take 500-1,000 mg by mouth every 6 hours as needed.      albuterol (PROAIR HFA/PROVENTIL HFA/VENTOLIN HFA) 108 (90 Base) MCG/ACT inhaler Inhale 2 puffs into the lungs every 6 hours as needed for shortness of breath or wheezing. 18 g 11    amiodarone (PACERONE) 200 MG tablet Take 1 tablet (200 mg) by mouth daily. 90 tablet 3    apixaban ANTICOAGULANT (ELIQUIS) 5 MG tablet Patient in study, is either on Eliquis or a new med Librexia      atorvastatin (LIPITOR) 20 MG tablet Take 1 tablet (20 mg) by mouth daily. 90 tablet 4    azelastine (ASTELIN) 0.1 % nasal spray Spray 1 spray into both nostrils at bedtime.      B-COMPLEX OR Take 1 tablet by mouth daily.      CALCIUM 500 +D OR Take 1 tablet by mouth daily.      folic acid (FOLVITE) 1 MG tablet Take 2 tablets (2 mg) by mouth daily 200 tablet 2    hypromellose (ARTIFICIAL TEARS) 0.5 % SOLN ophthalmic solution Place 1 drop into both eyes 3 times daily      latanoprost (XALATAN) 0.005 % ophthalmic solution Place 1 drop into both eyes at bedtime 7.5 mL 3    lisinopril (ZESTRIL) 5 MG tablet Take 1 tablet (5 mg) by mouth at bedtime 90 tablet 3    methotrexate 2.5 MG tablet Take 8 tablets (20 mg) by mouth every 7 days . Methotrexate is a once weekly medication, taken on the same day of each week. 96 tablet 2    multivitamin (OCUVITE) TABS tablet Take 1 tablet by mouth daily      polyethylene glycol (MIRALAX) 17 GM/Dose powder Take 17 g (1 Capful) by mouth daily as needed for constipation      SENNA-docusate sodium (SENNA S) 8.6-50 MG tablet Take 1-4 tablets by mouth daily as needed      spironolactone (ALDACTONE) 25 MG tablet Take 0.5 tablets (12.5 mg) by mouth daily 45 tablet 3    timolol maleate (TIMOPTIC) 0.5 % ophthalmic solution Place 1 drop Into the left eye  every morning 10 mL 3    zolpidem (AMBIEN) 5 MG tablet TAKE ONE TABLET BY MOUTH IN THE EVENING AS NEEDED FOR SLEEP 30 tablet 5     Current Facility-Administered Medications   Medication Dose Route Frequency Provider Last Rate Last Admin    study - apixaban vs. placebo (IDS# 6271) capsule 5 mg  5 mg Oral BID Nick Gamez MD        study - milvexian vs. placebo (IDS# 6271) tablet 100 mg  100 mg Oral BID Nick Gamez MD          Allergies   Allergen Reactions    No Clinical Screening - See Comments Other (See Comments)     senisitive to non steroidals  Aleve, ibuprofen celebrex cause bad stomach pains  senisitive to non steroidals  Aleve, ibuprofen celebrex cause bad stomach pains    Oxycodone Hives     Other reaction(s): Unknown  ... With facial swelling    Nsaids Nausea and Vomiting     Other reaction(s): Abdominal Pain         Lab Results (Personally Reviewed)    Chemistry/lipid CBC Cardiac Enzymes/BNP/TSH/INR   Lab Results   Component Value Date    BUN 20.5 09/23/2024     09/23/2024    CO2 23 09/23/2024     Creatinine   Date Value Ref Range Status   11/11/2024 1.02 (H) 0.51 - 0.95 mg/dL Final   11/26/2020 0.70 0.52 - 1.04 mg/dL Final       Lab Results   Component Value Date    CHOL 85 06/21/2024    HDL 39 (L) 06/21/2024    LDL 17 06/21/2024      Lab Results   Component Value Date    WBC 7.3 11/11/2024    HGB 12.6 11/11/2024    HCT 39.5 11/11/2024     (H) 11/11/2024     11/11/2024    Lab Results   Component Value Date    TSH 1.09 06/19/2024    INR 1.75 (H) 06/19/2024        The patient states understanding and is agreeable with the plan.     Libertad Howard PA-C  [facility]  Electrophysiology Consult Service  Pager: [phone]    I spent a total of 20 minutes face to face with Alfreda Baxter during today's office visit. I have spent an additional 15 minutes today on chart review and documentation.

## 2024-11-19 NOTE — LETTER
11/19/2024      RE: Alfreda Baxter  [address] [place] [address]       Dear Colleague,    Thank you for the opportunity to participate in the care of your patient, Alfreda Baxter, at the [facility] [place] at [facility]. Please see a copy of my visit note below.        ELECTROPHYSIOLOGY CLINIC VISIT    Assessment/Recommendations   Assessment/Plan:    Alfreda Baxter is a 79 year old female with past medical history significant for persistent atrial fibrillation, NICM LVEF 40-45% (now improved to 55-60%), central retinal artery occlusion (8/2018), ILR implant Dec 2018, hypertension and hyperlipidemia.     NICM, LVEF 40-45%, now improved to 55-60%   1. ACEi/ARB/ARNi: continue lisinopril, dose decreased due to some asymptomatic low BPs  2. BB: deferred d/t hypotension   3. Aldosterone antagonist: continue spironolactone.  4. SGLT2i: cost prohibitive   5.  SCD prophylaxis: LVEF improved to 55-60%  6. Fluid status: euvolemic on exam   7. Etiology: stress test negative, LVEF improved in sinus, presumed tachy mediated     Persistent Atrial Fibrillation   1. Stroke Prophylaxis: CHADSVASC 7 ++age, +female, +HF, +HTN, ++retinal artery occlusion 7, corresponding to a 9.6% annual stroke / systemic embolism event rate. She is enrolled in the Gulshan study, eliquis vs a new factor XI agonist . Follows with [place] cardiology team for this.   2. Rate Control: not on AVN agents.   3. Rhythm Control: Presented for stress test 6/3/24, -130 bpm, stress test negative. DCCV done 6/19/24 and was unsuccessful. She was started on amio, BUENO and LE edema worsened and she was sent to ER or IV diuresis and IV amio initiation. She had successful DCCV done 6/27/24. Repeat echo done in sinus with LVEF improved to 55-60%. She has remained on amio 200 mg daily. LFTs wnl, TSH and PFTs ordered today. We discussed if remains in sinus, at follow up we  could  consider decreasing amio dose to 100 mg daily or could consider switching to AAD such as sotalol or EPS/abaltion as well.   4. Risk Factor Management: Statin, BP control, and EDISON evaluation as indicated.     Complete TSH and PFTs.   Follow up in 6 months.      History of Present Illness/Subjective    Ms. Alfreda Baxter is a 79 year old female who comes in today for EP follow-up of AF.    Patient is a 79 year old female with central retinal artery occlusion in August 2018, s/p ILR implant where paroxysmal atrial fibrillation was discovered. She was started on anticoagulation at that time. ILR reached RRT June 2022, she elected to leave it retained.   She was last seen by Dr STEPHANIE Michelle 6/13/24, at this visit, she reported dyspnea on exertion that had been occurring since mothers day, also noted she was not sleeping well and having occasional nausea. She was seen by PCP 5/14/2024, heart rate was documented to be 123 bpm, exam noted irregularly irregular heart rate, no ECG done. She was treated for possible asthma exacerbation, and a nuclear stress test was order to determine if ischemia may be a cause of shortness of breath. She presented for a nuclear lexiscan stress and was noted to be in AF 130s-150s. Nuclear stress test showed no ischemia. Dr Michelle discussed proceed with DCCV given worsening BUENO and hypervolemia on exam.   She presented for DCCV 6/19/24, DIRK done with LVEF reduced to 40-45%, DCCV attempted x 3 and was unsuccessful. I discussed with Dr STEPHANIE Michelle who recommended initiation of PO amio load and ambulatory monitor for monitoring with initiation, metoprolol was reduced. Planned for repeat DCCV after 1 week on amio. She then called in with worsening BUENO, recommend she present to the ER for IV amio loading, diuresis and GDMT initiation. She was admitted 6/20, IV diuresis and amio loading done, discharged 6/22/24. She then had successful cardioversion done 6/27/24.     She presents today for follow up. She reports  symptoms have improved significantly since this summer. She does note not any dyspnea on exertion or limitations with activity. Weights have been stable since discharge, feels she has been losing weight with eating low sodium diet. She checks her HR every morning, resting HR have been in the 70s. Blood pressures 110-120s/70s. She denies chest discomfort, palpitations, abdominal fullness/bloating or peripheral edema, shortness of breath, paroxysmal nocturnal dyspnea, orthopnea, pre-syncope, or syncope. Presenting 12 lead ECG shows sinus Vent Rate 71 bpm,  ms, QRS 90 ms, QTc 473 ms.     I have reviewed and updated the patient's Past Medical History, Social History, Family History and Medication List.     Cardiographics (Personally Reviewed) :   Echo: 11/4/24 *in sinus  Interpretation Summary  Global and regional left ventricular function is normal with an EF of 55-60%.  Mild diastolic dysfunction.  Global right ventricular function is normal.  Moderate left atrial enlargement is present.  Moderate mitral insufficiency is present.  The ascending aorta is mildly dilated to 4.2 cm.  Moderate pulmonary hypertension is present. Pulmonary artery systolic pressure  is 50mmH.    DIRK 6/19/24 *in AF prior to DCCV   LVEF 40-45%   Normal RV function, mild dilation   Moderate MI     NM Stress Test: 6/3/24     The nuclear stress test is negative for inducible myocardial ischemia or infarction.     LVEDv 105ml. LVESv 33ml. LV EF 69%.    EKG:  TODAY 11/19/24: sinus 71 bpm   6/13/2024:  bpm   2/13/2024: sinus 82 bpm, PRWP       Physical Examination   /75 (BP Location: Right arm, Patient Position: Chair, Cuff Size: Adult Regular)   Pulse 74   Wt 78 kg (172 lb)   SpO2 97%   BMI 31.97 kg/m    Wt Readings from Last 3 Encounters:   11/19/24 78 kg (172 lb)   11/18/24 78 kg (172 lb)   11/04/24 78.9 kg (174 lb)     General Appearance:   Alert, well-appearing and in no acute distress.   HEENT: Atraumatic, normocephalic.  MMM.   Chest/Lungs:   Respirations unlabored.  Lungs are clear to auscultation.   Cardiovascular:   Regular rate and rhythm.  S1/S2. No murmur.    Abdomen:  Soft, nontender, nondistended.   Extremities: No cyanosis or clubbing. No edema.    Musculoskeletal: Moves all extremities.     Skin: Warm, dry, intact.    Neurologic: Mood and affect are appropriate.  Alert and oriented to person, place, time, and situation.          Medications  Allergies   Current Outpatient Medications   Medication Sig Dispense Refill     acetaminophen (TYLENOL) 500 MG tablet Take 500-1,000 mg by mouth every 6 hours as needed.       albuterol (PROAIR HFA/PROVENTIL HFA/VENTOLIN HFA) 108 (90 Base) MCG/ACT inhaler Inhale 2 puffs into the lungs every 6 hours as needed for shortness of breath or wheezing. 18 g 11     amiodarone (PACERONE) 200 MG tablet Take 1 tablet (200 mg) by mouth daily. 90 tablet 3     apixaban ANTICOAGULANT (ELIQUIS) 5 MG tablet Patient in study, is either on Eliquis or a new med Librexia       atorvastatin (LIPITOR) 20 MG tablet Take 1 tablet (20 mg) by mouth daily. 90 tablet 4     azelastine (ASTELIN) 0.1 % nasal spray Spray 1 spray into both nostrils at bedtime.       B-COMPLEX OR Take 1 tablet by mouth daily.       CALCIUM 500 +D OR Take 1 tablet by mouth daily.       folic acid (FOLVITE) 1 MG tablet Take 2 tablets (2 mg) by mouth daily 200 tablet 2     hypromellose (ARTIFICIAL TEARS) 0.5 % SOLN ophthalmic solution Place 1 drop into both eyes 3 times daily       latanoprost (XALATAN) 0.005 % ophthalmic solution Place 1 drop into both eyes at bedtime 7.5 mL 3     lisinopril (ZESTRIL) 5 MG tablet Take 1 tablet (5 mg) by mouth at bedtime 90 tablet 3     methotrexate 2.5 MG tablet Take 8 tablets (20 mg) by mouth every 7 days . Methotrexate is a once weekly medication, taken on the same day of each week. 96 tablet 2     multivitamin (OCUVITE) TABS tablet Take 1 tablet by mouth daily       polyethylene glycol (MIRALAX) 17  GM/Dose powder Take 17 g (1 Capful) by mouth daily as needed for constipation       SENNA-docusate sodium (SENNA S) 8.6-50 MG tablet Take 1-4 tablets by mouth daily as needed       spironolactone (ALDACTONE) 25 MG tablet Take 0.5 tablets (12.5 mg) by mouth daily 45 tablet 3     timolol maleate (TIMOPTIC) 0.5 % ophthalmic solution Place 1 drop Into the left eye every morning 10 mL 3     zolpidem (AMBIEN) 5 MG tablet TAKE ONE TABLET BY MOUTH IN THE EVENING AS NEEDED FOR SLEEP 30 tablet 5     Current Facility-Administered Medications   Medication Dose Route Frequency Provider Last Rate Last Admin     study - apixaban vs. placebo (IDS# 6271) capsule 5 mg  5 mg Oral BID Nick Gamez MD         study - milvexian vs. placebo (IDS# 6271) tablet 100 mg  100 mg Oral BID Nick Gamez MD          Allergies   Allergen Reactions     No Clinical Screening - See Comments Other (See Comments)     senisitive to non steroidals  Aleve, ibuprofen celebrex cause bad stomach pains  senisitive to non steroidals  Aleve, ibuprofen celebrex cause bad stomach pains     Oxycodone Hives     Other reaction(s): Unknown  ... With facial swelling     Nsaids Nausea and Vomiting     Other reaction(s): Abdominal Pain         Lab Results (Personally Reviewed)    Chemistry/lipid CBC Cardiac Enzymes/BNP/TSH/INR   Lab Results   Component Value Date    BUN 20.5 09/23/2024     09/23/2024    CO2 23 09/23/2024     Creatinine   Date Value Ref Range Status   11/11/2024 1.02 (H) 0.51 - 0.95 mg/dL Final   11/26/2020 0.70 0.52 - 1.04 mg/dL Final       Lab Results   Component Value Date    CHOL 85 06/21/2024    HDL 39 (L) 06/21/2024    LDL 17 06/21/2024      Lab Results   Component Value Date    WBC 7.3 11/11/2024    HGB 12.6 11/11/2024    HCT 39.5 11/11/2024     (H) 11/11/2024     11/11/2024    Lab Results   Component Value Date    TSH 1.09 06/19/2024    INR 1.75 (H) 06/19/2024        The patient states understanding and is agreeable with  the plan.     Libertad Howard PA-C  [facility]  Electrophysiology Consult Service  Pager: [phone]    I spent a total of 20 minutes face to face with Alfreda Baxter during today's office visit. I have spent an additional 15 minutes today on chart review and documentation.                     Please do not hesitate to contact me if you have any questions/concerns.     Sincerely,     Libertad Howard PA-C

## 2024-11-19 NOTE — PATIENT INSTRUCTIONS
You were seen in the Electrophysiology Clinic today by: GELACIO Bell    Plan:   Medication Changes: None.     Labs/Tests Needed: Pulmonary testing in December, repeat labs in 4 months.    Follow up Visit: with GELACIO Bell in 6 months       If you have further questions, please utilize [employer] to contact us.     Your Care Team:    EP Cardiology   Telephone Number     Nurse Line  Bebo Anderson RN    [phone]     For scheduling appointments:   Allie   [phone]   For procedure scheduling:    Sherry Dunlap     [phone]   For the Device Clinic (Pacemakers, ICDs, Loop Recorders)    During business hours: [phone]  After business hours:   [phone]- select option 4 and ask for job code [id number].       On-call cardiologist for after hours or on weekends:   [phone], option #4, and ask to speak to the on-call cardiologist.     Cardiovascular Clinic:   [address] [place] [address]. [place] [address]      As always, Thank you for trusting us with your health care needs!

## 2024-11-20 ENCOUNTER — TELEPHONE (OUTPATIENT)
Dept: CARDIOLOGY | Facility: CLINIC | Age: 79
End: 2024-11-20
Payer: MEDICARE

## 2024-11-20 ENCOUNTER — NURSE TRIAGE (OUTPATIENT)
Dept: NURSING | Facility: CLINIC | Age: 79
End: 2024-11-20
Payer: MEDICARE

## 2024-11-20 LAB
ATRIAL RATE - MUSE: 71 BPM
DIASTOLIC BLOOD PRESSURE - MUSE: NORMAL MMHG
INTERPRETATION ECG - MUSE: NORMAL
P AXIS - MUSE: 95 DEGREES
PR INTERVAL - MUSE: 182 MS
QRS DURATION - MUSE: 90 MS
QT - MUSE: 436 MS
QTC - MUSE: 473 MS
R AXIS - MUSE: -38 DEGREES
SYSTOLIC BLOOD PRESSURE - MUSE: NORMAL MMHG
T AXIS - MUSE: 5 DEGREES
VENTRICULAR RATE- MUSE: 71 BPM

## 2024-11-20 NOTE — TELEPHONE ENCOUNTER
Spoke with patient.  Bleeding has stopped.  Advised that if the bleeding starts again and is lasting more that 30 minutes she should go to the emergency room.      Qiana Paulino RN Care Coordinator, [facility] 11/20/24 12:04 PM

## 2024-11-20 NOTE — TELEPHONE ENCOUNTER
Has had  a nose bleed since 900am, started after straining with bowel movement. Patient is on blood thinner. Is in a study through Cardiology. Previously was on Elioquis and seeing ENT in August. Saw Cardio November first and placed on study. Since new med started has not had any epistaxis since Nov 1 until now.  Started dripping at 9am. Has tried laying down. ENT has told her to try Afrin which she did. First attempt 1 hour ago did not stop bleeding, second attempt did slow bleeding down. Is no longer dripping out nose but still present when she presses kleenex in nose. She feels it is bleeding further up nose. No dizziness/lightheadedness. Patient tamanna losing more than one cup of blood. Patient thinks cauterization will need to be done by ENT like in the past and would like to be called on her cell phone at [phone].     Protocol reviewed, DISPOSITION: See in office today. Note forwarded to ENT department for call back.     Millicent CERVANTES RN  P Central Nursing/Red Flag Triage & Med Refill Team  Reason for Disposition   Taking Coumadin (warfarin) or other strong blood thinner, or known bleeding disorder (e.g., thrombocytopenia)    Additional Information   Negative: Fainted (passed out), or too weak to stand following large blood loss   Negative: Sounds like a life-threatening emergency to the triager   Negative: Nosebleed followed nose injury   Negative: Bleeding present > 30 minutes and using correct method of direct pressure   Negative: Bleeding now and second call after being instructed in correct technique of direct pressure   Negative: Lightheadedness or dizziness   Negative: Pale skin (pallor) of new-onset or worsening   Negative: Has nasal packing (inserted by health care provider to control bleeding) and now has new rash   Negative: Has nasal packing and now has bleeding around the packing  (Exception: Few drops or ooze.)   Negative: Patient sounds very sick or weak to the triager   Negative: Large amount  of blood has been lost (e.g., one cup)   Negative: Bleeding recurs 3 or more times in 24 hours despite direct pressure    Protocols used: Nosebleed-A-OH

## 2024-11-20 NOTE — TELEPHONE ENCOUNTER
Patient Contacted for the patient to call back and schedule the following:    Appointment type: rtn ep   Provider: halie   Return date: 03/17/25  Specialty phone number: [phone] opt1   Additional appointment(s) needed: n/a  Additonal Notes: providers scheduled is not out for next year

## 2024-11-22 ENCOUNTER — TELEPHONE (OUTPATIENT)
Dept: OTOLARYNGOLOGY | Facility: CLINIC | Age: 79
End: 2024-11-22

## 2024-11-22 ENCOUNTER — PRE VISIT (OUTPATIENT)
Dept: SURGERY | Facility: CLINIC | Age: 79
End: 2024-11-22

## 2024-11-22 NOTE — TELEPHONE ENCOUNTER
Health Call Center    Phone Message    May a detailed message be left on voicemail: yes     Reason for Call: Appointment Intake    Referring Provider Name: Evelyn Guzmán MD in [facility]  Diagnosis and/or Symptoms: Skin cyst [L72.9], Right neck mass x 30 years that when pressed drained clear fluid. Recently, draining purulent fluid.    Diagnosis not in protocol. Patient would like a call back to schedule with the most appropriate provider because she's been bounced around.    Please call patient back at [phone]. Thank you.    Action Taken: Message routed to:  [facility] ([facility] ENT    Travel Screening: Not Applicable

## 2024-12-05 DIAGNOSIS — I50.20 HEART FAILURE WITH REDUCED EJECTION FRACTION (H): Primary | ICD-10-CM

## 2024-12-14 ENCOUNTER — NURSE TRIAGE (OUTPATIENT)
Dept: NURSING | Facility: CLINIC | Age: 79
End: 2024-12-14
Payer: MEDICARE

## 2024-12-14 NOTE — TELEPHONE ENCOUNTER
Nurse Triage SBAR    Is this a 2nd Level Triage? NO    Situation: Patient calling.     Background: Nose bleed.     Assessment: Nosebleed since 0830 this morning.  She is on an anticoagulant, she is in a study so she does not know which anticoagulant she is taking.  She has had nosebleeds in the past. She did not take her anticoagulant this morning. Bleeding is from right nostril only. She has been lying in bed with her head back and has been swallowing blood clots.  Also using an ice pack. Pt stated she spoke to someone at her ENT office at some point in the past and was told to use Afrin for nosebleed and if her bleeding does not stop within a half hour to go to the ED. Pt did use Afrin this morning.     Protocol Recommended Disposition:   See PCP Within 24 Hours    Recommendation: Pt declined care advice.  After triage complete, pt stated she only wants to know if she can see an ENT today because she needs cautery.  Advised to follow instructions which have previously been given to her as she has declined care advice. Pt stated she will consider options and is undetermined if she will go to ED.           Does the patient meet one of the following criteria for ADS visit consideration? 16+ years old, with an [facility] PCP     TIP  Providers, please consider if this condition is appropriate for management at one of our Acute and Diagnostic Services sites.     If patient is a good candidate, please use dotphrase <dot>triageresponse and select Refer to ADS to document.    Reason for Disposition   Taking Coumadin (warfarin) or other strong blood thinner, or known bleeding disorder (e.g., thrombocytopenia)    Additional Information   Negative: Nosebleed followed a nose injury   Negative: [1] Bleeding present > 30 minutes AND [2] using correct method of direct pressure   Negative: [1] Bleeding now AND [2] second call after being instructed in correct technique of direct pressure   Negative: Dizziness or lightheadedness    Negative: Fainted or too weak to stand following large blood loss   Negative: Sounds like a life-threatening emergency to the triager   Negative: Pale skin (pallor) of new-onset or worsening   Negative: [1] Has nasal packing (inserted by health care provider to control bleeding) AND [2] new rash   Negative: [1] Has nasal packing AND [2] now bleeding around the packing  (Exception: Few drops or ooze.)   Negative: Patient sounds very sick or weak to the triager   Negative: [1] Bleeding recurs 3 or more times in 24 hours AND [2] direct pressure applied correctly   Negative: [1] Skin bruises or bleeding gums AND [2] not caused by an injury   Negative: [1] Large amount of blood has been lost (e.g., 1 cup or 240 ml) AND [2] bleeding now controlled (stopped)   Negative: [1] Has nasal packing AND [2] fever > 100.4 F (38.0 C)    Protocols used: Nosebleed-A-

## 2024-12-16 ENCOUNTER — TELEPHONE (OUTPATIENT)
Dept: FAMILY MEDICINE | Facility: CLINIC | Age: 79
End: 2024-12-16
Payer: MEDICARE

## 2024-12-16 NOTE — TELEPHONE ENCOUNTER
Spoke with patient. Relayed provider's message as written. Patient verbalized understanding and has no further questions at this time.     LUCIANA AlvaradoN RN  [facility]

## 2024-12-16 NOTE — TELEPHONE ENCOUNTER
Hold astelin nose spray. Apply vaseline to nares and inside nose at bedtime. Avoid picking/blowing. Make ENT appointment. Go to ED if bleeding persists.     Notify cardiology about nose bleeds while on study medication.     Mercedes Urena MD

## 2024-12-16 NOTE — TELEPHONE ENCOUNTER
Routing to PCP to advise. Patient was triaged by RN 12/14/2024 and was advised to go to ED now. With this encounter MA huddled with clinic nurses and was advised to ask PCP if patient should be scheduled in clinic or if patient should still be seen at ER per 12/14/2024 telephone encounter. Please advise. Angelia Dutta MA

## 2024-12-16 NOTE — TELEPHONE ENCOUNTER
Reason for Call:  Appointment Request    Patient requesting this type of appt:  upper respiratory infection for past 8 days, and nose bleeds - Triage nurse call on Saturday that recommended ER but patient did not go     Requested provider:  any [facility] provider     Reason patient unable to be scheduled: Not within requested timeframe    When does patient want to be seen/preferred time: Same day    Comments: Please call or message patient     Could we send this information to you in [facility] or would you prefer to receive a phone call?:   No preference   Okay to leave a detailed message?: Yes at Home number on file [phone] (home)    Call taken on 12/16/2024 at 10:01 AM by Laine Velázquez

## 2024-12-17 NOTE — TELEPHONE ENCOUNTER
FUTURE VISIT INFORMATION      FUTURE VISIT INFORMATION:  Date: 3/18/25  Time: 9 AM  [place] [facility]   REFERRAL INFORMATION:  Referring provider:  Emili Guzmán MD   Referring providers clinic:  [facility] [place] [facility]   Reason for visit/diagnosis:  L72.9 (ICD-10-CM) - Skin cyst, ref'd by EMILI GUZMÁN, [facility] notes in triage, pt made appt, [facility] location     RECORDS REQUESTED FROM      Clinic name Comments Records Status Imaging Status   [facility] [place] [facility]  11/22/24 note- Emili Guzmán MD  [facility]    10/14/24 note- [facility] 2/13/24 MRA neck, MR/A brain  11/14/22 CTA head neck, CT head Epic PACS

## 2024-12-18 ENCOUNTER — NURSE TRIAGE (OUTPATIENT)
Dept: FAMILY MEDICINE | Facility: CLINIC | Age: 79
End: 2024-12-18

## 2024-12-18 ENCOUNTER — ANCILLARY PROCEDURE (OUTPATIENT)
Dept: GENERAL RADIOLOGY | Facility: CLINIC | Age: 79
End: 2024-12-18
Attending: PHYSICIAN ASSISTANT
Payer: MEDICARE

## 2024-12-18 ENCOUNTER — OFFICE VISIT (OUTPATIENT)
Dept: URGENT CARE | Facility: URGENT CARE | Age: 79
End: 2024-12-18
Payer: MEDICARE

## 2024-12-18 ENCOUNTER — LAB (OUTPATIENT)
Dept: LAB | Facility: CLINIC | Age: 79
End: 2024-12-18
Payer: MEDICARE

## 2024-12-18 VITALS
TEMPERATURE: 97.5 F | RESPIRATION RATE: 16 BRPM | BODY MASS INDEX: 31.6 KG/M2 | OXYGEN SATURATION: 96 % | WEIGHT: 170 LBS | HEART RATE: 82 BPM | SYSTOLIC BLOOD PRESSURE: 137 MMHG | DIASTOLIC BLOOD PRESSURE: 84 MMHG

## 2024-12-18 DIAGNOSIS — I50.20 HEART FAILURE WITH REDUCED EJECTION FRACTION (H): ICD-10-CM

## 2024-12-18 DIAGNOSIS — N18.31 CHRONIC KIDNEY DISEASE, STAGE 3A (H): ICD-10-CM

## 2024-12-18 DIAGNOSIS — J20.9 ACUTE BRONCHITIS WITH COEXISTING CONDITION REQUIRING PROPHYLACTIC TREATMENT: ICD-10-CM

## 2024-12-18 DIAGNOSIS — R06.02 SOB (SHORTNESS OF BREATH): ICD-10-CM

## 2024-12-18 DIAGNOSIS — J45.21 MILD INTERMITTENT ASTHMA WITH ACUTE EXACERBATION: ICD-10-CM

## 2024-12-18 DIAGNOSIS — R06.02 SOB (SHORTNESS OF BREATH): Primary | ICD-10-CM

## 2024-12-18 LAB
ANION GAP SERPL CALCULATED.3IONS-SCNC: 10 MMOL/L (ref 7–15)
BUN SERPL-MCNC: 23.3 MG/DL (ref 8–23)
CALCIUM SERPL-MCNC: 9.6 MG/DL (ref 8.8–10.4)
CHLORIDE SERPL-SCNC: 104 MMOL/L (ref 98–107)
CREAT SERPL-MCNC: 0.95 MG/DL (ref 0.51–0.95)
EGFRCR SERPLBLD CKD-EPI 2021: 60 ML/MIN/1.73M2
GLUCOSE SERPL-MCNC: 94 MG/DL (ref 70–99)
HCO3 SERPL-SCNC: 26 MMOL/L (ref 22–29)
POTASSIUM SERPL-SCNC: 4.9 MMOL/L (ref 3.4–5.3)
SODIUM SERPL-SCNC: 140 MMOL/L (ref 135–145)

## 2024-12-18 PROCEDURE — 71046 X-RAY EXAM CHEST 2 VIEWS: CPT | Mod: TC | Performed by: RADIOLOGY

## 2024-12-18 PROCEDURE — 99215 OFFICE O/P EST HI 40 MIN: CPT | Performed by: PHYSICIAN ASSISTANT

## 2024-12-18 RX ORDER — DOXYCYCLINE HYCLATE 100 MG
100 TABLET ORAL 2 TIMES DAILY
Qty: 20 TABLET | Refills: 0 | Status: SHIPPED | OUTPATIENT
Start: 2024-12-18 | End: 2024-12-18

## 2024-12-18 RX ORDER — DOXYCYCLINE HYCLATE 100 MG
100 TABLET ORAL 2 TIMES DAILY
Qty: 20 TABLET | Refills: 0 | Status: SHIPPED | OUTPATIENT
Start: 2024-12-18

## 2024-12-18 ASSESSMENT — ENCOUNTER SYMPTOMS
LIGHT-HEADEDNESS: 0
SHORTNESS OF BREATH: 1
JOINT SWELLING: 0
NECK STIFFNESS: 0
COUGH: 1
ARTHRALGIAS: 0
FEVER: 0
SORE THROAT: 0
DIZZINESS: 0
CHILLS: 0
WOUND: 0
ALLERGIC/IMMUNOLOGIC NEGATIVE: 1
PALPITATIONS: 0
DIARRHEA: 0
EYES NEGATIVE: 1
HEADACHES: 0
NECK PAIN: 0
BACK PAIN: 0
NAUSEA: 0
WEAKNESS: 0
RHINORRHEA: 0
VOMITING: 0
ENDOCRINE NEGATIVE: 1
MUSCULOSKELETAL NEGATIVE: 1
CARDIOVASCULAR NEGATIVE: 1
MYALGIAS: 0

## 2024-12-18 ASSESSMENT — PAIN SCALES - GENERAL: PAINLEVEL_OUTOF10: NO PAIN (0)

## 2024-12-18 NOTE — TELEPHONE ENCOUNTER
"S-(situation): Patient calling in stating she has had an URI the last 10 days and is now coughing up thick yellow mucus.      B-(background): Patient has not tested for Covid.  She states she does not have a test at home and did not feel well so she wouldn't go out and buy one.      A-(assessment): Patient states she doesn't know if she has a fever as she is taking Tylenol and Astrelzine (unsure of this medication).  She is having shortness of breath that ranges from mild to moderate.  She is currently experiencing mild SOB.  She is using her inhaler to help as well with breathing.  Patient also notes a headache and nasal drainage.  Denies chest pain.      R-(recommendations): See in Office Now.  Warm transferred to  to assist with appointment at her home clinic.  Discussed with patient to go to Urgent Care if no appointments available in clinic.   Also mentioned to patient to go to ED if she develops moderate SOB, patient replied \"I won't do that!  That is an incredible waste of resources.\"     Kristina Kjellberg, MSN, RN        Reason for Disposition   MILD difficulty breathing (e.g., minimal/no SOB at rest, SOB with walking, pulse <100) and still present when not coughing    Additional Information   Negative: Bluish (or gray) lips or face   Negative: SEVERE difficulty breathing (e.g., struggling for each breath, speaks in single words)   Negative: Rapid onset of cough and has hives   Negative: Coughing started suddenly after medicine, an allergic food or bee sting   Negative: Difficulty breathing after exposure to flames, smoke, or fumes   Negative: Sounds like a life-threatening emergency to the triager   Negative: Previous asthma attacks and this feels like asthma attack   Negative: Dry cough (non-productive; no sputum or minimal clear sputum) and within 14 days of COVID-19 Exposure   Negative: MODERATE difficulty breathing (e.g., speaks in phrases, SOB even at rest, pulse 100-120) and still present " "when not coughing   Negative: Chest pain present when not coughing   Negative: Passed out (e.g., fainted, lost consciousness, blacked out and was not responding)   Negative: Patient sounds very sick or weak to the triager    Answer Assessment - Initial Assessment Questions  1. ONSET: \"When did the cough begin?\"       10 full days  2. SEVERITY: \"How bad is the cough today?\"       About the same  3. SPUTUM: \"Describe the color of your sputum\" (e.g., none, dry cough; clear, white, yellow, green)      Thick yellow secretions  4. HEMOPTYSIS: \"Are you coughing up any blood?\" If Yes, ask: \"How much?\" (e.g., flecks, streaks, tablespoons, etc.)      No coughing up blood just coming out of nose  5. DIFFICULTY BREATHING: \"Are you having difficulty breathing?\" If Yes, ask: \"How bad is it?\" (e.g., mild, moderate, severe)       Mild-moderate; lying down becomes more moderate  6. FEVER: \"Do you have a fever?\" If Yes, ask: \"What is your temperature, how was it measured, and when did it start?\"      Unsure, taking Tylenol for Headache  7. CARDIAC HISTORY: \"Do you have any history of heart disease?\" (e.g., heart attack, congestive heart failure)       Yes, a fib.  Cardioverted 2x this summer   8. LUNG HISTORY: \"Do you have any history of lung disease?\"  (e.g., pulmonary embolus, asthma, emphysema)      Asthma,   9. PE RISK FACTORS: \"Do you have a history of blood clots?\" (or: recent major surgery, recent prolonged travel, bedridden)      CVA in the past  10. OTHER SYMPTOMS: \"Do you have any other symptoms?\" (e.g., runny nose, wheezing, chest pain)        Runny nose, headache, wheezing all the time, not audible over the phone  11. PREGNANCY: \"Is there any chance you are pregnant?\" \"When was your last menstrual period?\"        NA  12. TRAVEL: \"Have you traveled out of the country in the last month?\" (e.g., travel history, exposures)        No    Protocols used: Cough-A-[place]    "

## 2024-12-18 NOTE — PROGRESS NOTES
Chief Complaint:     Chief Complaint   Patient presents with    Urgent Care     X's 2 days, with nose bleeds     Cough    Nasal Congestion     Results for orders placed or performed in visit on 12/18/24   XR Chest 2 Views     Status: None    Narrative    XR CHEST 2 VIEWS   12/18/2024 3:35 PM     HISTORY: SOB (shortness of breath).    COMPARISON: 6/20/2024.      Impression    IMPRESSION: No acute cardiopulmonary disease. Cardiac recorder device  at the lower left chest.    KARTIK SAVAGE MD         SYSTEM ID:  [id number]       Medical Decision Making:    Vital signs reviewed by Benjamin Juarez PA-C  /84   Pulse 82   Temp 97.5  F (36.4  C) (Tympanic)   Resp 16   Wt 77.1 kg (170 lb)   SpO2 96%   BMI 31.60 kg/m      Differential Diagnosis:  URI Adult/Peds:  Bronchitis-viral, Pneumonia, and Viral upper respiratory illness     ASSESSMENT    1. SOB (shortness of breath)    2. Mild intermittent asthma with acute exacerbation    3. Acute bronchitis with coexisting condition requiring prophylactic treatment    4. Heart failure with reduced ejection fraction (H)    5. Chronic kidney disease, stage 3a (H)      PLAN    Patient is in no acute distress.    Temp is 97.5 in clinic today, lung sounds have wheezing throughout, and O2 sats at 96% on RA.    CXR was negative for any acute infiltrate or consolidation.  No indication of acute exacerbation of CHF per my read.    Asthma is currently not well controlled.  ACT is up to date.  Patient has Steroids and inhalers at home.  With Hx and length of symptoms, Rx for Doxycycline sent in.  No dosage adjustment needed for CKD per up to date.  Rest, Push fluids, vaporizer, elevation of head of bed.  Tylenol for any fever or body aches.  NSAIDS contraindicated with CKD.  Over the counter cough suppressant- PRN- as discussed.   If symptoms worsen, recheck immediately otherwise follow up with your PCP in 1 week if symptoms are not improving.  Worrisome symptoms discussed with  instructions to go to the ED.  Patient verbalized understanding and agreed with this plan.    Labs:    Results for orders placed or performed in visit on 12/18/24   XR Chest 2 Views     Status: None    Narrative    XR CHEST 2 VIEWS   12/18/2024 3:35 PM     HISTORY: SOB (shortness of breath).    COMPARISON: 6/20/2024.      Impression    IMPRESSION: No acute cardiopulmonary disease. Cardiac recorder device  at the lower left chest.    KARTIK SAVAEG MD         SYSTEM ID:  [id number]        Vital signs reviewed by Benjamin Juarez PA-C  /84   Pulse 82   Temp 97.5  F (36.4  C) (Tympanic)   Resp 16   Wt 77.1 kg (170 lb)   SpO2 96%   BMI 31.60 kg/m      Current Meds      Current Outpatient Medications:     acetaminophen (TYLENOL) 500 MG tablet, Take 500-1,000 mg by mouth every 6 hours as needed., Disp: , Rfl:     albuterol (PROAIR HFA/PROVENTIL HFA/VENTOLIN HFA) 108 (90 Base) MCG/ACT inhaler, Inhale 2 puffs into the lungs every 6 hours as needed for shortness of breath or wheezing., Disp: 18 g, Rfl: 11    amiodarone (PACERONE) 200 MG tablet, Take 1 tablet (200 mg) by mouth daily., Disp: 90 tablet, Rfl: 3    apixaban ANTICOAGULANT (ELIQUIS) 5 MG tablet, Patient in study, is either on Eliquis or a new med Librexia, Disp: , Rfl:     atorvastatin (LIPITOR) 20 MG tablet, Take 1 tablet (20 mg) by mouth daily., Disp: 90 tablet, Rfl: 4    azelastine (ASTELIN) 0.1 % nasal spray, Spray 1 spray into both nostrils at bedtime., Disp: , Rfl:     B-COMPLEX OR, Take 1 tablet by mouth daily., Disp: , Rfl:     CALCIUM 500 +D OR, Take 1 tablet by mouth daily., Disp: , Rfl:     doxycycline hyclate (VIBRA-TABS) 100 MG tablet, Take 1 tablet (100 mg) by mouth 2 times daily., Disp: 20 tablet, Rfl: 0    folic acid (FOLVITE) 1 MG tablet, Take 2 tablets (2 mg) by mouth daily, Disp: 200 tablet, Rfl: 2    hypromellose (ARTIFICIAL TEARS) 0.5 % SOLN ophthalmic solution, Place 1 drop into both eyes 3 times daily, Disp: , Rfl:     latanoprost  (XALATAN) 0.005 % ophthalmic solution, Place 1 drop into both eyes at bedtime, Disp: 7.5 mL, Rfl: 3    lisinopril (ZESTRIL) 5 MG tablet, Take 1 tablet (5 mg) by mouth at bedtime, Disp: 90 tablet, Rfl: 3    methotrexate 2.5 MG tablet, Take 8 tablets (20 mg) by mouth every 7 days . Methotrexate is a once weekly medication, taken on the same day of each week., Disp: 96 tablet, Rfl: 2    multivitamin (OCUVITE) TABS tablet, Take 1 tablet by mouth daily, Disp: , Rfl:     polyethylene glycol (MIRALAX) 17 GM/Dose powder, Take 17 g (1 Capful) by mouth daily as needed for constipation, Disp: , Rfl:     SENNA-docusate sodium (SENNA S) 8.6-50 MG tablet, Take 1-4 tablets by mouth daily as needed, Disp: , Rfl:     spironolactone (ALDACTONE) 25 MG tablet, Take 0.5 tablets (12.5 mg) by mouth daily, Disp: 45 tablet, Rfl: 3    timolol maleate (TIMOPTIC) 0.5 % ophthalmic solution, Place 1 drop Into the left eye every morning, Disp: 10 mL, Rfl: 3    zolpidem (AMBIEN) 5 MG tablet, TAKE ONE TABLET BY MOUTH IN THE EVENING AS NEEDED FOR SLEEP, Disp: 30 tablet, Rfl: 5    Current Facility-Administered Medications:     study - apixaban vs. placebo (IDS# 6271) capsule 5 mg, 5 mg, Oral, BID, Nick Gamez MD    study - milvexian vs. placebo (IDS# 6271) tablet 100 mg, 100 mg, Oral, BID, Nick Gamez MD      Respiratory History    occasional episodes of bronchitis, asthma, and CHF      SUBJECTIVE    HPI: Alfreda Baxter is an 79 year old female who presents with chest congestion, cough productive mucoid, and shortness of breath with activity.  Symptoms began 10 days ago and has unchanged.  There is no chest pain.  Patient is eating and drinking well.  No fever, nausea, vomiting, or diarrhea.    Patient denies any recent travel or exposure to known COVID positive tested individual.      ROS:     Review of Systems   Constitutional:  Negative for chills and fever.   HENT:  Positive for congestion. Negative for ear pain, rhinorrhea and sore  throat.    Eyes: Negative.    Respiratory:  Positive for cough and shortness of breath.    Cardiovascular: Negative.  Negative for chest pain and palpitations.   Gastrointestinal:  Negative for diarrhea, nausea and vomiting.   Endocrine: Negative.    Genitourinary: Negative.    Musculoskeletal: Negative.  Negative for arthralgias, back pain, joint swelling, myalgias, neck pain and neck stiffness.   Skin: Negative.  Negative for rash and wound.   Allergic/Immunologic: Negative.  Negative for immunocompromised state.   Neurological:  Negative for dizziness, weakness, light-headedness and headaches.         Family History   Family History   Problem Relation Age of Onset    Arthritis Mother     Cancer Mother     Hypertension Mother     Other Cancer Mother     Thyroid Disease Mother     Respiratory Father     Glaucoma Father     Asthma Father     Allergies Sister     Eye Disorder Sister     Lipids Sister     Neurologic Disorder Sister     Osteoporosis Sister     Glaucoma Sister     Hypertension Sister     Macular Degeneration Sister     Hyperlipidemia Sister     Hypertension Sister     Osteoporosis Sister     Gastrointestinal Disease Son         Ulcerative Colitis    Glaucoma Other         Problem history  Patient Active Problem List   Diagnosis    DJD (degenerative joint disease)    Benign essential hypertension    Hyperlipidemia LDL goal <70    Lumbar spinal stenosis    Mild intermittent asthma without complication    History of bilateral knee replacement    Obesity    Primary insomnia    Posterior vitreous detachment of both eyes    PFO (patent foramen ovale)    Neovascular glaucoma of left eye, moderate stage    Blind left eye    Paroxysmal A-fib (H)    Ocular hypertension of right eye, treated    History of central retinal artery occlusion, os    Disturbances of vision, late effect of stroke    Combined forms of age-related cataract, mild-mod, of left eye    Pseudophakia, od    Low bone mass    Cervicalgia    Heart  failure with reduced ejection fraction (H)    Chronic kidney disease, stage 3a (H)        Allergies  Allergies   Allergen Reactions    No Clinical Screening - See Comments Other (See Comments)     senisitive to non steroidals  Aleve, ibuprofen celebrex cause bad stomach pains  senisitive to non steroidals  Aleve, ibuprofen celebrex cause bad stomach pains    Oxycodone Hives     Other reaction(s): Unknown  ... With facial swelling    Nsaids Nausea and Vomiting     Other reaction(s): Abdominal Pain        Social History  Social History     Socioeconomic History    Marital status:      Spouse name: Vaughn    Number of children: 2    Years of education: 14    Highest education level: Not on file   Occupational History    Occupation: Retired LPN   Tobacco Use    Smoking status: Former     Current packs/day: 0.00     Average packs/day: 0.1 packs/day for 2.0 years (0.2 ttl pk-yrs)     Types: Cigarettes     Start date: 1963     Quit date: 1965     Years since quittin.0     Passive exposure: Past    Smokeless tobacco: Never   Vaping Use    Vaping status: Never Used   Substance and Sexual Activity    Alcohol use: Not Currently     Comment: one a month    Drug use: No    Sexual activity: Not Currently     Partners: Male     Birth control/protection: Post-menopausal, Female Surgical   Other Topics Concern    Parent/sibling w/ CABG, MI or angioplasty before 65F 55M? No   Social History Narrative    , lives with .  2 grown children in area     Lives in house.       Social Drivers of Health     Financial Resource Strain: High Risk (2024)    Financial Resource Strain     Within the past 12 months, have you or your family members you live with been unable to get utilities (heat, electricity) when it was really needed?: Yes   Food Insecurity: Low Risk  (2024)    Food Insecurity     Within the past 12 months, did you worry that your food would run out before you got money to buy more?: No      Within the past 12 months, did the food you bought just not last and you didn t have money to get more?: No   Transportation Needs: Low Risk  (9/4/2024)    Transportation Needs     Within the past 12 months, has lack of transportation kept you from medical appointments, getting your medicines, non-medical meetings or appointments, work, or from getting things that you need?: No   Physical Activity: Insufficiently Active (9/4/2024)    Exercise Vital Sign     Days of Exercise per Week: 3 days     Minutes of Exercise per Session: 20 min   Stress: No Stress Concern Present (9/4/2024)    [employer] [employer] - Occupational Stress Questionnaire     Feeling of Stress : Only a little   Social Connections: Unknown (9/4/2024)    Social Connection and Isolation Panel [NHANES]     Frequency of Communication with Friends and Family: Not on file     Frequency of Social Gatherings with Friends and Family: Twice a week     Attends Taoist Services: Not on file     Active Member of Clubs or Organizations: Not on file     Attends Club or Organization Meetings: Not on file     Marital Status: Not on file   Interpersonal Safety: Low Risk  (9/9/2024)    Interpersonal Safety     Do you feel physically and emotionally safe where you currently live?: Yes     Within the past 12 months, have you been hit, slapped, kicked or otherwise physically hurt by someone?: No     Within the past 12 months, have you been humiliated or emotionally abused in other ways by your partner or ex-partner?: No   Housing Stability: Low Risk  (9/4/2024)    Housing Stability     Do you have housing? : Yes     Are you worried about losing your housing?: No        OBJECTIVE     Vital signs reviewed by Benjamin Juarez PA-C  /84   Pulse 82   Temp 97.5  F (36.4  C) (Tympanic)   Resp 16   Wt 77.1 kg (170 lb)   SpO2 96%   BMI 31.60 kg/m       Physical Exam  Vitals and nursing note reviewed.   Constitutional:       General: She is not in  acute distress.     Appearance: She is well-developed. She is not ill-appearing, toxic-appearing or diaphoretic.   HENT:      Head: Normocephalic and atraumatic.      Right Ear: Hearing, tympanic membrane, ear canal and external ear normal. Tympanic membrane is not perforated, erythematous, retracted or bulging.      Left Ear: Hearing, tympanic membrane, ear canal and external ear normal. Tympanic membrane is not perforated, erythematous, retracted or bulging.      Nose: Congestion present. No mucosal edema or rhinorrhea.      Right Sinus: No maxillary sinus tenderness or frontal sinus tenderness.      Left Sinus: No maxillary sinus tenderness or frontal sinus tenderness.      Mouth/Throat:      Pharynx: No pharyngeal swelling, oropharyngeal exudate, posterior oropharyngeal erythema or uvula swelling.      Tonsils: No tonsillar exudate or tonsillar abscesses. 0 on the right. 0 on the left.   Eyes:      General:         Right eye: No discharge.         Left eye: No discharge.      Pupils: Pupils are equal, round, and reactive to light.   Cardiovascular:      Rate and Rhythm: Normal rate and regular rhythm.      Heart sounds: Normal heart sounds. No murmur heard.     No friction rub. No gallop.   Pulmonary:      Effort: Pulmonary effort is normal. No respiratory distress.      Breath sounds: Wheezing present. No decreased breath sounds, rhonchi or rales.   Chest:      Chest wall: No tenderness.   Abdominal:      General: Bowel sounds are normal. There is no distension.      Palpations: Abdomen is soft. There is no mass.      Tenderness: There is no abdominal tenderness. There is no guarding.   Musculoskeletal:      Cervical back: Normal range of motion and neck supple.   Lymphadenopathy:      Head:      Right side of head: No submental, submandibular, tonsillar, preauricular or posterior auricular adenopathy.      Left side of head: No submental, submandibular, tonsillar, preauricular or posterior auricular  adenopathy.      Cervical: No cervical adenopathy.      Right cervical: No superficial or posterior cervical adenopathy.     Left cervical: No superficial or posterior cervical adenopathy.   Skin:     General: Skin is warm and dry.      Findings: No rash.   Neurological:      Mental Status: She is alert and oriented to person, place, and time.      Cranial Nerves: No cranial nerve deficit.      Deep Tendon Reflexes: Reflexes are normal and symmetric.   Psychiatric:         Behavior: Behavior normal. Behavior is cooperative.         Thought Content: Thought content normal.         Judgment: Judgment normal.           Benjamin Juarez PA-C  12/18/2024, 3:13 PM

## 2024-12-24 ENCOUNTER — DOCUMENTATION ONLY (OUTPATIENT)
Dept: CARDIOLOGY | Facility: CLINIC | Age: 79
End: 2024-12-24
Payer: MEDICARE

## 2024-12-24 PROCEDURE — 99207 PR NO CHARGE-RESEARCH SERVICE: CPT

## 2024-12-24 NOTE — PROGRESS NOTES
A Phase 3, Randomized, Double-Blind, Double-Dummy, Parallel Group, Active-Controlled Study to Evaluate the Efficacy and Safety of Milvexian, an Oral Factor Joycelyn Inhibitor, Versus Apixaban in Participants with Atrial Fibrillation     Diagnosis/event description (diagnosis preferred):  Received a call from participant that she has again been experiencing significant nosebleeds, this time for several consecutive days, as well as an upper respiratory infection. She stopped the study medication on her own x2 days, but has since restarted. She asked about possibly withdrawing from participation of study medication and returning to [facility], but would need to do so before the new year for financial reasons. She expresses willingness to continue study participation but cannot see an ENT until January and needs to make a decision about the medications before this. She asked if this is a known potential side effect. CRC confirmed that both study medications can result in bleeding and that one of the study endpoints is to evaluate this. Will follow up with participant about a decision this week.    Start date: 15 Dec 2024    Date resolved: 22 Dec 2024     Outcome: ([] resolved [with or without sequelae] / [x]  recovering /[]  not recovered /[]  death /[]unknown)      Intensity: ([] mild /[x]  moderate / [] severe)    Study Medication adjustment:  [x] Yes   [] No    Stopped IP 16-18 Dec 24     Date study team aware of event: 23 Dec 2024    Was treatment given for this event:  [] Yes    [x] No   If yes, provide details on concomitant medication source    Serious adverse event?   [] Yes   [x] No  If yes:       Adverse event requiring additional information?  [] Yes   [x] No   If yes:      Endpoint?  [x] Yes   [] No   If yes:  Cardiovascular Death (including death due to undetermined cause) []  MI (eg, stable and unstable angina, and ischemic chest pain) []   Stroke []  TIA []  Major vascular (non-traumatic) limb amputation  []  Acute limb ischemia []  Pulmonary Embolism []  Symptomatic DVT []  Non-CNS systemic embolism []  Bleeding event [x]      Varghese Garcia RN     Clinical Research Nurse  [facility]  Clinical Trials Office  [address] [place] [id number]  [email or web address]   Office: [phone]      Dr. Gamez: Reasonable possibility that AE is related to study treatment  [] Yes   [] No

## 2024-12-30 ENCOUNTER — OFFICE VISIT (OUTPATIENT)
Dept: OPHTHALMOLOGY | Facility: CLINIC | Age: 79
End: 2024-12-30
Payer: MEDICARE

## 2024-12-30 DIAGNOSIS — Z86.69 HISTORY OF CENTRAL RETINAL ARTERY OCCLUSION: ICD-10-CM

## 2024-12-30 DIAGNOSIS — H02.106 PUNCTAL ECTROPIONS OF BOTH EYES: ICD-10-CM

## 2024-12-30 DIAGNOSIS — H40.051 OCULAR HYPERTENSION OF RIGHT EYE: Primary | ICD-10-CM

## 2024-12-30 DIAGNOSIS — H40.52X2 NEOVASCULAR GLAUCOMA OF LEFT EYE, MODERATE STAGE: ICD-10-CM

## 2024-12-30 DIAGNOSIS — H02.103 PUNCTAL ECTROPIONS OF BOTH EYES: ICD-10-CM

## 2024-12-30 RX ORDER — NEOMYCIN SULFATE, POLYMYXIN B SULFATE, AND DEXAMETHASONE 3.5; 10000; 1 MG/G; [USP'U]/G; MG/G
OINTMENT OPHTHALMIC
Qty: 3.5 G | Refills: 2 | Status: SHIPPED | OUTPATIENT
Start: 2024-12-30

## 2024-12-30 ASSESSMENT — VISUAL ACUITY
OD_CC+: -2
OD_CC: 20/20
METHOD: SNELLEN - LINEAR
CORRECTION_TYPE: GLASSES
OS_CC: NLP

## 2024-12-30 ASSESSMENT — EXTERNAL EXAM - RIGHT EYE: OD_EXAM: 2+ BROW PTOSIS

## 2024-12-30 ASSESSMENT — TONOMETRY
IOP_METHOD: APPLANATION
OD_IOP_MMHG: 12
OS_IOP_MMHG: 16

## 2024-12-30 ASSESSMENT — EXTERNAL EXAM - LEFT EYE: OS_EXAM: 2+ BROW PTOSIS, MILD TO MOD BROW

## 2024-12-30 NOTE — PROGRESS NOTES
" Current Eye Medications:  latanoprost - both eyes QHS - last dose: 10pm last night  Timolol - left eye only QAM - last dose: 8am today.   Artificial tears - right>left eye PRN      Subjective:  6 mo HVF, OCT and IOP - good with using drops daily. LLL drooping, has tearing left eye, worsened since last visit 6 mos ago. Right eye is dry - mostly using artificial tears in right eye only.     Hx - sister with droopy lid, requiring surgery.      Objective:  See Ophthalmology Exam.       Assessment:  Intraocular pressures, glaucoma OCT, retinal OCT, and Hill Visual Field remain normal right eye in monocular patient who is a treated glaucoma suspect.  Punctal ectropia both lower lids; symptomatic only left eye.      Plan:  Continue:   Latanoprost (green top) every evening both eyes.  Timolol (yellow top) every morning in the left eye.  Artificial tears up to four times a day as needed (like Refresh Optive, Systane Balance, or TheraTears. Avoid \"get the red out\" drops and generic artifical tears).     Begin:  Mauro/Dex ointment - apply a small squirt twice daily in the left eye     Wait at least 5 minutes between drops if using more than one at a time.     Return visit in 6 months for a complete exam. Can discuss a referral to Dr. Lupis Alfaro in [place] for an eye lid evaluation at that time as needed.     Bryan Morrison M.D.  [phone]       "

## 2024-12-30 NOTE — PATIENT INSTRUCTIONS
"Continue:   Latanoprost (green top) every evening both eyes.  Timolol (yellow top) every morning in the left eye.  Artificial tears up to four times a day as needed (like Refresh Optive, Systane Balance, or TheraTears. Avoid \"get the red out\" drops and generic artifical tears).     Begin:  Mauro/Dex ointment - apply a small squirt twice daily in the left eye     Wait at least 5 minutes between drops if using more than one at a time.     Return visit in 6 months for a complete exam. Can discuss a referral to Dr. Lupis Alfaro in [place] for an eye lid evaluation at that time as needed.     Bryan Morrison M.D.  [phone]    "

## 2024-12-30 NOTE — LETTER
"12/30/2024      Alfreda Baxter  [address] [address]      Dear Colleague,    Thank you for referring your patient, Alfreda Baxter, to the [facility]. Please see a copy of my visit note below.     Current Eye Medications:  latanoprost - both eyes QHS - last dose: 10pm last night  Timolol - left eye only QAM - last dose: 8am today.   Artificial tears - right>left eye PRN      Subjective:  6 mo HVF, OCT and IOP - good with using drops daily. LLL drooping, has tearing left eye, worsened since last visit 6 mos ago. Right eye is dry - mostly using artificial tears in right eye only.     Hx - sister with droopy lid, requiring surgery.      Objective:  See Ophthalmology Exam.       Assessment:  Intraocular pressures, glaucoma OCT, retinal OCT, and Hill Visual Field remain normal right eye in monocular patient who is a treated glaucoma suspect.  Punctal ectropia both lower lids; symptomatic only left eye.      Plan:  Continue:   Latanoprost (green top) every evening both eyes.  Timolol (yellow top) every morning in the left eye.  Artificial tears up to four times a day as needed (like Refresh Optive, Systane Balance, or TheraTears. Avoid \"get the red out\" drops and generic artifical tears).     Begin:  Mauro/Dex ointment - apply a small squirt twice daily in the left eye     Wait at least 5 minutes between drops if using more than one at a time.     Return visit in 6 months for a complete exam. Can discuss a referral to Dr. Lupis Alfaro in [place] for an eye lid evaluation at that time as needed.     Bryan Morrison M.D.  [phone]           Again, thank you for allowing me to participate in the care of your patient.        Sincerely,        Bryan Morrison MD    Electronically signed"

## 2024-12-31 PROBLEM — H02.103 PUNCTAL ECTROPIONS OF BOTH EYES: Status: ACTIVE | Noted: 2024-12-31

## 2024-12-31 PROBLEM — H02.106 PUNCTAL ECTROPIONS OF BOTH EYES: Status: ACTIVE | Noted: 2024-12-31

## 2025-01-10 ENCOUNTER — APPOINTMENT (OUTPATIENT)
Dept: LAB | Facility: HOSPITAL | Age: 80
End: 2025-01-10
Payer: MEDICARE

## 2025-01-13 ENCOUNTER — TRANSFERRED RECORDS (OUTPATIENT)
Dept: HEALTH INFORMATION MANAGEMENT | Facility: CLINIC | Age: 80
End: 2025-01-13
Payer: MEDICARE

## 2025-01-14 ENCOUNTER — TELEPHONE (OUTPATIENT)
Dept: CARDIOLOGY | Facility: CLINIC | Age: 80
End: 2025-01-14

## 2025-01-14 ENCOUNTER — DOCUMENTATION ONLY (OUTPATIENT)
Dept: CARDIOLOGY | Facility: CLINIC | Age: 80
End: 2025-01-14
Payer: MEDICARE

## 2025-01-14 DIAGNOSIS — Z00.6 EXAMINATION OF PARTICIPANT OR CONTROL IN CLINICAL RESEARCH: ICD-10-CM

## 2025-01-14 DIAGNOSIS — I48.19 PERSISTENT ATRIAL FIBRILLATION (H): Primary | ICD-10-CM

## 2025-01-14 NOTE — TELEPHONE ENCOUNTER
[facility] Call Center    Phone Message    May a detailed message be left on voicemail: yes     Reason for Call: Other: Per call from patient, she quit Dr. Gamez's anticoagulant study due to nose bleed but she needs to continue the eliquis medication. Patient has enough until tonight and is leaving town tomorrow. Please call patient.    Pharmacy - [id number] - [place], [place] - [address]   Action Taken: Other: Cardio    Travel Screening: Not Applicable     Date of Service:

## 2025-01-14 NOTE — TELEPHONE ENCOUNTER
1/15/25    Eliquis 5mg BID sent.          Varghese Garcia, RN  You2 minutes ago (9:27 AM)     PATRIC [place],    Yes, since Malinda will be coming off of the study medication she will need to go back on a labeled anticoagulant. She is still a subject in the study, but will no longer be taking the study medication.    Thank you!      Varghese Garcia, KEELEY    Clinical Research Nurse  [facility]  Clinical Trials Office  [address] [place] [address]  [email or web address]  Office: [phone]         1/14/25  Patient has Chadsvasc of 7, indicating need for anticoagulation. Secure chat message sent to research coordinator (Varghese Garcia, RN) to see if it's ok for patient to restart Eliquis now, as she just unenrolled from the study today. Will also route.

## 2025-01-14 NOTE — PROGRESS NOTES
A Phase 3, Randomized, Double-Blind, Double-Dummy, Parallel Group, Active-Controlled Study to Evaluate the Efficacy and Safety of Milvexian, an Oral Factor Joycelyn Inhibitor, Versus Apixaban in Participants with Atrial Fibrillation     Diagnosis/event description (diagnosis preferred):  Recurrent nosebleeds.    Subject called to report recurring nosebleeds. She had these in the past, changing from Xarelto to Eliquis due to their frequency. Had cautery 12 Aug 2024 with no further nosebleeds prior to enrollment. Had nose bleed 21-23 Dec 2024, stopped IP for these two days, and restarted on 24 Dec 2024. Now reporting frequent bleeding over this past weekend. Stopped IP x4 doses, restarting the evening of 12 Jan 2025. Saw ENT 13 Jan for repeat cautery. Woke up today with another nosebleed. At this time, she would like to stop IP and go back to labeled Eliquis.     Plan to schedule End of Treatment visit as soon as possible (within 30 days per protocol) as she will be out of town the rest of this week. Subject remains enrolled in the study and CRC will follow up according to the SOA.     Start date: 10 Leonid 2025    Date resolved: Ongoing     Outcome: ([] resolved [with or without sequelae] / [x]  recovering /[]  not recovered /[]  death /[]unknown)      Intensity: ([] mild /[x]  moderate / [] severe)    Study Medication adjustment:  [x] Yes   [] No    Date study team aware of event: 14 Jan 2025    Was treatment given for this event:  [x] Yes    No   If yes, provide details on concomitant medication source - Cautery per ENT    Serious adverse event?   [] Yes   [x] No      Adverse event requiring additional information?  [] Yes   [x] No         Endpoint?  [x] Yes   [] No   If yes:  Cardiovascular Death (including death due to undetermined cause) []  MI (eg, stable and unstable angina, and ischemic chest pain) []   Stroke []  TIA []  Major vascular (non-traumatic) limb amputation []  Acute limb ischemia []  Pulmonary Embolism  []  Symptomatic DVT []  Non-CNS systemic embolism []   Bleeding event [x]        Varghese Garcia RN     Clinical Research Nurse  [facility]  Clinical Trials Office  [facility] [place] [address]  [email or web address]   Office: [phone]      Dr. Gamez: Reasonable possibility that AE is related to study treatment  [x] Yes   [] No

## 2025-01-15 ENCOUNTER — TRANSFERRED RECORDS (OUTPATIENT)
Dept: CARDIOLOGY | Facility: CLINIC | Age: 80
End: 2025-01-15
Payer: MEDICARE

## 2025-01-21 ENCOUNTER — TRANSFERRED RECORDS (OUTPATIENT)
Dept: CARDIOLOGY | Facility: CLINIC | Age: 80
End: 2025-01-21
Payer: MEDICARE

## 2025-01-28 ENCOUNTER — TRANSFERRED RECORDS (OUTPATIENT)
Dept: CARDIOLOGY | Facility: CLINIC | Age: 80
End: 2025-01-28
Payer: MEDICARE

## 2025-02-13 ENCOUNTER — LAB (OUTPATIENT)
Dept: LAB | Facility: CLINIC | Age: 80
End: 2025-02-13
Payer: MEDICARE

## 2025-02-13 DIAGNOSIS — M06.09 RHEUMATOID ARTHRITIS OF MULTIPLE SITES WITH NEGATIVE RHEUMATOID FACTOR (H): ICD-10-CM

## 2025-02-13 DIAGNOSIS — Z79.899 HIGH RISK MEDICATION USE: ICD-10-CM

## 2025-02-13 DIAGNOSIS — I48.0 PAROXYSMAL A-FIB (H): ICD-10-CM

## 2025-02-13 LAB
BASOPHILS # BLD AUTO: 0 10E3/UL (ref 0–0.2)
BASOPHILS NFR BLD AUTO: 0 %
EOSINOPHIL # BLD AUTO: 0.1 10E3/UL (ref 0–0.7)
EOSINOPHIL NFR BLD AUTO: 3 %
ERYTHROCYTE [DISTWIDTH] IN BLOOD BY AUTOMATED COUNT: 14 % (ref 10–15)
ERYTHROCYTE [SEDIMENTATION RATE] IN BLOOD BY WESTERGREN METHOD: 13 MM/HR (ref 0–30)
HCT VFR BLD AUTO: 41.2 % (ref 35–47)
HGB BLD-MCNC: 13.1 G/DL (ref 11.7–15.7)
IMM GRANULOCYTES # BLD: 0 10E3/UL
IMM GRANULOCYTES NFR BLD: 0 %
LYMPHOCYTES # BLD AUTO: 1.4 10E3/UL (ref 0.8–5.3)
LYMPHOCYTES NFR BLD AUTO: 31 %
MCH RBC QN AUTO: 31.3 PG (ref 26.5–33)
MCHC RBC AUTO-ENTMCNC: 31.8 G/DL (ref 31.5–36.5)
MCV RBC AUTO: 98 FL (ref 78–100)
MONOCYTES # BLD AUTO: 0.5 10E3/UL (ref 0–1.3)
MONOCYTES NFR BLD AUTO: 12 %
NEUTROPHILS # BLD AUTO: 2.4 10E3/UL (ref 1.6–8.3)
NEUTROPHILS NFR BLD AUTO: 54 %
PLATELET # BLD AUTO: 212 10E3/UL (ref 150–450)
RBC # BLD AUTO: 4.19 10E6/UL (ref 3.8–5.2)
WBC # BLD AUTO: 4.5 10E3/UL (ref 4–11)

## 2025-02-17 ENCOUNTER — OFFICE VISIT (OUTPATIENT)
Dept: RHEUMATOLOGY | Facility: CLINIC | Age: 80
End: 2025-02-17
Payer: MEDICARE

## 2025-02-17 VITALS
BODY MASS INDEX: 31.6 KG/M2 | OXYGEN SATURATION: 98 % | DIASTOLIC BLOOD PRESSURE: 69 MMHG | WEIGHT: 170 LBS | HEART RATE: 72 BPM | SYSTOLIC BLOOD PRESSURE: 125 MMHG

## 2025-02-17 DIAGNOSIS — Z79.899 HIGH RISK MEDICATION USE: ICD-10-CM

## 2025-02-17 DIAGNOSIS — M06.09 RHEUMATOID ARTHRITIS OF MULTIPLE SITES WITH NEGATIVE RHEUMATOID FACTOR (H): Primary | ICD-10-CM

## 2025-02-17 PROCEDURE — G2211 COMPLEX E/M VISIT ADD ON: HCPCS | Performed by: INTERNAL MEDICINE

## 2025-02-17 PROCEDURE — 99214 OFFICE O/P EST MOD 30 MIN: CPT | Performed by: INTERNAL MEDICINE

## 2025-02-17 RX ORDER — FOLIC ACID 1 MG/1
2 TABLET ORAL DAILY
Qty: 200 TABLET | Refills: 2 | Status: SHIPPED | OUTPATIENT
Start: 2025-02-17

## 2025-02-17 RX ORDER — METHOTREXATE 2.5 MG/1
20 TABLET ORAL
Qty: 96 TABLET | Refills: 2 | Status: SHIPPED | OUTPATIENT
Start: 2025-02-17

## 2025-02-17 NOTE — PATIENT INSTRUCTIONS
RHEUMATOLOGY    [facility]  [address] [place] [address]  [place] [address]    Phone number: [phone]  Fax number: [phone]    If you need a medication refill, please contact us as you may need lab work and/or a follow up visit prior to your refill.      Thank you for choosing [facility]!    Loraine Seaman CMA Rheumatology

## 2025-02-17 NOTE — PROGRESS NOTES
"Rheumatology Clinic Visit      Alfreda Baxter MRN# [id number]   YOB: 1945 Age: 79 year old      Date of visit: 2/17/25   PCP: Dr. Mercedes Urena    Chief Complaint   Patient presents with:  Rheumatoid Arthritis: Doing well    Assessment and Plan     1.  Seronegative rheumatoid arthritis: Inflammatory arthritis symptoms affecting her MCPs, PIPs, wrists, and shoulders started in February 2023.  Establish care with me on 11/8/2023 at which point she had symmetric synovitis of the MCPs, PIPs, and wrists; and symptoms of impingement syndrome of the shoulders.  She has been following at [facility] where steroid injections of the shoulders provide benefit for no more than 10 days; and systemic steroid taper starting with 60 mg daily from her primary care provider was a \"miracle\" with resolution of MCP, PIP, wrist, and shoulder pain while on prednisone.  Methotrexate was started in November 2023.  2/15/2024: Patient reports having improvement with methotrexate but still active disease.  Prednisone was effective but higher anxiety at 20 mg daily, mild intermittent anxiety at 10 mg daily, and tolerated well at 5 mg daily.  Was able to stop prednisone completely on 5/1/2024.  Currently doing well on methotrexate monotherapy, with the exception of very mild fatigue that occurs only for 1 day duration, 2 days after methotrexate dosing that she says is very tolerable and she does not want to change methotrexate dosing based on this.  Chronic illness, stable.    - Continue Methotrexate 20mg once every 7 days  - Continue folic acid 2 mg daily  - Labs in 3 months: CBC, Creatinine, Hepatic Panel, ESR, CRP    2.  Osteoarthritis of the hands: Significant degenerative changes of the hands with large Heberden's and Alba's nodes, and squaring of the first CMC joints.  Reportedly this has been stable for many years, preceding the inflammatory arthritis symptoms started in February 2023.  Minimal symptom " severity from degenerative arthritis at this time.    3.  Degenerative arthritis of the spine: Many degenerative changes that have been managed with epidural steroid injections from [facility].  She will continue following with her spine specialist as needed.    4.  Right shoulder pain, upper back pain, history: Refer to physical therapy previously.  Not an issue today.    5.  History of squamous cell carcinoma: 1 lesion removed per patient report.  She will continue following with dermatology at least once yearly for monitoring    6.  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    7.  Elevated creatinine: Avoid nephrotoxins    8.  Vaccinations: Vaccinations reviewed with Ms. Baxter.  Risks and benefits of vaccinations were discussed.    - Influenza: encouraged yearly vaccination  - [id number] and [id number]: up to date  - Shingrix: uptodate  - COVID-19: Advised keeping updated and to hold methotrexate x1-2 weeks afterward  - RSV: up to date    Total minutes spent in evaluation with patient, documentation, , and review of pertinent studies and chart notes: 14  The longitudinal plan of care for the rheumatology problem(s) were addressed during this visit.  Due to added complexity of care, we will continue to support the patient and the subsequent management of this condition with ongoing continuity of care.      Ms. Baxter verbalized agreement with and understanding of the rational for the diagnosis and treatment plan.  All questions were answered to best of my ability and the patient's satisfaction. Ms. Baxter was advised to contact the clinic with any questions that may arise after the clinic visit.      Thank you for involving me in the care of the patient    Return to clinic: 3 months    HPI   Alfreda Baxter is a 79 year old female with a past medical history significant for hypertension, hyperlipidemia, lumbar spine stenosis, paroxysmal  atrial fibrillation on anticoagulation, history of CVA with left eye vision loss, insomnia, and bilateral TKA who presents for follow-up of rheumatoid arthritis.     11/8/2023: Chronic degenerative changes of her hands and spine; the degenerative changes of her hands do not bother her.  The degenerative changes of her spine are managed at [facility] where steroid injections have been effective; she has seen a spine surgeon and is next going to see a pain management specialist at [facility].  She had been fairly stable until February 2023 when she had sudden onset bilateral shoulder pain.  This was followed by pain at the wrists, MCPs, and PIPs.  MCP, PIP, wrist, and shoulder pain is worse in the morning and improves with time and activity; morning stiffness for most of the day, reaching a baseline after about 1-2 hours.  When she received a epidural steroid injection of her lumbar spine her shoulders felt better.  She then received a high dose of prednisone starting with 60 mg daily and tapered off from her primary care clinic and while on prednisone she felt like it was a miracle drug.  She has been given other courses of prednisone and she says that she typically only starts with 40 mg a day and that that is very effective also.  Reduced  strength.  History of bilateral TKA.  Knees, ankles, and feet without any issues.  She notes that her son has ulcerative colitis and that her son's cousins on his father side have psoriasis; no other autoimmune disease on her side of the family.  Squamous of carcinoma x1; following with dermatology regularly.  History of CVA 5 years ago with loss of vision in the left eye.  No history of scalp tenderness, jaw or tongue claudication, or new headaches now around the time of the vision loss.    2/15/2024: Prednisone was effective but higher anxiety at 20 mg daily, mild intermittent anxiety at 10 mg daily, and tolerated well at 5 mg daily. Prednisone  works well for arthritis.  Methotrexate has been helpful; held methotrexate for several doses because of infections.  Still with pain/swelling/stiffness at the MCPs, PIPs, wrists, but all improved with methotrexate.  She would like to continue on methotrexate monotherapy for now to see if a longer duration will be helpful.  She has several questions about rheumatoid arthritis and methotrexate that were discussed today.  Occasional oral sore since starting methotrexate.  No nasal sores.    6/18/2024: Doing well on methotrexate.  Was able to stop prednisone on 5/1/2024.  No joint pain or swelling.  Morning stiffness for less than 10 minutes.  Arthritis not limiting her daily activities.  Since last seen she developed shortness of breath and was found to have A-fib with RVR; following with cardiology and has planned ablation tomorrow.    8/12/2024: RA controlled.  Not on prednisone.  Methotrexate is effective for RA management.  Still with degenerative changes of the hands and back; following with a spine specialist at [facility].  Creatinine is mildly elevated; she questions if this is related to blood pressure medication changes and she plans to discuss this with her primary care provider tomorrow.    11/18/2024: RA stable.  Not requiring prednisone.  Only new issue is a few days of right anterior shoulder pain worse with activity and improved with rest; also with chronic back pain, worse right now at the thoracic area and causing muscle spasms across the trapezius.  No joint swelling.  Morning stiffness for less than 20 minutes.    Today, 2/17/2025: She held methotrexate 1 week before the week of Radha and for 1 week due to suspected influenza illness.  No worsening arthritis symptoms with holding methotrexate.  Feels like she is doing well at this time.  Currently without joint pain.  More stiffness for less than 20 minutes.  Arthritis not limiting daily activities.  She does note mild fatigue for  a 24-hour duration, only occurring 2 days after taking methotrexate; no fatigue the day after taking methotrexate.  She would like to remain on her current regimen as it is working well.    Tobacco: Former cigarette smoker  EtOH: None  Drugs: None  Occupation: retired nurse    ROS   12 point review of system was completed and negative except as noted in the HPI     Active Problem List     Patient Active Problem List   Diagnosis    DJD (degenerative joint disease)    Benign essential hypertension    Hyperlipidemia LDL goal <70    Lumbar spinal stenosis    Mild intermittent asthma without complication    History of bilateral knee replacement    Obesity    Primary insomnia    Posterior vitreous detachment of both eyes    PFO (patent foramen ovale)    Neovascular glaucoma of left eye, moderate stage    Blind left eye    Paroxysmal A-fib (H)    Ocular hypertension of right eye, treated    History of central retinal artery occlusion, os    Disturbances of vision, late effect of stroke    Combined forms of age-related cataract, mild-mod, of left eye    Pseudophakia, od    Low bone mass    Cervicalgia    Heart failure with reduced ejection fraction (H)    Chronic kidney disease, stage 3a (H)    Punctal ectropions of both eyes     Past Medical History     Past Medical History:   Diagnosis Date    Acute idiopathic gout of left foot 11/04/2015    Adenomatous colon polyp 06/05/2013    Colonoscopy due 2025    Asthma 11/23/2009    controlled with inhaler    Blind left eye 07/08/2019    Due to central retinal occlusion    Cataract 12/11/2008    Central retinal artery occlusion of left eye 09/15/2018    Chronic kidney disease, stage 3a (H) 08/13/2024    Disturbances of vision, late effect of stroke 12/03/2022    DJD (degenerative joint disease) 11/23/2009    knees    GERD (gastroesophageal reflux disease) 07/29/2010    Glaucoma 05/10/2012    Gout 11/15/2012    Heart failure with reduced ejection fraction (H) 08/13/2024    History  of central retinal artery occlusion, os 2020    HTN (hypertension) 2009    Hyperlipidemia LDL goal <70 2010    Infected cyst of skin 10/14/2024    Low bone mass 2003    Lumbar spinal stenosis 2011    Sees Dr Sgae    Mild intermittent asthma without complication 2018    Mild persistent asthma 2013    Neovascular glaucoma of left eye, moderate stage 2019    Ocular hypertension of right eye, treated 2019    Paroxysmal A-fib (H) 2019    Per loop recorder, 2018    On Rivaroxaban.   H/o R central retinal artery occlusion    Paroxysmal ventricular tachycardia (H) 2018    Added automatically from request for surgery [id number]    PFO (patent foramen ovale) 2010    Primary insomnia 2016    Recurrent cerebrovascular accidents (CVAs) (H) 2022    Recurrent cerebrovascular accidents (CVAs) (H) 2022    Rheumatoid arthritis of multiple sites with negative rheumatoid factor (H) 2023    Transient global amnesia 2022     Past Surgical History     Past Surgical History:   Procedure Laterality Date    ANESTHESIA CARDIOVERSION N/A 2024    Procedure: Anesthesia cardioversion @0930;  Surgeon: GENERIC ANESTHESIA PROVIDER;  Location: [facility] OR    ANESTHESIA CARDIOVERSION N/A 2024    Procedure: Anesthesia cardioversion @1330;  Surgeon: GENERIC ANESTHESIA PROVIDER;  Location: [facility] OR    APPENDECTOMY      age 7 yrs.    BREAST BIOPSY, RT/LT  2004    left benign    CATARACT IOL, RT/LT       SECTION      x 2    COLONOSCOPY  2013    needed q 3 yrs.    COLONOSCOPY N/A 09/10/2020    Procedure: Colonoscopy, With Polypectomy And Biopsy;  Surgeon: Brian Cleaning MD;  Location: [facility] OR    COLONOSCOPY WITH CO2 INSUFFLATION N/A 2016    Procedure: COLONOSCOPY WITH CO2 INSUFFLATION;  Surgeon: Brian Cleaning MD;  Location: [facility] OR    COLONOSCOPY WITH CO2 INSUFFLATION N/A 09/10/2020    Procedure: COLONOSCOPY, WITH CO2  INSUFFLATION;  Surgeon: Brian Cleaning MD;  Location:  OR    EP COMPREHENSIVE EP STUDY N/A 12/31/2018    Procedure: LOOP RECORDER;  Surgeon: Buck Butterfield MD;  Location:  [facility]    HYSTERECTOMY, POORNIMA  1997    bleeding fibroids    ORTHOPEDIC SURGERY  2009    meniscus repair    PHACOEMULSIFICATION WITH STANDARD INTRAOCULAR LENS IMPLANT Right 10/24/2022    Procedure: COMPLE RIGHT EYE PHACOEMULSIFICATION, CATARACT, WITH STANDARD INTRAOCULAR LENS IMPLANT INSERTION;  Surgeon: Ezequeil Florence MD;  [place] [facility] [place]    [facility] TOTAL KNEE ARTHROPLASTY Left 11/2015    at [facility]     Allergy     Allergies   Allergen Reactions    No Clinical Screening - See Comments Other (See Comments)     senisitive to non steroidals  Aleve, ibuprofen celebrex cause bad stomach pains  senisitive to non steroidals  Aleve, ibuprofen celebrex cause bad stomach pains    Oxycodone Hives     Other reaction(s): Unknown  ... With facial swelling    Nsaids Nausea and Vomiting     Other reaction(s): Abdominal Pain     Current Medication List     Current Outpatient Medications   Medication Sig Dispense Refill    acetaminophen (TYLENOL) 500 MG tablet Take 500-1,000 mg by mouth every 6 hours as needed.      albuterol (PROAIR HFA/PROVENTIL HFA/VENTOLIN HFA) 108 (90 Base) MCG/ACT inhaler Inhale 2 puffs into the lungs every 6 hours as needed for shortness of breath or wheezing. 18 g 11    amiodarone (PACERONE) 200 MG tablet Take 1 tablet (200 mg) by mouth daily. 90 tablet 3    apixaban ANTICOAGULANT (ELIQUIS) 5 MG tablet Take 1 tablet (5 mg) by mouth 2 times daily. 180 tablet 1    apixaban ANTICOAGULANT (ELIQUIS) 5 MG tablet Patient in study, is either on Eliquis or a new med Librexia      atorvastatin (LIPITOR) 20 MG tablet Take 1 tablet (20 mg) by mouth daily. 90 tablet 4    B-COMPLEX OR Take 1 tablet by mouth daily.      CALCIUM 500 +D OR Take 1 tablet by mouth daily.      folic acid (FOLVITE) 1 MG tablet Take 2  "tablets (2 mg) by mouth daily 200 tablet 2    hypromellose (ARTIFICIAL TEARS) 0.5 % SOLN ophthalmic solution Place 1 drop into both eyes 3 times daily      latanoprost (XALATAN) 0.005 % ophthalmic solution Place 1 drop into both eyes at bedtime 7.5 mL 3    lisinopril (ZESTRIL) 5 MG tablet Take 1 tablet (5 mg) by mouth at bedtime 90 tablet 3    methotrexate 2.5 MG tablet Take 8 tablets (20 mg) by mouth every 7 days . Methotrexate is a once weekly medication, taken on the same day of each week. 96 tablet 2    multivitamin (OCUVITE) TABS tablet Take 1 tablet by mouth daily      neomycin-polymyxin-dexAMETHasone (MAXITROL) 3.5-51865-3.1 ophthalmic ointment Apply a small squirt approx. 1/4\". 3.5 g 2    polyethylene glycol (MIRALAX) 17 GM/Dose powder Take 17 g (1 Capful) by mouth daily as needed for constipation      SENNA-docusate sodium (SENNA S) 8.6-50 MG tablet Take 1-4 tablets by mouth daily as needed      spironolactone (ALDACTONE) 25 MG tablet Take 0.5 tablets (12.5 mg) by mouth daily 45 tablet 3    timolol maleate (TIMOPTIC) 0.5 % ophthalmic solution Place 1 drop Into the left eye every morning 10 mL 3    zolpidem (AMBIEN) 5 MG tablet TAKE ONE TABLET BY MOUTH IN THE EVENING AS NEEDED FOR SLEEP 30 tablet 5    azelastine (ASTELIN) 0.1 % nasal spray Spray 1 spray into both nostrils at bedtime.      doxycycline hyclate (VIBRA-TABS) 100 MG tablet Take 1 tablet (100 mg) by mouth 2 times daily. (Patient not taking: Reported on 1/31/2025) 20 tablet 0     No current facility-administered medications for this visit.     Social History   See HPI    Family History     Family History   Problem Relation Age of Onset    Arthritis Mother     Cancer Mother     Hypertension Mother     Other Cancer Mother     Thyroid Disease Mother     Respiratory Father     Glaucoma Father     Asthma Father     Allergies Sister     Eye Disorder Sister     Lipids Sister     Neurologic Disorder Sister     Osteoporosis Sister     Glaucoma Sister     " "Hypertension Sister     Macular Degeneration Sister     Hyperlipidemia Sister     Hypertension Sister     Osteoporosis Sister     Gastrointestinal Disease Son         Ulcerative Colitis    Glaucoma Other      Son: Ulcerative colitis, psoriatic arthritis, psoriasis.  Her son's paternal cousins have psoriasis    Physical Exam     Temp Readings from Last 3 Encounters:   12/18/24 97.5  F (36.4  C) (Tympanic)   11/01/24 (!) 96.6  F (35.9  C) (Temporal)   10/14/24 97.5  F (36.4  C) (Oral)     BP Readings from Last 5 Encounters:   02/17/25 125/69   01/09/25 104/56   12/20/24 125/78   12/18/24 137/84   11/22/24 120/72     Pulse Readings from Last 1 Encounters:   02/17/25 72     Resp Readings from Last 1 Encounters:   01/09/25 16     Estimated body mass index is 31.6 kg/m  as calculated from the following:    Height as of 11/22/24: 1.562 m (5' 1.5\").    Weight as of this encounter: 77.1 kg (170 lb).    GEN: NAD. Healthy appearing adult.   HEENT:  Anicteric, noninjected sclera. No obvious external lesions of the ear and nose. Hearing intact.  PULM: No increased work of breathing  MSK: MCP and PIP without swelling or tenderness to palpation.   Heberden's and Alba's nodes present.  DIPs nontender to palpation.  Wrists and elbows without swelling or tenderness to palpation.  Shoulders without swelling or tenderness to palpation.  Knees and ankles without swelling or tenderness to palpation.  Negative MTP squeeze.  SKIN: No rash or jaundice seen  PSYCH: Alert. Appropriate.      Labs / Imaging (select studies)     RF/CCP  Recent Labs   Lab Test 03/08/23  1413   CCPIGG 0.7   RHF <7     CBC  Recent Labs   Lab Test 02/13/25  1128 11/11/24  1027 09/23/24  1106 08/09/24  1031 07/20/21  1248 11/26/20  1141 10/15/20  1108 07/24/20  0829 08/10/19  1410   WBC 4.5 7.3 5.9 5.0   < > 5.0   < > 5.7 18.6*   RBC 4.19 3.89 4.39 4.52   < > 4.74   < > 4.84 4.31   HGB 13.1 12.6 13.4 13.4   < > 13.4   < > 14.3 12.9   HCT 41.2 39.5 41.0 42.6   < > " 43.1   < > 43.4 40.0   MCV 98 102* 93 94   < > 91   < > 90 93   RDW 14.0 14.2 18.0* 15.9*   < > 12.5   < > 13.5 13.8    234 216 247   < > 338   < > 246 252   MCH 31.3 32.4 30.5 29.6   < > 28.3   < > 29.5 29.9   MCHC 31.8 31.9 32.7 31.5   < > 31.1*   < > 32.9 32.3   NEUTROPHIL 54 69  --  67   < > 66.6  --  48.3 85.6   LYMPH 31 20  --  24   < > 25.2  --  41.3 9.7   MONOCYTE 12 7  --  6   < > 7.0  --  7.9 4.6   EOSINOPHIL 3 3  --  3   < > 0.4  --  2.3 0.0   BASOPHIL 0 0  --  1   < > 0.6  --  0.2 0.1   ANEU  --   --   --   --   --  3.3  --  2.7 16.0*   ALYM  --   --   --   --   --  1.3  --  2.3 1.8   MANIDNER  --   --   --   --   --  0.4  --  0.5 0.9   AEOS  --   --   --   --   --  0.0  --  0.1 0.0   ABAS  --   --   --   --   --  0.0  --  0.0 0.0   ANEUTAUTO 2.4 5.1  --  3.4   < >  --   --   --   --    ALYMPAUTO 1.4 1.5  --  1.2   < >  --   --   --   --    AMONOAUTO 0.5 0.5  --  0.3   < >  --   --   --   --    AEOSAUTO 0.1 0.2  --  0.1   < >  --   --   --   --    ABSBASO 0.0 0.0  --  0.0   < >  --   --   --   --     < > = values in this interval not displayed.     CMP  Recent Labs   Lab Test 02/13/25  1128 12/18/24  1015 11/11/24  1027 09/23/24  1106 09/09/24  1205 07/27/21  1004 11/26/20  1141 10/15/20  1108 07/24/20  1013   NA  --  140  --  141 138   < > 138 141 143   POTASSIUM  --  4.9  --  4.4 4.7   < > 3.8 4.6 4.1   CHLORIDE  --  104  --  105 105   < > 104 106 108   CO2  --  26  --  23 21*   < > 29 33* 26   ANIONGAP  --  10  --  13 12   < > 6 2* 9   GLC  --  94  --  94 92   < > 114* 100* 99   BUN  --  23.3*  --  20.5 25.8*   < > 16 24 19   CR 1.15* 0.95 1.02* 0.98* 1.00*   < > 0.70 0.70 0.85   GFRESTIMATED 48* 60* 55* 58* 57*   < > 84 84 66   GFRESTBLACK  --   --   --   --   --   --  >90 >90 77   RUDOLPH  --  9.6  --  9.5 9.4   < > 10.0 9.6 9.1   BILITOTAL 0.4  --  0.6 0.6  --    < > 0.5  --   --    ALBUMIN 3.9  --  4.0 4.2  --    < > 3.6  --   --    PROTTOTAL 7.2  --  7.2 7.3  --    < > 8.6  --   --    ALKPHOS 57   --  60 60  --    < > 84  --   --    AST 32  --  25 23  --    < > 9  --   --    ALT 14  --  12 17  --    < > 16  --  22    < > = values in this interval not displayed.     Calcium/VitaminD  Recent Labs   Lab Test 12/18/24  1015 09/23/24  1106 09/09/24  1205 12/31/18  1043 06/25/18  0834   RUDOLPH 9.6 9.5 9.4   < > 9.4   VITDT  --   --   --   --  22    < > = values in this interval not displayed.     ESR/CRP  Recent Labs   Lab Test 02/13/25  1128 11/11/24  1027 08/09/24  1031 11/08/23  1242 03/08/23  1413 07/27/21  1004   SED 13 16 11   < > 18 9   CRP  --   --   --   --  13.4* <2.9   CRPI 3.42 3.29 <3.00   < >  --   --     < > = values in this interval not displayed.     Lipid Panel  Recent Labs   Lab Test 06/21/24  1646 11/14/22  1536 08/24/22  1057   CHOL 85 157 157   TRIG 147 142 168*   HDL 39* 55 55   LDL 17 74 68   NHDL 46 102 102     Hepatitis B  Recent Labs   Lab Test 11/08/23  1242   AUSAB 0.89   HBCAB Nonreactive   HEPBANG Nonreactive     Hepatitis C  Recent Labs   Lab Test 11/08/23  1242 12/03/21  1538   HCVAB Nonreactive Nonreactive     Lyme ab screening  Recent Labs   Lab Test 11/08/23  1242   LYMEGM 0.03     Immunization History     Immunization History   Administered Date(s) Administered    COVID-19 Bivalent 12+ (Pfizer) 12/12/2022    COVID-19 MONOVALENT 12+ (Pfizer) 03/10/2021, 03/31/2021, 12/07/2021    Flu 65+ (Fluad) 10/15/2024    Influenza (High Dose) Trivalent,PF (Fluzone) 10/21/2016, 11/16/2017, 10/16/2019, 10/10/2022    Influenza (IIV3) PF 11/29/2006, 11/02/2012, 11/01/2013, 11/07/2014, 10/20/2015    Influenza Vaccine 18-64 (Flublok) 10/19/2021    Influenza Vaccine 65+ (FLUAD) 10/19/2021, 10/10/2022, 11/06/2023    Influenza Vaccine 65+ (Fluzone HD) 10/13/2020    Pneumo Conj 13-V (2010&after) 06/28/2018    Pneumococcal 23 valent 11/29/2006, 07/27/2020    RSV Vaccine (Arexvy) 09/09/2024    TD,PF 7+ (Tenivac) 06/28/2018    TDAP Vaccine (Adacel) 11/19/2007    Td (Adult), Adsorbed 01/17/2000    Zoster  recombinant adjuvanted (SHINGRIX) 07/15/2019, 09/26/2019    Zoster vaccine, live 11/19/2008          Chart documentation done in part with Dragon Voice recognition Software. Although reviewed after completion, some word and grammatical error may remain.    Lucho Huber MD

## 2025-03-18 ENCOUNTER — PRE VISIT (OUTPATIENT)
Dept: OTOLARYNGOLOGY | Facility: CLINIC | Age: 80
End: 2025-03-18

## 2025-03-25 NOTE — PROGRESS NOTES
[facility]      Referring provider:Emily Sloan PA-C       HPI: Ms. 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     Cardioversion on 6/18/24 failed (shocked x3 times). Started on Amiodarone and sent home with [employer]  Patient converted to sinus rhythm while on amiodarone and has been maintaining sinus rhythm since.  Since then she has lost 30 pounds with dietary changes.  She tells me that initially she was on lisinopril 20 mg but that was reduced to 10 mg and then eventually to 5 mg due to orthostatic dizziness.  She is still having dizzy episodes from time to time when she is changing position from sitting to standing.  No syncope, chest pain or shortness of breath with regular activities.  She has been monitoring her rhythm very carefully since.  She has not noticed any A-fib since.  She tells me she feels tired after taking methotrexate which she takes weekly.  Follows with rheumatology with regards rheumatoid arthritis.  Reviewed most recent echocardiogram on 11/4/2024 which shows LVEF 55 to 60%, moderate MR, ascending aorta 4.2, PA pressure 50 mmHg.  EKG 11/19/2024 shows sinus rhythm.  Current medications: Spironolactone 12.5, lisinopril 5 mg, Eliquis 5 mg twice daily, methotrexate.    Medications, personal, family, and social history reviewed with patient and revised.    PAST MEDICAL HISTORY:  Past Medical History:   Diagnosis Date    Acute idiopathic gout of left foot 11/04/2015    Adenomatous colon polyp 06/05/2013    Colonoscopy due 2025    Asthma 11/23/2009    controlled with inhaler    Blind left eye 07/08/2019    Due to central retinal occlusion    Cataract 12/11/2008     Central retinal artery occlusion of left eye 09/15/2018    Chronic kidney disease, stage 3a (H) 08/13/2024    Disturbances of vision, late effect of stroke 12/03/2022    DJD (degenerative joint disease) 11/23/2009    knees    GERD (gastroesophageal reflux disease) 07/29/2010    Glaucoma 05/10/2012    Gout 11/15/2012    Heart failure with reduced ejection fraction (H) 08/13/2024    History of central retinal artery occlusion, os 12/11/2020    HTN (hypertension) 11/23/2009    Hyperlipidemia LDL goal <70 12/31/2010    Infected cyst of skin 10/14/2024    Low bone mass 08/25/2003    Lumbar spinal stenosis 12/08/2011    Sees Dr Sage    Mild intermittent asthma without complication 06/28/2018    Mild persistent asthma 12/12/2013    Neovascular glaucoma of left eye, 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   H/o R central retinal artery occlusion    Paroxysmal ventricular tachycardia (H) 11/20/2018    Added automatically from request for surgery [id number]    PFO (patent foramen ovale) 07/29/2010    Primary insomnia 12/05/2016    Recurrent cerebrovascular accidents (CVAs) (H) 11/14/2022    Recurrent cerebrovascular accidents (CVAs) (H) 11/14/2022    Rheumatoid arthritis of multiple sites with negative rheumatoid factor (H) 11/08/2023    Transient global amnesia 11/14/2022       CURRENT MEDICATIONS:  Current Outpatient Medications   Medication Sig Dispense Refill    acetaminophen (TYLENOL) 500 MG tablet Take 500-1,000 mg by mouth every 6 hours as needed.      albuterol (PROAIR HFA/PROVENTIL HFA/VENTOLIN HFA) 108 (90 Base) MCG/ACT inhaler Inhale 2 puffs into the lungs every 6 hours as needed for shortness of breath or wheezing. 18 g 11    amiodarone (PACERONE) 200 MG tablet Take 1 tablet (200 mg) by mouth daily. 90 tablet 3    apixaban ANTICOAGULANT (ELIQUIS) 5 MG tablet Take 1 tablet (5 mg) by mouth 2  "times daily. 180 tablet 1    apixaban ANTICOAGULANT (ELIQUIS) 5 MG tablet Patient in study, is either on Eliquis or a new med Librexia      atorvastatin (LIPITOR) 20 MG tablet Take 1 tablet (20 mg) by mouth daily. 90 tablet 4    azelastine (ASTELIN) 0.1 % nasal spray Spray 1 spray into both nostrils at bedtime.      B-COMPLEX OR Take 1 tablet by mouth daily.      CALCIUM 500 +D OR Take 1 tablet by mouth daily.      doxycycline hyclate (VIBRA-TABS) 100 MG tablet Take 1 tablet (100 mg) by mouth 2 times daily. (Patient not taking: Reported on 1/31/2025) 20 tablet 0    folic acid (FOLVITE) 1 MG tablet Take 2 tablets (2 mg) by mouth daily. 200 tablet 2    hypromellose (ARTIFICIAL TEARS) 0.5 % SOLN ophthalmic solution Place 1 drop into both eyes 3 times daily      latanoprost (XALATAN) 0.005 % ophthalmic solution Place 1 drop into both eyes at bedtime 7.5 mL 3    lisinopril (ZESTRIL) 5 MG tablet Take 1 tablet (5 mg) by mouth at bedtime 90 tablet 3    methotrexate 2.5 MG tablet Take 8 tablets (20 mg) by mouth every 7 days. . Methotrexate is a once weekly medication, taken on the same day of each week. 96 tablet 2    multivitamin (OCUVITE) TABS tablet Take 1 tablet by mouth daily      neomycin-polymyxin-dexAMETHasone (MAXITROL) 3.5-88002-0.1 ophthalmic ointment Apply a small squirt approx. 1/4\". 3.5 g 2    polyethylene glycol (MIRALAX) 17 GM/Dose powder Take 17 g (1 Capful) by mouth daily as needed for constipation      SENNA-docusate sodium (SENNA S) 8.6-50 MG tablet Take 1-4 tablets by mouth daily as needed      spironolactone (ALDACTONE) 25 MG tablet Take 0.5 tablets (12.5 mg) by mouth daily 45 tablet 3    timolol maleate (TIMOPTIC) 0.5 % ophthalmic solution Place 1 drop Into the left eye every morning 10 mL 3    zolpidem (AMBIEN) 5 MG tablet TAKE ONE TABLET BY MOUTH IN THE EVENING AS NEEDED FOR SLEEP 30 tablet 5       PAST SURGICAL HISTORY:  Past Surgical History:   Procedure Laterality Date    ANESTHESIA CARDIOVERSION " N/A 2024    Procedure: Anesthesia cardioversion @0930;  Surgeon: GENERIC ANESTHESIA PROVIDER;  Location: [facility] OR    ANESTHESIA CARDIOVERSION N/A 2024    Procedure: Anesthesia cardioversion @1330;  Surgeon: GENERIC ANESTHESIA PROVIDER;  Location: [facility] OR    APPENDECTOMY      age 7 yrs.    BREAST BIOPSY, RT/LT  2004    left benign    CATARACT IOL, RT/LT       SECTION      x 2    COLONOSCOPY  2013    needed q 3 yrs.    COLONOSCOPY N/A 09/10/2020    Procedure: Colonoscopy, With Polypectomy And Biopsy;  Surgeon: Brian Cleaning MD;  Location: [facility] OR    COLONOSCOPY WITH CO2 INSUFFLATION N/A 2016    Procedure: COLONOSCOPY WITH CO2 INSUFFLATION;  Surgeon: Brian Cleaning MD;  Location: [facility] OR    COLONOSCOPY WITH CO2 INSUFFLATION N/A 09/10/2020    Procedure: COLONOSCOPY, WITH CO2 INSUFFLATION;  Surgeon: Brian Cleaning MD;  Location: [facility] OR    EP COMPREHENSIVE EP STUDY N/A 2018    Procedure: LOOP RECORDER;  Surgeon: Buck Butterfield MD;  Location:  [facility]    HYSTERECTOMY, POORNIMA  1997    bleeding fibroids    ORTHOPEDIC SURGERY      meniscus repair    PHACOEMULSIFICATION WITH STANDARD INTRAOCULAR LENS IMPLANT Right 10/24/2022    Procedure: COMPLE RIGHT EYE PHACOEMULSIFICATION, CATARACT, WITH STANDARD INTRAOCULAR LENS IMPLANT INSERTION;  Surgeon: Ezequiel Florence MD;  Location: [facility] TOTAL KNEE ARTHROPLASTY Left 2015    at [facility]       ALLERGIES:     Allergies   Allergen Reactions    No Clinical Screening - See Comments Other (See Comments)     senisitive to non steroidals  Aleve, ibuprofen celebrex cause bad stomach pains  senisitive to non steroidals  Aleve, ibuprofen celebrex cause bad stomach pains    Oxycodone Hives     Other reaction(s): Unknown  ... With facial swelling    Nsaids Nausea and Vomiting     Other reaction(s): Abdominal Pain       FAMILY HISTORY:  Family History   Problem Relation Age of Onset    Arthritis  Mother     Cancer Mother     Hypertension Mother     Other Cancer Mother     Thyroid Disease Mother     Respiratory Father     Glaucoma Father     Asthma Father     Allergies Sister     Eye Disorder Sister     Lipids Sister     Neurologic Disorder Sister     Osteoporosis Sister     Glaucoma Sister     Hypertension Sister     Macular Degeneration Sister     Hyperlipidemia Sister     Hypertension Sister     Osteoporosis Sister     Gastrointestinal Disease Son         Ulcerative Colitis    Glaucoma Other          SOCIAL HISTORY:  Social History     Tobacco Use    Smoking status: Former     Current packs/day: 0.00     Average packs/day: 0.1 packs/day for 2.0 years (0.2 ttl pk-yrs)     Types: Cigarettes     Start date: 1963     Quit date: 1965     Years since quittin.2     Passive exposure: Past    Smokeless tobacco: Never   Vaping Use    Vaping status: Never Used   Substance Use Topics    Alcohol use: Not Currently     Comment: one a month    Drug use: No       ROS:   Constitutional: No fever, chills, or sweats. Weight stable.   Cardiovascular: As per HPI.       Exam:  /69   Pulse 63   Wt 77.1 kg (170 lb)   SpO2 99%   BMI 31.60 kg/m    GENERAL APPEARANCE: alert and no distress  HEENT: no icterus, no central cyanosis  LYMPH/NECK: no adenopathy, no asymmetry, JVP not elevated  CARDIOVASCULAR: regular rhythm, normal S1, S2, no S3 or S4; left parasternal 2/6 systolic ejection murmur.  GI: soft, non tender  EXTREMITIES:  no edema  NEURO: alert, normal speech,and affect  SKIN: no ecchymoses, no rashes     I have reviewed the labs and personally reviewed the imaging below and made my comment in the assessment and plan.    Labs:  CBC RESULTS:   Lab Results   Component Value Date    WBC 4.5 2025    WBC 5.0 2020    RBC 4.19 2025    RBC 4.74 2020    HGB 13.1 2025    HGB 13.4 2020    HCT 41.2 2025    HCT 43.1 2020    MCV 98 2025    MCV 91 2020    MCH  31.3 02/13/2025    MCH 28.3 11/26/2020    MCHC 31.8 02/13/2025    MCHC 31.1 (L) 11/26/2020    RDW 14.0 02/13/2025    RDW 12.5 11/26/2020     02/13/2025     11/26/2020       BMP RESULTS:  Lab Results   Component Value Date     12/18/2024     11/26/2020    POTASSIUM 4.9 12/18/2024    POTASSIUM 4.3 03/08/2023    POTASSIUM 3.8 11/26/2020    CHLORIDE 104 12/18/2024    CHLORIDE 106 03/08/2023    CHLORIDE 104 11/26/2020    CO2 26 12/18/2024    CO2 27 03/08/2023    CO2 29 11/26/2020    ANIONGAP 10 12/18/2024    ANIONGAP 4 03/08/2023    ANIONGAP 6 11/26/2020    GLC 94 12/18/2024     (H) 03/08/2023     (H) 11/26/2020    BUN 23.3 (H) 12/18/2024    BUN 22 03/08/2023    BUN 16 11/26/2020    CR 1.15 (H) 02/13/2025    CR 0.70 11/26/2020    GFRESTIMATED 48 (L) 02/13/2025    GFRESTIMATED >60 11/14/2022    GFRESTIMATED 84 11/26/2020    GFRESTBLACK >90 11/26/2020    RUDOLPH 9.6 12/18/2024    RUDOLPH 10.0 11/26/2020        Echocardiogram 11/2024  Global and regional left ventricular function is normal with an EF of 55-60%..  Mild diastolic dysfunction.  Global right ventricular function is normal.  Moderate left atrial enlargement is present.  Moderate mitral insufficiency is present.  The ascending aorta is mildly dilated to 4.2 cm.  Moderate pulmonary hypertension is present. Pulmonary artery systolic pressure  is 50mmH.     Compared to the prior study on 6/19/2024, left ventricular function has  improved.    DCCV 6/2024:   Details of Attempts:  One, 150 J biphasic synchronized shock delivered without conversion.   Second, 200 J biphasic synchronized shock delivered without conversion.  Final, 200 J biphasic synchronized shock delivered without conversion.     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  # Atrial fibrillation w/ RVR (8/2018) status post  DC cardioversion 6/2024.  Maintaining sinus rhythm on amiodarone.  -Her only symptom is dizziness at times when she is changing positions.  No syncope or no other concerning cardiac symptoms at this time.  Recent echo is unremarkable.  Euvolemic on exam.  She lost 30 pounds since June 2024 and subsequently lisinopril dose was reduced from 20 to 5 mg but she still has dizzy episodes.  She is avoiding salt.  I think she is having orthostatic symptoms with current spironolactone and lisinopril combination.    Plan:  -Cut down on lisinopril from 5 to 2.5 for orthostatic dizziness.  She will send us a message in a month or so if she continues to have dizziness.  At that time I will take her off of lisinopril and watch for a while to see if her symptoms resolve.  -Continue spironolactone 12.5 milligram per day  -Continue amiodarone 200 mg for rhythm control  -Continue Eliquis 5 mg twice daily  -Recent LFT and TSH are normal.  Will recheck in 1 year.     # HLD  - Atorvastatin 20 mg daily     # Moderate persistent asthma  - Albuterol pr    # Rheumatoid arthritis  -Follows with rheum.  On methotrexate.    Return to clinic 1 year or earlier as needed.    Total time spent today for this visit is 56 minutes including precharting, face-to-face clinic visit, review of labs/imaging and medical documentation.    Skyla RADER MD  [facility] Division of Cardiology  Pager [phone]

## 2025-03-31 ENCOUNTER — OFFICE VISIT (OUTPATIENT)
Dept: CARDIOLOGY | Facility: CLINIC | Age: 80
End: 2025-03-31
Payer: MEDICARE

## 2025-03-31 VITALS
HEART RATE: 63 BPM | WEIGHT: 170 LBS | SYSTOLIC BLOOD PRESSURE: 112 MMHG | OXYGEN SATURATION: 99 % | DIASTOLIC BLOOD PRESSURE: 69 MMHG | BODY MASS INDEX: 31.6 KG/M2

## 2025-03-31 DIAGNOSIS — I48.91 ATRIAL FIBRILLATION STATUS POST CARDIOVERSION (H): Primary | ICD-10-CM

## 2025-03-31 DIAGNOSIS — I48.0 PAROXYSMAL A-FIB (H): ICD-10-CM

## 2025-03-31 PROCEDURE — 99215 OFFICE O/P EST HI 40 MIN: CPT | Performed by: INTERNAL MEDICINE

## 2025-03-31 PROCEDURE — 3078F DIAST BP <80 MM HG: CPT | Performed by: INTERNAL MEDICINE

## 2025-03-31 PROCEDURE — 3074F SYST BP LT 130 MM HG: CPT | Performed by: INTERNAL MEDICINE

## 2025-03-31 PROCEDURE — 99417 PROLNG OP E/M EACH 15 MIN: CPT | Performed by: INTERNAL MEDICINE

## 2025-03-31 RX ORDER — LISINOPRIL 5 MG/1
2.5 TABLET ORAL AT BEDTIME
COMMUNITY
Start: 2025-03-31

## 2025-03-31 NOTE — NURSING NOTE
"Chief Complaint   Patient presents with    RECHECK     Return general cardiology for follow-up    Dizziness       Initial /69   Pulse 63   Wt 77.1 kg (170 lb)   SpO2 99%   BMI 31.60 kg/m   Estimated body mass index is 31.6 kg/m  as calculated from the following:    Height as of 11/22/24: 1.562 m (5' 1.5\").    Weight as of this encounter: 77.1 kg (170 lb)..  BP completed using cuff size: regular    ERIC Whitley    "

## 2025-03-31 NOTE — LETTER
3/31/2025      RE: Alfreda Baxter  [address] [facility] [address]       Dear Colleague,    Thank you for the opportunity to participate in the care of your patient, Alfreda Baxter, at the [facility]. Please see a copy of my visit note below.                                                                    General Cardiology [facility]-[place]      Referring provider:Emily Sloan PA-C       HPI: Ms. 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     Cardioversion on 6/18/24 failed (shocked x3 times). Started on Amiodarone and sent home with Biotel.  Patient converted to sinus rhythm while on amiodarone and has been maintaining sinus rhythm since.  Since then she has lost 30 pounds with dietary changes.  She tells me that initially she was on lisinopril 20 mg but that was reduced to 10 mg and then eventually to 5 mg due to orthostatic dizziness.  She is still having dizzy episodes from time to time when she is changing position from sitting to standing.  No syncope, chest pain or shortness of breath with regular activities.  She has been monitoring her rhythm very carefully since.  She has not noticed any A-fib since.  She tells me she feels tired after taking methotrexate which she takes weekly.  Follows with rheumatology with regards rheumatoid arthritis.  Reviewed most recent echocardiogram on 11/4/2024 which shows LVEF 55 to 60%, moderate MR, ascending aorta 4.2, PA pressure 50 mmHg.  EKG 11/19/2024 shows sinus rhythm.  Current medications: Spironolactone 12.5, lisinopril 5 mg, Eliquis 5 mg twice daily, methotrexate.    Medications, personal, family, and social  history reviewed with patient and revised.    PAST MEDICAL HISTORY:  Past Medical History:   Diagnosis Date     Acute idiopathic gout of left foot 11/04/2015     Adenomatous colon polyp 06/05/2013    Colonoscopy due 2025     Asthma 11/23/2009    controlled with inhaler     Blind left eye 07/08/2019    Due to central retinal occlusion     Cataract 12/11/2008     Central retinal artery occlusion of left eye 09/15/2018     Chronic kidney disease, stage 3a (H) 08/13/2024     Disturbances of vision, late effect of stroke 12/03/2022     DJD (degenerative joint disease) 11/23/2009    knees     GERD (gastroesophageal reflux disease) 07/29/2010     Glaucoma 05/10/2012     Gout 11/15/2012     Heart failure with reduced ejection fraction (H) 08/13/2024     History of central retinal artery occlusion, os 12/11/2020     HTN (hypertension) 11/23/2009     Hyperlipidemia LDL goal <70 12/31/2010     Infected cyst of skin 10/14/2024     Low bone mass 08/25/2003     Lumbar spinal stenosis 12/08/2011    Sees Dr Sage     Mild intermittent asthma without complication 06/28/2018     Mild persistent asthma 12/12/2013     Neovascular glaucoma of left eye, 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   H/o R central retinal artery occlusion     Paroxysmal ventricular tachycardia (H) 11/20/2018    Added automatically from request for surgery [id number]     PFO (patent foramen ovale) 07/29/2010     Primary insomnia 12/05/2016     Recurrent cerebrovascular accidents (CVAs) (H) 11/14/2022     Recurrent cerebrovascular accidents (CVAs) (H) 11/14/2022     Rheumatoid arthritis of multiple sites with negative rheumatoid factor (H) 11/08/2023     Transient global amnesia 11/14/2022       CURRENT MEDICATIONS:  Current Outpatient Medications   Medication Sig Dispense Refill     acetaminophen (TYLENOL) 500 MG tablet Take 500-1,000 mg by  "mouth every 6 hours as needed.       albuterol (PROAIR HFA/PROVENTIL HFA/VENTOLIN HFA) 108 (90 Base) MCG/ACT inhaler Inhale 2 puffs into the lungs every 6 hours as needed for shortness of breath or wheezing. 18 g 11     amiodarone (PACERONE) 200 MG tablet Take 1 tablet (200 mg) by mouth daily. 90 tablet 3     apixaban ANTICOAGULANT (ELIQUIS) 5 MG tablet Take 1 tablet (5 mg) by mouth 2 times daily. 180 tablet 1     apixaban ANTICOAGULANT (ELIQUIS) 5 MG tablet Patient in study, is either on Eliquis or a new med Librexia       atorvastatin (LIPITOR) 20 MG tablet Take 1 tablet (20 mg) by mouth daily. 90 tablet 4     azelastine (ASTELIN) 0.1 % nasal spray Spray 1 spray into both nostrils at bedtime.       B-COMPLEX OR Take 1 tablet by mouth daily.       CALCIUM 500 +D OR Take 1 tablet by mouth daily.       doxycycline hyclate (VIBRA-TABS) 100 MG tablet Take 1 tablet (100 mg) by mouth 2 times daily. (Patient not taking: Reported on 1/31/2025) 20 tablet 0     folic acid (FOLVITE) 1 MG tablet Take 2 tablets (2 mg) by mouth daily. 200 tablet 2     hypromellose (ARTIFICIAL TEARS) 0.5 % SOLN ophthalmic solution Place 1 drop into both eyes 3 times daily       latanoprost (XALATAN) 0.005 % ophthalmic solution Place 1 drop into both eyes at bedtime 7.5 mL 3     lisinopril (ZESTRIL) 5 MG tablet Take 1 tablet (5 mg) by mouth at bedtime 90 tablet 3     methotrexate 2.5 MG tablet Take 8 tablets (20 mg) by mouth every 7 days. . Methotrexate is a once weekly medication, taken on the same day of each week. 96 tablet 2     multivitamin (OCUVITE) TABS tablet Take 1 tablet by mouth daily       neomycin-polymyxin-dexAMETHasone (MAXITROL) 3.5-08306-6.1 ophthalmic ointment Apply a small squirt approx. 1/4\". 3.5 g 2     polyethylene glycol (MIRALAX) 17 GM/Dose powder Take 17 g (1 Capful) by mouth daily as needed for constipation       SENNA-docusate sodium (SENNA S) 8.6-50 MG tablet Take 1-4 tablets by mouth daily as needed       " spironolactone (ALDACTONE) 25 MG tablet Take 0.5 tablets (12.5 mg) by mouth daily 45 tablet 3     timolol maleate (TIMOPTIC) 0.5 % ophthalmic solution Place 1 drop Into the left eye every morning 10 mL 3     zolpidem (AMBIEN) 5 MG tablet TAKE ONE TABLET BY MOUTH IN THE EVENING AS NEEDED FOR SLEEP 30 tablet 5       PAST SURGICAL HISTORY:  Past Surgical History:   Procedure Laterality Date     ANESTHESIA CARDIOVERSION N/A 2024    Procedure: Anesthesia cardioversion @0930;  Surgeon: GENERIC ANESTHESIA PROVIDER;  Location: [facility] OR     ANESTHESIA CARDIOVERSION N/A 2024    Procedure: Anesthesia cardioversion @1330;  Surgeon: GENERIC ANESTHESIA PROVIDER;  Location: [facility] OR     APPENDECTOMY      age 7 yrs.     BREAST BIOPSY, RT/LT  2004    left benign     CATARACT IOL, RT/LT        SECTION      x 2     COLONOSCOPY  2013    needed q 3 yrs.     COLONOSCOPY N/A 09/10/2020    Procedure: Colonoscopy, With Polypectomy And Biopsy;  Surgeon: Brian Cleaning MD;  Location: [facility] OR     COLONOSCOPY WITH CO2 INSUFFLATION N/A 2016    Procedure: COLONOSCOPY WITH CO2 INSUFFLATION;  Surgeon: Brian Cleaning MD;  Location: [facility] OR     COLONOSCOPY WITH CO2 INSUFFLATION N/A 09/10/2020    Procedure: COLONOSCOPY, WITH CO2 INSUFFLATION;  Surgeon: Brian Cleaning MD;  Location: [facility] OR     EP COMPREHENSIVE EP STUDY N/A 2018    Procedure: LOOP RECORDER;  Surgeon: Buck Butterfield MD;  Location:  HEART CARDIAC CATH LAB     HYSTERECTOMY, POORNIMA      bleeding fibroids     ORTHOPEDIC SURGERY      meniscus repair     PHACOEMULSIFICATION WITH STANDARD INTRAOCULAR LENS IMPLANT Right 10/24/2022    Procedure: COMPLE RIGHT EYE PHACOEMULSIFICATION, CATARACT, WITH STANDARD INTRAOCULAR LENS IMPLANT INSERTION;  Surgeon: Ezequiel Florence MD;  Location: [facility] OR     [facility] TOTAL KNEE ARTHROPLASTY Left 2015    at [facility]       ALLERGIES:     Allergies   Allergen Reactions     No Clinical  Screening - See Comments Other (See Comments)     senisitive to non steroidals  Aleve, ibuprofen celebrex cause bad stomach pains  senisitive to non steroidals  Aleve, ibuprofen celebrex cause bad stomach pains     Oxycodone Hives     Other reaction(s): Unknown  ... With facial swelling     Nsaids Nausea and Vomiting     Other reaction(s): Abdominal Pain       FAMILY HISTORY:  Family History   Problem Relation Age of Onset     Arthritis Mother      Cancer Mother      Hypertension Mother      Other Cancer Mother      Thyroid Disease Mother      Respiratory Father      Glaucoma Father      Asthma Father      Allergies Sister      Eye Disorder Sister      Lipids Sister      Neurologic Disorder Sister      Osteoporosis Sister      Glaucoma Sister      Hypertension Sister      Macular Degeneration Sister      Hyperlipidemia Sister      Hypertension Sister      Osteoporosis Sister      Gastrointestinal Disease Son         Ulcerative Colitis     Glaucoma Other          SOCIAL HISTORY:  Social History     Tobacco Use     Smoking status: Former     Current packs/day: 0.00     Average packs/day: 0.1 packs/day for 2.0 years (0.2 ttl pk-yrs)     Types: Cigarettes     Start date: 1963     Quit date: 1965     Years since quittin.2     Passive exposure: Past     Smokeless tobacco: Never   Vaping Use     Vaping status: Never Used   Substance Use Topics     Alcohol use: Not Currently     Comment: one a month     Drug use: No       ROS:   Constitutional: No fever, chills, or sweats. Weight stable.   Cardiovascular: As per HPI.       Exam:  /69   Pulse 63   Wt 77.1 kg (170 lb)   SpO2 99%   BMI 31.60 kg/m    GENERAL APPEARANCE: alert and no distress  HEENT: no icterus, no central cyanosis  LYMPH/NECK: no adenopathy, no asymmetry, JVP not elevated  CARDIOVASCULAR: regular rhythm, normal S1, S2, no S3 or S4; left parasternal 2/6 systolic ejection murmur.  GI: soft, non tender  EXTREMITIES:  no edema  NEURO: alert,  normal speech,and affect  SKIN: no ecchymoses, no rashes     I have reviewed the labs and personally reviewed the imaging below and made my comment in the assessment and plan.    Labs:  CBC RESULTS:   Lab Results   Component Value Date    WBC 4.5 02/13/2025    WBC 5.0 11/26/2020    RBC 4.19 02/13/2025    RBC 4.74 11/26/2020    HGB 13.1 02/13/2025    HGB 13.4 11/26/2020    HCT 41.2 02/13/2025    HCT 43.1 11/26/2020    MCV 98 02/13/2025    MCV 91 11/26/2020    MCH 31.3 02/13/2025    MCH 28.3 11/26/2020    MCHC 31.8 02/13/2025    MCHC 31.1 (L) 11/26/2020    RDW 14.0 02/13/2025    RDW 12.5 11/26/2020     02/13/2025     11/26/2020       BMP RESULTS:  Lab Results   Component Value Date     12/18/2024     11/26/2020    POTASSIUM 4.9 12/18/2024    POTASSIUM 4.3 03/08/2023    POTASSIUM 3.8 11/26/2020    CHLORIDE 104 12/18/2024    CHLORIDE 106 03/08/2023    CHLORIDE 104 11/26/2020    CO2 26 12/18/2024    CO2 27 03/08/2023    CO2 29 11/26/2020    ANIONGAP 10 12/18/2024    ANIONGAP 4 03/08/2023    ANIONGAP 6 11/26/2020    GLC 94 12/18/2024     (H) 03/08/2023     (H) 11/26/2020    BUN 23.3 (H) 12/18/2024    BUN 22 03/08/2023    BUN 16 11/26/2020    CR 1.15 (H) 02/13/2025    CR 0.70 11/26/2020    GFRESTIMATED 48 (L) 02/13/2025    GFRESTIMATED >60 11/14/2022    GFRESTIMATED 84 11/26/2020    GFRESTBLACK >90 11/26/2020    RUDOLPH 9.6 12/18/2024    RUDOLPH 10.0 11/26/2020        Echocardiogram 11/2024  Global and regional left ventricular function is normal with an EF of 55-60%..  Mild diastolic dysfunction.  Global right ventricular function is normal.  Moderate left atrial enlargement is present.  Moderate mitral insufficiency is present.  The ascending aorta is mildly dilated to 4.2 cm.  Moderate pulmonary hypertension is present. Pulmonary artery systolic pressure  is 50mmH.     Compared to the prior study on 6/19/2024, left ventricular function has  improved.    DCCV 6/2024:   Details of Attempts:   One, 150 J biphasic synchronized shock delivered without conversion.   Second, 200 J biphasic synchronized shock delivered without conversion.  Final, 200 J biphasic synchronized shock delivered without conversion.     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  # Atrial fibrillation w/ RVR (8/2018) status post DC cardioversion 6/2024.  Maintaining sinus rhythm on amiodarone.  -Her only symptom is dizziness at times when she is changing positions.  No syncope or no other concerning cardiac symptoms at this time.  Recent echo is unremarkable.  Euvolemic on exam.  She lost 30 pounds since June 2024 and subsequently lisinopril dose was reduced from 20 to 5 mg but she still has dizzy episodes.  She is avoiding salt.  I think she is having orthostatic symptoms with current spironolactone and lisinopril combination.    Plan:  -Cut down on lisinopril from 5 to 2.5 for orthostatic dizziness.  She will send us a message in a month or so if she continues to have dizziness.  At that time I will take her off of lisinopril and watch for a while to see if her symptoms resolve.  -Continue spironolactone 12.5 milligram per day  -Continue amiodarone 200 mg for rhythm control  -Continue Eliquis 5 mg twice daily  -Recent LFT and TSH are normal.  Will recheck in 1 year.     # HLD  - Atorvastatin 20 mg daily     # Moderate persistent asthma  - Albuterol pr    # Rheumatoid arthritis  -Follows with rheum.  On methotrexate.    Return to clinic 1 year or earlier as needed.    Total time spent today for this visit is 56 minutes including precharting, face-to-face clinic visit, review of labs/imaging and medical documentation.    Skyla RADER MD  [facility] Division of Cardiology  Pager [phone]       Please do not hesitate to contact me if you have any questions/concerns.      Sincerely,     Skyla Gold MD

## 2025-03-31 NOTE — PATIENT INSTRUCTIONS
Thank you for coming to the [facility] [place]; please note the following instructions:    1. Decrease Lisinopril to 2.5 mg daily    2. In 1 month, if you are still having symptoms, please call # [phone] or send a [employer] message    3. Follow up with Dr. Gold in 1 year        If you have any questions regarding your visit please contact your care team:     Cardiology  Telephone Number   Sendy WADE., RN  Yvonne LUX, RN  Zuleika MOULTON, RN  Rosalee LÓPEZ, ERIC JEAN, ERIC HANDY, [place]  Bessie LUX, LICHA Langston., [phone] (option 1)   For scheduling appts:     [phone] (select option 1)       For the Device Clinic (Pacemakers and ICD's)  RN's :  Jaz Kemp   During business hours: [phone]    *After business hours:  [phone] (select option 4)      VIRTUAL VISITS: If you have had a virtual visit and have testing needing to be scheduled, please call [phone]. Otherwise please allow 24-48 hours for staff to reach out to assist in scheduling.     Normal test result notifications will be released via [employer] or mailed within 7 business days.  All other test results, will be communicated via telephone once reviewed by your cardiologist.    If you need a medication refill please contact your pharmacy.  Please allow 3 business days for your refill to be completed.    As always, thank you for trusting us with your health care needs!

## 2025-04-01 NOTE — NURSING NOTE
Return Appointment: Patient given instructions regarding scheduling next clinic visit. Patient demonstrated understanding of this information and agreed to call with further questions or concerns. Patient to follow up in 1 year.    Medication Change: Patient was educated regarding prescribed medication change, including discussion of the indication, administration, side effects, and when to report to MD or RN. Patient demonstrated understanding of this information and agreed to call with further questions or concerns. Patient to decrease lisinopril to 2.5 mg daily. Patient to update clinic in 1 month if patient is having any symptoms.    Patient stated she understood all health information given and agreed to call with further questions or concerns.     Yvonne Day RN, BSN  Cardiology RN Care Coordinator   [place]   Phone: [phone]  Fax: [phone] ([place]) [phone] ([place])

## 2025-04-07 ENCOUNTER — OFFICE VISIT (OUTPATIENT)
Dept: CARDIOLOGY | Facility: CLINIC | Age: 80
End: 2025-04-07
Payer: MEDICARE

## 2025-04-07 VITALS
DIASTOLIC BLOOD PRESSURE: 69 MMHG | WEIGHT: 169 LBS | HEART RATE: 67 BPM | RESPIRATION RATE: 16 BRPM | OXYGEN SATURATION: 98 % | SYSTOLIC BLOOD PRESSURE: 116 MMHG | BODY MASS INDEX: 31.42 KG/M2

## 2025-04-07 DIAGNOSIS — I48.0 PAROXYSMAL A-FIB (H): ICD-10-CM

## 2025-04-07 DIAGNOSIS — Z00.6 EXAMINATION OF PARTICIPANT OR CONTROL IN CLINICAL RESEARCH: Primary | ICD-10-CM

## 2025-04-07 NOTE — PROGRESS NOTES
A Phase 3, Randomized, Double-Blind, Double-Dummy, Parallel Group, Active-Controlled Study to Evaluate the Efficacy and Safety of Milvexian, an Oral Factor Joycelyn Inhibitor, Versus Apixaban in Participants with Atrial Fibrillation     Participant seen in clinic today for End of Treatment study visit     Participant discontinued treatment early needs to have EOT visit completed at the time of discontinuation or within 30 days.    /69 (BP Location: Right arm, Patient Position: Sitting, Cuff Size: Adult Regular)   Pulse 67   Resp 16   Wt 76.7 kg (169 lb)   SpO2 98%   BMI 31.42 kg/m   (include weight)    Endpoints Assessed:   CVD (including death due to undetermined cause), MI (eg, stable and unstable angina, and ischemic chest pain), Stroke, TIA, Major vascular (non-traumatic) limb amputation, Acute limb ischemia, Pulmonary Embolism, Symptomatic DVT, Non-CNS systemic embolism.     Endpoint assessment completed and participant denies any endpoints.     AEs requiring Additional Information:   Suspected liver injury, cutaneous adverse events, and renal adverse events     AEs requiring additional information assessment completed and participant denies any endpoints.     Modified Chris Scale Assessment (mRS) completed by investigator only for participants with a history of stroke.     Medical Resource Utilization,  EQ-5D-5L, and PROMIS-29 unable to be completed during EOT visit due to non-functioning tablet, ticket submitted to [employer].     Clinical laboratory tests collected @1057 AM/PM. Results will be submitted to the central lab and scanned in to 'media' tab and reviewed by investigator.      Collected Labs:   [Hemoglobin/platelet, serum creatinine/eGFR, Cystatin C, ALT/AST/ALP/Total bilirubin)]     Participant returned 70 tablets/capsules from treatment kit [id number]   Participant returned 70 tablets/capsules from treatment kit [id number]   Participant returned 70 tablets/capsules from treatment kit [id number]    Participant returned 70 tablets/capsules from treatment kit [id number]   Participant returned 70 tablets/capsules from treatment kit [id number]   Participant returned 70 tablets/capsules from treatment kit [id number]   Participant returned 70 tablets/capsules from treatment kit [id number]   Participant returned 70 tablets/capsules from treatment kit [id number]      Patient discontinued taking study drug January 14th,2025 due to AE (See previous documentation).     Concomitant therapies reviewed. Changes (if any) noted below.     Assessed participant regarding new or ongoing adverse events. Participant denies any adverse events.     Plan: Will schedule next study visit with participant at [facility] on .       Current Outpatient Medications:     acetaminophen (TYLENOL) 500 MG tablet, Take 500-1,000 mg by mouth every 6 hours as needed., Disp: , Rfl:     albuterol (PROAIR HFA/PROVENTIL HFA/VENTOLIN HFA) 108 (90 Base) MCG/ACT inhaler, Inhale 2 puffs into the lungs every 6 hours as needed for shortness of breath or wheezing., Disp: 18 g, Rfl: 11    amiodarone (PACERONE) 200 MG tablet, Take 1 tablet (200 mg) by mouth daily., Disp: 90 tablet, Rfl: 3    apixaban ANTICOAGULANT (ELIQUIS) 5 MG tablet, Take 1 tablet (5 mg) by mouth 2 times daily., Disp: 180 tablet, Rfl: 1    apixaban ANTICOAGULANT (ELIQUIS) 5 MG tablet, Patient in study, is either on Eliquis or a new med Librexia, Disp: , Rfl:     atorvastatin (LIPITOR) 20 MG tablet, Take 1 tablet (20 mg) by mouth daily., Disp: 90 tablet, Rfl: 4    azelastine (ASTELIN) 0.1 % nasal spray, Spray 1 spray into both nostrils at bedtime., Disp: , Rfl:     B-COMPLEX OR, Take 1 tablet by mouth daily., Disp: , Rfl:     CALCIUM 500 +D OR, Take 1 tablet by mouth daily., Disp: , Rfl:     doxycycline hyclate (VIBRA-TABS) 100 MG tablet, Take 1 tablet (100 mg) by mouth 2 times daily. (Patient not taking: Reported on 1/31/2025), Disp: 20 tablet, Rfl: 0    folic acid (FOLVITE) 1 MG tablet,  "Take 2 tablets (2 mg) by mouth daily., Disp: 200 tablet, Rfl: 2    hypromellose (ARTIFICIAL TEARS) 0.5 % SOLN ophthalmic solution, Place 1 drop into both eyes 3 times daily, Disp: , Rfl:     latanoprost (XALATAN) 0.005 % ophthalmic solution, Place 1 drop into both eyes at bedtime, Disp: 7.5 mL, Rfl: 3    lisinopril (ZESTRIL) 5 MG tablet, Take 0.5 tablets (2.5 mg) by mouth at bedtime., Disp: , Rfl:     methotrexate 2.5 MG tablet, Take 8 tablets (20 mg) by mouth every 7 days. . Methotrexate is a once weekly medication, taken on the same day of each week., Disp: 96 tablet, Rfl: 2    multivitamin (OCUVITE) TABS tablet, Take 1 tablet by mouth daily, Disp: , Rfl:     neomycin-polymyxin-dexAMETHasone (MAXITROL) 3.5-22097-4.1 ophthalmic ointment, Apply a small squirt approx. 1/4\"., Disp: 3.5 g, Rfl: 2    polyethylene glycol (MIRALAX) 17 GM/Dose powder, Take 17 g (1 Capful) by mouth daily as needed for constipation, Disp: , Rfl:     SENNA-docusate sodium (SENNA S) 8.6-50 MG tablet, Take 1-4 tablets by mouth daily as needed, Disp: , Rfl:     spironolactone (ALDACTONE) 25 MG tablet, Take 0.5 tablets (12.5 mg) by mouth daily, Disp: 45 tablet, Rfl: 3    timolol maleate (TIMOPTIC) 0.5 % ophthalmic solution, Place 1 drop Into the left eye every morning, Disp: 10 mL, Rfl: 3    zolpidem (AMBIEN) 5 MG tablet, TAKE ONE TABLET BY MOUTH IN THE EVENING AS NEEDED FOR SLEEP, Disp: 30 tablet, Rfl: 5     Yash Paiz RN   Clinical Trials Nurse    "

## 2025-04-15 ENCOUNTER — TRANSFERRED RECORDS (OUTPATIENT)
Dept: CARDIOLOGY | Facility: CLINIC | Age: 80
End: 2025-04-15
Payer: MEDICARE

## 2025-05-14 ENCOUNTER — MYC MEDICAL ADVICE (OUTPATIENT)
Dept: CARDIOLOGY | Facility: CLINIC | Age: 80
End: 2025-05-14
Payer: MEDICARE

## 2025-05-14 DIAGNOSIS — I50.20 HEART FAILURE WITH REDUCED EJECTION FRACTION (H): Primary | ICD-10-CM

## 2025-05-21 ENCOUNTER — RESULTS FOLLOW-UP (OUTPATIENT)
Dept: FAMILY MEDICINE | Facility: CLINIC | Age: 80
End: 2025-05-21

## 2025-05-21 ENCOUNTER — RESULTS FOLLOW-UP (OUTPATIENT)
Dept: CARDIOLOGY | Facility: CLINIC | Age: 80
End: 2025-05-21

## 2025-05-21 ENCOUNTER — LAB (OUTPATIENT)
Dept: LAB | Facility: CLINIC | Age: 80
End: 2025-05-21
Payer: MEDICARE

## 2025-05-21 DIAGNOSIS — Z13.6 SCREENING FOR CARDIOVASCULAR CONDITION: Primary | ICD-10-CM

## 2025-05-21 DIAGNOSIS — M06.09 RHEUMATOID ARTHRITIS OF MULTIPLE SITES WITH NEGATIVE RHEUMATOID FACTOR (H): ICD-10-CM

## 2025-05-21 DIAGNOSIS — E78.5 HYPERLIPIDEMIA LDL GOAL <130: ICD-10-CM

## 2025-05-21 DIAGNOSIS — Z79.899 HIGH RISK MEDICATION USE: ICD-10-CM

## 2025-05-21 DIAGNOSIS — I50.20 HEART FAILURE WITH REDUCED EJECTION FRACTION (H): ICD-10-CM

## 2025-05-21 LAB
ALBUMIN SERPL BCG-MCNC: 4.1 G/DL (ref 3.5–5.2)
ALP SERPL-CCNC: 68 U/L (ref 40–150)
ALT SERPL W P-5'-P-CCNC: 16 U/L (ref 0–50)
ANION GAP SERPL CALCULATED.3IONS-SCNC: 12 MMOL/L (ref 7–15)
AST SERPL W P-5'-P-CCNC: 25 U/L (ref 0–45)
BASOPHILS # BLD AUTO: 0 10E3/UL (ref 0–0.2)
BASOPHILS NFR BLD AUTO: 1 %
BILIRUB SERPL-MCNC: 0.6 MG/DL
BILIRUBIN DIRECT (ROCHE PRO & PURE): 0.24 MG/DL (ref 0–0.45)
BUN SERPL-MCNC: 23.9 MG/DL (ref 8–23)
CALCIUM SERPL-MCNC: 9.5 MG/DL (ref 8.8–10.4)
CHLORIDE SERPL-SCNC: 102 MMOL/L (ref 98–107)
CHOLEST SERPL-MCNC: 141 MG/DL
CREAT SERPL-MCNC: 0.95 MG/DL (ref 0.51–0.95)
CRP SERPL-MCNC: 3.03 MG/L
EGFRCR SERPLBLD CKD-EPI 2021: 60 ML/MIN/1.73M2
EOSINOPHIL # BLD AUTO: 0.2 10E3/UL (ref 0–0.7)
EOSINOPHIL NFR BLD AUTO: 2 %
ERYTHROCYTE [DISTWIDTH] IN BLOOD BY AUTOMATED COUNT: 14.4 % (ref 10–15)
ERYTHROCYTE [SEDIMENTATION RATE] IN BLOOD BY WESTERGREN METHOD: 15 MM/HR (ref 0–30)
FASTING STATUS PATIENT QL REPORTED: NO
FASTING STATUS PATIENT QL REPORTED: NO
GLUCOSE SERPL-MCNC: 92 MG/DL (ref 70–99)
HCO3 SERPL-SCNC: 24 MMOL/L (ref 22–29)
HCT VFR BLD AUTO: 41.6 % (ref 35–47)
HDLC SERPL-MCNC: 58 MG/DL
HGB BLD-MCNC: 13 G/DL (ref 11.7–15.7)
IMM GRANULOCYTES # BLD: 0 10E3/UL
IMM GRANULOCYTES NFR BLD: 0 %
LDLC SERPL CALC-MCNC: 61 MG/DL
LYMPHOCYTES # BLD AUTO: 1.1 10E3/UL (ref 0.8–5.3)
LYMPHOCYTES NFR BLD AUTO: 14 %
MCH RBC QN AUTO: 31.3 PG (ref 26.5–33)
MCHC RBC AUTO-ENTMCNC: 31.3 G/DL (ref 31.5–36.5)
MCV RBC AUTO: 100 FL (ref 78–100)
MONOCYTES # BLD AUTO: 0.7 10E3/UL (ref 0–1.3)
MONOCYTES NFR BLD AUTO: 9 %
NEUTROPHILS # BLD AUTO: 5.6 10E3/UL (ref 1.6–8.3)
NEUTROPHILS NFR BLD AUTO: 74 %
NONHDLC SERPL-MCNC: 83 MG/DL
PLATELET # BLD AUTO: 232 10E3/UL (ref 150–450)
POTASSIUM SERPL-SCNC: 5.1 MMOL/L (ref 3.4–5.3)
PROT SERPL-MCNC: 7.5 G/DL (ref 6.4–8.3)
RBC # BLD AUTO: 4.15 10E6/UL (ref 3.8–5.2)
SODIUM SERPL-SCNC: 138 MMOL/L (ref 135–145)
TRIGL SERPL-MCNC: 109 MG/DL
WBC # BLD AUTO: 7.6 10E3/UL (ref 4–11)

## 2025-05-21 PROCEDURE — 82248 BILIRUBIN DIRECT: CPT

## 2025-05-21 PROCEDURE — 36415 COLL VENOUS BLD VENIPUNCTURE: CPT

## 2025-05-21 PROCEDURE — 85652 RBC SED RATE AUTOMATED: CPT

## 2025-05-21 PROCEDURE — 85025 COMPLETE CBC W/AUTO DIFF WBC: CPT

## 2025-05-21 PROCEDURE — 80061 LIPID PANEL: CPT

## 2025-05-21 PROCEDURE — 86140 C-REACTIVE PROTEIN: CPT

## 2025-05-21 PROCEDURE — 80053 COMPREHEN METABOLIC PANEL: CPT

## 2025-05-28 ENCOUNTER — OFFICE VISIT (OUTPATIENT)
Dept: RHEUMATOLOGY | Facility: CLINIC | Age: 80
End: 2025-05-28
Payer: MEDICARE

## 2025-05-28 VITALS
BODY MASS INDEX: 31.6 KG/M2 | DIASTOLIC BLOOD PRESSURE: 71 MMHG | WEIGHT: 170 LBS | SYSTOLIC BLOOD PRESSURE: 118 MMHG | RESPIRATION RATE: 16 BRPM | OXYGEN SATURATION: 95 % | HEART RATE: 64 BPM

## 2025-05-28 DIAGNOSIS — Z79.899 HIGH RISK MEDICATION USE: ICD-10-CM

## 2025-05-28 DIAGNOSIS — M06.09 RHEUMATOID ARTHRITIS OF MULTIPLE SITES WITH NEGATIVE RHEUMATOID FACTOR (H): Primary | ICD-10-CM

## 2025-05-28 PROCEDURE — 99214 OFFICE O/P EST MOD 30 MIN: CPT | Performed by: INTERNAL MEDICINE

## 2025-05-28 PROCEDURE — 3074F SYST BP LT 130 MM HG: CPT | Performed by: INTERNAL MEDICINE

## 2025-05-28 PROCEDURE — G2211 COMPLEX E/M VISIT ADD ON: HCPCS | Performed by: INTERNAL MEDICINE

## 2025-05-28 PROCEDURE — 3078F DIAST BP <80 MM HG: CPT | Performed by: INTERNAL MEDICINE

## 2025-05-28 NOTE — PATIENT INSTRUCTIONS
RHEUMATOLOGY    [facility] [place]  [address] [place] [address]  [place] [address]    Phone number: [phone]  Fax number: [phone]    If you need a medication refill, please contact us as you may need lab work and/or a follow up visit prior to your refill.      Thank you for choosing [facility]!    Loraine Seaman CMA Rheumatology

## 2025-05-28 NOTE — PROGRESS NOTES
"Rheumatology Clinic Visit      Alfreda Baxter MRN# [id number]   YOB: 1945 Age: 80 year old      Date of visit: 5/28/25   PCP: Dr. Mercedes Urena    Chief Complaint   Patient presents with:  Rheumatoid Arthritis    Assessment and Plan     1.  Seronegative rheumatoid arthritis: Inflammatory arthritis symptoms affecting her MCPs, PIPs, wrists, and shoulders started in February 2023.  Establish care with me on 11/8/2023 at which point she had symmetric synovitis of the MCPs, PIPs, and wrists; and symptoms of impingement syndrome of the shoulders.  She has been following at [facility] where steroid injections of the shoulders provide benefit for no more than 10 days; and systemic steroid taper starting with 60 mg daily from her primary care provider was a \"miracle\" with resolution of MCP, PIP, wrist, and shoulder pain while on prednisone.  Methotrexate was started in November 2023.  2/15/2024: Patient reports having improvement with methotrexate but still active disease.  Prednisone was effective but higher anxiety at 20 mg daily, mild intermittent anxiety at 10 mg daily, and tolerated well at 5 mg daily.  Was able to stop prednisone completely on 5/1/2024.  Currently doing well on methotrexate monotherapy, with the exception of very mild fatigue that occurs only for 1 day duration, 2 days after methotrexate dosing that she says is very tolerable and she does not want to change methotrexate dosing based on this.  Chronic illness, stable.    - Continue Methotrexate 20mg once every 7 days  - Continue folic acid 2 mg daily  - Labs in 3 months: CBC, Creatinine, Hepatic Panel, ESR, CRP    2.  Osteoarthritis of the hands: Significant degenerative changes of the hands with large Heberden's and Alba's nodes, and squaring of the first CMC joints.  Reportedly this has been stable for many years, preceding the inflammatory arthritis symptoms started in February 2023.  Minimal symptom severity from " degenerative arthritis at this time.    3.  Degenerative arthritis of the spine: Many degenerative changes that have been managed with epidural steroid injections from [facility].  She will continue following with her spine specialist as needed.    4.  Right shoulder pain, upper back pain, history: Refer to physical therapy previously.  Not an issue today.    5.  History of squamous cell carcinoma: 1 lesion removed per patient report.  She will continue following with dermatology at least once yearly for monitoring    6.  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    7.  Hx of elevated creatinine: Avoid nephrotoxins    8.  Vaccinations: Vaccinations reviewed with Ms. Baxter.  Risks and benefits of vaccinations were discussed.    - Influenza: encouraged yearly vaccination  - [id number] and [id number]: up to date  - Shingrix: uptodate  - COVID-19: Advised keeping updated and to hold methotrexate x1-2 weeks afterward  - RSV: up to date    9. 11/4/2024 echocardiogram ordered by Emily Sloan documents moderate pulmonary hypertension with a PA systolic pressure of 50 mmHg. Will message Emily Sloan and Dr. Skyla Gold, cardiology team.  Needing right heart cath?    Total minutes spent in evaluation with patient, documentation, , and review of pertinent studies and chart notes: 20  The longitudinal plan of care for the rheumatology problem(s) were addressed during this visit.  Due to added complexity of care, we will continue to support the patient and the subsequent management of this condition with ongoing continuity of care.      Ms. Baxter verbalized agreement with and understanding of the rational for the diagnosis and treatment plan.  All questions were answered to best of my ability and the patient's satisfaction. Ms. Baxter was advised to contact the clinic with any questions that may arise after the clinic visit.      Thank you for  involving me in the care of the patient    Return to clinic: 3 months    HPI   Alfreda Baxter is a 80 year old female with a past medical history significant for hypertension, hyperlipidemia, lumbar spine stenosis, paroxysmal atrial fibrillation on anticoagulation, history of CVA with left eye vision loss, insomnia, and bilateral TKA who presents for follow-up of rheumatoid arthritis.     11/8/2023: Chronic degenerative changes of her hands and spine; the degenerative changes of her hands do not bother her.  The degenerative changes of her spine are managed at [facility] where steroid injections have been effective; she has seen a spine surgeon and is next going to see a pain management specialist at [facility].  She had been fairly stable until February 2023 when she had sudden onset bilateral shoulder pain.  This was followed by pain at the wrists, MCPs, and PIPs.  MCP, PIP, wrist, and shoulder pain is worse in the morning and improves with time and activity; morning stiffness for most of the day, reaching a baseline after about 1-2 hours.  When she received a epidural steroid injection of her lumbar spine her shoulders felt better.  She then received a high dose of prednisone starting with 60 mg daily and tapered off from her primary care clinic and while on prednisone she felt like it was a miracle drug.  She has been given other courses of prednisone and she says that she typically only starts with 40 mg a day and that that is very effective also.  Reduced  strength.  History of bilateral TKA.  Knees, ankles, and feet without any issues.  She notes that her son has ulcerative colitis and that her son's cousins on his father side have psoriasis; no other autoimmune disease on her side of the family.  Squamous of carcinoma x1; following with dermatology regularly.  History of CVA 5 years ago with loss of vision in the left eye.  No history of scalp tenderness, jaw or tongue  claudication, or new headaches now around the time of the vision loss.    2/15/2024: Prednisone was effective but higher anxiety at 20 mg daily, mild intermittent anxiety at 10 mg daily, and tolerated well at 5 mg daily. Prednisone works well for arthritis.  Methotrexate has been helpful; held methotrexate for several doses because of infections.  Still with pain/swelling/stiffness at the MCPs, PIPs, wrists, but all improved with methotrexate.  She would like to continue on methotrexate monotherapy for now to see if a longer duration will be helpful.  She has several questions about rheumatoid arthritis and methotrexate that were discussed today.  Occasional oral sore since starting methotrexate.  No nasal sores.    6/18/2024: Doing well on methotrexate.  Was able to stop prednisone on 5/1/2024.  No joint pain or swelling.  Morning stiffness for less than 10 minutes.  Arthritis not limiting her daily activities.  Since last seen she developed shortness of breath and was found to have A-fib with RVR; following with cardiology and has planned ablation tomorrow.    8/12/2024: RA controlled.  Not on prednisone.  Methotrexate is effective for RA management.  Still with degenerative changes of the hands and back; following with a spine specialist at [facility].  Creatinine is mildly elevated; she questions if this is related to blood pressure medication changes and she plans to discuss this with her primary care provider tomorrow.    11/18/2024: RA stable.  Not requiring prednisone.  Only new issue is a few days of right anterior shoulder pain worse with activity and improved with rest; also with chronic back pain, worse right now at the thoracic area and causing muscle spasms across the trapezius.  No joint swelling.  Morning stiffness for less than 20 minutes.    2/17/2025: She held methotrexate 1 week before the week of Christmas and for 1 week due to suspected influenza illness.  No worsening arthritis  symptoms with holding methotrexate.  Feels like she is doing well at this time.  Currently without joint pain.  More stiffness for less than 20 minutes.  Arthritis not limiting daily activities.  She does note mild fatigue for a 24-hour duration, only occurring 2 days after taking methotrexate; no fatigue the day after taking methotrexate.  She would like to remain on her current regimen as it is working well.    Today, 5/28/2025: RA controlled currently.  She notes that any delay in methotrexate dose results in worsening joint pain.  Morning stiffness <30 min. No gelling. Arthritis not limiting daily activities.      Tobacco: Former cigarette smoker  EtOH: None  Drugs: None  Occupation: retired nurse    ROS   12 point review of system was completed and negative except as noted in the HPI     Active Problem List     Patient Active Problem List   Diagnosis    DJD (degenerative joint disease)    Benign essential hypertension    Hyperlipidemia LDL goal <70    Lumbar spinal stenosis    Mild intermittent asthma without complication    History of bilateral knee replacement    Obesity    Primary insomnia    Posterior vitreous detachment of both eyes    PFO (patent foramen ovale)    Neovascular glaucoma of left eye, moderate stage    Blind left eye    Paroxysmal A-fib (H)    Ocular hypertension of right eye, treated    History of central retinal artery occlusion, os    Disturbances of vision, late effect of stroke    Combined forms of age-related cataract, mild-mod, of left eye    Pseudophakia, od    Low bone mass    Cervicalgia    Heart failure with reduced ejection fraction (H)    Chronic kidney disease, stage 3a (H)    Punctal ectropions of both eyes     Past Medical History     Past Medical History:   Diagnosis Date    Acute idiopathic gout of left foot 11/04/2015    Adenomatous colon polyp 06/05/2013    Colonoscopy due 2025    Asthma 11/23/2009    controlled with inhaler    Blind left eye 07/08/2019    Due to central  retinal occlusion    Cataract 2008    Central retinal artery occlusion of left eye 09/15/2018    Chronic kidney disease, stage 3a (H) 2024    Disturbances of vision, late effect of stroke 2022    DJD (degenerative joint disease) 2009    knees    GERD (gastroesophageal reflux disease) 2010    Glaucoma 05/10/2012    Gout 11/15/2012    Heart failure with reduced ejection fraction (H) 2024    History of central retinal artery occlusion, os 2020    HTN (hypertension) 2009    Hyperlipidemia LDL goal <70 2010    Infected cyst of skin 10/14/2024    Low bone mass 2003    Lumbar spinal stenosis 2011    Sees Dr Sage    Mild intermittent asthma without complication 2018    Mild persistent asthma 2013    Neovascular glaucoma of left eye, moderate stage 2019    Ocular hypertension of right eye, treated 2019    Paroxysmal A-fib (H) 2019    Per loop recorder, 2018    On Rivaroxaban.   H/o R central retinal artery occlusion    Paroxysmal ventricular tachycardia (H) 2018    Added automatically from request for surgery [id number]    PFO (patent foramen ovale) 2010    Primary insomnia 2016    Recurrent cerebrovascular accidents (CVAs) (H) 2022    Recurrent cerebrovascular accidents (CVAs) (H) 2022    Rheumatoid arthritis of multiple sites with negative rheumatoid factor (H) 2023    Transient global amnesia 2022     Past Surgical History     Past Surgical History:   Procedure Laterality Date    ANESTHESIA CARDIOVERSION N/A 2024    Procedure: Anesthesia cardioversion @0930;  Surgeon: GENERIC ANESTHESIA PROVIDER;  Location: [facility] OR    ANESTHESIA CARDIOVERSION N/A 2024    Procedure: Anesthesia cardioversion @1330;  Surgeon: GENERIC ANESTHESIA PROVIDER;  Location: [facility] OR    APPENDECTOMY      age 7 yrs.    BREAST BIOPSY, RT/LT  2004    left benign    CATARACT IOL, RT/LT       SECTION       x 2    COLONOSCOPY  2013    needed q 3 yrs.    COLONOSCOPY N/A 09/10/2020    Procedure: Colonoscopy, With Polypectomy And Biopsy;  Surgeon: Brian Cleaning MD;  Location: [facility] OR    COLONOSCOPY WITH CO2 INSUFFLATION N/A 11/21/2016    Procedure: COLONOSCOPY WITH CO2 INSUFFLATION;  Surgeon: Brian Cleaning MD;  Location: [facility] OR    COLONOSCOPY WITH CO2 INSUFFLATION N/A 09/10/2020    Procedure: COLONOSCOPY, WITH CO2 INSUFFLATION;  Surgeon: Brian Cleaning MD;  Location: [facility] OR    EP COMPREHENSIVE EP STUDY N/A 12/31/2018    Procedure: LOOP RECORDER;  Surgeon: Buck Butterfield MD;  Location:  [facility]    HYSTERECTOMY, POORNIMA  1997    bleeding fibroids    ORTHOPEDIC SURGERY  2009    meniscus repair    PHACOEMULSIFICATION WITH STANDARD INTRAOCULAR LENS IMPLANT Right 10/24/2022    Procedure: COMPLE RIGHT EYE PHACOEMULSIFICATION, CATARACT, WITH STANDARD INTRAOCULAR LENS IMPLANT INSERTION;  Surgeon: Ezequiel Florence MD;  Location: [facility] TOTAL KNEE ARTHROPLASTY Left 11/2015    at [facility]     Allergy     Allergies   Allergen Reactions    No Clinical Screening - See Comments Other (See Comments)     senisitive to non steroidals  Aleve, ibuprofen celebrex cause bad stomach pains.    Oxycodone Hives     Other reaction(s): Unknown  ... With facial swelling    Nsaids Nausea and Vomiting     Other reaction(s): Abdominal Pain     Current Medication List     Current Outpatient Medications   Medication Sig Dispense Refill    acetaminophen (TYLENOL) 500 MG tablet Take 500-1,000 mg by mouth every 6 hours as needed.      albuterol (PROAIR HFA/PROVENTIL HFA/VENTOLIN HFA) 108 (90 Base) MCG/ACT inhaler Inhale 2 puffs into the lungs every 6 hours as needed for shortness of breath or wheezing. 18 g 11    amiodarone (PACERONE) 200 MG tablet Take 1 tablet (200 mg) by mouth daily. 90 tablet 3    apixaban ANTICOAGULANT (ELIQUIS) 5 MG tablet Take 1 tablet (5 mg) by mouth 2 times daily. 180 tablet  "1    apixaban ANTICOAGULANT (ELIQUIS) 5 MG tablet Patient in study, is either on Eliquis or a new med Librexia      atorvastatin (LIPITOR) 20 MG tablet Take 1 tablet (20 mg) by mouth daily. 90 tablet 4    B-COMPLEX OR Take 1 tablet by mouth daily.      CALCIUM 500 +D OR Take 1 tablet by mouth daily.      folic acid (FOLVITE) 1 MG tablet Take 2 tablets (2 mg) by mouth daily. 200 tablet 2    hypromellose (ARTIFICIAL TEARS) 0.5 % SOLN ophthalmic solution Place 1 drop into both eyes 3 times daily      latanoprost (XALATAN) 0.005 % ophthalmic solution Place 1 drop into both eyes at bedtime 7.5 mL 3    lisinopril (ZESTRIL) 5 MG tablet Take 0.5 tablets (2.5 mg) by mouth at bedtime.      methotrexate 2.5 MG tablet Take 8 tablets (20 mg) by mouth every 7 days. . Methotrexate is a once weekly medication, taken on the same day of each week. 96 tablet 2    multivitamin (OCUVITE) TABS tablet Take 1 tablet by mouth daily      neomycin-polymyxin-dexAMETHasone (MAXITROL) 3.5-41847-7.1 ophthalmic ointment Apply a small squirt approx. 1/4\". 3.5 g 2    polyethylene glycol (MIRALAX) 17 GM/Dose powder Take 17 g (1 Capful) by mouth daily as needed for constipation      SENNA-docusate sodium (SENNA S) 8.6-50 MG tablet Take 1-4 tablets by mouth daily as needed      spironolactone (ALDACTONE) 25 MG tablet Take 0.5 tablets (12.5 mg) by mouth daily 45 tablet 3    timolol maleate (TIMOPTIC) 0.5 % ophthalmic solution Place 1 drop Into the left eye every morning 10 mL 3    zolpidem (AMBIEN) 5 MG tablet TAKE ONE TABLET BY MOUTH IN THE EVENING AS NEEDED FOR SLEEP 30 tablet 5    azelastine (ASTELIN) 0.1 % nasal spray Spray 1 spray into both nostrils at bedtime.      doxycycline hyclate (VIBRA-TABS) 100 MG tablet Take 1 tablet (100 mg) by mouth 2 times daily. (Patient not taking: Reported on 1/31/2025) 20 tablet 0     No current facility-administered medications for this visit.     Social History   See HPI    Family History     Family History " "  Problem Relation Age of Onset    Arthritis Mother     Cancer Mother     Hypertension Mother     Other Cancer Mother     Thyroid Disease Mother     Respiratory Father     Glaucoma Father     Asthma Father     Allergies Sister     Eye Disorder Sister     Lipids Sister     Neurologic Disorder Sister     Osteoporosis Sister     Glaucoma Sister     Hypertension Sister     Macular Degeneration Sister     Hyperlipidemia Sister     Hypertension Sister     Osteoporosis Sister     Gastrointestinal Disease Son         Ulcerative Colitis    Glaucoma Other      Son: Ulcerative colitis, psoriatic arthritis, psoriasis.  Her son's paternal cousins have psoriasis    Physical Exam     Temp Readings from Last 3 Encounters:   12/18/24 97.5  F (36.4  C) (Tympanic)   11/01/24 (!) 96.6  F (35.9  C) (Temporal)   10/14/24 97.5  F (36.4  C) (Oral)     BP Readings from Last 5 Encounters:   05/28/25 118/71   04/07/25 116/69   03/31/25 112/69   02/17/25 125/69   01/09/25 104/56     Pulse Readings from Last 1 Encounters:   05/28/25 64     Resp Readings from Last 1 Encounters:   05/28/25 16     Estimated body mass index is 31.6 kg/m  as calculated from the following:    Height as of 11/22/24: 1.562 m (5' 1.5\").    Weight as of this encounter: 77.1 kg (170 lb).    GEN: NAD. Healthy appearing adult.   HEENT:  Anicteric, noninjected sclera. No obvious external lesions of the ear and nose. Hearing intact.  CV: S1, S2.  Holosystolic murmur.   PULM: No increased work of breathing. CTA bilaterally  MSK: MCP and PIP without swelling or tenderness to palpation.   Heberden's and Alba's nodes present.  DIPs nontender to palpation.  Wrists and elbows without swelling or tenderness to palpation.  Shoulders without swelling or tenderness to palpation.  Knees and ankles without swelling or tenderness to palpation.  Negative MTP squeeze.  SKIN: No rash or jaundice seen  PSYCH: Alert. Appropriate.      Labs / Imaging (select studies)     RF/CCP  Recent Labs "   Lab Test 03/08/23  1413   CCPIGG 0.7   RHF <7     CBC  Recent Labs   Lab Test 05/21/25  1046 02/13/25  1128 11/11/24  1027   WBC 7.6 4.5 7.3   RBC 4.15 4.19 3.89   HGB 13.0 13.1 12.6   HCT 41.6 41.2 39.5    98 102*   RDW 14.4 14.0 14.2    212 234   MCH 31.3 31.3 32.4   MCHC 31.3* 31.8 31.9   NEUTROPHIL 74 54 69   LYMPH 14 31 20   MONOCYTE 9 12 7   EOSINOPHIL 2 3 3   BASOPHIL 1 0 0   ANEU 5.6 2.4 5.1   ALYM 1.1 1.4 1.5   MANINDER 0.7 0.5 0.5   AEOS 0.2 0.1 0.2   ABAS 0.0 0.0 0.0     CMP  Recent Labs   Lab Test 05/21/25  1046 02/13/25  1128 12/18/24  1015 11/11/24  1027 09/23/24  1106 07/27/21  1004 11/26/20  1141 10/15/20  1108 07/24/20  1013     --  140  --  141   < > 138 141 143   POTASSIUM 5.1  --  4.9  --  4.4   < > 3.8 4.6 4.1   CHLORIDE 102  --  104  --  105   < > 104 106 108   CO2 24  --  26  --  23   < > 29 33* 26   ANIONGAP 12  --  10  --  13   < > 6 2* 9   GLC 92  --  94  --  94   < > 114* 100* 99   BUN 23.9*  --  23.3*  --  20.5   < > 16 24 19   CR 0.95 1.15* 0.95 1.02* 0.98*   < > 0.70 0.70 0.85   GFRESTIMATED 60* 48* 60* 55* 58*   < > 84 84 66   GFRESTBLACK  --   --   --   --   --   --  >90 >90 77   RUDOLPH 9.5  --  9.6  --  9.5   < > 10.0 9.6 9.1   BILITOTAL 0.6 0.4  --  0.6 0.6   < > 0.5  --   --    ALBUMIN 4.1 3.9  --  4.0 4.2   < > 3.6  --   --    PROTTOTAL 7.5 7.2  --  7.2 7.3   < > 8.6  --   --    ALKPHOS 68 57  --  60 60   < > 84  --   --    AST 25 32  --  25 23   < > 9  --   --    ALT 16 14  --  12 17   < > 16  --  22    < > = values in this interval not displayed.     Calcium/VitaminD  Recent Labs   Lab Test 05/21/25  1046 12/18/24  1015 09/23/24  1106 12/31/18  1043 06/25/18  0834   RUDOLPH 9.5 9.6 9.5   < > 9.4   VITDT  --   --   --   --  22    < > = values in this interval not displayed.     ESR/CRP  Recent Labs   Lab Test 05/21/25  1046 02/13/25  1128 11/11/24  1027 11/08/23  1242 03/08/23  1413 07/27/21  1004   SED 15 13 16   < > 18 9   CRP  --   --   --   --  13.4* <2.9   CRPI  3.03 3.42 3.29   < >  --   --     < > = values in this interval not displayed.     Lipid Panel  Recent Labs   Lab Test 05/21/25  1046 06/21/24  1646 11/14/22  1536   CHOL 141 85 157   TRIG 109 147 142   HDL 58 39* 55   LDL 61 17 74   NHDL 83 46 102     Hepatitis B  Recent Labs   Lab Test 11/08/23  1242   AUSAB 0.89   HBCAB Nonreactive   HEPBANG Nonreactive     Hepatitis C  Recent Labs   Lab Test 11/08/23  1242 12/03/21  1538   HCVAB Nonreactive Nonreactive     Lyme ab screening  Recent Labs   Lab Test 11/08/23  1242   LYMEGM 0.03     Immunization History     Immunization History   Administered Date(s) Administered    COVID-19 Bivalent 12+ (Pfizer) 12/12/2022    COVID-19 MONOVALENT 12+ (Pfizer) 03/10/2021, 03/31/2021, 12/07/2021    Flu 65+ (Fluad) 10/15/2024    Influenza (High Dose) Trivalent,PF (Fluzone) 10/21/2016, 11/16/2017, 10/16/2019, 10/10/2022    Influenza (IIV3) PF 11/29/2006, 11/02/2012, 11/01/2013, 11/07/2014, 10/20/2015    Influenza Vaccine 18-64 (Flublok) 10/19/2021    Influenza Vaccine 65+ (FLUAD) 10/19/2021, 10/10/2022, 11/06/2023    Influenza Vaccine 65+ (Fluzone HD) 10/13/2020    Pneumo Conj 13-V (2010&after) 06/28/2018    Pneumococcal 23 valent 11/29/2006, 07/27/2020    RSV Vaccine (Arexvy) 09/09/2024    TD,PF 7+ (Tenivac) 06/28/2018    TDAP Vaccine (Adacel) 11/19/2007    Td (Adult), Adsorbed 01/17/2000    Zoster recombinant adjuvanted (Shingrix) 07/15/2019, 09/26/2019    Zoster vaccine, live 11/19/2008          Chart documentation done in part with Dragon Voice recognition Software. Although reviewed after completion, some word and grammatical error may remain.    Lucho Huber MD

## 2025-05-28 NOTE — Clinical Note
João Diaz and Dr. Gold, The 11/4/2024 echocardiogram documents moderate pulmonary hypertension with a PA systolic pressure of 50 mmHg. Will you address this at her upcoming cardiology appointment?  Or should she be referred to a pulm hypertension specialist?   Thank you! Lucho

## 2025-06-06 PROBLEM — I50.32 CHRONIC HEART FAILURE WITH PRESERVED EJECTION FRACTION (H): Status: ACTIVE | Noted: 2024-08-13

## 2025-06-17 DIAGNOSIS — Z79.899 ENCOUNTER FOR MONITORING AMIODARONE THERAPY: ICD-10-CM

## 2025-06-17 DIAGNOSIS — I48.19 PERSISTENT ATRIAL FIBRILLATION (H): ICD-10-CM

## 2025-06-17 DIAGNOSIS — Z51.81 ENCOUNTER FOR MONITORING AMIODARONE THERAPY: ICD-10-CM

## 2025-06-19 LAB
DLCOUNC-%PRED-PRE: 100 %
DLCOUNC-PRE: 17.11 ML/MIN/MMHG
DLCOUNC-PRED: 17.1 ML/MIN/MMHG
ERV-%PRED-PRE: 70 %
ERV-PRE: 0.52 L
ERV-PRED: 0.74 L
EXPTIME-PRE: 6.35 SEC
FEF2575-%PRED-PRE: 86 %
FEF2575-PRE: 1.18 L/SEC
FEF2575-PRED: 1.36 L/SEC
FEFMAX-%PRED-PRE: 121 %
FEFMAX-PRE: 5.21 L/SEC
FEFMAX-PRED: 4.29 L/SEC
FEV1-%PRED-PRE: 89 %
FEV1-PRE: 1.45 L
FEV1FEV6-PRE: 77 %
FEV1FEV6-PRED: 77 %
FEV1FVC-PRE: 77 %
FEV1FVC-PRED: 78 %
FEV1SVC-PRE: 67 %
FEV1SVC-PRED: 61 %
FIFMAX-PRE: 4.06 L/SEC
FRCPLETH-%PRED-PRE: 81 %
FRCPLETH-PRE: 2.21 L
FRCPLETH-PRED: 2.69 L
FVC-%PRED-PRE: 89 %
FVC-PRE: 1.88 L
FVC-PRED: 2.11 L
IC-%PRED-PRE: 109 %
IC-PRE: 1.64 L
IC-PRED: 1.49 L
RVPLETH-%PRED-PRE: 79 %
RVPLETH-PRE: 1.69 L
RVPLETH-PRED: 2.13 L
TLCPLETH-%PRED-PRE: 85 %
TLCPLETH-PRE: 3.85 L
TLCPLETH-PRED: 4.52 L
VA-%PRED-PRE: 86 %
VA-PRE: 3.53 L
VC-%PRED-PRE: 80 %
VC-PRE: 2.16 L
VC-PRED: 2.68 L

## 2025-06-30 ENCOUNTER — OFFICE VISIT (OUTPATIENT)
Dept: OPHTHALMOLOGY | Facility: CLINIC | Age: 80
End: 2025-06-30
Payer: MEDICARE

## 2025-06-30 DIAGNOSIS — Z96.1 PSEUDOPHAKIA: ICD-10-CM

## 2025-06-30 DIAGNOSIS — H25.812 COMBINED FORMS OF AGE-RELATED CATARACT OF LEFT EYE: ICD-10-CM

## 2025-06-30 DIAGNOSIS — Z86.69 HISTORY OF CENTRAL RETINAL ARTERY OCCLUSION: Primary | ICD-10-CM

## 2025-06-30 DIAGNOSIS — Z01.01 ENCOUNTER FOR EXAMINATION OF EYES AND VISION WITH ABNORMAL FINDINGS: ICD-10-CM

## 2025-06-30 DIAGNOSIS — H40.52X2 NEOVASCULAR GLAUCOMA OF LEFT EYE, MODERATE STAGE: ICD-10-CM

## 2025-06-30 DIAGNOSIS — H52.4 PRESBYOPIA: ICD-10-CM

## 2025-06-30 DIAGNOSIS — H02.106 PUNCTAL ECTROPIONS OF BOTH EYES: ICD-10-CM

## 2025-06-30 DIAGNOSIS — H02.103 PUNCTAL ECTROPIONS OF BOTH EYES: ICD-10-CM

## 2025-06-30 DIAGNOSIS — H40.051 OCULAR HYPERTENSION OF RIGHT EYE: ICD-10-CM

## 2025-06-30 DIAGNOSIS — H43.813 POSTERIOR VITREOUS DETACHMENT OF BOTH EYES: ICD-10-CM

## 2025-06-30 PROCEDURE — 92014 COMPRE OPH EXAM EST PT 1/>: CPT | Performed by: OPHTHALMOLOGY

## 2025-06-30 PROCEDURE — 92015 DETERMINE REFRACTIVE STATE: CPT | Mod: GY | Performed by: OPHTHALMOLOGY

## 2025-06-30 RX ORDER — LATANOPROST 50 UG/ML
1 SOLUTION/ DROPS OPHTHALMIC AT BEDTIME
Qty: 7.5 ML | Refills: 3 | Status: SHIPPED | OUTPATIENT
Start: 2025-06-30

## 2025-06-30 RX ORDER — TIMOLOL MALEATE 5 MG/ML
1 SOLUTION/ DROPS OPHTHALMIC EVERY MORNING
Qty: 10 ML | Refills: 3 | Status: SHIPPED | OUTPATIENT
Start: 2025-06-30

## 2025-06-30 ASSESSMENT — REFRACTION_MANIFEST
OD_AXIS: 167
OD_CYLINDER: +0.75
OD_SPHERE: -2.25
OD_ADD: +2.75

## 2025-06-30 ASSESSMENT — REFRACTION_WEARINGRX
OD_CYLINDER: +0.75
SPECS_TYPE: PAL
OD_AXIS: 161
OS_SPHERE: -3.00
OD_SPHERE: -3.00
OS_ADD: +3.00
OS_AXIS: 032
OD_ADD: +3.00
OS_CYLINDER: +0.25

## 2025-06-30 ASSESSMENT — CUP TO DISC RATIO
OD_RATIO: 0.5
OS_RATIO: 0.8

## 2025-06-30 ASSESSMENT — CONF VISUAL FIELD
OS_INFERIOR_TEMPORAL_RESTRICTION: 1
OS_SUPERIOR_TEMPORAL_RESTRICTION: 1
OD_NORMAL: 1
OD_SUPERIOR_NASAL_RESTRICTION: 0
OD_INFERIOR_TEMPORAL_RESTRICTION: 0
OD_SUPERIOR_TEMPORAL_RESTRICTION: 0
OD_INFERIOR_NASAL_RESTRICTION: 0
OS_SUPERIOR_NASAL_RESTRICTION: 1
OS_INFERIOR_NASAL_RESTRICTION: 1

## 2025-06-30 ASSESSMENT — TONOMETRY
IOP_METHOD: APPLANATION
OS_IOP_MMHG: 15
OD_IOP_MMHG: 08

## 2025-06-30 ASSESSMENT — VISUAL ACUITY
OD_CC+: -1
METHOD: SNELLEN - LINEAR
CORRECTION_TYPE: GLASSES
OD_CC: J2
OD_CC: 20/25
OS_CC: NLP

## 2025-06-30 ASSESSMENT — SLIT LAMP EXAM - LIDS: COMMENTS: 2+ DERMATOCHALASIS - UPPER LID, 2+ SCLERAL SHOW, 2+ MEIBOMIAN GLAND DYSFUNCTION

## 2025-06-30 ASSESSMENT — EXTERNAL EXAM - RIGHT EYE: OD_EXAM: 2+ BROW PTOSIS

## 2025-06-30 ASSESSMENT — EXTERNAL EXAM - LEFT EYE: OS_EXAM: 2+ BROW PTOSIS, MILD TO MOD BROW

## 2025-06-30 NOTE — PATIENT INSTRUCTIONS
"Continue:   Latanoprost (green top) every evening both eyes.  Timolol (yellow top) every morning in the left eye.    Maxitrol ointment at bedtime in the left eye as needed     May use artificial tears up to four times a day (like Refresh Optive, Systane Balance, or TheraTears. Avoid \"get the red out\" drops and generic artifical tears).     Wait at least 5 minutes between drops if using more than one at a time.     Glasses prescription given - optional    Possible clouding of posterior capsule right eye discussed.     Referral sent to Dr. Lupis Alfaro for an eye lid evaluation. His office will call you to schedule an appointment.     Return visit 6 months for an intraocular pressure check, glaucoma OCT, retinal OCT, and [facility].     Bryan Morrison M.D.  [phone]    "

## 2025-06-30 NOTE — LETTER
6/30/2025      Alfreda Baxter  [address] [facility] [address]      Dear Colleague,    Thank you for referring your patient, Alfreda Baxter, to the [facility]. Please see a copy of my visit note below.     Current Eye Medications:  Latanoprost both eyes every evening, Timolol left eye each morning.  Last drops:  10:30pm, accidentally used Timolol in both eyes this morning.  7:30am.    Artificial tears both eyes three times a day.   Maxitrol ointment left eye as needed.       Subjective:  Comprehensive Eye Exam.  She would like to proceed with with the Oculoplastic referral to repair the ectropion, left lower eyelid.  Left eye tends to be irritated and a lot of water.  She also has a foreign body sensation around her right upper eyelid.  Vision is not as clear as she'd like.       Objective:  See Ophthalmology Exam.       Assessment:  Stable eye intraocular pressures and discs in patient who is monocular.  Epiphora left eye probably due to lid laxity and punctal ectropion.  Mild refractive shift right eye, otherwise stable eye exam.      ICD-10-CM    1. History of central retinal artery occlusion, os  Z86.69       2. Neovascular glaucoma of left eye, moderate stage  H40.52X2 timolol maleate (TIMOPTIC) 0.5 % ophthalmic solution     latanoprost (XALATAN) 0.005 % ophthalmic solution      3. Ocular hypertension of right eye, treated  H40.051 latanoprost (XALATAN) 0.005 % ophthalmic solution      4. Pseudophakia, od  Z96.1       5. Combined forms of age-related cataract, mild-mod, of left eye  H25.812       6. Punctal ectropions of both eyes  H02.103     H02.106       7. Posterior vitreous detachment of both eyes  H43.813       8. Encounter for examination of eyes and vision with abnormal findings  Z01.01 EYE EXAM (SIMPLE-NONBILLABLE)      9. Presbyopia  H52.4 REFRACTIVE STATUS     EYE EXAM (SIMPLE-NONBILLABLE)           Plan:  Continue:   Latanoprost (green top) every evening both  "eyes.  Timolol (yellow top) every morning in the left eye.    Maxitrol ointment at bedtime in the left eye as needed     May use artificial tears up to four times a day (like Refresh Optive, Systane Balance, or TheraTears. Avoid \"get the red out\" drops and generic artifical tears).     Wait at least 5 minutes between drops if using more than one at a time.     Glasses prescription given - optional    Possible clouding of posterior capsule right eye discussed.     Referral sent to Dr. Lupis Alfaro for an eye lid evaluation. His office will call you to schedule an appointment.     Return visit 6 months for an intraocular pressure check, glaucoma OCT, retinal OCT, and Hill Visual Field.     Bryan Morrison M.D.  [phone]         Again, thank you for allowing me to participate in the care of your patient.        Sincerely,        Bryan Morrison MD    Electronically signed"

## 2025-06-30 NOTE — PROGRESS NOTES
" Current Eye Medications:  Latanoprost both eyes every evening, Timolol left eye each morning.  Last drops:  10:30pm, accidentally used Timolol in both eyes this morning.  7:30am.    Artificial tears both eyes three times a day.   Maxitrol ointment left eye as needed.       Subjective:  Comprehensive Eye Exam.  She would like to proceed with with the Oculoplastic referral to repair the ectropion, left lower eyelid.  Left eye tends to be irritated and a lot of water.  She also has a foreign body sensation around her right upper eyelid.  Vision is not as clear as she'd like.       Objective:  See Ophthalmology Exam.       Assessment:  Stable eye intraocular pressures and discs in patient who is monocular.  Epiphora left eye probably due to lid laxity and punctal ectropion.  Mild refractive shift right eye, otherwise stable eye exam.      ICD-10-CM    1. History of central retinal artery occlusion, os  Z86.69       2. Neovascular glaucoma of left eye, moderate stage  H40.52X2 timolol maleate (TIMOPTIC) 0.5 % ophthalmic solution     latanoprost (XALATAN) 0.005 % ophthalmic solution      3. Ocular hypertension of right eye, treated  H40.051 latanoprost (XALATAN) 0.005 % ophthalmic solution      4. Pseudophakia, od  Z96.1       5. Combined forms of age-related cataract, mild-mod, of left eye  H25.812       6. Punctal ectropions of both eyes  H02.103     H02.106       7. Posterior vitreous detachment of both eyes  H43.813       8. Encounter for examination of eyes and vision with abnormal findings  Z01.01 EYE EXAM (SIMPLE-NONBILLABLE)      9. Presbyopia  H52.4 REFRACTIVE STATUS     EYE EXAM (SIMPLE-NONBILLABLE)           Plan:  Continue:   Latanoprost (green top) every evening both eyes.  Timolol (yellow top) every morning in the left eye.    Maxitrol ointment at bedtime in the left eye as needed     May use artificial tears up to four times a day (like Refresh Optive, Systane Balance, or TheraTears. Avoid \"get the red " "out\" drops and generic artifical tears).     Wait at least 5 minutes between drops if using more than one at a time.     Glasses prescription given - optional    Possible clouding of posterior capsule right eye discussed.     Referral sent to Dr. Lupis Alfaro for an eye lid evaluation. His office will call you to schedule an appointment.     Return visit 6 months for an intraocular pressure check, glaucoma OCT, retinal OCT, and Hill Visual Field.     Bryan Morrison M.D.  [phone]       "

## 2025-07-02 ENCOUNTER — DOCUMENTATION ONLY (OUTPATIENT)
Dept: CARDIOLOGY | Facility: CLINIC | Age: 80
End: 2025-07-02
Payer: MEDICARE

## 2025-07-02 NOTE — PROGRESS NOTES
A Phase 3, Randomized, Double-Blind, Double-Dummy, Parallel Group, Active-Controlled Study to Evaluate the Efficacy and Safety of Milvexian, an Oral Factor Joycelyn Inhibitor, Versus Apixaban in Participants with Atrial Fibrillation       Recurrent nosebleed adverse event resolved as of 14Jan2025.    Yash Paiz RN   Clinical Trials Office

## 2025-07-05 ENCOUNTER — PATIENT OUTREACH (OUTPATIENT)
Dept: CARE COORDINATION | Facility: CLINIC | Age: 80
End: 2025-07-05
Payer: MEDICARE

## 2025-07-07 ENCOUNTER — PATIENT OUTREACH (OUTPATIENT)
Dept: CARE COORDINATION | Facility: CLINIC | Age: 80
End: 2025-07-07
Payer: MEDICARE

## 2025-07-12 ENCOUNTER — HEALTH MAINTENANCE LETTER (OUTPATIENT)
Age: 80
End: 2025-07-12

## 2025-07-14 ENCOUNTER — VIRTUAL VISIT (OUTPATIENT)
Dept: CARDIOLOGY | Facility: CLINIC | Age: 80
End: 2025-07-14
Payer: MEDICARE

## 2025-07-14 DIAGNOSIS — Z00.6 EXAMINATION OF PARTICIPANT OR CONTROL IN CLINICAL RESEARCH: Primary | ICD-10-CM

## 2025-07-14 DIAGNOSIS — I48.19 PERSISTENT ATRIAL FIBRILLATION (H): ICD-10-CM

## 2025-07-14 PROCEDURE — 99207 PR NO CHARGE-RESEARCH SERVICE: CPT | Mod: 93

## 2025-07-14 NOTE — TELEPHONE ENCOUNTER
FUTURE VISIT INFORMATION:  Appointment Date: 10/28/2025  Appointment Time: 9 AM     REFERRAL INFORMATION:  Referring Provider: Bryan Morrison MD   Referring Clinic:  [facility] - Ophthalmology  Reason for Visit/Diagnosis: Repair the Ectropion, left lower eyelid.      NOTES STATUS DETAILS   OFFICE NOTE from Referring Provider Internal [facility] - Ophthalmology  06/30/2025, 12/30/2024, 06/24/2024, 12/21/2023, 06/19/2023, 12/12/2022, 06/15/2022, 12/09/2021, 06/09/2021, 12/10/2020, 06/10/2020, 11/27/2019, 05/22/2019, 01/23/2019 OV with Bryan Morrison MD     *other older OVS in EPIC with referring provider.     OFFICE NOTE from Eye Clinics  * Include Visual Field Tests Internal [facility] [place]  11/16/2022, 08/11/2022 OV with Ezequiel Florence MD   10/24/2025 OV + procedure      OFFICE NOTE from Other Specialists  * Neurology, Dermatology, ENT Internal +CE [facility] [place]  12/30/2022 OV with Zaina Yanez APRN CNP    [facility]  10/22/2018 OV with Luz Oates APRN, CNP     [facility] -Otolaryngology  08/12/2024 OV with Leander Sy PA    Head/Brain/Orbits Imaging     CT Internal [facility]   11/14/22   CTA Head Neck with Contrast  CT Head w/o Contrast  11/26/20 Head CT w/o contrast    [facility]  08/11/2018 CT HEAD W/O CONTRAST     MRI Internal [facility]   02/13/2024   MRA Angiogram Neck w/o & w Contrast  MRA Angiogram Head w/o Contrast  MR Brain w/o & w Contrast  11/14/22 MR Brain w/o Contrast  11/26/2020 MR Brain for Stroke Cmpl w/o & w Contras     Records Requested  07/14/25    Facility  [facility]  Fax: [phone]   Outcome Faxed request for images to be pushed to [facility] PACS  [facility]  08/11/2018 CT HEAD  W/O CONTRAST

## 2025-07-14 NOTE — PROGRESS NOTES
A Phase 3, Randomized, Double-Blind, Double-Dummy, Parallel Group, Active-Controlled Study to Evaluate the Efficacy and Safety of Milvexian, an Oral Factor Joycelyn Inhibitor, Versus Apixaban in Participants with Atrial Fibrillation     Participant contacted by phone today for study visit: Select one: Month 9/ Week 39 [x]     Endpoints Assessed:   CVD (including death due to undetermined cause), MI (eg, stable and unstable angina, and ischemic chest pain), Stroke, TIA, Major vascular (non-traumatic) limb amputation, Acute limb ischemia, Pulmonary Embolism, Symptomatic DVT, Non-CNS systemic embolism.     Endpoint assessment completed and participant denies any endpoints.     AEs requiring Additional Information:   Suspected liver injury, cutaneous adverse events, and renal adverse events     AEs requiring additional information assessment completed and participant denies any endpoints.     Modified Chris Scale Assessment (mRS) completed by investigator only for participants with a history of stroke.     Medical Resource Utilization completed as per protocol.     Concomitant therapies reviewed.  Changes (if any) noted below.     Assessed participant regarding new or ongoing adverse events. Participant denies any adverse events.     Plan: Will schedule next study visit with participant at [facility] on 13Oct2025.       Current Outpatient Medications:     acetaminophen (TYLENOL) 500 MG tablet, Take 500-1,000 mg by mouth every 6 hours as needed., Disp: , Rfl:     albuterol (PROAIR HFA/PROVENTIL HFA/VENTOLIN HFA) 108 (90 Base) MCG/ACT inhaler, Inhale 2 puffs into the lungs every 6 hours as needed for shortness of breath or wheezing., Disp: 18 g, Rfl: 11    amiodarone (PACERONE) 200 MG tablet, Take 1 tablet (200 mg) by mouth daily., Disp: 90 tablet, Rfl: 3    apixaban ANTICOAGULANT (ELIQUIS) 5 MG tablet, Take 1 tablet (5 mg) by mouth 2 times daily., Disp: 180 tablet, Rfl: 3    apixaban ANTICOAGULANT (ELIQUIS) 5 MG  "tablet, Take 5 mg by mouth 2 times daily., Disp: , Rfl:     atorvastatin (LIPITOR) 20 MG tablet, Take 1 tablet (20 mg) by mouth daily., Disp: 90 tablet, Rfl: 4    azelastine (ASTELIN) 0.1 % nasal spray, Spray 1 spray into both nostrils at bedtime., Disp: , Rfl:     B-COMPLEX OR, Take 1 tablet by mouth daily., Disp: , Rfl:     CALCIUM 500 +D OR, Take 1 tablet by mouth daily. (Patient not taking: Reported on 6/30/2025), Disp: , Rfl:     folic acid (FOLVITE) 1 MG tablet, Take 2 tablets (2 mg) by mouth daily., Disp: 200 tablet, Rfl: 2    hypromellose (ARTIFICIAL TEARS) 0.5 % SOLN ophthalmic solution, Place 1 drop into both eyes 3 times daily, Disp: , Rfl:     latanoprost (XALATAN) 0.005 % ophthalmic solution, Place 1 drop into both eyes at bedtime., Disp: 7.5 mL, Rfl: 3    lisinopril (ZESTRIL) 5 MG tablet, Take 0.5 tablets (2.5 mg) by mouth at bedtime., Disp: , Rfl:     methotrexate 2.5 MG tablet, Take 8 tablets (20 mg) by mouth every 7 days. . Methotrexate is a once weekly medication, taken on the same day of each week., Disp: 96 tablet, Rfl: 2    multivitamin (OCUVITE) TABS tablet, Take 1 tablet by mouth daily, Disp: , Rfl:     neomycin-polymyxin-dexAMETHasone (MAXITROL) 3.5-44732-7.1 ophthalmic ointment, Apply a small squirt approx. 1/4\"., Disp: 3.5 g, Rfl: 2    polyethylene glycol (MIRALAX) 17 GM/Dose powder, Take 17 g (1 Capful) by mouth daily as needed for constipation, Disp: , Rfl:     SENNA-docusate sodium (SENNA S) 8.6-50 MG tablet, Take 1-4 tablets by mouth daily as needed, Disp: , Rfl:     spironolactone (ALDACTONE) 25 MG tablet, Take 0.5 tablets (12.5 mg) by mouth daily, Disp: 45 tablet, Rfl: 3    timolol maleate (TIMOPTIC) 0.5 % ophthalmic solution, Place 1 drop Into the left eye every morning., Disp: 10 mL, Rfl: 3    zolpidem (AMBIEN) 5 MG tablet, TAKE ONE TABLET BY MOUTH IN THE EVENING AS NEEDED FOR SLEEP, Disp: 30 tablet, Rfl: 5     Yash Paiz, RN   Clinical Trials Nurse    "

## 2025-07-16 ENCOUNTER — DOCUMENTATION ONLY (OUTPATIENT)
Dept: ANTICOAGULATION | Facility: CLINIC | Age: 80
End: 2025-07-16
Payer: MEDICARE

## 2025-07-16 NOTE — PROGRESS NOTES
Anticoagulant Therapeutic Duplication    Duplicate orders identified: identical order(s)    The duplicate anticoagulant order(s) has been discontinued    Active anticoagulant: apixaban (Eliquis)    Plan made per [facility] anticoagulation protocol.    Walter Person RN  7/16/2025

## 2025-08-08 ENCOUNTER — OFFICE VISIT (OUTPATIENT)
Dept: OPHTHALMOLOGY | Facility: CLINIC | Age: 80
End: 2025-08-08
Payer: MEDICARE

## 2025-08-08 ENCOUNTER — MYC MEDICAL ADVICE (OUTPATIENT)
Dept: CARDIOLOGY | Facility: CLINIC | Age: 80
End: 2025-08-08

## 2025-08-08 DIAGNOSIS — S05.02XD ABRASION OF LEFT CORNEA, SUBSEQUENT ENCOUNTER: Primary | ICD-10-CM

## 2025-08-08 DIAGNOSIS — I50.20 HEART FAILURE WITH REDUCED EJECTION FRACTION (H): ICD-10-CM

## 2025-08-08 PROCEDURE — 92012 INTRM OPH EXAM EST PATIENT: CPT | Performed by: OPHTHALMOLOGY

## 2025-08-08 ASSESSMENT — VISUAL ACUITY
OD_CC: 20/20
CORRECTION_TYPE: GLASSES
METHOD: SNELLEN - LINEAR
OS_CC: NLP

## 2025-08-08 ASSESSMENT — TONOMETRY
IOP_METHOD: APPLANATION
OS_IOP_MMHG: 13
OD_IOP_MMHG: 09

## 2025-08-09 ASSESSMENT — EXTERNAL EXAM - LEFT EYE: OS_EXAM: 2+ BROW PTOSIS, MILD TO MOD BROW

## 2025-08-09 ASSESSMENT — EXTERNAL EXAM - RIGHT EYE: OD_EXAM: 2+ BROW PTOSIS

## 2025-08-11 RX ORDER — SPIRONOLACTONE 25 MG/1
12.5 TABLET ORAL DAILY
Qty: 45 TABLET | Refills: 2 | Status: SHIPPED | OUTPATIENT
Start: 2025-08-11

## 2025-08-20 PROBLEM — D04.60: Status: ACTIVE | Noted: 2023-11-07

## 2025-08-20 PROBLEM — Q18.0: Status: ACTIVE | Noted: 2025-08-20

## 2025-08-21 ENCOUNTER — DOCUMENTATION ONLY (OUTPATIENT)
Dept: FAMILY MEDICINE | Facility: CLINIC | Age: 80
End: 2025-08-21
Payer: MEDICARE

## 2025-08-25 DIAGNOSIS — I50.20 HEART FAILURE WITH REDUCED EJECTION FRACTION (H): Primary | ICD-10-CM

## 2025-09-03 ENCOUNTER — DOCUMENTATION ONLY (OUTPATIENT)
Dept: FAMILY MEDICINE | Facility: CLINIC | Age: 80
End: 2025-09-03

## 2025-09-03 ENCOUNTER — LAB (OUTPATIENT)
Dept: LAB | Facility: CLINIC | Age: 80
End: 2025-09-03
Payer: MEDICARE

## 2025-09-03 DIAGNOSIS — Z13.1 SCREENING FOR DIABETES MELLITUS: Primary | ICD-10-CM

## 2025-09-03 DIAGNOSIS — N18.31 CHRONIC KIDNEY DISEASE, STAGE 3A (H): ICD-10-CM

## 2025-09-03 DIAGNOSIS — Z51.81 ENCOUNTER FOR MONITORING AMIODARONE THERAPY: ICD-10-CM

## 2025-09-03 DIAGNOSIS — E66.9 OBESITY: Primary | ICD-10-CM

## 2025-09-03 DIAGNOSIS — I48.19 PERSISTENT ATRIAL FIBRILLATION (H): ICD-10-CM

## 2025-09-03 DIAGNOSIS — I50.20 HEART FAILURE WITH REDUCED EJECTION FRACTION (H): ICD-10-CM

## 2025-09-03 DIAGNOSIS — Z79.899 ENCOUNTER FOR MONITORING AMIODARONE THERAPY: ICD-10-CM

## 2025-09-03 DIAGNOSIS — M06.09 RHEUMATOID ARTHRITIS OF MULTIPLE SITES WITH NEGATIVE RHEUMATOID FACTOR (H): ICD-10-CM

## 2025-09-03 DIAGNOSIS — Z79.899 HIGH RISK MEDICATION USE: ICD-10-CM

## 2025-09-03 LAB
ALBUMIN SERPL BCG-MCNC: 3.6 G/DL (ref 3.5–5.2)
ALP SERPL-CCNC: 70 U/L (ref 40–150)
ALT SERPL W P-5'-P-CCNC: 21 U/L (ref 0–50)
ANION GAP SERPL CALCULATED.3IONS-SCNC: 12 MMOL/L (ref 7–15)
AST SERPL W P-5'-P-CCNC: 36 U/L (ref 0–45)
BASOPHILS # BLD AUTO: 0.04 10E3/UL (ref 0–0.2)
BASOPHILS NFR BLD AUTO: 0.7 %
BILIRUB SERPL-MCNC: 0.8 MG/DL
BILIRUBIN DIRECT (ROCHE PRO & PURE): 0.34 MG/DL (ref 0–0.45)
BUN SERPL-MCNC: 19.6 MG/DL (ref 8–23)
CALCIUM SERPL-MCNC: 9.7 MG/DL (ref 8.8–10.4)
CHLORIDE SERPL-SCNC: 102 MMOL/L (ref 98–107)
CREAT SERPL-MCNC: 1.02 MG/DL (ref 0.51–0.95)
CREAT UR-MCNC: 71 MG/DL
CRP SERPL-MCNC: 3.28 MG/L
EGFRCR SERPLBLD CKD-EPI 2021: 55 ML/MIN/1.73M2
EOSINOPHIL # BLD AUTO: 0.08 10E3/UL (ref 0–0.7)
EOSINOPHIL NFR BLD AUTO: 1.3 %
ERYTHROCYTE [DISTWIDTH] IN BLOOD BY AUTOMATED COUNT: 15.8 % (ref 10–15)
ERYTHROCYTE [SEDIMENTATION RATE] IN BLOOD BY WESTERGREN METHOD: 12 MM/HR (ref 0–30)
EST. AVERAGE GLUCOSE BLD GHB EST-MCNC: 100 MG/DL
GLUCOSE SERPL-MCNC: 105 MG/DL (ref 70–99)
HBA1C MFR BLD: 5.1 % (ref 0–5.6)
HCO3 SERPL-SCNC: 24 MMOL/L (ref 22–29)
HCT VFR BLD AUTO: 43.7 % (ref 35–47)
HGB BLD-MCNC: 13.6 G/DL (ref 11.7–15.7)
IMM GRANULOCYTES # BLD: <0.04 10E3/UL
IMM GRANULOCYTES NFR BLD: 0.3 %
LYMPHOCYTES # BLD AUTO: 1 10E3/UL (ref 0.8–5.3)
LYMPHOCYTES NFR BLD AUTO: 16.5 %
MCH RBC QN AUTO: 30.2 PG (ref 26.5–33)
MCHC RBC AUTO-ENTMCNC: 31.1 G/DL (ref 31.5–36.5)
MCV RBC AUTO: 97.1 FL (ref 78–100)
MICROALBUMIN UR-MCNC: <12 MG/L
MICROALBUMIN/CREAT UR: NORMAL MG/G{CREAT}
MONOCYTES # BLD AUTO: 0.6 10E3/UL (ref 0–1.3)
MONOCYTES NFR BLD AUTO: 9.9 %
NEUTROPHILS # BLD AUTO: 4.32 10E3/UL (ref 1.6–8.3)
NEUTROPHILS NFR BLD AUTO: 71.3 %
PLATELET # BLD AUTO: 203 10E3/UL (ref 150–450)
POTASSIUM SERPL-SCNC: 4.9 MMOL/L (ref 3.4–5.3)
PROT SERPL-MCNC: 7.5 G/DL (ref 6.4–8.3)
RBC # BLD AUTO: 4.5 10E6/UL (ref 3.8–5.2)
SODIUM SERPL-SCNC: 138 MMOL/L (ref 135–145)
TSH SERPL DL<=0.005 MIU/L-ACNC: 3.22 UIU/ML (ref 0.3–4.2)
WBC # BLD AUTO: 6.06 10E3/UL (ref 4–11)

## 2025-09-03 PROCEDURE — 82248 BILIRUBIN DIRECT: CPT

## 2025-09-03 PROCEDURE — 86140 C-REACTIVE PROTEIN: CPT

## 2025-09-03 PROCEDURE — 85652 RBC SED RATE AUTOMATED: CPT

## 2025-09-03 PROCEDURE — 83036 HEMOGLOBIN GLYCOSYLATED A1C: CPT | Mod: GA

## 2025-09-03 PROCEDURE — 85025 COMPLETE CBC W/AUTO DIFF WBC: CPT

## 2025-09-03 PROCEDURE — 82570 ASSAY OF URINE CREATININE: CPT

## 2025-09-03 PROCEDURE — 82043 UR ALBUMIN QUANTITATIVE: CPT

## 2025-09-03 PROCEDURE — 80053 COMPREHEN METABOLIC PANEL: CPT

## 2025-09-03 PROCEDURE — 84443 ASSAY THYROID STIM HORMONE: CPT

## 2025-09-04 ENCOUNTER — OFFICE VISIT (OUTPATIENT)
Dept: CARDIOLOGY | Facility: CLINIC | Age: 80
End: 2025-09-04
Payer: MEDICARE

## 2025-09-04 VITALS
OXYGEN SATURATION: 97 % | SYSTOLIC BLOOD PRESSURE: 125 MMHG | HEART RATE: 65 BPM | DIASTOLIC BLOOD PRESSURE: 76 MMHG | WEIGHT: 170 LBS | BODY MASS INDEX: 31.6 KG/M2

## 2025-09-04 DIAGNOSIS — I50.20 HEART FAILURE WITH REDUCED EJECTION FRACTION (H): Primary | ICD-10-CM

## 2025-09-04 DIAGNOSIS — I10 BENIGN ESSENTIAL HYPERTENSION: ICD-10-CM

## 2025-09-04 DIAGNOSIS — I48.19 PERSISTENT ATRIAL FIBRILLATION (H): ICD-10-CM

## 2025-09-04 DIAGNOSIS — N18.31 CHRONIC KIDNEY DISEASE, STAGE 3A (H): ICD-10-CM

## 2025-10-28 ENCOUNTER — PRE VISIT (OUTPATIENT)
Dept: OPHTHALMOLOGY | Facility: CLINIC | Age: 80
End: 2025-10-28

## (undated) DEVICE — SU DERMABOND ADVANCED .7ML DNX12

## (undated) DEVICE — SOL WATER IRRIG 1000ML BOTTLE 07139-09

## (undated) DEVICE — SOL WATER IRRIG 500ML BOTTLE 2F7113

## (undated) DEVICE — EYE SHIELD PLASTIC

## (undated) DEVICE — EYE PACK CUSTOM ANTERIOR 30DEG TIP CENTURION PPK6682-04

## (undated) DEVICE — EYE KNIFE SLIT XSTAR VISITEC 2.6MM 45DEG 373726

## (undated) DEVICE — EYE CANN IRR 25GA CYSTOTOME 581610

## (undated) DEVICE — EYE TIP IRRIGATION & ASPIRATION POLYMER CVD 0.3MM 8065751512

## (undated) DEVICE — EYE CANN IRR 27GA ANTERIOR CHAMBER 581280

## (undated) DEVICE — PREP CHLORAPREP 26ML TINTED ORANGE  260815

## (undated) DEVICE — EYE KNIFE STILETTO VISITEC 1.1MM ANG 45DEG SIDEPORT 376620

## (undated) DEVICE — PACK CATARACT CUSTOM ASC SEY15CPUMC

## (undated) DEVICE — LINEN TOWEL PACK X5 5464

## (undated) DEVICE — GLOVE PROTEXIS MICRO 7.5  2D73PM75

## (undated) RX ORDER — LIDOCAINE HYDROCHLORIDE 20 MG/ML
SOLUTION OROPHARYNGEAL
Status: DISPENSED
Start: 2024-06-19

## (undated) RX ORDER — CEFAZOLIN SODIUM 2 G/100ML
INJECTION, SOLUTION INTRAVENOUS
Status: DISPENSED
Start: 2018-12-31

## (undated) RX ORDER — FENTANYL CITRATE 50 UG/ML
INJECTION, SOLUTION INTRAMUSCULAR; INTRAVENOUS
Status: DISPENSED
Start: 2024-06-19

## (undated) RX ORDER — LIDOCAINE HYDROCHLORIDE AND EPINEPHRINE 10; 10 MG/ML; UG/ML
INJECTION, SOLUTION INFILTRATION; PERINEURAL
Status: DISPENSED
Start: 2018-12-31

## (undated) RX ORDER — ACETAMINOPHEN 325 MG/1
TABLET ORAL
Status: DISPENSED
Start: 2022-10-24

## (undated) RX ORDER — REGADENOSON 0.08 MG/ML
INJECTION, SOLUTION INTRAVENOUS
Status: DISPENSED
Start: 2024-06-03

## (undated) RX ORDER — FENTANYL CITRATE 50 UG/ML
INJECTION, SOLUTION INTRAMUSCULAR; INTRAVENOUS
Status: DISPENSED
Start: 2020-09-10

## (undated) RX ORDER — SODIUM CHLORIDE 9 MG/ML
INJECTION, SOLUTION INTRAVENOUS
Status: DISPENSED
Start: 2018-12-31